# Patient Record
Sex: MALE | Race: WHITE | Employment: OTHER | URBAN - METROPOLITAN AREA
[De-identification: names, ages, dates, MRNs, and addresses within clinical notes are randomized per-mention and may not be internally consistent; named-entity substitution may affect disease eponyms.]

---

## 2017-03-27 ENCOUNTER — OFFICE VISIT (OUTPATIENT)
Dept: FAMILY MEDICINE | Facility: CLINIC | Age: 82
End: 2017-03-27
Payer: COMMERCIAL

## 2017-03-27 ENCOUNTER — TRANSFERRED RECORDS (OUTPATIENT)
Dept: HEALTH INFORMATION MANAGEMENT | Facility: CLINIC | Age: 82
End: 2017-03-27

## 2017-03-27 VITALS
WEIGHT: 180 LBS | HEIGHT: 72 IN | BODY MASS INDEX: 24.38 KG/M2 | TEMPERATURE: 96.7 F | DIASTOLIC BLOOD PRESSURE: 68 MMHG | SYSTOLIC BLOOD PRESSURE: 120 MMHG | OXYGEN SATURATION: 96 % | HEART RATE: 84 BPM

## 2017-03-27 DIAGNOSIS — R35.0 INCREASED FREQUENCY OF URINATION: ICD-10-CM

## 2017-03-27 DIAGNOSIS — Z12.5 SCREENING FOR PROSTATE CANCER: ICD-10-CM

## 2017-03-27 DIAGNOSIS — R30.0 BURNING WITH URINATION: Primary | ICD-10-CM

## 2017-03-27 LAB
ALBUMIN UR-MCNC: NEGATIVE MG/DL
APPEARANCE UR: CLEAR
BASOPHILS # BLD AUTO: 0.1 10E9/L (ref 0–0.2)
BASOPHILS NFR BLD AUTO: 2.8 %
BILIRUB UR QL STRIP: NEGATIVE
COLOR UR AUTO: YELLOW
DIFFERENTIAL METHOD BLD: ABNORMAL
EOSINOPHIL # BLD AUTO: 0.3 10E9/L (ref 0–0.7)
EOSINOPHIL NFR BLD AUTO: 6.9 %
ERYTHROCYTE [DISTWIDTH] IN BLOOD BY AUTOMATED COUNT: 15.5 % (ref 10–15)
GLUCOSE UR STRIP-MCNC: NEGATIVE MG/DL
HCT VFR BLD AUTO: 44.5 % (ref 40–53)
HGB BLD-MCNC: 15.1 G/DL (ref 13.3–17.7)
HGB UR QL STRIP: NEGATIVE
KETONES UR STRIP-MCNC: NEGATIVE MG/DL
LEUKOCYTE ESTERASE UR QL STRIP: NEGATIVE
LYMPHOCYTES # BLD AUTO: 0.8 10E9/L (ref 0.8–5.3)
LYMPHOCYTES NFR BLD AUTO: 19.5 %
MCH RBC QN AUTO: 28.5 PG (ref 26.5–33)
MCHC RBC AUTO-ENTMCNC: 33.9 G/DL (ref 31.5–36.5)
MCV RBC AUTO: 84 FL (ref 78–100)
MONOCYTES # BLD AUTO: 1.1 10E9/L (ref 0–1.3)
MONOCYTES NFR BLD AUTO: 26.6 %
NEUTROPHILS # BLD AUTO: 1.7 10E9/L (ref 1.6–8.3)
NEUTROPHILS NFR BLD AUTO: 44.2 %
NITRATE UR QL: NEGATIVE
PH UR STRIP: 5.5 PH (ref 5–7)
PLATELET # BLD AUTO: 342 10E9/L (ref 150–450)
RBC # BLD AUTO: 5.3 10E12/L (ref 4.4–5.9)
SP GR UR STRIP: 1.02 (ref 1–1.03)
URN SPEC COLLECT METH UR: NORMAL
UROBILINOGEN UR STRIP-ACNC: 0.2 EU/DL (ref 0.2–1)
WBC # BLD AUTO: 3.9 10E9/L (ref 4–11)

## 2017-03-27 PROCEDURE — 87186 SC STD MICRODIL/AGAR DIL: CPT | Performed by: PHYSICIAN ASSISTANT

## 2017-03-27 PROCEDURE — 36415 COLL VENOUS BLD VENIPUNCTURE: CPT | Performed by: PHYSICIAN ASSISTANT

## 2017-03-27 PROCEDURE — G0103 PSA SCREENING: HCPCS | Performed by: PHYSICIAN ASSISTANT

## 2017-03-27 PROCEDURE — 85025 COMPLETE CBC W/AUTO DIFF WBC: CPT | Performed by: PHYSICIAN ASSISTANT

## 2017-03-27 PROCEDURE — 87088 URINE BACTERIA CULTURE: CPT | Performed by: PHYSICIAN ASSISTANT

## 2017-03-27 PROCEDURE — 80048 BASIC METABOLIC PNL TOTAL CA: CPT | Performed by: PHYSICIAN ASSISTANT

## 2017-03-27 PROCEDURE — 81003 URINALYSIS AUTO W/O SCOPE: CPT | Performed by: PHYSICIAN ASSISTANT

## 2017-03-27 PROCEDURE — 87086 URINE CULTURE/COLONY COUNT: CPT | Performed by: PHYSICIAN ASSISTANT

## 2017-03-27 PROCEDURE — 99214 OFFICE O/P EST MOD 30 MIN: CPT | Performed by: PHYSICIAN ASSISTANT

## 2017-03-27 RX ORDER — TACROLIMUS 1 MG/G
OINTMENT TOPICAL
Refills: 2 | COMMUNITY
Start: 2017-03-26 | End: 2017-07-10

## 2017-03-27 RX ORDER — TAMSULOSIN HYDROCHLORIDE 0.4 MG/1
0.4 CAPSULE ORAL DAILY
Qty: 30 CAPSULE | Refills: 0 | Status: SHIPPED | OUTPATIENT
Start: 2017-03-27 | End: 2017-09-06

## 2017-03-27 RX ORDER — TRIAMCINOLONE ACETONIDE 1 MG/G
OINTMENT TOPICAL
Refills: 1 | COMMUNITY
Start: 2017-02-17 | End: 2017-07-10

## 2017-03-27 ASSESSMENT — ANXIETY QUESTIONNAIRES
GAD7 TOTAL SCORE: 3
IF YOU CHECKED OFF ANY PROBLEMS ON THIS QUESTIONNAIRE, HOW DIFFICULT HAVE THESE PROBLEMS MADE IT FOR YOU TO DO YOUR WORK, TAKE CARE OF THINGS AT HOME, OR GET ALONG WITH OTHER PEOPLE: NOT DIFFICULT AT ALL
1. FEELING NERVOUS, ANXIOUS, OR ON EDGE: SEVERAL DAYS
5. BEING SO RESTLESS THAT IT IS HARD TO SIT STILL: NOT AT ALL
7. FEELING AFRAID AS IF SOMETHING AWFUL MIGHT HAPPEN: NOT AT ALL
2. NOT BEING ABLE TO STOP OR CONTROL WORRYING: NOT AT ALL
3. WORRYING TOO MUCH ABOUT DIFFERENT THINGS: SEVERAL DAYS
6. BECOMING EASILY ANNOYED OR IRRITABLE: NOT AT ALL

## 2017-03-27 ASSESSMENT — PATIENT HEALTH QUESTIONNAIRE - PHQ9: 5. POOR APPETITE OR OVEREATING: SEVERAL DAYS

## 2017-03-27 NOTE — PROGRESS NOTES
SUBJECTIVE:                                                    Jeff Mack is a 82 year old male who presents to clinic today for the following health issues:    Genitourinary - Male     Onset: 1 month off and on    Description:   Dysuria (painful urination): YES- burning urination  Hematuria (blood in urine): no   Frequency: YES  Are you urinating at night : YES  Hesitancy (delay in urine): YES  Retention (unable to empty): YES  Decrease in urinary flow: YES  Incontinence: no     Progression of Symptoms:  intermittent    Accompanying Signs & Symptoms:  Fever: no   Back/Flank pain: YNO  Urethral discharge: no   Testicle lumps/masses/pain: no   Nausea and/or vomiting: no   Abdominal pain: YES- left lower quadrant.    History:   History of frequent UTI's: no   History of kidney stones: no   History of hernias: YES  Personal or Family history of Prostate problems: patient does not know family history prostate problems.   Sexually active: no     Precipitating factors:   nothing    Alleviating factors:  nothing         Therapies Tried and outcome: Increase fluid intake; gas x.        Jeff has hx of prostate resection, bladder diverticula and colonic diverticulitis.  Today he presents with 1 month hx of frequency of urination, urinary hesitancy, incomplete emptying.  Over the past few days, these symptoms are becoming more persistent and now he has some burning with urination.  No hematuria.  He does have some LLQ pain, no n/v/d or constipation noted, no f/c.               Problem list and histories reviewed & adjusted, as indicated.  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)     Cardiomegaly     Chronic ischemic heart disease      Essential hypertension, benign     Primary cardiomyopathy (H)     Hyperlipidemia with target LDL less than 100     Acute low back pain     Nonallopathic lesion of sacral region     Lumbar stenosis     Health Care Home     Advanced directives, counseling/discussion     Edema     Esophageal cancer (H)     Past Surgical History:   Procedure Laterality Date     BRONCHOSCOPY FLEXIBLE N/A 4/3/2015    Procedure: BRONCHOSCOPY FLEXIBLE;  Surgeon: Rlaph Catherine MD;  Location:  OR      EXCISION OF LINGUAL TONSIL      TONSILLECTOMY     C NONSPECIFIC PROCEDURE  2-99    Colonic polpectomy     C NONSPECIFIC PROCEDURE  9-92    TURP     C NONSPECIFIC PROCEDURE  1982    Retinal surgery     COLONOSCOPY       ESOPHAGOSCOPY, GASTROSCOPY, DUODENOSCOPY (EGD), COMBINED N/A 3/19/2015    Procedure: COMBINED ESOPHAGOSCOPY, GASTROSCOPY, DUODENOSCOPY (EGD), BIOPSY SINGLE OR MULTIPLE;  Surgeon: Alo Mauro MD;  Location: [facility]     ESOPHAGOSCOPY, GASTROSCOPY, DUODENOSCOPY (EGD), COMBINED N/A 7/14/2015    Procedure: COMBINED ESOPHAGOSCOPY, GASTROSCOPY, DUODENOSCOPY (EGD), BIOPSY SINGLE OR MULTIPLE;  Surgeon: Kathy Torres MD;  Location: [facility]     ESOPHAGOSCOPY, GASTROSCOPY, DUODENOSCOPY (EGD), COMBINED N/A 12/1/2015    Procedure: COMBINED ESOPHAGOSCOPY, GASTROSCOPY, DUODENOSCOPY (EGD), BIOPSY SINGLE OR MULTIPLE;  Surgeon: Alo Mauro MD;  Location: [facility]     ESOPHAGOSCOPY, GASTROSCOPY, DUODENOSCOPY (EGD), COMBINED N/A 3/17/2016    Procedure: COMBINED ESOPHAGOSCOPY, GASTROSCOPY, DUODENOSCOPY (EGD), BIOPSY SINGLE OR MULTIPLE;  Surgeon: Alo Mauro MD;  Location:  GI     ESOPHAGOSCOPY, GASTROSCOPY, DUODENOSCOPY (EGD), COMBINED N/A 7/21/2016    Procedure: COMBINED ESOPHAGOSCOPY, GASTROSCOPY, DUODENOSCOPY (EGD);  Surgeon: Alo Mauro MD;  Location:  GI     ESOPHAGOSCOPY, GASTROSCOPY, DUODENOSCOPY (EGD), COMBINED N/A 11/17/2016    Procedure: COMBINED ESOPHAGOSCOPY, GASTROSCOPY, DUODENOSCOPY (EGD);  Surgeon:  Alo Mauro MD;  Location:  GI     GASTROSTOMY, INSERT TUBE, COMBINED N/A 4/3/2015    Procedure: COMBINED GASTROSTOMY, INSERT TUBE (OPEN);  Surgeon: Ralph Catherine MD;  Location: [facility]     HC PRESSURE GRADIENT MEASUREMENT, INITIAL VESSEL  2005    essentially normal     HC UGI ENDOSCOPY W EUS N/A 2015    Procedure: COMBINED ENDOSCOPIC ULTRASOUND, ESOPHAGOSCOPY, GASTROSCOPY, DUODENOSCOPY (EGD);  Surgeon: Kathy Torres MD;  Location:  GI     LAMINECTOMY, FUSION LUMBAR ONE LEVEL, COMBINED  2012    Procedure: COMBINED LAMINECTOMY, FUSION LUMBAR ONE LEVEL;  Posterior Fusion L5-S1, Total Facetectomy L5-S1 Left ;  Surgeon: Ronald Espinosa MD;  Location:  OR       Social History   Substance Use Topics     Smoking status: Former Smoker     Packs/day: 2.00     Years: 40.00     Types: Cigarettes     Quit date: 1975     Smokeless tobacco: Never Used     Alcohol use No     Family History   Problem Relation Age of Onset     Hypertension Mother      CEREBROVASCULAR DISEASE Mother      DIABETES Brother      DIABETES Father       age 97         Current Outpatient Prescriptions   Medication Sig Dispense Refill     tamsulosin (FLOMAX) 0.4 MG capsule Take 1 capsule (0.4 mg) by mouth daily 30 capsule 0     budesonide-formoterol (SYMBICORT) 160-4.5 MCG/ACT inhaler Inhale 2 puffs into the lungs 2 times daily       CITALOPRAM HYDROBROMIDE PO        cyanocolbalamin (VITAMIN  B-12) 100 MCG tablet Take 50 mcg by mouth daily       Cholecalciferol (VITAMIN D3) 5000 UNITS TABS Take  by mouth.       triamcinolone (KENALOG) 0.1 % ointment APPLY TO AFFECTED AREA ON TRUNK TWICE DAILY -SHOULD LAST APPROXIMATELY 1 MONTH  1     tacrolimus (PROTOPIC) 0.1 % ointment   2     TRAMADOL HCL PO        ALPRAZolam (XANAX) 0.5 MG tablet Take 1 tablet (0.5 mg) by mouth 2 times daily as needed for anxiety 45 tablet 0     fluticasone (FLOVENT HFA) 220 MCG/ACT inhaler Inhale 2 puffs into the lungs 2 times daily        EPINEPHrine (EPIPEN) 0.3 MG/0.3ML injection Inject 0.3 mLs (0.3 mg) into the muscle once as needed for anaphylaxis 1 each 1     MAXAIR AUTOHALER 200 MCG/INH IN AERB 2 puffs tid prn 1 5     Allergies   Allergen Reactions     Bee Venom Shortness Of Breath       Reviewed and updated as needed this visit by clinical staff       Reviewed and updated as needed this visit by Provider         ROS:  Constitutional, HEENT, cardiovascular, pulmonary, gi and gu systems are negative, except as otherwise noted.    OBJECTIVE:                                                    /68 (BP Location: Left arm, Patient Position: Chair, Cuff Size: Adult Large)  Pulse 84  Temp 96.7  F (35.9  C) (Tympanic)  Ht 6' (1.829 m)  Wt 180 lb (81.6 kg)  SpO2 96%  BMI 24.41 kg/m2  Body mass index is 24.41 kg/(m^2).  GENERAL: healthy, alert and no distress  RESP: lungs clear to auscultation - no rales, rhonchi or wheezes  CV: regular rate and rhythm, normal S1 S2, no S3 or S4, no murmur, click or rub  ABDOMEN: soft, mild LLQ TTP, no rebound or guarding, no hepatosplenomegaly, no masses and bowel sounds normal  PSYCH: mentation appears normal, affect normal/bright    Diagnostic Test Results:  Results for orders placed or performed in visit on 03/27/17 (from the past 24 hour(s))   CBC with platelets differential   Result Value Ref Range    WBC 3.9 (L) 4.0 - 11.0 10e9/L    RBC Count 5.30 4.4 - 5.9 10e12/L    Hemoglobin 15.1 13.3 - 17.7 g/dL    Hematocrit 44.5 40.0 - 53.0 %    MCV 84 78 - 100 fl    MCH 28.5 26.5 - 33.0 pg    MCHC 33.9 31.5 - 36.5 g/dL    RDW 15.5 (H) 10.0 - 15.0 %    Platelet Count 342 150 - 450 10e9/L    Diff Method Automated Method     % Neutrophils 44.2 %    % Lymphocytes 19.5 %    % Monocytes 26.6 %    % Eosinophils 6.9 %    % Basophils 2.8 %    Absolute Neutrophil 1.7 1.6 - 8.3 10e9/L    Absolute Lymphocytes 0.8 0.8 - 5.3 10e9/L    Absolute Monocytes 1.1 0.0 - 1.3 10e9/L    Absolute Eosinophils 0.3 0.0 - 0.7 10e9/L     Absolute Basophils 0.1 0.0 - 0.2 10e9/L   *UA reflex to Microscopic and Culture ([place] and [facility] (except [place] and [place])   Result Value Ref Range    Color Urine Yellow     Appearance Urine Clear     Glucose Urine Negative NEG mg/dL    Bilirubin Urine Negative NEG    Ketones Urine Negative NEG mg/dL    Specific Gravity Urine 1.020 1.003 - 1.035    Blood Urine Negative NEG    pH Urine 5.5 5.0 - 7.0 pH    Protein Albumin Urine Negative NEG mg/dL    Urobilinogen Urine 0.2 0.2 - 1.0 EU/dL    Nitrite Urine Negative NEG    Leukocyte Esterase Urine Negative NEG    Source Midstream Urine         ASSESSMENT/PLAN:                                                      1. Burning with urination  UA normal, will await culture. Etiology is likely BPH, I am still awaiting his PSA at this time.  Given his LLQ pain and hx of incomplete voiding, I will also get a renal and bladder US with post void residual to assess for obstructive process.  Today we started him on Flomax for symptoms improvement, i will follow up with him when I have his US available.  - *UA reflex to Microscopic and Culture ([place] and [facility] (except [place] and [place])  - Urine Culture Aerobic Bacterial  - CBC with platelets differential  - Basic metabolic panel    2. Increased frequency of urination  - Basic metabolic panel  - tamsulosin (FLOMAX) 0.4 MG capsule; Take 1 capsule (0.4 mg) by mouth daily  Dispense: 30 capsule; Refill: 0  - US Renal Complete; Future    3. Screening for prostate cancer  - PSA, screen    See Patient Instructions    Flako Butterfield PA-C  [facility]

## 2017-03-27 NOTE — NURSING NOTE
Chief Complaint   Patient presents with     Abdominal Pain       Initial /68 (BP Location: Left arm, Patient Position: Chair, Cuff Size: Adult Large)  Pulse 84  Temp 96.7  F (35.9  C) (Tympanic)  Ht 6' (1.829 m)  Wt 180 lb (81.6 kg)  SpO2 96%  BMI 24.41 kg/m2 Estimated body mass index is 24.41 kg/(m^2) as calculated from the following:    Height as of this encounter: 6' (1.829 m).    Weight as of this encounter: 180 lb (81.6 kg).  Medication Reconciliation: complete Trinidad Grant MA

## 2017-03-27 NOTE — MR AVS SNAPSHOT
"              After Visit Summary   3/27/2017    Jeff Mack    MRN: [id number]           Patient Information     Date Of Birth          1935        Visit Information        Provider Department      3/27/2017 11:00 AM Flako Butterfield PA-C [facility]        Today's Diagnoses     Burning with urination    -  1    Increased frequency of urination        Screening for prostate cancer           Follow-ups after your visit        Future tests that were ordered for you today     Open Future Orders        Priority Expected Expires Ordered    US Renal Complete Routine 6/25/2017 3/27/2018 3/27/2017            Who to contact     If you have questions or need follow up information about today's clinic visit or your schedule please contact [facility] directly at [phone].  Normal or non-critical lab and imaging results will be communicated to you by MyChart, letter or phone within 4 business days after the clinic has received the results. If you do not hear from us within 7 days, please contact the clinic through MyChart or phone. If you have a critical or abnormal lab result, we will notify you by phone as soon as possible.  Submit refill requests through [employer] or call your pharmacy and they will forward the refill request to us. Please allow 3 business days for your refill to be completed.          Additional Information About Your Visit        MyChart Information     [employer] lets you send messages to your doctor, view your test results, renew your prescriptions, schedule appointments and more. To sign up, go to [email or web address] . Click on \"Log in\" on the left side of the screen, which will take you to the Welcome page. Then click on \"Sign up Now\" on the right side of the page.     You will be asked to enter the access code listed below, as well as some personal information. Please follow the directions to create your username and password.     Your access " code is: [email or web address]  Expires: 2017  6:23 PM     Your access code will  in 90 days. If you need help or a new code, please call your [place] clinic or [phone].        Care EveryWhere ID     This is your Care EveryWhere ID. This could be used by other organizations to access your [place] medical records  [id number]        Your Vitals Were     Pulse Temperature Height Pulse Oximetry BMI (Body Mass Index)       84 96.7  F (35.9  C) (Tympanic) 6' (1.829 m) 96% 24.41 kg/m2        Blood Pressure from Last 3 Encounters:   17 120/68   16 150/81   16 127/74    Weight from Last 3 Encounters:   17 180 lb (81.6 kg)   16 175 lb (79.4 kg)   16 175 lb (79.4 kg)              We Performed the Following     *UA reflex to Microscopic and Culture ([place] and [facility] (except [place] and [place])     Basic metabolic panel     CBC with platelets differential     PSA, screen     Urine Culture Aerobic Bacterial          Today's Medication Changes          These changes are accurate as of: 3/27/17  6:23 PM.  If you have any questions, ask your nurse or doctor.               Start taking these medicines.        Dose/Directions    tamsulosin 0.4 MG capsule   Commonly known as:  FLOMAX   Used for:  Increased frequency of urination   Started by:  Flako Butterfield PA-C        Dose:  0.4 mg   Take 1 capsule (0.4 mg) by mouth daily   Quantity:  30 capsule   Refills:  0         These medicines have changed or have updated prescriptions.        Dose/Directions    ALPRAZolam 0.5 MG tablet   Commonly known as:  XANAX   This may have changed:  Another medication with the same name was removed. Continue taking this medication, and follow the directions you see here.   Used for:  Adjustment disorder with mixed anxiety and depressed mood   Changed by:  Benjamin Lazar MD        Dose:  0.5 mg   Take 1 tablet (0.5 mg) by mouth 2 times daily as needed for anxiety   Quantity:  45 tablet    Refills:  0         Stop taking these medicines if you haven't already. Please contact your care team if you have questions.     FORADIL AEROLIZER 12 MCG capsule   Generic drug:  formoterol   Stopped by:  Flako Butterfield PA-C                Where to get your medicines      These medications were sent to [place] [address] [facility] - [place], [place] - [address]  [address] [facility], [facility] [address]     Phone:  [phone]     tamsulosin 0.4 MG capsule                Primary Care Provider Office Phone # Fax #    Benjamin Lazar [phone]       [place] [id number] [facility] [address]        Thank you!     Thank you for choosing [facility]  [place]. Our goal is always to provide you with excellent care. Hearing back from our patients is one way we can continue to improve our services. Please take a few minutes to complete the written survey that you may receive in the mail after your visit with us. Thank you!             Your Updated Medication List - Protect others around you: Learn how to safely use, store and throw away your medicines at www.disposemymeds.org.          This list is accurate as of: 3/27/17  6:23 PM.  Always use your most recent med list.                   Brand Name Dispense Instructions for use    ALPRAZolam 0.5 MG tablet    XANAX    45 tablet    Take 1 tablet (0.5 mg) by mouth 2 times daily as needed for anxiety       budesonide-formoterol 160-4.5 MCG/ACT Inhaler    SYMBICORT     Inhale 2 puffs into the lungs 2 times daily       cholecalciferol 5000 UNITS Tabs tablet    vitamin D3     Take  by mouth.       CITALOPRAM HYDROBROMIDE PO          cyanocolbalamin 100 MCG tablet    vitamin  B-12     Take 50 mcg by mouth daily       EPINEPHrine 0.3 MG/0.3ML injection    EPIPEN    1 each    Inject 0.3 mLs (0.3 mg) into the muscle once as needed for anaphylaxis       fluticasone 220 MCG/ACT Inhaler    FLOVENT  HFA     Inhale 2 puffs into the lungs 2 times daily       MAXAIR AUTOHALER 200 MCG/INH Inhaler   Generic drug:  pirbuterol     1    2 puffs tid prn       tacrolimus 0.1 % ointment    PROTOPIC         tamsulosin 0.4 MG capsule    FLOMAX    30 capsule    Take 1 capsule (0.4 mg) by mouth daily       TRAMADOL HCL PO          triamcinolone 0.1 % ointment    KENALOG     APPLY TO AFFECTED AREA ON TRUNK TWICE DAILY -SHOULD LAST APPROXIMATELY 1 MONTH

## 2017-03-28 ENCOUNTER — TELEPHONE (OUTPATIENT)
Dept: FAMILY MEDICINE | Facility: CLINIC | Age: 82
End: 2017-03-28

## 2017-03-28 DIAGNOSIS — N30.00 ACUTE CYSTITIS WITHOUT HEMATURIA: Primary | ICD-10-CM

## 2017-03-28 LAB
ANION GAP SERPL CALCULATED.3IONS-SCNC: 4 MMOL/L (ref 3–14)
BACTERIA SPEC CULT: ABNORMAL
BUN SERPL-MCNC: 10 MG/DL (ref 7–30)
CALCIUM SERPL-MCNC: 8.9 MG/DL (ref 8.5–10.1)
CHLORIDE SERPL-SCNC: 103 MMOL/L (ref 94–109)
CO2 SERPL-SCNC: 32 MMOL/L (ref 20–32)
CREAT SERPL-MCNC: 0.81 MG/DL (ref 0.66–1.25)
GFR SERPL CREATININE-BSD FRML MDRD: NORMAL ML/MIN/1.7M2
GLUCOSE SERPL-MCNC: 84 MG/DL (ref 70–99)
MICRO REPORT STATUS: ABNORMAL
MICROORGANISM SPEC CULT: ABNORMAL
POTASSIUM SERPL-SCNC: 4.4 MMOL/L (ref 3.4–5.3)
PSA SERPL-ACNC: 0.62 UG/L (ref 0–4)
SODIUM SERPL-SCNC: 139 MMOL/L (ref 133–144)
SPECIMEN SOURCE: ABNORMAL

## 2017-03-28 RX ORDER — SULFAMETHOXAZOLE/TRIMETHOPRIM 800-160 MG
1 TABLET ORAL 2 TIMES DAILY
Qty: 20 TABLET | Refills: 0 | Status: SHIPPED | OUTPATIENT
Start: 2017-03-28 | End: 2017-07-10

## 2017-03-28 ASSESSMENT — ANXIETY QUESTIONNAIRES: GAD7 TOTAL SCORE: 3

## 2017-03-28 ASSESSMENT — PATIENT HEALTH QUESTIONNAIRE - PHQ9: SUM OF ALL RESPONSES TO PHQ QUESTIONS 1-9: 2

## 2017-03-28 NOTE — TELEPHONE ENCOUNTER
Left non detailed message for patient to return call.  Myra Oneill RN   [facility] - Triage

## 2017-03-28 NOTE — TELEPHONE ENCOUNTER
Patient informed and agrees with plan.  Will  Rx and start today  No further questions    Edda Rodney RN

## 2017-03-28 NOTE — TELEPHONE ENCOUNTER
Called and spoke with patient about results of US, no follow up needed, bladder US shows only bladder diverticulum, some debris in the bladder likely secondary to infection. he will begin taking bactrim for UTI and follow up if new or worsening symptoms

## 2017-03-28 NOTE — TELEPHONE ENCOUNTER
Patient prescribed flomax capsule yesterday.  Patient report that he cannot take the capsule whole and it's too big in size.  Wondering if he can open the capsule and place the content in apple sauce.    Message handled by Nurse Triage with [facility] - provider name: Flako Butterfield PA-C.    That's fine.  Just don't crush the capsule.  He can open it and take the content with apple sauce.    Patient informed and agrees with plan    Edda Rodney RN

## 2017-03-28 NOTE — TELEPHONE ENCOUNTER
Attempted to call patient x 1 with results of urine culture showing + UTI ( grew out e coli).  I do not have susceptibilities back yet but given his symptoms I would like to start him on bactrim BID x 10 days. Please try and call him to inform him of this.   He may take this and I would presume symptoms will improve without the help of flomax, so he may not need the flomax any longer.  I do not have the results of the US back yet but will call him when I do.

## 2017-03-29 ENCOUNTER — TELEPHONE (OUTPATIENT)
Dept: FAMILY MEDICINE | Facility: CLINIC | Age: 82
End: 2017-03-29

## 2017-03-29 DIAGNOSIS — N30.00 ACUTE CYSTITIS WITHOUT HEMATURIA: Primary | ICD-10-CM

## 2017-03-29 RX ORDER — CEPHALEXIN 500 MG/1
500 CAPSULE ORAL 4 TIMES DAILY
Qty: 40 CAPSULE | Refills: 0 | Status: SHIPPED | OUTPATIENT
Start: 2017-03-29 | End: 2017-04-08

## 2017-03-29 NOTE — TELEPHONE ENCOUNTER
Patient contacted with information from provider noted below.  Patient agrees with plan.  Brooklyn Barber RN  [facility] [employer]

## 2017-03-29 NOTE — TELEPHONE ENCOUNTER
Jeff was placed on bactrim empirically for uti yesterday.  His culture today grew out resistant bacteria.  Please call him and inform him that I would like to switch him to Levaquin 750 QD x 5 days.  Please let me know if he has questions.

## 2017-03-29 NOTE — TELEPHONE ENCOUNTER
Drug interactions noted with levaquin, Patient can pickup keflex QID x 10 days at his pharmacy.   This has been ordered

## 2017-03-29 NOTE — TELEPHONE ENCOUNTER
Last messages from Flako as below from yesterday and today     3/28/17 -   Attempted to call patient x 1 with results of urine culture showing + UTI ( grew out e coli). I do not have susceptibilities back yet but given his symptoms I would like to start him on bactrim BID x 10 days. Please try and call him to inform him of this. He may take this and I would presume symptoms will improve without the help of flomax, so he may not need the flomax any longer. I do not have the results of the US back yet but will call him when I do.      3/29/17  Jeff was placed on bactrim empirically for uti yesterday. His culture today grew out resistant bacteria. Please call him and inform him that I would like to switch him to Levaquin 750 QD x 5 days. Please let me know if he has questions.     Drug interactions noted with levaquin, Patient can pickup keflex QID x 10 days at his pharmacy. This has been ordered    Patient called back, advised to stop sulfamethoxazole-trimethoprim (BACTRIM DS/SEPTRA DS) 800-160 MG per tablet  and begin Keflex QID for 10 days.  Agrees with plan, will  new abx from pharmacy and started today        Edda Rodney RN

## 2017-05-09 ENCOUNTER — SURGERY (OUTPATIENT)
Age: 82
End: 2017-05-09

## 2017-05-09 ENCOUNTER — HOSPITAL ENCOUNTER (OUTPATIENT)
Facility: CLINIC | Age: 82
Discharge: HOME | End: 2017-05-09
Attending: SPECIALIST | Admitting: SPECIALIST
Payer: MEDICARE

## 2017-05-09 VITALS
SYSTOLIC BLOOD PRESSURE: 137 MMHG | HEIGHT: 72 IN | BODY MASS INDEX: 24.11 KG/M2 | OXYGEN SATURATION: 98 % | DIASTOLIC BLOOD PRESSURE: 109 MMHG | RESPIRATION RATE: 10 BRPM | WEIGHT: 178 LBS

## 2017-05-09 LAB — UPPER GI ENDOSCOPY: NORMAL

## 2017-05-09 PROCEDURE — 25000132 ZZH RX MED GY IP 250 OP 250 PS 637: Mod: GY | Performed by: SPECIALIST

## 2017-05-09 PROCEDURE — 40000104 ZZH STATISTIC MODERATE SEDATION < 10 MIN: Performed by: SPECIALIST

## 2017-05-09 PROCEDURE — 25000128 H RX IP 250 OP 636: Performed by: SPECIALIST

## 2017-05-09 PROCEDURE — 43235 EGD DIAGNOSTIC BRUSH WASH: CPT | Performed by: SPECIALIST

## 2017-05-09 PROCEDURE — 25000125 ZZHC RX 250: Performed by: SPECIALIST

## 2017-05-09 RX ORDER — ONDANSETRON 2 MG/ML
4 INJECTION INTRAMUSCULAR; INTRAVENOUS
Status: DISCONTINUED | OUTPATIENT
Start: 2017-05-09 | End: 2017-05-09 | Stop reason: HOSPADM

## 2017-05-09 RX ORDER — FENTANYL CITRATE 50 UG/ML
INJECTION, SOLUTION INTRAMUSCULAR; INTRAVENOUS PRN
Status: DISCONTINUED | OUTPATIENT
Start: 2017-05-09 | End: 2017-05-09 | Stop reason: HOSPADM

## 2017-05-09 RX ORDER — BETAMETHASONE DIPROPIONATE 0.5 MG/G
CREAM TOPICAL 2 TIMES DAILY
COMMUNITY
End: 2017-10-31

## 2017-05-09 RX ORDER — LIDOCAINE 40 MG/G
CREAM TOPICAL
Status: DISCONTINUED | OUTPATIENT
Start: 2017-05-09 | End: 2017-05-09 | Stop reason: HOSPADM

## 2017-05-09 RX ADMIN — MIDAZOLAM HYDROCHLORIDE 0.5 MG: 1 INJECTION, SOLUTION INTRAMUSCULAR; INTRAVENOUS at 08:39

## 2017-05-09 RX ADMIN — FENTANYL CITRATE 25 MCG: 50 INJECTION, SOLUTION INTRAMUSCULAR; INTRAVENOUS at 08:43

## 2017-05-09 RX ADMIN — MIDAZOLAM HYDROCHLORIDE 0.5 MG: 1 INJECTION, SOLUTION INTRAMUSCULAR; INTRAVENOUS at 08:43

## 2017-05-09 RX ADMIN — FENTANYL CITRATE 25 MCG: 50 INJECTION, SOLUTION INTRAMUSCULAR; INTRAVENOUS at 08:39

## 2017-05-09 RX ADMIN — BENZOCAINE 1 SPRAY: 220 SPRAY, METERED PERIODONTAL at 08:39

## 2017-05-09 NOTE — BRIEF OP NOTE
[facility] Brief Operative Note    Pre-operative diagnosis: ESOPHOGEAL CANCER   Post-operative diagnosis hiatus hernia, scarring in proximal esophagus from prior esophageal cancer treatment.      Procedure: Procedure(s):  (EGD) COMBINED ESOPHAGOSCOPY, GASTROSCOPY, DUODENOSCOPY - Wound Class: II-Clean Contaminated   Surgeon(s): Surgeon(s) and Role:     * Alo Mauro MD - Primary   Estimated blood loss: * No values recorded between 5/9/2017 12:00 AM and 5/9/2017  8:57 AM *    Specimens: * No specimens in log *   Findings: Please see ProVation procedure note in Chart Review

## 2017-05-09 NOTE — H&P
Pre-Endoscopy History and Physical     Jeff Mack MRN# [id number]   YOB: 1935 Age: 82 year old     Date of Procedure: 5/9/2017  Primary care provider: Benjamin Lazar  Type of Endoscopy: Esophagogastroduodenoscopy with possible biopsy, possible dilation, possible foreign body removal  Reason for Procedure: follow up esophageal cancer  Type of Anesthesia Anticipated: Conscious Sedation    HPI:    Jeff is a 82 year old male who will be undergoing the above procedure.      A history and physical has been performed. The patient's medications and allergies have been reviewed. The risks and benefits of the procedure and the sedation options and risks were discussed with the patient.  All questions were answered and informed consent was obtained.      He denies a personal or family history of anesthesia complications or bleeding disorders.     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)     Cardiomegaly     Chronic ischemic heart disease     Essential hypertension, benign     Primary cardiomyopathy (H)     Hyperlipidemia with target LDL less than 100     Acute low back pain     Nonallopathic lesion of sacral region     Lumbar stenosis     Health Care Home     Advanced directives, counseling/discussion     Edema     Esophageal cancer (H)        Past Medical History:   Diagnosis Date     Anxiety state, unspecified      Asthma      Basal cell carcinoma      Benign neoplasm of colon      Cardiomegaly 1/6/2005     Chronic ischemic heart disease, unspecified      Constipation      Depressive disorder, not elsewhere classified      Diverticulosis of colon (without mention of hemorrhage)      Esophageal cancer (H) 3/23/2015     Essential hypertension, benign      Extrinsic  asthma, unspecified      Hiatal hernia      Hypertrophy (benign) of prostate      Internal hemorrhoids without mention of complication      Other primary cardiomyopathies 2003    EF 45%     Sciatica      Spondylosis of unspecified site without mention of myelopathy         Past Surgical History:   Procedure Laterality Date     BRONCHOSCOPY FLEXIBLE N/A 4/3/2015    Procedure: BRONCHOSCOPY FLEXIBLE;  Surgeon: Ralph Catherine MD;  Location:  OR      EXCISION OF LINGUAL TONSIL      TONSILLECTOMY     C NONSPECIFIC PROCEDURE  2-99    Colonic polpectomy     C NONSPECIFIC PROCEDURE  9-92    TURP     C NONSPECIFIC PROCEDURE  1982    Retinal surgery     COLONOSCOPY       ESOPHAGOSCOPY, GASTROSCOPY, DUODENOSCOPY (EGD), COMBINED N/A 3/19/2015    Procedure: COMBINED ESOPHAGOSCOPY, GASTROSCOPY, DUODENOSCOPY (EGD), BIOPSY SINGLE OR MULTIPLE;  Surgeon: Alo Mauro MD;  Location: [facility]     ESOPHAGOSCOPY, GASTROSCOPY, DUODENOSCOPY (EGD), COMBINED N/A 7/14/2015    Procedure: COMBINED ESOPHAGOSCOPY, GASTROSCOPY, DUODENOSCOPY (EGD), BIOPSY SINGLE OR MULTIPLE;  Surgeon: Kathy Torres MD;  Location: [facility]     ESOPHAGOSCOPY, GASTROSCOPY, DUODENOSCOPY (EGD), COMBINED N/A 12/1/2015    Procedure: COMBINED ESOPHAGOSCOPY, GASTROSCOPY, DUODENOSCOPY (EGD), BIOPSY SINGLE OR MULTIPLE;  Surgeon: Alo Mauro MD;  Location: [facility]     ESOPHAGOSCOPY, GASTROSCOPY, DUODENOSCOPY (EGD), COMBINED N/A 3/17/2016    Procedure: COMBINED ESOPHAGOSCOPY, GASTROSCOPY, DUODENOSCOPY (EGD), BIOPSY SINGLE OR MULTIPLE;  Surgeon: Alo Mauro MD;  Location:  GI     ESOPHAGOSCOPY, GASTROSCOPY, DUODENOSCOPY (EGD), COMBINED N/A 7/21/2016    Procedure: COMBINED ESOPHAGOSCOPY, GASTROSCOPY, DUODENOSCOPY (EGD);  Surgeon: Alo Mauro MD;  Location:  GI     ESOPHAGOSCOPY, GASTROSCOPY, DUODENOSCOPY (EGD), COMBINED N/A 11/17/2016    Procedure: COMBINED ESOPHAGOSCOPY, GASTROSCOPY, DUODENOSCOPY (EGD);  Surgeon: Alo Mauro MD;  [facility]     GASTROSTOMY, INSERT TUBE, COMBINED N/A 4/3/2015    Procedure: COMBINED GASTROSTOMY, INSERT TUBE (OPEN);  Surgeon: Ralph Catherine MD;  Location: [facility]     HC PRESSURE GRADIENT MEASUREMENT, INITIAL VESSEL  2005    essentially normal     HC UGI ENDOSCOPY W EUS N/A 2015    Procedure: COMBINED ENDOSCOPIC ULTRASOUND, ESOPHAGOSCOPY, GASTROSCOPY, DUODENOSCOPY (EGD);  Surgeon: Kathy Torres MD;  Location:  GI     LAMINECTOMY, FUSION LUMBAR ONE LEVEL, COMBINED  2012    Procedure: COMBINED LAMINECTOMY, FUSION LUMBAR ONE LEVEL;  Posterior Fusion L5-S1, Total Facetectomy L5-S1 Left ;  Surgeon: Ronald Espinosa MD;  Location:  OR       Social History   Substance Use Topics     Smoking status: Former Smoker     Packs/day: 2.00     Years: 40.00     Types: Cigarettes     Quit date: 1975     Smokeless tobacco: Never Used     Alcohol use No       Family History   Problem Relation Age of Onset     Hypertension Mother      CEREBROVASCULAR DISEASE Mother      DIABETES Brother      DIABETES Father       age 97       Prior to Admission medications    Medication Sig Start Date End Date Taking? Authorizing Provider   betamethasone dipropionate (DIPROSONE) 0.05 % cream Apply topically 2 times daily   Yes Reported, Patient   budesonide-formoterol (SYMBICORT) 160-4.5 MCG/ACT inhaler Inhale 2 puffs into the lungs 2 times daily   Yes Reported, Patient   sulfamethoxazole-trimethoprim (BACTRIM DS/SEPTRA DS) 800-160 MG per tablet Take 1 tablet by mouth 2 times daily 3/28/17   Flako Butterfield PA-C   triamcinolone (KENALOG) 0.1 % ointment APPLY TO AFFECTED AREA ON TRUNK TWICE DAILY -SHOULD LAST APPROXIMATELY 1 MONTH 17   Reported, Patient   tacrolimus (PROTOPIC) 0.1 % ointment  3/26/17   Reported, Patient   tamsulosin (FLOMAX) 0.4 MG capsule Take 1 capsule (0.4 mg) by mouth daily 3/27/17   Flako Butterfield PA-C   TRAMADOL HCL PO     Reported, Patient   ALPRAZolam (XANAX)  0.5 MG tablet Take 1 tablet (0.5 mg) by mouth 2 times daily as needed for anxiety 4/8/16   Benjamin Lazar MD   CITALOPRAM HYDROBROMIDE PO     Reported, Patient   fluticasone (FLOVENT HFA) 220 MCG/ACT inhaler Inhale 2 puffs into the lungs 2 times daily    Reported, Patient   cyanocolbalamin (VITAMIN  B-12) 100 MCG tablet Take 50 mcg by mouth daily    Reported, Patient   EPINEPHrine (EPIPEN) 0.3 MG/0.3ML injection Inject 0.3 mLs (0.3 mg) into the muscle once as needed for anaphylaxis 9/2/13   Juan Francisco Hanks MD   Cholecalciferol (VITAMIN D3) 5000 UNITS TABS Take  by mouth.    Reported, Patient   MAXAIR AUTOHALER 200 MCG/INH IN AERB 2 puffs tid prn 12/31/04   Jamir Velasquez MD       Allergies   Allergen Reactions     Bee Venom Shortness Of Breath        REVIEW OF SYSTEMS:   5 point ROS negative except as noted above in HPI, including Gen., Resp., CV, GI &  system review.    PHYSICAL EXAM:   /81  Resp 16  Ht 1.829 m (6')  Wt 80.7 kg (178 lb)  SpO2 100%  BMI 24.14 kg/m2 Estimated body mass index is 24.14 kg/(m^2) as calculated from the following:    Height as of this encounter: 1.829 m (6').    Weight as of this encounter: 80.7 kg (178 lb).   GENERAL APPEARANCE: alert, and oriented  MENTAL STATUS: alert  AIRWAY EXAM: Mallampatti Class I (visualization of the soft palate, fauces, uvula, anterior and posterior pillars)  RESP: lungs clear to auscultation - no rales, rhonchi or wheezes  CV: regular rates and rhythm  DIAGNOSTICS:    Not indicated    IMPRESSION   ASA Class 2 - Mild systemic disease    PLAN:   Plan for Esophagogastroduodenoscopy with possible biopsy, possible dilation, possible foreign body removal. We discussed the risks, benefits and alternatives and the patient wished to proceed.    The above has been forwarded to the consulting provider.      Signed Electronically by: Alo Mauro  May 9, 2017

## 2017-05-15 ENCOUNTER — TRANSFERRED RECORDS (OUTPATIENT)
Dept: HEALTH INFORMATION MANAGEMENT | Facility: CLINIC | Age: 82
End: 2017-05-15

## 2017-07-03 ENCOUNTER — TELEPHONE (OUTPATIENT)
Dept: FAMILY MEDICINE | Facility: CLINIC | Age: 82
End: 2017-07-03

## 2017-07-03 NOTE — TELEPHONE ENCOUNTER
Scheduled office visit for 7/10 for evaluation of back issues and get refill of Alprazolam    Alprazolam      Last Written Prescription Date: 4/8/16  Last Fill Quantity: 48,  # refills: 0   Last Office Visit with [facility], P or [facility] prescribing provider: 3/27/17                                         Next 5 appointments (look out 90 days)     Jul 10, 2017 11:40 AM CDT   Office Visit with Benjamin Lazar MD   [facility] ([facility])    [address] [place] [id number] [phone]                 Still has a few pills left from 4/8/16 prescription, advised to wait until 7/10 appointment to discuss refill with Dr. Lazar.   Patient agreed with plan.   STALIN Deras

## 2017-07-10 ENCOUNTER — OFFICE VISIT (OUTPATIENT)
Dept: FAMILY MEDICINE | Facility: CLINIC | Age: 82
End: 2017-07-10
Payer: COMMERCIAL

## 2017-07-10 VITALS
WEIGHT: 174 LBS | OXYGEN SATURATION: 97 % | SYSTOLIC BLOOD PRESSURE: 126 MMHG | BODY MASS INDEX: 23.57 KG/M2 | RESPIRATION RATE: 12 BRPM | HEART RATE: 86 BPM | HEIGHT: 72 IN | TEMPERATURE: 97.9 F | DIASTOLIC BLOOD PRESSURE: 74 MMHG

## 2017-07-10 DIAGNOSIS — F43.23 ADJUSTMENT DISORDER WITH MIXED ANXIETY AND DEPRESSED MOOD: ICD-10-CM

## 2017-07-10 DIAGNOSIS — Z23 NEED FOR PROPHYLACTIC VACCINATION AGAINST STREPTOCOCCUS PNEUMONIAE (PNEUMOCOCCUS): ICD-10-CM

## 2017-07-10 DIAGNOSIS — F41.9 ANXIETY: Primary | ICD-10-CM

## 2017-07-10 DIAGNOSIS — Z13.31 SCREENING FOR DEPRESSION: ICD-10-CM

## 2017-07-10 DIAGNOSIS — M54.30 SCIATICA, UNSPECIFIED LATERALITY: ICD-10-CM

## 2017-07-10 PROCEDURE — 99214 OFFICE O/P EST MOD 30 MIN: CPT | Performed by: FAMILY MEDICINE

## 2017-07-10 RX ORDER — ALPRAZOLAM 0.5 MG
0.5 TABLET ORAL 2 TIMES DAILY PRN
Qty: 45 TABLET | Refills: 0 | Status: SHIPPED | OUTPATIENT
Start: 2017-07-10 | End: 2020-01-01

## 2017-07-10 ASSESSMENT — ANXIETY QUESTIONNAIRES
GAD7 TOTAL SCORE: 1
2. NOT BEING ABLE TO STOP OR CONTROL WORRYING: NOT AT ALL
7. FEELING AFRAID AS IF SOMETHING AWFUL MIGHT HAPPEN: NOT AT ALL
1. FEELING NERVOUS, ANXIOUS, OR ON EDGE: SEVERAL DAYS
5. BEING SO RESTLESS THAT IT IS HARD TO SIT STILL: NOT AT ALL
6. BECOMING EASILY ANNOYED OR IRRITABLE: NOT AT ALL
3. WORRYING TOO MUCH ABOUT DIFFERENT THINGS: NOT AT ALL
IF YOU CHECKED OFF ANY PROBLEMS ON THIS QUESTIONNAIRE, HOW DIFFICULT HAVE THESE PROBLEMS MADE IT FOR YOU TO DO YOUR WORK, TAKE CARE OF THINGS AT HOME, OR GET ALONG WITH OTHER PEOPLE: NOT DIFFICULT AT ALL

## 2017-07-10 ASSESSMENT — PATIENT HEALTH QUESTIONNAIRE - PHQ9: 5. POOR APPETITE OR OVEREATING: NOT AT ALL

## 2017-07-10 NOTE — MR AVS SNAPSHOT
After Visit Summary   7/10/2017    Jeff Mack    MRN: [id number]           Patient Information     Date Of Birth          1935        Visit Information        Provider Department      7/10/2017 11:40 AM Benjamin Lazar MD [facility]        Today's Diagnoses     Anxiety    -  1    Screening for depression        Need for prophylactic vaccination against Streptococcus pneumoniae (pneumococcus)        Adjustment disorder with mixed anxiety and depressed mood        Sciatica, unspecified laterality           Follow-ups after your visit        Additional Services     MENTAL HEALTH REFERRAL       Your provider has referred you to: OTHER PROVIDERS: [place] [facility]([phone])    All scheduling is subject to the client's specific insurance plan & benefits, provider/location availability, and provider clinical specialities.  Please arrive 15 minutes early for your first appointment and bring your completed paperwork.    Please be aware that coverage of these services is subject to the terms and limitations of your health insurance plan.  Call member services at your health plan with any benefit or coverage questions.                  Who to contact     If you have questions or need follow up information about today's clinic visit or your schedule please contact [facility] [place] directly at [phone].  Normal or non-critical lab and imaging results will be communicated to you by MyChart, letter or phone within 4 business days after the clinic has received the results. If you do not hear from us within 7 days, please contact the clinic through MyChart or phone. If you have a critical or abnormal lab result, we will notify you by phone as soon as possible.  Submit refill requests through [employer] or call your pharmacy and they will forward the refill request to us. Please allow 3 business days for your refill to be completed.          Additional Information  "About Your Visit        [employer] Information     [employer] lets you send messages to your doctor, view your test results, renew your prescriptions, schedule appointments and more. To sign up, go to [email or web address] . Click on \"Log in\" on the left side of the screen, which will take you to the Welcome page. Then click on \"Sign up Now\" on the right side of the page.     You will be asked to enter the access code listed below, as well as some personal information. Please follow the directions to create your username and password.     Your access code is: [email or web address]  Expires: 10/8/2017 12:07 PM     Your access code will  in 90 days. If you need help or a new code, please call your [place] clinic or [phone].        Care EveryWhere ID     This is your Care EveryWhere ID. This could be used by other organizations to access your [place] medical records  [id number]        Your Vitals Were     Pulse Temperature Respirations Height Pulse Oximetry BMI (Body Mass Index)    86 97.9  F (36.6  C) (Tympanic) 12 6' (1.829 m) 97% 23.6 kg/m2       Blood Pressure from Last 3 Encounters:   07/10/17 126/74   17 (!) 137/109   17 120/68    Weight from Last 3 Encounters:   07/10/17 174 lb (78.9 kg)   17 178 lb (80.7 kg)   17 180 lb (81.6 kg)              We Performed the Following     DEPRESSION ACTION PLAN (DAP)     MENTAL HEALTH REFERRAL          Where to get your medicines      Some of these will need a paper prescription and others can be bought over the counter.  Ask your nurse if you have questions.     Bring a paper prescription for each of these medications     ALPRAZolam 0.5 MG tablet          Primary Care Provider Office Phone # Fax #    Benjamin Lazar [phone]       [address] [address]        Equal Access to Services     CUONG RAY AH: Kymberly Epstein, kenny aldana, asmita doyle " marina sarmientorafa mace'aan ah. So [facility] [phone].    ATENCIÓN: Si rayla jefferson, tiene a dey disposición servicios gratuitos de asistencia lingüística. Eb al [phone].    We comply with applicable federal civil rights laws and [place] laws. We do not discriminate on the basis of race, color, national origin, age, disability sex, sexual orientation or gender identity.            Thank you!     Thank you for choosing [facility]  for your care. Our goal is always to provide you with excellent care. Hearing back from our patients is one way we can continue to improve our services. Please take a few minutes to complete the written survey that you may receive in the mail after your visit with us. Thank you!             Your Updated Medication List - Protect others around you: Learn how to safely use, store and throw away your medicines at www.disposemymeds.org.          This list is accurate as of: 7/10/17 12:07 PM.  Always use your most recent med list.                   Brand Name Dispense Instructions for use Diagnosis    ALPRAZolam 0.5 MG tablet    XANAX    45 tablet    Take 1 tablet (0.5 mg) by mouth 2 times daily as needed for anxiety    Adjustment disorder with mixed anxiety and depressed mood       betamethasone dipropionate 0.05 % cream    DIPROSONE     Apply topically 2 times daily        budesonide-formoterol 160-4.5 MCG/ACT Inhaler    SYMBICORT     Inhale 2 puffs into the lungs 2 times daily        cholecalciferol 5000 UNITS Tabs tablet    vitamin D3     Take  by mouth.        CITALOPRAM HYDROBROMIDE PO           cyanocolbalamin 100 MCG tablet    vitamin  B-12     Take 50 mcg by mouth daily        EPINEPHrine 0.3 MG/0.3ML injection    EPIPEN    1 each    Inject 0.3 mLs (0.3 mg) into the muscle once as needed for anaphylaxis        tamsulosin 0.4 MG capsule    FLOMAX    30 capsule    Take 1 capsule (0.4 mg) by mouth daily    Increased frequency of urination       TRAMADOL HCL  PO

## 2017-07-10 NOTE — PROGRESS NOTES
SUBJECTIVE:                                                    Jeff Mack is a 82 year old male who presents to clinic today for the following health issues:      Back Pain Follow Up      Description:   Location of pain:  left  Character of pain: dull ache  Pain radiation: Does not radiate  Since last visit, pain is:  worsened  New numbness or weakness in legs, not attributed to pain:  no     Intensity: Currently 3/10    History:   Pain interferes with job: Not applicable  Therapies tried without relief: none   Therapies tried with relief: Advil            Accompanying Signs & Symptoms:  Risk of Fracture:  None  Risk of Cauda Equina:  None  Risk of Infection:  None  Risk of Cancer:  None      Amount of exercise or physical activity: 1 day/week for an average of less than 15 minutes    Problems taking medications regularly: No    Medication side effects: none    Diet: regular (no restrictions)          Problem list and histories reviewed & adjusted, as indicated.  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)     Cardiomegaly     Chronic ischemic heart disease     Essential hypertension, benign     Primary cardiomyopathy (H)     Hyperlipidemia with target LDL less than 100     Acute low back pain     Nonallopathic lesion of sacral region     Lumbar stenosis     Health Care Home     Advanced directives, counseling/discussion     Edema     Esophageal cancer (H)     Past Surgical History:   Procedure Laterality Date     BRONCHOSCOPY FLEXIBLE N/A 4/3/2015    Procedure: BRONCHOSCOPY FLEXIBLE;  Surgeon: Ralph Catherine MD;  [facility]     C EXCISION OF LINGUAL TONSIL      TONSILLECTOMY     C NONSPECIFIC PROCEDURE  2-99    Colonic  polpectomy     C NONSPECIFIC PROCEDURE  9-92    TURP     C NONSPECIFIC PROCEDURE  1982    Retinal surgery     COLONOSCOPY       ESOPHAGOSCOPY, GASTROSCOPY, DUODENOSCOPY (EGD), COMBINED N/A 3/19/2015    Procedure: COMBINED ESOPHAGOSCOPY, GASTROSCOPY, DUODENOSCOPY (EGD), BIOPSY SINGLE OR MULTIPLE;  Surgeon: Alo Mauro MD;  Location: [facility]     ESOPHAGOSCOPY, GASTROSCOPY, DUODENOSCOPY (EGD), COMBINED N/A 7/14/2015    Procedure: COMBINED ESOPHAGOSCOPY, GASTROSCOPY, DUODENOSCOPY (EGD), BIOPSY SINGLE OR MULTIPLE;  Surgeon: Kathy Torres MD;  Location: [facility]     ESOPHAGOSCOPY, GASTROSCOPY, DUODENOSCOPY (EGD), COMBINED N/A 12/1/2015    Procedure: COMBINED ESOPHAGOSCOPY, GASTROSCOPY, DUODENOSCOPY (EGD), BIOPSY SINGLE OR MULTIPLE;  Surgeon: Alo Mauro MD;  Location: [facility]     ESOPHAGOSCOPY, GASTROSCOPY, DUODENOSCOPY (EGD), COMBINED N/A 3/17/2016    Procedure: COMBINED ESOPHAGOSCOPY, GASTROSCOPY, DUODENOSCOPY (EGD), BIOPSY SINGLE OR MULTIPLE;  Surgeon: Alo Mauro MD;  Location: [facility]     ESOPHAGOSCOPY, GASTROSCOPY, DUODENOSCOPY (EGD), COMBINED N/A 7/21/2016    Procedure: COMBINED ESOPHAGOSCOPY, GASTROSCOPY, DUODENOSCOPY (EGD);  Surgeon: Alo Mauro MD;  Location: [facility]     ESOPHAGOSCOPY, GASTROSCOPY, DUODENOSCOPY (EGD), COMBINED N/A 11/17/2016    Procedure: COMBINED ESOPHAGOSCOPY, GASTROSCOPY, DUODENOSCOPY (EGD);  Surgeon: Alo Mauro MD;  Location: [facility]     ESOPHAGOSCOPY, GASTROSCOPY, DUODENOSCOPY (EGD), COMBINED N/A 5/9/2017    Procedure: COMBINED ESOPHAGOSCOPY, GASTROSCOPY, DUODENOSCOPY (EGD);  (EGD) COMBINED ESOPHAGOSCOPY, GASTROSCOPY, DUODENOSCOPY;  Surgeon: Alo Mauro MD;  Location: [facility]     GASTROSTOMY, INSERT TUBE, COMBINED N/A 4/3/2015    Procedure: COMBINED GASTROSTOMY, INSERT TUBE (OPEN);  Surgeon: Ralph Catherine MD;  [facility]     HC PRESSURE GRADIENT MEASUREMENT, INITIAL VESSEL  2005    essentially normal     HC UGI ENDOSCOPY W EUS N/A 7/14/2015     Procedure: COMBINED ENDOSCOPIC ULTRASOUND, ESOPHAGOSCOPY, GASTROSCOPY, DUODENOSCOPY (EGD);  Surgeon: Kathy Torres MD;  Location:  GI     LAMINECTOMY, FUSION LUMBAR ONE LEVEL, COMBINED  2012    Procedure: COMBINED LAMINECTOMY, FUSION LUMBAR ONE LEVEL;  Posterior Fusion L5-S1, Total Facetectomy L5-S1 Left ;  Surgeon: Ronald Espinosa MD;  Location:  OR       Social History   Substance Use Topics     Smoking status: Former Smoker     Packs/day: 2.00     Years: 40.00     Types: Cigarettes     Quit date: 1975     Smokeless tobacco: Never Used     Alcohol use No     Family History   Problem Relation Age of Onset     Hypertension Mother      CEREBROVASCULAR DISEASE Mother      DIABETES Brother      DIABETES Father       age 97         Current Outpatient Prescriptions   Medication Sig Dispense Refill     ALPRAZolam (XANAX) 0.5 MG tablet Take 1 tablet (0.5 mg) by mouth 2 times daily as needed for anxiety 45 tablet 0     betamethasone dipropionate (DIPROSONE) 0.05 % cream Apply topically 2 times daily       budesonide-formoterol (SYMBICORT) 160-4.5 MCG/ACT inhaler Inhale 2 puffs into the lungs 2 times daily       TRAMADOL HCL PO        CITALOPRAM HYDROBROMIDE PO        Cholecalciferol (VITAMIN D3) 5000 UNITS TABS Take  by mouth.       tamsulosin (FLOMAX) 0.4 MG capsule Take 1 capsule (0.4 mg) by mouth daily (Patient not taking: Reported on 7/10/2017) 30 capsule 0     cyanocolbalamin (VITAMIN  B-12) 100 MCG tablet Take 50 mcg by mouth daily       EPINEPHrine (EPIPEN) 0.3 MG/0.3ML injection Inject 0.3 mLs (0.3 mg) into the muscle once as needed for anaphylaxis 1 each 1     Allergies   Allergen Reactions     Bee Venom Shortness Of Breath     Recent Labs   Lab Test  17   1130  07/09/15   1110   12   1548   04/28/10   0824   LDL   --   92   --   111   --   144*   HDL   --   48   --   71   --   88   TRIG   --   80   --   110   --   104   ALT   --    --    --   19   --   17   CR  0.81   0.56*   < >  0.86   < >  0.89   GFRESTIMATED  >90  Non  GFR Calc    >90  Non  GFR Calc     < >  87   < >  84   GFRESTBLACK  >90   GFR Calc    >90   GFR Calc     < >  >90   < >  >90   POTASSIUM  4.4  4.1   < >  3.5   --   4.1   TSH   --    --    --   1.72   --   1.73    < > = values in this interval not displayed.      BP Readings from Last 3 Encounters:   07/10/17 126/74   05/09/17 (!) 137/109   03/27/17 120/68    Wt Readings from Last 3 Encounters:   07/10/17 174 lb (78.9 kg)   05/09/17 178 lb (80.7 kg)   03/27/17 180 lb (81.6 kg)                    Reviewed and updated as needed this visit by clinical staff       Reviewed and updated as needed this visit by Provider                  ROS:  Constitutional, HEENT, cardiovascular, pulmonary, gi and gu systems are negative, except as otherwise noted.    OBJECTIVE:     /74 (Cuff Size: Adult Regular)  Pulse 86  Temp 97.9  F (36.6  C) (Tympanic)  Resp 12  Ht 6' (1.829 m)  Wt 174 lb (78.9 kg)  SpO2 97%  BMI 23.6 kg/m2  Body mass index is 23.6 kg/(m^2).  GENERAL: healthy, alert and no distress  NECK: no adenopathy, no asymmetry, masses, or scars and thyroid normal to palpation  RESP: lungs clear to auscultation - no rales, rhonchi or wheezes  CV: regular rate and rhythm, normal S1 S2, no S3 or S4, no murmur, click or rub, no peripheral edema and peripheral pulses strong  ABDOMEN: soft, nontender, no hepatosplenomegaly, no masses and bowel sounds normal  MS: no gross musculoskeletal defects noted, no edema        ASSESSMENT/PLAN:   ASSESSMENT / PLAN:  (F41.9) Anxiety  (primary encounter diagnosis)  Comment: has been worsening with feeling at edge, has no clear etiology nor cause, will have him to see mental health specialist with referral([place] [facility])  Plan: MENTAL HEALTH REFERRAL            (Z13.89) Screening for depression  Plan: DEPRESSION ACTION PLAN (DAP)            (Z23) Need  for prophylactic vaccination against Streptococcus pneumoniae (pneumococcus)      (F43.23) Adjustment disorder with mixed anxiety and depressed mood  Comment: will have him to keep taking xanax prn for flares of anxiety   Plan: ALPRAZolam (XANAX) 0.5 MG tablet            (M54.30) Sciatica, unspecified laterality  Comment: worsening with physical activity, has radicular pain on bilateral LE  Plan: encouraged him to see spine surgery as was already scheduled by pt      Benjamin Lazar MD  [facility]

## 2017-07-10 NOTE — NURSING NOTE
Chief Complaint   Patient presents with     Back Pain       Initial /74 (Cuff Size: Adult Regular)  Pulse 86  Temp 97.9  F (36.6  C) (Tympanic)  Resp 12  Ht 6' (1.829 m)  Wt 174 lb (78.9 kg)  SpO2 97%  BMI 23.6 kg/m2 Estimated body mass index is 23.6 kg/(m^2) as calculated from the following:    Height as of this encounter: 6' (1.829 m).    Weight as of this encounter: 174 lb (78.9 kg).  Medication Reconciliation: complete   Dania Connors, CMA

## 2017-07-11 ASSESSMENT — ANXIETY QUESTIONNAIRES: GAD7 TOTAL SCORE: 1

## 2017-07-11 ASSESSMENT — PATIENT HEALTH QUESTIONNAIRE - PHQ9: SUM OF ALL RESPONSES TO PHQ QUESTIONS 1-9: 5

## 2017-07-12 ENCOUNTER — TELEPHONE (OUTPATIENT)
Dept: FAMILY MEDICINE | Facility: CLINIC | Age: 82
End: 2017-07-12

## 2017-07-12 NOTE — TELEPHONE ENCOUNTER
Patient calling to request a recommendation from Dr. Lazar for mental health consultation. Would like to talk to someone on Dr. Lazar's team. Please advise.  [phone] ([place]) or cell [phone]  Ok to leave detailed message: yes    Thank you  Connie Robertson

## 2017-07-12 NOTE — TELEPHONE ENCOUNTER
Detailed message left with information noted below from provider along with contact number to clinic for additional questions.  Brooklyn Barber RN  [employer]

## 2017-07-12 NOTE — TELEPHONE ENCOUNTER
I gave him a mental health referral to '[facility]'.   Please call him to ask if he needs any further support on the referral   thx

## 2017-08-10 ENCOUNTER — TRANSFERRED RECORDS (OUTPATIENT)
Dept: HEALTH INFORMATION MANAGEMENT | Facility: CLINIC | Age: 82
End: 2017-08-10

## 2017-08-16 ENCOUNTER — TRANSFERRED RECORDS (OUTPATIENT)
Dept: HEALTH INFORMATION MANAGEMENT | Facility: CLINIC | Age: 82
End: 2017-08-16

## 2017-09-06 ENCOUNTER — OFFICE VISIT (OUTPATIENT)
Dept: FAMILY MEDICINE | Facility: CLINIC | Age: 82
End: 2017-09-06
Payer: COMMERCIAL

## 2017-09-06 VITALS
HEART RATE: 89 BPM | WEIGHT: 176 LBS | SYSTOLIC BLOOD PRESSURE: 142 MMHG | TEMPERATURE: 98.3 F | OXYGEN SATURATION: 97 % | BODY MASS INDEX: 23.87 KG/M2 | DIASTOLIC BLOOD PRESSURE: 83 MMHG

## 2017-09-06 DIAGNOSIS — R07.0 THROAT PAIN: Primary | ICD-10-CM

## 2017-09-06 DIAGNOSIS — C15.9 MALIGNANT NEOPLASM OF ESOPHAGUS, UNSPECIFIED LOCATION (H): ICD-10-CM

## 2017-09-06 LAB
BASOPHILS NFR BLD AUTO: 2 %
DIFFERENTIAL METHOD BLD: ABNORMAL
EOSINOPHIL NFR BLD AUTO: 2 %
ERYTHROCYTE [DISTWIDTH] IN BLOOD BY AUTOMATED COUNT: 16.6 % (ref 10–15)
HCT VFR BLD AUTO: 43.8 % (ref 40–53)
HGB BLD-MCNC: 15.2 G/DL (ref 13.3–17.7)
LYMPHOCYTES NFR BLD AUTO: 48 %
MCH RBC QN AUTO: 29.1 PG (ref 26.5–33)
MCHC RBC AUTO-ENTMCNC: 34.7 G/DL (ref 31.5–36.5)
MCV RBC AUTO: 84 FL (ref 78–100)
MONOCYTES NFR BLD AUTO: 13 %
NEUTROPHILS NFR BLD AUTO: 33 %
NEUTS BAND NFR BLD MANUAL: 2 %
PLATELET # BLD AUTO: 339 10E9/L (ref 150–450)
PLATELET # BLD EST: NORMAL 10*3/UL
RBC # BLD AUTO: 5.22 10E12/L (ref 4.4–5.9)
RBC MORPH BLD: NORMAL
TSH SERPL DL<=0.005 MIU/L-ACNC: 1.79 MU/L (ref 0.4–4)
WBC # BLD AUTO: 3.9 10E9/L (ref 4–11)

## 2017-09-06 PROCEDURE — 84443 ASSAY THYROID STIM HORMONE: CPT | Performed by: INTERNAL MEDICINE

## 2017-09-06 PROCEDURE — 99214 OFFICE O/P EST MOD 30 MIN: CPT | Performed by: INTERNAL MEDICINE

## 2017-09-06 PROCEDURE — 85025 COMPLETE CBC W/AUTO DIFF WBC: CPT | Performed by: INTERNAL MEDICINE

## 2017-09-06 PROCEDURE — 36415 COLL VENOUS BLD VENIPUNCTURE: CPT | Performed by: INTERNAL MEDICINE

## 2017-09-06 RX ORDER — FLUTICASONE PROPIONATE 50 MCG
1-2 SPRAY, SUSPENSION (ML) NASAL DAILY
Qty: 1 BOTTLE | Refills: 11 | Status: ON HOLD | OUTPATIENT
Start: 2017-09-06 | End: 2020-01-01

## 2017-09-06 NOTE — MR AVS SNAPSHOT
"              After Visit Summary   2017    Jeff Mack    MRN: [id number]           Patient Information     Date Of Birth          1935        Visit Information        Provider Department      2017 9:20 AM Tika Friedman MD [facility] [place]        Today's Diagnoses     Throat pain    -  1    Malignant neoplasm of esophagus, unspecified location (H)           Follow-ups after your visit        Who to contact     If you have questions or need follow up information about today's clinic visit or your schedule please contact [facility] [place] directly at [phone].  Normal or non-critical lab and imaging results will be communicated to you by [employer], letter or phone within 4 business days after the clinic has received the results. If you do not hear from us within 7 days, please contact the clinic through [employer] or phone. If you have a critical or abnormal lab result, we will notify you by phone as soon as possible.  Submit refill requests through [employer] or call your pharmacy and they will forward the refill request to us. Please allow 3 business days for your refill to be completed.          Additional Information About Your Visit        MyChart Information     [employer] lets you send messages to your doctor, view your test results, renew your prescriptions, schedule appointments and more. To sign up, go to [email or web address] . Click on \"Log in\" on the left side of the screen, which will take you to the Welcome page. Then click on \"Sign up Now\" on the right side of the page.     You will be asked to enter the access code listed below, as well as some personal information. Please follow the directions to create your username and password.     Your access code is: [email or web address]  Expires: 10/8/2017 12:07 PM     Your access code will  in 90 days. If you need help or a new code, please call your [facility] or [phone].        Care EveryWhere ID     This " is your Care EveryWhere ID. This could be used by other organizations to access your [place] medical records  [id number]        Your Vitals Were     Pulse Temperature Pulse Oximetry BMI (Body Mass Index)          89 98.3  F (36.8  C) (Oral) 97% 23.87 kg/m2         Blood Pressure from Last 3 Encounters:   09/06/17 142/83   07/10/17 126/74   05/09/17 (!) 137/109    Weight from Last 3 Encounters:   09/06/17 176 lb (79.8 kg)   07/10/17 174 lb (78.9 kg)   05/09/17 178 lb (80.7 kg)              We Performed the Following     CBC with platelets differential     TSH with free T4 reflex          Today's Medication Changes          These changes are accurate as of: 9/6/17  9:46 AM.  If you have any questions, ask your nurse or doctor.               Start taking these medicines.        Dose/Directions    fluticasone 50 MCG/ACT spray   Commonly known as:  FLONASE   Used for:  Throat pain   Started by:  Tika Friedman MD        Dose:  1-2 spray   Spray 1-2 sprays into both nostrils daily   Quantity:  1 Bottle   Refills:  11            Where to get your medicines      These medications were sent to [place] [address] [place] [facility] [address]  [address] [facility] [address]     Phone:  [phone]     fluticasone 50 MCG/ACT spray                Primary Care Provider Office Phone # Fax #    Benjamin Lazar [phone]       [address] [facility] [address]        Equal Access to Services     [facility]: Hadii tim galarzao Soana lilia, waaxda luqadaha, qaybta kaalmada adeegyada, waxay idiin hayaan adeeg kharash la'aan . So [facility] [phone].    ATENCIÓN: Si habla español, tiene a dey disposición servicios gratuitos de asistencia lingüística. Llame al [phone].    We comply with applicable federal civil rights laws and [place] laws. We do not discriminate on the basis of race, color, national origin, age, disability sex, sexual orientation or gender  identity.            Thank you!     Thank you for choosing [facility] [place]  for your care. Our goal is always to provide you with excellent care. Hearing back from our patients is one way we can continue to improve our services. Please take a few minutes to complete the written survey that you may receive in the mail after your visit with us. Thank you!             Your Updated Medication List - Protect others around you: Learn how to safely use, store and throw away your medicines at www.disposemymeds.org.          This list is accurate as of: 9/6/17  9:46 AM.  Always use your most recent med list.                   Brand Name Dispense Instructions for use Diagnosis    ALPRAZolam 0.5 MG tablet    XANAX    45 tablet    Take 1 tablet (0.5 mg) by mouth 2 times daily as needed for anxiety    Adjustment disorder with mixed anxiety and depressed mood       betamethasone dipropionate 0.05 % cream    DIPROSONE     Apply topically 2 times daily        budesonide-formoterol 160-4.5 MCG/ACT Inhaler    SYMBICORT     Inhale 2 puffs into the lungs 2 times daily        cholecalciferol 5000 UNITS Tabs tablet    vitamin D3     Take  by mouth.        CITALOPRAM HYDROBROMIDE PO           cyanocolbalamin 100 MCG tablet    vitamin  B-12     Take 50 mcg by mouth daily        EPINEPHrine 0.3 MG/0.3ML injection    EPIPEN    1 each    Inject 0.3 mLs (0.3 mg) into the muscle once as needed for anaphylaxis        fluticasone 50 MCG/ACT spray    FLONASE    1 Bottle    Spray 1-2 sprays into both nostrils daily    Throat pain       TRAMADOL HCL PO

## 2017-09-06 NOTE — PROGRESS NOTES
SUBJECTIVE:   Jeff Mack is a 82 year old male who presents to clinic today for the following health issues:      Acute Illness   Acute illness concerns: sinus pain   Onset: 2 weeks     Fever: no     Chills/Sweats: no     Headache (location?): YES    Sinus Pressure:YES    Conjunctivitis:  no    Ear Pain: no    Rhinorrhea: YES    Congestion: YES    Sore Throat: no but feels hot       Cough: YES    Wheeze: no     Decreased Appetite: no     Nausea: no     Vomiting: no     Diarrhea:  no     Dysuria/Freq.: no     Fatigue/Achiness: no     Sick/Strep Exposure: no      Therapies Tried and outcome: cough drops     Throat feels swollen and sore. He has a history of esophageal cancer treated with radiation and chemotherapy. He has not had any recurrence for the past 2 years. He had an upper endoscopy in May of this year showing some mild mucosal changes (scarring and decreased vascular pattern). He had a few small sessile polyps without bleeding. His duodenum and stomach were normal. He has been getting EGD's every 6 months. However, this next time (October) He will be having a CT scan of his Chest/Abd/Pelvis.       He has been having this soreness/fullness for the past 2 weeks but has gotten worse in the past few days. He has been working outside a lot for the past month. He admits to having some redness and soreness in his eyes. He denies fever. He says he has had a change in the quality of his voice; he denies dysphagia.      Problem list and histories reviewed & adjusted, as indicated.  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)     Cardiomegaly     Chronic ischemic heart disease     Essential hypertension, benign     Primary cardiomyopathy  (H)     Hyperlipidemia with target LDL less than 100     Acute low back pain     Nonallopathic lesion of sacral region     Lumbar stenosis     Health Care Home     Advanced directives, counseling/discussion     Edema     Esophageal cancer (H)     Past Surgical History:   Procedure Laterality Date     BRONCHOSCOPY FLEXIBLE N/A 4/3/2015    Procedure: BRONCHOSCOPY FLEXIBLE;  Surgeon: Ralph Catherine MD;  Location:  OR      EXCISION OF LINGUAL TONSIL      TONSILLECTOMY     C NONSPECIFIC PROCEDURE  2-99    Colonic polpectomy     C NONSPECIFIC PROCEDURE  9-92    TURP     C NONSPECIFIC PROCEDURE  1982    Retinal surgery     COLONOSCOPY       ESOPHAGOSCOPY, GASTROSCOPY, DUODENOSCOPY (EGD), COMBINED N/A 3/19/2015    Procedure: COMBINED ESOPHAGOSCOPY, GASTROSCOPY, DUODENOSCOPY (EGD), BIOPSY SINGLE OR MULTIPLE;  Surgeon: Alo Mauro MD;  Location: [facility]     ESOPHAGOSCOPY, GASTROSCOPY, DUODENOSCOPY (EGD), COMBINED N/A 7/14/2015    Procedure: COMBINED ESOPHAGOSCOPY, GASTROSCOPY, DUODENOSCOPY (EGD), BIOPSY SINGLE OR MULTIPLE;  Surgeon: Kathy Torres MD;  Location: [facility]     ESOPHAGOSCOPY, GASTROSCOPY, DUODENOSCOPY (EGD), COMBINED N/A 12/1/2015    Procedure: COMBINED ESOPHAGOSCOPY, GASTROSCOPY, DUODENOSCOPY (EGD), BIOPSY SINGLE OR MULTIPLE;  Surgeon: Alo Mauro MD;  Location: [facility]     ESOPHAGOSCOPY, GASTROSCOPY, DUODENOSCOPY (EGD), COMBINED N/A 3/17/2016    Procedure: COMBINED ESOPHAGOSCOPY, GASTROSCOPY, DUODENOSCOPY (EGD), BIOPSY SINGLE OR MULTIPLE;  Surgeon: Alo Mauro MD;  Location: [facility]     ESOPHAGOSCOPY, GASTROSCOPY, DUODENOSCOPY (EGD), COMBINED N/A 7/21/2016    Procedure: COMBINED ESOPHAGOSCOPY, GASTROSCOPY, DUODENOSCOPY (EGD);  Surgeon: Alo Mauro MD;  Location:  GI     ESOPHAGOSCOPY, GASTROSCOPY, DUODENOSCOPY (EGD), COMBINED N/A 11/17/2016    Procedure: COMBINED ESOPHAGOSCOPY, GASTROSCOPY, DUODENOSCOPY (EGD);  Surgeon: Alo Mauro MD;  Location: [facility]     ESOPHAGOSCOPY,  GASTROSCOPY, DUODENOSCOPY (EGD), COMBINED N/A 2017    Procedure: COMBINED ESOPHAGOSCOPY, GASTROSCOPY, DUODENOSCOPY (EGD);  (EGD) COMBINED ESOPHAGOSCOPY, GASTROSCOPY, DUODENOSCOPY;  Surgeon: Alo Mauro MD;  Location:  GI     GASTROSTOMY, INSERT TUBE, COMBINED N/A 4/3/2015    Procedure: COMBINED GASTROSTOMY, INSERT TUBE (OPEN);  Surgeon: Ralph Catherine MD;  Location:  OR     HC PRESSURE GRADIENT MEASUREMENT, INITIAL VESSEL  2005    essentially normal     HC UGI ENDOSCOPY W EUS N/A 2015    Procedure: COMBINED ENDOSCOPIC ULTRASOUND, ESOPHAGOSCOPY, GASTROSCOPY, DUODENOSCOPY (EGD);  Surgeon: Kathy Torres MD;  Location:  GI     LAMINECTOMY, FUSION LUMBAR ONE LEVEL, COMBINED  2012    Procedure: COMBINED LAMINECTOMY, FUSION LUMBAR ONE LEVEL;  Posterior Fusion L5-S1, Total Facetectomy L5-S1 Left ;  Surgeon: Ronald Espinosa MD;  Location:  OR       Social History   Substance Use Topics     Smoking status: Former Smoker     Packs/day: 2.00     Years: 40.00     Types: Cigarettes     Quit date: 1975     Smokeless tobacco: Never Used     Alcohol use No     Family History   Problem Relation Age of Onset     Hypertension Mother      CEREBROVASCULAR DISEASE Mother      DIABETES Brother      DIABETES Father       age 97         Current Outpatient Prescriptions   Medication Sig Dispense Refill     ALPRAZolam (XANAX) 0.5 MG tablet Take 1 tablet (0.5 mg) by mouth 2 times daily as needed for anxiety 45 tablet 0     betamethasone dipropionate (DIPROSONE) 0.05 % cream Apply topically 2 times daily       budesonide-formoterol (SYMBICORT) 160-4.5 MCG/ACT inhaler Inhale 2 puffs into the lungs 2 times daily       TRAMADOL HCL PO        CITALOPRAM HYDROBROMIDE PO        cyanocolbalamin (VITAMIN  B-12) 100 MCG tablet Take 50 mcg by mouth daily       EPINEPHrine (EPIPEN) 0.3 MG/0.3ML injection Inject 0.3 mLs (0.3 mg) into the muscle once as needed for anaphylaxis 1 each 1      Cholecalciferol (VITAMIN D3) 5000 UNITS TABS Take  by mouth.       BP Readings from Last 3 Encounters:   09/06/17 142/83   07/10/17 126/74   05/09/17 (!) 137/109    Wt Readings from Last 3 Encounters:   09/06/17 176 lb (79.8 kg)   07/10/17 174 lb (78.9 kg)   05/09/17 178 lb (80.7 kg)                        Reviewed and updated as needed this visit by clinical staff     Reviewed and updated as needed this visit by Provider         ROS:  Constitutional, HEENT, cardiovascular, pulmonary, gi and gu systems are negative, except as otherwise noted.      OBJECTIVE:   /83 (BP Location: Left arm, Cuff Size: Adult Regular)  Pulse 89  Temp 98.3  F (36.8  C) (Oral)  Wt 176 lb (79.8 kg)  SpO2 97%  BMI 23.87 kg/m2  Body mass index is 23.87 kg/(m^2).  GENERAL: healthy, alert and no distress  EYES: Eyes grossly normal to inspection, PERRL and conjunctivae and sclerae normal  HENT: ear canals and TM's normal, nose and mouth without ulcers or lesions  NECK: no adenopathy, no asymmetry, masses, or scars and thyroid normal to palpation  RESP: lungs clear to auscultation - no rales, rhonchi or wheezes  CV: regular rate and rhythm, normal S1 S2, no S3 or S4, no murmur, click or rub, no peripheral edema and peripheral pulses strong  PSYCH: mentation appears normal, affect normal/bright    Diagnostic Test Results:  none     ASSESSMENT/PLAN:   Jeff is an 81 y/o male with history of esophageal cancer s/p radiation and chemotherapy, who presents with a 2 weeks of burning and fullness in his throat. He had an EGD done in May (4 months ago) which was unremarkable; this is reassuring. His oncologist is planning a CT Chest/Abd/Pelvis in October. His exam today is normal; most likely his symptoms are either viral in origin or related to an environmental allergen. I am recommending flonase, warm tea with honey, lozenges, and a humidifier. If throat sensation worsens, or if he develops dysphagia, would recommend he contact his  oncologist to have his CT scan done early, or to add another EGD to the scan.     1. Throat pain  - CBC with platelets differential  - TSH with free T4 reflex  - fluticasone (FLONASE) 50 MCG/ACT spray; Spray 1-2 sprays into both nostrils daily  Dispense: 1 Bottle; Refill: 11    2. Malignant neoplasm of esophagus, unspecified location (H)      Follow up if no improvement in symptoms      Tika Friedman MD  [facility]

## 2017-10-31 ENCOUNTER — OFFICE VISIT (OUTPATIENT)
Dept: FAMILY MEDICINE | Facility: CLINIC | Age: 82
End: 2017-10-31
Payer: COMMERCIAL

## 2017-10-31 VITALS
BODY MASS INDEX: 23.84 KG/M2 | TEMPERATURE: 96.9 F | DIASTOLIC BLOOD PRESSURE: 77 MMHG | RESPIRATION RATE: 16 BRPM | SYSTOLIC BLOOD PRESSURE: 129 MMHG | HEART RATE: 80 BPM | HEIGHT: 72 IN | OXYGEN SATURATION: 97 % | WEIGHT: 176 LBS

## 2017-10-31 DIAGNOSIS — I42.9 PRIMARY CARDIOMYOPATHY (H): ICD-10-CM

## 2017-10-31 DIAGNOSIS — K57.30 DIVERTICULOSIS OF LARGE INTESTINE WITHOUT HEMORRHAGE: ICD-10-CM

## 2017-10-31 DIAGNOSIS — I10 ESSENTIAL HYPERTENSION, BENIGN: ICD-10-CM

## 2017-10-31 DIAGNOSIS — K59.00 CONSTIPATION, UNSPECIFIED CONSTIPATION TYPE: Primary | ICD-10-CM

## 2017-10-31 DIAGNOSIS — C15.9 MALIGNANT NEOPLASM OF ESOPHAGUS, UNSPECIFIED LOCATION (H): ICD-10-CM

## 2017-10-31 PROCEDURE — 99214 OFFICE O/P EST MOD 30 MIN: CPT | Performed by: FAMILY MEDICINE

## 2017-10-31 NOTE — PROGRESS NOTES
SUBJECTIVE:   Jeff Mack is a 82 year old male who presents to clinic today for the following health issues:      Constipation       Duration: 2-3 weeks     Description (location/character/radiation): constipation     Intensity:  moderate    Accompanying signs and symptoms: abdominal tightness     History (similar episodes/previous evaluation): history of diverticulitis     Precipitating or alleviating factors: None    Therapies tried and outcome: Dulcolax, Senokot, Magnesium Citrate, Enema      Problem list and histories reviewed & adjusted, as indicated.  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)     Cardiomegaly     Chronic ischemic heart disease     Essential hypertension, benign     Primary cardiomyopathy (H)     Hyperlipidemia with target LDL less than 100     Acute low back pain     Nonallopathic lesion of sacral region     Lumbar stenosis     Health Care Home     Advanced directives, counseling/discussion     Edema     Esophageal cancer (H)     Past Surgical History:   Procedure Laterality Date     BRONCHOSCOPY FLEXIBLE N/A 4/3/2015    Procedure: BRONCHOSCOPY FLEXIBLE;  Surgeon: Ralph Catherine MD;  Location:  OR      EXCISION OF LINGUAL TONSIL      TONSILLECTOMY     C NONSPECIFIC PROCEDURE  2-99    Colonic polpectomy     C NONSPECIFIC PROCEDURE  9-92    TURP     C NONSPECIFIC PROCEDURE  1982    Retinal surgery     COLONOSCOPY       ESOPHAGOSCOPY, GASTROSCOPY, DUODENOSCOPY (EGD), COMBINED N/A 3/19/2015    Procedure: COMBINED ESOPHAGOSCOPY, GASTROSCOPY, DUODENOSCOPY (EGD), BIOPSY SINGLE OR MULTIPLE;  Surgeon: Alo Mauro MD;  Location:  GI     ESOPHAGOSCOPY, GASTROSCOPY, DUODENOSCOPY (EGD), COMBINED N/A  7/14/2015    Procedure: COMBINED ESOPHAGOSCOPY, GASTROSCOPY, DUODENOSCOPY (EGD), BIOPSY SINGLE OR MULTIPLE;  Surgeon: Kathy Torres MD;  Location:  GI     ESOPHAGOSCOPY, GASTROSCOPY, DUODENOSCOPY (EGD), COMBINED N/A 12/1/2015    Procedure: COMBINED ESOPHAGOSCOPY, GASTROSCOPY, DUODENOSCOPY (EGD), BIOPSY SINGLE OR MULTIPLE;  Surgeon: Alo Mauro MD;  Location:  GI     ESOPHAGOSCOPY, GASTROSCOPY, DUODENOSCOPY (EGD), COMBINED N/A 3/17/2016    Procedure: COMBINED ESOPHAGOSCOPY, GASTROSCOPY, DUODENOSCOPY (EGD), BIOPSY SINGLE OR MULTIPLE;  Surgeon: Alo Mauro MD;  Location: [facility]     ESOPHAGOSCOPY, GASTROSCOPY, DUODENOSCOPY (EGD), COMBINED N/A 7/21/2016    Procedure: COMBINED ESOPHAGOSCOPY, GASTROSCOPY, DUODENOSCOPY (EGD);  Surgeon: Alo Mauro MD;  Location: [facility]     ESOPHAGOSCOPY, GASTROSCOPY, DUODENOSCOPY (EGD), COMBINED N/A 11/17/2016    Procedure: COMBINED ESOPHAGOSCOPY, GASTROSCOPY, DUODENOSCOPY (EGD);  Surgeon: Alo Mauro MD;  Location: [facility]     ESOPHAGOSCOPY, GASTROSCOPY, DUODENOSCOPY (EGD), COMBINED N/A 5/9/2017    Procedure: COMBINED ESOPHAGOSCOPY, GASTROSCOPY, DUODENOSCOPY (EGD);  (EGD) COMBINED ESOPHAGOSCOPY, GASTROSCOPY, DUODENOSCOPY;  Surgeon: Alo Mauro MD;  Location:  GI     GASTROSTOMY, INSERT TUBE, COMBINED N/A 4/3/2015    Procedure: COMBINED GASTROSTOMY, INSERT TUBE (OPEN);  Surgeon: Ralph Catherine MD;  Location:  OR      PRESSURE GRADIENT MEASUREMENT, INITIAL VESSEL  2005    essentially normal     HC UGI ENDOSCOPY W EUS N/A 7/14/2015    Procedure: COMBINED ENDOSCOPIC ULTRASOUND, ESOPHAGOSCOPY, GASTROSCOPY, DUODENOSCOPY (EGD);  Surgeon: Kathy Torres MD;  Location:  GI     LAMINECTOMY, FUSION LUMBAR ONE LEVEL, COMBINED  7/26/2012    Procedure: COMBINED LAMINECTOMY, FUSION LUMBAR ONE LEVEL;  Posterior Fusion L5-S1, Total Facetectomy L5-S1 Left ;  Surgeon: Ronald Espinosa MD;  Location:  OR       Social History   Substance Use  Topics     Smoking status: Former Smoker     Packs/day: 2.00     Years: 40.00     Types: Cigarettes     Quit date: 1975     Smokeless tobacco: Never Used     Alcohol use No     Family History   Problem Relation Age of Onset     Hypertension Mother      CEREBROVASCULAR DISEASE Mother      DIABETES Brother      DIABETES Father       age 97         Current Outpatient Prescriptions   Medication Sig Dispense Refill     fluticasone (FLONASE) 50 MCG/ACT spray Spray 1-2 sprays into both nostrils daily 1 Bottle 11     ALPRAZolam (XANAX) 0.5 MG tablet Take 1 tablet (0.5 mg) by mouth 2 times daily as needed for anxiety 45 tablet 0     budesonide-formoterol (SYMBICORT) 160-4.5 MCG/ACT inhaler Inhale 2 puffs into the lungs 2 times daily       cyanocolbalamin (VITAMIN  B-12) 100 MCG tablet Take 50 mcg by mouth daily       Cholecalciferol (VITAMIN D3) 5000 UNITS TABS Take  by mouth.       TRAMADOL HCL PO        CITALOPRAM HYDROBROMIDE PO        EPINEPHrine (EPIPEN) 0.3 MG/0.3ML injection Inject 0.3 mLs (0.3 mg) into the muscle once as needed for anaphylaxis (Patient not taking: Reported on 10/31/2017) 1 each 1     Allergies   Allergen Reactions     Bee Venom Shortness Of Breath     Recent Labs   Lab Test  17   0943  17   1130  07/09/15   1110   12   1548   04/28/10   0824   LDL   --    --   92   --   111   --   144*   HDL   --    --   48   --   71   --   88   TRIG   --    --   80   --   110   --   104   ALT   --    --    --    --   19   --   17   CR   --   0.81  0.56*   < >  0.86   < >  0.89   GFRESTIMATED   --   >90  Non  GFR Calc    >90  Non  GFR Calc     < >  87   < >  84   GFRESTBLACK   --   >90   GFR Calc    >90   GFR Calc     < >  >90   < >  >90   POTASSIUM   --   4.4  4.1   < >  3.5   --   4.1   TSH  1.79   --    --    --   1.72   --   1.73    < > = values in this interval not displayed.      BP Readings from Last 3 Encounters:    10/31/17 129/77   09/06/17 142/83   07/10/17 126/74    Wt Readings from Last 3 Encounters:   10/31/17 176 lb (79.8 kg)   09/06/17 176 lb (79.8 kg)   07/10/17 174 lb (78.9 kg)                          Reviewed and updated as needed this visit by clinical staff     Reviewed and updated as needed this visit by Provider         ROS:  Constitutional, HEENT, cardiovascular, pulmonary, gi and gu systems are negative, except as otherwise noted.      OBJECTIVE:   /77 (Cuff Size: Adult Large)  Pulse 80  Temp 96.9  F (36.1  C) (Tympanic)  Resp 16  Ht 6' (1.829 m)  Wt 176 lb (79.8 kg)  SpO2 97%  BMI 23.87 kg/m2  Body mass index is 23.87 kg/(m^2).  GENERAL: healthy, alert and no distress  NECK: no adenopathy, no asymmetry, masses, or scars and thyroid normal to palpation  RESP: lungs clear to auscultation - no rales, rhonchi or wheezes  CV: regular rate and rhythm, normal S1 S2, no S3 or S4, no murmur, click or rub, no peripheral edema and peripheral pulses strong  ABDOMEN: soft, nontender, no hepatosplenomegaly, no masses and bowel sounds normal  MS: no gross musculoskeletal defects noted, no edema        ASSESSMENT/PLAN:     ASSESSMENT / PLAN:  (K59.00) Constipation, unspecified constipation type  (primary encounter diagnosis)  Comment: has been alternating between constipation and normal BM, has no blood in BM, has no change wt nor appetite, encouraged him to try miralax for next 2-3 weeks daily  Plan: if no improving with miralax, will consider colonoscopy for further evaluation     (I42.8) Primary cardiomyopathy (H)  Comment: has bene stable without worsening clinical sx,  Plan: will keep watching sx     (I10) Essential hypertension, benign  Comment: stable without clinical sx of worsening   Plan: will keep monitoring     (C15.9) Malignant neoplasm of esophagus, unspecified location (H)  Comment: stable, has been doing close surveillance with oncology   Plan: encouraged him to follow oncology as scheduled      (K57.30) Diverticulosis of large intestine without hemorrhage  Comment: has no sign of infection   Plan: will keep watching sx           Benjamin Lazar MD  [facility] [place]

## 2017-10-31 NOTE — NURSING NOTE
Chief Complaint   Patient presents with     Constipation       Initial /77 (Cuff Size: Adult Large)  Pulse 80  Temp 96.9  F (36.1  C) (Tympanic)  Resp 16  Ht 6' (1.829 m)  Wt 176 lb (79.8 kg)  SpO2 97%  BMI 23.87 kg/m2 Estimated body mass index is 23.87 kg/(m^2) as calculated from the following:    Height as of this encounter: 6' (1.829 m).    Weight as of this encounter: 176 lb (79.8 kg).  Medication Reconciliation: complete   Dania Connors, CMA

## 2017-10-31 NOTE — MR AVS SNAPSHOT
"              After Visit Summary   10/31/2017    Jeff Mack    MRN: [id number]           Patient Information     Date Of Birth          1935        Visit Information        Provider Department      10/31/2017 2:40 PM Benjamin Lazar MD [facility]        Today's Diagnoses     Constipation, unspecified constipation type    -  1    Primary cardiomyopathy (H)        Essential hypertension, benign        Malignant neoplasm of esophagus, unspecified location (H)        Diverticulosis of large intestine without hemorrhage           Follow-ups after your visit        Who to contact     If you have questions or need follow up information about today's clinic visit or your schedule please contact [facility] directly at [phone].  Normal or non-critical lab and imaging results will be communicated to you by MyChart, letter or phone within 4 business days after the clinic has received the results. If you do not hear from us within 7 days, please contact the clinic through MyChart or phone. If you have a critical or abnormal lab result, we will notify you by phone as soon as possible.  Submit refill requests through SwingPal or call your pharmacy and they will forward the refill request to us. Please allow 3 business days for your refill to be completed.          Additional Information About Your Visit        MyChart Information     SwingPal lets you send messages to your doctor, view your test results, renew your prescriptions, schedule appointments and more. To sign up, go to [email or web address] . Click on \"Log in\" on the left side of the screen, which will take you to the Welcome page. Then click on \"Sign up Now\" on the right side of the page.     You will be asked to enter the access code listed below, as well as some personal information. Please follow the directions to create your username and password.     Your access code is: [email or web address]  Expires: 1/29/2018  3:14 PM   "   Your access code will  in 90 days. If you need help or a new code, please call your [facility] clinic or [phone].        Care EveryWhere ID     This is your Care EveryWhere ID. This could be used by other organizations to access your [facility] medical records  [id number]        Your Vitals Were     Pulse Temperature Respirations Height Pulse Oximetry BMI (Body Mass Index)    80 96.9  F (36.1  C) (Tympanic) 16 6' (1.829 m) 97% 23.87 kg/m2       Blood Pressure from Last 3 Encounters:   10/31/17 129/77   17 142/83   07/10/17 126/74    Weight from Last 3 Encounters:   10/31/17 176 lb (79.8 kg)   17 176 lb (79.8 kg)   07/10/17 174 lb (78.9 kg)              Today, you had the following     No orders found for display       Primary Care Provider Office Phone # Fax #    Benjamin Lazar [phone]       [address] [facility] [address]        Equal Access to Services     [facility]: Hadii tim katz hadkiah Soana lilia, waaxda luqadaha, qaybta kaalmagildardo acevedo, asmita cummings . So [facility] [phone].    ATENCIÓN: Si habla español, tiene a dey disposición servicios gratuitos de asistencia lingüística. [facility] [phone].    We comply with applicable federal civil rights laws and [place] laws. We do not discriminate on the basis of race, color, national origin, age, disability, sex, sexual orientation, or gender identity.            Thank you!     Thank you for choosing [facility]  for your care. Our goal is always to provide you with excellent care. Hearing back from our patients is one way we can continue to improve our services. Please take a few minutes to complete the written survey that you may receive in the mail after your visit with us. Thank you!             Your Updated Medication List - Protect others around you: Learn how to safely use, store and throw away your medicines at www.disposemymeds.org.          This list  is accurate as of: 10/31/17  3:14 PM.  Always use your most recent med list.                   Brand Name Dispense Instructions for use Diagnosis    ALPRAZolam 0.5 MG tablet    XANAX    45 tablet    Take 1 tablet (0.5 mg) by mouth 2 times daily as needed for anxiety    Adjustment disorder with mixed anxiety and depressed mood       budesonide-formoterol 160-4.5 MCG/ACT Inhaler    SYMBICORT     Inhale 2 puffs into the lungs 2 times daily        cholecalciferol 5000 UNITS Tabs tablet    vitamin D3     Take  by mouth.        CITALOPRAM HYDROBROMIDE PO           cyanocolbalamin 100 MCG tablet    vitamin  B-12     Take 50 mcg by mouth daily        EPINEPHrine 0.3 MG/0.3ML injection    EPIPEN    1 each    Inject 0.3 mLs (0.3 mg) into the muscle once as needed for anaphylaxis        fluticasone 50 MCG/ACT spray    FLONASE    1 Bottle    Spray 1-2 sprays into both nostrils daily    Throat pain       TRAMADOL HCL PO

## 2017-11-13 ENCOUNTER — TRANSFERRED RECORDS (OUTPATIENT)
Dept: HEALTH INFORMATION MANAGEMENT | Facility: CLINIC | Age: 82
End: 2017-11-13

## 2018-02-13 ENCOUNTER — OFFICE VISIT (OUTPATIENT)
Dept: FAMILY MEDICINE | Facility: CLINIC | Age: 83
End: 2018-02-13
Payer: COMMERCIAL

## 2018-02-13 VITALS
HEART RATE: 90 BPM | BODY MASS INDEX: 26.81 KG/M2 | OXYGEN SATURATION: 97 % | WEIGHT: 181 LBS | DIASTOLIC BLOOD PRESSURE: 85 MMHG | SYSTOLIC BLOOD PRESSURE: 148 MMHG | HEIGHT: 69 IN | TEMPERATURE: 99.2 F

## 2018-02-13 DIAGNOSIS — J30.2 CHRONIC SEASONAL ALLERGIC RHINITIS, UNSPECIFIED TRIGGER: ICD-10-CM

## 2018-02-13 DIAGNOSIS — J01.90 ACUTE SINUSITIS WITH SYMPTOMS > 10 DAYS: Primary | ICD-10-CM

## 2018-02-13 PROCEDURE — 99213 OFFICE O/P EST LOW 20 MIN: CPT | Performed by: PHYSICIAN ASSISTANT

## 2018-02-13 ASSESSMENT — ANXIETY QUESTIONNAIRES
7. FEELING AFRAID AS IF SOMETHING AWFUL MIGHT HAPPEN: NOT AT ALL
2. NOT BEING ABLE TO STOP OR CONTROL WORRYING: NOT AT ALL
GAD7 TOTAL SCORE: 1
5. BEING SO RESTLESS THAT IT IS HARD TO SIT STILL: NOT AT ALL
IF YOU CHECKED OFF ANY PROBLEMS ON THIS QUESTIONNAIRE, HOW DIFFICULT HAVE THESE PROBLEMS MADE IT FOR YOU TO DO YOUR WORK, TAKE CARE OF THINGS AT HOME, OR GET ALONG WITH OTHER PEOPLE: NOT DIFFICULT AT ALL
6. BECOMING EASILY ANNOYED OR IRRITABLE: NOT AT ALL
3. WORRYING TOO MUCH ABOUT DIFFERENT THINGS: NOT AT ALL
1. FEELING NERVOUS, ANXIOUS, OR ON EDGE: SEVERAL DAYS

## 2018-02-13 ASSESSMENT — PATIENT HEALTH QUESTIONNAIRE - PHQ9: 5. POOR APPETITE OR OVEREATING: NOT AT ALL

## 2018-02-13 NOTE — PROGRESS NOTES
SUBJECTIVE:   Jeff Mack is a 82 year old male who presents to clinic today for the following health issues:      Acute Illness   Acute illness concerns:  Sinus problem   Onset:  2 months    Fever: no     Chills/Sweats: no     Headache (location?): no     Sinus Pressure:YES    Conjunctivitis:  no    Ear Pain: no    Rhinorrhea: YES    Congestion: YES    Sore Throat: YES little      Cough: YES-productive of clear sputum    Wheeze: YES    Decreased Appetite: no     Nausea: no     Vomiting: no     Diarrhea:  no     Dysuria/Freq.: no     Fatigue/Achiness: no     Sick/Strep Exposure: no      Therapies Tried and outcome:       Jeff has been experiencing some facial pressure located around his nose and cheeks, post nasal drip and constant throat clearing now x 2 months.  He has been using Flonase without relief, unclear if there is a specific trigger.  Over the past few weeks he has been experiencing some associated body aches.  No fevers/chills.  +cough with intermittent mucous production.  He is unsure if he has allergies in the past        Problem list and histories reviewed & adjusted, as indicated.  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)     Cardiomegaly     Chronic ischemic heart disease     Essential hypertension, benign     Primary cardiomyopathy (H)     Hyperlipidemia with target LDL less than 100     Acute low back pain     Nonallopathic lesion of sacral region     Lumbar stenosis     Health Care Home     Advanced directives, counseling/discussion     Edema     Esophageal cancer (H)     Past Surgical History:   Procedure Laterality Date     BRONCHOSCOPY FLEXIBLE N/A 4/3/2015    Procedure: BRONCHOSCOPY FLEXIBLE;  Surgeon:  Ralph Catherine MD;  Location:  OR     C EXCISION OF LINGUAL TONSIL      TONSILLECTOMY     C NONSPECIFIC PROCEDURE  2-99    Colonic polpectomy     C NONSPECIFIC PROCEDURE  9-92    TURP     C NONSPECIFIC PROCEDURE  1982    Retinal surgery     COLONOSCOPY       ESOPHAGOSCOPY, GASTROSCOPY, DUODENOSCOPY (EGD), COMBINED N/A 3/19/2015    Procedure: COMBINED ESOPHAGOSCOPY, GASTROSCOPY, DUODENOSCOPY (EGD), BIOPSY SINGLE OR MULTIPLE;  Surgeon: Alo Mauro MD;  Location: [facility]     ESOPHAGOSCOPY, GASTROSCOPY, DUODENOSCOPY (EGD), COMBINED N/A 7/14/2015    Procedure: COMBINED ESOPHAGOSCOPY, GASTROSCOPY, DUODENOSCOPY (EGD), BIOPSY SINGLE OR MULTIPLE;  Surgeon: Kathy Torres MD;  Location: [facility]     ESOPHAGOSCOPY, GASTROSCOPY, DUODENOSCOPY (EGD), COMBINED N/A 12/1/2015    Procedure: COMBINED ESOPHAGOSCOPY, GASTROSCOPY, DUODENOSCOPY (EGD), BIOPSY SINGLE OR MULTIPLE;  Surgeon: Alo Mauro MD;  Location: [facility]     ESOPHAGOSCOPY, GASTROSCOPY, DUODENOSCOPY (EGD), COMBINED N/A 3/17/2016    Procedure: COMBINED ESOPHAGOSCOPY, GASTROSCOPY, DUODENOSCOPY (EGD), BIOPSY SINGLE OR MULTIPLE;  Surgeon: Alo Mauro MD;  Location: [facility]     ESOPHAGOSCOPY, GASTROSCOPY, DUODENOSCOPY (EGD), COMBINED N/A 7/21/2016    Procedure: COMBINED ESOPHAGOSCOPY, GASTROSCOPY, DUODENOSCOPY (EGD);  Surgeon: Alo Mauro MD;  Location: [facility]     ESOPHAGOSCOPY, GASTROSCOPY, DUODENOSCOPY (EGD), COMBINED N/A 11/17/2016    Procedure: COMBINED ESOPHAGOSCOPY, GASTROSCOPY, DUODENOSCOPY (EGD);  Surgeon: Alo Mauro MD;  Location: [facility]     ESOPHAGOSCOPY, GASTROSCOPY, DUODENOSCOPY (EGD), COMBINED N/A 5/9/2017    Procedure: COMBINED ESOPHAGOSCOPY, GASTROSCOPY, DUODENOSCOPY (EGD);  (EGD) COMBINED ESOPHAGOSCOPY, GASTROSCOPY, DUODENOSCOPY;  Surgeon: Alo Mauro MD;  Location: [facility]     GASTROSTOMY, INSERT TUBE, COMBINED N/A 4/3/2015    Procedure: COMBINED GASTROSTOMY, INSERT TUBE (OPEN);  Surgeon: Ralph Catherine MD;   Location: [facility]     HC PRESSURE GRADIENT MEASUREMENT, INITIAL VESSEL      essentially normal     HC UGI ENDOSCOPY W EUS N/A 2015    Procedure: COMBINED ENDOSCOPIC ULTRASOUND, ESOPHAGOSCOPY, GASTROSCOPY, DUODENOSCOPY (EGD);  Surgeon: Kathy Torres MD;  Location: [facility]     LAMINECTOMY, FUSION LUMBAR ONE LEVEL, COMBINED  2012    Procedure: COMBINED LAMINECTOMY, FUSION LUMBAR ONE LEVEL;  Posterior Fusion L5-S1, Total Facetectomy L5-S1 Left ;  Surgeon: Ronald Espinosa MD;  Location:  OR       Social History   Substance Use Topics     Smoking status: Former Smoker     Packs/day: 2.00     Years: 40.00     Types: Cigarettes     Quit date: 1975     Smokeless tobacco: Never Used     Alcohol use No     Family History   Problem Relation Age of Onset     Hypertension Mother      CEREBROVASCULAR DISEASE Mother      DIABETES Brother      DIABETES Father       age 97         Current Outpatient Prescriptions   Medication Sig Dispense Refill     Ibuprofen (ADVIL PO)        MAGNESIUM OXIDE PO        amoxicillin-clavulanate (AUGMENTIN) 875-125 MG per tablet Take 1 tablet by mouth 2 times daily for 7 days 14 tablet 0     fluticasone (FLONASE) 50 MCG/ACT spray Spray 1-2 sprays into both nostrils daily 1 Bottle 11     ALPRAZolam (XANAX) 0.5 MG tablet Take 1 tablet (0.5 mg) by mouth 2 times daily as needed for anxiety 45 tablet 0     budesonide-formoterol (SYMBICORT) 160-4.5 MCG/ACT inhaler Inhale 2 puffs into the lungs 2 times daily       cyanocolbalamin (VITAMIN  B-12) 100 MCG tablet Take 50 mcg by mouth daily       EPINEPHrine (EPIPEN) 0.3 MG/0.3ML injection Inject 0.3 mLs (0.3 mg) into the muscle once as needed for anaphylaxis 1 each 1     Cholecalciferol (VITAMIN D3) 5000 UNITS TABS Take  by mouth.       TRAMADOL HCL PO        Allergies   Allergen Reactions     Bee Venom Shortness Of Breath       Reviewed and updated as needed this visit by clinical staff       Reviewed and updated as needed  "this visit by Provider         ROS:  Constitutional, HEENT, cardiovascular, pulmonary, gi and gu systems are negative, except as otherwise noted.    OBJECTIVE:     /85  Pulse 90  Temp 99.2  F (37.3  C) (Oral)  Ht 5' 9.29\" (1.76 m)  Wt 181 lb (82.1 kg)  SpO2 97%  BMI 26.5 kg/m2  Body mass index is 26.5 kg/(m^2).  GENERAL: healthy, alert and no distress  EYES: Eyes grossly normal to inspection, PERRL and conjunctivae and sclerae normal  HENT: ear canals and TM's normal, nose and mouth without ulcers or lesions, nasal turbinates swollen and erythematous, + maxillary sinus TTP bilaterally  NECK: no adenopathy  RESP: lungs clear to auscultation - no rales, rhonchi or wheezes  CV: regular rate and rhythm, normal S1 S2, no S3 or S4, no murmur, click or rub    Diagnostic Test Results:  none     ASSESSMENT/PLAN:       1. Acute sinusitis with symptoms > 10 days  Jeff's symptoms are consistent with chronic rhinitis.  I have advised that he try a humidifier, daily Claritin and saline spray daily for symptoms relief. On exam he does have TTP of his maxillary sinuses and symptoms have worsened in the past 2 weeks.  I am going to treat for acute sinusitis today with Augmentin.  If no improvement may consider allergy referral.    - amoxicillin-clavulanate (AUGMENTIN) 875-125 MG per tablet; Take 1 tablet by mouth 2 times daily for 7 days  Dispense: 14 tablet; Refill: 0    2. Chronic seasonal allergic rhinitis, unspecified trigger  See above      See Patient Instructions    Flako Butterfield PA-C  [facility]    "

## 2018-02-13 NOTE — MR AVS SNAPSHOT
"              After Visit Summary   2018    Jeff Mack    MRN: [id number]           Patient Information     Date Of Birth          1935        Visit Information        Provider Department      2018 9:20 AM Flako Butterfield PA-C [facility]        Today's Diagnoses     Acute sinusitis with symptoms > 10 days    -  1    Chronic seasonal allergic rhinitis, unspecified trigger           Follow-ups after your visit        Follow-up notes from your care team     Return if symptoms worsen or fail to improve.      Who to contact     If you have questions or need follow up information about today's clinic visit or your schedule please contact [facility] directly at [phone].  Normal or non-critical lab and imaging results will be communicated to you by [employer], letter or phone within 4 business days after the clinic has received the results. If you do not hear from us within 7 days, please contact the clinic through [employer] or phone. If you have a critical or abnormal lab result, we will notify you by phone as soon as possible.  Submit refill requests through SlideRocket or call your pharmacy and they will forward the refill request to us. Please allow 3 business days for your refill to be completed.          Additional Information About Your Visit        MyChart Information     SlideRocket lets you send messages to your doctor, view your test results, renew your prescriptions, schedule appointments and more. To sign up, go to [email or web address] . Click on \"Log in\" on the left side of the screen, which will take you to the Welcome page. Then click on \"Sign up Now\" on the right side of the page.     You will be asked to enter the access code listed below, as well as some personal information. Please follow the directions to create your username and password.     Your access code is: [email or web address]  Expires: 2018  9:40 AM     Your access code will  in " "90 days. If you need help or a new code, please call your [facility] clinic or [phone].        Care EveryWhere ID     This is your Care EveryWhere ID. This could be used by other organizations to access your [facility] medical records  [id number]        Your Vitals Were     Pulse Temperature Height Pulse Oximetry BMI (Body Mass Index)       90 99.2  F (37.3  C) (Oral) 5' 9.29\" (1.76 m) 97% 26.5 kg/m2        Blood Pressure from Last 3 Encounters:   02/13/18 148/85   10/31/17 129/77   09/06/17 142/83    Weight from Last 3 Encounters:   02/13/18 181 lb (82.1 kg)   10/31/17 176 lb (79.8 kg)   09/06/17 176 lb (79.8 kg)              Today, you had the following     No orders found for display         Today's Medication Changes          These changes are accurate as of 2/13/18  9:40 AM.  If you have any questions, ask your nurse or doctor.               Start taking these medicines.        Dose/Directions    amoxicillin-clavulanate 875-125 MG per tablet   Commonly known as:  AUGMENTIN   Used for:  Acute sinusitis with symptoms > 10 days   Started by:  Flako Butterfield PA-C        Dose:  1 tablet   Take 1 tablet by mouth 2 times daily for 7 days   Quantity:  14 tablet   Refills:  0            Where to get your medicines      These medications were sent to [place] [address] [place] [employer] [facility] [address] [facility]  [address] [facility] [place] [address]     Phone:  [phone]     amoxicillin-clavulanate 875-125 MG per tablet                Primary Care Provider Office Phone # Fax #    Benjamin Lazar [phone]        [facility] [address]        Equal Access to Services     JACE RAY : Kymberly núñez Soana lilia, waaxda luqadaha, qaybta kaalmada bandaryagildardo, asmita frazier. So [facility] [phone].    ATENCIÓN: Si habla español, tiene a dey disposición servicios gratuitos de asistencia lingüística. Llame al [phone].    We comply " with applicable federal civil rights laws and [place] laws. We do not discriminate on the basis of race, color, national origin, age, disability, sex, sexual orientation, or gender identity.            Thank you!     Thank you for choosing [facility]  for your care. Our goal is always to provide you with excellent care. Hearing back from our patients is one way we can continue to improve our services. Please take a few minutes to complete the written survey that you may receive in the mail after your visit with us. Thank you!             Your Updated Medication List - Protect others around you: Learn how to safely use, store and throw away your medicines at www.disposemymeds.org.          This list is accurate as of 2/13/18  9:40 AM.  Always use your most recent med list.                   Brand Name Dispense Instructions for use Diagnosis    ADVIL PO           ALPRAZolam 0.5 MG tablet    XANAX    45 tablet    Take 1 tablet (0.5 mg) by mouth 2 times daily as needed for anxiety    Adjustment disorder with mixed anxiety and depressed mood       amoxicillin-clavulanate 875-125 MG per tablet    AUGMENTIN    14 tablet    Take 1 tablet by mouth 2 times daily for 7 days    Acute sinusitis with symptoms > 10 days       budesonide-formoterol 160-4.5 MCG/ACT Inhaler    SYMBICORT     Inhale 2 puffs into the lungs 2 times daily        cholecalciferol 5000 UNITS Tabs tablet    vitamin D3     Take  by mouth.        cyanocolbalamin 100 MCG tablet    vitamin  B-12     Take 50 mcg by mouth daily        EPINEPHrine 0.3 MG/0.3ML injection    EPIPEN    1 each    Inject 0.3 mLs (0.3 mg) into the muscle once as needed for anaphylaxis        fluticasone 50 MCG/ACT spray    FLONASE    1 Bottle    Spray 1-2 sprays into both nostrils daily    Throat pain       MAGNESIUM OXIDE PO           TRAMADOL HCL PO

## 2018-02-14 ASSESSMENT — PATIENT HEALTH QUESTIONNAIRE - PHQ9: SUM OF ALL RESPONSES TO PHQ QUESTIONS 1-9: 1

## 2018-02-14 ASSESSMENT — ANXIETY QUESTIONNAIRES: GAD7 TOTAL SCORE: 1

## 2018-02-28 ENCOUNTER — TELEPHONE (OUTPATIENT)
Dept: FAMILY MEDICINE | Facility: CLINIC | Age: 83
End: 2018-02-28

## 2018-02-28 DIAGNOSIS — J01.90 ACUTE SINUSITIS WITH SYMPTOMS > 10 DAYS: Primary | ICD-10-CM

## 2018-02-28 RX ORDER — DOXYCYCLINE 100 MG/1
100 CAPSULE ORAL 2 TIMES DAILY
Qty: 14 CAPSULE | Refills: 0 | Status: SHIPPED | OUTPATIENT
Start: 2018-02-28 | End: 2018-03-20

## 2018-02-28 NOTE — TELEPHONE ENCOUNTER
Patient called concerned with ongoing symptoms.    Acute Illness   Acute illness concerns: ongoing sinus   Onset: 3 weeks or more    Fever: no     Chills/Sweats: no     Headache (location?): no     Sinus Pressure:YES improved some not much    Conjunctivitis:  no    Ear Pain: no    Rhinorrhea: YES    Congestion: YES    Sore Throat: YES     Cough: YES    Wheeze: YES    Decreased Appetite: no     Nausea: no     Vomiting: no     Diarrhea:  no     Dysuria/Freq.: no     Fatigue/Achiness: no     Sick/Strep Exposure: no      Therapies Tried and outcome: Flonase, Augmentin, Nasal saline    Patient notes that since he was seen and finished his antibiotic he has not had much improvement in symptoms.  Notes that some of the pressure was relieved in his sinuses however still has quite a bit there.  Wondering if there is another antibiotic that he can take or if there are other advise regarding symptoms.    Brooklyn Barber RN - Triage  [facility]

## 2018-02-28 NOTE — TELEPHONE ENCOUNTER
Patient notified with information noted below from provider and agrees with plan.  Brooklyn Barber RN - Triage  [facility]

## 2018-02-28 NOTE — TELEPHONE ENCOUNTER
Will try second line antibiotic given some improvement with Augmentin. Please advise that he continue with nasal saline, flonase as well for symptom control

## 2018-03-20 ENCOUNTER — OFFICE VISIT (OUTPATIENT)
Dept: FAMILY MEDICINE | Facility: CLINIC | Age: 83
End: 2018-03-20
Payer: COMMERCIAL

## 2018-03-20 VITALS
SYSTOLIC BLOOD PRESSURE: 133 MMHG | BODY MASS INDEX: 26.81 KG/M2 | OXYGEN SATURATION: 98 % | HEIGHT: 69 IN | RESPIRATION RATE: 16 BRPM | HEART RATE: 96 BPM | WEIGHT: 181 LBS | DIASTOLIC BLOOD PRESSURE: 77 MMHG | TEMPERATURE: 97.7 F

## 2018-03-20 DIAGNOSIS — J32.9 PURULENT POST-NASAL DISCHARGE: Primary | ICD-10-CM

## 2018-03-20 PROCEDURE — 99213 OFFICE O/P EST LOW 20 MIN: CPT | Performed by: FAMILY MEDICINE

## 2018-03-20 RX ORDER — IPRATROPIUM BROMIDE 42 UG/1
2 SPRAY, METERED NASAL 3 TIMES DAILY
Qty: 1 BOX | Refills: 1 | Status: SHIPPED | OUTPATIENT
Start: 2018-03-20 | End: 2018-11-16

## 2018-03-20 NOTE — NURSING NOTE
"Chief Complaint   Patient presents with     Sinus Problem       Initial /77 (Cuff Size: Adult Regular)  Pulse 96  Temp 97.7  F (36.5  C) (Tympanic)  Resp 16  Ht 5' 9\" (1.753 m)  Wt 181 lb (82.1 kg)  SpO2 98%  BMI 26.73 kg/m2 Estimated body mass index is 26.73 kg/(m^2) as calculated from the following:    Height as of this encounter: 5' 9\" (1.753 m).    Weight as of this encounter: 181 lb (82.1 kg).  Medication Reconciliation: complete   Dania Connors, CMA    "

## 2018-03-20 NOTE — MR AVS SNAPSHOT
"              After Visit Summary   3/20/2018    Jeff Mack    MRN: [id number]           Patient Information     Date Of Birth          1935        Visit Information        Provider Department      3/20/2018 10:00 AM Benjamin Lazar MD [facility]        Today's Diagnoses     Purulent post-nasal discharge    -  1       Follow-ups after your visit        Follow-up notes from your care team     Return if symptoms worsen or fail to improve.      Your next 10 appointments already scheduled     May 01, 2018   Procedure with Alo Mauro MD   [facility] ([facility])    [address]  [place] [id number] [phone]           [facility] is located at [address] [place]              Who to contact     If you have questions or need follow up information about today's clinic visit or your schedule please contact [facility] [place] directly at [phone].  Normal or non-critical lab and imaging results will be communicated to you by [employer], letter or phone within 4 business days after the clinic has received the results. If you do not hear from us within 7 days, please contact the clinic through [employer] or phone. If you have a critical or abnormal lab result, we will notify you by phone as soon as possible.  Submit refill requests through "[employer] or call your pharmacy and they will forward the refill request to us. Please allow 3 business days for your refill to be completed.          Additional Information About Your Visit        MyChart Information     "[email or web address] lets you send messages to your doctor, view your test results, renew your prescriptions, schedule appointments and more. To sign up, go to [email or web address] . Click on \"Log in\" on the left side of the screen, which will take you to the Welcome page. Then click on \"Sign up Now\" on the right side of the page.     You will be asked to enter the access code " "listed below, as well as some personal information. Please follow the directions to create your username and password.     Your access code is: [email or web address]  Expires: 2018 10:40 AM     Your access code will  in 90 days. If you need help or a new code, please call your [facility] or [phone].        Care EveryWhere ID     This is your Care EveryWhere ID. This could be used by other organizations to access your [facility] medical records  [id number]        Your Vitals Were     Pulse Temperature Respirations Height Pulse Oximetry BMI (Body Mass Index)    96 97.7  F (36.5  C) (Tympanic) 16 5' 9\" (1.753 m) 98% 26.73 kg/m2       Blood Pressure from Last 3 Encounters:   18 133/77   18 148/85   10/31/17 129/77    Weight from Last 3 Encounters:   18 181 lb (82.1 kg)   18 181 lb (82.1 kg)   10/31/17 176 lb (79.8 kg)              Today, you had the following     No orders found for display         Today's Medication Changes          These changes are accurate as of 3/20/18 10:19 AM.  If you have any questions, ask your nurse or doctor.               Start taking these medicines.        Dose/Directions    ipratropium 0.06 % spray   Commonly known as:  ATROVENT   Used for:  Purulent post-nasal discharge   Started by:  Benjamin Lazar MD        Dose:  2 spray   Spray 2 sprays into both nostrils 3 times daily   Quantity:  1 Box   Refills:  1            Where to get your medicines      These medications were sent to [place] [address] [place] [facility] [address] [facility]  [address] [facility] [address]     Phone:  [phone]     ipratropium 0.06 % spray                Primary Care Provider Office Phone # Fax #    Benjamin Lazar [phone]       [address] [facility] [address]        Equal Access to Services     [facility] AH: Hadii aad ku hadasho Soomaali, waaxda luqadaha, qaybta kaalmada adeegyada, waxay idiin hayaan adeeg kharash " lacoretta frazier. So [facility] [phone].    ATENCIÓN: Si habla jefferson, tiene a dey disposición servicios gratuitos de asistencia lingüística. Eb buckley [phone].    We comply with applicable federal civil rights laws and [place] laws. We do not discriminate on the basis of race, color, national origin, age, disability, sex, sexual orientation, or gender identity.            Thank you!     Thank you for choosing [facility] [place]  for your care. Our goal is always to provide you with excellent care. Hearing back from our patients is one way we can continue to improve our services. Please take a few minutes to complete the written survey that you may receive in the mail after your visit with us. Thank you!             Your Updated Medication List - Protect others around you: Learn how to safely use, store and throw away your medicines at www.disposemymeds.org.          This list is accurate as of 3/20/18 10:19 AM.  Always use your most recent med list.                   Brand Name Dispense Instructions for use Diagnosis    ADVIL PO           ALPRAZolam 0.5 MG tablet    XANAX    45 tablet    Take 1 tablet (0.5 mg) by mouth 2 times daily as needed for anxiety    Adjustment disorder with mixed anxiety and depressed mood       budesonide-formoterol 160-4.5 MCG/ACT Inhaler    SYMBICORT     Inhale 2 puffs into the lungs 2 times daily        cholecalciferol 5000 UNITS Tabs tablet    vitamin D3     Take  by mouth.        cyanocolbalamin 100 MCG tablet    vitamin  B-12     Take 50 mcg by mouth daily        EPINEPHrine 0.3 MG/0.3ML injection    EPIPEN    1 each    Inject 0.3 mLs (0.3 mg) into the muscle once as needed for anaphylaxis        fluticasone 50 MCG/ACT spray    FLONASE    1 Bottle    Spray 1-2 sprays into both nostrils daily    Throat pain       ipratropium 0.06 % spray    ATROVENT    1 Box    Spray 2 sprays into both nostrils 3 times daily    Purulent post-nasal discharge       MAGNESIUM OXIDE PO            TRAMADOL HCL PO

## 2018-03-20 NOTE — PROGRESS NOTES
SUBJECTIVE:   Jeff Mack is a 83 year old male who presents to clinic today for the following health issues:      Sinus Problem Follow Up      Description (location/character/radiation): Seen for sinus problem 2/28/18. Still has SOB, nasal congestion and urge to clear his throat.          Problem list and histories reviewed & adjusted, as indicated.  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)     Cardiomegaly     Chronic ischemic heart disease     Essential hypertension, benign     Primary cardiomyopathy (H)     Hyperlipidemia with target LDL less than 100     Acute low back pain     Nonallopathic lesion of sacral region     Lumbar stenosis     Health Care Home     Advanced directives, counseling/discussion     Edema     Esophageal cancer (H)     Past Surgical History:   Procedure Laterality Date     BRONCHOSCOPY FLEXIBLE N/A 4/3/2015    Procedure: BRONCHOSCOPY FLEXIBLE;  Surgeon: Ralph Catherine MD;  Location:  OR      EXCISION OF LINGUAL TONSIL      TONSILLECTOMY     C NONSPECIFIC PROCEDURE  2-99    Colonic polpectomy     C NONSPECIFIC PROCEDURE  9-92    TURP     C NONSPECIFIC PROCEDURE  1982    Retinal surgery     COLONOSCOPY       ESOPHAGOSCOPY, GASTROSCOPY, DUODENOSCOPY (EGD), COMBINED N/A 3/19/2015    Procedure: COMBINED ESOPHAGOSCOPY, GASTROSCOPY, DUODENOSCOPY (EGD), BIOPSY SINGLE OR MULTIPLE;  Surgeon: Alo Mauro MD;  Location:  GI     ESOPHAGOSCOPY, GASTROSCOPY, DUODENOSCOPY (EGD), COMBINED N/A 7/14/2015    Procedure: COMBINED ESOPHAGOSCOPY, GASTROSCOPY, DUODENOSCOPY (EGD), BIOPSY SINGLE OR MULTIPLE;  Surgeon: Kathy Torres MD;  Location:  GI     ESOPHAGOSCOPY, GASTROSCOPY, DUODENOSCOPY (EGD),  COMBINED N/A 12/1/2015    Procedure: COMBINED ESOPHAGOSCOPY, GASTROSCOPY, DUODENOSCOPY (EGD), BIOPSY SINGLE OR MULTIPLE;  Surgeon: Alo Mauro MD;  Location:  GI     ESOPHAGOSCOPY, GASTROSCOPY, DUODENOSCOPY (EGD), COMBINED N/A 3/17/2016    Procedure: COMBINED ESOPHAGOSCOPY, GASTROSCOPY, DUODENOSCOPY (EGD), BIOPSY SINGLE OR MULTIPLE;  Surgeon: Alo Mauro MD;  Location:  GI     ESOPHAGOSCOPY, GASTROSCOPY, DUODENOSCOPY (EGD), COMBINED N/A 7/21/2016    Procedure: COMBINED ESOPHAGOSCOPY, GASTROSCOPY, DUODENOSCOPY (EGD);  Surgeon: Alo Mauro MD;  Location: [facility]     ESOPHAGOSCOPY, GASTROSCOPY, DUODENOSCOPY (EGD), COMBINED N/A 11/17/2016    Procedure: COMBINED ESOPHAGOSCOPY, GASTROSCOPY, DUODENOSCOPY (EGD);  Surgeon: Alo Mauro MD;  Location: [facility]     ESOPHAGOSCOPY, GASTROSCOPY, DUODENOSCOPY (EGD), COMBINED N/A 5/9/2017    Procedure: COMBINED ESOPHAGOSCOPY, GASTROSCOPY, DUODENOSCOPY (EGD);  (EGD) COMBINED ESOPHAGOSCOPY, GASTROSCOPY, DUODENOSCOPY;  Surgeon: Alo Mauro MD;  Location:  GI     GASTROSTOMY, INSERT TUBE, COMBINED N/A 4/3/2015    Procedure: COMBINED GASTROSTOMY, INSERT TUBE (OPEN);  Surgeon: Ralph Catherine MD;  Location:  OR      PRESSURE GRADIENT MEASUREMENT, INITIAL VESSEL  2005    essentially normal     HC UGI ENDOSCOPY W EUS N/A 7/14/2015    Procedure: COMBINED ENDOSCOPIC ULTRASOUND, ESOPHAGOSCOPY, GASTROSCOPY, DUODENOSCOPY (EGD);  Surgeon: Kathy Torres MD;  Location:  GI     LAMINECTOMY, FUSION LUMBAR ONE LEVEL, COMBINED  7/26/2012    Procedure: COMBINED LAMINECTOMY, FUSION LUMBAR ONE LEVEL;  Posterior Fusion L5-S1, Total Facetectomy L5-S1 Left ;  Surgeon: Ronald Espinosa MD;  Location:  OR       Social History   Substance Use Topics     Smoking status: Former Smoker     Packs/day: 2.00     Years: 40.00     Types: Cigarettes     Quit date: 1/1/1975     Smokeless tobacco: Never Used     Alcohol use No     Family History   Problem Relation Age  of Onset     Hypertension Mother      CEREBROVASCULAR DISEASE Mother      DIABETES Brother      DIABETES Father       age 97         Current Outpatient Prescriptions   Medication Sig Dispense Refill     ipratropium (ATROVENT) 0.06 % spray Spray 2 sprays into both nostrils 3 times daily 1 Box 1     Ibuprofen (ADVIL PO)        MAGNESIUM OXIDE PO        fluticasone (FLONASE) 50 MCG/ACT spray Spray 1-2 sprays into both nostrils daily 1 Bottle 11     ALPRAZolam (XANAX) 0.5 MG tablet Take 1 tablet (0.5 mg) by mouth 2 times daily as needed for anxiety 45 tablet 0     budesonide-formoterol (SYMBICORT) 160-4.5 MCG/ACT inhaler Inhale 2 puffs into the lungs 2 times daily       TRAMADOL HCL PO        cyanocolbalamin (VITAMIN  B-12) 100 MCG tablet Take 50 mcg by mouth daily       Cholecalciferol (VITAMIN D3) 5000 UNITS TABS Take  by mouth.       EPINEPHrine (EPIPEN) 0.3 MG/0.3ML injection Inject 0.3 mLs (0.3 mg) into the muscle once as needed for anaphylaxis (Patient not taking: Reported on 3/20/2018) 1 each 1     Allergies   Allergen Reactions     Bee Venom Shortness Of Breath     Recent Labs   Lab Test  17   0943  17   1130  07/09/15   1110   12   1548   04/28/10   0824   LDL   --    --   92   --   111   --   144*   HDL   --    --   48   --   71   --   88   TRIG   --    --   80   --   110   --   104   ALT   --    --    --    --   19   --   17   CR   --   0.81  0.56*   < >  0.86   < >  0.89   GFRESTIMATED   --   >90  Non  GFR Calc    >90  Non  GFR Calc     < >  87   < >  84   GFRESTBLACK   --   >90   GFR Calc    >90   GFR Calc     < >  >90   < >  >90   POTASSIUM   --   4.4  4.1   < >  3.5   --   4.1   TSH  1.79   --    --    --   1.72   --   1.73    < > = values in this interval not displayed.      BP Readings from Last 3 Encounters:   18 133/77   18 148/85   10/31/17 129/77    Wt Readings from Last 3 Encounters:   18 181 lb  "(82.1 kg)   02/13/18 181 lb (82.1 kg)   10/31/17 176 lb (79.8 kg)                    Reviewed and updated as needed this visit by clinical staff       Reviewed and updated as needed this visit by Provider         ROS:  Constitutional, HEENT, cardiovascular, pulmonary, gi and gu systems are negative, except as otherwise noted.    OBJECTIVE:     /77 (Cuff Size: Adult Regular)  Pulse 96  Temp 97.7  F (36.5  C) (Tympanic)  Resp 16  Ht 5' 9\" (1.753 m)  Wt 181 lb (82.1 kg)  SpO2 98%  BMI 26.73 kg/m2  Body mass index is 26.73 kg/(m^2).  GENERAL: healthy, alert and no distress  NECK: no adenopathy, no asymmetry, masses, or scars and thyroid normal to palpation  RESP: lungs clear to auscultation - no rales, rhonchi or wheezes  CV: regular rate and rhythm, normal S1 S2, no S3 or S4, no murmur, click or rub, no peripheral edema and peripheral pulses strong  ABDOMEN: soft, nontender, no hepatosplenomegaly, no masses and bowel sounds normal  MS: no gross musculoskeletal defects noted, no edema        ASSESSMENT/PLAN:   ASSESSMENT / PLAN:  (J32.9) Purulent post-nasal discharge  (primary encounter diagnosis)  Comment: has been having lingering cough with mild SOB due to postnasal drainage,   Will have him to switch Flonase to Atrovent, and adding xyzal daily   Plan: ipratropium (ATROVENT) 0.06 % spray        RTC in 1 month if not improving       Benjamin Lazar MD  [facility]    "

## 2018-05-01 ENCOUNTER — SURGERY (OUTPATIENT)
Age: 83
End: 2018-05-01

## 2018-05-01 ENCOUNTER — HOSPITAL ENCOUNTER (OUTPATIENT)
Facility: CLINIC | Age: 83
Discharge: HOME | End: 2018-05-01
Attending: SPECIALIST | Admitting: SPECIALIST
Payer: MEDICARE

## 2018-05-01 VITALS
OXYGEN SATURATION: 95 % | WEIGHT: 175 LBS | BODY MASS INDEX: 24.5 KG/M2 | RESPIRATION RATE: 14 BRPM | SYSTOLIC BLOOD PRESSURE: 134 MMHG | DIASTOLIC BLOOD PRESSURE: 79 MMHG | HEIGHT: 71 IN

## 2018-05-01 LAB — UPPER GI ENDOSCOPY: NORMAL

## 2018-05-01 PROCEDURE — 43235 EGD DIAGNOSTIC BRUSH WASH: CPT | Performed by: SPECIALIST

## 2018-05-01 PROCEDURE — 25000128 H RX IP 250 OP 636: Performed by: SPECIALIST

## 2018-05-01 PROCEDURE — G0500 MOD SEDAT ENDO SERVICE >5YRS: HCPCS | Performed by: SPECIALIST

## 2018-05-01 PROCEDURE — 25000125 ZZHC RX 250: Performed by: SPECIALIST

## 2018-05-01 RX ORDER — FENTANYL CITRATE 50 UG/ML
INJECTION, SOLUTION INTRAMUSCULAR; INTRAVENOUS PRN
Status: DISCONTINUED | OUTPATIENT
Start: 2018-05-01 | End: 2018-05-01 | Stop reason: HOSPADM

## 2018-05-01 RX ORDER — LIDOCAINE 40 MG/G
CREAM TOPICAL
Status: DISCONTINUED | OUTPATIENT
Start: 2018-05-01 | End: 2018-05-01 | Stop reason: HOSPADM

## 2018-05-01 RX ORDER — ONDANSETRON 2 MG/ML
4 INJECTION INTRAMUSCULAR; INTRAVENOUS
Status: DISCONTINUED | OUTPATIENT
Start: 2018-05-01 | End: 2018-05-01 | Stop reason: HOSPADM

## 2018-05-01 RX ADMIN — TOPICAL ANESTHETIC 1 SPRAY: 200 SPRAY DENTAL; PERIODONTAL at 08:45

## 2018-05-01 RX ADMIN — MIDAZOLAM 0.5 MG: 1 INJECTION INTRAMUSCULAR; INTRAVENOUS at 08:45

## 2018-05-01 RX ADMIN — FENTANYL CITRATE 25 MCG: 50 INJECTION, SOLUTION INTRAMUSCULAR; INTRAVENOUS at 08:45

## 2018-05-01 NOTE — H&P
Pre-Endoscopy History and Physical     Jeff Mack MRN# [id number]   YOB: 1935 Age: 83 year old     Date of Procedure: 5/1/2018  Primary care provider: Benjamin Lazar  Type of Endoscopy: Gastroscopy with possible biopsy, possible dilation  Reason for Procedure: history of esophageal cancer  Type of Anesthesia Anticipated: Conscious Sedation    HPI:    Jeff is a 83 year old male who will be undergoing the above procedure.      A history and physical has been performed. The patient's medications and allergies have been reviewed. The risks and benefits of the procedure and the sedation options and risks were discussed with the patient.  All questions were answered and informed consent was obtained.      He denies a personal or family history of anesthesia complications or bleeding disorders.     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)     Cardiomegaly     Chronic ischemic heart disease     Essential hypertension, benign     Primary cardiomyopathy (H)     Hyperlipidemia with target LDL less than 100     Acute low back pain     Nonallopathic lesion of sacral region     Lumbar stenosis     Health Care Home     Advanced directives, counseling/discussion     Edema     Esophageal cancer (H)        Past Medical History:   Diagnosis Date     Anxiety state, unspecified      Asthma      Basal cell carcinoma      Benign neoplasm of colon      Cardiomegaly 1/6/2005     Chronic ischemic heart disease, unspecified      Constipation      Depressive disorder, not elsewhere classified      Diverticulosis of colon (without mention of hemorrhage)      Esophageal cancer (H) 3/23/2015     Essential hypertension, benign      Hiatal hernia      Hypertrophy (benign) of prostate       Internal hemorrhoids without mention of complication      Other primary cardiomyopathies 2003    EF 45%     Sciatica      Spondylosis of unspecified site without mention of myelopathy         Past Surgical History:   Procedure Laterality Date     BRONCHOSCOPY FLEXIBLE N/A 4/3/2015    Procedure: BRONCHOSCOPY FLEXIBLE;  Surgeon: Ralph Catherine MD;  Location:  OR     C EXCISION OF LINGUAL TONSIL      TONSILLECTOMY     C NONSPECIFIC PROCEDURE  2-99    Colonic polpectomy     C NONSPECIFIC PROCEDURE  9-92    TURP     C NONSPECIFIC PROCEDURE  1982    Retinal surgery     COLONOSCOPY       ESOPHAGOSCOPY, GASTROSCOPY, DUODENOSCOPY (EGD), COMBINED N/A 3/19/2015    Procedure: COMBINED ESOPHAGOSCOPY, GASTROSCOPY, DUODENOSCOPY (EGD), BIOPSY SINGLE OR MULTIPLE;  Surgeon: Alo Mauro MD;  Location: [facility]     ESOPHAGOSCOPY, GASTROSCOPY, DUODENOSCOPY (EGD), COMBINED N/A 7/14/2015    Procedure: COMBINED ESOPHAGOSCOPY, GASTROSCOPY, DUODENOSCOPY (EGD), BIOPSY SINGLE OR MULTIPLE;  Surgeon: Kathy Torres MD;  Location:  GI     ESOPHAGOSCOPY, GASTROSCOPY, DUODENOSCOPY (EGD), COMBINED N/A 12/1/2015    Procedure: COMBINED ESOPHAGOSCOPY, GASTROSCOPY, DUODENOSCOPY (EGD), BIOPSY SINGLE OR MULTIPLE;  Surgeon: Alo Mauro MD;  Location: [facility]     ESOPHAGOSCOPY, GASTROSCOPY, DUODENOSCOPY (EGD), COMBINED N/A 3/17/2016    Procedure: COMBINED ESOPHAGOSCOPY, GASTROSCOPY, DUODENOSCOPY (EGD), BIOPSY SINGLE OR MULTIPLE;  Surgeon: Alo Mauro MD;  Location:  GI     ESOPHAGOSCOPY, GASTROSCOPY, DUODENOSCOPY (EGD), COMBINED N/A 7/21/2016    Procedure: COMBINED ESOPHAGOSCOPY, GASTROSCOPY, DUODENOSCOPY (EGD);  Surgeon: Alo Mauro MD;  Location:  GI     ESOPHAGOSCOPY, GASTROSCOPY, DUODENOSCOPY (EGD), COMBINED N/A 11/17/2016    Procedure: COMBINED ESOPHAGOSCOPY, GASTROSCOPY, DUODENOSCOPY (EGD);  Surgeon: Alo Mauro MD;  Location: [facility]     ESOPHAGOSCOPY, GASTROSCOPY, DUODENOSCOPY (EGD), COMBINED N/A 5/9/2017     Procedure: COMBINED ESOPHAGOSCOPY, GASTROSCOPY, DUODENOSCOPY (EGD);  (EGD) COMBINED ESOPHAGOSCOPY, GASTROSCOPY, DUODENOSCOPY;  Surgeon: Alo Mauro MD;  Location:  GI     GASTROSTOMY, INSERT TUBE, COMBINED N/A 4/3/2015    Procedure: COMBINED GASTROSTOMY, INSERT TUBE (OPEN);  Surgeon: Ralph Catherine MD;  Location: [facility]     HC PRESSURE GRADIENT MEASUREMENT, INITIAL VESSEL  2005    essentially normal     HC UGI ENDOSCOPY W EUS N/A 2015    Procedure: COMBINED ENDOSCOPIC ULTRASOUND, ESOPHAGOSCOPY, GASTROSCOPY, DUODENOSCOPY (EGD);  Surgeon: Kathy Torres MD;  Location:  GI     LAMINECTOMY, FUSION LUMBAR ONE LEVEL, COMBINED  2012    Procedure: COMBINED LAMINECTOMY, FUSION LUMBAR ONE LEVEL;  Posterior Fusion L5-S1, Total Facetectomy L5-S1 Left ;  Surgeon: Ronald Espinosa MD;  Location:  OR       Social History   Substance Use Topics     Smoking status: Former Smoker     Packs/day: 2.00     Years: 40.00     Types: Cigarettes     Quit date: 1975     Smokeless tobacco: Never Used     Alcohol use No       Family History   Problem Relation Age of Onset     Hypertension Mother      CEREBROVASCULAR DISEASE Mother      DIABETES Brother      DIABETES Father       age 97       Prior to Admission medications    Medication Sig Start Date End Date Taking? Authorizing Provider   budesonide-formoterol (SYMBICORT) 160-4.5 MCG/ACT inhaler Inhale 2 puffs into the lungs 2 times daily   Yes Reported, Patient   Cholecalciferol (VITAMIN D3) 5000 UNITS TABS Take  by mouth.   Yes Reported, Patient   cyanocolbalamin (VITAMIN  B-12) 100 MCG tablet Take 50 mcg by mouth daily   Yes Reported, Patient   fluticasone (FLONASE) 50 MCG/ACT spray Spray 1-2 sprays into both nostrils daily 17  Yes Elder, Tika Victoria MD   Ibuprofen (ADVIL PO)    Yes Reported, Patient   MAGNESIUM OXIDE PO    Yes Reported, Patient   ALPRAZolam (XANAX) 0.5 MG tablet Take 1 tablet (0.5 mg) by mouth 2 times daily as needed  "for anxiety 7/10/17   Benjamin Lazar MD   EPINEPHrine (EPIPEN) 0.3 MG/0.3ML injection Inject 0.3 mLs (0.3 mg) into the muscle once as needed for anaphylaxis  Patient not taking: Reported on 3/20/2018 9/2/13   Juan Francisco Hanks MD   ipratropium (ATROVENT) 0.06 % spray Spray 2 sprays into both nostrils 3 times daily 3/20/18   Benjamin Lazar MD   TRAMADOL HCL PO     Reported, Patient       Allergies   Allergen Reactions     Bee Venom Shortness Of Breath        REVIEW OF SYSTEMS:   5 point ROS negative except as noted above in HPI, including Gen., Resp., CV, GI &  system review.    PHYSICAL EXAM:   /83  Resp 16  Ht 1.803 m (5' 11\")  Wt 79.4 kg (175 lb)  SpO2 97%  BMI 24.41 kg/m2 Estimated body mass index is 24.41 kg/(m^2) as calculated from the following:    Height as of this encounter: 1.803 m (5' 11\").    Weight as of this encounter: 79.4 kg (175 lb).   GENERAL APPEARANCE: alert, and oriented  MENTAL STATUS: alert  AIRWAY EXAM: Mallampatti Class I (visualization of the soft palate, fauces, uvula, anterior and posterior pillars)  RESP: lungs clear to auscultation - no rales, rhonchi or wheezes  CV: regular rates and rhythm  DIAGNOSTICS:    Not indicated    IMPRESSION   ASA Class 2 - Mild systemic disease    PLAN:   Plan for Gastroscopy with possible biopsy, possible dilation. We discussed the risks, benefits and alternatives and the patient wished to proceed.    The above has been forwarded to the consulting provider.      Signed Electronically by: Alo Mauro  May 1, 2018          "

## 2018-05-01 NOTE — BRIEF OP NOTE
[facility] Brief Operative Note    Pre-operative diagnosis: SURVEILLANCE AND FOLLOW UP FOR ESOPHAGEAL TUMOR    Post-operative diagnosis normal      Procedure: Procedure(s):  ESOPHAGOGASTRODUODENOSCOPY  - Wound Class: II-Clean Contaminated   Surgeon(s): Surgeon(s) and Role:     * Alo Mauro MD - Primary   Estimated blood loss: * No values recorded between 5/1/2018 12:00 AM and 5/1/2018  9:02 AM *    Specimens: * No specimens in log *   Findings: Please see ProVation procedure note in Chart Review

## 2018-05-07 ENCOUNTER — TRANSFERRED RECORDS (OUTPATIENT)
Dept: HEALTH INFORMATION MANAGEMENT | Facility: CLINIC | Age: 83
End: 2018-05-07

## 2018-05-25 ENCOUNTER — TELEPHONE (OUTPATIENT)
Dept: FAMILY MEDICINE | Facility: CLINIC | Age: 83
End: 2018-05-25
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
## 2018-05-29 ENCOUNTER — OFFICE VISIT (OUTPATIENT)
Dept: FAMILY MEDICINE | Facility: CLINIC | Age: 83
End: 2018-05-29
Payer: COMMERCIAL

## 2018-05-29 VITALS
HEART RATE: 87 BPM | SYSTOLIC BLOOD PRESSURE: 114 MMHG | TEMPERATURE: 97.3 F | BODY MASS INDEX: 26.48 KG/M2 | OXYGEN SATURATION: 96 % | WEIGHT: 178.8 LBS | RESPIRATION RATE: 16 BRPM | DIASTOLIC BLOOD PRESSURE: 57 MMHG | HEIGHT: 69 IN

## 2018-05-29 DIAGNOSIS — H83.3X3 NOISE-INDUCED HEARING LOSS OF BOTH EARS: ICD-10-CM

## 2018-05-29 DIAGNOSIS — Z77.122 EXPOSED TO NOISE: Primary | ICD-10-CM

## 2018-05-29 PROCEDURE — 99213 OFFICE O/P EST LOW 20 MIN: CPT | Performed by: PHYSICIAN ASSISTANT

## 2018-05-29 NOTE — MR AVS SNAPSHOT
After Visit Summary   5/29/2018    Jeff Mack    MRN: [id number]           Patient Information     Date Of Birth          1935        Visit Information        Provider Department      5/29/2018 7:20 AM Flako Butterfield PA-C [facility] [place]        Today's Diagnoses     Exposed to noise    -  1    Noise-induced hearing loss of both ears           Follow-ups after your visit        Additional Services     AUDIOLOGY ADULT REFERRAL       Your provider has referred you to: [facility] [place] [phone]   [email or web address]    Specialty Testing:  Audiogram w/Tymps and Reflexes (Comprehensive Audiology Evaluation)            AUDIOLOGY ADULT REFERRAL       Your provider has referred you to: [facility] [phone]   [email or web address]    Specialty Testing:  Audiogram w/Tymps and Reflexes (Comprehensive Audiology Evaluation)                  Follow-up notes from your care team     Return in about 1 week (around 6/5/2018) for with audiology.      Who to contact     If you have questions or need follow up information about today's clinic visit or your schedule please contact [facility] [place] directly at [phone].  Normal or non-critical lab and imaging results will be communicated to you by MyChart, letter or phone within 4 business days after the clinic has received the results. If you do not hear from us within 7 days, please contact the clinic through MyChart or phone. If you have a critical or abnormal lab result, we will notify you by phone as soon as possible.  Submit refill requests through [employer] or call your pharmacy and they will forward the refill request to us. Please allow 3 business days for your refill to be completed.          Additional Information About Your Visit        Care EveryWhere ID     This  "is your Care EveryWhere ID. This could be used by other organizations to access your [place] medical records  [id number]        Your Vitals Were     Pulse Temperature Respirations Height Pulse Oximetry BMI (Body Mass Index)    87 97.3  F (36.3  C) (Tympanic) 16 5' 9\" (1.753 m) 96% 26.4 kg/m2       Blood Pressure from Last 3 Encounters:   05/29/18 114/57   05/01/18 134/79   03/20/18 133/77    Weight from Last 3 Encounters:   05/29/18 178 lb 12.8 oz (81.1 kg)   05/01/18 175 lb (79.4 kg)   03/20/18 181 lb (82.1 kg)              We Performed the Following     AUDIOLOGY ADULT REFERRAL     AUDIOLOGY ADULT REFERRAL        Primary Care Provider Office Phone # Fax #    Benjamin Lazar [phone]       [address] [facility] [address]        Equal Access to Services     [facility]: Hadii aad ku hadasho Soomaali, waaxda luqadaha, qaybta kaalmada adeegyada, waxay idiin hayaan bandar gardner lacoretta . So [facility] [phone].    ATENCIÓN: Si habla español, tiene a dey disposición servicios gratuitos de asistencia lingüística. Llame al [phone].    We comply with applicable federal civil rights laws and [place] laws. We do not discriminate on the basis of race, color, national origin, age, disability, sex, sexual orientation, or gender identity.            Thank you!     Thank you for choosing [facility]  for your care. Our goal is always to provide you with excellent care. Hearing back from our patients is one way we can continue to improve our services. Please take a few minutes to complete the written survey that you may receive in the mail after your visit with us. Thank you!             Your Updated Medication List - Protect others around you: Learn how to safely use, store and throw away your medicines at www.disposemymeds.org.          This list is accurate as of 5/29/18  7:44 AM.  Always use your most recent med list.                   Brand Name Dispense Instructions " for use Diagnosis    ADVIL PO           ALPRAZolam 0.5 MG tablet    XANAX    45 tablet    Take 1 tablet (0.5 mg) by mouth 2 times daily as needed for anxiety    Adjustment disorder with mixed anxiety and depressed mood       budesonide-formoterol 160-4.5 MCG/ACT Inhaler    SYMBICORT     Inhale 2 puffs into the lungs 2 times daily        cholecalciferol 5000 units Tabs tablet    vitamin D3     Take  by mouth.        cyanocolbalamin 100 MCG tablet    vitamin  B-12     Take 50 mcg by mouth daily        EPINEPHrine 0.3 MG/0.3ML injection    EPIPEN    1 each    Inject 0.3 mLs (0.3 mg) into the muscle once as needed for anaphylaxis        fluticasone 50 MCG/ACT spray    FLONASE    1 Bottle    Spray 1-2 sprays into both nostrils daily    Throat pain       ipratropium 0.06 % spray    ATROVENT    1 Box    Spray 2 sprays into both nostrils 3 times daily    Purulent post-nasal discharge       MAGNESIUM OXIDE PO           TRAMADOL HCL PO
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
## 2018-06-05 ENCOUNTER — TRANSFERRED RECORDS (OUTPATIENT)
Dept: HEALTH INFORMATION MANAGEMENT | Facility: CLINIC | Age: 83
End: 2018-06-05

## 2018-06-18 ENCOUNTER — OFFICE VISIT (OUTPATIENT)
Dept: FAMILY MEDICINE | Facility: CLINIC | Age: 83
End: 2018-06-18
Payer: COMMERCIAL

## 2018-06-18 VITALS
HEIGHT: 69 IN | OXYGEN SATURATION: 97 % | TEMPERATURE: 98 F | WEIGHT: 178 LBS | DIASTOLIC BLOOD PRESSURE: 73 MMHG | RESPIRATION RATE: 16 BRPM | SYSTOLIC BLOOD PRESSURE: 138 MMHG | HEART RATE: 88 BPM | BODY MASS INDEX: 26.36 KG/M2

## 2018-06-18 DIAGNOSIS — Z13.31 SCREENING FOR DEPRESSION: ICD-10-CM

## 2018-06-18 DIAGNOSIS — J30.2 ACUTE SEASONAL ALLERGIC RHINITIS, UNSPECIFIED TRIGGER: Primary | ICD-10-CM

## 2018-06-18 DIAGNOSIS — I42.9 PRIMARY CARDIOMYOPATHY (H): ICD-10-CM

## 2018-06-18 DIAGNOSIS — I10 ESSENTIAL HYPERTENSION, BENIGN: ICD-10-CM

## 2018-06-18 PROCEDURE — 99213 OFFICE O/P EST LOW 20 MIN: CPT | Performed by: FAMILY MEDICINE

## 2018-06-18 RX ORDER — PREDNISONE 20 MG/1
20 TABLET ORAL DAILY
Qty: 5 TABLET | Refills: 0 | Status: SHIPPED | OUTPATIENT
Start: 2018-06-18 | End: 2018-11-16

## 2018-06-18 RX ORDER — MONTELUKAST SODIUM 10 MG/1
10 TABLET ORAL AT BEDTIME
Qty: 30 TABLET | Refills: 1 | Status: SHIPPED | OUTPATIENT
Start: 2018-06-18 | End: 2018-11-16

## 2018-06-18 NOTE — MR AVS SNAPSHOT
"              After Visit Summary   6/18/2018    Jeff Mack    MRN: [id number]           Patient Information     Date Of Birth          1935        Visit Information        Provider Department      6/18/2018 3:40 PM Benjamin Lazar MD [facility] [place]        Today's Diagnoses     Screening for depression    -  1    Acute seasonal allergic rhinitis, unspecified trigger          Care Instructions    Please use prednisone prescription for 5 days with Atrovent spray, then start using Xyzal with prescribed Singulair after first 5 days(treatment with prednisone). We strongly recommend you to keep using Atrovent nasal spray.           Follow-ups after your visit        Who to contact     If you have questions or need follow up information about today's clinic visit or your schedule please contact [facility] [place] directly at [phone].  Normal or non-critical lab and imaging results will be communicated to you by MyChart, letter or phone within 4 business days after the clinic has received the results. If you do not hear from us within 7 days, please contact the clinic through MyChart or phone. If you have a critical or abnormal lab result, we will notify you by phone as soon as possible.  Submit refill requests through [employer] or call your pharmacy and they will forward the refill request to us. Please allow 3 business days for your refill to be completed.          Additional Information About Your Visit        Care EveryWhere ID     This is your Care EveryWhere ID. This could be used by other organizations to access your [facility] medical records  [id number]        Your Vitals Were     Pulse Temperature Respirations Height Pulse Oximetry BMI (Body Mass Index)    88 98  F (36.7  C) (Tympanic) 16 5' 9\" (1.753 m) 97% 26.29 kg/m2       Blood Pressure from Last 3 Encounters:   06/18/18 138/73   05/29/18 114/57   05/01/18 134/79    Weight from Last 3 Encounters:   06/18/18 178 lb (80.7 kg) "   05/29/18 178 lb 12.8 oz (81.1 kg)   05/01/18 175 lb (79.4 kg)              We Performed the Following     DEPRESSION ACTION PLAN (DAP)          Today's Medication Changes          These changes are accurate as of 6/18/18  3:51 PM.  If you have any questions, ask your nurse or doctor.               Start taking these medicines.        Dose/Directions    montelukast 10 MG tablet   Commonly known as:  SINGULAIR   Used for:  Acute seasonal allergic rhinitis, unspecified trigger   Started by:  Benjamin Lazar MD        Dose:  10 mg   Take 1 tablet (10 mg) by mouth At Bedtime   Quantity:  30 tablet   Refills:  1       predniSONE 20 MG tablet   Commonly known as:  DELTASONE   Used for:  Acute seasonal allergic rhinitis, unspecified trigger   Started by:  Benjamin Lazar MD        Dose:  20 mg   Take 1 tablet (20 mg) by mouth daily   Quantity:  5 tablet   Refills:  0            Where to get your medicines      These medications were sent to [place] [address] [place] [facility] [address]  [address] [facility] [address]     Phone:  [phone]     montelukast 10 MG tablet    predniSONE 20 MG tablet                Primary Care Provider Office Phone # Fax #    Benjamin Lazar [phone]       [address] [facility] [address]        Equal Access to Services     [facility]: Hadii tim ku hadasho Soomaali, waaxda luqadaha, qaybta kaalmada adeegyada, waxay idiin hayaan bandar cummings . So [facility] [phone].    ATENCIÓN: Si habla español, tiene a dey disposición servicios gratuitos de asistencia lingüística. Nicholasame al [phone].    We comply with applicable federal civil rights laws and [place] laws. We do not discriminate on the basis of race, color, national origin, age, disability, sex, sexual orientation, or gender identity.            Thank you!     Thank you for choosing [facility]  for your care. Our goal is always to provide you with  excellent care. Hearing back from our patients is one way we can continue to improve our services. Please take a few minutes to complete the written survey that you may receive in the mail after your visit with us. Thank you!             Your Updated Medication List - Protect others around you: Learn how to safely use, store and throw away your medicines at www.disposemymeds.org.          This list is accurate as of 6/18/18  3:51 PM.  Always use your most recent med list.                   Brand Name Dispense Instructions for use Diagnosis    ADVIL PO           ALPRAZolam 0.5 MG tablet    XANAX    45 tablet    Take 1 tablet (0.5 mg) by mouth 2 times daily as needed for anxiety    Adjustment disorder with mixed anxiety and depressed mood       budesonide-formoterol 160-4.5 MCG/ACT Inhaler    SYMBICORT     Inhale 2 puffs into the lungs 2 times daily        cholecalciferol 5000 units Tabs tablet    vitamin D3     Take  by mouth.        cyanocolbalamin 100 MCG tablet    vitamin  B-12     Take 50 mcg by mouth daily        EPINEPHrine 0.3 MG/0.3ML injection    EPIPEN    1 each    Inject 0.3 mLs (0.3 mg) into the muscle once as needed for anaphylaxis        fluticasone 50 MCG/ACT spray    FLONASE    1 Bottle    Spray 1-2 sprays into both nostrils daily    Throat pain       ipratropium 0.06 % spray    ATROVENT    1 Box    Spray 2 sprays into both nostrils 3 times daily    Purulent post-nasal discharge       MAGNESIUM OXIDE PO           montelukast 10 MG tablet    SINGULAIR    30 tablet    Take 1 tablet (10 mg) by mouth At Bedtime    Acute seasonal allergic rhinitis, unspecified trigger       predniSONE 20 MG tablet    DELTASONE    5 tablet    Take 1 tablet (20 mg) by mouth daily    Acute seasonal allergic rhinitis, unspecified trigger       TRAMADOL HCL PO              grossly assessed due to/4/5 throughout

## 2018-06-18 NOTE — PATIENT INSTRUCTIONS
Please use prednisone prescription for 5 days with Atrovent spray, then start using Xyzal with prescribed Singulair after first 5 days(treatment with prednisone). We strongly recommend you to keep using Atrovent nasal spray.

## 2018-06-18 NOTE — PROGRESS NOTES
SUBJECTIVE:   Jeff Mack is a 83 year old male who presents to clinic today for the following health issues:      RESPIRATORY SYMPTOMS      Duration: 3 weeks     Description  Cough, wheezing and nasal cognestion    Severity: moderate    Accompanying signs and symptoms: None    History (predisposing factors):  none    Precipitating or alleviating factors: None    Therapies tried and outcome:  Flonase     Problem list and histories reviewed & adjusted, as indicated.  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)     Cardiomegaly     Chronic ischemic heart disease     Essential hypertension, benign     Primary cardiomyopathy (H)     Hyperlipidemia with target LDL less than 100     Acute low back pain     Nonallopathic lesion of sacral region     Lumbar stenosis     Health Care Home     Advanced directives, counseling/discussion     Edema     Esophageal cancer (H)     Past Surgical History:   Procedure Laterality Date     BRONCHOSCOPY FLEXIBLE N/A 4/3/2015    Procedure: BRONCHOSCOPY FLEXIBLE;  Surgeon: Ralph Catherine MD;  Location:  OR     C EXCISION OF LINGUAL TONSIL      TONSILLECTOMY     C NONSPECIFIC PROCEDURE  2-99    Colonic polpectomy     C NONSPECIFIC PROCEDURE  9-92    TURP     C NONSPECIFIC PROCEDURE  1982    Retinal surgery     COLONOSCOPY       ESOPHAGOSCOPY, GASTROSCOPY, DUODENOSCOPY (EGD), COMBINED N/A 3/19/2015    Procedure: COMBINED ESOPHAGOSCOPY, GASTROSCOPY, DUODENOSCOPY (EGD), BIOPSY SINGLE OR MULTIPLE;  Surgeon: Alo Mauro MD;  Location:  GI     ESOPHAGOSCOPY, GASTROSCOPY, DUODENOSCOPY (EGD), COMBINED N/A 7/14/2015    Procedure: COMBINED ESOPHAGOSCOPY, GASTROSCOPY, DUODENOSCOPY (EGD), BIOPSY SINGLE OR  MULTIPLE;  Surgeon: Kathy Torres MD;  Location:  GI     ESOPHAGOSCOPY, GASTROSCOPY, DUODENOSCOPY (EGD), COMBINED N/A 12/1/2015    Procedure: COMBINED ESOPHAGOSCOPY, GASTROSCOPY, DUODENOSCOPY (EGD), BIOPSY SINGLE OR MULTIPLE;  Surgeon: Alo Mauro MD;  Location:  GI     ESOPHAGOSCOPY, GASTROSCOPY, DUODENOSCOPY (EGD), COMBINED N/A 3/17/2016    Procedure: COMBINED ESOPHAGOSCOPY, GASTROSCOPY, DUODENOSCOPY (EGD), BIOPSY SINGLE OR MULTIPLE;  Surgeon: Alo Mauro MD;  Location: [facility]     ESOPHAGOSCOPY, GASTROSCOPY, DUODENOSCOPY (EGD), COMBINED N/A 7/21/2016    Procedure: COMBINED ESOPHAGOSCOPY, GASTROSCOPY, DUODENOSCOPY (EGD);  Surgeon: Alo Mauro MD;  Location: [facility]     ESOPHAGOSCOPY, GASTROSCOPY, DUODENOSCOPY (EGD), COMBINED N/A 11/17/2016    Procedure: COMBINED ESOPHAGOSCOPY, GASTROSCOPY, DUODENOSCOPY (EGD);  Surgeon: Alo Mauro MD;  Location: [facility]     ESOPHAGOSCOPY, GASTROSCOPY, DUODENOSCOPY (EGD), COMBINED N/A 5/9/2017    Procedure: COMBINED ESOPHAGOSCOPY, GASTROSCOPY, DUODENOSCOPY (EGD);  (EGD) COMBINED ESOPHAGOSCOPY, GASTROSCOPY, DUODENOSCOPY;  Surgeon: Alo Mauro MD;  Location: [facility]     ESOPHAGOSCOPY, GASTROSCOPY, DUODENOSCOPY (EGD), COMBINED N/A 5/1/2018    Procedure: COMBINED ESOPHAGOSCOPY, GASTROSCOPY, DUODENOSCOPY (EGD);  ESOPHAGOGASTRODUODENOSCOPY ;  Surgeon: Alo Mauro MD;  Location:  GI     GASTROSTOMY, INSERT TUBE, COMBINED N/A 4/3/2015    Procedure: COMBINED GASTROSTOMY, INSERT TUBE (OPEN);  Surgeon: Ralph Catherine MD;  Location:  OR      PRESSURE GRADIENT MEASUREMENT, INITIAL VESSEL  2005    essentially normal     HC UGI ENDOSCOPY W EUS N/A 7/14/2015    Procedure: COMBINED ENDOSCOPIC ULTRASOUND, ESOPHAGOSCOPY, GASTROSCOPY, DUODENOSCOPY (EGD);  Surgeon: Kathy Torres MD;  Location: [facility]     LAMINECTOMY, FUSION LUMBAR ONE LEVEL, COMBINED  7/26/2012    Procedure: COMBINED LAMINECTOMY, FUSION LUMBAR ONE LEVEL;  Posterior Fusion  L5-S1, Total Facetectomy L5-S1 Left ;  Surgeon: Ronald Espinosa MD;  Location: [facility]       Social History   Substance Use Topics     Smoking status: Former Smoker     Packs/day: 2.00     Years: 40.00     Types: Cigarettes     Quit date: 1975     Smokeless tobacco: Never Used     Alcohol use No     Family History   Problem Relation Age of Onset     Hypertension Mother      CEREBROVASCULAR DISEASE Mother      DIABETES Brother      DIABETES Father       age 97         Current Outpatient Prescriptions   Medication Sig Dispense Refill     ALPRAZolam (XANAX) 0.5 MG tablet Take 1 tablet (0.5 mg) by mouth 2 times daily as needed for anxiety 45 tablet 0     budesonide-formoterol (SYMBICORT) 160-4.5 MCG/ACT inhaler Inhale 2 puffs into the lungs 2 times daily       Cholecalciferol (VITAMIN D3) 5000 UNITS TABS Take  by mouth.       cyanocolbalamin (VITAMIN  B-12) 100 MCG tablet Take 50 mcg by mouth daily       fluticasone (FLONASE) 50 MCG/ACT spray Spray 1-2 sprays into both nostrils daily 1 Bottle 11     Ibuprofen (ADVIL PO)        montelukast (SINGULAIR) 10 MG tablet Take 1 tablet (10 mg) by mouth At Bedtime 30 tablet 1     predniSONE (DELTASONE) 20 MG tablet Take 1 tablet (20 mg) by mouth daily 5 tablet 0     EPINEPHrine (EPIPEN) 0.3 MG/0.3ML injection Inject 0.3 mLs (0.3 mg) into the muscle once as needed for anaphylaxis (Patient not taking: Reported on 3/20/2018) 1 each 1     ipratropium (ATROVENT) 0.06 % spray Spray 2 sprays into both nostrils 3 times daily (Patient not taking: Reported on 2018) 1 Box 1     MAGNESIUM OXIDE PO        TRAMADOL HCL PO        Allergies   Allergen Reactions     Bee Venom Shortness Of Breath     Recent Labs   Lab Test  17   0943  17   1130  07/09/15   1110   12   1548   04/28/10   0824   LDL   --    --   92   --   111   --   144*   HDL   --    --   48   --   71   --   88   TRIG   --    --   80   --   110   --   104   ALT   --    --    --    --   19   --   17  "  CR   --   0.81  0.56*   < >  0.86   < >  0.89   GFRESTIMATED   --   >90  Non  GFR Calc    >90  Non  GFR Calc     < >  87   < >  84   GFRESTBLACK   --   >90   GFR Calc    >90   GFR Calc     < >  >90   < >  >90   POTASSIUM   --   4.4  4.1   < >  3.5   --   4.1   TSH  1.79   --    --    --   1.72   --   1.73    < > = values in this interval not displayed.      BP Readings from Last 3 Encounters:   06/18/18 138/73   05/29/18 114/57   05/01/18 134/79    Wt Readings from Last 3 Encounters:   06/18/18 178 lb (80.7 kg)   05/29/18 178 lb 12.8 oz (81.1 kg)   05/01/18 175 lb (79.4 kg)                    Reviewed and updated as needed this visit by clinical staff       Reviewed and updated as needed this visit by Provider         ROS:  Constitutional, HEENT, cardiovascular, pulmonary, gi and gu systems are negative, except as otherwise noted.    OBJECTIVE:     /73 (Cuff Size: Adult Regular)  Pulse 88  Temp 98  F (36.7  C) (Tympanic)  Resp 16  Ht 5' 9\" (1.753 m)  Wt 178 lb (80.7 kg)  SpO2 97%  BMI 26.29 kg/m2  Body mass index is 26.29 kg/(m^2).  GENERAL: healthy, alert and no distress  NECK: no adenopathy, no asymmetry, masses, or scars and thyroid normal to palpation  RESP: lungs clear to auscultation - no rales, rhonchi or wheezes  CV: regular rate and rhythm, normal S1 S2, no S3 or S4, no murmur, click or rub, no peripheral edema and peripheral pulses strong  ABDOMEN: soft, nontender, no hepatosplenomegaly, no masses and bowel sounds normal  MS: no gross musculoskeletal defects noted, no edema        ASSESSMENT/PLAN:   Jeff was seen today for uri.    Diagnoses and all orders for this visit:    Acute seasonal allergic rhinitis, unspecified trigger  -     predniSONE (DELTASONE) 20 MG tablet; Take 1 tablet (20 mg) by mouth daily  -     montelukast (SINGULAIR) 10 MG tablet; Take 1 tablet (10 mg) by mouth At Bedtime    Screening for depression  -     " DEPRESSION ACTION PLAN (DAP)    Essential hypertension, benign    Primary cardiomyopathy (H)      Pt has purulent postnasal drainage with cough, has mild wheezing bilateral upper lung  Will have him to try prednisone with Atrovent nasal spray for next 5 days, and switch prednisone to Xyzal and Singulair with keeping on Atrovent spray       FUTURE APPOINTMENTS:       - Follow-up visit as needed     Benjamin Lazar MD  [facility] [place]

## 2018-06-19 ASSESSMENT — ANXIETY QUESTIONNAIRES
6. BECOMING EASILY ANNOYED OR IRRITABLE: SEVERAL DAYS
5. BEING SO RESTLESS THAT IT IS HARD TO SIT STILL: NOT AT ALL
1. FEELING NERVOUS, ANXIOUS, OR ON EDGE: SEVERAL DAYS
7. FEELING AFRAID AS IF SOMETHING AWFUL MIGHT HAPPEN: NOT AT ALL
2. NOT BEING ABLE TO STOP OR CONTROL WORRYING: NOT AT ALL
GAD7 TOTAL SCORE: 3
IF YOU CHECKED OFF ANY PROBLEMS ON THIS QUESTIONNAIRE, HOW DIFFICULT HAVE THESE PROBLEMS MADE IT FOR YOU TO DO YOUR WORK, TAKE CARE OF THINGS AT HOME, OR GET ALONG WITH OTHER PEOPLE: NOT DIFFICULT AT ALL
3. WORRYING TOO MUCH ABOUT DIFFERENT THINGS: NOT AT ALL

## 2018-06-19 ASSESSMENT — PATIENT HEALTH QUESTIONNAIRE - PHQ9: 5. POOR APPETITE OR OVEREATING: SEVERAL DAYS

## 2018-06-20 ASSESSMENT — ANXIETY QUESTIONNAIRES: GAD7 TOTAL SCORE: 3

## 2018-06-20 ASSESSMENT — PATIENT HEALTH QUESTIONNAIRE - PHQ9: SUM OF ALL RESPONSES TO PHQ QUESTIONS 1-9: 2

## 2018-07-03 ENCOUNTER — TRANSFERRED RECORDS (OUTPATIENT)
Dept: HEALTH INFORMATION MANAGEMENT | Facility: CLINIC | Age: 83
End: 2018-07-03

## 2018-07-10 ENCOUNTER — TELEPHONE (OUTPATIENT)
Dept: FAMILY MEDICINE | Facility: CLINIC | Age: 83
End: 2018-07-10

## 2018-07-25 ENCOUNTER — DOCUMENTATION ONLY (OUTPATIENT)
Dept: OTHER | Facility: CLINIC | Age: 83
End: 2018-07-25

## 2018-07-25 DIAGNOSIS — Z71.89 ADVANCED DIRECTIVES, COUNSELING/DISCUSSION: Chronic | ICD-10-CM

## 2018-11-15 ENCOUNTER — ALLIED HEALTH/NURSE VISIT (OUTPATIENT)
Dept: NURSING | Facility: CLINIC | Age: 83
End: 2018-11-15
Payer: COMMERCIAL

## 2018-11-15 VITALS
OXYGEN SATURATION: 94 % | DIASTOLIC BLOOD PRESSURE: 74 MMHG | HEART RATE: 80 BPM | TEMPERATURE: 97.1 F | SYSTOLIC BLOOD PRESSURE: 130 MMHG
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
## 2018-11-15 NOTE — MR AVS SNAPSHOT
"              After Visit Summary   11/15/2018    Jeff Mack    MRN: [id number]           Patient Information     Date Of Birth          1935        Visit Information        Provider Department      11/15/2018 10:30 AM EC RN [place]        Today's Diagnoses     Fall, initial encounter    -  1       Follow-ups after your visit        Your next 10 appointments already scheduled     Nov 16, 2018  1:20 PM CST   Office Visit with Benjamin Lazar MD   [facility] [place] [facility])    [address] [place] [id number]   [phone]           Bring a current list of meds and any records pertaining to this visit. For Physicals, please bring immunization records and any forms needing to be filled out. Please arrive 10 minutes early to complete paperwork.              Who to contact     If you have questions or need follow up information about today's clinic visit or your schedule please contact [facility] [place] directly at [phone].  Normal or non-critical lab and imaging results will be communicated to you by ZMPhart, letter or phone within 4 business days after the clinic has received the results. If you do not hear from us within 7 days, please contact the clinic through [employer] or phone. If you have a critical or abnormal lab result, we will notify you by phone as soon as possible.  Submit refill requests through [employer] or call your pharmacy and they will forward the refill request to us. Please allow 3 business days for your refill to be completed.          Additional Information About Your Visit        ZMPharComviva Information     [employer] lets you send messages to your doctor, view your test results, renew your prescriptions, schedule appointments and more. To sign up, go to [email or web address] . Click on \"Log in\" on the left side of the screen, which will take you to the Welcome page. Then click on \"Sign up Now\" on the " right side of the page.     You will be asked to enter the access code listed below, as well as some personal information. Please follow the directions to create your username and password.     Your access code is: [email or web address]  Expires: 2019 11:02 AM     Your access code will  in 90 days. If you need help or a new code, please call your [place] clinic or [phone].        Care EveryWhere ID     This is your Care EveryWhere ID. This could be used by other organizations to access your [place] medical records  [id number]        Your Vitals Were     Pulse Temperature Pulse Oximetry             80 97.1  F (36.2  C) 94%          Blood Pressure from Last 3 Encounters:   11/15/18 130/74   18 138/73   18 114/57    Weight from Last 3 Encounters:   18 80.7 kg (178 lb)   18 81.1 kg (178 lb 12.8 oz)   18 79.4 kg (175 lb)              Today, you had the following     No orders found for display       Primary Care Provider Office Phone # Fax #    Benjamin Lazar [phone]        [facility] [id number]        Equal Access to Services     CUONG RAY AH: Hadii tim ku hadasho Soomaali, waaxda luqadaha, qaybta kaalmada adeegyada, waxay marina frazier. So [facility] [phone].    ATENCIÓN: Si habla español, tiene a dey disposición servicios gratuitos de asistencia lingüística. Llame al [phone].    We comply with applicable federal civil rights laws and [place] laws. We do not discriminate on the basis of race, color, national origin, age, disability, sex, sexual orientation, or gender identity.            Thank you!     Thank you for choosing [facility]  for your care. Our goal is always to provide you with excellent care. Hearing back from our patients is one way we can continue to improve our services. Please take a few minutes to complete the written survey that you may receive in the mail after your visit  with us. Thank you!             Your Updated Medication List - Protect others around you: Learn how to safely use, store and throw away your medicines at www.disposemymeds.org.          This list is accurate as of 11/15/18 11:02 AM.  Always use your most recent med list.                   Brand Name Dispense Instructions for use Diagnosis    ADVIL PO           ALPRAZolam 0.5 MG tablet    XANAX    45 tablet    Take 1 tablet (0.5 mg) by mouth 2 times daily as needed for anxiety    Adjustment disorder with mixed anxiety and depressed mood       budesonide-formoterol 160-4.5 MCG/ACT Inhaler    SYMBICORT     Inhale 2 puffs into the lungs 2 times daily        cholecalciferol 5000 units Tabs tablet    vitamin D3     Take  by mouth.        cyanocolbalamin 100 MCG tablet    vitamin  B-12     Take 50 mcg by mouth daily        EPINEPHrine 0.3 MG/0.3ML injection    EPIPEN    1 each    Inject 0.3 mLs (0.3 mg) into the muscle once as needed for anaphylaxis        fluticasone 50 MCG/ACT spray    FLONASE    1 Bottle    Spray 1-2 sprays into both nostrils daily    Throat pain       ipratropium 0.06 % spray    ATROVENT    1 Box    Spray 2 sprays into both nostrils 3 times daily    Purulent post-nasal discharge       MAGNESIUM OXIDE PO           montelukast 10 MG tablet    SINGULAIR    30 tablet    Take 1 tablet (10 mg) by mouth At Bedtime    Acute seasonal allergic rhinitis, unspecified trigger       predniSONE 20 MG tablet    DELTASONE    5 tablet    Take 1 tablet (20 mg) by mouth daily    Acute seasonal allergic rhinitis, unspecified trigger       TRAMADOL HCL PO

## 2018-11-15 NOTE — PROGRESS NOTES
Pt walked into clinic after tripping and falling on cement outside of a printing shop.  He did hit the glass of the shop hitting forehead.  Has a goose egg that is turning black and blue.  Denies headache, vision changes, lightheadedness/dizziness, or confusion.  No open wound on head.  Does c/o stiff neck starting.  Did scrape knee on cement that is bleeding.    Vitals normal.  Applied ice to goose egg for about 5-10 minutes and reduced quickly.  Cleaned up knee with normal saline, applied bactracin and large bandage.  Pt has appt with Dr. Lazar tomorrow.  Sent pt home with ice packs, bactracin, and large bandages for knee.  Advised to apply ice packs to head and neck for 20 minutes 4x/day and ok to take tylenol or ibuprofen for pain.    Pt states understanding.    Smita MEZA RN  EP Triage

## 2018-11-16 ENCOUNTER — OFFICE VISIT (OUTPATIENT)
Dept: FAMILY MEDICINE | Facility: CLINIC | Age: 83
End: 2018-11-16
Payer: COMMERCIAL

## 2018-11-16 VITALS
HEART RATE: 80 BPM | HEIGHT: 69 IN | TEMPERATURE: 98 F | BODY MASS INDEX: 26.36 KG/M2 | SYSTOLIC BLOOD PRESSURE: 118 MMHG | WEIGHT: 178 LBS | RESPIRATION RATE: 14 BRPM | DIASTOLIC BLOOD PRESSURE: 67 MMHG | OXYGEN SATURATION: 97 %

## 2018-11-16 DIAGNOSIS — Z12.5 SCREENING FOR PROSTATE CANCER: Primary | ICD-10-CM

## 2018-11-16 DIAGNOSIS — J01.90 ACUTE SINUSITIS WITH SYMPTOMS > 10 DAYS: ICD-10-CM

## 2018-11-16 LAB
ALBUMIN SERPL-MCNC: 3.7 G/DL (ref 3.4–5)
ALP SERPL-CCNC: 69 U/L (ref 40–150)
ALT SERPL W P-5'-P-CCNC: 19 U/L (ref 0–70)
ANION GAP SERPL CALCULATED.3IONS-SCNC: 9 MMOL/L (ref 3–14)
AST SERPL W P-5'-P-CCNC: 25 U/L (ref 0–45)
BASOPHILS # BLD AUTO: 0.1 10E9/L (ref 0–0.2)
BASOPHILS NFR BLD AUTO: 2.1 %
BILIRUB SERPL-MCNC: 0.7 MG/DL (ref 0.2–1.3)
BUN SERPL-MCNC: 12 MG/DL (ref 7–30)
CALCIUM SERPL-MCNC: 8.9 MG/DL (ref 8.5–10.1)
CHLORIDE SERPL-SCNC: 103 MMOL/L (ref 94–109)
CO2 SERPL-SCNC: 27 MMOL/L (ref 20–32)
CREAT SERPL-MCNC: 0.77 MG/DL (ref 0.66–1.25)
DIFFERENTIAL METHOD BLD: ABNORMAL
EOSINOPHIL # BLD AUTO: 0.2 10E9/L (ref 0–0.7)
EOSINOPHIL NFR BLD AUTO: 4.8 %
ERYTHROCYTE [DISTWIDTH] IN BLOOD BY AUTOMATED COUNT: 15.7 % (ref 10–15)
ERYTHROCYTE [SEDIMENTATION RATE] IN BLOOD BY WESTERGREN METHOD: 10 MM/H (ref 0–20)
GFR SERPL CREATININE-BSD FRML MDRD: >90 ML/MIN/1.7M2
GLUCOSE SERPL-MCNC: 123 MG/DL (ref 70–99)
HCT VFR BLD AUTO: 44.1 % (ref 40–53)
HGB BLD-MCNC: 15.3 G/DL (ref 13.3–17.7)
LYMPHOCYTES # BLD AUTO: 0.9 10E9/L (ref 0.8–5.3)
LYMPHOCYTES NFR BLD AUTO: 18.7 %
MCH RBC QN AUTO: 29.5 PG (ref 26.5–33)
MCHC RBC AUTO-ENTMCNC: 34.7 G/DL (ref 31.5–36.5)
MCV RBC AUTO: 85 FL (ref 78–100)
MONOCYTES # BLD AUTO: 1.2 10E9/L (ref 0–1.3)
MONOCYTES NFR BLD AUTO: 24.3 %
NEUTROPHILS # BLD AUTO: 2.4 10E9/L (ref 1.6–8.3)
NEUTROPHILS NFR BLD AUTO: 50.1 %
PLATELET # BLD AUTO: 295 10E9/L (ref 150–450)
POTASSIUM SERPL-SCNC: 4 MMOL/L (ref 3.4–5.3)
PROT SERPL-MCNC: 7.4 G/DL (ref 6.8–8.8)
PSA SERPL-ACNC: 0.98 UG/L (ref 0–4)
RBC # BLD AUTO: 5.19 10E12/L (ref 4.4–5.9)
SODIUM SERPL-SCNC: 139 MMOL/L (ref 133–144)
WBC # BLD AUTO: 4.8 10E9/L (ref 4–11)

## 2018-11-16 PROCEDURE — 80053 COMPREHEN METABOLIC PANEL: CPT | Performed by: FAMILY MEDICINE

## 2018-11-16 PROCEDURE — 85025 COMPLETE CBC W/AUTO DIFF WBC: CPT | Performed by: FAMILY MEDICINE

## 2018-11-16 PROCEDURE — 36415 COLL VENOUS BLD VENIPUNCTURE: CPT | Performed by: FAMILY MEDICINE

## 2018-11-16 PROCEDURE — 99213 OFFICE O/P EST LOW 20 MIN: CPT | Performed by: FAMILY MEDICINE

## 2018-11-16 PROCEDURE — 85652 RBC SED RATE AUTOMATED: CPT | Performed by: FAMILY MEDICINE

## 2018-11-16 PROCEDURE — G0103 PSA SCREENING: HCPCS | Performed by: FAMILY MEDICINE

## 2018-11-16 RX ORDER — PREDNISONE 20 MG/1
TABLET ORAL
Qty: 20 TABLET | Refills: 0 | Status: SHIPPED | OUTPATIENT
Start: 2018-11-16 | End: 2019-02-19

## 2018-11-16 RX ORDER — MONTELUKAST SODIUM 10 MG/1
10 TABLET ORAL AT BEDTIME
Qty: 30 TABLET | Refills: 1 | Status: SHIPPED | OUTPATIENT
Start: 2018-11-16 | End: 2019-08-15

## 2018-11-16 NOTE — MR AVS SNAPSHOT
"              After Visit Summary   2018    Jeff Mack    MRN: [id number]           Patient Information     Date Of Birth          1935        Visit Information        Provider Department      2018 1:20 PM Benjamin Lazar MD [facility] [place]        Today's Diagnoses     Screening for prostate cancer    -  1    Acute sinusitis with symptoms > 10 days           Follow-ups after your visit        Follow-up notes from your care team     Return in about 3 months (around 2019) for Physical Exam.      Who to contact     If you have questions or need follow up information about today's clinic visit or your schedule please contact [facility] directly at [phone].  Normal or non-critical lab and imaging results will be communicated to you by MyChart, letter or phone within 4 business days after the clinic has received the results. If you do not hear from us within 7 days, please contact the clinic through MyChart or phone. If you have a critical or abnormal lab result, we will notify you by phone as soon as possible.  Submit refill requests through [employer] or call your pharmacy and they will forward the refill request to us. Please allow 3 business days for your refill to be completed.          Additional Information About Your Visit        MyChart Information     [employer] lets you send messages to your doctor, view your test results, renew your prescriptions, schedule appointments and more. To sign up, go to [email or web address] . Click on \"Log in\" on the left side of the screen, which will take you to the Welcome page. Then click on \"Sign up Now\" on the right side of the page.     You will be asked to enter the access code listed below, as well as some personal information. Please follow the directions to create your username and password.     Your access code is: [email or web address]  Expires: 2019 11:02 AM     Your access code will  in 90 days. If you need " "help or a new code, please call your [place] clinic or [phone].        Care EveryWhere ID     This is your Care EveryWhere ID. This could be used by other organizations to access your [place] medical records  [id number]        Your Vitals Were     Pulse Temperature Respirations Height Pulse Oximetry BMI (Body Mass Index)    80 98  F (36.7  C) (Tympanic) 14 5' 9\" (1.753 m) 97% 26.29 kg/m2       Blood Pressure from Last 3 Encounters:   11/16/18 118/67   11/15/18 130/74   06/18/18 138/73    Weight from Last 3 Encounters:   11/16/18 178 lb (80.7 kg)   06/18/18 178 lb (80.7 kg)   05/29/18 178 lb 12.8 oz (81.1 kg)              We Performed the Following     CBC with platelets and differential     Comprehensive metabolic panel     ESR: Erythrocyte sedimentation rate     PSA, screen          Today's Medication Changes          These changes are accurate as of 11/16/18  1:55 PM.  If you have any questions, ask your nurse or doctor.               Start taking these medicines.        Dose/Directions    predniSONE 20 MG tablet   Commonly known as:  DELTASONE   Used for:  Acute sinusitis with symptoms > 10 days   Started by:  Benjamin Lazar MD        Take 3 tabs (60 mg) by mouth daily x 3 days, 2 tabs (40 mg) daily x 3 days, 1 tab (20 mg) daily x 3 days, then 1/2 tab (10 mg) x 3 days.   Quantity:  20 tablet   Refills:  0            Where to get your medicines      These medications were sent to [place] [address] [place] [facility] [address] [facility]  [address] [facility] [address]     Phone:  [phone]     montelukast 10 MG tablet    predniSONE 20 MG tablet                Primary Care Provider Office Phone # Fax #    Benjamin Lazar [phone]       [address] [facility] [address]        Equal Access to Services     CUONG RAY AH: Hadii aad ku hadasho Soomaali, waaxda luqadaha, qaybta kaalmada curtis, asmita frazier. So [facility] " [phone].    ATENCIÓN: Si chelly paulino, tiene a dey disposición servicios gratuitos de asistencia lingüística. Eb buckley [phone].    We comply with applicable federal civil rights laws and [place] laws. We do not discriminate on the basis of race, color, national origin, age, disability, sex, sexual orientation, or gender identity.            Thank you!     Thank you for choosing [facility]  for your care. Our goal is always to provide you with excellent care. Hearing back from our patients is one way we can continue to improve our services. Please take a few minutes to complete the written survey that you may receive in the mail after your visit with us. Thank you!             Your Updated Medication List - Protect others around you: Learn how to safely use, store and throw away your medicines at www.disposemymeds.org.          This list is accurate as of 11/16/18  1:55 PM.  Always use your most recent med list.                   Brand Name Dispense Instructions for use Diagnosis    ADVIL PO           ALPRAZolam 0.5 MG tablet    XANAX    45 tablet    Take 1 tablet (0.5 mg) by mouth 2 times daily as needed for anxiety    Adjustment disorder with mixed anxiety and depressed mood       budesonide-formoterol 160-4.5 MCG/ACT Inhaler    SYMBICORT     Inhale 2 puffs into the lungs 2 times daily        cholecalciferol 5000 units Tabs tablet    vitamin D3     Take  by mouth.        cyanocolbalamin 100 MCG tablet    vitamin  B-12     Take 50 mcg by mouth daily        EPINEPHrine 0.3 MG/0.3ML injection    EPIPEN    1 each    Inject 0.3 mLs (0.3 mg) into the muscle once as needed for anaphylaxis        fluticasone 50 MCG/ACT spray    FLONASE    1 Bottle    Spray 1-2 sprays into both nostrils daily    Throat pain       MAGNESIUM OXIDE PO           montelukast 10 MG tablet    SINGULAIR    30 tablet    Take 1 tablet (10 mg) by mouth At Bedtime    Acute sinusitis with symptoms > 10 days        predniSONE 20 MG tablet    DELTASONE    20 tablet    Take 3 tabs (60 mg) by mouth daily x 3 days, 2 tabs (40 mg) daily x 3 days, 1 tab (20 mg) daily x 3 days, then 1/2 tab (10 mg) x 3 days.    Acute sinusitis with symptoms > 10 days       TRAMADOL HCL PO

## 2018-11-16 NOTE — LETTER
November 19, 2018      Jeff Mack  [address] [place] [address]        Dear ,    We are writing to inform you of your test results.    Your lab results including complete blood cell counts and sed rate showed sign of serious inflammation, and your electrolyte balance/liver and kidney function/prostate specific antigen were all normal.  Your glucose(blood sugar) is slightly elevated around the borderline. Please keep working on life style modification including low fat/carb diet with regular exercise.      Resulted Orders   CBC with platelets and differential   Result Value Ref Range    WBC 4.8 4.0 - 11.0 10e9/L      Comment:      Results confirmed by repeat test    RBC Count 5.19 4.4 - 5.9 10e12/L    Hemoglobin 15.3 13.3 - 17.7 g/dL    Hematocrit 44.1 40.0 - 53.0 %    MCV 85 78 - 100 fl    MCH 29.5 26.5 - 33.0 pg    MCHC 34.7 31.5 - 36.5 g/dL    RDW 15.7 (H) 10.0 - 15.0 %    Platelet Count 295 150 - 450 10e9/L    % Neutrophils 50.1 %    % Lymphocytes 18.7 %    % Monocytes 24.3 %      Comment:      Verified by smear review    % Eosinophils 4.8 %    % Basophils 2.1 %    Absolute Neutrophil 2.4 1.6 - 8.3 10e9/L    Absolute Lymphocytes 0.9 0.8 - 5.3 10e9/L    Absolute Monocytes 1.2 0.0 - 1.3 10e9/L    Absolute Eosinophils 0.2 0.0 - 0.7 10e9/L    Absolute Basophils 0.1 0.0 - 0.2 10e9/L    Diff Method Automated Method    ESR: Erythrocyte sedimentation rate   Result Value Ref Range    Sed Rate 10 0 - 20 mm/h   Comprehensive metabolic panel   Result Value Ref Range    Sodium 139 133 - 144 mmol/L    Potassium 4.0 3.4 - 5.3 mmol/L    Chloride 103 94 - 109 mmol/L    Carbon Dioxide 27 20 - 32 mmol/L    Anion Gap 9 3 - 14 mmol/L    Glucose 123 (H) 70 - 99 mg/dL      Comment:      Non Fasting    Urea Nitrogen 12 7 - 30 mg/dL    Creatinine 0.77 0.66 - 1.25 mg/dL    GFR Estimate >90 >60 mL/min/1.7m2      Comment:      Non  GFR Calc    GFR Estimate If Black >90 >60 mL/min/1.7m2       Comment:       GFR Calc    Calcium 8.9 8.5 - 10.1 mg/dL    Bilirubin Total 0.7 0.2 - 1.3 mg/dL    Albumin 3.7 3.4 - 5.0 g/dL    Protein Total 7.4 6.8 - 8.8 g/dL    Alkaline Phosphatase 69 40 - 150 U/L    ALT 19 0 - 70 U/L    AST 25 0 - 45 U/L   PSA, screen   Result Value Ref Range    PSA 0.98 0 - 4 ug/L      Comment:      Assay Method:  Chemiluminescence using Siemens Vista analyzer       If you have any questions or concerns, please call the clinic at the number listed above.       Sincerely,        Benjamin Lazar MD

## 2018-11-16 NOTE — PROGRESS NOTES
SUBJECTIVE:   Jeff Mack is a 83 year old male who presents to clinic today for the following health issues:      RESPIRATORY SYMPTOMS      Duration: 5 weeks     Description  sore throat and facial pain/pressure    Severity: moderate    Accompanying signs and symptoms: fatigue     History (predisposing factors):  none    Precipitating or alleviating factors: None  Therapies tried and outcome:  Flonase, Xyzal    Problem list and histories reviewed & adjusted, as indicated.  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)     Cardiomegaly     Chronic ischemic heart disease     Essential hypertension, benign     Primary cardiomyopathy (H)     Hyperlipidemia with target LDL less than 100     Acute low back pain     Nonallopathic lesion of sacral region     Lumbar stenosis     Health Care Home     Advance Care Planning     Edema     Esophageal cancer (H)     Past Surgical History:   Procedure Laterality Date     BRONCHOSCOPY FLEXIBLE N/A 4/3/2015    Procedure: BRONCHOSCOPY FLEXIBLE;  Surgeon: Ralph Catherine MD;  Location:  OR      EXCISION OF LINGUAL TONSIL      TONSILLECTOMY     C NONSPECIFIC PROCEDURE  2-99    Colonic polpectomy     C NONSPECIFIC PROCEDURE  9-92    TURP     C NONSPECIFIC PROCEDURE  1982    Retinal surgery     COLONOSCOPY       ESOPHAGOSCOPY, GASTROSCOPY, DUODENOSCOPY (EGD), COMBINED N/A 3/19/2015    Procedure: COMBINED ESOPHAGOSCOPY, GASTROSCOPY, DUODENOSCOPY (EGD), BIOPSY SINGLE OR MULTIPLE;  Surgeon: Alo Mauro MD;  Location:  GI     ESOPHAGOSCOPY, GASTROSCOPY, DUODENOSCOPY (EGD), COMBINED N/A 7/14/2015    Procedure: COMBINED ESOPHAGOSCOPY, GASTROSCOPY, DUODENOSCOPY (EGD), BIOPSY SINGLE OR MULTIPLE;   Surgeon: Kathy Torres MD;  Location:  GI     ESOPHAGOSCOPY, GASTROSCOPY, DUODENOSCOPY (EGD), COMBINED N/A 12/1/2015    Procedure: COMBINED ESOPHAGOSCOPY, GASTROSCOPY, DUODENOSCOPY (EGD), BIOPSY SINGLE OR MULTIPLE;  Surgeon: Alo Mauro MD;  Location:  GI     ESOPHAGOSCOPY, GASTROSCOPY, DUODENOSCOPY (EGD), COMBINED N/A 3/17/2016    Procedure: COMBINED ESOPHAGOSCOPY, GASTROSCOPY, DUODENOSCOPY (EGD), BIOPSY SINGLE OR MULTIPLE;  Surgeon: Alo Mauro MD;  Location: [facility]     ESOPHAGOSCOPY, GASTROSCOPY, DUODENOSCOPY (EGD), COMBINED N/A 7/21/2016    Procedure: COMBINED ESOPHAGOSCOPY, GASTROSCOPY, DUODENOSCOPY (EGD);  Surgeon: Alo Mauro MD;  Location: [facility]     ESOPHAGOSCOPY, GASTROSCOPY, DUODENOSCOPY (EGD), COMBINED N/A 11/17/2016    Procedure: COMBINED ESOPHAGOSCOPY, GASTROSCOPY, DUODENOSCOPY (EGD);  Surgeon: Alo Mauro MD;  Location: [facility]     ESOPHAGOSCOPY, GASTROSCOPY, DUODENOSCOPY (EGD), COMBINED N/A 5/9/2017    Procedure: COMBINED ESOPHAGOSCOPY, GASTROSCOPY, DUODENOSCOPY (EGD);  (EGD) COMBINED ESOPHAGOSCOPY, GASTROSCOPY, DUODENOSCOPY;  Surgeon: Alo Mauro MD;  Location: [facility]     ESOPHAGOSCOPY, GASTROSCOPY, DUODENOSCOPY (EGD), COMBINED N/A 5/1/2018    Procedure: COMBINED ESOPHAGOSCOPY, GASTROSCOPY, DUODENOSCOPY (EGD);  ESOPHAGOGASTRODUODENOSCOPY ;  Surgeon: Alo Mauro MD;  Location:  GI     GASTROSTOMY, INSERT TUBE, COMBINED N/A 4/3/2015    Procedure: COMBINED GASTROSTOMY, INSERT TUBE (OPEN);  Surgeon: Ralph Catherine MD;  Location:  OR      PRESSURE GRADIENT MEASUREMENT, INITIAL VESSEL  2005    essentially normal     HC UGI ENDOSCOPY W EUS N/A 7/14/2015    Procedure: COMBINED ENDOSCOPIC ULTRASOUND, ESOPHAGOSCOPY, GASTROSCOPY, DUODENOSCOPY (EGD);  Surgeon: Kathy Torres MD;  Location: [facility]     LAMINECTOMY, FUSION LUMBAR ONE LEVEL, COMBINED  7/26/2012    Procedure: COMBINED LAMINECTOMY, FUSION LUMBAR ONE LEVEL;  Posterior Fusion L5-S1, Total  Facetectomy L5-S1 Left ;  Surgeon: Ronald Espinosa MD;  Location: [facility]       Social History   Substance Use Topics     Smoking status: Former Smoker     Packs/day: 2.00     Years: 40.00     Types: Cigarettes     Quit date: 1975     Smokeless tobacco: Never Used     Alcohol use No     Family History   Problem Relation Age of Onset     Hypertension Mother      Cerebrovascular Disease Mother      Diabetes Brother      Diabetes Father       age 97         Current Outpatient Prescriptions   Medication Sig Dispense Refill     ALPRAZolam (XANAX) 0.5 MG tablet Take 1 tablet (0.5 mg) by mouth 2 times daily as needed for anxiety 45 tablet 0     budesonide-formoterol (SYMBICORT) 160-4.5 MCG/ACT inhaler Inhale 2 puffs into the lungs 2 times daily       cyanocolbalamin (VITAMIN  B-12) 100 MCG tablet Take 50 mcg by mouth daily       fluticasone (FLONASE) 50 MCG/ACT spray Spray 1-2 sprays into both nostrils daily 1 Bottle 11     Ibuprofen (ADVIL PO)        MAGNESIUM OXIDE PO        montelukast (SINGULAIR) 10 MG tablet Take 1 tablet (10 mg) by mouth At Bedtime 30 tablet 1     predniSONE (DELTASONE) 20 MG tablet Take 3 tabs (60 mg) by mouth daily x 3 days, 2 tabs (40 mg) daily x 3 days, 1 tab (20 mg) daily x 3 days, then 1/2 tab (10 mg) x 3 days. 20 tablet 0     TRAMADOL HCL PO        Cholecalciferol (VITAMIN D3) 5000 UNITS TABS Take  by mouth.       EPINEPHrine (EPIPEN) 0.3 MG/0.3ML injection Inject 0.3 mLs (0.3 mg) into the muscle once as needed for anaphylaxis (Patient not taking: Reported on 3/20/2018) 1 each 1     [DISCONTINUED] montelukast (SINGULAIR) 10 MG tablet Take 1 tablet (10 mg) by mouth At Bedtime (Patient not taking: Reported on 2018) 30 tablet 1     Allergies   Allergen Reactions     Bee Venom Shortness Of Breath     Recent Labs   Lab Test  17   0943  17   1130  07/09/15   1110   12   1548   LDL   --    --   92   --   111   HDL   --    --   48   --   71   TRIG   --    --   80    "--   110   ALT   --    --    --    --   19   CR   --   0.81  0.56*   < >  0.86   GFRESTIMATED   --   >90  Non  GFR Calc    >90  Non  GFR Calc     < >  87   GFRESTBLACK   --   >90   GFR Calc    >90   GFR Calc     < >  >90   POTASSIUM   --   4.4  4.1   < >  3.5   TSH  1.79   --    --    --   1.72    < > = values in this interval not displayed.      BP Readings from Last 3 Encounters:   11/16/18 118/67   11/15/18 130/74   06/18/18 138/73    Wt Readings from Last 3 Encounters:   11/16/18 178 lb (80.7 kg)   06/18/18 178 lb (80.7 kg)   05/29/18 178 lb 12.8 oz (81.1 kg)                    Reviewed and updated as needed this visit by clinical staff       Reviewed and updated as needed this visit by Provider         ROS:  Constitutional, HEENT, cardiovascular, pulmonary, gi and gu systems are negative, except as otherwise noted.    OBJECTIVE:     /67 (Cuff Size: Adult Regular)  Pulse 80  Temp 98  F (36.7  C) (Tympanic)  Resp 14  Ht 5' 9\" (1.753 m)  Wt 178 lb (80.7 kg)  SpO2 97%  BMI 26.29 kg/m2  Body mass index is 26.29 kg/(m^2).  GENERAL: healthy, alert and no distress  NECK: no adenopathy, no asymmetry, masses, or scars and thyroid normal to palpation  RESP: lungs clear to auscultation - no rales, rhonchi or wheezes  CV: regular rate and rhythm, normal S1 S2, no S3 or S4, no murmur, click or rub, no peripheral edema and peripheral pulses strong  ABDOMEN: soft, nontender, no hepatosplenomegaly, no masses and bowel sounds normal  MS: no gross musculoskeletal defects noted, no edema        ASSESSMENT/PLAN:   ASSESSMENT / PLAN:  (Z12.5) Screening for prostate cancer  (primary encounter diagnosis)  Plan: PSA, screen            (J01.90) Acute sinusitis with symptoms > 10 days  Comment: has been having mild sore throat and sinus tenderness for last 4-5 weeks, worsening with weather change, has no sign of infection   Will have him to try Singulair and " prednisone,   Also encouraged him to try Flonase and xyzal additionally   Plan: CBC with platelets and differential, ESR:         Erythrocyte sedimentation rate, Comprehensive         metabolic panel, predniSONE (DELTASONE) 20 MG         tablet, montelukast (SINGULAIR) 10 MG tablet            FUTURE APPOINTMENTS:       - Follow-up visit in 3-4 months for CPE    Benjamin Lazar MD  [facility]

## 2019-02-19 ENCOUNTER — OFFICE VISIT (OUTPATIENT)
Dept: FAMILY MEDICINE | Facility: CLINIC | Age: 84
End: 2019-02-19
Payer: COMMERCIAL

## 2019-02-19 VITALS
WEIGHT: 177 LBS | HEIGHT: 69 IN | OXYGEN SATURATION: 99 % | DIASTOLIC BLOOD PRESSURE: 82 MMHG | RESPIRATION RATE: 12 BRPM | SYSTOLIC BLOOD PRESSURE: 134 MMHG | BODY MASS INDEX: 26.22 KG/M2 | TEMPERATURE: 96.6 F | HEART RATE: 83 BPM

## 2019-02-19 DIAGNOSIS — M48.02 CERVICAL STENOSIS OF SPINAL CANAL: ICD-10-CM

## 2019-02-19 DIAGNOSIS — M47.812 FACET ARTHRITIS OF CERVICAL REGION: Primary | ICD-10-CM

## 2019-02-19 DIAGNOSIS — Z23 NEED FOR VACCINATION: ICD-10-CM

## 2019-02-19 PROCEDURE — 90472 IMMUNIZATION ADMIN EACH ADD: CPT | Performed by: FAMILY MEDICINE

## 2019-02-19 PROCEDURE — 90715 TDAP VACCINE 7 YRS/> IM: CPT | Performed by: FAMILY MEDICINE

## 2019-02-19 PROCEDURE — 99213 OFFICE O/P EST LOW 20 MIN: CPT | Mod: 25 | Performed by: FAMILY MEDICINE

## 2019-02-19 PROCEDURE — G0009 ADMIN PNEUMOCOCCAL VACCINE: HCPCS | Performed by: FAMILY MEDICINE

## 2019-02-19 PROCEDURE — 90670 PCV13 VACCINE IM: CPT | Performed by: FAMILY MEDICINE

## 2019-02-19 RX ORDER — MELOXICAM 7.5 MG/1
7.5 TABLET ORAL DAILY
Qty: 90 TABLET | Refills: 3 | Status: SHIPPED | OUTPATIENT
Start: 2019-02-19 | End: 2020-01-01

## 2019-02-19 ASSESSMENT — ANXIETY QUESTIONNAIRES
6. BECOMING EASILY ANNOYED OR IRRITABLE: SEVERAL DAYS
3. WORRYING TOO MUCH ABOUT DIFFERENT THINGS: SEVERAL DAYS
1. FEELING NERVOUS, ANXIOUS, OR ON EDGE: SEVERAL DAYS
2. NOT BEING ABLE TO STOP OR CONTROL WORRYING: SEVERAL DAYS
7. FEELING AFRAID AS IF SOMETHING AWFUL MIGHT HAPPEN: NOT AT ALL
5. BEING SO RESTLESS THAT IT IS HARD TO SIT STILL: SEVERAL DAYS
GAD7 TOTAL SCORE: 6
IF YOU CHECKED OFF ANY PROBLEMS ON THIS QUESTIONNAIRE, HOW DIFFICULT HAVE THESE PROBLEMS MADE IT FOR YOU TO DO YOUR WORK, TAKE CARE OF THINGS AT HOME, OR GET ALONG WITH OTHER PEOPLE: SOMEWHAT DIFFICULT

## 2019-02-19 ASSESSMENT — PATIENT HEALTH QUESTIONNAIRE - PHQ9
SUM OF ALL RESPONSES TO PHQ QUESTIONS 1-9: 5
5. POOR APPETITE OR OVEREATING: SEVERAL DAYS

## 2019-02-19 ASSESSMENT — MIFFLIN-ST. JEOR: SCORE: 1498.25

## 2019-02-19 NOTE — PROGRESS NOTES
SUBJECTIVE:   Jeff Mack is a 83 year old male who presents to clinic today for the following health issues:      Musculoskeletal problem/pain      Duration: 3 months     Description  Location: neck     Intensity:  moderate    Accompanying signs and symptoms: none    History  Previous similar problem: no   Previous evaluation:  x-ray and MRI at [facility]     Precipitating or alleviating factors:  Trauma or overuse: YES- fell and hit a glass door   Aggravating factors include: lying down     Therapies tried and outcome: exercises and Advil       Problem list and histories reviewed & adjusted, as indicated.  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)     Cardiomegaly     Chronic ischemic heart disease     Essential hypertension, benign     Primary cardiomyopathy (H)     Hyperlipidemia with target LDL less than 100     Acute low back pain     Nonallopathic lesion of sacral region     Lumbar stenosis     Health Care Home     Advance Care Planning     Edema     Esophageal cancer (H)     Past Surgical History:   Procedure Laterality Date     BRONCHOSCOPY FLEXIBLE N/A 4/3/2015    Procedure: BRONCHOSCOPY FLEXIBLE;  Surgeon: Ralph Catherine MD;  Location:  OR      EXCISION OF LINGUAL TONSIL      TONSILLECTOMY     C NONSPECIFIC PROCEDURE  2-99    Colonic polpectomy     C NONSPECIFIC PROCEDURE  9-92    TURP     C NONSPECIFIC PROCEDURE  1982    Retinal surgery     COLONOSCOPY       ESOPHAGOSCOPY, GASTROSCOPY, DUODENOSCOPY (EGD), COMBINED N/A 3/19/2015    Procedure: COMBINED ESOPHAGOSCOPY, GASTROSCOPY, DUODENOSCOPY (EGD), BIOPSY SINGLE OR MULTIPLE;  Surgeon: Alo Mauro MD;  Location:  GI     ESOPHAGOSCOPY,  GASTROSCOPY, DUODENOSCOPY (EGD), COMBINED N/A 7/14/2015    Procedure: COMBINED ESOPHAGOSCOPY, GASTROSCOPY, DUODENOSCOPY (EGD), BIOPSY SINGLE OR MULTIPLE;  Surgeon: Kathy Torres MD;  Location: [facility]     ESOPHAGOSCOPY, GASTROSCOPY, DUODENOSCOPY (EGD), COMBINED N/A 12/1/2015    Procedure: COMBINED ESOPHAGOSCOPY, GASTROSCOPY, DUODENOSCOPY (EGD), BIOPSY SINGLE OR MULTIPLE;  Surgeon: Alo Mauro MD;  Location:  GI     ESOPHAGOSCOPY, GASTROSCOPY, DUODENOSCOPY (EGD), COMBINED N/A 3/17/2016    Procedure: COMBINED ESOPHAGOSCOPY, GASTROSCOPY, DUODENOSCOPY (EGD), BIOPSY SINGLE OR MULTIPLE;  Surgeon: Alo Mauro MD;  Location: [facility]     ESOPHAGOSCOPY, GASTROSCOPY, DUODENOSCOPY (EGD), COMBINED N/A 7/21/2016    Procedure: COMBINED ESOPHAGOSCOPY, GASTROSCOPY, DUODENOSCOPY (EGD);  Surgeon: Alo Mauro MD;  Location: [facility]     ESOPHAGOSCOPY, GASTROSCOPY, DUODENOSCOPY (EGD), COMBINED N/A 11/17/2016    Procedure: COMBINED ESOPHAGOSCOPY, GASTROSCOPY, DUODENOSCOPY (EGD);  Surgeon: Alo Mauro MD;  Location: [facility]     ESOPHAGOSCOPY, GASTROSCOPY, DUODENOSCOPY (EGD), COMBINED N/A 5/9/2017    Procedure: COMBINED ESOPHAGOSCOPY, GASTROSCOPY, DUODENOSCOPY (EGD);  (EGD) COMBINED ESOPHAGOSCOPY, GASTROSCOPY, DUODENOSCOPY;  Surgeon: Alo Mauro MD;  Location: [facility]     ESOPHAGOSCOPY, GASTROSCOPY, DUODENOSCOPY (EGD), COMBINED N/A 5/1/2018    Procedure: COMBINED ESOPHAGOSCOPY, GASTROSCOPY, DUODENOSCOPY (EGD);  ESOPHAGOGASTRODUODENOSCOPY ;  Surgeon: Alo Mauro MD;  Location:  GI     GASTROSTOMY, INSERT TUBE, COMBINED N/A 4/3/2015    Procedure: COMBINED GASTROSTOMY, INSERT TUBE (OPEN);  Surgeon: Ralph Catherine MD;  Location:  OR      PRESSURE GRADIENT MEASUREMENT, INITIAL VESSEL  2005    essentially normal     HC UGI ENDOSCOPY W EUS N/A 7/14/2015    Procedure: COMBINED ENDOSCOPIC ULTRASOUND, ESOPHAGOSCOPY, GASTROSCOPY, DUODENOSCOPY (EGD);  Surgeon: Kathy Torres MD;  Location:  GI      LAMINECTOMY, FUSION LUMBAR ONE LEVEL, COMBINED  2012    Procedure: COMBINED LAMINECTOMY, FUSION LUMBAR ONE LEVEL;  Posterior Fusion L5-S1, Total Facetectomy L5-S1 Left ;  Surgeon: Ronald Espinosa MD;  Location:  OR       Social History     Tobacco Use     Smoking status: Former Smoker     Packs/day: 2.00     Years: 40.00     Pack years: 80.00     Types: Cigarettes     Last attempt to quit: 1975     Years since quittin.1     Smokeless tobacco: Never Used   Substance Use Topics     Alcohol use: No     Family History   Problem Relation Age of Onset     Hypertension Mother      Cerebrovascular Disease Mother      Diabetes Brother      Diabetes Father          age 97         Current Outpatient Medications   Medication Sig Dispense Refill     ALPRAZolam (XANAX) 0.5 MG tablet Take 1 tablet (0.5 mg) by mouth 2 times daily as needed for anxiety 45 tablet 0     budesonide-formoterol (SYMBICORT) 160-4.5 MCG/ACT inhaler Inhale 2 puffs into the lungs 2 times daily       Cholecalciferol (VITAMIN D3) 5000 UNITS TABS Take  by mouth.       cyanocolbalamin (VITAMIN  B-12) 100 MCG tablet Take 50 mcg by mouth daily       fluticasone (FLONASE) 50 MCG/ACT spray Spray 1-2 sprays into both nostrils daily 1 Bottle 11     Ibuprofen (ADVIL PO)        meloxicam (MOBIC) 7.5 MG tablet Take 1 tablet (7.5 mg) by mouth daily 90 tablet 3     EPINEPHrine (EPIPEN) 0.3 MG/0.3ML injection Inject 0.3 mLs (0.3 mg) into the muscle once as needed for anaphylaxis (Patient not taking: Reported on 3/20/2018) 1 each 1     MAGNESIUM OXIDE PO        montelukast (SINGULAIR) 10 MG tablet Take 1 tablet (10 mg) by mouth At Bedtime (Patient not taking: Reported on 2019) 30 tablet 1     TRAMADOL HCL PO        Allergies   Allergen Reactions     Bee Venom Shortness Of Breath     Recent Labs   Lab Test 18  1354 17  0943 17  1130 07/09/15  1110  12  1548   LDL  --   --   --  92  --  111   HDL  --   --   --  48  --   "71   TRIG  --   --   --  80  --  110   ALT 19  --   --   --   --  19   CR 0.77  --  0.81 0.56*   < > 0.86   GFRESTIMATED >90  --  >90  Non  GFR Calc   >90  Non  GFR Calc     < > 87   GFRESTBLACK >90  --  >90   GFR Calc   >90   GFR Calc     < > >90   POTASSIUM 4.0  --  4.4 4.1   < > 3.5   TSH  --  1.79  --   --   --  1.72    < > = values in this interval not displayed.      BP Readings from Last 3 Encounters:   02/19/19 134/82   11/16/18 118/67   11/15/18 130/74    Wt Readings from Last 3 Encounters:   02/19/19 80.3 kg (177 lb)   11/16/18 80.7 kg (178 lb)   06/18/18 80.7 kg (178 lb)                    Reviewed and updated as needed this visit by clinical staff       Reviewed and updated as needed this visit by Provider         ROS:  Constitutional, HEENT, cardiovascular, pulmonary, gi and gu systems are negative, except as otherwise noted.    OBJECTIVE:     /82 (Cuff Size: Adult Regular)   Pulse 83   Temp 96.6  F (35.9  C) (Tympanic)   Resp 12   Ht 1.753 m (5' 9\")   Wt 80.3 kg (177 lb)   SpO2 99%   BMI 26.14 kg/m     Body mass index is 26.14 kg/m .  GENERAL: healthy, alert and no distress  NECK: no adenopathy, no asymmetry, masses, or scars and thyroid normal to palpation  RESP: lungs clear to auscultation - no rales, rhonchi or wheezes  CV: regular rate and rhythm, normal S1 S2, no S3 or S4, no murmur, click or rub, no peripheral edema and peripheral pulses strong  ABDOMEN: soft, nontender, no hepatosplenomegaly, no masses and bowel sounds normal  MS: spine exam shows straight and point tenderness on C4-5 and C5-6 bilateral, decreased ROM with pain and stiffness, and mild radiculopathy on bilateral UE      ASSESSMENT/PLAN:   Jeff was seen today for musculoskeletal problem.    Diagnoses and all orders for this visit:    Facet arthritis of cervical region (H)  -     [facility]; Future  -     meloxicam (MOBIC) " 7.5 MG tablet; Take 1 tablet (7.5 mg) by mouth daily    Cervical stenosis of spinal canal  -     [facility]; Future  -     meloxicam (MOBIC) 7.5 MG tablet; Take 1 tablet (7.5 mg) by mouth daily      He has neck pain and seen by [facility], MRI showed Facet arthritis, ZOHREH ordered but insurance company denies approval  Will have him to try PT and NSAIDs for next 1 month and have his spine specialist trial another approval of ZOHREH/facet joint injection   encouraged him to take tramadol for pain as needed       FUTURE APPOINTMENTS:       - Follow-up visit in 6 months for CPE    Benjamin Lazar MD  [facility]

## 2019-02-20 ASSESSMENT — ANXIETY QUESTIONNAIRES: GAD7 TOTAL SCORE: 6

## 2019-02-25 ENCOUNTER — THERAPY VISIT (OUTPATIENT)
Dept: PHYSICAL THERAPY | Facility: CLINIC | Age: 84
End: 2019-02-25
Attending: FAMILY MEDICINE
Payer: COMMERCIAL

## 2019-02-25 DIAGNOSIS — M48.02 CERVICAL STENOSIS OF SPINAL CANAL: ICD-10-CM

## 2019-02-25 DIAGNOSIS — M47.812 FACET ARTHRITIS OF CERVICAL REGION: ICD-10-CM

## 2019-02-25 DIAGNOSIS — M47.812 CERVICAL SPONDYLOSIS WITHOUT MYELOPATHY: ICD-10-CM

## 2019-02-25 DIAGNOSIS — M54.2 NECK PAIN: ICD-10-CM

## 2019-02-25 PROCEDURE — 97012 MECHANICAL TRACTION THERAPY: CPT | Mod: GP | Performed by: PHYSICAL THERAPIST

## 2019-02-25 PROCEDURE — 97161 PT EVAL LOW COMPLEX 20 MIN: CPT | Mod: GP | Performed by: PHYSICAL THERAPIST

## 2019-02-25 PROCEDURE — G8982 BODY POS GOAL STATUS: HCPCS | Mod: GP | Performed by: PHYSICAL THERAPIST

## 2019-02-25 PROCEDURE — G8981 BODY POS CURRENT STATUS: HCPCS | Mod: GP | Performed by: PHYSICAL THERAPIST

## 2019-02-25 PROCEDURE — 97035 APP MDLTY 1+ULTRASOUND EA 15: CPT | Mod: GP | Performed by: PHYSICAL THERAPIST

## 2019-02-25 NOTE — PROGRESS NOTES
[facility] Initial Evaluation  Subjective:  Patient is referred with c/o left cervical pain and limited ROM. He recalls falling forward and hitting his head in November 2018. He has noted left sided neck pain since along with decreased ROM. Pain is worse with left rotation, extension and prolonged sitting. PMH includes lumbar decompression and fusion at L4-5.      The history is provided by the patient.   Jeff Mack is a 83 year old male with a cervical spine condition.  Condition occurred with:  A fall/slip.  Condition occurred: in the community.  This is a new condition     Patient reports pain:  Cervical left side.  Radiates to:  None.  Pain is described as aching and is constant and reported as 4/10.  Associated symptoms:  Loss of motion/stiffness. Pain is worse during the day.  Symptoms are exacerbated by rotating head, looking up or down and sitting and relieved by rest.  Since onset symptoms are unchanged.  Special tests:  MRI and x-ray.      General health as reported by patient is good.  Pertinent medical history includes:  Asthma and cancer.    Other surgeries include:  Cancer surgery.            Barriers include:  None as reported by the patient.    Red flags:  None as reported by the patient.                        Objective:  System    Physical Exam    [place] Cervical Evaluation    Posture:  Sitting: poor  Standing: poor  Protruding Head: yes  Wry Neck: no  Correction of Posture: no effect    Movement Loss:  Protrusion (PRO): min  Flexion (Flex): min and pain  Retraction (RET): min  Extension (EXT): mod and pain  Lateral Flexion Right (LF R): mod and pain  Lateral Flexion Left (LF L): mod  Rotation Right (ROT R): mod  Rotation Left (ROT L): mod and pain  Test Movements:  Pretest Pain Sitting: L neck  PRO: During: no effect  After: no effect  Mechanical Response: no effect  Repeat PRO: During: no effect  After: worse  Mechanical Response: no effect  RET: During: increases   After: no worse  Mechanical Response: no effect  Repeat RET: During: decreases  After: no effect  Mechanical Response: no effect                          Conclusion: dysfunction and other (DDD, spindylosis)                                           ROS    Assessment/Plan:    Patient is a 83 year old male with cervical complaints.    Patient has the following significant findings with corresponding treatment plan.                Diagnosis 1:  Neck pain  Pain -  US, mechanical traction, manual therapy, self management, education and home program  Decreased ROM/flexibility - manual therapy, therapeutic exercise and home program  Impaired muscle performance - neuro re-education and home program  Decreased function - therapeutic activities and home program  Impaired posture - neuro re-education and home program    Therapy Evaluation Codes:   1) History comprised of:   Personal factors that impact the plan of care:      Age and Time since onset of symptoms.    Comorbidity factors that impact the plan of care are:      Osteoarthritis.     Medications impacting care: None.  2) Examination of Body Systems comprised of:   Body structures and functions that impact the plan of care:      Cervical spine.   Activity limitations that impact the plan of care are:      Driving and Reading/Computer work.  3) Clinical presentation characteristics are:   Stable/Uncomplicated.  4) Decision-Making    Low complexity using standardized patient assessment instrument and/or measureable assessment of functional outcome.  Cumulative Therapy Evaluation is: Low complexity.    Previous and current functional limitations:  (See Goal Flow Sheet for this information)    Short term and Long term goals: (See Goal Flow Sheet for this information)     Communication ability:  Patient appears to be able to clearly communicate and understand verbal and written communication and follow directions correctly.  Treatment Explanation - The following has been  discussed with the patient:   RX ordered/plan of care  Anticipated outcomes  Possible risks and side effects  This patient would benefit from PT intervention to resume normal activities.   Rehab potential is fair.    Frequency:  2 X week, once daily  Duration:  for 3 weeks  Discharge Plan:  Achieve all LTG.  Independent in home treatment program.  Reach maximal therapeutic benefit.    Please refer to the daily flowsheet for treatment today, total treatment time and time spent performing 1:1 timed codes.

## 2019-03-01 ENCOUNTER — THERAPY VISIT (OUTPATIENT)
Dept: PHYSICAL THERAPY | Facility: CLINIC | Age: 84
End: 2019-03-01
Payer: COMMERCIAL

## 2019-03-01 DIAGNOSIS — M54.2 NECK PAIN: ICD-10-CM

## 2019-03-01 DIAGNOSIS — M47.812 CERVICAL SPONDYLOSIS WITHOUT MYELOPATHY: ICD-10-CM

## 2019-03-01 PROCEDURE — 97012 MECHANICAL TRACTION THERAPY: CPT | Mod: GP | Performed by: PHYSICAL THERAPIST

## 2019-03-01 PROCEDURE — 97035 APP MDLTY 1+ULTRASOUND EA 15: CPT | Mod: GP | Performed by: PHYSICAL THERAPIST

## 2019-03-01 PROCEDURE — 97140 MANUAL THERAPY 1/> REGIONS: CPT | Mod: GP | Performed by: PHYSICAL THERAPIST

## 2019-03-05 ENCOUNTER — THERAPY VISIT (OUTPATIENT)
Dept: PHYSICAL THERAPY | Facility: CLINIC | Age: 84
End: 2019-03-05
Payer: COMMERCIAL

## 2019-03-05 DIAGNOSIS — M47.812 CERVICAL SPONDYLOSIS WITHOUT MYELOPATHY: ICD-10-CM

## 2019-03-05 DIAGNOSIS — M54.2 NECK PAIN: ICD-10-CM

## 2019-03-05 PROCEDURE — 97140 MANUAL THERAPY 1/> REGIONS: CPT | Mod: GP | Performed by: PHYSICAL THERAPIST

## 2019-03-05 PROCEDURE — 97012 MECHANICAL TRACTION THERAPY: CPT | Mod: GP | Performed by: PHYSICAL THERAPIST

## 2019-03-05 PROCEDURE — 97035 APP MDLTY 1+ULTRASOUND EA 15: CPT | Mod: GP | Performed by: PHYSICAL THERAPIST

## 2019-03-08 ENCOUNTER — THERAPY VISIT (OUTPATIENT)
Dept: PHYSICAL THERAPY | Facility: CLINIC | Age: 84
End: 2019-03-08
Payer: COMMERCIAL

## 2019-03-08 DIAGNOSIS — M47.812 CERVICAL SPONDYLOSIS WITHOUT MYELOPATHY: ICD-10-CM

## 2019-03-08 DIAGNOSIS — M54.2 NECK PAIN: ICD-10-CM

## 2019-03-08 PROCEDURE — 97140 MANUAL THERAPY 1/> REGIONS: CPT | Mod: GP | Performed by: PHYSICAL THERAPIST

## 2019-03-08 PROCEDURE — 97012 MECHANICAL TRACTION THERAPY: CPT | Mod: GP | Performed by: PHYSICAL THERAPIST

## 2019-03-08 PROCEDURE — 97035 APP MDLTY 1+ULTRASOUND EA 15: CPT | Mod: GP | Performed by: PHYSICAL THERAPIST

## 2019-03-12 ENCOUNTER — THERAPY VISIT (OUTPATIENT)
Dept: PHYSICAL THERAPY | Facility: CLINIC | Age: 84
End: 2019-03-12
Payer: COMMERCIAL

## 2019-03-12 DIAGNOSIS — M47.812 CERVICAL SPONDYLOSIS WITHOUT MYELOPATHY: ICD-10-CM

## 2019-03-12 DIAGNOSIS — M54.2 NECK PAIN: ICD-10-CM

## 2019-03-12 PROCEDURE — 97035 APP MDLTY 1+ULTRASOUND EA 15: CPT | Mod: GP | Performed by: PHYSICAL THERAPIST

## 2019-03-12 PROCEDURE — 97012 MECHANICAL TRACTION THERAPY: CPT | Mod: GP | Performed by: PHYSICAL THERAPIST

## 2019-03-12 PROCEDURE — 97530 THERAPEUTIC ACTIVITIES: CPT | Mod: GP | Performed by: PHYSICAL THERAPIST

## 2019-03-12 PROCEDURE — 97010 HOT OR COLD PACKS THERAPY: CPT | Mod: GP | Performed by: PHYSICAL THERAPIST

## 2019-03-15 ENCOUNTER — THERAPY VISIT (OUTPATIENT)
Dept: PHYSICAL THERAPY | Facility: CLINIC | Age: 84
End: 2019-03-15
Payer: COMMERCIAL

## 2019-03-15 DIAGNOSIS — M47.812 CERVICAL SPONDYLOSIS WITHOUT MYELOPATHY: ICD-10-CM

## 2019-03-15 DIAGNOSIS — M54.2 NECK PAIN: ICD-10-CM

## 2019-03-15 PROCEDURE — 97140 MANUAL THERAPY 1/> REGIONS: CPT | Mod: GP | Performed by: PHYSICAL THERAPIST

## 2019-03-15 PROCEDURE — 97012 MECHANICAL TRACTION THERAPY: CPT | Mod: GP | Performed by: PHYSICAL THERAPIST

## 2019-03-15 PROCEDURE — 97035 APP MDLTY 1+ULTRASOUND EA 15: CPT | Mod: GP | Performed by: PHYSICAL THERAPIST

## 2019-03-19 ENCOUNTER — THERAPY VISIT (OUTPATIENT)
Dept: PHYSICAL THERAPY | Facility: CLINIC | Age: 84
End: 2019-03-19
Payer: COMMERCIAL

## 2019-03-19 DIAGNOSIS — M54.2 NECK PAIN: ICD-10-CM

## 2019-03-19 DIAGNOSIS — M47.812 CERVICAL SPONDYLOSIS WITHOUT MYELOPATHY: ICD-10-CM

## 2019-03-19 PROCEDURE — 97140 MANUAL THERAPY 1/> REGIONS: CPT | Mod: GP | Performed by: PHYSICAL THERAPIST

## 2019-03-19 PROCEDURE — 97035 APP MDLTY 1+ULTRASOUND EA 15: CPT | Mod: GP | Performed by: PHYSICAL THERAPIST

## 2019-03-19 PROCEDURE — 97012 MECHANICAL TRACTION THERAPY: CPT | Mod: GP | Performed by: PHYSICAL THERAPIST

## 2019-03-22 ENCOUNTER — THERAPY VISIT (OUTPATIENT)
Dept: PHYSICAL THERAPY | Facility: CLINIC | Age: 84
End: 2019-03-22
Payer: COMMERCIAL

## 2019-03-22 DIAGNOSIS — M54.2 NECK PAIN: ICD-10-CM

## 2019-03-22 DIAGNOSIS — M47.812 CERVICAL SPONDYLOSIS WITHOUT MYELOPATHY: ICD-10-CM

## 2019-03-22 PROCEDURE — 97140 MANUAL THERAPY 1/> REGIONS: CPT | Mod: GP | Performed by: PHYSICAL THERAPIST

## 2019-03-22 PROCEDURE — 97012 MECHANICAL TRACTION THERAPY: CPT | Mod: GP | Performed by: PHYSICAL THERAPIST

## 2019-03-22 PROCEDURE — 97035 APP MDLTY 1+ULTRASOUND EA 15: CPT | Mod: GP | Performed by: PHYSICAL THERAPIST

## 2019-04-05 ENCOUNTER — THERAPY VISIT (OUTPATIENT)
Dept: PHYSICAL THERAPY | Facility: CLINIC | Age: 84
End: 2019-04-05
Payer: COMMERCIAL

## 2019-04-05 DIAGNOSIS — M47.812 CERVICAL SPONDYLOSIS WITHOUT MYELOPATHY: ICD-10-CM

## 2019-04-05 DIAGNOSIS — M54.2 NECK PAIN: ICD-10-CM

## 2019-04-05 PROCEDURE — 97140 MANUAL THERAPY 1/> REGIONS: CPT | Mod: GP | Performed by: PHYSICAL THERAPIST

## 2019-04-05 PROCEDURE — 97035 APP MDLTY 1+ULTRASOUND EA 15: CPT | Mod: GP | Performed by: PHYSICAL THERAPIST

## 2019-04-05 PROCEDURE — 97012 MECHANICAL TRACTION THERAPY: CPT | Mod: GP | Performed by: PHYSICAL THERAPIST

## 2019-04-12 ENCOUNTER — THERAPY VISIT (OUTPATIENT)
Dept: PHYSICAL THERAPY | Facility: CLINIC | Age: 84
End: 2019-04-12
Payer: COMMERCIAL

## 2019-04-12 DIAGNOSIS — M47.812 CERVICAL SPONDYLOSIS WITHOUT MYELOPATHY: ICD-10-CM

## 2019-04-12 DIAGNOSIS — M54.2 NECK PAIN: ICD-10-CM

## 2019-04-12 PROCEDURE — 97012 MECHANICAL TRACTION THERAPY: CPT | Mod: GP | Performed by: PHYSICAL THERAPIST

## 2019-04-12 PROCEDURE — 97035 APP MDLTY 1+ULTRASOUND EA 15: CPT | Mod: GP | Performed by: PHYSICAL THERAPIST

## 2019-04-12 PROCEDURE — 97140 MANUAL THERAPY 1/> REGIONS: CPT | Mod: GP | Performed by: PHYSICAL THERAPIST

## 2019-05-08 ENCOUNTER — TRANSFERRED RECORDS (OUTPATIENT)
Dept: HEALTH INFORMATION MANAGEMENT | Facility: CLINIC | Age: 84
End: 2019-05-08

## 2019-05-17 ENCOUNTER — TRANSFERRED RECORDS (OUTPATIENT)
Dept: HEALTH INFORMATION MANAGEMENT | Facility: CLINIC | Age: 84
End: 2019-05-17

## 2019-06-24 ENCOUNTER — OFFICE VISIT (OUTPATIENT)
Dept: FAMILY MEDICINE | Facility: CLINIC | Age: 84
End: 2019-06-24
Payer: COMMERCIAL

## 2019-06-24 VITALS
HEIGHT: 69 IN | TEMPERATURE: 97.6 F | WEIGHT: 173 LBS | SYSTOLIC BLOOD PRESSURE: 128 MMHG | DIASTOLIC BLOOD PRESSURE: 84 MMHG | HEART RATE: 93 BPM | BODY MASS INDEX: 25.62 KG/M2 | OXYGEN SATURATION: 96 % | RESPIRATION RATE: 18 BRPM

## 2019-06-24 DIAGNOSIS — R06.02 SOB (SHORTNESS OF BREATH): Primary | ICD-10-CM

## 2019-06-24 PROCEDURE — 99213 OFFICE O/P EST LOW 20 MIN: CPT | Performed by: FAMILY MEDICINE

## 2019-06-24 ASSESSMENT — MIFFLIN-ST. JEOR: SCORE: 1475.1

## 2019-06-24 NOTE — PROGRESS NOTES
Subjective     Jeff Mack is a 84 year old male who presents to clinic today for the following health issues:    HPI   ALLERGIES      Duration: 2 months    Description:   Nasal congestion: no   Sneezing: no   Red, itchy eyes: no     Accompanying signs and symptoms: trouble through bronchial area    History (similar episodes/allergy testing):    Precipitating or alleviating factors: he has been seeing a Doctor at the allergy clinic and was given an inhaler that isnt helping much    Therapies tried and outcome: flonase and inhaler; not helping      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)     Cardiomegaly     Chronic ischemic heart disease     Essential hypertension, benign     Primary cardiomyopathy (H)     Hyperlipidemia with target LDL less than 100     Acute low back pain     Nonallopathic lesion of sacral region     Lumbar stenosis     Health Care Home     Advance Care Planning     Edema     Esophageal cancer (H)     Neck pain     Cervical spondylosis without myelopathy     Past Surgical History:   Procedure Laterality Date     BRONCHOSCOPY FLEXIBLE N/A 4/3/2015    Procedure: BRONCHOSCOPY FLEXIBLE;  Surgeon: Ralph Catherine MD;  Location:  OR      EXCISION OF LINGUAL TONSIL      TONSILLECTOMY     C NONSPECIFIC PROCEDURE  2-99    Colonic polpectomy     C NONSPECIFIC PROCEDURE  9-92    TURP     C NONSPECIFIC PROCEDURE  1982    Retinal surgery     COLONOSCOPY       ESOPHAGOSCOPY, GASTROSCOPY, DUODENOSCOPY (EGD), COMBINED N/A 3/19/2015    Procedure: COMBINED ESOPHAGOSCOPY, GASTROSCOPY, DUODENOSCOPY (EGD), BIOPSY SINGLE OR MULTIPLE;  Surgeon: Alo Mauro MD;  Location:  GI     ESOPHAGOSCOPY, GASTROSCOPY, DUODENOSCOPY (EGD), COMBINED N/A 7/14/2015     Procedure: COMBINED ESOPHAGOSCOPY, GASTROSCOPY, DUODENOSCOPY (EGD), BIOPSY SINGLE OR MULTIPLE;  Surgeon: Kathy Torres MD;  Location: [facility]     ESOPHAGOSCOPY, GASTROSCOPY, DUODENOSCOPY (EGD), COMBINED N/A 12/1/2015    Procedure: COMBINED ESOPHAGOSCOPY, GASTROSCOPY, DUODENOSCOPY (EGD), BIOPSY SINGLE OR MULTIPLE;  Surgeon: Alo Mauro MD;  Location: [facility]     ESOPHAGOSCOPY, GASTROSCOPY, DUODENOSCOPY (EGD), COMBINED N/A 3/17/2016    Procedure: COMBINED ESOPHAGOSCOPY, GASTROSCOPY, DUODENOSCOPY (EGD), BIOPSY SINGLE OR MULTIPLE;  Surgeon: Alo Mauro MD;  Location: [facility]     ESOPHAGOSCOPY, GASTROSCOPY, DUODENOSCOPY (EGD), COMBINED N/A 7/21/2016    Procedure: COMBINED ESOPHAGOSCOPY, GASTROSCOPY, DUODENOSCOPY (EGD);  Surgeon: Alo Mauro MD;  Location: [facility]     ESOPHAGOSCOPY, GASTROSCOPY, DUODENOSCOPY (EGD), COMBINED N/A 11/17/2016    Procedure: COMBINED ESOPHAGOSCOPY, GASTROSCOPY, DUODENOSCOPY (EGD);  Surgeon: Alo Mauro MD;  Location: [facility]     ESOPHAGOSCOPY, GASTROSCOPY, DUODENOSCOPY (EGD), COMBINED N/A 5/9/2017    Procedure: COMBINED ESOPHAGOSCOPY, GASTROSCOPY, DUODENOSCOPY (EGD);  (EGD) COMBINED ESOPHAGOSCOPY, GASTROSCOPY, DUODENOSCOPY;  Surgeon: Alo Mauro MD;  Location: [facility]     ESOPHAGOSCOPY, GASTROSCOPY, DUODENOSCOPY (EGD), COMBINED N/A 5/1/2018    Procedure: COMBINED ESOPHAGOSCOPY, GASTROSCOPY, DUODENOSCOPY (EGD);  ESOPHAGOGASTRODUODENOSCOPY ;  Surgeon: Alo Mauro MD;  Location: [facility]     GASTROSTOMY, INSERT TUBE, COMBINED N/A 4/3/2015    Procedure: COMBINED GASTROSTOMY, INSERT TUBE (OPEN);  Surgeon: Ralph Catherine MD;  Location:  OR      PRESSURE GRADIENT MEASUREMENT, INITIAL VESSEL  2005    essentially normal     HC UGI ENDOSCOPY W EUS N/A 7/14/2015    Procedure: COMBINED ENDOSCOPIC ULTRASOUND, ESOPHAGOSCOPY, GASTROSCOPY, DUODENOSCOPY (EGD);  Surgeon: Kathy Torres MD;  Location: [facility]     LAMINECTOMY, FUSION LUMBAR ONE LEVEL, COMBINED  7/26/2012     Procedure: COMBINED LAMINECTOMY, FUSION LUMBAR ONE LEVEL;  Posterior Fusion L5-S1, Total Facetectomy L5-S1 Left ;  Surgeon: Ronald Espinosa MD;  Location:  OR       Social History     Tobacco Use     Smoking status: Former Smoker     Packs/day: 2.00     Years: 40.00     Pack years: 80.00     Types: Cigarettes     Last attempt to quit: 1975     Years since quittin.5     Smokeless tobacco: Never Used   Substance Use Topics     Alcohol use: No     Family History   Problem Relation Age of Onset     Hypertension Mother      Cerebrovascular Disease Mother      Diabetes Brother      Diabetes Father          age 97         Current Outpatient Medications   Medication Sig Dispense Refill     ALPRAZolam (XANAX) 0.5 MG tablet Take 1 tablet (0.5 mg) by mouth 2 times daily as needed for anxiety 45 tablet 0     budesonide-formoterol (SYMBICORT) 160-4.5 MCG/ACT inhaler Inhale 2 puffs into the lungs 2 times daily       Cholecalciferol (VITAMIN D3) 5000 UNITS TABS Take  by mouth.       cyanocolbalamin (VITAMIN  B-12) 100 MCG tablet Take 50 mcg by mouth daily       EPINEPHrine (EPIPEN) 0.3 MG/0.3ML injection Inject 0.3 mLs (0.3 mg) into the muscle once as needed for anaphylaxis 1 each 1     fluticasone (FLONASE) 50 MCG/ACT spray Spray 1-2 sprays into both nostrils daily 1 Bottle 11     Ibuprofen (ADVIL PO)        MAGNESIUM OXIDE PO        meloxicam (MOBIC) 7.5 MG tablet Take 1 tablet (7.5 mg) by mouth daily 90 tablet 3     montelukast (SINGULAIR) 10 MG tablet Take 1 tablet (10 mg) by mouth At Bedtime 30 tablet 1     TRAMADOL HCL PO        Allergies   Allergen Reactions     Bee Venom Shortness Of Breath     Recent Labs   Lab Test 18  1354 17  0943 17  1130 07/09/15  1110  12  1548   LDL  --   --   --  92  --  111   HDL  --   --   --  48  --  71   TRIG  --   --   --  80  --  110   ALT 19  --   --   --   --  19   CR 0.77  --  0.81 0.56*   < > 0.86   GFRESTIMATED >90  --  >90  Non   "American GFR Calc   >90  Non  GFR Calc     < > 87   GFRESTBLACK >90  --  >90   GFR Calc   >90   GFR Calc     < > >90   POTASSIUM 4.0  --  4.4 4.1   < > 3.5   TSH  --  1.79  --   --   --  1.72    < > = values in this interval not displayed.      BP Readings from Last 3 Encounters:   06/24/19 128/84   02/19/19 134/82   11/16/18 118/67    Wt Readings from Last 3 Encounters:   06/24/19 78.5 kg (173 lb)   02/19/19 80.3 kg (177 lb)   11/16/18 80.7 kg (178 lb)                Reviewed and updated as needed this visit by Provider         Review of Systems   ROS COMP: Constitutional, HEENT, cardiovascular, pulmonary, gi and gu systems are negative, except as otherwise noted.      Objective    /84   Pulse 93   Temp 97.6  F (36.4  C) (Tympanic)   Resp 18   Ht 1.753 m (5' 9\")   Wt 78.5 kg (173 lb)   SpO2 96%   BMI 25.55 kg/m    Body mass index is 25.55 kg/m .  Physical Exam   GENERAL: healthy, alert and no distress  NECK: no adenopathy, no asymmetry, masses, or scars and thyroid normal to palpation  RESP: lungs clear to auscultation - no rales, rhonchi or wheezes  CV: regular rate and rhythm, normal S1 S2, no S3 or S4, no murmur, click or rub, no peripheral edema and peripheral pulses strong  ABDOMEN: soft, nontender, no hepatosplenomegaly, no masses and bowel sounds normal  MS: no gross musculoskeletal defects noted, no edema            Assessment & Plan       ICD-10-CM    1. SOB (shortness of breath) R06.02 PULMONARY MEDICINE REFERRAL     His allergic sx has been well controlled but SOB and wheezing got worse, will have him to see pulmonology for further evaluation. Pt acknowledged and agreed with the plan        BMI:   Estimated body mass index is 25.55 kg/m  as calculated from the following:    Height as of this encounter: 1.753 m (5' 9\").    Weight as of this encounter: 78.5 kg (173 lb).           FUTURE APPOINTMENTS:       - Follow-up visit in 3 months     No " follow-ups on file.    Benjamin Lazar MD  [facility] [place]

## 2019-07-08 ENCOUNTER — TRANSFERRED RECORDS (OUTPATIENT)
Dept: HEALTH INFORMATION MANAGEMENT | Facility: CLINIC | Age: 84
End: 2019-07-08

## 2019-08-15 ENCOUNTER — OFFICE VISIT (OUTPATIENT)
Dept: FAMILY MEDICINE | Facility: CLINIC | Age: 84
End: 2019-08-15
Payer: COMMERCIAL

## 2019-08-15 VITALS
HEIGHT: 69 IN | RESPIRATION RATE: 16 BRPM | BODY MASS INDEX: 25.62 KG/M2 | OXYGEN SATURATION: 96 % | HEART RATE: 80 BPM | WEIGHT: 173 LBS | SYSTOLIC BLOOD PRESSURE: 130 MMHG | DIASTOLIC BLOOD PRESSURE: 68 MMHG | TEMPERATURE: 98 F

## 2019-08-15 DIAGNOSIS — Z85.01 HISTORY OF ESOPHAGEAL CANCER: ICD-10-CM

## 2019-08-15 DIAGNOSIS — J30.9 ALLERGIC SINUSITIS: Primary | ICD-10-CM

## 2019-08-15 DIAGNOSIS — J01.90 ACUTE SINUSITIS WITH SYMPTOMS > 10 DAYS: ICD-10-CM

## 2019-08-15 PROCEDURE — 99214 OFFICE O/P EST MOD 30 MIN: CPT | Performed by: FAMILY MEDICINE

## 2019-08-15 RX ORDER — ALBUTEROL SULFATE 90 UG/1
AEROSOL, METERED RESPIRATORY (INHALATION)
Refills: 0 | Status: ON HOLD | COMMUNITY
Start: 2019-06-18 | End: 2020-01-01

## 2019-08-15 RX ORDER — MONTELUKAST SODIUM 10 MG/1
10 TABLET ORAL AT BEDTIME
Qty: 30 TABLET | Refills: 1 | Status: SHIPPED | OUTPATIENT
Start: 2019-08-15 | End: 2020-01-01

## 2019-08-15 ASSESSMENT — ANXIETY QUESTIONNAIRES
6. BECOMING EASILY ANNOYED OR IRRITABLE: NOT AT ALL
GAD7 TOTAL SCORE: 3
2. NOT BEING ABLE TO STOP OR CONTROL WORRYING: SEVERAL DAYS
7. FEELING AFRAID AS IF SOMETHING AWFUL MIGHT HAPPEN: NOT AT ALL
5. BEING SO RESTLESS THAT IT IS HARD TO SIT STILL: NOT AT ALL
3. WORRYING TOO MUCH ABOUT DIFFERENT THINGS: NOT AT ALL
IF YOU CHECKED OFF ANY PROBLEMS ON THIS QUESTIONNAIRE, HOW DIFFICULT HAVE THESE PROBLEMS MADE IT FOR YOU TO DO YOUR WORK, TAKE CARE OF THINGS AT HOME, OR GET ALONG WITH OTHER PEOPLE: NOT DIFFICULT AT ALL
1. FEELING NERVOUS, ANXIOUS, OR ON EDGE: SEVERAL DAYS

## 2019-08-15 ASSESSMENT — PATIENT HEALTH QUESTIONNAIRE - PHQ9
SUM OF ALL RESPONSES TO PHQ QUESTIONS 1-9: 2
5. POOR APPETITE OR OVEREATING: SEVERAL DAYS

## 2019-08-15 ASSESSMENT — MIFFLIN-ST. JEOR: SCORE: 1475.1

## 2019-08-15 NOTE — PROGRESS NOTES
Subjective     Jeff Mack is a 84 year old male who presents to clinic today for the following health issues:    HPI   RESPIRATORY SYMPTOMS      Duration: 2 months     Description  facial pain/pressure, sore throat     Severity: moderate    Accompanying signs and symptoms: None    History (predisposing factors):  Asthma     Precipitating or alleviating factors: has done lots of gardening that is possibly making symptoms worse     Therapies tried and outcome:  Flonase       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)     Cardiomegaly     Chronic ischemic heart disease     Essential hypertension, benign     Primary cardiomyopathy (H)     Hyperlipidemia with target LDL less than 100     Acute low back pain     Nonallopathic lesion of sacral region     Lumbar stenosis     Health Care Home     Advance Care Planning     Edema     Esophageal cancer (H)     Neck pain     Cervical spondylosis without myelopathy     Past Surgical History:   Procedure Laterality Date     BRONCHOSCOPY FLEXIBLE N/A 4/3/2015    Procedure: BRONCHOSCOPY FLEXIBLE;  Surgeon: Ralph Catherine MD;  Location:  OR      EXCISION OF LINGUAL TONSIL      TONSILLECTOMY     C NONSPECIFIC PROCEDURE  2-99    Colonic polpectomy     C NONSPECIFIC PROCEDURE  9-92    TURP     C NONSPECIFIC PROCEDURE  1982    Retinal surgery     COLONOSCOPY       ESOPHAGOSCOPY, GASTROSCOPY, DUODENOSCOPY (EGD), COMBINED N/A 3/19/2015    Procedure: COMBINED ESOPHAGOSCOPY, GASTROSCOPY, DUODENOSCOPY (EGD), BIOPSY SINGLE OR MULTIPLE;  Surgeon: Alo Mauro MD;  Location:  GI     ESOPHAGOSCOPY, GASTROSCOPY, DUODENOSCOPY (EGD), COMBINED N/A 7/14/2015    Procedure: COMBINED ESOPHAGOSCOPY, GASTROSCOPY, DUODENOSCOPY (EGD), BIOPSY SINGLE  OR MULTIPLE;  Surgeon: Kathy Torres MD;  Location:  GI     ESOPHAGOSCOPY, GASTROSCOPY, DUODENOSCOPY (EGD), COMBINED N/A 12/1/2015    Procedure: COMBINED ESOPHAGOSCOPY, GASTROSCOPY, DUODENOSCOPY (EGD), BIOPSY SINGLE OR MULTIPLE;  Surgeon: Alo Mauro MD;  Location:  GI     ESOPHAGOSCOPY, GASTROSCOPY, DUODENOSCOPY (EGD), COMBINED N/A 3/17/2016    Procedure: COMBINED ESOPHAGOSCOPY, GASTROSCOPY, DUODENOSCOPY (EGD), BIOPSY SINGLE OR MULTIPLE;  Surgeon: Alo Mauro MD;  Location:  GI     ESOPHAGOSCOPY, GASTROSCOPY, DUODENOSCOPY (EGD), COMBINED N/A 7/21/2016    Procedure: COMBINED ESOPHAGOSCOPY, GASTROSCOPY, DUODENOSCOPY (EGD);  Surgeon: Alo Mauro MD;  Location: [facility]     ESOPHAGOSCOPY, GASTROSCOPY, DUODENOSCOPY (EGD), COMBINED N/A 11/17/2016    Procedure: COMBINED ESOPHAGOSCOPY, GASTROSCOPY, DUODENOSCOPY (EGD);  Surgeon: Alo Mauro MD;  Location: [facility]     ESOPHAGOSCOPY, GASTROSCOPY, DUODENOSCOPY (EGD), COMBINED N/A 5/9/2017    Procedure: COMBINED ESOPHAGOSCOPY, GASTROSCOPY, DUODENOSCOPY (EGD);  (EGD) COMBINED ESOPHAGOSCOPY, GASTROSCOPY, DUODENOSCOPY;  Surgeon: Alo Mauro MD;  Location: [facility]     ESOPHAGOSCOPY, GASTROSCOPY, DUODENOSCOPY (EGD), COMBINED N/A 5/1/2018    Procedure: COMBINED ESOPHAGOSCOPY, GASTROSCOPY, DUODENOSCOPY (EGD);  ESOPHAGOGASTRODUODENOSCOPY ;  Surgeon: Alo Mauro MD;  Location:  GI     GASTROSTOMY, INSERT TUBE, COMBINED N/A 4/3/2015    Procedure: COMBINED GASTROSTOMY, INSERT TUBE (OPEN);  Surgeon: Ralph Catherine MD;  Location:  OR      PRESSURE GRADIENT MEASUREMENT, INITIAL VESSEL  2005    essentially normal     HC UGI ENDOSCOPY W EUS N/A 7/14/2015    Procedure: COMBINED ENDOSCOPIC ULTRASOUND, ESOPHAGOSCOPY, GASTROSCOPY, DUODENOSCOPY (EGD);  Surgeon: Kathy Torres MD;  Location: [facility]     LAMINECTOMY, FUSION LUMBAR ONE LEVEL, COMBINED  7/26/2012    Procedure: COMBINED LAMINECTOMY, FUSION LUMBAR ONE LEVEL;  Posterior Fusion  L5-S1, Total Facetectomy L5-S1 Left ;  Surgeon: Ronald Espinosa MD;  Location: [facility]       Social History     Tobacco Use     Smoking status: Former Smoker     Packs/day: 2.00     Years: 40.00     Pack years: 80.00     Types: Cigarettes     Last attempt to quit: 1975     Years since quittin.6     Smokeless tobacco: Never Used   Substance Use Topics     Alcohol use: No     Family History   Problem Relation Age of Onset     Hypertension Mother      Cerebrovascular Disease Mother      Diabetes Brother      Diabetes Father          age 97         Current Outpatient Medications   Medication Sig Dispense Refill     ALPRAZolam (XANAX) 0.5 MG tablet Take 1 tablet (0.5 mg) by mouth 2 times daily as needed for anxiety 45 tablet 0     Cholecalciferol (VITAMIN D3) 5000 UNITS TABS Take  by mouth.       cyanocolbalamin (VITAMIN  B-12) 100 MCG tablet Take 50 mcg by mouth daily       fluticasone (FLONASE) 50 MCG/ACT spray Spray 1-2 sprays into both nostrils daily 1 Bottle 11     Ibuprofen (ADVIL PO)        MAGNESIUM OXIDE PO        meloxicam (MOBIC) 7.5 MG tablet Take 1 tablet (7.5 mg) by mouth daily 90 tablet 3     montelukast (SINGULAIR) 10 MG tablet Take 1 tablet (10 mg) by mouth At Bedtime 30 tablet 1     TRAMADOL HCL PO        TRELEGY ELLIPTA 100-62.5-25 MCG/INH oral inhaler INHALE 1 PUFF ONCE DAILY AT THE SAME TIME EACH DAY. RINSE MOUTH WITH WATER & SPIT OUT AFTER USE  5     EPINEPHrine (EPIPEN) 0.3 MG/0.3ML injection Inject 0.3 mLs (0.3 mg) into the muscle once as needed for anaphylaxis (Patient not taking: Reported on 8/15/2019) 1 each 1     VENTOLIN  (90 Base) MCG/ACT inhaler INHALE 2 PUFFS BY MOUTH EVERY 6 HOURS AS NEEDED  0     Allergies   Allergen Reactions     Bee Venom Shortness Of Breath     Recent Labs   Lab Test 18  1354 17  0943 17  1130 07/09/15  1110  12  1548   LDL  --   --   --  92  --  111   HDL  --   --   --  48  --  71   TRIG  --   --   --  80  --  110   ALT  "19  --   --   --   --  19   CR 0.77  --  0.81 0.56*   < > 0.86   GFRESTIMATED >90  --  >90  Non  GFR Calc   >90  Non  GFR Calc     < > 87   GFRESTBLACK >90  --  >90   GFR Calc   >90   GFR Calc     < > >90   POTASSIUM 4.0  --  4.4 4.1   < > 3.5   TSH  --  1.79  --   --   --  1.72    < > = values in this interval not displayed.      BP Readings from Last 3 Encounters:   08/15/19 130/68   06/24/19 128/84   02/19/19 134/82    Wt Readings from Last 3 Encounters:   08/15/19 78.5 kg (173 lb)   06/24/19 78.5 kg (173 lb)   02/19/19 80.3 kg (177 lb)           Reviewed and updated as needed this visit by Provider         Review of Systems   ROS COMP: Constitutional, HEENT, cardiovascular, pulmonary, gi and gu systems are negative, except as otherwise noted.      Objective    /68 (Cuff Size: Adult Regular)   Pulse 80   Temp 98  F (36.7  C) (Tympanic)   Resp 16   Ht 1.753 m (5' 9\")   Wt 78.5 kg (173 lb)   SpO2 96%   BMI 25.55 kg/m    Body mass index is 25.55 kg/m .  Physical Exam   GENERAL: healthy, alert and no distress  NECK: no adenopathy, no asymmetry, masses, or scars and thyroid normal to palpation  RESP: lungs clear to auscultation - no rales, rhonchi or wheezes  CV: regular rate and rhythm, normal S1 S2, no S3 or S4, no murmur, click or rub, no peripheral edema and peripheral pulses strong  ABDOMEN: soft, nontender, no hepatosplenomegaly, no masses and bowel sounds normal  MS: no gross musculoskeletal defects noted, no edema            Assessment & Plan       ICD-10-CM    1. Allergic sinusitis J30.9 montelukast (SINGULAIR) 10 MG tablet   2. Acute sinusitis with symptoms > 10 days J01.90 montelukast (SINGULAIR) 10 MG tablet   3. History of esophageal cancer Z85.01       has been off of Singulair, currently using Flonase and allergra, will have him to resume Singulair and recheck in 1 month  If sore throat not improving, encouraged him to see GI " "to review the sx and considering another EGD, he has last h/o esophageal cancer     BMI:   Estimated body mass index is 25.55 kg/m  as calculated from the following:    Height as of this encounter: 1.753 m (5' 9\").    Weight as of this encounter: 78.5 kg (173 lb).       No follow-ups on file.    Benjamin Lazar MD  [facility] [place]        "

## 2019-08-16 ENCOUNTER — TELEPHONE (OUTPATIENT)
Dept: FAMILY MEDICINE | Facility: CLINIC | Age: 84
End: 2019-08-16

## 2019-08-16 ASSESSMENT — ANXIETY QUESTIONNAIRES: GAD7 TOTAL SCORE: 3

## 2019-08-16 NOTE — TELEPHONE ENCOUNTER
Health Care Directive received and given to Myra Oneill RN to review and document.  Rhina Shah,

## 2019-08-23 ENCOUNTER — DOCUMENTATION ONLY (OUTPATIENT)
Dept: OTHER | Facility: CLINIC | Age: 84
End: 2019-08-23

## 2019-08-28 PROBLEM — M47.812 CERVICAL SPONDYLOSIS WITHOUT MYELOPATHY: Status: RESOLVED | Noted: 2019-02-25 | Resolved: 2019-08-28

## 2019-08-28 PROBLEM — M54.2 NECK PAIN: Status: RESOLVED | Noted: 2019-02-25 | Resolved: 2019-08-28

## 2019-08-28 NOTE — PROGRESS NOTES
Discharge Note    Progress reporting period is from last progress note on   to Apr 12, 2019.    Jeff failed to follow up and current status is unknown.  Please see information below for last relevant information on current status.  Patient seen for 10 visits.    SUBJECTIVE  Subjective changes noted by patient:  Has noted minimal issue with pain over the past week. Able to hit golf balls painfree.  .  Current pain level is  .     Previous pain level was  4/10.   Changes in function:  Yes (See Goal flowsheet attached for changes in current functional level)  Adverse reaction to treatment or activity: None    OBJECTIVE  Changes noted in objective findings: See flow. Expected hypomobility noted in bilateral c-spine related to spondylosis.     ASSESSMENT/PLAN  Diagnosis: neck pain   Updated problem list and treatment plan:   Pain - HEP  STG/LTGs have been met or progress has been made towards goals:  Yes, please see goal flowsheet for most current information  Assessment of Progress: current status is unknown.    Last current status: Pt is progressing well   Self Management Plans:  HEP  I have re-evaluated this patient and find that the nature, scope, duration and intensity of the therapy is appropriate for the medical condition of the patient.  Jeff continues to require the following intervention to meet STG and LTG's:  HEP.    Recommendations:  Discharge with current home program.  Patient to follow up with MD as needed.    Please refer to the daily flowsheet for treatment today, total treatment time and time spent performing 1:1 timed codes.

## 2019-12-02 ENCOUNTER — OFFICE VISIT (OUTPATIENT)
Dept: FAMILY MEDICINE | Facility: CLINIC | Age: 84
End: 2019-12-02
Payer: COMMERCIAL

## 2019-12-02 VITALS
WEIGHT: 170 LBS | SYSTOLIC BLOOD PRESSURE: 132 MMHG | DIASTOLIC BLOOD PRESSURE: 78 MMHG | BODY MASS INDEX: 25.1 KG/M2 | TEMPERATURE: 97 F | HEART RATE: 76 BPM

## 2019-12-02 DIAGNOSIS — J44.89 CHRONIC OBSTRUCTIVE AIRWAY DISEASE WITH ASTHMA (H): ICD-10-CM

## 2019-12-02 DIAGNOSIS — N40.0 HYPERTROPHY OF PROSTATE WITHOUT URINARY OBSTRUCTION: Primary | ICD-10-CM

## 2019-12-02 DIAGNOSIS — C15.9 MALIGNANT NEOPLASM OF ESOPHAGUS, UNSPECIFIED LOCATION (H): ICD-10-CM

## 2019-12-02 DIAGNOSIS — Z12.5 ENCOUNTER FOR SCREENING FOR MALIGNANT NEOPLASM OF PROSTATE: ICD-10-CM

## 2019-12-02 DIAGNOSIS — R33.9 URINARY RETENTION: ICD-10-CM

## 2019-12-02 DIAGNOSIS — R30.0 DYSURIA: ICD-10-CM

## 2019-12-02 DIAGNOSIS — I42.9 PRIMARY CARDIOMYOPATHY (H): ICD-10-CM

## 2019-12-02 LAB
ALBUMIN UR-MCNC: NEGATIVE MG/DL
APPEARANCE UR: CLEAR
BILIRUB UR QL STRIP: NEGATIVE
COLOR UR AUTO: YELLOW
GLUCOSE UR STRIP-MCNC: NEGATIVE MG/DL
HGB UR QL STRIP: NEGATIVE
KETONES UR STRIP-MCNC: NEGATIVE MG/DL
LEUKOCYTE ESTERASE UR QL STRIP: NEGATIVE
NITRATE UR QL: NEGATIVE
PH UR STRIP: 6 PH (ref 5–7)
SOURCE: NORMAL
SP GR UR STRIP: 1.01 (ref 1–1.03)
UROBILINOGEN UR STRIP-ACNC: 0.2 EU/DL (ref 0.2–1)

## 2019-12-02 PROCEDURE — 81003 URINALYSIS AUTO W/O SCOPE: CPT | Performed by: NURSE PRACTITIONER

## 2019-12-02 PROCEDURE — G0103 PSA SCREENING: HCPCS | Performed by: NURSE PRACTITIONER

## 2019-12-02 PROCEDURE — 36415 COLL VENOUS BLD VENIPUNCTURE: CPT | Performed by: NURSE PRACTITIONER

## 2019-12-02 PROCEDURE — 99214 OFFICE O/P EST MOD 30 MIN: CPT | Performed by: NURSE PRACTITIONER

## 2019-12-02 RX ORDER — TAMSULOSIN HYDROCHLORIDE 0.4 MG/1
0.4 CAPSULE ORAL DAILY
Qty: 30 CAPSULE | Refills: 3 | Status: SHIPPED | OUTPATIENT
Start: 2019-12-02 | End: 2019-12-03 | Stop reason: ALTCHOICE

## 2019-12-02 NOTE — PATIENT INSTRUCTIONS
1. Call [facility] [phone] and see if your pulmonologist runs into this a lot and if he would like to try something different.    2. I'll call you with PSA result    3. Try Flomax at night (watch for dizziness / BP drop)    4. If no relief, schedule with urology.

## 2019-12-02 NOTE — PROGRESS NOTES
Subjective     Jeff Mack is a 84 year old male who presents to clinic today for the following health issues:    HPI   Genitourinary - Male  Onset: a few months but feels it's been induced by Trelegy ellipta this summer     Description:   Dysuria (painful urination): no   Hematuria (blood in urine): no   Frequency: YES  Are you urinating at night : YES  Hesitancy (delay in urine): YES  Retention (unable to empty): YES  Decrease in urinary flow: YES  Incontinence: YES    Progression of Symptoms:  worsening    Accompanying Signs & Symptoms:  Fever: no   Back/Flank pain: YES- but nothing new   Urethral discharge: no   Testicle lumps/masses/pain: no   Nausea and/or vomiting: no   Abdominal pain: YES    History:   History of frequent UTI's: no   History of kidney stones: no   History of hernias: no   Personal or Family history of Prostate problems: YES- dad. And 1993 had a TURP   Sexually active: no     Precipitating factors:   Noted; new RX     Alleviating factors:  Na     HPI: Jeff presents today with the complaints of urinary retention, frequency, nocturia, weak stream, and incontinence. Denies constitutional symptoms or change to his urine character (clarity, odor, etc.). No penile discharge.    He feels like this constellation of symptoms may be driven by new COPD inhaler (Trelegy Ellipta). He states that these urinary symptoms began shortly after he started this in August (prescribed by pulmonology). He also states that this is a listed potential side effect of the medication.     He did have TURP in the 1990s for BPH with severe LUTS. Hasn't been an issue since that procedure. PSA one year ago was normal.     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)     Cardiomegaly      Chronic ischemic heart disease     Essential hypertension, benign     Primary cardiomyopathy (H)     Hyperlipidemia with target LDL less than 100     Acute low back pain     Nonallopathic lesion of sacral region     Lumbar stenosis     Health Care Home     Edema     Esophageal cancer (H)     Past Surgical History:   Procedure Laterality Date     BRONCHOSCOPY FLEXIBLE N/A 4/3/2015    Procedure: BRONCHOSCOPY FLEXIBLE;  Surgeon: Ralph Catherine MD;  Location:  OR      EXCISION OF LINGUAL TONSIL      TONSILLECTOMY     C NONSPECIFIC PROCEDURE  2-99    Colonic polpectomy     C NONSPECIFIC PROCEDURE  9-92    TURP     C NONSPECIFIC PROCEDURE  1982    Retinal surgery     COLONOSCOPY       ESOPHAGOSCOPY, GASTROSCOPY, DUODENOSCOPY (EGD), COMBINED N/A 3/19/2015    Procedure: COMBINED ESOPHAGOSCOPY, GASTROSCOPY, DUODENOSCOPY (EGD), BIOPSY SINGLE OR MULTIPLE;  Surgeon: Alo Mauro MD;  Location: [facility]     ESOPHAGOSCOPY, GASTROSCOPY, DUODENOSCOPY (EGD), COMBINED N/A 7/14/2015    Procedure: COMBINED ESOPHAGOSCOPY, GASTROSCOPY, DUODENOSCOPY (EGD), BIOPSY SINGLE OR MULTIPLE;  Surgeon: Kathy Torres MD;  Location: [facility]     ESOPHAGOSCOPY, GASTROSCOPY, DUODENOSCOPY (EGD), COMBINED N/A 12/1/2015    Procedure: COMBINED ESOPHAGOSCOPY, GASTROSCOPY, DUODENOSCOPY (EGD), BIOPSY SINGLE OR MULTIPLE;  Surgeon: Alo Mauro MD;  Location: [facility]     ESOPHAGOSCOPY, GASTROSCOPY, DUODENOSCOPY (EGD), COMBINED N/A 3/17/2016    Procedure: COMBINED ESOPHAGOSCOPY, GASTROSCOPY, DUODENOSCOPY (EGD), BIOPSY SINGLE OR MULTIPLE;  Surgeon: Alo Mauro MD;  Location:  GI     ESOPHAGOSCOPY, GASTROSCOPY, DUODENOSCOPY (EGD), COMBINED N/A 7/21/2016    Procedure: COMBINED ESOPHAGOSCOPY, GASTROSCOPY, DUODENOSCOPY (EGD);  Surgeon: Alo Mauro MD;  Location:  GI     ESOPHAGOSCOPY, GASTROSCOPY, DUODENOSCOPY (EGD), COMBINED N/A 11/17/2016    Procedure: COMBINED ESOPHAGOSCOPY, GASTROSCOPY, DUODENOSCOPY (EGD);  Surgeon: Zunilda  Alo DUENAS MD;  Location:  GI     ESOPHAGOSCOPY, GASTROSCOPY, DUODENOSCOPY (EGD), COMBINED N/A 2017    Procedure: COMBINED ESOPHAGOSCOPY, GASTROSCOPY, DUODENOSCOPY (EGD);  (EGD) COMBINED ESOPHAGOSCOPY, GASTROSCOPY, DUODENOSCOPY;  Surgeon: Alo Mauro MD;  Location:  GI     ESOPHAGOSCOPY, GASTROSCOPY, DUODENOSCOPY (EGD), COMBINED N/A 2018    Procedure: COMBINED ESOPHAGOSCOPY, GASTROSCOPY, DUODENOSCOPY (EGD);  ESOPHAGOGASTRODUODENOSCOPY ;  Surgeon: Alo Mauro MD;  Location:  GI     GASTROSTOMY, INSERT TUBE, COMBINED N/A 4/3/2015    Procedure: COMBINED GASTROSTOMY, INSERT TUBE (OPEN);  Surgeon: Ralph Catherine MD;  Location:  OR     HC PRESSURE GRADIENT MEASUREMENT, INITIAL VESSEL  2005    essentially normal     HC UGI ENDOSCOPY W EUS N/A 2015    Procedure: COMBINED ENDOSCOPIC ULTRASOUND, ESOPHAGOSCOPY, GASTROSCOPY, DUODENOSCOPY (EGD);  Surgeon: Kathy Torres MD;  Location:  GI     LAMINECTOMY, FUSION LUMBAR ONE LEVEL, COMBINED  2012    Procedure: COMBINED LAMINECTOMY, FUSION LUMBAR ONE LEVEL;  Posterior Fusion L5-S1, Total Facetectomy L5-S1 Left ;  Surgeon: Ronald Espinosa MD;  Location:  OR       Social History     Tobacco Use     Smoking status: Former Smoker     Packs/day: 2.00     Years: 40.00     Pack years: 80.00     Types: Cigarettes     Last attempt to quit: 1975     Years since quittin.9     Smokeless tobacco: Never Used   Substance Use Topics     Alcohol use: No     Family History   Problem Relation Age of Onset     Hypertension Mother      Cerebrovascular Disease Mother      Diabetes Brother      Diabetes Father          age 97           Reviewed and updated as needed this visit by Provider  Tobacco  Allergies  Meds  Problems  Med Hx  Surg Hx  Fam Hx         Review of Systems   ROS COMP: Constitutional, systems are negative, except as otherwise noted.      Objective    /78   Pulse 76   Temp 97  F (36.1  C)  (Tympanic)   Wt 77.1 kg (170 lb)   BMI 25.10 kg/m    Body mass index is 25.1 kg/m .  Physical Exam   GENERAL: healthy, alert and no distress  RESP: lungs clear to auscultation - no rales, rhonchi or wheezes  CV: regular rate and rhythm, normal S1 S2, no S3 or S4, no murmur, click or rub, no peripheral edema and peripheral pulses strong  NEURO: Normal strength and tone, mentation intact and speech normal  BACK: no CVA tenderness, no paralumbar tenderness    Diagnostic Test Results:  Results for orders placed or performed in visit on 12/02/19 (from the past 24 hour(s))   UA reflex to Microscopic and Culture   Result Value Ref Range    Color Urine Yellow     Appearance Urine Clear     Glucose Urine Negative NEG^Negative mg/dL    Bilirubin Urine Negative NEG^Negative    Ketones Urine Negative NEG^Negative mg/dL    Specific Gravity Urine 1.015 1.003 - 1.035    Blood Urine Negative NEG^Negative    pH Urine 6.0 5.0 - 7.0 pH    Protein Albumin Urine Negative NEG^Negative mg/dL    Urobilinogen Urine 0.2 0.2 - 1.0 EU/dL    Nitrite Urine Negative NEG^Negative    Leukocyte Esterase Urine Negative NEG^Negative    Source Midstream Urine            Assessment & Plan     Jeff was seen today for urinary problem. UA is unremarkable, making infection less likely. Chronic prostatitis is a possibility, but I feel this is less likely given lack of constitutional symptoms and perineal pain. It would seem that this is either due to a worsening of BPH or urinary retention related to pharmaceutical agent (inhaler). I will recheck BPH today to ensure that it has not climbed significantly since last November. I have also asked him to touch base with his pulmonologist to discuss potential alternatives (and to discuss the likelihood that his inhaler is even at fault). I will order a trial of tamsulosin, as well, in the meantime to see if this makes things easier until he can visit with pulmonology. Finally, I have placed an order for urology  "referral, so he can investigate the likelihood that this is related to worsening BPH (he will schedule a visit if this issue fails to improve if/when he switches his inhaler and if Flomax is less-than-effective. Discussed reasons to call or return to clinic. Jeff acknowledges and demonstrates understanding of circumstances under which care should be sought urgently or emergently. Follow up as discussed. Discussed risks, benefits, alternatives, potential side effects, and proper administration of new medication / treatment. Agrees with plan of care. All questions answered.     Diagnoses and all orders for this visit:    Hypertrophy of prostate without urinary obstruction  -     UROLOGY ADULT REFERRAL  -     tamsulosin (FLOMAX) 0.4 MG capsule; Take 1 capsule (0.4 mg) by mouth daily  -     PSA, screen    Urinary retention  -     UROLOGY ADULT REFERRAL  -     tamsulosin (FLOMAX) 0.4 MG capsule; Take 1 capsule (0.4 mg) by mouth daily  -     PSA, screen    Dysuria  -     UA reflex to Microscopic and Culture  -     PSA, screen    Encounter for screening for malignant neoplasm of prostate   -     PSA, screen    Malignant neoplasm of esophagus, unspecified location (H)  Comment: Treated with chemotherapy and radiation. Being followed yearly by oncology.     Primary cardiomyopathy (H)  Comment: Stable. No issues. Cardiology no longer following given stability / lack of symptoms.     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 Recently started on Trelegy Ellipta. Stable.          BMI:   Estimated body mass index is 25.1 kg/m  as calculated from the following:    Height as of 8/15/19: 1.753 m (5' 9\").    Weight as of this encounter: 77.1 kg (170 lb).       See Patient Instructions    Return in about 4 weeks (around 12/30/2019) for persistent or worsening symptoms.    Akhil Beltran NP  [facility] [place]      "

## 2019-12-03 ENCOUNTER — TELEPHONE (OUTPATIENT)
Dept: FAMILY MEDICINE | Facility: CLINIC | Age: 84
End: 2019-12-03

## 2019-12-03 DIAGNOSIS — N40.0 HYPERTROPHY OF PROSTATE WITHOUT URINARY OBSTRUCTION: Primary | ICD-10-CM

## 2019-12-03 DIAGNOSIS — R33.9 URINARY RETENTION: ICD-10-CM

## 2019-12-03 LAB — PSA SERPL-ACNC: 0.45 UG/L (ref 0–4)

## 2019-12-03 RX ORDER — DOXAZOSIN 1 MG/1
1-4 TABLET ORAL AT BEDTIME
Qty: 90 TABLET | Refills: 1 | Status: SHIPPED | OUTPATIENT
Start: 2019-12-03 | End: 2020-01-01

## 2019-12-03 NOTE — TELEPHONE ENCOUNTER
Called patient and informed him of NP response below. Patient verbalized understanding and agrees with plan.     Domi Rivas RN, BSN  [facility] [place]

## 2019-12-03 NOTE — TELEPHONE ENCOUNTER
Patient states that he has a swallowing problem and cannot swallow Flomax capsule. Pharmacist is checking with  if it is OK to open the capsule.     Patient is asking if another medication available in tablet form that could be crushed.     Please advise. Triage to call the patient back.  Ashley Mark RN

## 2019-12-03 NOTE — TELEPHONE ENCOUNTER
There is only one option from this class that is safe to crush. Please do not open the Flomax capsules. I have ordered doxazosin (Cardura). Please start with 1 mg every night for a week. If it isn't fully effective, you may increase to 2 mg every night for a week. If this isn't effective, may increase to 3 mg nightly. May continue until he hits 4 mg nightly. Please watch for evidence of low blood pressure (dizziness, feeling faint, etc.). Please let me know if there are issues.     Thanks.     Gilles

## 2019-12-05 ENCOUNTER — TELEPHONE (OUTPATIENT)
Dept: FAMILY MEDICINE | Facility: CLINIC | Age: 84
End: 2019-12-05

## 2019-12-05 DIAGNOSIS — J44.89 CHRONIC OBSTRUCTIVE AIRWAY DISEASE WITH ASTHMA (H): Primary | ICD-10-CM

## 2019-12-05 NOTE — TELEPHONE ENCOUNTER
Patient reports that [facility] changed his inhaler from Trelegy to Breo due to patient's urinary problem.  Medication list updated.   Ashley Mark RN

## 2020-01-01 ENCOUNTER — TELEPHONE (OUTPATIENT)
Dept: ONCOLOGY | Facility: CLINIC | Age: 85
End: 2020-01-01

## 2020-01-01 ENCOUNTER — HOSPITAL ENCOUNTER (OUTPATIENT)
Facility: CLINIC | Age: 85
Setting detail: SPECIMEN
Discharge: HOME | End: 2020-08-10
Attending: INTERNAL MEDICINE | Admitting: NURSE PRACTITIONER
Payer: COMMERCIAL

## 2020-01-01 ENCOUNTER — ONCOLOGY VISIT (OUTPATIENT)
Dept: ONCOLOGY | Facility: CLINIC | Age: 85
End: 2020-01-01
Attending: INTERNAL MEDICINE
Payer: COMMERCIAL

## 2020-01-01 ENCOUNTER — ONCOLOGY VISIT (OUTPATIENT)
Dept: ONCOLOGY | Facility: CLINIC | Age: 85
End: 2020-01-01
Attending: NURSE PRACTITIONER
Payer: COMMERCIAL

## 2020-01-01 ENCOUNTER — APPOINTMENT (OUTPATIENT)
Dept: CT IMAGING | Facility: CLINIC | Age: 85
End: 2020-01-01
Attending: EMERGENCY MEDICINE
Payer: COMMERCIAL

## 2020-01-01 ENCOUNTER — INFUSION THERAPY VISIT (OUTPATIENT)
Dept: INFUSION THERAPY | Facility: CLINIC | Age: 85
End: 2020-01-01
Attending: NURSE PRACTITIONER
Payer: COMMERCIAL

## 2020-01-01 ENCOUNTER — HOSPITAL ENCOUNTER (OUTPATIENT)
Facility: CLINIC | Age: 85
Setting detail: SPECIMEN
Discharge: HOME | End: 2020-08-17
Attending: FAMILY MEDICINE | Admitting: NURSE PRACTITIONER
Payer: COMMERCIAL

## 2020-01-01 ENCOUNTER — INFUSION THERAPY VISIT (OUTPATIENT)
Dept: INFUSION THERAPY | Facility: CLINIC | Age: 85
End: 2020-01-01
Attending: INTERNAL MEDICINE
Payer: COMMERCIAL

## 2020-01-01 ENCOUNTER — ANESTHESIA (OUTPATIENT)
Dept: GASTROENTEROLOGY | Facility: CLINIC | Age: 85
End: 2020-01-01
Payer: COMMERCIAL

## 2020-01-01 ENCOUNTER — PATIENT OUTREACH (OUTPATIENT)
Dept: ONCOLOGY | Facility: CLINIC | Age: 85
End: 2020-01-01

## 2020-01-01 ENCOUNTER — HOSPITAL ENCOUNTER (OUTPATIENT)
Facility: CLINIC | Age: 85
Setting detail: SPECIMEN
Discharge: HOME | End: 2020-09-28
Attending: NURSE PRACTITIONER | Admitting: NURSE PRACTITIONER
Payer: COMMERCIAL

## 2020-01-01 ENCOUNTER — APPOINTMENT (OUTPATIENT)
Dept: CT IMAGING | Facility: CLINIC | Age: 85
DRG: 386 | End: 2020-01-01
Attending: NURSE PRACTITIONER
Payer: COMMERCIAL

## 2020-01-01 ENCOUNTER — PATIENT OUTREACH (OUTPATIENT)
Dept: INFUSION THERAPY | Facility: CLINIC | Age: 85
End: 2020-01-01

## 2020-01-01 ENCOUNTER — HOSPITAL ENCOUNTER (OUTPATIENT)
Facility: CLINIC | Age: 85
Setting detail: SPECIMEN
Discharge: HOME | End: 2020-11-10
Attending: NURSE PRACTITIONER | Admitting: NURSE PRACTITIONER
Payer: COMMERCIAL

## 2020-01-01 ENCOUNTER — ANESTHESIA EVENT (OUTPATIENT)
Dept: GASTROENTEROLOGY | Facility: CLINIC | Age: 85
End: 2020-01-01
Payer: COMMERCIAL

## 2020-01-01 ENCOUNTER — NURSE TRIAGE (OUTPATIENT)
Dept: NURSING | Facility: CLINIC | Age: 85
End: 2020-01-01

## 2020-01-01 ENCOUNTER — OFFICE VISIT (OUTPATIENT)
Dept: UROLOGY | Facility: CLINIC | Age: 85
End: 2020-01-01
Attending: FAMILY MEDICINE
Payer: COMMERCIAL

## 2020-01-01 ENCOUNTER — OFFICE VISIT (OUTPATIENT)
Dept: FAMILY MEDICINE | Facility: CLINIC | Age: 85
End: 2020-01-01
Payer: COMMERCIAL

## 2020-01-01 ENCOUNTER — HOSPITAL ENCOUNTER (OUTPATIENT)
Facility: CLINIC | Age: 85
Setting detail: OBSERVATION
Discharge: HOME | End: 2020-04-27
Attending: EMERGENCY MEDICINE | Admitting: HOSPITALIST
Payer: COMMERCIAL

## 2020-01-01 ENCOUNTER — HOSPITAL ENCOUNTER (OUTPATIENT)
Facility: CLINIC | Age: 85
Discharge: HOME | End: 2020-12-15
Admitting: RADIOLOGY
Payer: COMMERCIAL

## 2020-01-01 ENCOUNTER — NURSE TRIAGE (OUTPATIENT)
Dept: FAMILY MEDICINE | Facility: CLINIC | Age: 85
End: 2020-01-01

## 2020-01-01 ENCOUNTER — HOSPITAL ENCOUNTER (OUTPATIENT)
Dept: ULTRASOUND IMAGING | Facility: CLINIC | Age: 85
End: 2020-11-24
Attending: NURSE PRACTITIONER
Payer: COMMERCIAL

## 2020-01-01 ENCOUNTER — VIRTUAL VISIT (OUTPATIENT)
Dept: FAMILY MEDICINE | Facility: CLINIC | Age: 85
End: 2020-01-01
Payer: COMMERCIAL

## 2020-01-01 ENCOUNTER — TELEPHONE (OUTPATIENT)
Dept: FAMILY MEDICINE | Facility: CLINIC | Age: 85
End: 2020-01-01

## 2020-01-01 ENCOUNTER — HOSPITAL ENCOUNTER (OUTPATIENT)
Facility: CLINIC | Age: 85
Setting detail: SPECIMEN
Discharge: HOME | End: 2020-11-23
Attending: INTERNAL MEDICINE | Admitting: NURSE PRACTITIONER
Payer: COMMERCIAL

## 2020-01-01 ENCOUNTER — HOSPITAL ENCOUNTER (OUTPATIENT)
Facility: CLINIC | Age: 85
Setting detail: SPECIMEN
Discharge: HOME | End: 2020-04-29
Attending: INTERNAL MEDICINE | Admitting: INTERNAL MEDICINE
Payer: COMMERCIAL

## 2020-01-01 ENCOUNTER — APPOINTMENT (OUTPATIENT)
Dept: GENERAL RADIOLOGY | Facility: CLINIC | Age: 85
End: 2020-01-01
Attending: EMERGENCY MEDICINE
Payer: COMMERCIAL

## 2020-01-01 ENCOUNTER — APPOINTMENT (OUTPATIENT)
Dept: CARDIOLOGY | Facility: CLINIC | Age: 85
DRG: 187 | End: 2020-01-01
Attending: INTERNAL MEDICINE
Payer: COMMERCIAL

## 2020-01-01 ENCOUNTER — HOSPITAL ENCOUNTER (OUTPATIENT)
Facility: CLINIC | Age: 85
Setting detail: SPECIMEN
Discharge: HOME | End: 2020-08-04
Attending: NURSE PRACTITIONER | Admitting: NURSE PRACTITIONER
Payer: COMMERCIAL

## 2020-01-01 ENCOUNTER — PRE VISIT (OUTPATIENT)
Dept: ONCOLOGY | Facility: CLINIC | Age: 85
End: 2020-01-01

## 2020-01-01 ENCOUNTER — HOSPITAL ENCOUNTER (OUTPATIENT)
Facility: CLINIC | Age: 85
Setting detail: SPECIMEN
Discharge: HOME | End: 2020-07-09
Attending: INTERNAL MEDICINE | Admitting: NURSE PRACTITIONER
Payer: COMMERCIAL

## 2020-01-01 ENCOUNTER — HOSPITAL ENCOUNTER (OUTPATIENT)
Facility: CLINIC | Age: 85
Setting detail: SPECIMEN
Discharge: HOME | End: 2020-05-04
Attending: INTERNAL MEDICINE | Admitting: INTERNAL MEDICINE
Payer: COMMERCIAL

## 2020-01-01 ENCOUNTER — TELEPHONE (OUTPATIENT)
Dept: MEDSURG UNIT | Facility: CLINIC | Age: 85
End: 2020-01-01

## 2020-01-01 ENCOUNTER — HOSPITAL ENCOUNTER (OUTPATIENT)
Facility: CLINIC | Age: 85
Setting detail: OBSERVATION
Discharge: HOME | End: 2020-02-23
Attending: EMERGENCY MEDICINE | Admitting: HOSPITALIST
Payer: COMMERCIAL

## 2020-01-01 ENCOUNTER — HOSPITAL ENCOUNTER (OUTPATIENT)
Facility: CLINIC | Age: 85
Setting detail: SPECIMEN
Discharge: HOME | End: 2020-09-14
Attending: INTERNAL MEDICINE | Admitting: INTERNAL MEDICINE
Payer: COMMERCIAL

## 2020-01-01 ENCOUNTER — HOSPITAL ENCOUNTER (OUTPATIENT)
Facility: CLINIC | Age: 85
Setting detail: SPECIMEN
Discharge: HOME | End: 2020-11-06
Attending: INTERNAL MEDICINE | Admitting: NURSE PRACTITIONER
Payer: COMMERCIAL

## 2020-01-01 ENCOUNTER — HOSPITAL ENCOUNTER (EMERGENCY)
Facility: CLINIC | Age: 85
Discharge: HOME | End: 2020-10-26
Attending: EMERGENCY MEDICINE | Admitting: EMERGENCY MEDICINE
Payer: COMMERCIAL

## 2020-01-01 ENCOUNTER — APPOINTMENT (OUTPATIENT)
Dept: CT IMAGING | Facility: CLINIC | Age: 85
DRG: 386 | End: 2020-01-01
Attending: EMERGENCY MEDICINE
Payer: COMMERCIAL

## 2020-01-01 ENCOUNTER — HOSPITAL ENCOUNTER (OUTPATIENT)
Facility: CLINIC | Age: 85
Setting detail: SPECIMEN
Discharge: HOME | End: 2020-11-27
Attending: INTERNAL MEDICINE | Admitting: INTERNAL MEDICINE
Payer: COMMERCIAL

## 2020-01-01 ENCOUNTER — HOSPITAL ENCOUNTER (OUTPATIENT)
Facility: CLINIC | Age: 85
Discharge: HOME | End: 2020-04-16
Attending: PATHOLOGY | Admitting: PATHOLOGY
Payer: COMMERCIAL

## 2020-01-01 ENCOUNTER — HOSPITAL ENCOUNTER (OUTPATIENT)
Facility: CLINIC | Age: 85
Discharge: HOME | End: 2020-02-19
Attending: INTERNAL MEDICINE | Admitting: INTERNAL MEDICINE
Payer: COMMERCIAL

## 2020-01-01 ENCOUNTER — TELEPHONE (OUTPATIENT)
Dept: LAB | Facility: CLINIC | Age: 85
End: 2020-01-01

## 2020-01-01 ENCOUNTER — HOSPITAL ENCOUNTER (OUTPATIENT)
Facility: CLINIC | Age: 85
Setting detail: SPECIMEN
Discharge: HOME | End: 2020-10-05
Attending: NURSE PRACTITIONER | Admitting: NURSE PRACTITIONER
Payer: COMMERCIAL

## 2020-01-01 ENCOUNTER — HOSPITAL ENCOUNTER (INPATIENT)
Facility: CLINIC | Age: 85
LOS: 2 days | DRG: 386 | End: 2020-12-22
Attending: EMERGENCY MEDICINE | Admitting: INTERNAL MEDICINE
Payer: COMMERCIAL

## 2020-01-01 ENCOUNTER — APPOINTMENT (OUTPATIENT)
Dept: CT IMAGING | Facility: CLINIC | Age: 85
DRG: 187 | End: 2020-01-01
Attending: EMERGENCY MEDICINE
Payer: COMMERCIAL

## 2020-01-01 ENCOUNTER — HOSPITAL ENCOUNTER (OUTPATIENT)
Facility: CLINIC | Age: 85
Setting detail: SPECIMEN
Discharge: HOME | End: 2020-08-24
Attending: INTERNAL MEDICINE | Admitting: INTERNAL MEDICINE
Payer: COMMERCIAL

## 2020-01-01 ENCOUNTER — HOSPITAL ENCOUNTER (OUTPATIENT)
Facility: CLINIC | Age: 85
Setting detail: SPECIMEN
Discharge: HOME | End: 2020-11-04
Attending: NURSE PRACTITIONER | Admitting: NURSE PRACTITIONER
Payer: COMMERCIAL

## 2020-01-01 ENCOUNTER — HOSPITAL ENCOUNTER (OUTPATIENT)
Facility: CLINIC | Age: 85
Setting detail: SPECIMEN
Discharge: HOME | End: 2020-11-02
Attending: NURSE PRACTITIONER | Admitting: INTERNAL MEDICINE
Payer: COMMERCIAL

## 2020-01-01 ENCOUNTER — HOSPITAL ENCOUNTER (OUTPATIENT)
Facility: CLINIC | Age: 85
Setting detail: SPECIMEN
Discharge: HOME | End: 2020-12-15
Attending: NURSE PRACTITIONER | Admitting: NURSE PRACTITIONER
Payer: COMMERCIAL

## 2020-01-01 ENCOUNTER — APPOINTMENT (OUTPATIENT)
Dept: PHYSICAL THERAPY | Facility: CLINIC | Age: 85
End: 2020-01-01
Attending: HOSPITALIST
Payer: COMMERCIAL

## 2020-01-01 ENCOUNTER — INFUSION THERAPY VISIT (OUTPATIENT)
Dept: INFUSION THERAPY | Facility: CLINIC | Age: 85
End: 2020-01-01
Attending: FAMILY MEDICINE
Payer: COMMERCIAL

## 2020-01-01 ENCOUNTER — HOSPITAL ENCOUNTER (OUTPATIENT)
Dept: GENERAL RADIOLOGY | Facility: CLINIC | Age: 85
Discharge: HOME | End: 2020-10-01
Attending: NURSE PRACTITIONER | Admitting: NURSE PRACTITIONER
Payer: COMMERCIAL

## 2020-01-01 ENCOUNTER — HOSPITAL ENCOUNTER (OUTPATIENT)
Facility: CLINIC | Age: 85
Setting detail: SPECIMEN
Discharge: HOME | End: 2020-07-06
Attending: INTERNAL MEDICINE | Admitting: NURSE PRACTITIONER
Payer: COMMERCIAL

## 2020-01-01 ENCOUNTER — HOSPITAL ENCOUNTER (OUTPATIENT)
Facility: CLINIC | Age: 85
Setting detail: SPECIMEN
Discharge: HOME | End: 2020-03-19
Attending: INTERNAL MEDICINE | Admitting: INTERNAL MEDICINE
Payer: COMMERCIAL

## 2020-01-01 ENCOUNTER — ONCOLOGY VISIT (OUTPATIENT)
Dept: ONCOLOGY | Facility: CLINIC | Age: 85
End: 2020-01-01
Attending: FAMILY MEDICINE
Payer: COMMERCIAL

## 2020-01-01 ENCOUNTER — APPOINTMENT (OUTPATIENT)
Dept: GENERAL RADIOLOGY | Facility: CLINIC | Age: 85
DRG: 386 | End: 2020-01-01
Attending: NURSE PRACTITIONER
Payer: COMMERCIAL

## 2020-01-01 ENCOUNTER — APPOINTMENT (OUTPATIENT)
Dept: ULTRASOUND IMAGING | Facility: CLINIC | Age: 85
DRG: 187 | End: 2020-01-01
Attending: INTERNAL MEDICINE
Payer: COMMERCIAL

## 2020-01-01 ENCOUNTER — ONCOLOGY VISIT (OUTPATIENT)
Dept: ONCOLOGY | Facility: CLINIC | Age: 85
End: 2020-01-01
Attending: PHYSICAL MEDICINE & REHABILITATION
Payer: COMMERCIAL

## 2020-01-01 ENCOUNTER — TELEPHONE (OUTPATIENT)
Dept: PHARMACY | Facility: CLINIC | Age: 85
End: 2020-01-01

## 2020-01-01 ENCOUNTER — HOSPITAL ENCOUNTER (EMERGENCY)
Facility: CLINIC | Age: 85
Discharge: HOME | End: 2020-10-31
Attending: EMERGENCY MEDICINE | Admitting: EMERGENCY MEDICINE
Payer: COMMERCIAL

## 2020-01-01 ENCOUNTER — PATIENT OUTREACH (OUTPATIENT)
Dept: CARE COORDINATION | Facility: CLINIC | Age: 85
End: 2020-01-01

## 2020-01-01 ENCOUNTER — HOSPITAL ENCOUNTER (OUTPATIENT)
Facility: CLINIC | Age: 85
Setting detail: SPECIMEN
Discharge: HOME | End: 2020-07-02
Attending: INTERNAL MEDICINE | Admitting: NURSE PRACTITIONER
Payer: COMMERCIAL

## 2020-01-01 ENCOUNTER — VIRTUAL VISIT (OUTPATIENT)
Dept: ONCOLOGY | Facility: CLINIC | Age: 85
End: 2020-01-01
Attending: PSYCHOLOGIST
Payer: COMMERCIAL

## 2020-01-01 ENCOUNTER — HOSPITAL ENCOUNTER (OUTPATIENT)
Facility: CLINIC | Age: 85
Setting detail: SPECIMEN
Discharge: HOME | End: 2020-12-09
Attending: NURSE PRACTITIONER | Admitting: INTERNAL MEDICINE
Payer: COMMERCIAL

## 2020-01-01 ENCOUNTER — HOSPITAL ENCOUNTER (OUTPATIENT)
Facility: CLINIC | Age: 85
Discharge: HOME | End: 2020-11-24
Admitting: PHYSICIAN ASSISTANT
Payer: COMMERCIAL

## 2020-01-01 ENCOUNTER — HOSPITAL ENCOUNTER (OUTPATIENT)
Facility: CLINIC | Age: 85
Setting detail: SPECIMEN
Discharge: HOME | End: 2020-10-12
Attending: NURSE PRACTITIONER | Admitting: NURSE PRACTITIONER
Payer: COMMERCIAL

## 2020-01-01 ENCOUNTER — HOSPITAL ENCOUNTER (OUTPATIENT)
Facility: CLINIC | Age: 85
Setting detail: SPECIMEN
Discharge: HOME | End: 2020-06-29
Attending: NURSE PRACTITIONER | Admitting: INTERNAL MEDICINE
Payer: COMMERCIAL

## 2020-01-01 ENCOUNTER — HOSPITAL ENCOUNTER (OUTPATIENT)
Facility: CLINIC | Age: 85
Setting detail: SPECIMEN
Discharge: HOME | End: 2020-11-05
Attending: INTERNAL MEDICINE | Admitting: NURSE PRACTITIONER
Payer: COMMERCIAL

## 2020-01-01 ENCOUNTER — HOSPITAL ENCOUNTER (INPATIENT)
Facility: CLINIC | Age: 85
LOS: 1 days | Discharge: HOME | DRG: 187 | End: 2020-11-12
Attending: EMERGENCY MEDICINE | Admitting: INTERNAL MEDICINE
Payer: COMMERCIAL

## 2020-01-01 ENCOUNTER — HOSPITAL ENCOUNTER (OUTPATIENT)
Facility: CLINIC | Age: 85
Setting detail: SPECIMEN
Discharge: HOME | End: 2020-10-01
Attending: NURSE PRACTITIONER | Admitting: NURSE PRACTITIONER
Payer: COMMERCIAL

## 2020-01-01 ENCOUNTER — HOSPITAL ENCOUNTER (OUTPATIENT)
Dept: ULTRASOUND IMAGING | Facility: CLINIC | Age: 85
End: 2020-12-15
Attending: NURSE PRACTITIONER
Payer: COMMERCIAL

## 2020-01-01 ENCOUNTER — HOSPITAL ENCOUNTER (OUTPATIENT)
Facility: CLINIC | Age: 85
Setting detail: SPECIMEN
Discharge: HOME | End: 2020-10-19
Admitting: INTERNAL MEDICINE
Payer: COMMERCIAL

## 2020-01-01 ENCOUNTER — DOCUMENTATION ONLY (OUTPATIENT)
Dept: ONCOLOGY | Facility: CLINIC | Age: 85
End: 2020-01-01

## 2020-01-01 ENCOUNTER — APPOINTMENT (OUTPATIENT)
Dept: GENERAL RADIOLOGY | Facility: CLINIC | Age: 85
DRG: 187 | End: 2020-01-01
Attending: INTERNAL MEDICINE
Payer: COMMERCIAL

## 2020-01-01 ENCOUNTER — HOSPITAL ENCOUNTER (OUTPATIENT)
Facility: CLINIC | Age: 85
Setting detail: SPECIMEN
Discharge: HOME | End: 2020-07-27
Attending: NURSE PRACTITIONER | Admitting: INTERNAL MEDICINE
Payer: COMMERCIAL

## 2020-01-01 ENCOUNTER — HOSPITAL ENCOUNTER (EMERGENCY)
Facility: CLINIC | Age: 85
Discharge: HOME | End: 2020-02-29
Attending: EMERGENCY MEDICINE | Admitting: EMERGENCY MEDICINE
Payer: COMMERCIAL

## 2020-01-01 VITALS
DIASTOLIC BLOOD PRESSURE: 61 MMHG | HEART RATE: 80 BPM | SYSTOLIC BLOOD PRESSURE: 116 MMHG | SYSTOLIC BLOOD PRESSURE: 126 MMHG | OXYGEN SATURATION: 99 % | TEMPERATURE: 97.8 F | RESPIRATION RATE: 20 BRPM | TEMPERATURE: 97.5 F | BODY MASS INDEX: 22.8 KG/M2 | HEART RATE: 82 BPM | RESPIRATION RATE: 16 BRPM | WEIGHT: 154.4 LBS | DIASTOLIC BLOOD PRESSURE: 64 MMHG | OXYGEN SATURATION: 98 %

## 2020-01-01 VITALS
RESPIRATION RATE: 16 BRPM | HEIGHT: 69 IN | WEIGHT: 152.8 LBS | DIASTOLIC BLOOD PRESSURE: 68 MMHG | SYSTOLIC BLOOD PRESSURE: 121 MMHG | BODY MASS INDEX: 22.63 KG/M2 | OXYGEN SATURATION: 97 % | HEART RATE: 82 BPM | TEMPERATURE: 97.6 F

## 2020-01-01 VITALS
DIASTOLIC BLOOD PRESSURE: 69 MMHG | SYSTOLIC BLOOD PRESSURE: 131 MMHG | BODY MASS INDEX: 24 KG/M2 | WEIGHT: 162.6 LBS | HEART RATE: 90 BPM | OXYGEN SATURATION: 96 % | TEMPERATURE: 97.3 F | RESPIRATION RATE: 20 BRPM

## 2020-01-01 VITALS
SYSTOLIC BLOOD PRESSURE: 154 MMHG | OXYGEN SATURATION: 97 % | BODY MASS INDEX: 24.08 KG/M2 | TEMPERATURE: 97.7 F | HEART RATE: 90 BPM | DIASTOLIC BLOOD PRESSURE: 79 MMHG | RESPIRATION RATE: 18 BRPM | HEIGHT: 69 IN | WEIGHT: 162.6 LBS

## 2020-01-01 VITALS
SYSTOLIC BLOOD PRESSURE: 137 MMHG | SYSTOLIC BLOOD PRESSURE: 137 MMHG | RESPIRATION RATE: 18 BRPM | OXYGEN SATURATION: 99 % | WEIGHT: 154.6 LBS | HEIGHT: 69 IN | BODY MASS INDEX: 22.66 KG/M2 | TEMPERATURE: 97.5 F | HEART RATE: 81 BPM | BODY MASS INDEX: 22.83 KG/M2 | WEIGHT: 153 LBS | OXYGEN SATURATION: 99 % | TEMPERATURE: 98 F | DIASTOLIC BLOOD PRESSURE: 78 MMHG | DIASTOLIC BLOOD PRESSURE: 69 MMHG | RESPIRATION RATE: 16 BRPM

## 2020-01-01 VITALS
HEART RATE: 82 BPM | DIASTOLIC BLOOD PRESSURE: 65 MMHG | RESPIRATION RATE: 16 BRPM | TEMPERATURE: 99.4 F | SYSTOLIC BLOOD PRESSURE: 129 MMHG

## 2020-01-01 VITALS
WEIGHT: 155.6 LBS | SYSTOLIC BLOOD PRESSURE: 117 MMHG | HEART RATE: 83 BPM | BODY MASS INDEX: 23.05 KG/M2 | DIASTOLIC BLOOD PRESSURE: 64 MMHG | OXYGEN SATURATION: 98 % | TEMPERATURE: 97.6 F | RESPIRATION RATE: 16 BRPM | HEIGHT: 69 IN

## 2020-01-01 VITALS
HEART RATE: 75 BPM | TEMPERATURE: 97.3 F | OXYGEN SATURATION: 96 % | HEIGHT: 69 IN | WEIGHT: 171 LBS | SYSTOLIC BLOOD PRESSURE: 128 MMHG | BODY MASS INDEX: 25.33 KG/M2 | DIASTOLIC BLOOD PRESSURE: 78 MMHG

## 2020-01-01 VITALS
SYSTOLIC BLOOD PRESSURE: 147 MMHG | HEIGHT: 69 IN | OXYGEN SATURATION: 98 % | BODY MASS INDEX: 23.13 KG/M2 | BODY MASS INDEX: 23.19 KG/M2 | HEART RATE: 83 BPM | RESPIRATION RATE: 20 BRPM | TEMPERATURE: 97.7 F | SYSTOLIC BLOOD PRESSURE: 128 MMHG | OXYGEN SATURATION: 97 % | HEART RATE: 76 BPM | RESPIRATION RATE: 16 BRPM | DIASTOLIC BLOOD PRESSURE: 81 MMHG | TEMPERATURE: 97.9 F | DIASTOLIC BLOOD PRESSURE: 77 MMHG | WEIGHT: 156.6 LBS | WEIGHT: 156.6 LBS

## 2020-01-01 VITALS
TEMPERATURE: 98.2 F | SYSTOLIC BLOOD PRESSURE: 135 MMHG | DIASTOLIC BLOOD PRESSURE: 64 MMHG | RESPIRATION RATE: 16 BRPM | OXYGEN SATURATION: 98 % | BODY MASS INDEX: 23.12 KG/M2 | HEART RATE: 86 BPM | HEIGHT: 69 IN | WEIGHT: 156.1 LBS

## 2020-01-01 VITALS
TEMPERATURE: 98.2 F | WEIGHT: 155 LBS | HEART RATE: 81 BPM | OXYGEN SATURATION: 98 % | BODY MASS INDEX: 22.96 KG/M2 | DIASTOLIC BLOOD PRESSURE: 80 MMHG | RESPIRATION RATE: 16 BRPM | SYSTOLIC BLOOD PRESSURE: 156 MMHG | HEIGHT: 69 IN

## 2020-01-01 VITALS
OXYGEN SATURATION: 95 % | RESPIRATION RATE: 20 BRPM | DIASTOLIC BLOOD PRESSURE: 71 MMHG | SYSTOLIC BLOOD PRESSURE: 117 MMHG | HEIGHT: 72 IN | HEART RATE: 73 BPM | BODY MASS INDEX: 21.26 KG/M2 | WEIGHT: 157 LBS | TEMPERATURE: 97.5 F

## 2020-01-01 VITALS
RESPIRATION RATE: 16 BRPM | SYSTOLIC BLOOD PRESSURE: 123 MMHG | HEIGHT: 69 IN | HEART RATE: 84 BPM | OXYGEN SATURATION: 98 % | WEIGHT: 152.6 LBS | TEMPERATURE: 98.4 F | BODY MASS INDEX: 22.6 KG/M2 | DIASTOLIC BLOOD PRESSURE: 61 MMHG

## 2020-01-01 VITALS
BODY MASS INDEX: 21.02 KG/M2 | OXYGEN SATURATION: 99 % | WEIGHT: 155 LBS | HEART RATE: 95 BPM | TEMPERATURE: 96.5 F | DIASTOLIC BLOOD PRESSURE: 62 MMHG | SYSTOLIC BLOOD PRESSURE: 118 MMHG

## 2020-01-01 VITALS
HEIGHT: 69 IN | RESPIRATION RATE: 16 BRPM | BODY MASS INDEX: 22.1 KG/M2 | WEIGHT: 149.2 LBS | DIASTOLIC BLOOD PRESSURE: 65 MMHG | HEART RATE: 82 BPM | TEMPERATURE: 97.5 F | SYSTOLIC BLOOD PRESSURE: 112 MMHG | OXYGEN SATURATION: 98 %

## 2020-01-01 VITALS
HEART RATE: 89 BPM | DIASTOLIC BLOOD PRESSURE: 73 MMHG | RESPIRATION RATE: 20 BRPM | TEMPERATURE: 98.3 F | BODY MASS INDEX: 24.21 KG/M2 | SYSTOLIC BLOOD PRESSURE: 131 MMHG | WEIGHT: 164 LBS

## 2020-01-01 VITALS
TEMPERATURE: 98.3 F | WEIGHT: 170 LBS | TEMPERATURE: 97.8 F | SYSTOLIC BLOOD PRESSURE: 151 MMHG | RESPIRATION RATE: 18 BRPM | RESPIRATION RATE: 11 BRPM | SYSTOLIC BLOOD PRESSURE: 112 MMHG | HEART RATE: 76 BPM | DIASTOLIC BLOOD PRESSURE: 62 MMHG | HEART RATE: 88 BPM | OXYGEN SATURATION: 98 % | HEIGHT: 69 IN | DIASTOLIC BLOOD PRESSURE: 71 MMHG | BODY MASS INDEX: 25.18 KG/M2

## 2020-01-01 VITALS
DIASTOLIC BLOOD PRESSURE: 68 MMHG | TEMPERATURE: 97.4 F | HEART RATE: 93 BPM | WEIGHT: 165 LBS | OXYGEN SATURATION: 96 % | WEIGHT: 165 LBS | HEART RATE: 82 BPM | RESPIRATION RATE: 18 BRPM | OXYGEN SATURATION: 98 % | SYSTOLIC BLOOD PRESSURE: 128 MMHG | DIASTOLIC BLOOD PRESSURE: 64 MMHG | RESPIRATION RATE: 16 BRPM | SYSTOLIC BLOOD PRESSURE: 123 MMHG | HEIGHT: 72 IN | HEIGHT: 69 IN | BODY MASS INDEX: 22.35 KG/M2 | TEMPERATURE: 97.7 F | BODY MASS INDEX: 24.44 KG/M2

## 2020-01-01 VITALS
DIASTOLIC BLOOD PRESSURE: 65 MMHG | SYSTOLIC BLOOD PRESSURE: 111 MMHG | WEIGHT: 149.2 LBS | RESPIRATION RATE: 16 BRPM | HEIGHT: 72 IN | OXYGEN SATURATION: 100 % | BODY MASS INDEX: 20.21 KG/M2 | TEMPERATURE: 97.6 F | HEART RATE: 85 BPM

## 2020-01-01 VITALS
WEIGHT: 156 LBS | SYSTOLIC BLOOD PRESSURE: 127 MMHG | HEART RATE: 81 BPM | BODY MASS INDEX: 23.11 KG/M2 | TEMPERATURE: 97.8 F | HEIGHT: 69 IN | DIASTOLIC BLOOD PRESSURE: 58 MMHG | RESPIRATION RATE: 16 BRPM

## 2020-01-01 VITALS
OXYGEN SATURATION: 98 % | HEART RATE: 88 BPM | WEIGHT: 162 LBS | DIASTOLIC BLOOD PRESSURE: 70 MMHG | SYSTOLIC BLOOD PRESSURE: 130 MMHG | TEMPERATURE: 98.3 F | BODY MASS INDEX: 23.92 KG/M2

## 2020-01-01 VITALS
TEMPERATURE: 95.2 F | WEIGHT: 150 LBS | BODY MASS INDEX: 22.22 KG/M2 | HEART RATE: 73 BPM | HEIGHT: 69 IN | RESPIRATION RATE: 18 BRPM | DIASTOLIC BLOOD PRESSURE: 59 MMHG | OXYGEN SATURATION: 100 % | SYSTOLIC BLOOD PRESSURE: 122 MMHG

## 2020-01-01 VITALS
HEART RATE: 80 BPM | DIASTOLIC BLOOD PRESSURE: 67 MMHG | OXYGEN SATURATION: 95 % | SYSTOLIC BLOOD PRESSURE: 132 MMHG | TEMPERATURE: 97.7 F

## 2020-01-01 VITALS
RESPIRATION RATE: 18 BRPM | HEIGHT: 72 IN | OXYGEN SATURATION: 99 % | HEART RATE: 81 BPM | SYSTOLIC BLOOD PRESSURE: 119 MMHG | DIASTOLIC BLOOD PRESSURE: 68 MMHG | WEIGHT: 150 LBS | BODY MASS INDEX: 20.32 KG/M2 | TEMPERATURE: 97.7 F

## 2020-01-01 VITALS
WEIGHT: 152 LBS | RESPIRATION RATE: 16 BRPM | SYSTOLIC BLOOD PRESSURE: 135 MMHG | HEART RATE: 100 BPM | DIASTOLIC BLOOD PRESSURE: 71 MMHG | HEIGHT: 69 IN | TEMPERATURE: 97.9 F | OXYGEN SATURATION: 98 % | BODY MASS INDEX: 22.51 KG/M2

## 2020-01-01 VITALS
BODY MASS INDEX: 23.7 KG/M2 | OXYGEN SATURATION: 99 % | RESPIRATION RATE: 18 BRPM | SYSTOLIC BLOOD PRESSURE: 136 MMHG | DIASTOLIC BLOOD PRESSURE: 80 MMHG | HEIGHT: 69 IN | WEIGHT: 160 LBS | TEMPERATURE: 98 F | HEART RATE: 94 BPM

## 2020-01-01 VITALS
WEIGHT: 170 LBS | BODY MASS INDEX: 25.18 KG/M2 | DIASTOLIC BLOOD PRESSURE: 70 MMHG | OXYGEN SATURATION: 100 % | SYSTOLIC BLOOD PRESSURE: 130 MMHG | HEART RATE: 88 BPM | HEIGHT: 69 IN | TEMPERATURE: 98 F

## 2020-01-01 VITALS
HEIGHT: 69 IN | SYSTOLIC BLOOD PRESSURE: 135 MMHG | OXYGEN SATURATION: 86 % | HEART RATE: 55 BPM | DIASTOLIC BLOOD PRESSURE: 98 MMHG | TEMPERATURE: 97.1 F | WEIGHT: 151.24 LBS | BODY MASS INDEX: 22.4 KG/M2

## 2020-01-01 VITALS
SYSTOLIC BLOOD PRESSURE: 116 MMHG | RESPIRATION RATE: 18 BRPM | TEMPERATURE: 97.6 F | DIASTOLIC BLOOD PRESSURE: 67 MMHG | OXYGEN SATURATION: 99 % | HEART RATE: 80 BPM

## 2020-01-01 VITALS
RESPIRATION RATE: 16 BRPM | DIASTOLIC BLOOD PRESSURE: 74 MMHG | BODY MASS INDEX: 22.93 KG/M2 | HEIGHT: 69 IN | WEIGHT: 154.8 LBS | HEART RATE: 80 BPM | SYSTOLIC BLOOD PRESSURE: 138 MMHG | TEMPERATURE: 97.8 F | OXYGEN SATURATION: 98 %

## 2020-01-01 VITALS
SYSTOLIC BLOOD PRESSURE: 152 MMHG | RESPIRATION RATE: 16 BRPM | HEART RATE: 86 BPM | TEMPERATURE: 97.9 F | DIASTOLIC BLOOD PRESSURE: 63 MMHG

## 2020-01-01 VITALS
HEART RATE: 79 BPM | TEMPERATURE: 97.9 F | HEIGHT: 69 IN | RESPIRATION RATE: 18 BRPM | WEIGHT: 154.2 LBS | SYSTOLIC BLOOD PRESSURE: 130 MMHG | DIASTOLIC BLOOD PRESSURE: 71 MMHG | BODY MASS INDEX: 22.84 KG/M2

## 2020-01-01 VITALS
WEIGHT: 164 LBS | SYSTOLIC BLOOD PRESSURE: 158 MMHG | HEIGHT: 69 IN | TEMPERATURE: 97.1 F | DIASTOLIC BLOOD PRESSURE: 72 MMHG | HEART RATE: 80 BPM | RESPIRATION RATE: 16 BRPM | OXYGEN SATURATION: 100 % | BODY MASS INDEX: 24.29 KG/M2

## 2020-01-01 VITALS
HEART RATE: 92 BPM | SYSTOLIC BLOOD PRESSURE: 149 MMHG | OXYGEN SATURATION: 99 % | OXYGEN SATURATION: 96 % | TEMPERATURE: 97.4 F | WEIGHT: 154 LBS | RESPIRATION RATE: 18 BRPM | DIASTOLIC BLOOD PRESSURE: 74 MMHG | TEMPERATURE: 97.1 F | SYSTOLIC BLOOD PRESSURE: 131 MMHG | DIASTOLIC BLOOD PRESSURE: 63 MMHG | BODY MASS INDEX: 22.74 KG/M2 | RESPIRATION RATE: 16 BRPM | HEART RATE: 74 BPM

## 2020-01-01 VITALS
HEIGHT: 69 IN | SYSTOLIC BLOOD PRESSURE: 129 MMHG | BODY MASS INDEX: 22.87 KG/M2 | RESPIRATION RATE: 16 BRPM | WEIGHT: 154.4 LBS | TEMPERATURE: 98.7 F | DIASTOLIC BLOOD PRESSURE: 73 MMHG | HEART RATE: 83 BPM | OXYGEN SATURATION: 98 %

## 2020-01-01 VITALS
TEMPERATURE: 97.6 F | RESPIRATION RATE: 16 BRPM | SYSTOLIC BLOOD PRESSURE: 142 MMHG | HEIGHT: 69 IN | HEART RATE: 84 BPM | DIASTOLIC BLOOD PRESSURE: 67 MMHG | BODY MASS INDEX: 22.81 KG/M2 | WEIGHT: 154 LBS | OXYGEN SATURATION: 98 %

## 2020-01-01 VITALS
HEART RATE: 100 BPM | DIASTOLIC BLOOD PRESSURE: 67 MMHG | OXYGEN SATURATION: 98 % | DIASTOLIC BLOOD PRESSURE: 76 MMHG | SYSTOLIC BLOOD PRESSURE: 127 MMHG | SYSTOLIC BLOOD PRESSURE: 133 MMHG | RESPIRATION RATE: 16 BRPM | RESPIRATION RATE: 18 BRPM | BODY MASS INDEX: 22.96 KG/M2 | OXYGEN SATURATION: 95 % | TEMPERATURE: 97.9 F | BODY MASS INDEX: 22.53 KG/M2 | TEMPERATURE: 97.9 F | WEIGHT: 166.1 LBS | WEIGHT: 155 LBS | HEIGHT: 69 IN | HEART RATE: 71 BPM

## 2020-01-01 VITALS
OXYGEN SATURATION: 99 % | RESPIRATION RATE: 16 BRPM | SYSTOLIC BLOOD PRESSURE: 132 MMHG | DIASTOLIC BLOOD PRESSURE: 70 MMHG | WEIGHT: 155 LBS | BODY MASS INDEX: 22.96 KG/M2 | TEMPERATURE: 97.7 F | HEART RATE: 87 BPM | HEIGHT: 69 IN

## 2020-01-01 VITALS
BODY MASS INDEX: 24.79 KG/M2 | HEIGHT: 69 IN | DIASTOLIC BLOOD PRESSURE: 63 MMHG | HEART RATE: 80 BPM | SYSTOLIC BLOOD PRESSURE: 119 MMHG | WEIGHT: 167.4 LBS | RESPIRATION RATE: 10 BRPM | TEMPERATURE: 95.3 F | OXYGEN SATURATION: 99 %

## 2020-01-01 VITALS
BODY MASS INDEX: 22.8 KG/M2 | OXYGEN SATURATION: 97 % | TEMPERATURE: 98.6 F | DIASTOLIC BLOOD PRESSURE: 63 MMHG | HEART RATE: 87 BPM | RESPIRATION RATE: 20 BRPM | SYSTOLIC BLOOD PRESSURE: 127 MMHG | WEIGHT: 154.4 LBS

## 2020-01-01 VITALS
BODY MASS INDEX: 22.69 KG/M2 | HEART RATE: 79 BPM | RESPIRATION RATE: 16 BRPM | HEIGHT: 69 IN | WEIGHT: 153.2 LBS | OXYGEN SATURATION: 99 % | TEMPERATURE: 98 F | DIASTOLIC BLOOD PRESSURE: 70 MMHG | SYSTOLIC BLOOD PRESSURE: 116 MMHG

## 2020-01-01 VITALS
RESPIRATION RATE: 16 BRPM | TEMPERATURE: 98 F | DIASTOLIC BLOOD PRESSURE: 67 MMHG | SYSTOLIC BLOOD PRESSURE: 113 MMHG | HEART RATE: 98 BPM | OXYGEN SATURATION: 93 %

## 2020-01-01 VITALS
BODY MASS INDEX: 23.04 KG/M2 | DIASTOLIC BLOOD PRESSURE: 70 MMHG | RESPIRATION RATE: 20 BRPM | WEIGHT: 156 LBS | TEMPERATURE: 97.8 F | HEART RATE: 98 BPM | SYSTOLIC BLOOD PRESSURE: 123 MMHG | OXYGEN SATURATION: 96 %

## 2020-01-01 VITALS
HEART RATE: 104 BPM | SYSTOLIC BLOOD PRESSURE: 119 MMHG | HEIGHT: 72 IN | WEIGHT: 165 LBS | OXYGEN SATURATION: 96 % | DIASTOLIC BLOOD PRESSURE: 72 MMHG | RESPIRATION RATE: 18 BRPM | BODY MASS INDEX: 22.35 KG/M2

## 2020-01-01 VITALS
WEIGHT: 155.4 LBS | BODY MASS INDEX: 22.95 KG/M2 | RESPIRATION RATE: 18 BRPM | TEMPERATURE: 98.5 F | DIASTOLIC BLOOD PRESSURE: 69 MMHG | SYSTOLIC BLOOD PRESSURE: 130 MMHG

## 2020-01-01 VITALS
WEIGHT: 153.2 LBS | DIASTOLIC BLOOD PRESSURE: 74 MMHG | BODY MASS INDEX: 22.62 KG/M2 | SYSTOLIC BLOOD PRESSURE: 157 MMHG | OXYGEN SATURATION: 99 % | HEART RATE: 80 BPM | RESPIRATION RATE: 14 BRPM | TEMPERATURE: 98.1 F

## 2020-01-01 VITALS
OXYGEN SATURATION: 95 % | TEMPERATURE: 98 F | HEIGHT: 72 IN | DIASTOLIC BLOOD PRESSURE: 72 MMHG | WEIGHT: 165.2 LBS | HEART RATE: 107 BPM | SYSTOLIC BLOOD PRESSURE: 116 MMHG | BODY MASS INDEX: 22.37 KG/M2 | RESPIRATION RATE: 16 BRPM

## 2020-01-01 VITALS
SYSTOLIC BLOOD PRESSURE: 113 MMHG | WEIGHT: 150 LBS | HEART RATE: 84 BPM | TEMPERATURE: 97.5 F | RESPIRATION RATE: 16 BRPM | DIASTOLIC BLOOD PRESSURE: 65 MMHG | BODY MASS INDEX: 22.15 KG/M2 | OXYGEN SATURATION: 96 %

## 2020-01-01 VITALS
SYSTOLIC BLOOD PRESSURE: 119 MMHG | DIASTOLIC BLOOD PRESSURE: 67 MMHG | RESPIRATION RATE: 7 BRPM | HEIGHT: 69 IN | BODY MASS INDEX: 24.29 KG/M2 | WEIGHT: 164 LBS | HEART RATE: 84 BPM | OXYGEN SATURATION: 96 %

## 2020-01-01 VITALS
SYSTOLIC BLOOD PRESSURE: 130 MMHG | TEMPERATURE: 98.1 F | HEART RATE: 87 BPM | RESPIRATION RATE: 18 BRPM | DIASTOLIC BLOOD PRESSURE: 68 MMHG

## 2020-01-01 VITALS
DIASTOLIC BLOOD PRESSURE: 74 MMHG | BODY MASS INDEX: 23.82 KG/M2 | TEMPERATURE: 97.2 F | WEIGHT: 160.8 LBS | RESPIRATION RATE: 16 BRPM | SYSTOLIC BLOOD PRESSURE: 135 MMHG | HEIGHT: 69 IN | OXYGEN SATURATION: 98 % | HEART RATE: 88 BPM

## 2020-01-01 VITALS
DIASTOLIC BLOOD PRESSURE: 72 MMHG | WEIGHT: 165 LBS | HEART RATE: 104 BPM | BODY MASS INDEX: 22.35 KG/M2 | OXYGEN SATURATION: 96 % | SYSTOLIC BLOOD PRESSURE: 119 MMHG | HEIGHT: 72 IN | RESPIRATION RATE: 18 BRPM

## 2020-01-01 VITALS
BODY MASS INDEX: 22.73 KG/M2 | TEMPERATURE: 97.9 F | WEIGHT: 154 LBS | DIASTOLIC BLOOD PRESSURE: 66 MMHG | HEART RATE: 80 BPM | SYSTOLIC BLOOD PRESSURE: 113 MMHG | RESPIRATION RATE: 18 BRPM

## 2020-01-01 VITALS
WEIGHT: 157.6 LBS | SYSTOLIC BLOOD PRESSURE: 134 MMHG | HEIGHT: 69 IN | DIASTOLIC BLOOD PRESSURE: 72 MMHG | BODY MASS INDEX: 23.34 KG/M2 | HEART RATE: 79 BPM | RESPIRATION RATE: 18 BRPM | OXYGEN SATURATION: 98 %

## 2020-01-01 VITALS
BODY MASS INDEX: 25.18 KG/M2 | OXYGEN SATURATION: 96 % | SYSTOLIC BLOOD PRESSURE: 116 MMHG | HEART RATE: 85 BPM | HEIGHT: 69 IN | WEIGHT: 170 LBS | DIASTOLIC BLOOD PRESSURE: 68 MMHG

## 2020-01-01 VITALS
OXYGEN SATURATION: 98 % | TEMPERATURE: 98.1 F | HEART RATE: 100 BPM | WEIGHT: 168 LBS | RESPIRATION RATE: 20 BRPM | HEIGHT: 69 IN | DIASTOLIC BLOOD PRESSURE: 84 MMHG | SYSTOLIC BLOOD PRESSURE: 148 MMHG | BODY MASS INDEX: 24.88 KG/M2

## 2020-01-01 VITALS
BODY MASS INDEX: 22.73 KG/M2 | HEART RATE: 80 BPM | TEMPERATURE: 97.9 F | DIASTOLIC BLOOD PRESSURE: 66 MMHG | SYSTOLIC BLOOD PRESSURE: 113 MMHG | WEIGHT: 154 LBS | RESPIRATION RATE: 18 BRPM

## 2020-01-01 VITALS
TEMPERATURE: 97.3 F | WEIGHT: 150.4 LBS | HEART RATE: 81 BPM | SYSTOLIC BLOOD PRESSURE: 114 MMHG | RESPIRATION RATE: 18 BRPM | DIASTOLIC BLOOD PRESSURE: 68 MMHG | OXYGEN SATURATION: 99 % | BODY MASS INDEX: 20.4 KG/M2

## 2020-01-01 VITALS
SYSTOLIC BLOOD PRESSURE: 117 MMHG | BODY MASS INDEX: 22.73 KG/M2 | OXYGEN SATURATION: 99 % | WEIGHT: 154 LBS | RESPIRATION RATE: 16 BRPM | TEMPERATURE: 97.7 F | HEART RATE: 73 BPM | DIASTOLIC BLOOD PRESSURE: 60 MMHG

## 2020-01-01 VITALS
SYSTOLIC BLOOD PRESSURE: 130 MMHG | TEMPERATURE: 97 F | DIASTOLIC BLOOD PRESSURE: 60 MMHG | WEIGHT: 163 LBS | OXYGEN SATURATION: 96 % | HEART RATE: 92 BPM | BODY MASS INDEX: 24.07 KG/M2

## 2020-01-01 VITALS
DIASTOLIC BLOOD PRESSURE: 56 MMHG | OXYGEN SATURATION: 100 % | BODY MASS INDEX: 22.51 KG/M2 | SYSTOLIC BLOOD PRESSURE: 129 MMHG | HEART RATE: 85 BPM | TEMPERATURE: 95.9 F | HEIGHT: 69 IN | WEIGHT: 152 LBS | RESPIRATION RATE: 18 BRPM

## 2020-01-01 DIAGNOSIS — K52.9: ICD-10-CM

## 2020-01-01 DIAGNOSIS — C93.10 CHRONIC MYELOMONOCYTIC LEUKEMIA NOT HAVING ACHIEVED REMISSION (H): Primary | ICD-10-CM

## 2020-01-01 DIAGNOSIS — D70.1 CHEMOTHERAPY-INDUCED NEUTROPENIA (H): ICD-10-CM

## 2020-01-01 DIAGNOSIS — R06.02 SOB (SHORTNESS OF BREATH): ICD-10-CM

## 2020-01-01 DIAGNOSIS — R10.32 LLQ ABDOMINAL PAIN: ICD-10-CM

## 2020-01-01 DIAGNOSIS — T45.1X5A CHEMOTHERAPY-INDUCED NEUTROPENIA (H): Primary | ICD-10-CM

## 2020-01-01 DIAGNOSIS — R39.198 SLOWING OF URINARY STREAM: Primary | ICD-10-CM

## 2020-01-01 DIAGNOSIS — D64.9 ANEMIA, UNSPECIFIED TYPE: Primary | ICD-10-CM

## 2020-01-01 DIAGNOSIS — C93.10 CHRONIC MYELOMONOCYTIC LEUKEMIA NOT HAVING ACHIEVED REMISSION (H): ICD-10-CM

## 2020-01-01 DIAGNOSIS — D47.02 SYSTEMIC MASTOCYTOSIS: ICD-10-CM

## 2020-01-01 DIAGNOSIS — F43.23 ADJUSTMENT DISORDER WITH MIXED ANXIETY AND DEPRESSED MOOD: ICD-10-CM

## 2020-01-01 DIAGNOSIS — D72.829 LEUKOCYTOSIS, UNSPECIFIED TYPE: Primary | ICD-10-CM

## 2020-01-01 DIAGNOSIS — Z20.822 PERSON UNDER INVESTIGATION FOR COVID-19: ICD-10-CM

## 2020-01-01 DIAGNOSIS — J44.89 CHRONIC OBSTRUCTIVE AIRWAY DISEASE WITH ASTHMA (H): ICD-10-CM

## 2020-01-01 DIAGNOSIS — D72.829 LEUKOCYTOSIS, UNSPECIFIED TYPE: ICD-10-CM

## 2020-01-01 DIAGNOSIS — R53.1 WEAKNESS: ICD-10-CM

## 2020-01-01 DIAGNOSIS — J90 PLEURAL EFFUSION: ICD-10-CM

## 2020-01-01 DIAGNOSIS — R63.4 UNINTENTIONAL WEIGHT LOSS: ICD-10-CM

## 2020-01-01 DIAGNOSIS — D69.6 THROMBOCYTOPENIA (H): ICD-10-CM

## 2020-01-01 DIAGNOSIS — D64.9 ANEMIA, UNSPECIFIED TYPE: ICD-10-CM

## 2020-01-01 DIAGNOSIS — G89.3 CANCER ASSOCIATED PAIN: ICD-10-CM

## 2020-01-01 DIAGNOSIS — F43.29 ADJUSTMENT DISORDER WITH OTHER SYMPTOM: ICD-10-CM

## 2020-01-01 DIAGNOSIS — E79.0 HYPERURICEMIA: ICD-10-CM

## 2020-01-01 DIAGNOSIS — C15.9 MALIGNANT NEOPLASM OF ESOPHAGUS, UNSPECIFIED LOCATION (H): Primary | ICD-10-CM

## 2020-01-01 DIAGNOSIS — F43.0 ACUTE REACTION TO STRESS: Primary | ICD-10-CM

## 2020-01-01 DIAGNOSIS — D70.1 CHEMOTHERAPY-INDUCED NEUTROPENIA (H): Primary | ICD-10-CM

## 2020-01-01 DIAGNOSIS — K62.5 RECTAL BLEEDING: ICD-10-CM

## 2020-01-01 DIAGNOSIS — T45.1X5A CHEMOTHERAPY-INDUCED NEUTROPENIA (H): ICD-10-CM

## 2020-01-01 DIAGNOSIS — N40.0 BPH (BENIGN PROSTATIC HYPERPLASIA): Primary | ICD-10-CM

## 2020-01-01 DIAGNOSIS — R35.1 NOCTURIA: Primary | ICD-10-CM

## 2020-01-01 DIAGNOSIS — R63.4 WEIGHT LOSS: ICD-10-CM

## 2020-01-01 DIAGNOSIS — N40.1 BENIGN PROSTATIC HYPERPLASIA WITH URINARY OBSTRUCTION: ICD-10-CM

## 2020-01-01 DIAGNOSIS — Z01.818 PREOPERATIVE EXAMINATION: Primary | ICD-10-CM

## 2020-01-01 DIAGNOSIS — I25.9 CHRONIC ISCHEMIC HEART DISEASE: Primary | ICD-10-CM

## 2020-01-01 DIAGNOSIS — F43.21 ADJUSTMENT DISORDER WITH DEPRESSED MOOD: Primary | ICD-10-CM

## 2020-01-01 DIAGNOSIS — R82.90 ABNORMAL URINALYSIS: ICD-10-CM

## 2020-01-01 DIAGNOSIS — Z71.89 ADVANCED CARE PLANNING/COUNSELING DISCUSSION: ICD-10-CM

## 2020-01-01 DIAGNOSIS — R63.0 DECREASE IN APPETITE: ICD-10-CM

## 2020-01-01 DIAGNOSIS — R16.1 SPLENOMEGALY: ICD-10-CM

## 2020-01-01 DIAGNOSIS — R39.198 SLOWING OF URINARY STREAM: ICD-10-CM

## 2020-01-01 DIAGNOSIS — R23.2 FACIAL FLUSHING: ICD-10-CM

## 2020-01-01 DIAGNOSIS — R53.83 FATIGUE, UNSPECIFIED TYPE: Primary | ICD-10-CM

## 2020-01-01 DIAGNOSIS — L08.9 LOCALIZED INFECTION OF SKIN: Primary | ICD-10-CM

## 2020-01-01 DIAGNOSIS — C92.10 CML (CHRONIC MYELOCYTIC LEUKEMIA) (H): ICD-10-CM

## 2020-01-01 DIAGNOSIS — M47.816 SPONDYLOSIS OF LUMBAR REGION WITHOUT MYELOPATHY OR RADICULOPATHY: ICD-10-CM

## 2020-01-01 DIAGNOSIS — Z92.3 HISTORY OF RADIATION THERAPY: ICD-10-CM

## 2020-01-01 DIAGNOSIS — N13.8 BENIGN PROSTATIC HYPERPLASIA WITH URINARY OBSTRUCTION: ICD-10-CM

## 2020-01-01 DIAGNOSIS — R06.02 SOB (SHORTNESS OF BREATH): Primary | ICD-10-CM

## 2020-01-01 DIAGNOSIS — F41.9 ANXIETY: ICD-10-CM

## 2020-01-01 DIAGNOSIS — F43.0 ACUTE REACTION TO STRESS: ICD-10-CM

## 2020-01-01 DIAGNOSIS — C15.9 MALIGNANT NEOPLASM OF ESOPHAGUS, UNSPECIFIED LOCATION (H): ICD-10-CM

## 2020-01-01 DIAGNOSIS — J30.9 ALLERGIC SINUSITIS: Primary | ICD-10-CM

## 2020-01-01 DIAGNOSIS — S33.5XXA SPRAIN OF LOW BACK, INITIAL ENCOUNTER: Primary | ICD-10-CM

## 2020-01-01 DIAGNOSIS — I10 ESSENTIAL HYPERTENSION, BENIGN: ICD-10-CM

## 2020-01-01 DIAGNOSIS — K52.9 COLITIS: ICD-10-CM

## 2020-01-01 DIAGNOSIS — E79.0 HYPERURICEMIA: Primary | ICD-10-CM

## 2020-01-01 DIAGNOSIS — N40.1 BENIGN PROSTATIC HYPERPLASIA WITH LOWER URINARY TRACT SYMPTOMS, SYMPTOM DETAILS UNSPECIFIED: Primary | ICD-10-CM

## 2020-01-01 DIAGNOSIS — Z85.01 HISTORY OF ESOPHAGEAL CANCER: ICD-10-CM

## 2020-01-01 DIAGNOSIS — Z51.5 ENCOUNTER FOR PALLIATIVE CARE: ICD-10-CM

## 2020-01-01 LAB
ABO + RH BLD: NORMAL
ABO + RH BLD: NORMAL
ACANTHOCYTES BLD QL SMEAR: ABNORMAL
ACANTHOCYTES BLD QL SMEAR: ABNORMAL
ACANTHOCYTES BLD QL SMEAR: SLIGHT
ACID FAST STN SPEC QL: NORMAL
ACID FAST STN SPEC QL: NORMAL
ALBUMIN SERPL-MCNC: 3 G/DL (ref 3.4–5)
ALBUMIN SERPL-MCNC: 3 G/DL (ref 3.4–5)
ALBUMIN SERPL-MCNC: 3.1 G/DL (ref 3.4–5)
ALBUMIN SERPL-MCNC: 3.1 G/DL (ref 3.4–5)
ALBUMIN SERPL-MCNC: 3.2 G/DL (ref 3.4–5)
ALBUMIN SERPL-MCNC: 3.2 G/DL (ref 3.4–5)
ALBUMIN SERPL-MCNC: 3.3 G/DL (ref 3.4–5)
ALBUMIN SERPL-MCNC: 3.4 G/DL (ref 3.4–5)
ALBUMIN SERPL-MCNC: 3.5 G/DL (ref 3.4–5)
ALBUMIN SERPL-MCNC: 3.6 G/DL (ref 3.4–5)
ALBUMIN SERPL-MCNC: 3.7 G/DL (ref 3.4–5)
ALBUMIN SERPL-MCNC: 3.8 G/DL (ref 3.4–5)
ALBUMIN SERPL-MCNC: 3.8 G/DL (ref 3.4–5)
ALBUMIN SERPL-MCNC: 3.9 G/DL (ref 3.4–5)
ALBUMIN UR-MCNC: 10 MG/DL
ALBUMIN UR-MCNC: 10 MG/DL
ALBUMIN UR-MCNC: 30 MG/DL
ALBUMIN UR-MCNC: NEGATIVE MG/DL
ALBUMIN UR-MCNC: NEGATIVE MG/DL
ALP SERPL-CCNC: 103 U/L (ref 40–150)
ALP SERPL-CCNC: 103 U/L (ref 40–150)
ALP SERPL-CCNC: 109 U/L (ref 40–150)
ALP SERPL-CCNC: 111 U/L (ref 40–150)
ALP SERPL-CCNC: 131 U/L (ref 40–150)
ALP SERPL-CCNC: 135 U/L (ref 40–150)
ALP SERPL-CCNC: 145 U/L (ref 40–150)
ALP SERPL-CCNC: 182 U/L (ref 40–150)
ALP SERPL-CCNC: 188 U/L (ref 40–150)
ALP SERPL-CCNC: 190 U/L (ref 40–150)
ALP SERPL-CCNC: 213 U/L (ref 40–150)
ALP SERPL-CCNC: 231 U/L (ref 40–150)
ALP SERPL-CCNC: 234 U/L (ref 40–150)
ALP SERPL-CCNC: 237 U/L (ref 40–150)
ALP SERPL-CCNC: 238 U/L (ref 40–150)
ALP SERPL-CCNC: 248 U/L (ref 40–150)
ALP SERPL-CCNC: 249 U/L (ref 40–150)
ALP SERPL-CCNC: 256 U/L (ref 40–150)
ALP SERPL-CCNC: 258 U/L (ref 40–150)
ALP SERPL-CCNC: 71 U/L (ref 40–150)
ALP SERPL-CCNC: 83 U/L (ref 40–150)
ALP SERPL-CCNC: 84 U/L (ref 40–150)
ALT SERPL W P-5'-P-CCNC: 16 U/L (ref 0–70)
ALT SERPL W P-5'-P-CCNC: 20 U/L (ref 0–70)
ALT SERPL W P-5'-P-CCNC: 21 U/L (ref 0–70)
ALT SERPL W P-5'-P-CCNC: 23 U/L (ref 0–70)
ALT SERPL W P-5'-P-CCNC: 24 U/L (ref 0–70)
ALT SERPL W P-5'-P-CCNC: 24 U/L (ref 0–70)
ALT SERPL W P-5'-P-CCNC: 28 U/L (ref 0–70)
ALT SERPL W P-5'-P-CCNC: 31 U/L (ref 0–70)
ALT SERPL W P-5'-P-CCNC: 33 U/L (ref 0–70)
ALT SERPL W P-5'-P-CCNC: 35 U/L (ref 0–70)
ALT SERPL W P-5'-P-CCNC: 39 U/L (ref 0–70)
ALT SERPL W P-5'-P-CCNC: 39 U/L (ref 0–70)
ALT SERPL W P-5'-P-CCNC: 49 U/L (ref 0–70)
ALT SERPL W P-5'-P-CCNC: 56 U/L (ref 0–70)
ALT SERPL W P-5'-P-CCNC: 58 U/L (ref 0–70)
ALT SERPL W P-5'-P-CCNC: 58 U/L (ref 0–70)
ALT SERPL W P-5'-P-CCNC: 60 U/L (ref 0–70)
ALT SERPL W P-5'-P-CCNC: 61 U/L (ref 0–70)
ALT SERPL W P-5'-P-CCNC: 61 U/L (ref 0–70)
ALT SERPL W P-5'-P-CCNC: 66 U/L (ref 0–70)
ANION GAP SERPL CALCULATED.3IONS-SCNC: 10 MMOL/L (ref 3–14)
ANION GAP SERPL CALCULATED.3IONS-SCNC: 12 MMOL/L (ref 3–14)
ANION GAP SERPL CALCULATED.3IONS-SCNC: 2 MMOL/L (ref 3–14)
ANION GAP SERPL CALCULATED.3IONS-SCNC: 3 MMOL/L (ref 3–14)
ANION GAP SERPL CALCULATED.3IONS-SCNC: 4 MMOL/L (ref 3–14)
ANION GAP SERPL CALCULATED.3IONS-SCNC: 5 MMOL/L (ref 3–14)
ANION GAP SERPL CALCULATED.3IONS-SCNC: 6 MMOL/L (ref 3–14)
ANION GAP SERPL CALCULATED.3IONS-SCNC: 7 MMOL/L (ref 3–14)
ANION GAP SERPL CALCULATED.3IONS-SCNC: 8 MMOL/L (ref 3–14)
ANION GAP SERPL CALCULATED.3IONS-SCNC: 8 MMOL/L (ref 3–14)
ANION GAP SERPL CALCULATED.3IONS-SCNC: 9 MMOL/L (ref 3–14)
ANISOCYTOSIS BLD QL SMEAR: ABNORMAL
ANISOCYTOSIS BLD QL SMEAR: SLIGHT
APPEARANCE FLD: NORMAL
APPEARANCE UR: ABNORMAL
APPEARANCE UR: CLEAR
APTT PPP: 47 SEC (ref 22–37)
AST SERPL W P-5'-P-CCNC: 100 U/L (ref 0–45)
AST SERPL W P-5'-P-CCNC: 105 U/L (ref 0–45)
AST SERPL W P-5'-P-CCNC: 109 U/L (ref 0–45)
AST SERPL W P-5'-P-CCNC: 110 U/L (ref 0–45)
AST SERPL W P-5'-P-CCNC: 114 U/L (ref 0–45)
AST SERPL W P-5'-P-CCNC: 13 U/L (ref 0–45)
AST SERPL W P-5'-P-CCNC: 14 U/L (ref 0–45)
AST SERPL W P-5'-P-CCNC: 15 U/L (ref 0–45)
AST SERPL W P-5'-P-CCNC: 16 U/L (ref 0–45)
AST SERPL W P-5'-P-CCNC: 17 U/L (ref 0–45)
AST SERPL W P-5'-P-CCNC: 17 U/L (ref 0–45)
AST SERPL W P-5'-P-CCNC: 19 U/L (ref 0–45)
AST SERPL W P-5'-P-CCNC: 23 U/L (ref 0–45)
AST SERPL W P-5'-P-CCNC: 26 U/L (ref 0–45)
AST SERPL W P-5'-P-CCNC: 26 U/L (ref 0–45)
AST SERPL W P-5'-P-CCNC: 27 U/L (ref 0–45)
AST SERPL W P-5'-P-CCNC: 37 U/L (ref 0–45)
AST SERPL W P-5'-P-CCNC: 38 U/L (ref 0–45)
AST SERPL W P-5'-P-CCNC: 56 U/L (ref 0–45)
AST SERPL W P-5'-P-CCNC: 63 U/L (ref 0–45)
AST SERPL W P-5'-P-CCNC: 71 U/L (ref 0–45)
AST SERPL W P-5'-P-CCNC: 95 U/L (ref 0–45)
BACTERIA #/AREA URNS HPF: ABNORMAL /HPF
BACTERIA SPEC CULT: NO GROWTH
BASO STIPL BLD QL SMEAR: PRESENT
BASOPHILS # BLD AUTO: 0 10E9/L (ref 0–0.2)
BASOPHILS # BLD AUTO: 0.1 10E9/L (ref 0–0.2)
BASOPHILS # BLD AUTO: 0.2 10E9/L (ref 0–0.2)
BASOPHILS # BLD AUTO: 0.2 10E9/L (ref 0–0.2)
BASOPHILS # BLD AUTO: 0.3 10E9/L (ref 0–0.2)
BASOPHILS # BLD AUTO: 0.4 10E9/L (ref 0–0.2)
BASOPHILS # BLD AUTO: 0.4 10E9/L (ref 0–0.2)
BASOPHILS # BLD AUTO: 0.5 10E9/L (ref 0–0.2)
BASOPHILS # BLD AUTO: 0.5 10E9/L (ref 0–0.2)
BASOPHILS # BLD AUTO: 0.7 10E9/L (ref 0–0.2)
BASOPHILS # BLD AUTO: 0.8 10E9/L (ref 0–0.2)
BASOPHILS # BLD AUTO: 0.8 10E9/L (ref 0–0.2)
BASOPHILS # BLD AUTO: 0.9 10E9/L (ref 0–0.2)
BASOPHILS # BLD AUTO: 1 10E9/L (ref 0–0.2)
BASOPHILS # BLD AUTO: 1 10E9/L (ref 0–0.2)
BASOPHILS # BLD AUTO: 1.1 10E9/L (ref 0–0.2)
BASOPHILS # BLD AUTO: 1.2 10E9/L (ref 0–0.2)
BASOPHILS # BLD AUTO: 1.6 10E9/L (ref 0–0.2)
BASOPHILS # BLD AUTO: 1.7 10E9/L (ref 0–0.2)
BASOPHILS NFR BLD AUTO: 0 %
BASOPHILS NFR BLD AUTO: 0.5 %
BASOPHILS NFR BLD AUTO: 0.5 %
BASOPHILS NFR BLD AUTO: 0.8 %
BASOPHILS NFR BLD AUTO: 0.8 %
BASOPHILS NFR BLD AUTO: 0.9 %
BASOPHILS NFR BLD AUTO: 1 %
BASOPHILS NFR BLD AUTO: 1.3 %
BASOPHILS NFR BLD AUTO: 1.5 %
BASOPHILS NFR BLD AUTO: 10 %
BASOPHILS NFR BLD AUTO: 12 %
BASOPHILS NFR BLD AUTO: 2 %
BASOPHILS NFR BLD AUTO: 2.6 %
BASOPHILS NFR BLD AUTO: 3 %
BASOPHILS NFR BLD AUTO: 3.5 %
BASOPHILS NFR BLD AUTO: 4 %
BASOPHILS NFR BLD AUTO: 4 %
BASOPHILS NFR BLD AUTO: 5 %
BASOPHILS NFR BLD AUTO: 5 %
BASOPHILS NFR BLD AUTO: 7 %
BASOPHILS NFR BLD AUTO: 7 %
BASOPHILS NFR BLD AUTO: 8 %
BILIRUB DIRECT SERPL-MCNC: 0.1 MG/DL (ref 0–0.2)
BILIRUB SERPL-MCNC: 0.4 MG/DL (ref 0.2–1.3)
BILIRUB SERPL-MCNC: 0.4 MG/DL (ref 0.2–1.3)
BILIRUB SERPL-MCNC: 0.5 MG/DL (ref 0.2–1.3)
BILIRUB SERPL-MCNC: 0.5 MG/DL (ref 0.2–1.3)
BILIRUB SERPL-MCNC: 0.6 MG/DL (ref 0.2–1.3)
BILIRUB SERPL-MCNC: 0.6 MG/DL (ref 0.2–1.3)
BILIRUB SERPL-MCNC: 0.7 MG/DL (ref 0.2–1.3)
BILIRUB SERPL-MCNC: 0.8 MG/DL (ref 0.2–1.3)
BILIRUB SERPL-MCNC: 0.8 MG/DL (ref 0.2–1.3)
BILIRUB SERPL-MCNC: 1 MG/DL (ref 0.2–1.3)
BILIRUB SERPL-MCNC: 1 MG/DL (ref 0.2–1.3)
BILIRUB SERPL-MCNC: 1.1 MG/DL (ref 0.2–1.3)
BILIRUB SERPL-MCNC: 1.2 MG/DL (ref 0.2–1.3)
BILIRUB SERPL-MCNC: 1.2 MG/DL (ref 0.2–1.3)
BILIRUB SERPL-MCNC: 1.3 MG/DL (ref 0.2–1.3)
BILIRUB SERPL-MCNC: 1.4 MG/DL (ref 0.2–1.3)
BILIRUB SERPL-MCNC: 1.4 MG/DL (ref 0.2–1.3)
BILIRUB SERPL-MCNC: 1.5 MG/DL (ref 0.2–1.3)
BILIRUB SERPL-MCNC: 1.7 MG/DL (ref 0.2–1.3)
BILIRUB SERPL-MCNC: 1.9 MG/DL (ref 0.2–1.3)
BILIRUB UR QL STRIP: NEGATIVE
BLASTS # BLD: 0 10E9/L
BLASTS # BLD: 0.3 10E9/L
BLASTS # BLD: 1.3 10E9/L
BLASTS # BLD: 1.6 10E9/L
BLASTS # BLD: 2.6 10E9/L
BLASTS # BLD: 20.6 10E9/L
BLASTS # BLD: 6.5 10E9/L
BLASTS # BLD: 8.4 10E9/L
BLASTS BLD QL SMEAR: 0.5 %
BLASTS BLD QL SMEAR: 1 %
BLASTS BLD QL SMEAR: 1 %
BLASTS BLD QL SMEAR: 1.5 %
BLASTS BLD QL SMEAR: 1.5 %
BLASTS BLD QL SMEAR: 10 %
BLASTS BLD QL SMEAR: 10.5 %
BLASTS BLD QL SMEAR: 14 %
BLD GP AB SCN SERPL QL: NORMAL
BLD PROD TYP BPU: NORMAL
BLD PROD TYP BPU: NORMAL
BLD UNIT ID BPU: 0
BLOOD BANK CMNT PATIENT-IMP: NORMAL
BLOOD PRODUCT CODE: NORMAL
BPU ID: NORMAL
BUN SERPL-MCNC: 11 MG/DL (ref 7–30)
BUN SERPL-MCNC: 11 MG/DL (ref 7–30)
BUN SERPL-MCNC: 12 MG/DL (ref 7–30)
BUN SERPL-MCNC: 13 MG/DL (ref 7–30)
BUN SERPL-MCNC: 13 MG/DL (ref 7–30)
BUN SERPL-MCNC: 14 MG/DL (ref 7–30)
BUN SERPL-MCNC: 15 MG/DL (ref 7–30)
BUN SERPL-MCNC: 16 MG/DL (ref 7–30)
BUN SERPL-MCNC: 16 MG/DL (ref 7–30)
BUN SERPL-MCNC: 17 MG/DL (ref 7–30)
BUN SERPL-MCNC: 18 MG/DL (ref 7–30)
BUN SERPL-MCNC: 22 MG/DL (ref 7–30)
BUN SERPL-MCNC: 23 MG/DL (ref 7–30)
BUN SERPL-MCNC: 23 MG/DL (ref 7–30)
BUN SERPL-MCNC: 24 MG/DL (ref 7–30)
BUN SERPL-MCNC: 25 MG/DL (ref 7–30)
BUN SERPL-MCNC: 30 MG/DL (ref 7–30)
BUN SERPL-MCNC: 32 MG/DL (ref 7–30)
BUN SERPL-MCNC: 34 MG/DL (ref 7–30)
BUN SERPL-MCNC: 9 MG/DL (ref 7–30)
BURR CELLS BLD QL SMEAR: SLIGHT
CALCIUM SERPL-MCNC: 10 MG/DL (ref 8.5–10.1)
CALCIUM SERPL-MCNC: 10.1 MG/DL (ref 8.5–10.1)
CALCIUM SERPL-MCNC: 8.2 MG/DL (ref 8.5–10.1)
CALCIUM SERPL-MCNC: 8.3 MG/DL (ref 8.5–10.1)
CALCIUM SERPL-MCNC: 8.3 MG/DL (ref 8.5–10.1)
CALCIUM SERPL-MCNC: 8.4 MG/DL (ref 8.5–10.1)
CALCIUM SERPL-MCNC: 8.5 MG/DL (ref 8.5–10.1)
CALCIUM SERPL-MCNC: 8.6 MG/DL (ref 8.5–10.1)
CALCIUM SERPL-MCNC: 8.7 MG/DL (ref 8.5–10.1)
CALCIUM SERPL-MCNC: 8.8 MG/DL (ref 8.5–10.1)
CALCIUM SERPL-MCNC: 8.9 MG/DL (ref 8.5–10.1)
CALCIUM SERPL-MCNC: 8.9 MG/DL (ref 8.5–10.1)
CALCIUM SERPL-MCNC: 9 MG/DL (ref 8.5–10.1)
CALCIUM SERPL-MCNC: 9.1 MG/DL (ref 8.5–10.1)
CALCIUM SERPL-MCNC: 9.1 MG/DL (ref 8.5–10.1)
CALCIUM SERPL-MCNC: 9.8 MG/DL (ref 8.5–10.1)
CHLORIDE SERPL-SCNC: 100 MMOL/L (ref 94–109)
CHLORIDE SERPL-SCNC: 101 MMOL/L (ref 94–109)
CHLORIDE SERPL-SCNC: 102 MMOL/L (ref 94–109)
CHLORIDE SERPL-SCNC: 103 MMOL/L (ref 94–109)
CHLORIDE SERPL-SCNC: 104 MMOL/L (ref 94–109)
CHLORIDE SERPL-SCNC: 105 MMOL/L (ref 94–109)
CHLORIDE SERPL-SCNC: 106 MMOL/L (ref 94–109)
CHLORIDE SERPL-SCNC: 107 MMOL/L (ref 94–109)
CHLORIDE SERPL-SCNC: 108 MMOL/L (ref 94–109)
CHLORIDE SERPL-SCNC: 109 MMOL/L (ref 94–109)
CHLORIDE SERPL-SCNC: 98 MMOL/L (ref 94–109)
CO2 SERPL-SCNC: 18 MMOL/L (ref 20–32)
CO2 SERPL-SCNC: 24 MMOL/L (ref 20–32)
CO2 SERPL-SCNC: 25 MMOL/L (ref 20–32)
CO2 SERPL-SCNC: 26 MMOL/L (ref 20–32)
CO2 SERPL-SCNC: 27 MMOL/L (ref 20–32)
CO2 SERPL-SCNC: 28 MMOL/L (ref 20–32)
CO2 SERPL-SCNC: 30 MMOL/L (ref 20–32)
CO2 SERPL-SCNC: 30 MMOL/L (ref 20–32)
CO2 SERPL-SCNC: 31 MMOL/L (ref 20–32)
CO2 SERPL-SCNC: 31 MMOL/L (ref 20–32)
COLONOSCOPY: NORMAL
COLONOSCOPY: NORMAL
COLOR FLD: NORMAL
COLOR UR AUTO: YELLOW
COPATH REPORT: NORMAL
CREAT SERPL-MCNC: 0.67 MG/DL (ref 0.66–1.25)
CREAT SERPL-MCNC: 0.67 MG/DL (ref 0.66–1.25)
CREAT SERPL-MCNC: 0.68 MG/DL (ref 0.66–1.25)
CREAT SERPL-MCNC: 0.7 MG/DL (ref 0.66–1.25)
CREAT SERPL-MCNC: 0.72 MG/DL (ref 0.66–1.25)
CREAT SERPL-MCNC: 0.75 MG/DL (ref 0.66–1.25)
CREAT SERPL-MCNC: 0.79 MG/DL (ref 0.66–1.25)
CREAT SERPL-MCNC: 0.91 MG/DL (ref 0.66–1.25)
CREAT SERPL-MCNC: 0.92 MG/DL (ref 0.66–1.25)
CREAT SERPL-MCNC: 0.93 MG/DL (ref 0.66–1.25)
CREAT SERPL-MCNC: 0.97 MG/DL (ref 0.66–1.25)
CREAT SERPL-MCNC: 0.98 MG/DL (ref 0.66–1.25)
CREAT SERPL-MCNC: 1.02 MG/DL (ref 0.66–1.25)
CREAT SERPL-MCNC: 1.02 MG/DL (ref 0.66–1.25)
CREAT SERPL-MCNC: 1.08 MG/DL (ref 0.66–1.25)
CREAT SERPL-MCNC: 1.08 MG/DL (ref 0.66–1.25)
CREAT SERPL-MCNC: 1.09 MG/DL (ref 0.66–1.25)
CREAT SERPL-MCNC: 1.1 MG/DL (ref 0.66–1.25)
CREAT SERPL-MCNC: 1.17 MG/DL (ref 0.66–1.25)
CREAT SERPL-MCNC: 1.26 MG/DL (ref 0.66–1.25)
CREAT SERPL-MCNC: 1.26 MG/DL (ref 0.66–1.25)
CREAT SERPL-MCNC: 1.28 MG/DL (ref 0.66–1.25)
CREAT SERPL-MCNC: 1.36 MG/DL (ref 0.66–1.25)
CREAT SERPL-MCNC: 1.4 MG/DL (ref 0.66–1.25)
CREAT SERPL-MCNC: 1.41 MG/DL (ref 0.66–1.25)
CREAT SERPL-MCNC: 1.77 MG/DL (ref 0.66–1.25)
D DIMER PPP FEU-MCNC: 2.8 UG/ML FEU (ref 0–0.5)
DACRYOCYTES BLD QL SMEAR: SLIGHT
DIFFERENTIAL METHOD BLD: ABNORMAL
DOHLE BOD BLD QL SMEAR: PRESENT
EOSINOPHIL # BLD AUTO: 0 10E9/L (ref 0–0.7)
EOSINOPHIL # BLD AUTO: 0.1 10E9/L (ref 0–0.7)
EOSINOPHIL # BLD AUTO: 0.2 10E9/L (ref 0–0.7)
EOSINOPHIL # BLD AUTO: 0.2 10E9/L (ref 0–0.7)
EOSINOPHIL # BLD AUTO: 0.3 10E9/L (ref 0–0.7)
EOSINOPHIL # BLD AUTO: 0.4 10E9/L (ref 0–0.7)
EOSINOPHIL # BLD AUTO: 0.4 10E9/L (ref 0–0.7)
EOSINOPHIL # BLD AUTO: 0.5 10E9/L (ref 0–0.7)
EOSINOPHIL # BLD AUTO: 0.5 10E9/L (ref 0–0.7)
EOSINOPHIL # BLD AUTO: 0.6 10E9/L (ref 0–0.7)
EOSINOPHIL # BLD AUTO: 0.7 10E9/L (ref 0–0.7)
EOSINOPHIL # BLD AUTO: 0.7 10E9/L (ref 0–0.7)
EOSINOPHIL # BLD AUTO: 0.8 10E9/L (ref 0–0.7)
EOSINOPHIL # BLD AUTO: 0.8 10E9/L (ref 0–0.7)
EOSINOPHIL # BLD AUTO: 0.9 10E9/L (ref 0–0.7)
EOSINOPHIL # BLD AUTO: 0.9 10E9/L (ref 0–0.7)
EOSINOPHIL # BLD AUTO: 1.1 10E9/L (ref 0–0.7)
EOSINOPHIL # BLD AUTO: 1.2 10E9/L (ref 0–0.7)
EOSINOPHIL # BLD AUTO: 1.2 10E9/L (ref 0–0.7)
EOSINOPHIL # BLD AUTO: 1.3 10E9/L (ref 0–0.7)
EOSINOPHIL # BLD AUTO: 1.5 10E9/L (ref 0–0.7)
EOSINOPHIL # BLD AUTO: 1.9 10E9/L (ref 0–0.7)
EOSINOPHIL # BLD AUTO: 2 10E9/L (ref 0–0.7)
EOSINOPHIL # BLD AUTO: 2.4 10E9/L (ref 0–0.7)
EOSINOPHIL # BLD AUTO: 2.7 10E9/L (ref 0–0.7)
EOSINOPHIL # BLD AUTO: 2.8 10E9/L (ref 0–0.7)
EOSINOPHIL # BLD AUTO: 3.1 10E9/L (ref 0–0.7)
EOSINOPHIL # BLD AUTO: 3.2 10E9/L (ref 0–0.7)
EOSINOPHIL # BLD AUTO: 3.4 10E9/L (ref 0–0.7)
EOSINOPHIL # BLD AUTO: 4.3 10E9/L (ref 0–0.7)
EOSINOPHIL # BLD AUTO: 4.5 10E9/L (ref 0–0.7)
EOSINOPHIL NFR BLD AUTO: 0 %
EOSINOPHIL NFR BLD AUTO: 0.6 %
EOSINOPHIL NFR BLD AUTO: 1 %
EOSINOPHIL NFR BLD AUTO: 1.1 %
EOSINOPHIL NFR BLD AUTO: 1.2 %
EOSINOPHIL NFR BLD AUTO: 1.5 %
EOSINOPHIL NFR BLD AUTO: 1.5 %
EOSINOPHIL NFR BLD AUTO: 10 %
EOSINOPHIL NFR BLD AUTO: 10 %
EOSINOPHIL NFR BLD AUTO: 10.4 %
EOSINOPHIL NFR BLD AUTO: 11 %
EOSINOPHIL NFR BLD AUTO: 12 %
EOSINOPHIL NFR BLD AUTO: 13.3 %
EOSINOPHIL NFR BLD AUTO: 2 %
EOSINOPHIL NFR BLD AUTO: 2.5 %
EOSINOPHIL NFR BLD AUTO: 2.5 %
EOSINOPHIL NFR BLD AUTO: 20 %
EOSINOPHIL NFR BLD AUTO: 20 %
EOSINOPHIL NFR BLD AUTO: 3 %
EOSINOPHIL NFR BLD AUTO: 4 %
EOSINOPHIL NFR BLD AUTO: 4 %
EOSINOPHIL NFR BLD AUTO: 5 %
EOSINOPHIL NFR BLD AUTO: 5.5 %
EOSINOPHIL NFR BLD AUTO: 6 %
EOSINOPHIL NFR BLD AUTO: 7 %
EOSINOPHIL NFR BLD AUTO: 9 %
EOSINOPHIL NFR BLD AUTO: 9 %
ERYTHROCYTE [DISTWIDTH] IN BLOOD BY AUTOMATED COUNT: 15.2 % (ref 10–15)
ERYTHROCYTE [DISTWIDTH] IN BLOOD BY AUTOMATED COUNT: 15.3 % (ref 10–15)
ERYTHROCYTE [DISTWIDTH] IN BLOOD BY AUTOMATED COUNT: 15.3 % (ref 10–15)
ERYTHROCYTE [DISTWIDTH] IN BLOOD BY AUTOMATED COUNT: 15.4 % (ref 10–15)
ERYTHROCYTE [DISTWIDTH] IN BLOOD BY AUTOMATED COUNT: 15.5 % (ref 10–15)
ERYTHROCYTE [DISTWIDTH] IN BLOOD BY AUTOMATED COUNT: 15.6 % (ref 10–15)
ERYTHROCYTE [DISTWIDTH] IN BLOOD BY AUTOMATED COUNT: 15.7 % (ref 10–15)
ERYTHROCYTE [DISTWIDTH] IN BLOOD BY AUTOMATED COUNT: 16.4 % (ref 10–15)
ERYTHROCYTE [DISTWIDTH] IN BLOOD BY AUTOMATED COUNT: 17.1 % (ref 10–15)
ERYTHROCYTE [DISTWIDTH] IN BLOOD BY AUTOMATED COUNT: 17.3 % (ref 10–15)
ERYTHROCYTE [DISTWIDTH] IN BLOOD BY AUTOMATED COUNT: 17.3 % (ref 10–15)
ERYTHROCYTE [DISTWIDTH] IN BLOOD BY AUTOMATED COUNT: 18.2 % (ref 10–15)
ERYTHROCYTE [DISTWIDTH] IN BLOOD BY AUTOMATED COUNT: 18.7 % (ref 10–15)
ERYTHROCYTE [DISTWIDTH] IN BLOOD BY AUTOMATED COUNT: 18.7 % (ref 10–15)
ERYTHROCYTE [DISTWIDTH] IN BLOOD BY AUTOMATED COUNT: 19.3 % (ref 10–15)
ERYTHROCYTE [DISTWIDTH] IN BLOOD BY AUTOMATED COUNT: 19.4 % (ref 10–15)
ERYTHROCYTE [DISTWIDTH] IN BLOOD BY AUTOMATED COUNT: 19.5 % (ref 10–15)
ERYTHROCYTE [DISTWIDTH] IN BLOOD BY AUTOMATED COUNT: 19.9 % (ref 10–15)
ERYTHROCYTE [DISTWIDTH] IN BLOOD BY AUTOMATED COUNT: 20 % (ref 10–15)
ERYTHROCYTE [DISTWIDTH] IN BLOOD BY AUTOMATED COUNT: 20.1 % (ref 10–15)
ERYTHROCYTE [DISTWIDTH] IN BLOOD BY AUTOMATED COUNT: 20.1 % (ref 10–15)
ERYTHROCYTE [DISTWIDTH] IN BLOOD BY AUTOMATED COUNT: 20.5 % (ref 10–15)
ERYTHROCYTE [DISTWIDTH] IN BLOOD BY AUTOMATED COUNT: 21.1 % (ref 10–15)
ERYTHROCYTE [DISTWIDTH] IN BLOOD BY AUTOMATED COUNT: 21.1 % (ref 10–15)
ERYTHROCYTE [DISTWIDTH] IN BLOOD BY AUTOMATED COUNT: 21.2 % (ref 10–15)
ERYTHROCYTE [DISTWIDTH] IN BLOOD BY AUTOMATED COUNT: 21.2 % (ref 10–15)
ERYTHROCYTE [DISTWIDTH] IN BLOOD BY AUTOMATED COUNT: 21.3 % (ref 10–15)
ERYTHROCYTE [DISTWIDTH] IN BLOOD BY AUTOMATED COUNT: 21.4 % (ref 10–15)
ERYTHROCYTE [DISTWIDTH] IN BLOOD BY AUTOMATED COUNT: 21.5 % (ref 10–15)
ERYTHROCYTE [DISTWIDTH] IN BLOOD BY AUTOMATED COUNT: 21.5 % (ref 10–15)
ERYTHROCYTE [DISTWIDTH] IN BLOOD BY AUTOMATED COUNT: 21.6 % (ref 10–15)
ERYTHROCYTE [DISTWIDTH] IN BLOOD BY AUTOMATED COUNT: 21.7 % (ref 10–15)
ERYTHROCYTE [DISTWIDTH] IN BLOOD BY AUTOMATED COUNT: 21.7 % (ref 10–15)
ERYTHROCYTE [DISTWIDTH] IN BLOOD BY AUTOMATED COUNT: 21.8 % (ref 10–15)
ERYTHROCYTE [DISTWIDTH] IN BLOOD BY AUTOMATED COUNT: 21.8 % (ref 10–15)
ERYTHROCYTE [DISTWIDTH] IN BLOOD BY AUTOMATED COUNT: 21.9 % (ref 10–15)
ERYTHROCYTE [DISTWIDTH] IN BLOOD BY AUTOMATED COUNT: 22.6 % (ref 10–15)
ERYTHROCYTE [DISTWIDTH] IN BLOOD BY AUTOMATED COUNT: 22.7 % (ref 10–15)
ERYTHROCYTE [DISTWIDTH] IN BLOOD BY AUTOMATED COUNT: 23.1 % (ref 10–15)
ERYTHROCYTE [DISTWIDTH] IN BLOOD BY AUTOMATED COUNT: 23.2 % (ref 10–15)
ERYTHROCYTE [DISTWIDTH] IN BLOOD BY AUTOMATED COUNT: 23.6 % (ref 10–15)
ERYTHROCYTE [DISTWIDTH] IN BLOOD BY AUTOMATED COUNT: 23.7 % (ref 10–15)
ERYTHROCYTE [DISTWIDTH] IN BLOOD BY AUTOMATED COUNT: 24.2 % (ref 10–15)
ERYTHROCYTE [DISTWIDTH] IN BLOOD BY AUTOMATED COUNT: 24.5 % (ref 10–15)
ERYTHROCYTE [DISTWIDTH] IN BLOOD BY AUTOMATED COUNT: 24.6 % (ref 10–15)
ERYTHROCYTE [DISTWIDTH] IN BLOOD BY AUTOMATED COUNT: 26.2 % (ref 10–15)
FLUAV+FLUBV AG SPEC QL: NEGATIVE
FLUAV+FLUBV AG SPEC QL: NEGATIVE
FLUAV+FLUBV RNA SPEC QL NAA+PROBE: NEGATIVE
FLUAV+FLUBV RNA SPEC QL NAA+PROBE: NEGATIVE
GFR SERPL CREATININE-BSD FRML MDRD: 35 ML/MIN/{1.73_M2}
GFR SERPL CREATININE-BSD FRML MDRD: 46 ML/MIN/{1.73_M2}
GFR SERPL CREATININE-BSD FRML MDRD: 46 ML/MIN/{1.73_M2}
GFR SERPL CREATININE-BSD FRML MDRD: 48 ML/MIN/{1.73_M2}
GFR SERPL CREATININE-BSD FRML MDRD: 51 ML/MIN/{1.73_M2}
GFR SERPL CREATININE-BSD FRML MDRD: 52 ML/MIN/{1.73_M2}
GFR SERPL CREATININE-BSD FRML MDRD: 52 ML/MIN/{1.73_M2}
GFR SERPL CREATININE-BSD FRML MDRD: 57 ML/MIN/{1.73_M2}
GFR SERPL CREATININE-BSD FRML MDRD: 61 ML/MIN/{1.73_M2}
GFR SERPL CREATININE-BSD FRML MDRD: 62 ML/MIN/{1.73_M2}
GFR SERPL CREATININE-BSD FRML MDRD: 63 ML/MIN/{1.73_M2}
GFR SERPL CREATININE-BSD FRML MDRD: 63 ML/MIN/{1.73_M2}
GFR SERPL CREATININE-BSD FRML MDRD: 67 ML/MIN/{1.73_M2}
GFR SERPL CREATININE-BSD FRML MDRD: 67 ML/MIN/{1.73_M2}
GFR SERPL CREATININE-BSD FRML MDRD: 70 ML/MIN/{1.73_M2}
GFR SERPL CREATININE-BSD FRML MDRD: 72 ML/MIN/{1.73_M2}
GFR SERPL CREATININE-BSD FRML MDRD: 75 ML/MIN/{1.73_M2}
GFR SERPL CREATININE-BSD FRML MDRD: 76 ML/MIN/{1.73_M2}
GFR SERPL CREATININE-BSD FRML MDRD: 77 ML/MIN/{1.73_M2}
GFR SERPL CREATININE-BSD FRML MDRD: 83 ML/MIN/{1.73_M2}
GFR SERPL CREATININE-BSD FRML MDRD: 84 ML/MIN/{1.73_M2}
GFR SERPL CREATININE-BSD FRML MDRD: 85 ML/MIN/{1.73_M2}
GFR SERPL CREATININE-BSD FRML MDRD: 85 ML/MIN/{1.73_M2}
GFR SERPL CREATININE-BSD FRML MDRD: 86 ML/MIN/{1.73_M2}
GFR SERPL CREATININE-BSD FRML MDRD: 87 ML/MIN/{1.73_M2}
GFR SERPL CREATININE-BSD FRML MDRD: 88 ML/MIN/{1.73_M2}
GFR SERPL CREATININE-BSD FRML MDRD: 89 ML/MIN/{1.73_M2}
GFR SERPL CREATININE-BSD FRML MDRD: 89 ML/MIN/{1.73_M2}
GLUCOSE BLDC GLUCOMTR-MCNC: 125 MG/DL (ref 70–99)
GLUCOSE BLDC GLUCOMTR-MCNC: 152 MG/DL (ref 70–99)
GLUCOSE BLDC GLUCOMTR-MCNC: 52 MG/DL (ref 70–99)
GLUCOSE BLDC GLUCOMTR-MCNC: 67 MG/DL (ref 70–99)
GLUCOSE BLDC GLUCOMTR-MCNC: 73 MG/DL (ref 70–99)
GLUCOSE BLDC GLUCOMTR-MCNC: 81 MG/DL (ref 70–99)
GLUCOSE BLDC GLUCOMTR-MCNC: 84 MG/DL (ref 70–99)
GLUCOSE FLD-MCNC: 80 MG/DL
GLUCOSE SERPL-MCNC: 102 MG/DL (ref 70–99)
GLUCOSE SERPL-MCNC: 105 MG/DL (ref 70–99)
GLUCOSE SERPL-MCNC: 106 MG/DL (ref 70–99)
GLUCOSE SERPL-MCNC: 107 MG/DL (ref 70–99)
GLUCOSE SERPL-MCNC: 108 MG/DL (ref 70–99)
GLUCOSE SERPL-MCNC: 109 MG/DL (ref 70–99)
GLUCOSE SERPL-MCNC: 113 MG/DL (ref 70–99)
GLUCOSE SERPL-MCNC: 116 MG/DL (ref 70–99)
GLUCOSE SERPL-MCNC: 116 MG/DL (ref 70–99)
GLUCOSE SERPL-MCNC: 120 MG/DL (ref 70–99)
GLUCOSE SERPL-MCNC: 128 MG/DL (ref 70–99)
GLUCOSE SERPL-MCNC: 147 MG/DL (ref 70–99)
GLUCOSE SERPL-MCNC: 160 MG/DL (ref 70–99)
GLUCOSE SERPL-MCNC: 71 MG/DL (ref 70–99)
GLUCOSE SERPL-MCNC: 76 MG/DL (ref 70–99)
GLUCOSE SERPL-MCNC: 83 MG/DL (ref 70–99)
GLUCOSE SERPL-MCNC: 90 MG/DL (ref 70–99)
GLUCOSE SERPL-MCNC: 90 MG/DL (ref 70–99)
GLUCOSE SERPL-MCNC: 92 MG/DL (ref 70–99)
GLUCOSE SERPL-MCNC: 93 MG/DL (ref 70–99)
GLUCOSE SERPL-MCNC: 93 MG/DL (ref 70–99)
GLUCOSE SERPL-MCNC: 94 MG/DL (ref 70–99)
GLUCOSE SERPL-MCNC: 94 MG/DL (ref 70–99)
GLUCOSE SERPL-MCNC: 95 MG/DL (ref 70–99)
GLUCOSE SERPL-MCNC: 98 MG/DL (ref 70–99)
GLUCOSE SERPL-MCNC: 98 MG/DL (ref 70–99)
GLUCOSE UR STRIP-MCNC: NEGATIVE MG/DL
GRAM STN SPEC: NORMAL
GRAM STN SPEC: NORMAL
GRAN CASTS #/AREA URNS LPF: 2 /LPF
HCT VFR BLD AUTO: 28.3 % (ref 40–53)
HCT VFR BLD AUTO: 29.1 % (ref 40–53)
HCT VFR BLD AUTO: 29.5 % (ref 40–53)
HCT VFR BLD AUTO: 29.6 % (ref 40–53)
HCT VFR BLD AUTO: 30 % (ref 40–53)
HCT VFR BLD AUTO: 30.2 % (ref 40–53)
HCT VFR BLD AUTO: 30.3 % (ref 40–53)
HCT VFR BLD AUTO: 30.3 % (ref 40–53)
HCT VFR BLD AUTO: 30.6 % (ref 40–53)
HCT VFR BLD AUTO: 31.5 % (ref 40–53)
HCT VFR BLD AUTO: 31.9 % (ref 40–53)
HCT VFR BLD AUTO: 32 % (ref 40–53)
HCT VFR BLD AUTO: 32.1 % (ref 40–53)
HCT VFR BLD AUTO: 32.6 % (ref 40–53)
HCT VFR BLD AUTO: 32.6 % (ref 40–53)
HCT VFR BLD AUTO: 32.7 % (ref 40–53)
HCT VFR BLD AUTO: 32.8 % (ref 40–53)
HCT VFR BLD AUTO: 33.1 % (ref 40–53)
HCT VFR BLD AUTO: 33.3 % (ref 40–53)
HCT VFR BLD AUTO: 33.5 % (ref 40–53)
HCT VFR BLD AUTO: 33.6 % (ref 40–53)
HCT VFR BLD AUTO: 33.8 % (ref 40–53)
HCT VFR BLD AUTO: 33.8 % (ref 40–53)
HCT VFR BLD AUTO: 34 % (ref 40–53)
HCT VFR BLD AUTO: 34.1 % (ref 40–53)
HCT VFR BLD AUTO: 34.9 % (ref 40–53)
HCT VFR BLD AUTO: 35 % (ref 40–53)
HCT VFR BLD AUTO: 35.4 % (ref 40–53)
HCT VFR BLD AUTO: 35.6 % (ref 40–53)
HCT VFR BLD AUTO: 36.1 % (ref 40–53)
HCT VFR BLD AUTO: 36.4 % (ref 40–53)
HCT VFR BLD AUTO: 36.4 % (ref 40–53)
HCT VFR BLD AUTO: 36.5 % (ref 40–53)
HCT VFR BLD AUTO: 36.5 % (ref 40–53)
HCT VFR BLD AUTO: 36.8 % (ref 40–53)
HCT VFR BLD AUTO: 37.4 % (ref 40–53)
HCT VFR BLD AUTO: 37.8 % (ref 40–53)
HCT VFR BLD AUTO: 38.6 % (ref 40–53)
HCT VFR BLD AUTO: 39.8 % (ref 40–53)
HCT VFR BLD AUTO: 40.1 % (ref 40–53)
HCT VFR BLD AUTO: 40.8 % (ref 40–53)
HCT VFR BLD AUTO: 40.8 % (ref 40–53)
HCT VFR BLD AUTO: 41.6 % (ref 40–53)
HCT VFR BLD AUTO: 41.8 % (ref 40–53)
HCT VFR BLD AUTO: 42 % (ref 40–53)
HCT VFR BLD AUTO: 42.3 % (ref 40–53)
HCT VFR BLD AUTO: 42.6 % (ref 40–53)
HCT VFR BLD AUTO: 44.3 % (ref 40–53)
HGB BLD-MCNC: 10 G/DL (ref 13.3–17.7)
HGB BLD-MCNC: 10 G/DL (ref 13.3–17.7)
HGB BLD-MCNC: 10.2 G/DL (ref 13.3–17.7)
HGB BLD-MCNC: 10.4 G/DL (ref 13.3–17.7)
HGB BLD-MCNC: 10.4 G/DL (ref 13.3–17.7)
HGB BLD-MCNC: 10.5 G/DL (ref 13.3–17.7)
HGB BLD-MCNC: 10.5 G/DL (ref 13.3–17.7)
HGB BLD-MCNC: 10.7 G/DL (ref 13.3–17.7)
HGB BLD-MCNC: 10.8 G/DL (ref 13.3–17.7)
HGB BLD-MCNC: 10.9 G/DL (ref 13.3–17.7)
HGB BLD-MCNC: 10.9 G/DL (ref 13.3–17.7)
HGB BLD-MCNC: 11.1 G/DL (ref 13.3–17.7)
HGB BLD-MCNC: 11.2 G/DL (ref 13.3–17.7)
HGB BLD-MCNC: 11.3 G/DL (ref 13.3–17.7)
HGB BLD-MCNC: 11.3 G/DL (ref 13.3–17.7)
HGB BLD-MCNC: 11.4 G/DL (ref 13.3–17.7)
HGB BLD-MCNC: 11.4 G/DL (ref 13.3–17.7)
HGB BLD-MCNC: 11.6 G/DL (ref 13.3–17.7)
HGB BLD-MCNC: 11.7 G/DL (ref 13.3–17.7)
HGB BLD-MCNC: 11.8 G/DL (ref 13.3–17.7)
HGB BLD-MCNC: 11.9 G/DL (ref 13.3–17.7)
HGB BLD-MCNC: 12.1 G/DL (ref 13.3–17.7)
HGB BLD-MCNC: 12.3 G/DL (ref 13.3–17.7)
HGB BLD-MCNC: 12.3 G/DL (ref 13.3–17.7)
HGB BLD-MCNC: 12.4 G/DL (ref 13.3–17.7)
HGB BLD-MCNC: 12.7 G/DL (ref 13.3–17.7)
HGB BLD-MCNC: 12.9 G/DL (ref 13.3–17.7)
HGB BLD-MCNC: 13 G/DL (ref 13.3–17.7)
HGB BLD-MCNC: 13.4 G/DL (ref 13.3–17.7)
HGB BLD-MCNC: 13.5 G/DL (ref 13.3–17.7)
HGB BLD-MCNC: 13.6 G/DL (ref 13.3–17.7)
HGB BLD-MCNC: 13.7 G/DL (ref 13.3–17.7)
HGB BLD-MCNC: 13.9 G/DL (ref 13.3–17.7)
HGB BLD-MCNC: 13.9 G/DL (ref 13.3–17.7)
HGB BLD-MCNC: 14.3 G/DL (ref 13.3–17.7)
HGB BLD-MCNC: 15 G/DL (ref 13.3–17.7)
HGB BLD-MCNC: 9.3 G/DL (ref 13.3–17.7)
HGB BLD-MCNC: 9.3 G/DL (ref 13.3–17.7)
HGB BLD-MCNC: 9.4 G/DL (ref 13.3–17.7)
HGB BLD-MCNC: 9.5 G/DL (ref 13.3–17.7)
HGB BLD-MCNC: 9.7 G/DL (ref 13.3–17.7)
HGB BLD-MCNC: 9.7 G/DL (ref 13.3–17.7)
HGB BLD-MCNC: 9.9 G/DL (ref 13.3–17.7)
HGB BLD-MCNC: 9.9 G/DL (ref 13.3–17.7)
HGB UR QL STRIP: ABNORMAL
HGB UR QL STRIP: NEGATIVE
HYALINE CASTS #/AREA URNS LPF: 1 /LPF (ref 0–2)
HYALINE CASTS #/AREA URNS LPF: 6 /LPF (ref 0–2)
HYALINE CASTS #/AREA URNS LPF: 9 /LPF (ref 0–2)
IMM GRANULOCYTES # BLD: 0 10E9/L (ref 0–0.4)
IMM GRANULOCYTES # BLD: 0 10E9/L (ref 0–0.4)
IMM GRANULOCYTES NFR BLD: 0.2 %
IMM GRANULOCYTES NFR BLD: 0.6 %
INR PPP: 1.18 (ref 0.86–1.14)
INR PPP: 1.25 (ref 0.86–1.14)
INR PPP: 1.28 (ref 0.86–1.14)
INTERPRETATION ECG - MUSE: NORMAL
KETONES UR STRIP-MCNC: NEGATIVE MG/DL
LABORATORY COMMENT REPORT: NORMAL
LABORATORY COMMENT REPORT: NORMAL
LACTATE BLD-SCNC: 0.8 MMOL/L (ref 0.7–2)
LACTATE BLD-SCNC: 0.9 MMOL/L (ref 0.7–2)
LACTATE BLD-SCNC: 1.3 MMOL/L (ref 0.7–2)
LACTATE BLD-SCNC: 1.6 MMOL/L (ref 0.7–2)
LDH FLD L TO P-CCNC: 306 U/L
LDH SERPL L TO P-CCNC: 204 U/L (ref 85–227)
LDH SERPL L TO P-CCNC: 368 U/L (ref 85–227)
LDH SERPL L TO P-CCNC: 481 U/L (ref 85–227)
LDH SERPL L TO P-CCNC: 610 U/L (ref 85–227)
LEUKOCYTE ESTERASE UR QL STRIP: NEGATIVE
LIPASE SERPL-CCNC: 18 U/L (ref 73–393)
LIPASE SERPL-CCNC: 241 U/L (ref 73–393)
LIPASE SERPL-CCNC: 99 U/L (ref 73–393)
LYMPHOCYTES # BLD AUTO: 0.3 10E9/L (ref 0.8–5.3)
LYMPHOCYTES # BLD AUTO: 0.3 10E9/L (ref 0.8–5.3)
LYMPHOCYTES # BLD AUTO: 0.5 10E9/L (ref 0.8–5.3)
LYMPHOCYTES # BLD AUTO: 0.6 10E9/L (ref 0.8–5.3)
LYMPHOCYTES # BLD AUTO: 0.7 10E9/L (ref 0.8–5.3)
LYMPHOCYTES # BLD AUTO: 0.7 10E9/L (ref 0.8–5.3)
LYMPHOCYTES # BLD AUTO: 0.8 10E9/L (ref 0.8–5.3)
LYMPHOCYTES # BLD AUTO: 0.8 10E9/L (ref 0.8–5.3)
LYMPHOCYTES # BLD AUTO: 0.9 10E9/L (ref 0.8–5.3)
LYMPHOCYTES # BLD AUTO: 1 10E9/L (ref 0.8–5.3)
LYMPHOCYTES # BLD AUTO: 1.2 10E9/L (ref 0.8–5.3)
LYMPHOCYTES # BLD AUTO: 1.2 10E9/L (ref 0.8–5.3)
LYMPHOCYTES # BLD AUTO: 1.3 10E9/L (ref 0.8–5.3)
LYMPHOCYTES # BLD AUTO: 1.5 10E9/L (ref 0.8–5.3)
LYMPHOCYTES # BLD AUTO: 1.6 10E9/L (ref 0.8–5.3)
LYMPHOCYTES # BLD AUTO: 1.7 10E9/L (ref 0.8–5.3)
LYMPHOCYTES # BLD AUTO: 1.8 10E9/L (ref 0.8–5.3)
LYMPHOCYTES # BLD AUTO: 10.8 10E9/L (ref 0.8–5.3)
LYMPHOCYTES # BLD AUTO: 12.5 10E9/L (ref 0.8–5.3)
LYMPHOCYTES # BLD AUTO: 2 10E9/L (ref 0.8–5.3)
LYMPHOCYTES # BLD AUTO: 2.2 10E9/L (ref 0.8–5.3)
LYMPHOCYTES # BLD AUTO: 2.4 10E9/L (ref 0.8–5.3)
LYMPHOCYTES # BLD AUTO: 2.4 10E9/L (ref 0.8–5.3)
LYMPHOCYTES # BLD AUTO: 2.7 10E9/L (ref 0.8–5.3)
LYMPHOCYTES # BLD AUTO: 3.2 10E9/L (ref 0.8–5.3)
LYMPHOCYTES # BLD AUTO: 3.6 10E9/L (ref 0.8–5.3)
LYMPHOCYTES # BLD AUTO: 3.8 10E9/L (ref 0.8–5.3)
LYMPHOCYTES # BLD AUTO: 4.1 10E9/L (ref 0.8–5.3)
LYMPHOCYTES # BLD AUTO: 4.6 10E9/L (ref 0.8–5.3)
LYMPHOCYTES # BLD AUTO: 4.7 10E9/L (ref 0.8–5.3)
LYMPHOCYTES # BLD AUTO: 5 10E9/L (ref 0.8–5.3)
LYMPHOCYTES # BLD AUTO: 5.1 10E9/L (ref 0.8–5.3)
LYMPHOCYTES # BLD AUTO: 5.9 10E9/L (ref 0.8–5.3)
LYMPHOCYTES # BLD AUTO: 6.5 10E9/L (ref 0.8–5.3)
LYMPHOCYTES # BLD AUTO: 7.4 10E9/L (ref 0.8–5.3)
LYMPHOCYTES NFR BLD AUTO: 1 %
LYMPHOCYTES NFR BLD AUTO: 1 %
LYMPHOCYTES NFR BLD AUTO: 1.5 %
LYMPHOCYTES NFR BLD AUTO: 10 %
LYMPHOCYTES NFR BLD AUTO: 11 %
LYMPHOCYTES NFR BLD AUTO: 11 %
LYMPHOCYTES NFR BLD AUTO: 12 %
LYMPHOCYTES NFR BLD AUTO: 12 %
LYMPHOCYTES NFR BLD AUTO: 12.4 %
LYMPHOCYTES NFR BLD AUTO: 13 %
LYMPHOCYTES NFR BLD AUTO: 13.8 %
LYMPHOCYTES NFR BLD AUTO: 14 %
LYMPHOCYTES NFR BLD AUTO: 15 %
LYMPHOCYTES NFR BLD AUTO: 15 %
LYMPHOCYTES NFR BLD AUTO: 17 %
LYMPHOCYTES NFR BLD AUTO: 17.9 %
LYMPHOCYTES NFR BLD AUTO: 2.5 %
LYMPHOCYTES NFR BLD AUTO: 21 %
LYMPHOCYTES NFR BLD AUTO: 22 %
LYMPHOCYTES NFR BLD AUTO: 22 %
LYMPHOCYTES NFR BLD AUTO: 24 %
LYMPHOCYTES NFR BLD AUTO: 26 %
LYMPHOCYTES NFR BLD AUTO: 29 %
LYMPHOCYTES NFR BLD AUTO: 3 %
LYMPHOCYTES NFR BLD AUTO: 31 %
LYMPHOCYTES NFR BLD AUTO: 33 %
LYMPHOCYTES NFR BLD AUTO: 4 %
LYMPHOCYTES NFR BLD AUTO: 4 %
LYMPHOCYTES NFR BLD AUTO: 5 %
LYMPHOCYTES NFR BLD AUTO: 5.5 %
LYMPHOCYTES NFR BLD AUTO: 55 %
LYMPHOCYTES NFR BLD AUTO: 6 %
LYMPHOCYTES NFR BLD AUTO: 7 %
LYMPHOCYTES NFR BLD AUTO: 7 %
LYMPHOCYTES NFR BLD AUTO: 8 %
LYMPHOCYTES NFR BLD AUTO: 9 %
LYMPHOCYTES NFR FLD MANUAL: 53 %
MACROCYTES BLD QL SMEAR: PRESENT
MAGNESIUM SERPL-MCNC: 2.1 MG/DL (ref 1.6–2.3)
MCH RBC QN AUTO: 26.3 PG (ref 26.5–33)
MCH RBC QN AUTO: 26.4 PG (ref 26.5–33)
MCH RBC QN AUTO: 26.5 PG (ref 26.5–33)
MCH RBC QN AUTO: 26.6 PG (ref 26.5–33)
MCH RBC QN AUTO: 26.7 PG (ref 26.5–33)
MCH RBC QN AUTO: 26.8 PG (ref 26.5–33)
MCH RBC QN AUTO: 26.9 PG (ref 26.5–33)
MCH RBC QN AUTO: 27 PG (ref 26.5–33)
MCH RBC QN AUTO: 27 PG (ref 26.5–33)
MCH RBC QN AUTO: 27.1 PG (ref 26.5–33)
MCH RBC QN AUTO: 27.3 PG (ref 26.5–33)
MCH RBC QN AUTO: 27.4 PG (ref 26.5–33)
MCH RBC QN AUTO: 27.5 PG (ref 26.5–33)
MCH RBC QN AUTO: 27.7 PG (ref 26.5–33)
MCH RBC QN AUTO: 27.9 PG (ref 26.5–33)
MCH RBC QN AUTO: 28 PG (ref 26.5–33)
MCH RBC QN AUTO: 28 PG (ref 26.5–33)
MCH RBC QN AUTO: 28.1 PG (ref 26.5–33)
MCH RBC QN AUTO: 28.2 PG (ref 26.5–33)
MCH RBC QN AUTO: 28.2 PG (ref 26.5–33)
MCH RBC QN AUTO: 28.3 PG (ref 26.5–33)
MCH RBC QN AUTO: 28.4 PG (ref 26.5–33)
MCH RBC QN AUTO: 28.4 PG (ref 26.5–33)
MCH RBC QN AUTO: 28.6 PG (ref 26.5–33)
MCH RBC QN AUTO: 28.6 PG (ref 26.5–33)
MCH RBC QN AUTO: 28.7 PG (ref 26.5–33)
MCH RBC QN AUTO: 28.8 PG (ref 26.5–33)
MCH RBC QN AUTO: 28.9 PG (ref 26.5–33)
MCH RBC QN AUTO: 29.6 PG (ref 26.5–33)
MCH RBC QN AUTO: 29.9 PG (ref 26.5–33)
MCH RBC QN AUTO: 30 PG (ref 26.5–33)
MCH RBC QN AUTO: 30.3 PG (ref 26.5–33)
MCH RBC QN AUTO: 30.5 PG (ref 26.5–33)
MCH RBC QN AUTO: 30.6 PG (ref 26.5–33)
MCH RBC QN AUTO: 30.7 PG (ref 26.5–33)
MCHC RBC AUTO-ENTMCNC: 31.7 G/DL (ref 31.5–36.5)
MCHC RBC AUTO-ENTMCNC: 31.9 G/DL (ref 31.5–36.5)
MCHC RBC AUTO-ENTMCNC: 32 G/DL (ref 31.5–36.5)
MCHC RBC AUTO-ENTMCNC: 32.1 G/DL (ref 31.5–36.5)
MCHC RBC AUTO-ENTMCNC: 32.1 G/DL (ref 31.5–36.5)
MCHC RBC AUTO-ENTMCNC: 32.2 G/DL (ref 31.5–36.5)
MCHC RBC AUTO-ENTMCNC: 32.2 G/DL (ref 31.5–36.5)
MCHC RBC AUTO-ENTMCNC: 32.3 G/DL (ref 31.5–36.5)
MCHC RBC AUTO-ENTMCNC: 32.4 G/DL (ref 31.5–36.5)
MCHC RBC AUTO-ENTMCNC: 32.5 G/DL (ref 31.5–36.5)
MCHC RBC AUTO-ENTMCNC: 32.5 G/DL (ref 31.5–36.5)
MCHC RBC AUTO-ENTMCNC: 32.6 G/DL (ref 31.5–36.5)
MCHC RBC AUTO-ENTMCNC: 32.7 G/DL (ref 31.5–36.5)
MCHC RBC AUTO-ENTMCNC: 32.8 G/DL (ref 31.5–36.5)
MCHC RBC AUTO-ENTMCNC: 32.9 G/DL (ref 31.5–36.5)
MCHC RBC AUTO-ENTMCNC: 33 G/DL (ref 31.5–36.5)
MCHC RBC AUTO-ENTMCNC: 33.1 G/DL (ref 31.5–36.5)
MCHC RBC AUTO-ENTMCNC: 33.2 G/DL (ref 31.5–36.5)
MCHC RBC AUTO-ENTMCNC: 33.2 G/DL (ref 31.5–36.5)
MCHC RBC AUTO-ENTMCNC: 33.3 G/DL (ref 31.5–36.5)
MCHC RBC AUTO-ENTMCNC: 33.3 G/DL (ref 31.5–36.5)
MCHC RBC AUTO-ENTMCNC: 33.4 G/DL (ref 31.5–36.5)
MCHC RBC AUTO-ENTMCNC: 33.5 G/DL (ref 31.5–36.5)
MCHC RBC AUTO-ENTMCNC: 33.6 G/DL (ref 31.5–36.5)
MCHC RBC AUTO-ENTMCNC: 33.6 G/DL (ref 31.5–36.5)
MCHC RBC AUTO-ENTMCNC: 33.8 G/DL (ref 31.5–36.5)
MCHC RBC AUTO-ENTMCNC: 33.9 G/DL (ref 31.5–36.5)
MCHC RBC AUTO-ENTMCNC: 34 G/DL (ref 31.5–36.5)
MCHC RBC AUTO-ENTMCNC: 34.1 G/DL (ref 31.5–36.5)
MCV RBC AUTO: 80 FL (ref 78–100)
MCV RBC AUTO: 81 FL (ref 78–100)
MCV RBC AUTO: 82 FL (ref 78–100)
MCV RBC AUTO: 83 FL (ref 78–100)
MCV RBC AUTO: 84 FL (ref 78–100)
MCV RBC AUTO: 85 FL (ref 78–100)
MCV RBC AUTO: 86 FL (ref 78–100)
MCV RBC AUTO: 87 FL (ref 78–100)
MCV RBC AUTO: 88 FL (ref 78–100)
MCV RBC AUTO: 91 FL (ref 78–100)
MCV RBC AUTO: 92 FL (ref 78–100)
MCV RBC AUTO: 93 FL (ref 78–100)
MCV RBC AUTO: 93 FL (ref 78–100)
MCV RBC AUTO: 94 FL (ref 78–100)
MCV RBC AUTO: 94 FL (ref 78–100)
MCV RBC AUTO: 96 FL (ref 78–100)
METAMYELOCYTES # BLD: 0 10E9/L
METAMYELOCYTES # BLD: 0.1 10E9/L
METAMYELOCYTES # BLD: 0.1 10E9/L
METAMYELOCYTES # BLD: 0.5 10E9/L
METAMYELOCYTES # BLD: 0.6 10E9/L
METAMYELOCYTES # BLD: 0.7 10E9/L
METAMYELOCYTES # BLD: 0.9 10E9/L
METAMYELOCYTES # BLD: 1.1 10E9/L
METAMYELOCYTES # BLD: 1.9 10E9/L
METAMYELOCYTES # BLD: 11.2 10E9/L
METAMYELOCYTES # BLD: 13.8 10E9/L
METAMYELOCYTES # BLD: 16.6 10E9/L
METAMYELOCYTES # BLD: 16.8 10E9/L
METAMYELOCYTES # BLD: 17 10E9/L
METAMYELOCYTES # BLD: 2 10E9/L
METAMYELOCYTES # BLD: 2.1 10E9/L
METAMYELOCYTES # BLD: 2.3 10E9/L
METAMYELOCYTES # BLD: 2.5 10E9/L
METAMYELOCYTES # BLD: 21 10E9/L
METAMYELOCYTES # BLD: 21.9 10E9/L
METAMYELOCYTES # BLD: 22.2 10E9/L
METAMYELOCYTES # BLD: 4 10E9/L
METAMYELOCYTES # BLD: 4.3 10E9/L
METAMYELOCYTES # BLD: 5.2 10E9/L
METAMYELOCYTES # BLD: 5.4 10E9/L
METAMYELOCYTES # BLD: 5.6 10E9/L
METAMYELOCYTES # BLD: 9.9 10E9/L
METAMYELOCYTES NFR BLD MANUAL: 1 %
METAMYELOCYTES NFR BLD MANUAL: 1 %
METAMYELOCYTES NFR BLD MANUAL: 10 %
METAMYELOCYTES NFR BLD MANUAL: 10 %
METAMYELOCYTES NFR BLD MANUAL: 11 %
METAMYELOCYTES NFR BLD MANUAL: 13 %
METAMYELOCYTES NFR BLD MANUAL: 16 %
METAMYELOCYTES NFR BLD MANUAL: 17 %
METAMYELOCYTES NFR BLD MANUAL: 18 %
METAMYELOCYTES NFR BLD MANUAL: 2 %
METAMYELOCYTES NFR BLD MANUAL: 2 %
METAMYELOCYTES NFR BLD MANUAL: 2.5 %
METAMYELOCYTES NFR BLD MANUAL: 3 %
METAMYELOCYTES NFR BLD MANUAL: 4 %
METAMYELOCYTES NFR BLD MANUAL: 5 %
METAMYELOCYTES NFR BLD MANUAL: 6 %
METAMYELOCYTES NFR BLD MANUAL: 7 %
METAMYELOCYTES NFR BLD MANUAL: 8 %
METAMYELOCYTES NFR BLD MANUAL: 9.5 %
MICROCYTES BLD QL SMEAR: PRESENT
MONOCYTES # BLD AUTO: 0 10E9/L (ref 0–1.3)
MONOCYTES # BLD AUTO: 0.1 10E9/L (ref 0–1.3)
MONOCYTES # BLD AUTO: 0.2 10E9/L (ref 0–1.3)
MONOCYTES # BLD AUTO: 0.3 10E9/L (ref 0–1.3)
MONOCYTES # BLD AUTO: 0.4 10E9/L (ref 0–1.3)
MONOCYTES # BLD AUTO: 0.4 10E9/L (ref 0–1.3)
MONOCYTES # BLD AUTO: 0.5 10E9/L (ref 0–1.3)
MONOCYTES # BLD AUTO: 0.5 10E9/L (ref 0–1.3)
MONOCYTES # BLD AUTO: 0.6 10E9/L (ref 0–1.3)
MONOCYTES # BLD AUTO: 0.7 10E9/L (ref 0–1.3)
MONOCYTES # BLD AUTO: 0.8 10E9/L (ref 0–1.3)
MONOCYTES # BLD AUTO: 1.7 10E9/L (ref 0–1.3)
MONOCYTES # BLD AUTO: 1.9 10E9/L (ref 0–1.3)
MONOCYTES # BLD AUTO: 16.4 10E9/L (ref 0–1.3)
MONOCYTES # BLD AUTO: 17.6 10E9/L (ref 0–1.3)
MONOCYTES # BLD AUTO: 17.7 10E9/L (ref 0–1.3)
MONOCYTES # BLD AUTO: 2.2 10E9/L (ref 0–1.3)
MONOCYTES # BLD AUTO: 2.5 10E9/L (ref 0–1.3)
MONOCYTES # BLD AUTO: 2.7 10E9/L (ref 0–1.3)
MONOCYTES # BLD AUTO: 26 10E9/L (ref 0–1.3)
MONOCYTES # BLD AUTO: 26 10E9/L (ref 0–1.3)
MONOCYTES # BLD AUTO: 28 10E9/L (ref 0–1.3)
MONOCYTES # BLD AUTO: 4.4 10E9/L (ref 0–1.3)
MONOCYTES # BLD AUTO: 4.8 10E9/L (ref 0–1.3)
MONOCYTES # BLD AUTO: 4.9 10E9/L (ref 0–1.3)
MONOCYTES # BLD AUTO: 5.2 10E9/L (ref 0–1.3)
MONOCYTES # BLD AUTO: 51.2 10E9/L (ref 0–1.3)
MONOCYTES # BLD AUTO: 53.7 10E9/L (ref 0–1.3)
MONOCYTES # BLD AUTO: 56.1 10E9/L (ref 0–1.3)
MONOCYTES # BLD AUTO: 6.7 10E9/L (ref 0–1.3)
MONOCYTES # BLD AUTO: 60.8 10E9/L (ref 0–1.3)
MONOCYTES # BLD AUTO: 66.3 10E9/L (ref 0–1.3)
MONOCYTES # BLD AUTO: 7.8 10E9/L (ref 0–1.3)
MONOCYTES # BLD AUTO: 9 10E9/L (ref 0–1.3)
MONOCYTES # BLD AUTO: 9.1 10E9/L (ref 0–1.3)
MONOCYTES NFR BLD AUTO: 0 %
MONOCYTES NFR BLD AUTO: 0 %
MONOCYTES NFR BLD AUTO: 1.2 %
MONOCYTES NFR BLD AUTO: 10 %
MONOCYTES NFR BLD AUTO: 11 %
MONOCYTES NFR BLD AUTO: 11 %
MONOCYTES NFR BLD AUTO: 12 %
MONOCYTES NFR BLD AUTO: 14 %
MONOCYTES NFR BLD AUTO: 14.1 %
MONOCYTES NFR BLD AUTO: 16 %
MONOCYTES NFR BLD AUTO: 16 %
MONOCYTES NFR BLD AUTO: 17 %
MONOCYTES NFR BLD AUTO: 18 %
MONOCYTES NFR BLD AUTO: 19 %
MONOCYTES NFR BLD AUTO: 2 %
MONOCYTES NFR BLD AUTO: 20.5 %
MONOCYTES NFR BLD AUTO: 22 %
MONOCYTES NFR BLD AUTO: 22 %
MONOCYTES NFR BLD AUTO: 23 %
MONOCYTES NFR BLD AUTO: 24 %
MONOCYTES NFR BLD AUTO: 27 %
MONOCYTES NFR BLD AUTO: 27 %
MONOCYTES NFR BLD AUTO: 29 %
MONOCYTES NFR BLD AUTO: 3 %
MONOCYTES NFR BLD AUTO: 30 %
MONOCYTES NFR BLD AUTO: 32 %
MONOCYTES NFR BLD AUTO: 32 %
MONOCYTES NFR BLD AUTO: 34 %
MONOCYTES NFR BLD AUTO: 35 %
MONOCYTES NFR BLD AUTO: 41 %
MONOCYTES NFR BLD AUTO: 5 %
MONOCYTES NFR BLD AUTO: 5 %
MONOCYTES NFR BLD AUTO: 5.2 %
MONOCYTES NFR BLD AUTO: 6 %
MONOCYTES NFR BLD AUTO: 7 %
MONOCYTES NFR BLD AUTO: 7.1 %
MONOCYTES NFR BLD AUTO: 8 %
MONOCYTES NFR BLD AUTO: 8 %
MONOCYTES NFR BLD AUTO: 9 %
MONOS+MACROS NFR FLD MANUAL: 16 %
MUCOUS THREADS #/AREA URNS LPF: PRESENT /LPF
MYELOCYTES # BLD: 0 10E9/L
MYELOCYTES # BLD: 0.1 10E9/L
MYELOCYTES # BLD: 0.1 10E9/L
MYELOCYTES # BLD: 0.2 10E9/L
MYELOCYTES # BLD: 0.3 10E9/L
MYELOCYTES # BLD: 0.3 10E9/L
MYELOCYTES # BLD: 0.4 10E9/L
MYELOCYTES # BLD: 0.6 10E9/L
MYELOCYTES # BLD: 0.7 10E9/L
MYELOCYTES # BLD: 0.8 10E9/L
MYELOCYTES # BLD: 1.1 10E9/L
MYELOCYTES # BLD: 1.2 10E9/L
MYELOCYTES # BLD: 10.9 10E9/L
MYELOCYTES # BLD: 14.5 10E9/L
MYELOCYTES # BLD: 14.6 10E9/L
MYELOCYTES # BLD: 18.8 10E9/L
MYELOCYTES # BLD: 2.7 10E9/L
MYELOCYTES # BLD: 25.6 10E9/L
MYELOCYTES # BLD: 3.8 10E9/L
MYELOCYTES # BLD: 32.2 10E9/L
MYELOCYTES # BLD: 4.3 10E9/L
MYELOCYTES # BLD: 4.6 10E9/L
MYELOCYTES # BLD: 5.2 10E9/L
MYELOCYTES # BLD: 6.7 10E9/L
MYELOCYTES # BLD: 8.1 10E9/L
MYELOCYTES # BLD: 8.4 10E9/L
MYELOCYTES NFR BLD MANUAL: 0.5 %
MYELOCYTES NFR BLD MANUAL: 1 %
MYELOCYTES NFR BLD MANUAL: 10 %
MYELOCYTES NFR BLD MANUAL: 11 %
MYELOCYTES NFR BLD MANUAL: 14 %
MYELOCYTES NFR BLD MANUAL: 16 %
MYELOCYTES NFR BLD MANUAL: 18 %
MYELOCYTES NFR BLD MANUAL: 2 %
MYELOCYTES NFR BLD MANUAL: 3 %
MYELOCYTES NFR BLD MANUAL: 5 %
MYELOCYTES NFR BLD MANUAL: 5 %
MYELOCYTES NFR BLD MANUAL: 7 %
MYELOCYTES NFR BLD MANUAL: 7.5 %
MYELOCYTES NFR BLD MANUAL: 8 %
MYELOCYTES NFR BLD MANUAL: 9 %
MYELOCYTES NFR BLD MANUAL: 9.5 %
NEUTROPHILS # BLD AUTO: 0.1 10E9/L (ref 1.6–8.3)
NEUTROPHILS # BLD AUTO: 0.8 10E9/L (ref 1.6–8.3)
NEUTROPHILS # BLD AUTO: 1.1 10E9/L (ref 1.6–8.3)
NEUTROPHILS # BLD AUTO: 1.2 10E9/L (ref 1.6–8.3)
NEUTROPHILS # BLD AUTO: 1.7 10E9/L (ref 1.6–8.3)
NEUTROPHILS # BLD AUTO: 1.9 10E9/L (ref 1.6–8.3)
NEUTROPHILS # BLD AUTO: 11.1 10E9/L (ref 1.6–8.3)
NEUTROPHILS # BLD AUTO: 11.4 10E9/L (ref 1.6–8.3)
NEUTROPHILS # BLD AUTO: 11.6 10E9/L (ref 1.6–8.3)
NEUTROPHILS # BLD AUTO: 14 10E9/L (ref 1.6–8.3)
NEUTROPHILS # BLD AUTO: 15.3 10E9/L (ref 1.6–8.3)
NEUTROPHILS # BLD AUTO: 15.9 10E9/L (ref 1.6–8.3)
NEUTROPHILS # BLD AUTO: 16.6 10E9/L (ref 1.6–8.3)
NEUTROPHILS # BLD AUTO: 17.4 10E9/L (ref 1.6–8.3)
NEUTROPHILS # BLD AUTO: 17.8 10E9/L (ref 1.6–8.3)
NEUTROPHILS # BLD AUTO: 2.1 10E9/L (ref 1.6–8.3)
NEUTROPHILS # BLD AUTO: 2.2 10E9/L (ref 1.6–8.3)
NEUTROPHILS # BLD AUTO: 2.2 10E9/L (ref 1.6–8.3)
NEUTROPHILS # BLD AUTO: 2.4 10E9/L (ref 1.6–8.3)
NEUTROPHILS # BLD AUTO: 2.4 10E9/L (ref 1.6–8.3)
NEUTROPHILS # BLD AUTO: 23 10E9/L (ref 1.6–8.3)
NEUTROPHILS # BLD AUTO: 26.7 10E9/L (ref 1.6–8.3)
NEUTROPHILS # BLD AUTO: 27.6 10E9/L (ref 1.6–8.3)
NEUTROPHILS # BLD AUTO: 3 10E9/L (ref 1.6–8.3)
NEUTROPHILS # BLD AUTO: 3.1 10E9/L (ref 1.6–8.3)
NEUTROPHILS # BLD AUTO: 32 10E9/L (ref 1.6–8.3)
NEUTROPHILS # BLD AUTO: 32.2 10E9/L (ref 1.6–8.3)
NEUTROPHILS # BLD AUTO: 36.9 10E9/L (ref 1.6–8.3)
NEUTROPHILS # BLD AUTO: 4.1 10E9/L (ref 1.6–8.3)
NEUTROPHILS # BLD AUTO: 40.3 10E9/L (ref 1.6–8.3)
NEUTROPHILS # BLD AUTO: 48.5 10E9/L (ref 1.6–8.3)
NEUTROPHILS # BLD AUTO: 5.3 10E9/L (ref 1.6–8.3)
NEUTROPHILS # BLD AUTO: 5.8 10E9/L (ref 1.6–8.3)
NEUTROPHILS # BLD AUTO: 50.8 10E9/L (ref 1.6–8.3)
NEUTROPHILS # BLD AUTO: 51.9 10E9/L (ref 1.6–8.3)
NEUTROPHILS # BLD AUTO: 54.7 10E9/L (ref 1.6–8.3)
NEUTROPHILS # BLD AUTO: 6.9 10E9/L (ref 1.6–8.3)
NEUTROPHILS # BLD AUTO: 60.9 10E9/L (ref 1.6–8.3)
NEUTROPHILS # BLD AUTO: 66.5 10E9/L (ref 1.6–8.3)
NEUTROPHILS # BLD AUTO: 82.2 10E9/L (ref 1.6–8.3)
NEUTROPHILS # BLD AUTO: 9.2 10E9/L (ref 1.6–8.3)
NEUTROPHILS # BLD AUTO: 9.7 10E9/L (ref 1.6–8.3)
NEUTROPHILS # BLD AUTO: 9.8 10E9/L (ref 1.6–8.3)
NEUTROPHILS NFR BLD AUTO: 19 %
NEUTROPHILS NFR BLD AUTO: 20 %
NEUTROPHILS NFR BLD AUTO: 21 %
NEUTROPHILS NFR BLD AUTO: 29 %
NEUTROPHILS NFR BLD AUTO: 3.5 %
NEUTROPHILS NFR BLD AUTO: 30 %
NEUTROPHILS NFR BLD AUTO: 34 %
NEUTROPHILS NFR BLD AUTO: 38 %
NEUTROPHILS NFR BLD AUTO: 39 %
NEUTROPHILS NFR BLD AUTO: 44 %
NEUTROPHILS NFR BLD AUTO: 44 %
NEUTROPHILS NFR BLD AUTO: 45 %
NEUTROPHILS NFR BLD AUTO: 45 %
NEUTROPHILS NFR BLD AUTO: 48 %
NEUTROPHILS NFR BLD AUTO: 48.4 %
NEUTROPHILS NFR BLD AUTO: 49 %
NEUTROPHILS NFR BLD AUTO: 50 %
NEUTROPHILS NFR BLD AUTO: 50 %
NEUTROPHILS NFR BLD AUTO: 52 %
NEUTROPHILS NFR BLD AUTO: 52.5 %
NEUTROPHILS NFR BLD AUTO: 53 %
NEUTROPHILS NFR BLD AUTO: 53 %
NEUTROPHILS NFR BLD AUTO: 53.1 %
NEUTROPHILS NFR BLD AUTO: 54 %
NEUTROPHILS NFR BLD AUTO: 54 %
NEUTROPHILS NFR BLD AUTO: 55 %
NEUTROPHILS NFR BLD AUTO: 55.5 %
NEUTROPHILS NFR BLD AUTO: 58 %
NEUTROPHILS NFR BLD AUTO: 60 %
NEUTROPHILS NFR BLD AUTO: 60 %
NEUTROPHILS NFR BLD AUTO: 61 %
NEUTROPHILS NFR BLD AUTO: 62 %
NEUTROPHILS NFR BLD AUTO: 63 %
NEUTROPHILS NFR BLD AUTO: 63 %
NEUTROPHILS NFR BLD AUTO: 64.5 %
NEUTROPHILS NFR BLD AUTO: 65 %
NEUTROPHILS NFR BLD AUTO: 66 %
NEUTROPHILS NFR BLD AUTO: 66.1 %
NEUTROPHILS NFR BLD AUTO: 69 %
NEUTROPHILS NFR BLD AUTO: 72 %
NEUTROPHILS NFR BLD AUTO: 73 %
NEUTROPHILS NFR BLD AUTO: 77 %
NEUTROPHILS NFR BLD AUTO: 78.6 %
NEUTROPHILS NFR BLD AUTO: 85.8 %
NEUTS BAND NFR FLD MANUAL: 24 %
NITRATE UR QL: NEGATIVE
NON-SQ EPI CELLS #/AREA URNS LPF: NORMAL /LPF
NRBC # BLD AUTO: 0 10*3/UL
NRBC # BLD AUTO: 0.2 10*3/UL
NRBC # BLD AUTO: 0.4 10*3/UL
NRBC # BLD AUTO: 0.8 10*3/UL
NRBC # BLD AUTO: 0.9 10*3/UL
NRBC # BLD AUTO: 1.2 10*3/UL
NRBC # BLD AUTO: 1.6 10*3/UL
NRBC # BLD AUTO: 2.9 10*3/UL
NRBC # BLD AUTO: 3.4 10*3/UL
NRBC # BLD AUTO: 4.3 10*3/UL
NRBC # BLD AUTO: 8.9 10*3/UL
NRBC BLD AUTO-RTO: 0 /100
NRBC BLD AUTO-RTO: 0 /100
NRBC BLD AUTO-RTO: 1 /100
NRBC BLD AUTO-RTO: 2 /100
NRBC BLD AUTO-RTO: 3 /100
NRBC BLD AUTO-RTO: 5 /100
NT-PROBNP SERPL-MCNC: 397 PG/ML (ref 0–450)
NT-PROBNP SERPL-MCNC: 478 PG/ML (ref 0–1800)
NUM BPU REQUESTED: 1
OTHER CELLS # BLD MANUAL: 0 10E9/L
OTHER CELLS # BLD MANUAL: 153.9 10E9/L
OTHER CELLS # BLD MANUAL: 22.3 10E9/L
OTHER CELLS # BLD MANUAL: 48.7 10E9/L
OTHER CELLS FLD MANUAL: 7 %
OTHER CELLS NFR BLD MANUAL: 1 %
OTHER CELLS NFR BLD MANUAL: 48 %
OTHER CELLS NFR BLD MANUAL: 58 %
OTHER CELLS NFR BLD MANUAL: 78.5 %
OVALOCYTES BLD QL SMEAR: SLIGHT
PH FLD: 9 PH
PH UR STRIP: 5 PH (ref 5–7)
PH UR STRIP: 5.5 PH (ref 5–7)
PH UR STRIP: 6.5 PH (ref 5–7)
PHOSPHATE SERPL-MCNC: 1.2 MG/DL (ref 2.5–4.5)
PHOSPHATE SERPL-MCNC: 3.3 MG/DL (ref 2.5–4.5)
PHOSPHATE SERPL-MCNC: 3.8 MG/DL (ref 2.5–4.5)
PLATELET # BLD AUTO: 107 10E9/L (ref 150–450)
PLATELET # BLD AUTO: 108 10E9/L (ref 150–450)
PLATELET # BLD AUTO: 110 10E9/L (ref 150–450)
PLATELET # BLD AUTO: 111 10E9/L (ref 150–450)
PLATELET # BLD AUTO: 112 10E9/L (ref 150–450)
PLATELET # BLD AUTO: 115 10E9/L (ref 150–450)
PLATELET # BLD AUTO: 119 10E9/L (ref 150–450)
PLATELET # BLD AUTO: 1219 10E9/L (ref 150–450)
PLATELET # BLD AUTO: 127 10E9/L (ref 150–450)
PLATELET # BLD AUTO: 132 10E9/L (ref 150–450)
PLATELET # BLD AUTO: 139 10E9/L (ref 150–450)
PLATELET # BLD AUTO: 140 10E9/L (ref 150–450)
PLATELET # BLD AUTO: 1420 10E9/L (ref 150–450)
PLATELET # BLD AUTO: 144 10E9/L (ref 150–450)
PLATELET # BLD AUTO: 156 10E9/L (ref 150–450)
PLATELET # BLD AUTO: 163 10E9/L (ref 150–450)
PLATELET # BLD AUTO: 179 10E9/L (ref 150–450)
PLATELET # BLD AUTO: 213 10E9/L (ref 150–450)
PLATELET # BLD AUTO: 235 10E9/L (ref 150–450)
PLATELET # BLD AUTO: 241 10E9/L (ref 150–450)
PLATELET # BLD AUTO: 247 10E9/L (ref 150–450)
PLATELET # BLD AUTO: 260 10E9/L (ref 150–450)
PLATELET # BLD AUTO: 32 10E9/L (ref 150–450)
PLATELET # BLD AUTO: 35 10E9/L (ref 150–450)
PLATELET # BLD AUTO: 35 10E9/L (ref 150–450)
PLATELET # BLD AUTO: 36 10E9/L (ref 150–450)
PLATELET # BLD AUTO: 360 10E9/L (ref 150–450)
PLATELET # BLD AUTO: 39 10E9/L (ref 150–450)
PLATELET # BLD AUTO: 431 10E9/L (ref 150–450)
PLATELET # BLD AUTO: 44 10E9/L (ref 150–450)
PLATELET # BLD AUTO: 44 10E9/L (ref 150–450)
PLATELET # BLD AUTO: 442 10E9/L (ref 150–450)
PLATELET # BLD AUTO: 482 10E9/L (ref 150–450)
PLATELET # BLD AUTO: 532 10E9/L (ref 150–450)
PLATELET # BLD AUTO: 542 10E9/L (ref 150–450)
PLATELET # BLD AUTO: 58 10E9/L (ref 150–450)
PLATELET # BLD AUTO: 669 10E9/L (ref 150–450)
PLATELET # BLD AUTO: 735 10E9/L (ref 150–450)
PLATELET # BLD AUTO: 74 10E9/L (ref 150–450)
PLATELET # BLD AUTO: 76 10E9/L (ref 150–450)
PLATELET # BLD AUTO: 78 10E9/L (ref 150–450)
PLATELET # BLD AUTO: 818 10E9/L (ref 150–450)
PLATELET # BLD AUTO: 82 10E9/L (ref 150–450)
PLATELET # BLD AUTO: 85 10E9/L (ref 150–450)
PLATELET # BLD AUTO: 85 10E9/L (ref 150–450)
PLATELET # BLD AUTO: 98 10E9/L (ref 150–450)
PLATELET # BLD EST: ABNORMAL 10*3/UL
POIKILOCYTOSIS BLD QL SMEAR: SLIGHT
POIKILOCYTOSIS BLD QL SMEAR: SLIGHT
POLYCHROMASIA BLD QL SMEAR: SLIGHT
POTASSIUM SERPL-SCNC: 3.3 MMOL/L (ref 3.4–5.3)
POTASSIUM SERPL-SCNC: 3.4 MMOL/L (ref 3.4–5.3)
POTASSIUM SERPL-SCNC: 3.4 MMOL/L (ref 3.4–5.3)
POTASSIUM SERPL-SCNC: 3.5 MMOL/L (ref 3.4–5.3)
POTASSIUM SERPL-SCNC: 3.5 MMOL/L (ref 3.4–5.3)
POTASSIUM SERPL-SCNC: 3.6 MMOL/L (ref 3.4–5.3)
POTASSIUM SERPL-SCNC: 3.7 MMOL/L (ref 3.4–5.3)
POTASSIUM SERPL-SCNC: 3.8 MMOL/L (ref 3.4–5.3)
POTASSIUM SERPL-SCNC: 4 MMOL/L (ref 3.4–5.3)
POTASSIUM SERPL-SCNC: 4.1 MMOL/L (ref 3.4–5.3)
POTASSIUM SERPL-SCNC: 4.1 MMOL/L (ref 3.4–5.3)
POTASSIUM SERPL-SCNC: 4.2 MMOL/L (ref 3.4–5.3)
POTASSIUM SERPL-SCNC: 4.2 MMOL/L (ref 3.4–5.3)
POTASSIUM SERPL-SCNC: 4.3 MMOL/L (ref 3.4–5.3)
POTASSIUM SERPL-SCNC: 5.3 MMOL/L (ref 3.4–5.3)
PROMYELOCYTES # BLD MANUAL: 1.7 10E9/L
PROMYELOCYTES NFR BLD MANUAL: 1 %
PROT FLD-MCNC: 3.3 G/DL
PROT SERPL-MCNC: 6.3 G/DL (ref 6.8–8.8)
PROT SERPL-MCNC: 6.4 G/DL (ref 6.8–8.8)
PROT SERPL-MCNC: 6.8 G/DL (ref 6.8–8.8)
PROT SERPL-MCNC: 6.9 G/DL (ref 6.8–8.8)
PROT SERPL-MCNC: 6.9 G/DL (ref 6.8–8.8)
PROT SERPL-MCNC: 7 G/DL (ref 6.8–8.8)
PROT SERPL-MCNC: 7 G/DL (ref 6.8–8.8)
PROT SERPL-MCNC: 7.2 G/DL (ref 6.8–8.8)
PROT SERPL-MCNC: 7.2 G/DL (ref 6.8–8.8)
PROT SERPL-MCNC: 7.3 G/DL (ref 6.8–8.8)
PROT SERPL-MCNC: 7.4 G/DL (ref 6.8–8.8)
PROT SERPL-MCNC: 7.5 G/DL (ref 6.8–8.8)
PROT SERPL-MCNC: 7.5 G/DL (ref 6.8–8.8)
PROT SERPL-MCNC: 7.6 G/DL (ref 6.8–8.8)
PROT SERPL-MCNC: 7.7 G/DL (ref 6.8–8.8)
PROT SERPL-MCNC: 7.8 G/DL (ref 6.8–8.8)
PROT SERPL-MCNC: 7.9 G/DL (ref 6.8–8.8)
PROT SERPL-MCNC: 7.9 G/DL (ref 6.8–8.8)
PROT SERPL-MCNC: 8.3 G/DL (ref 6.8–8.8)
PROT SERPL-MCNC: 8.4 G/DL (ref 6.8–8.8)
PROT SERPL-MCNC: 8.5 G/DL (ref 6.8–8.8)
RADIOLOGIST FLAGS: ABNORMAL
RBC # BLD AUTO: 3.04 10E12/L (ref 4.4–5.9)
RBC # BLD AUTO: 3.18 10E12/L (ref 4.4–5.9)
RBC # BLD AUTO: 3.21 10E12/L (ref 4.4–5.9)
RBC # BLD AUTO: 3.22 10E12/L (ref 4.4–5.9)
RBC # BLD AUTO: 3.23 10E12/L (ref 4.4–5.9)
RBC # BLD AUTO: 3.25 10E12/L (ref 4.4–5.9)
RBC # BLD AUTO: 3.43 10E12/L (ref 4.4–5.9)
RBC # BLD AUTO: 3.5 10E12/L (ref 4.4–5.9)
RBC # BLD AUTO: 3.54 10E12/L (ref 4.4–5.9)
RBC # BLD AUTO: 3.58 10E12/L (ref 4.4–5.9)
RBC # BLD AUTO: 3.6 10E12/L (ref 4.4–5.9)
RBC # BLD AUTO: 3.73 10E12/L (ref 4.4–5.9)
RBC # BLD AUTO: 3.77 10E12/L (ref 4.4–5.9)
RBC # BLD AUTO: 3.79 10E12/L (ref 4.4–5.9)
RBC # BLD AUTO: 3.8 10E12/L (ref 4.4–5.9)
RBC # BLD AUTO: 3.86 10E12/L (ref 4.4–5.9)
RBC # BLD AUTO: 3.87 10E12/L (ref 4.4–5.9)
RBC # BLD AUTO: 3.91 10E12/L (ref 4.4–5.9)
RBC # BLD AUTO: 3.93 10E12/L (ref 4.4–5.9)
RBC # BLD AUTO: 3.95 10E12/L (ref 4.4–5.9)
RBC # BLD AUTO: 4 10E12/L (ref 4.4–5.9)
RBC # BLD AUTO: 4 10E12/L (ref 4.4–5.9)
RBC # BLD AUTO: 4.02 10E12/L (ref 4.4–5.9)
RBC # BLD AUTO: 4.03 10E12/L (ref 4.4–5.9)
RBC # BLD AUTO: 4.14 10E12/L (ref 4.4–5.9)
RBC # BLD AUTO: 4.15 10E12/L (ref 4.4–5.9)
RBC # BLD AUTO: 4.17 10E12/L (ref 4.4–5.9)
RBC # BLD AUTO: 4.19 10E12/L (ref 4.4–5.9)
RBC # BLD AUTO: 4.29 10E12/L (ref 4.4–5.9)
RBC # BLD AUTO: 4.3 10E12/L (ref 4.4–5.9)
RBC # BLD AUTO: 4.32 10E12/L (ref 4.4–5.9)
RBC # BLD AUTO: 4.33 10E12/L (ref 4.4–5.9)
RBC # BLD AUTO: 4.38 10E12/L (ref 4.4–5.9)
RBC # BLD AUTO: 4.44 10E12/L (ref 4.4–5.9)
RBC # BLD AUTO: 4.49 10E12/L (ref 4.4–5.9)
RBC # BLD AUTO: 4.5 10E12/L (ref 4.4–5.9)
RBC # BLD AUTO: 4.51 10E12/L (ref 4.4–5.9)
RBC # BLD AUTO: 4.51 10E12/L (ref 4.4–5.9)
RBC # BLD AUTO: 4.54 10E12/L (ref 4.4–5.9)
RBC # BLD AUTO: 4.7 10E12/L (ref 4.4–5.9)
RBC # BLD AUTO: 4.71 10E12/L (ref 4.4–5.9)
RBC # BLD AUTO: 4.88 10E12/L (ref 4.4–5.9)
RBC # BLD AUTO: 4.9 10E12/L (ref 4.4–5.9)
RBC # BLD AUTO: 5.02 10E12/L (ref 4.4–5.9)
RBC # BLD AUTO: 5.12 10E12/L (ref 4.4–5.9)
RBC # BLD AUTO: 5.14 10E12/L (ref 4.4–5.9)
RBC # BLD AUTO: 5.22 10E12/L (ref 4.4–5.9)
RBC # BLD AUTO: 5.22 10E12/L (ref 4.4–5.9)
RBC # BLD AUTO: 5.28 10E12/L (ref 4.4–5.9)
RBC # BLD AUTO: 5.29 10E12/L (ref 4.4–5.9)
RBC #/AREA URNS AUTO: 1 /HPF (ref 0–2)
RBC #/AREA URNS AUTO: 1 /HPF (ref 0–2)
RBC #/AREA URNS AUTO: 2 /HPF (ref 0–2)
RBC #/AREA URNS AUTO: 21 /HPF (ref 0–2)
RBC #/AREA URNS AUTO: 37 /HPF (ref 0–2)
RBC #/AREA URNS AUTO: 7 /HPF (ref 0–2)
RBC #/AREA URNS AUTO: NORMAL /HPF
RBC INCLUSIONS BLD: ABNORMAL
RBC MORPH BLD: ABNORMAL
RBC MORPH BLD: NORMAL
RBC MORPH BLD: NORMAL
RESIDUAL VOLUME (RV) (EXTERNAL): 265
RETICS # AUTO: 240.5 10E9/L (ref 25–95)
RETICS # AUTO: 98.3 10E9/L (ref 25–95)
RETICS/RBC NFR AUTO: 2.2 % (ref 0.5–2)
RETICS/RBC NFR AUTO: 5.6 % (ref 0.5–2)
RSV RNA SPEC QL NAA+PROBE: NEGATIVE
SARS-COV-2 RNA SPEC QL NAA+PROBE: NEGATIVE
SARS-COV-2 RNA SPEC QL NAA+PROBE: NEGATIVE
SARS-COV-2 RNA SPEC QL NAA+PROBE: NORMAL
SARS-COV-2 RNA SPEC QL NAA+PROBE: NOT DETECTED
SMUDGE CELLS BLD QL SMEAR: PRESENT
SMUDGE CELLS BLD QL SMEAR: PRESENT
SODIUM SERPL-SCNC: 132 MMOL/L (ref 133–144)
SODIUM SERPL-SCNC: 133 MMOL/L (ref 133–144)
SODIUM SERPL-SCNC: 133 MMOL/L (ref 133–144)
SODIUM SERPL-SCNC: 134 MMOL/L (ref 133–144)
SODIUM SERPL-SCNC: 135 MMOL/L (ref 133–144)
SODIUM SERPL-SCNC: 136 MMOL/L (ref 133–144)
SODIUM SERPL-SCNC: 137 MMOL/L (ref 133–144)
SODIUM SERPL-SCNC: 138 MMOL/L (ref 133–144)
SODIUM SERPL-SCNC: 139 MMOL/L (ref 133–144)
SOURCE: ABNORMAL
SP GR UR STRIP: 1.01 (ref 1–1.03)
SP GR UR STRIP: 1.02 (ref 1–1.03)
SPECIMEN EXP DATE BLD: NORMAL
SPECIMEN SOURCE FLD: NORMAL
SPECIMEN SOURCE: NORMAL
SQUAMOUS #/AREA URNS AUTO: 1 /HPF (ref 0–1)
SQUAMOUS #/AREA URNS AUTO: 1 /HPF (ref 0–1)
SQUAMOUS #/AREA URNS AUTO: 28 /HPF (ref 0–1)
SQUAMOUS #/AREA URNS AUTO: 3 /HPF (ref 0–1)
SQUAMOUS #/AREA URNS AUTO: <1 /HPF (ref 0–1)
SQUAMOUS #/AREA URNS AUTO: <1 /HPF (ref 0–1)
T4 FREE SERPL-MCNC: 1.14 NG/DL (ref 0.76–1.46)
TARGETS BLD QL SMEAR: SLIGHT
TARGETS BLD QL SMEAR: SLIGHT
TOXIC GRANULES BLD QL SMEAR: PRESENT
TOXIC GRANULES BLD QL SMEAR: PRESENT
TRANSFUSION STATUS PATIENT QL: NORMAL
TRANSFUSION STATUS PATIENT QL: NORMAL
TROPONIN I SERPL-MCNC: <0.015 UG/L (ref 0–0.04)
TRYPTASE SERPL-MCNC: 100 UG/L
TRYPTASE SERPL-MCNC: 26.9 UG/L
TRYPTASE SERPL-MCNC: 33.5 UG/L
TRYPTASE SERPL-MCNC: 44.8 UG/L
TRYPTASE SERPL-MCNC: 51.3 UG/L
TRYPTASE SERPL-MCNC: 52.6 UG/L
TRYPTASE SERPL-MCNC: 57.7 UG/L
TRYPTASE SERPL-MCNC: 81.3 UG/L
TRYPTASE SERPL-MCNC: 82.2 UG/L
TSH SERPL DL<=0.005 MIU/L-ACNC: 6.56 MU/L (ref 0.4–4)
URATE SERPL-MCNC: 11.6 MG/DL (ref 3.5–7.2)
URATE SERPL-MCNC: 14.7 MG/DL (ref 3.5–7.2)
URATE SERPL-MCNC: 4.4 MG/DL (ref 3.5–7.2)
URATE SERPL-MCNC: 5.8 MG/DL (ref 3.5–7.2)
URATE SERPL-MCNC: 6.1 MG/DL (ref 3.5–7.2)
URATE SERPL-MCNC: 6.3 MG/DL (ref 3.5–7.2)
URATE SERPL-MCNC: 7 MG/DL (ref 3.5–7.2)
URATE SERPL-MCNC: 8.6 MG/DL (ref 3.5–7.2)
URATE SERPL-MCNC: 9.8 MG/DL (ref 3.5–7.2)
UROBILINOGEN UR STRIP-ACNC: 0.2 EU/DL (ref 0.2–1)
UROBILINOGEN UR STRIP-ACNC: 0.2 EU/DL (ref 0.2–1)
UROBILINOGEN UR STRIP-MCNC: 2 MG/DL (ref 0–2)
UROBILINOGEN UR STRIP-MCNC: NORMAL MG/DL (ref 0–2)
WBC # BLD AUTO: 0.5 10E9/L (ref 4–11)
WBC # BLD AUTO: 1.8 10E9/L (ref 4–11)
WBC # BLD AUTO: 1.9 10E9/L (ref 4–11)
WBC # BLD AUTO: 10 10E9/L (ref 4–11)
WBC # BLD AUTO: 100.7 10E9/L (ref 4–11)
WBC # BLD AUTO: 103.6 10E9/L (ref 4–11)
WBC # BLD AUTO: 12 10E9/L (ref 4–11)
WBC # BLD AUTO: 12.8 10E9/L (ref 4–11)
WBC # BLD AUTO: 121.6 10E9/L (ref 4–11)
WBC # BLD AUTO: 13.2 10E9/L (ref 4–11)
WBC # BLD AUTO: 15.4 10E9/L (ref 4–11)
WBC # BLD AUTO: 16.6 10E9/L (ref 4–11)
WBC # BLD AUTO: 160.3 10E9/L (ref 4–11)
WBC # BLD AUTO: 161.8 10E9/L (ref 4–11)
WBC # BLD AUTO: 167.7 10E9/L (ref 4–11)
WBC # BLD AUTO: 170.6 10E9/L (ref 4–11)
WBC # BLD AUTO: 178.8 10E9/L (ref 4–11)
WBC # BLD AUTO: 18.5 10E9/L (ref 4–11)
WBC # BLD AUTO: 196 10E9/L (ref 4–11)
WBC # BLD AUTO: 2.3 10E9/L (ref 4–11)
WBC # BLD AUTO: 2.6 10E9/L (ref 4–11)
WBC # BLD AUTO: 22.4 10E9/L (ref 4–11)
WBC # BLD AUTO: 23.3 10E9/L (ref 4–11)
WBC # BLD AUTO: 23.9 10E9/L (ref 4–11)
WBC # BLD AUTO: 26.7 10E9/L (ref 4–11)
WBC # BLD AUTO: 28.3 10E9/L (ref 4–11)
WBC # BLD AUTO: 3.2 10E9/L (ref 4–11)
WBC # BLD AUTO: 3.5 10E9/L (ref 4–11)
WBC # BLD AUTO: 3.7 10E9/L (ref 4–11)
WBC # BLD AUTO: 30.5 10E9/L (ref 4–11)
WBC # BLD AUTO: 32.2 10E9/L (ref 4–11)
WBC # BLD AUTO: 33.5 10E9/L (ref 4–11)
WBC # BLD AUTO: 4 10E9/L (ref 4–11)
WBC # BLD AUTO: 4 10E9/L (ref 4–11)
WBC # BLD AUTO: 4.5 10E9/L (ref 4–11)
WBC # BLD AUTO: 4.7 10E9/L (ref 4–11)
WBC # BLD AUTO: 4.7 10E9/L (ref 4–11)
WBC # BLD AUTO: 41.8 10E9/L (ref 4–11)
WBC # BLD AUTO: 46.5 10E9/L (ref 4–11)
WBC # BLD AUTO: 48.1 10E9/L (ref 4–11)
WBC # BLD AUTO: 55.5 10E9/L (ref 4–11)
WBC # BLD AUTO: 6.8 10E9/L (ref 4–11)
WBC # BLD AUTO: 60.9 10E9/L (ref 4–11)
WBC # BLD AUTO: 7.1 10E9/L (ref 4–11)
WBC # BLD AUTO: 7.5 10E9/L (ref 4–11)
WBC # BLD AUTO: 73.9 10E9/L (ref 4–11)
WBC # BLD AUTO: 78.9 10E9/L (ref 4–11)
WBC # BLD AUTO: 81.1 10E9/L (ref 4–11)
WBC # BLD AUTO: 83.9 10E9/L (ref 4–11)
WBC # BLD AUTO: 89.6 10E9/L (ref 4–11)
WBC # FLD AUTO: 3105 /UL
WBC #/AREA URNS AUTO: 2 /HPF (ref 0–5)
WBC #/AREA URNS AUTO: 2 /HPF (ref 0–5)
WBC #/AREA URNS AUTO: 3 /HPF (ref 0–5)
WBC #/AREA URNS AUTO: 3 /HPF (ref 0–5)
WBC #/AREA URNS AUTO: 4 /HPF (ref 0–5)
WBC #/AREA URNS AUTO: <1 /HPF (ref 0–5)
WBC #/AREA URNS AUTO: NORMAL /HPF

## 2020-01-01 PROCEDURE — 99214 OFFICE O/P EST MOD 30 MIN: CPT | Mod: 95 | Performed by: INTERNAL MEDICINE

## 2020-01-01 PROCEDURE — 250N000011 HC RX IP 250 OP 636: Performed by: INTERNAL MEDICINE

## 2020-01-01 PROCEDURE — 25000132 ZZH RX MED GY IP 250 OP 250 PS 637: Performed by: INTERNAL MEDICINE

## 2020-01-01 PROCEDURE — G0463 HOSPITAL OUTPT CLINIC VISIT: HCPCS

## 2020-01-01 PROCEDURE — 85379 FIBRIN DEGRADATION QUANT: CPT | Performed by: EMERGENCY MEDICINE

## 2020-01-01 PROCEDURE — U0003 INFECTIOUS AGENT DETECTION BY NUCLEIC ACID (DNA OR RNA); SEVERE ACUTE RESPIRATORY SYNDROME CORONAVIRUS 2 (SARS-COV-2) (CORONAVIRUS DISEASE [COVID-19]), AMPLIFIED PROBE TECHNIQUE, MAKING USE OF HIGH THROUGHPUT TECHNOLOGIES AS DESCRIBED BY CMS-2020-01-R: HCPCS | Performed by: EMERGENCY MEDICINE

## 2020-01-01 PROCEDURE — 80053 COMPREHEN METABOLIC PANEL: CPT | Performed by: NURSE PRACTITIONER

## 2020-01-01 PROCEDURE — 36415 COLL VENOUS BLD VENIPUNCTURE: CPT

## 2020-01-01 PROCEDURE — 81219 CALR GENE COM VARIANTS: CPT | Performed by: INTERNAL MEDICINE

## 2020-01-01 PROCEDURE — 258N000003 HC RX IP 258 OP 636: Performed by: INTERNAL MEDICINE

## 2020-01-01 PROCEDURE — 25000128 H RX IP 250 OP 636: Mod: JW | Performed by: INTERNAL MEDICINE

## 2020-01-01 PROCEDURE — 36415 COLL VENOUS BLD VENIPUNCTURE: CPT | Performed by: INTERNAL MEDICINE

## 2020-01-01 PROCEDURE — 25000128 H RX IP 250 OP 636: Performed by: INTERNAL MEDICINE

## 2020-01-01 PROCEDURE — 85025 COMPLETE CBC W/AUTO DIFF WBC: CPT | Performed by: PATHOLOGY

## 2020-01-01 PROCEDURE — 250N000009 HC RX 250: Performed by: EMERGENCY MEDICINE

## 2020-01-01 PROCEDURE — 88305 TISSUE EXAM BY PATHOLOGIST: CPT | Performed by: INTERNAL MEDICINE

## 2020-01-01 PROCEDURE — 74177 CT ABD & PELVIS W/CONTRAST: CPT

## 2020-01-01 PROCEDURE — 83690 ASSAY OF LIPASE: CPT | Performed by: EMERGENCY MEDICINE

## 2020-01-01 PROCEDURE — 97161 PT EVAL LOW COMPLEX 20 MIN: CPT | Mod: GP

## 2020-01-01 PROCEDURE — 96372 THER/PROPH/DIAG INJ SC/IM: CPT

## 2020-01-01 PROCEDURE — 96413 CHEMO IV INFUSION 1 HR: CPT

## 2020-01-01 PROCEDURE — 25000128 H RX IP 250 OP 636: Performed by: NURSE PRACTITIONER

## 2020-01-01 PROCEDURE — 99238 HOSP IP/OBS DSCHRG MGMT 30/<: CPT | Performed by: NURSE PRACTITIONER

## 2020-01-01 PROCEDURE — 81403 MOPATH PROCEDURE LEVEL 4: CPT | Performed by: INTERNAL MEDICINE

## 2020-01-01 PROCEDURE — 999N000154 HC STATISTIC RADIOLOGY XRAY, US, CT, MAR, NM

## 2020-01-01 PROCEDURE — 45385 COLONOSCOPY W/LESION REMOVAL: CPT | Performed by: INTERNAL MEDICINE

## 2020-01-01 PROCEDURE — 93005 ELECTROCARDIOGRAM TRACING: CPT

## 2020-01-01 PROCEDURE — 85025 COMPLETE CBC W/AUTO DIFF WBC: CPT | Performed by: INTERNAL MEDICINE

## 2020-01-01 PROCEDURE — 85060 BLOOD SMEAR INTERPRETATION: CPT | Performed by: NURSE PRACTITIONER

## 2020-01-01 PROCEDURE — 80076 HEPATIC FUNCTION PANEL: CPT | Performed by: EMERGENCY MEDICINE

## 2020-01-01 PROCEDURE — 99220 ZZC INITIAL OBSERVATION CARE,LEVL III: CPT | Performed by: HOSPITALIST

## 2020-01-01 PROCEDURE — 250N000013 HC RX MED GY IP 250 OP 250 PS 637: Performed by: EMERGENCY MEDICINE

## 2020-01-01 PROCEDURE — 99207 PR NO CHARGE LOS: CPT

## 2020-01-01 PROCEDURE — 87040 BLOOD CULTURE FOR BACTERIA: CPT | Performed by: EMERGENCY MEDICINE

## 2020-01-01 PROCEDURE — 250N000011 HC RX IP 250 OP 636: Performed by: EMERGENCY MEDICINE

## 2020-01-01 PROCEDURE — 999N001193 HC VIDEO/TELEPHONE VISIT; NO CHARGE

## 2020-01-01 PROCEDURE — 88185 FLOWCYTOMETRY/TC ADD-ON: CPT | Mod: TC | Performed by: INTERNAL MEDICINE

## 2020-01-01 PROCEDURE — 83735 ASSAY OF MAGNESIUM: CPT | Performed by: EMERGENCY MEDICINE

## 2020-01-01 PROCEDURE — 83605 ASSAY OF LACTIC ACID: CPT | Performed by: EMERGENCY MEDICINE

## 2020-01-01 PROCEDURE — 70450 CT HEAD/BRAIN W/O DYE: CPT

## 2020-01-01 PROCEDURE — 85018 HEMOGLOBIN: CPT | Performed by: INTERNAL MEDICINE

## 2020-01-01 PROCEDURE — 99214 OFFICE O/P EST MOD 30 MIN: CPT | Performed by: NURSE PRACTITIONER

## 2020-01-01 PROCEDURE — 83605 ASSAY OF LACTIC ACID: CPT | Performed by: INTERNAL MEDICINE

## 2020-01-01 PROCEDURE — 40000010 ZZH STATISTIC ANES STAT CODE-CRNA PER MINUTE: Performed by: INTERNAL MEDICINE

## 2020-01-01 PROCEDURE — 88342 IMHCHEM/IMCYTCHM 1ST ANTB: CPT | Performed by: INTERNAL MEDICINE

## 2020-01-01 PROCEDURE — 25800030 ZZH RX IP 258 OP 636: Performed by: INTERNAL MEDICINE

## 2020-01-01 PROCEDURE — 96376 TX/PRO/DX INJ SAME DRUG ADON: CPT

## 2020-01-01 PROCEDURE — 71250 CT THORAX DX C-: CPT

## 2020-01-01 PROCEDURE — 250N000013 HC RX MED GY IP 250 OP 250 PS 637: Performed by: INTERNAL MEDICINE

## 2020-01-01 PROCEDURE — 99217 ZZC OBSERVATION CARE DISCHARGE: CPT | Performed by: INTERNAL MEDICINE

## 2020-01-01 PROCEDURE — 40001009 ZZH VIDEO/TELEPHONE VISIT; NO CHARGE

## 2020-01-01 PROCEDURE — 250N000011 HC RX IP 250 OP 636: Mod: JW | Performed by: INTERNAL MEDICINE

## 2020-01-01 PROCEDURE — 99213 OFFICE O/P EST LOW 20 MIN: CPT | Performed by: NURSE PRACTITIONER

## 2020-01-01 PROCEDURE — 272N000021 US THORACENTESIS

## 2020-01-01 PROCEDURE — 96361 HYDRATE IV INFUSION ADD-ON: CPT

## 2020-01-01 PROCEDURE — 83880 ASSAY OF NATRIURETIC PEPTIDE: CPT | Performed by: EMERGENCY MEDICINE

## 2020-01-01 PROCEDURE — 84484 ASSAY OF TROPONIN QUANT: CPT | Performed by: EMERGENCY MEDICINE

## 2020-01-01 PROCEDURE — 83520 IMMUNOASSAY QUANT NOS NONAB: CPT | Performed by: INTERNAL MEDICINE

## 2020-01-01 PROCEDURE — 45382 COLONOSCOPY W/CONTROL BLEED: CPT | Performed by: INTERNAL MEDICINE

## 2020-01-01 PROCEDURE — G0463 HOSPITAL OUTPT CLINIC VISIT: HCPCS | Mod: 25

## 2020-01-01 PROCEDURE — 87206 SMEAR FLUORESCENT/ACID STAI: CPT | Performed by: INTERNAL MEDICINE

## 2020-01-01 PROCEDURE — 85025 COMPLETE CBC W/AUTO DIFF WBC: CPT | Performed by: NURSE PRACTITIONER

## 2020-01-01 PROCEDURE — 85018 HEMOGLOBIN: CPT | Mod: 91 | Performed by: INTERNAL MEDICINE

## 2020-01-01 PROCEDURE — 86901 BLOOD TYPING SEROLOGIC RH(D): CPT | Performed by: EMERGENCY MEDICINE

## 2020-01-01 PROCEDURE — 81001 URINALYSIS AUTO W/SCOPE: CPT | Performed by: HOSPITALIST

## 2020-01-01 PROCEDURE — 25800030 ZZH RX IP 258 OP 636: Performed by: HOSPITALIST

## 2020-01-01 PROCEDURE — 88112 CYTOPATH CELL ENHANCE TECH: CPT | Mod: TC | Performed by: INTERNAL MEDICINE

## 2020-01-01 PROCEDURE — 00000058 ZZHCL STATISTIC BONE MARROW ASP PERF TC 38220: Performed by: INTERNAL MEDICINE

## 2020-01-01 PROCEDURE — 37000008 ZZH ANESTHESIA TECHNICAL FEE, 1ST 30 MIN: Performed by: INTERNAL MEDICINE

## 2020-01-01 PROCEDURE — 25800030 ZZH RX IP 258 OP 636: Performed by: PHYSICIAN ASSISTANT

## 2020-01-01 PROCEDURE — 250N000011 HC RX IP 250 OP 636: Performed by: NURSE PRACTITIONER

## 2020-01-01 PROCEDURE — 40001005 ZZHCL STATISTIC FLOW >15 ABY TC 88189: Performed by: INTERNAL MEDICINE

## 2020-01-01 PROCEDURE — 71046 X-RAY EXAM CHEST 2 VIEWS: CPT

## 2020-01-01 PROCEDURE — 86850 RBC ANTIBODY SCREEN: CPT | Performed by: EMERGENCY MEDICINE

## 2020-01-01 PROCEDURE — 81001 URINALYSIS AUTO W/SCOPE: CPT | Performed by: NURSE PRACTITIONER

## 2020-01-01 PROCEDURE — 25000132 ZZH RX MED GY IP 250 OP 250 PS 637: Performed by: HOSPITALIST

## 2020-01-01 PROCEDURE — P9037 PLATE PHERES LEUKOREDU IRRAD: HCPCS | Performed by: INTERNAL MEDICINE

## 2020-01-01 PROCEDURE — 88189 FLOWCYTOMETRY/READ 16 & >: CPT | Performed by: PATHOLOGY

## 2020-01-01 PROCEDURE — 999N001109 HC STATISTIC MORPHOLOGY W/INTERP HISTOLOGY TC 85060: Performed by: INTERNAL MEDICINE

## 2020-01-01 PROCEDURE — 80053 COMPREHEN METABOLIC PANEL: CPT | Performed by: INTERNAL MEDICINE

## 2020-01-01 PROCEDURE — 83986 ASSAY PH BODY FLUID NOS: CPT | Performed by: INTERNAL MEDICINE

## 2020-01-01 PROCEDURE — 99214 OFFICE O/P EST MOD 30 MIN: CPT | Mod: 95 | Performed by: FAMILY MEDICINE

## 2020-01-01 PROCEDURE — 96367 TX/PROPH/DG ADDL SEQ IV INF: CPT

## 2020-01-01 PROCEDURE — 93306 TTE W/DOPPLER COMPLETE: CPT | Mod: 26 | Performed by: INTERNAL MEDICINE

## 2020-01-01 PROCEDURE — 80053 COMPREHEN METABOLIC PANEL: CPT | Performed by: EMERGENCY MEDICINE

## 2020-01-01 PROCEDURE — 88341 IMHCHEM/IMCYTCHM EA ADD ANTB: CPT | Performed by: INTERNAL MEDICINE

## 2020-01-01 PROCEDURE — 85097 BONE MARROW INTERPRETATION: CPT | Performed by: INTERNAL MEDICINE

## 2020-01-01 PROCEDURE — 999N001014 HC STATISTIC CYTO WRIGHT STAIN TC: Performed by: INTERNAL MEDICINE

## 2020-01-01 PROCEDURE — 84550 ASSAY OF BLOOD/URIC ACID: CPT | Performed by: INTERNAL MEDICINE

## 2020-01-01 PROCEDURE — 999N000065 XR CHEST PORT 1 VW

## 2020-01-01 PROCEDURE — G0500 MOD SEDAT ENDO SERVICE >5YRS: HCPCS | Performed by: INTERNAL MEDICINE

## 2020-01-01 PROCEDURE — 81001 URINALYSIS AUTO W/SCOPE: CPT | Performed by: EMERGENCY MEDICINE

## 2020-01-01 PROCEDURE — 80069 RENAL FUNCTION PANEL: CPT | Performed by: INTERNAL MEDICINE

## 2020-01-01 PROCEDURE — 38221 DX BONE MARROW BIOPSIES: CPT | Performed by: PATHOLOGY

## 2020-01-01 PROCEDURE — 96374 THER/PROPH/DIAG INJ IV PUSH: CPT

## 2020-01-01 PROCEDURE — 120N000001 HC R&B MED SURG/OB

## 2020-01-01 PROCEDURE — 85730 THROMBOPLASTIN TIME PARTIAL: CPT | Performed by: INTERNAL MEDICINE

## 2020-01-01 PROCEDURE — 88280 CHROMOSOME KARYOTYPE STUDY: CPT | Performed by: INTERNAL MEDICINE

## 2020-01-01 PROCEDURE — 99214 OFFICE O/P EST MOD 30 MIN: CPT | Mod: 95 | Performed by: STUDENT IN AN ORGANIZED HEALTH CARE EDUCATION/TRAINING PROGRAM

## 2020-01-01 PROCEDURE — 83520 IMMUNOASSAY QUANT NOS NONAB: CPT | Performed by: NURSE PRACTITIONER

## 2020-01-01 PROCEDURE — 99214 OFFICE O/P EST MOD 30 MIN: CPT | Performed by: INTERNAL MEDICINE

## 2020-01-01 PROCEDURE — 85025 COMPLETE CBC W/AUTO DIFF WBC: CPT | Performed by: EMERGENCY MEDICINE

## 2020-01-01 PROCEDURE — 89051 BODY FLUID CELL COUNT: CPT | Performed by: INTERNAL MEDICINE

## 2020-01-01 PROCEDURE — 84443 ASSAY THYROID STIM HORMONE: CPT | Performed by: EMERGENCY MEDICINE

## 2020-01-01 PROCEDURE — 88305 TISSUE EXAM BY PATHOLOGIST: CPT | Mod: TC | Performed by: INTERNAL MEDICINE

## 2020-01-01 PROCEDURE — 99285 EMERGENCY DEPT VISIT HI MDM: CPT | Mod: 25

## 2020-01-01 PROCEDURE — 36415 COLL VENOUS BLD VENIPUNCTURE: CPT | Performed by: FAMILY MEDICINE

## 2020-01-01 PROCEDURE — 99219 PR INITIAL OBSERVATION CARE,LEVEL II: CPT | Performed by: INTERNAL MEDICINE

## 2020-01-01 PROCEDURE — 99495 TRANSJ CARE MGMT MOD F2F 14D: CPT | Performed by: FAMILY MEDICINE

## 2020-01-01 PROCEDURE — 200N000001 HC R&B ICU

## 2020-01-01 PROCEDURE — 36415 COLL VENOUS BLD VENIPUNCTURE: CPT | Performed by: HOSPITALIST

## 2020-01-01 PROCEDURE — 96375 TX/PRO/DX INJ NEW DRUG ADDON: CPT

## 2020-01-01 PROCEDURE — 87040 BLOOD CULTURE FOR BACTERIA: CPT | Performed by: NURSE PRACTITIONER

## 2020-01-01 PROCEDURE — 99217 ZZC OBSERVATION CARE DISCHARGE: CPT | Performed by: PHYSICIAN ASSISTANT

## 2020-01-01 PROCEDURE — 999N001017 HC STATISTIC GLUCOSE BY METER IP

## 2020-01-01 PROCEDURE — 87804 INFLUENZA ASSAY W/OPTIC: CPT | Mod: 59 | Performed by: EMERGENCY MEDICINE

## 2020-01-01 PROCEDURE — 258N000003 HC RX IP 258 OP 636: Performed by: EMERGENCY MEDICINE

## 2020-01-01 PROCEDURE — 25000128 H RX IP 250 OP 636: Performed by: EMERGENCY MEDICINE

## 2020-01-01 PROCEDURE — 36591 DRAW BLOOD OFF VENOUS DEVICE: CPT

## 2020-01-01 PROCEDURE — 83880 ASSAY OF NATRIURETIC PEPTIDE: CPT | Performed by: NURSE PRACTITIONER

## 2020-01-01 PROCEDURE — 258N000003 HC RX IP 258 OP 636: Performed by: NURSE PRACTITIONER

## 2020-01-01 PROCEDURE — 36415 COLL VENOUS BLD VENIPUNCTURE: CPT | Performed by: NURSE PRACTITIONER

## 2020-01-01 PROCEDURE — 51798 US URINE CAPACITY MEASURE: CPT | Performed by: UROLOGY

## 2020-01-01 PROCEDURE — G0378 HOSPITAL OBSERVATION PER HR: HCPCS

## 2020-01-01 PROCEDURE — 45381 COLONOSCOPY SUBMUCOUS NJX: CPT | Mod: PT | Performed by: INTERNAL MEDICINE

## 2020-01-01 PROCEDURE — 99215 OFFICE O/P EST HI 40 MIN: CPT | Performed by: INTERNAL MEDICINE

## 2020-01-01 PROCEDURE — 72125 CT NECK SPINE W/O DYE: CPT

## 2020-01-01 PROCEDURE — 0W993ZZ DRAINAGE OF RIGHT PLEURAL CAVITY, PERCUTANEOUS APPROACH: ICD-10-PCS | Performed by: RADIOLOGY

## 2020-01-01 PROCEDURE — 84100 ASSAY OF PHOSPHORUS: CPT | Performed by: EMERGENCY MEDICINE

## 2020-01-01 PROCEDURE — 99213 OFFICE O/P EST LOW 20 MIN: CPT | Mod: 95 | Performed by: INTERNAL MEDICINE

## 2020-01-01 PROCEDURE — 85045 AUTOMATED RETICULOCYTE COUNT: CPT | Performed by: NURSE PRACTITIONER

## 2020-01-01 PROCEDURE — 83615 LACTATE (LD) (LDH) ENZYME: CPT | Performed by: INTERNAL MEDICINE

## 2020-01-01 PROCEDURE — 99239 HOSP IP/OBS DSCHRG MGMT >30: CPT | Performed by: INTERNAL MEDICINE

## 2020-01-01 PROCEDURE — 99207 PR NON BILLABLE SERVICE FOR FALL: CPT | Performed by: NURSE PRACTITIONER

## 2020-01-01 PROCEDURE — 80048 BASIC METABOLIC PNL TOTAL CA: CPT | Performed by: INTERNAL MEDICINE

## 2020-01-01 PROCEDURE — 99207 PR CDG-HISTORY COMP: MEETS EXP. PROB FOCUSED- DOWN CODED LACK OF PFSH: CPT | Performed by: INTERNAL MEDICINE

## 2020-01-01 PROCEDURE — C9803 HOPD COVID-19 SPEC COLLECT: HCPCS

## 2020-01-01 PROCEDURE — 88185 FLOWCYTOMETRY/TC ADD-ON: CPT | Performed by: INTERNAL MEDICINE

## 2020-01-01 PROCEDURE — 84132 ASSAY OF SERUM POTASSIUM: CPT | Performed by: INTERNAL MEDICINE

## 2020-01-01 PROCEDURE — 87116 MYCOBACTERIA CULTURE: CPT | Performed by: INTERNAL MEDICINE

## 2020-01-01 PROCEDURE — 88189 FLOWCYTOMETRY/READ 16 & >: CPT | Performed by: STUDENT IN AN ORGANIZED HEALTH CARE EDUCATION/TRAINING PROGRAM

## 2020-01-01 PROCEDURE — 88341 IMHCHEM/IMCYTCHM EA ADD ANTB: CPT | Mod: 26,59 | Performed by: INTERNAL MEDICINE

## 2020-01-01 PROCEDURE — 999N001137 HC STATISTIC FLOW >15 ABY TC 88189: Performed by: INTERNAL MEDICINE

## 2020-01-01 PROCEDURE — 85027 COMPLETE CBC AUTOMATED: CPT | Performed by: HOSPITALIST

## 2020-01-01 PROCEDURE — 84550 ASSAY OF BLOOD/URIC ACID: CPT | Performed by: NURSE PRACTITIONER

## 2020-01-01 PROCEDURE — 87636 SARSCOV2 & INF A&B AMP PRB: CPT | Performed by: EMERGENCY MEDICINE

## 2020-01-01 PROCEDURE — 73080 X-RAY EXAM OF ELBOW: CPT | Mod: RT

## 2020-01-01 PROCEDURE — 99214 OFFICE O/P EST MOD 30 MIN: CPT | Mod: 95 | Performed by: NURSE PRACTITIONER

## 2020-01-01 PROCEDURE — 85610 PROTHROMBIN TIME: CPT | Performed by: INTERNAL MEDICINE

## 2020-01-01 PROCEDURE — 250N000009 HC RX 250: Performed by: NURSE PRACTITIONER

## 2020-01-01 PROCEDURE — 99231 SBSQ HOSP IP/OBS SF/LOW 25: CPT | Performed by: INTERNAL MEDICINE

## 2020-01-01 PROCEDURE — 88305 TISSUE EXAM BY PATHOLOGIST: CPT | Mod: 26 | Performed by: INTERNAL MEDICINE

## 2020-01-01 PROCEDURE — 38221 DX BONE MARROW BIOPSIES: CPT | Performed by: INTERNAL MEDICINE

## 2020-01-01 PROCEDURE — 96374 THER/PROPH/DIAG INJ IV PUSH: CPT | Mod: 59

## 2020-01-01 PROCEDURE — 99442 ZZC PHYSICIAN TELEPHONE EVALUATION 11-20 MIN: CPT | Performed by: FAMILY MEDICINE

## 2020-01-01 PROCEDURE — 81121 IDH2 COMMON VARIANTS: CPT | Performed by: INTERNAL MEDICINE

## 2020-01-01 PROCEDURE — 36415 COLL VENOUS BLD VENIPUNCTURE: CPT | Performed by: PHYSICIAN ASSISTANT

## 2020-01-01 PROCEDURE — 40000948 ZZHCL STATISTIC BONE MARROW ASP TC 85097: Performed by: INTERNAL MEDICINE

## 2020-01-01 PROCEDURE — U0003 INFECTIOUS AGENT DETECTION BY NUCLEIC ACID (DNA OR RNA); SEVERE ACUTE RESPIRATORY SYNDROME CORONAVIRUS 2 (SARS-COV-2) (CORONAVIRUS DISEASE [COVID-19]), AMPLIFIED PROBE TECHNIQUE, MAKING USE OF HIGH THROUGHPUT TECHNOLOGIES AS DESCRIBED BY CMS-2020-01-R: HCPCS | Performed by: NURSE PRACTITIONER

## 2020-01-01 PROCEDURE — 84439 ASSAY OF FREE THYROXINE: CPT | Performed by: EMERGENCY MEDICINE

## 2020-01-01 PROCEDURE — 87070 CULTURE OTHR SPECIMN AEROBIC: CPT | Performed by: INTERNAL MEDICINE

## 2020-01-01 PROCEDURE — 36415 COLL VENOUS BLD VENIPUNCTURE: CPT | Performed by: PATHOLOGY

## 2020-01-01 PROCEDURE — 84550 ASSAY OF BLOOD/URIC ACID: CPT | Performed by: EMERGENCY MEDICINE

## 2020-01-01 PROCEDURE — 84155 ASSAY OF PROTEIN SERUM: CPT | Performed by: INTERNAL MEDICINE

## 2020-01-01 PROCEDURE — 25000125 ZZHC RX 250: Performed by: EMERGENCY MEDICINE

## 2020-01-01 PROCEDURE — C9113 INJ PANTOPRAZOLE SODIUM, VIA: HCPCS | Performed by: EMERGENCY MEDICINE

## 2020-01-01 PROCEDURE — 88237 TISSUE CULTURE BONE MARROW: CPT | Performed by: INTERNAL MEDICINE

## 2020-01-01 PROCEDURE — 38222 DX BONE MARROW BX & ASPIR: CPT | Performed by: INTERNAL MEDICINE

## 2020-01-01 PROCEDURE — 71275 CT ANGIOGRAPHY CHEST: CPT

## 2020-01-01 PROCEDURE — 83615 LACTATE (LD) (LDH) ENZYME: CPT | Performed by: NURSE PRACTITIONER

## 2020-01-01 PROCEDURE — 38222 DX BONE MARROW BX & ASPIR: CPT | Mod: LT | Performed by: PATHOLOGY

## 2020-01-01 PROCEDURE — 250N000009 HC RX 250: Performed by: INTERNAL MEDICINE

## 2020-01-01 PROCEDURE — 85045 AUTOMATED RETICULOCYTE COUNT: CPT | Performed by: PATHOLOGY

## 2020-01-01 PROCEDURE — 258N000001 HC RX 258: Performed by: INTERNAL MEDICINE

## 2020-01-01 PROCEDURE — 25000128 H RX IP 250 OP 636: Performed by: NURSE ANESTHETIST, CERTIFIED REGISTERED

## 2020-01-01 PROCEDURE — 88311 DECALCIFY TISSUE: CPT | Performed by: INTERNAL MEDICINE

## 2020-01-01 PROCEDURE — 99207 ZZC CDG-CODE CATEGORY CHANGED: CPT | Performed by: INTERNAL MEDICINE

## 2020-01-01 PROCEDURE — 86900 BLOOD TYPING SEROLOGIC ABO: CPT | Performed by: EMERGENCY MEDICINE

## 2020-01-01 PROCEDURE — 85610 PROTHROMBIN TIME: CPT | Performed by: PHYSICIAN ASSISTANT

## 2020-01-01 PROCEDURE — 81206 BCR/ABL1 GENE MAJOR BP: CPT | Performed by: INTERNAL MEDICINE

## 2020-01-01 PROCEDURE — 85027 COMPLETE CBC AUTOMATED: CPT | Performed by: FAMILY MEDICINE

## 2020-01-01 PROCEDURE — 88305 TISSUE EXAM BY PATHOLOGIST: CPT | Mod: 26 | Performed by: PATHOLOGY

## 2020-01-01 PROCEDURE — 84157 ASSAY OF PROTEIN OTHER: CPT | Performed by: INTERNAL MEDICINE

## 2020-01-01 PROCEDURE — 99233 SBSQ HOSP IP/OBS HIGH 50: CPT | Performed by: INTERNAL MEDICINE

## 2020-01-01 PROCEDURE — 93306 TTE W/DOPPLER COMPLETE: CPT

## 2020-01-01 PROCEDURE — 27210582 ZZH DEVICE CLIP RESOLUTION, EACH: Performed by: INTERNAL MEDICINE

## 2020-01-01 PROCEDURE — 999N000147 HC STATISTIC PT IP EVAL DEFER

## 2020-01-01 PROCEDURE — 81270 JAK2 GENE: CPT | Performed by: INTERNAL MEDICINE

## 2020-01-01 PROCEDURE — 99214 OFFICE O/P EST MOD 30 MIN: CPT | Mod: TEL | Performed by: NURSE PRACTITIONER

## 2020-01-01 PROCEDURE — 81207 BCR/ABL1 GENE MINOR BP: CPT | Performed by: INTERNAL MEDICINE

## 2020-01-01 PROCEDURE — 32555 ASPIRATE PLEURA W/ IMAGING: CPT

## 2020-01-01 PROCEDURE — 250N000009 HC RX 250: Performed by: RADIOLOGY

## 2020-01-01 PROCEDURE — 99232 SBSQ HOSP IP/OBS MODERATE 35: CPT | Performed by: INTERNAL MEDICINE

## 2020-01-01 PROCEDURE — 90832 PSYTX W PT 30 MINUTES: CPT | Mod: 95 | Performed by: PSYCHOLOGIST

## 2020-01-01 PROCEDURE — 87205 SMEAR GRAM STAIN: CPT | Performed by: INTERNAL MEDICINE

## 2020-01-01 PROCEDURE — 25800030 ZZH RX IP 258 OP 636: Performed by: NURSE ANESTHETIST, CERTIFIED REGISTERED

## 2020-01-01 PROCEDURE — 88313 SPECIAL STAINS GROUP 2: CPT | Performed by: INTERNAL MEDICINE

## 2020-01-01 PROCEDURE — 99204 OFFICE O/P NEW MOD 45 MIN: CPT | Performed by: INTERNAL MEDICINE

## 2020-01-01 PROCEDURE — 97802 MEDICAL NUTRITION INDIV IN: CPT | Mod: 95 | Performed by: DIETITIAN, REGISTERED

## 2020-01-01 PROCEDURE — 85060 BLOOD SMEAR INTERPRETATION: CPT | Performed by: INTERNAL MEDICINE

## 2020-01-01 PROCEDURE — 85060 BLOOD SMEAR INTERPRETATION: CPT | Performed by: PATHOLOGY

## 2020-01-01 PROCEDURE — 99204 OFFICE O/P NEW MOD 45 MIN: CPT | Mod: 25 | Performed by: UROLOGY

## 2020-01-01 PROCEDURE — 88342 IMHCHEM/IMCYTCHM 1ST ANTB: CPT | Mod: 26 | Performed by: INTERNAL MEDICINE

## 2020-01-01 PROCEDURE — 81003 URINALYSIS AUTO W/O SCOPE: CPT | Performed by: UROLOGY

## 2020-01-01 PROCEDURE — 25800030 ZZH RX IP 258 OP 636: Performed by: EMERGENCY MEDICINE

## 2020-01-01 PROCEDURE — 25000132 ZZH RX MED GY IP 250 OP 250 PS 637: Performed by: PHYSICIAN ASSISTANT

## 2020-01-01 PROCEDURE — 88184 FLOWCYTOMETRY/ TC 1 MARKER: CPT | Mod: TC | Performed by: INTERNAL MEDICINE

## 2020-01-01 PROCEDURE — 96360 HYDRATION IV INFUSION INIT: CPT

## 2020-01-01 PROCEDURE — 80048 BASIC METABOLIC PNL TOTAL CA: CPT | Performed by: HOSPITALIST

## 2020-01-01 PROCEDURE — 99213 OFFICE O/P EST LOW 20 MIN: CPT | Performed by: FAMILY MEDICINE

## 2020-01-01 PROCEDURE — 37000009 ZZH ANESTHESIA TECHNICAL FEE, EACH ADDTL 15 MIN: Performed by: INTERNAL MEDICINE

## 2020-01-01 PROCEDURE — 88264 CHROMOSOME ANALYSIS 20-25: CPT | Performed by: INTERNAL MEDICINE

## 2020-01-01 PROCEDURE — 99215 OFFICE O/P EST HI 40 MIN: CPT | Performed by: NURSE PRACTITIONER

## 2020-01-01 PROCEDURE — 80048 BASIC METABOLIC PNL TOTAL CA: CPT | Performed by: EMERGENCY MEDICINE

## 2020-01-01 PROCEDURE — 85027 COMPLETE CBC AUTOMATED: CPT | Performed by: INTERNAL MEDICINE

## 2020-01-01 PROCEDURE — 81206 BCR/ABL1 GENE MAJOR BP: CPT | Performed by: PATHOLOGY

## 2020-01-01 PROCEDURE — 27210585 ZZH DEVICE CLIP QUICK: Performed by: INTERNAL MEDICINE

## 2020-01-01 PROCEDURE — 40000847 ZZHCL STATISTIC MORPHOLOGY W/INTERP HISTOLOGY TC 85060: Performed by: INTERNAL MEDICINE

## 2020-01-01 PROCEDURE — 99223 1ST HOSP IP/OBS HIGH 75: CPT | Performed by: NURSE PRACTITIONER

## 2020-01-01 PROCEDURE — 99223 1ST HOSP IP/OBS HIGH 75: CPT | Performed by: INTERNAL MEDICINE

## 2020-01-01 PROCEDURE — 40000424 ZZHCL STATISTIC BONE MARROW CORE PERF TC 38221: Performed by: INTERNAL MEDICINE

## 2020-01-01 PROCEDURE — 88112 CYTOPATH CELL ENHANCE TECH: CPT | Mod: 26 | Performed by: PATHOLOGY

## 2020-01-01 PROCEDURE — 96365 THER/PROPH/DIAG IV INF INIT: CPT

## 2020-01-01 PROCEDURE — 99207 PR CDG-CODE CATEGORY CHANGED: CPT | Performed by: INTERNAL MEDICINE

## 2020-01-01 PROCEDURE — 88313 SPECIAL STAINS GROUP 2: CPT | Mod: 26,59 | Performed by: INTERNAL MEDICINE

## 2020-01-01 PROCEDURE — 45380 COLONOSCOPY AND BIOPSY: CPT | Mod: PT,XU | Performed by: INTERNAL MEDICINE

## 2020-01-01 PROCEDURE — 99207 PR NO CHARGE NURSE ONLY: CPT

## 2020-01-01 PROCEDURE — 88305 TISSUE EXAM BY PATHOLOGIST: CPT | Mod: 26,59 | Performed by: INTERNAL MEDICINE

## 2020-01-01 PROCEDURE — 99214 OFFICE O/P EST MOD 30 MIN: CPT | Performed by: FAMILY MEDICINE

## 2020-01-01 PROCEDURE — 00000008 ZZHCL STATISTIC ADDL BM ASP PERF PF 38220: Performed by: INTERNAL MEDICINE

## 2020-01-01 PROCEDURE — 99221 1ST HOSP IP/OBS SF/LOW 40: CPT | Performed by: NURSE PRACTITIONER

## 2020-01-01 PROCEDURE — 88184 FLOWCYTOMETRY/ TC 1 MARKER: CPT | Performed by: INTERNAL MEDICINE

## 2020-01-01 PROCEDURE — 81207 BCR/ABL1 GENE MINOR BP: CPT | Performed by: PATHOLOGY

## 2020-01-01 PROCEDURE — 80053 COMPREHEN METABOLIC PANEL: CPT | Performed by: HOSPITALIST

## 2020-01-01 PROCEDURE — 00000159 ZZHCL STATISTIC H-SEND OUTS PREP: Performed by: INTERNAL MEDICINE

## 2020-01-01 PROCEDURE — 82945 GLUCOSE OTHER FLUID: CPT | Performed by: INTERNAL MEDICINE

## 2020-01-01 PROCEDURE — 99221 1ST HOSP IP/OBS SF/LOW 40: CPT | Mod: 95 | Performed by: INTERNAL MEDICINE

## 2020-01-01 PROCEDURE — 999N001018 HC STATISTIC H-CELL BLOCK W/STAIN: Performed by: INTERNAL MEDICINE

## 2020-01-01 PROCEDURE — 88311 DECALCIFY TISSUE: CPT | Mod: 26 | Performed by: INTERNAL MEDICINE

## 2020-01-01 PROCEDURE — 81479 UNLISTED MOLECULAR PATHOLOGY: CPT | Performed by: INTERNAL MEDICINE

## 2020-01-01 PROCEDURE — 87040 BLOOD CULTURE FOR BACTERIA: CPT | Mod: XS | Performed by: EMERGENCY MEDICINE

## 2020-01-01 RX ORDER — NALOXONE HYDROCHLORIDE 0.4 MG/ML
.1-.4 INJECTION, SOLUTION INTRAMUSCULAR; INTRAVENOUS; SUBCUTANEOUS
Status: CANCELLED | OUTPATIENT
Start: 2020-01-01

## 2020-01-01 RX ORDER — MEPERIDINE HYDROCHLORIDE 25 MG/ML
25 INJECTION INTRAMUSCULAR; INTRAVENOUS; SUBCUTANEOUS EVERY 30 MIN PRN
Status: CANCELLED | OUTPATIENT
Start: 2020-01-01

## 2020-01-01 RX ORDER — HEPARIN SODIUM (PORCINE) LOCK FLUSH IV SOLN 100 UNIT/ML 100 UNIT/ML
5 SOLUTION INTRAVENOUS
Status: CANCELLED | OUTPATIENT
Start: 2020-01-01

## 2020-01-01 RX ORDER — METRONIDAZOLE 500 MG/1
500 TABLET ORAL ONCE
Status: COMPLETED | OUTPATIENT
Start: 2020-01-01 | End: 2020-01-01

## 2020-01-01 RX ORDER — ALBUTEROL SULFATE 90 UG/1
1-2 AEROSOL, METERED RESPIRATORY (INHALATION)
Status: CANCELLED
Start: 2020-01-01

## 2020-01-01 RX ORDER — POLYETHYLENE GLYCOL 3350 17 G/17G
17 POWDER, FOR SOLUTION ORAL DAILY
Status: DISCONTINUED | OUTPATIENT
Start: 2020-01-01 | End: 2020-01-01 | Stop reason: HOSPADM

## 2020-01-01 RX ORDER — DIPHENHYDRAMINE HYDROCHLORIDE 50 MG/ML
50 INJECTION INTRAMUSCULAR; INTRAVENOUS
Status: CANCELLED
Start: 2020-01-01

## 2020-01-01 RX ORDER — HEPARIN SODIUM,PORCINE 10 UNIT/ML
5 VIAL (ML) INTRAVENOUS
Status: CANCELLED | OUTPATIENT
Start: 2020-01-01

## 2020-01-01 RX ORDER — EPINEPHRINE 1 MG/ML
0.3 INJECTION, SOLUTION INTRAMUSCULAR; SUBCUTANEOUS EVERY 5 MIN PRN
Status: CANCELLED | OUTPATIENT
Start: 2020-01-01

## 2020-01-01 RX ORDER — ALBUTEROL SULFATE 0.83 MG/ML
2.5 SOLUTION RESPIRATORY (INHALATION)
Status: CANCELLED | OUTPATIENT
Start: 2020-01-01

## 2020-01-01 RX ORDER — LACTULOSE 10 G/15ML
20 SOLUTION ORAL 3 TIMES DAILY
Status: DISCONTINUED | OUTPATIENT
Start: 2020-01-01 | End: 2020-01-01

## 2020-01-01 RX ORDER — ONDANSETRON 2 MG/ML
4 INJECTION INTRAMUSCULAR; INTRAVENOUS EVERY 6 HOURS PRN
Status: DISCONTINUED | OUTPATIENT
Start: 2020-01-01 | End: 2020-01-01 | Stop reason: HOSPADM

## 2020-01-01 RX ORDER — LEVOFLOXACIN 500 MG/1
500 TABLET, FILM COATED ORAL DAILY
Qty: 7 TABLET | Refills: 0 | Status: SHIPPED | OUTPATIENT
Start: 2020-01-01 | End: 2020-01-01

## 2020-01-01 RX ORDER — SODIUM CHLORIDE 9 MG/ML
1000 INJECTION, SOLUTION INTRAVENOUS CONTINUOUS PRN
Status: CANCELLED
Start: 2020-01-01

## 2020-01-01 RX ORDER — ALPRAZOLAM 0.5 MG
TABLET ORAL
Qty: 45 TABLET | Refills: 0 | Status: SHIPPED | OUTPATIENT
Start: 2020-01-01 | End: 2020-01-01

## 2020-01-01 RX ORDER — DEXTROSE MONOHYDRATE 25 G/50ML
INJECTION, SOLUTION INTRAVENOUS
Status: DISCONTINUED
Start: 2020-01-01 | End: 2020-01-01 | Stop reason: HOSPADM

## 2020-01-01 RX ORDER — PREDNISONE 10 MG/1
TABLET ORAL
Qty: 20 TABLET | Refills: 0 | Status: SHIPPED | OUTPATIENT
Start: 2020-01-01 | End: 2020-01-01

## 2020-01-01 RX ORDER — ALLOPURINOL 300 MG/1
TABLET ORAL
Qty: 30 TABLET | Refills: 0 | OUTPATIENT
Start: 2020-01-01

## 2020-01-01 RX ORDER — METHYLPREDNISOLONE SODIUM SUCCINATE 125 MG/2ML
125 INJECTION, POWDER, LYOPHILIZED, FOR SOLUTION INTRAMUSCULAR; INTRAVENOUS
Status: CANCELLED
Start: 2020-01-01

## 2020-01-01 RX ORDER — LORAZEPAM 2 MG/ML
0.5 INJECTION INTRAMUSCULAR EVERY 4 HOURS PRN
Status: CANCELLED
Start: 2020-01-01

## 2020-01-01 RX ORDER — ALBUTEROL SULFATE 90 UG/1
1-2 AEROSOL, METERED RESPIRATORY (INHALATION) EVERY 6 HOURS PRN
Status: DISCONTINUED | OUTPATIENT
Start: 2020-01-01 | End: 2020-01-01 | Stop reason: HOSPADM

## 2020-01-01 RX ORDER — POTASSIUM CHLORIDE 1.5 G/1.58G
20 POWDER, FOR SOLUTION ORAL ONCE
Status: COMPLETED | OUTPATIENT
Start: 2020-01-01 | End: 2020-01-01

## 2020-01-01 RX ORDER — POTASSIUM CHLORIDE 29.8 MG/ML
20 INJECTION INTRAVENOUS
Status: DISCONTINUED | OUTPATIENT
Start: 2020-01-01 | End: 2020-01-01 | Stop reason: HOSPADM

## 2020-01-01 RX ORDER — FUROSEMIDE 20 MG
20 TABLET ORAL DAILY
Qty: 1 TABLET | Refills: 0 | Status: SHIPPED | OUTPATIENT
Start: 2020-01-01 | End: 2020-01-01

## 2020-01-01 RX ORDER — ALPRAZOLAM 0.25 MG
0.25 TABLET ORAL 2 TIMES DAILY PRN
Status: DISCONTINUED | OUTPATIENT
Start: 2020-01-01 | End: 2020-01-01 | Stop reason: HOSPADM

## 2020-01-01 RX ORDER — SERTRALINE HYDROCHLORIDE 25 MG/1
25 TABLET, FILM COATED ORAL DAILY
Status: DISCONTINUED | OUTPATIENT
Start: 2020-01-01 | End: 2020-01-01 | Stop reason: HOSPADM

## 2020-01-01 RX ORDER — FUROSEMIDE 10 MG/ML
20 INJECTION INTRAMUSCULAR; INTRAVENOUS ONCE
Status: CANCELLED
Start: 2020-01-01 | End: 2020-01-01

## 2020-01-01 RX ORDER — FUROSEMIDE 10 MG/ML
20 INJECTION INTRAMUSCULAR; INTRAVENOUS ONCE
Status: COMPLETED | OUTPATIENT
Start: 2020-01-01 | End: 2020-01-01

## 2020-01-01 RX ORDER — FLUTICASONE PROPIONATE 50 MCG
2 SPRAY, SUSPENSION (ML) NASAL DAILY
Status: DISCONTINUED | OUTPATIENT
Start: 2020-01-01 | End: 2020-01-01 | Stop reason: HOSPADM

## 2020-01-01 RX ORDER — HYDROXYUREA 500 MG/1
500 CAPSULE ORAL DAILY
Qty: 30 CAPSULE | Refills: 0 | Status: SHIPPED | OUTPATIENT
Start: 2020-01-01 | End: 2020-01-01

## 2020-01-01 RX ORDER — EPINEPHRINE 0.3 MG/.3ML
0.3 INJECTION SUBCUTANEOUS PRN
COMMUNITY
End: 2020-01-01

## 2020-01-01 RX ORDER — TRAMADOL HYDROCHLORIDE 50 MG/1
50 TABLET ORAL EVERY 6 HOURS PRN
Qty: 30 TABLET | Refills: 0 | Status: SHIPPED | OUTPATIENT
Start: 2020-01-01 | End: 2020-01-01

## 2020-01-01 RX ORDER — PROPOFOL 10 MG/ML
INJECTION, EMULSION INTRAVENOUS CONTINUOUS PRN
Status: DISCONTINUED | OUTPATIENT
Start: 2020-01-01 | End: 2020-01-01

## 2020-01-01 RX ORDER — SENNOSIDES 8.6 MG
1-2 TABLET ORAL 2 TIMES DAILY
Status: DISCONTINUED | OUTPATIENT
Start: 2020-01-01 | End: 2020-01-01 | Stop reason: HOSPADM

## 2020-01-01 RX ORDER — POTASSIUM CL/LIDO/0.9 % NACL 10MEQ/0.1L
10 INTRAVENOUS SOLUTION, PIGGYBACK (ML) INTRAVENOUS
Status: DISCONTINUED | OUTPATIENT
Start: 2020-01-01 | End: 2020-01-01 | Stop reason: HOSPADM

## 2020-01-01 RX ORDER — NALOXONE HYDROCHLORIDE 0.4 MG/ML
0.4 INJECTION, SOLUTION INTRAMUSCULAR; INTRAVENOUS; SUBCUTANEOUS
Status: DISCONTINUED | OUTPATIENT
Start: 2020-01-01 | End: 2020-01-01 | Stop reason: HOSPADM

## 2020-01-01 RX ORDER — ALLOPURINOL 300 MG/1
300 TABLET ORAL DAILY
Qty: 30 TABLET | Refills: 0 | Status: SHIPPED | OUTPATIENT
Start: 2020-01-01 | End: 2020-01-01

## 2020-01-01 RX ORDER — ALPRAZOLAM 0.5 MG
0.5 TABLET ORAL 2 TIMES DAILY PRN
Status: DISCONTINUED | OUTPATIENT
Start: 2020-01-01 | End: 2020-01-01 | Stop reason: HOSPADM

## 2020-01-01 RX ORDER — LIDOCAINE 40 MG/G
CREAM TOPICAL
Status: DISCONTINUED | OUTPATIENT
Start: 2020-01-01 | End: 2020-01-01 | Stop reason: HOSPADM

## 2020-01-01 RX ORDER — SERTRALINE HYDROCHLORIDE 25 MG/1
50 TABLET, FILM COATED ORAL DAILY
Qty: 90 TABLET | Refills: 0
Start: 2020-01-01 | End: 2020-01-01

## 2020-01-01 RX ORDER — ALBUTEROL SULFATE 90 UG/1
1-2 AEROSOL, METERED RESPIRATORY (INHALATION) EVERY 6 HOURS PRN
Qty: 1 INHALER | Refills: 4 | Status: SHIPPED | OUTPATIENT
Start: 2020-01-01

## 2020-01-01 RX ORDER — ONDANSETRON 4 MG/1
4 TABLET, ORALLY DISINTEGRATING ORAL EVERY 6 HOURS PRN
Status: DISCONTINUED | OUTPATIENT
Start: 2020-01-01 | End: 2020-01-01 | Stop reason: HOSPADM

## 2020-01-01 RX ORDER — LEVOFLOXACIN 750 MG/1
750 TABLET, FILM COATED ORAL ONCE
Status: COMPLETED | OUTPATIENT
Start: 2020-01-01 | End: 2020-01-01

## 2020-01-01 RX ORDER — HEPARIN SODIUM,PORCINE 10 UNIT/ML
5 VIAL (ML) INTRAVENOUS
Status: DISCONTINUED | OUTPATIENT
Start: 2020-01-01 | End: 2020-01-01 | Stop reason: HOSPADM

## 2020-01-01 RX ORDER — SODIUM CHLORIDE 9 MG/ML
INJECTION, SOLUTION INTRAVENOUS CONTINUOUS
Status: DISCONTINUED | OUTPATIENT
Start: 2020-01-01 | End: 2020-01-01 | Stop reason: HOSPADM

## 2020-01-01 RX ORDER — ALBUTEROL SULFATE 90 UG/1
1-2 AEROSOL, METERED RESPIRATORY (INHALATION) EVERY 6 HOURS PRN
COMMUNITY
End: 2020-01-01

## 2020-01-01 RX ORDER — IOPAMIDOL 755 MG/ML
83 INJECTION, SOLUTION INTRAVASCULAR ONCE
Status: COMPLETED | OUTPATIENT
Start: 2020-01-01 | End: 2020-01-01

## 2020-01-01 RX ORDER — POTASSIUM CHLORIDE 1500 MG/1
20-40 TABLET, EXTENDED RELEASE ORAL
Status: DISCONTINUED | OUTPATIENT
Start: 2020-01-01 | End: 2020-01-01 | Stop reason: HOSPADM

## 2020-01-01 RX ORDER — IOPAMIDOL 755 MG/ML
75 INJECTION, SOLUTION INTRAVASCULAR ONCE
Status: COMPLETED | OUTPATIENT
Start: 2020-01-01 | End: 2020-01-01

## 2020-01-01 RX ORDER — MAGNESIUM SULFATE HEPTAHYDRATE 40 MG/ML
4 INJECTION, SOLUTION INTRAVENOUS EVERY 4 HOURS PRN
Status: DISCONTINUED | OUTPATIENT
Start: 2020-01-01 | End: 2020-01-01 | Stop reason: HOSPADM

## 2020-01-01 RX ORDER — LIDOCAINE HYDROCHLORIDE 10 MG/ML
10 INJECTION, SOLUTION EPIDURAL; INFILTRATION; INTRACAUDAL; PERINEURAL ONCE
Status: COMPLETED | OUTPATIENT
Start: 2020-01-01 | End: 2020-01-01

## 2020-01-01 RX ORDER — ACETAMINOPHEN 650 MG/1
650 SUPPOSITORY RECTAL EVERY 4 HOURS PRN
Status: DISCONTINUED | OUTPATIENT
Start: 2020-01-01 | End: 2020-01-01 | Stop reason: HOSPADM

## 2020-01-01 RX ORDER — SENNOSIDES 8.6 MG
1 TABLET ORAL DAILY PRN
COMMUNITY

## 2020-01-01 RX ORDER — ALPRAZOLAM 0.5 MG
0.5 TABLET ORAL 2 TIMES DAILY PRN
Qty: 45 TABLET | Refills: 0 | Status: SHIPPED | OUTPATIENT
Start: 2020-01-01 | End: 2020-01-01

## 2020-01-01 RX ORDER — NICOTINE POLACRILEX 4 MG
15-30 LOZENGE BUCCAL
Status: CANCELLED | OUTPATIENT
Start: 2020-01-01

## 2020-01-01 RX ORDER — PROCHLORPERAZINE 25 MG
12.5 SUPPOSITORY, RECTAL RECTAL EVERY 12 HOURS PRN
Status: DISCONTINUED | OUTPATIENT
Start: 2020-01-01 | End: 2020-01-01 | Stop reason: HOSPADM

## 2020-01-01 RX ORDER — MORPHINE SULFATE 4 MG/ML
4 INJECTION, SOLUTION INTRAMUSCULAR; INTRAVENOUS
Status: DISCONTINUED | OUTPATIENT
Start: 2020-01-01 | End: 2020-01-01 | Stop reason: HOSPADM

## 2020-01-01 RX ORDER — AMOXICILLIN 250 MG
1 CAPSULE ORAL 2 TIMES DAILY PRN
Status: DISCONTINUED | OUTPATIENT
Start: 2020-01-01 | End: 2020-01-01 | Stop reason: HOSPADM

## 2020-01-01 RX ORDER — FLUMAZENIL 0.1 MG/ML
0.2 INJECTION, SOLUTION INTRAVENOUS
Status: DISCONTINUED | OUTPATIENT
Start: 2020-01-01 | End: 2020-01-01 | Stop reason: HOSPADM

## 2020-01-01 RX ORDER — HEPARIN SODIUM (PORCINE) LOCK FLUSH IV SOLN 100 UNIT/ML 100 UNIT/ML
5 SOLUTION INTRAVENOUS
Status: DISCONTINUED | OUTPATIENT
Start: 2020-01-01 | End: 2020-01-01 | Stop reason: HOSPADM

## 2020-01-01 RX ORDER — SERTRALINE HYDROCHLORIDE 25 MG/1
TABLET, FILM COATED ORAL
Qty: 90 TABLET | Refills: 0 | OUTPATIENT
Start: 2020-01-01

## 2020-01-01 RX ORDER — ALPRAZOLAM 0.25 MG
0.25 TABLET ORAL 2 TIMES DAILY PRN
Qty: 60 TABLET | Refills: 1 | Status: SHIPPED | OUTPATIENT
Start: 2020-01-01

## 2020-01-01 RX ORDER — LIDOCAINE HYDROCHLORIDE 20 MG/ML
10 JELLY TOPICAL ONCE
Status: COMPLETED | OUTPATIENT
Start: 2020-01-01 | End: 2020-01-01

## 2020-01-01 RX ORDER — NALOXONE HYDROCHLORIDE 0.4 MG/ML
.1-.4 INJECTION, SOLUTION INTRAMUSCULAR; INTRAVENOUS; SUBCUTANEOUS
Status: DISCONTINUED | OUTPATIENT
Start: 2020-01-01 | End: 2020-01-01 | Stop reason: HOSPADM

## 2020-01-01 RX ORDER — SERTRALINE HYDROCHLORIDE 25 MG/1
25 TABLET, FILM COATED ORAL DAILY
Qty: 90 TABLET | Refills: 0 | Status: SHIPPED | OUTPATIENT
Start: 2020-01-01

## 2020-01-01 RX ORDER — NICOTINE POLACRILEX 4 MG
15-30 LOZENGE BUCCAL
Status: DISCONTINUED | OUTPATIENT
Start: 2020-01-01 | End: 2020-01-01 | Stop reason: HOSPADM

## 2020-01-01 RX ORDER — AMOXICILLIN 250 MG
2 CAPSULE ORAL 2 TIMES DAILY PRN
Status: DISCONTINUED | OUTPATIENT
Start: 2020-01-01 | End: 2020-01-01 | Stop reason: HOSPADM

## 2020-01-01 RX ORDER — IOPAMIDOL 755 MG/ML
79 INJECTION, SOLUTION INTRAVASCULAR ONCE
Status: COMPLETED | OUTPATIENT
Start: 2020-01-01 | End: 2020-01-01

## 2020-01-01 RX ORDER — TRAMADOL HYDROCHLORIDE 50 MG/1
50 TABLET ORAL EVERY 6 HOURS PRN
Status: DISCONTINUED | OUTPATIENT
Start: 2020-01-01 | End: 2020-01-01 | Stop reason: HOSPADM

## 2020-01-01 RX ORDER — EPINEPHRINE 1 MG/ML
0.3 INJECTION, SOLUTION, CONCENTRATE INTRAVENOUS EVERY 5 MIN PRN
Status: CANCELLED | OUTPATIENT
Start: 2020-01-01

## 2020-01-01 RX ORDER — ACETAMINOPHEN 325 MG/1
650 TABLET ORAL EVERY 4 HOURS PRN
Status: DISCONTINUED | OUTPATIENT
Start: 2020-01-01 | End: 2020-01-01 | Stop reason: HOSPADM

## 2020-01-01 RX ORDER — FUROSEMIDE 20 MG
20 TABLET ORAL DAILY
Qty: 3 TABLET | Refills: 0 | Status: SHIPPED | OUTPATIENT
Start: 2020-01-01 | End: 2020-01-01

## 2020-01-01 RX ORDER — FUROSEMIDE 10 MG/ML
40 INJECTION INTRAMUSCULAR; INTRAVENOUS ONCE
Status: COMPLETED | OUTPATIENT
Start: 2020-01-01 | End: 2020-01-01

## 2020-01-01 RX ORDER — ALPRAZOLAM 0.25 MG
0.25 TABLET ORAL 3 TIMES DAILY PRN
Status: DISCONTINUED | OUTPATIENT
Start: 2020-01-01 | End: 2020-01-01 | Stop reason: HOSPADM

## 2020-01-01 RX ORDER — AMOXICILLIN 250 MG
1 CAPSULE ORAL 2 TIMES DAILY
Status: DISCONTINUED | OUTPATIENT
Start: 2020-01-01 | End: 2020-01-01 | Stop reason: HOSPADM

## 2020-01-01 RX ORDER — PIPERACILLIN SODIUM, TAZOBACTAM SODIUM 3; .375 G/15ML; G/15ML
3.38 INJECTION, POWDER, LYOPHILIZED, FOR SOLUTION INTRAVENOUS EVERY 6 HOURS
Status: DISCONTINUED | OUTPATIENT
Start: 2020-01-01 | End: 2020-01-01

## 2020-01-01 RX ORDER — ALPRAZOLAM 0.5 MG
TABLET ORAL
Qty: 45 TABLET | Refills: 0 | OUTPATIENT
Start: 2020-01-01

## 2020-01-01 RX ORDER — AMOXICILLIN 250 MG
2 CAPSULE ORAL 2 TIMES DAILY
Status: DISCONTINUED | OUTPATIENT
Start: 2020-01-01 | End: 2020-01-01 | Stop reason: HOSPADM

## 2020-01-01 RX ORDER — FUROSEMIDE 20 MG
20 TABLET ORAL DAILY
Qty: 30 TABLET | Refills: 0 | Status: SHIPPED | OUTPATIENT
Start: 2020-01-01 | End: 2020-01-01

## 2020-01-01 RX ORDER — POTASSIUM CHLORIDE 1.5 G/1.58G
20-40 POWDER, FOR SOLUTION ORAL
Status: DISCONTINUED | OUTPATIENT
Start: 2020-01-01 | End: 2020-01-01 | Stop reason: HOSPADM

## 2020-01-01 RX ORDER — PROCHLORPERAZINE MALEATE 5 MG
5 TABLET ORAL EVERY 6 HOURS PRN
Status: DISCONTINUED | OUTPATIENT
Start: 2020-01-01 | End: 2020-01-01 | Stop reason: HOSPADM

## 2020-01-01 RX ORDER — FENTANYL CITRATE 50 UG/ML
INJECTION, SOLUTION INTRAMUSCULAR; INTRAVENOUS PRN
Status: DISCONTINUED | OUTPATIENT
Start: 2020-01-01 | End: 2020-01-01 | Stop reason: HOSPADM

## 2020-01-01 RX ORDER — SODIUM CHLORIDE 9 MG/ML
INJECTION, SOLUTION INTRAVENOUS CONTINUOUS
Status: ACTIVE | OUTPATIENT
Start: 2020-01-01 | End: 2020-01-01

## 2020-01-01 RX ORDER — POTASSIUM CHLORIDE 1.5 G/1.58G
40 POWDER, FOR SOLUTION ORAL ONCE
Status: COMPLETED | OUTPATIENT
Start: 2020-01-01 | End: 2020-01-01

## 2020-01-01 RX ORDER — NALOXONE HYDROCHLORIDE 0.4 MG/ML
0.2 INJECTION, SOLUTION INTRAMUSCULAR; INTRAVENOUS; SUBCUTANEOUS
Status: DISCONTINUED | OUTPATIENT
Start: 2020-01-01 | End: 2020-01-01 | Stop reason: HOSPADM

## 2020-01-01 RX ORDER — MAGNESIUM CARB/ALUMINUM HYDROX 105-160MG
148 TABLET,CHEWABLE ORAL
Status: DISCONTINUED | OUTPATIENT
Start: 2020-01-01 | End: 2020-01-01 | Stop reason: HOSPADM

## 2020-01-01 RX ORDER — MORPHINE SULFATE 4 MG/ML
4 INJECTION, SOLUTION INTRAMUSCULAR; INTRAVENOUS ONCE
Status: COMPLETED | OUTPATIENT
Start: 2020-01-01 | End: 2020-01-01

## 2020-01-01 RX ORDER — DEXTROSE MONOHYDRATE 25 G/50ML
25-50 INJECTION, SOLUTION INTRAVENOUS
Status: DISCONTINUED | OUTPATIENT
Start: 2020-01-01 | End: 2020-01-01 | Stop reason: HOSPADM

## 2020-01-01 RX ORDER — DEXTROSE MONOHYDRATE 25 G/50ML
25-50 INJECTION, SOLUTION INTRAVENOUS
Status: CANCELLED | OUTPATIENT
Start: 2020-01-01

## 2020-01-01 RX ORDER — FUROSEMIDE 20 MG
20 TABLET ORAL ONCE
Status: COMPLETED | OUTPATIENT
Start: 2020-01-01 | End: 2020-01-01

## 2020-01-01 RX ORDER — PIPERACILLIN SODIUM, TAZOBACTAM SODIUM 3; .375 G/15ML; G/15ML
3.38 INJECTION, POWDER, LYOPHILIZED, FOR SOLUTION INTRAVENOUS ONCE
Status: COMPLETED | OUTPATIENT
Start: 2020-01-01 | End: 2020-01-01

## 2020-01-01 RX ORDER — POLYETHYLENE GLYCOL 3350 17 G/17G
17 POWDER, FOR SOLUTION ORAL DAILY PRN
Status: DISCONTINUED | OUTPATIENT
Start: 2020-01-01 | End: 2020-01-01 | Stop reason: HOSPADM

## 2020-01-01 RX ORDER — FUROSEMIDE 20 MG
20 TABLET ORAL ONCE
Qty: 1 TABLET | Refills: 0 | Status: SHIPPED | OUTPATIENT
Start: 2020-01-01 | End: 2020-01-01

## 2020-01-01 RX ORDER — LEVOFLOXACIN 750 MG/1
750 TABLET, FILM COATED ORAL DAILY
Qty: 10 TABLET | Refills: 0 | Status: SHIPPED | OUTPATIENT
Start: 2020-01-01 | End: 2020-01-01

## 2020-01-01 RX ORDER — FLUTICASONE PROPIONATE 50 MCG
SPRAY, SUSPENSION (ML) NASAL
Qty: 48 ML | Refills: 3 | Status: SHIPPED | OUTPATIENT
Start: 2020-01-01

## 2020-01-01 RX ORDER — POTASSIUM CHLORIDE 1500 MG/1
20 TABLET, EXTENDED RELEASE ORAL 2 TIMES DAILY
Qty: 2 TABLET | Refills: 0 | Status: SHIPPED | OUTPATIENT
Start: 2020-01-01 | End: 2020-01-01

## 2020-01-01 RX ORDER — SERTRALINE HYDROCHLORIDE 25 MG/1
25 TABLET, FILM COATED ORAL DAILY
Qty: 90 TABLET | Refills: 0 | Status: SHIPPED | OUTPATIENT
Start: 2020-01-01 | End: 2020-01-01

## 2020-01-01 RX ORDER — ALLOPURINOL 100 MG/1
200 TABLET ORAL DAILY
Qty: 10 TABLET | Refills: 0 | Status: SHIPPED | OUTPATIENT
Start: 2020-01-01 | End: 2020-01-01

## 2020-01-01 RX ORDER — AMOXICILLIN 250 MG
1 CAPSULE ORAL DAILY
Qty: 30 TABLET | Refills: 0 | Status: SHIPPED | OUTPATIENT
Start: 2020-01-01 | End: 2020-01-01

## 2020-01-01 RX ORDER — OXYCODONE HYDROCHLORIDE 5 MG/1
5-10 TABLET ORAL
Status: DISCONTINUED | OUTPATIENT
Start: 2020-01-01 | End: 2020-01-01 | Stop reason: HOSPADM

## 2020-01-01 RX ORDER — ALLOPURINOL 300 MG/1
300 TABLET ORAL DAILY
Status: DISCONTINUED | OUTPATIENT
Start: 2020-01-01 | End: 2020-01-01 | Stop reason: HOSPADM

## 2020-01-01 RX ORDER — ALLOPURINOL 100 MG/1
200 TABLET ORAL ONCE
Status: COMPLETED | OUTPATIENT
Start: 2020-01-01 | End: 2020-01-01

## 2020-01-01 RX ORDER — ALLOPURINOL 300 MG/1
300 TABLET ORAL DAILY
Qty: 14 TABLET | Refills: 0 | Status: SHIPPED | OUTPATIENT
Start: 2020-01-01 | End: 2020-01-01

## 2020-01-01 RX ORDER — ALPRAZOLAM 0.5 MG
0.5 TABLET ORAL 2 TIMES DAILY PRN
Qty: 45 TABLET | Refills: 0 | Status: CANCELLED | OUTPATIENT
Start: 2020-01-01

## 2020-01-01 RX ORDER — FENTANYL CITRATE 50 UG/ML
25 INJECTION, SOLUTION INTRAMUSCULAR; INTRAVENOUS EVERY 5 MIN PRN
Status: DISCONTINUED | OUTPATIENT
Start: 2020-01-01 | End: 2020-01-01 | Stop reason: HOSPADM

## 2020-01-01 RX ORDER — MORPHINE SULFATE 4 MG/ML
4 INJECTION, SOLUTION INTRAMUSCULAR; INTRAVENOUS
Status: COMPLETED | OUTPATIENT
Start: 2020-01-01 | End: 2020-01-01

## 2020-01-01 RX ORDER — SODIUM CHLORIDE 9 MG/ML
INJECTION, SOLUTION INTRAVENOUS CONTINUOUS
Status: DISCONTINUED | OUTPATIENT
Start: 2020-01-01 | End: 2020-01-01

## 2020-01-01 RX ORDER — LIDOCAINE HYDROCHLORIDE 10 MG/ML
8-10 INJECTION, SOLUTION INFILTRATION; PERINEURAL
Status: DISCONTINUED | OUTPATIENT
Start: 2020-01-01 | End: 2020-01-01 | Stop reason: HOSPADM

## 2020-01-01 RX ORDER — IBUPROFEN 200 MG
200 TABLET ORAL DAILY PRN
COMMUNITY
End: 2020-01-01

## 2020-01-01 RX ORDER — POLYETHYLENE GLYCOL 3350 17 G/17G
1 POWDER, FOR SOLUTION ORAL DAILY PRN
COMMUNITY

## 2020-01-01 RX ORDER — TRAMADOL HYDROCHLORIDE 50 MG/1
50 TABLET ORAL EVERY 6 HOURS PRN
Qty: 20 TABLET | Refills: 0 | COMMUNITY
Start: 2020-01-01

## 2020-01-01 RX ORDER — FUROSEMIDE 20 MG
20 TABLET ORAL DAILY
Qty: 30 TABLET | Refills: 0 | Status: SHIPPED | OUTPATIENT
Start: 2020-01-01

## 2020-01-01 RX ORDER — MUPIROCIN 20 MG/G
OINTMENT TOPICAL 3 TIMES DAILY
Qty: 15 G | Refills: 1 | Status: SHIPPED | OUTPATIENT
Start: 2020-01-01 | End: 2020-01-01

## 2020-01-01 RX ORDER — SIMETHICONE 40MG/0.6ML
SUSPENSION, DROPS(FINAL DOSAGE FORM)(ML) ORAL PRN
Status: DISCONTINUED | OUTPATIENT
Start: 2020-01-01 | End: 2020-01-01 | Stop reason: HOSPADM

## 2020-01-01 RX ORDER — PROPOFOL 10 MG/ML
INJECTION, EMULSION INTRAVENOUS PRN
Status: DISCONTINUED | OUTPATIENT
Start: 2020-01-01 | End: 2020-01-01

## 2020-01-01 RX ORDER — SODIUM CHLORIDE, SODIUM LACTATE, POTASSIUM CHLORIDE, CALCIUM CHLORIDE 600; 310; 30; 20 MG/100ML; MG/100ML; MG/100ML; MG/100ML
INJECTION, SOLUTION INTRAVENOUS CONTINUOUS PRN
Status: DISCONTINUED | OUTPATIENT
Start: 2020-01-01 | End: 2020-01-01

## 2020-01-01 RX ORDER — FUROSEMIDE 20 MG
20 TABLET ORAL DAILY
Qty: 3 TABLET | Refills: 0 | Status: ON HOLD | OUTPATIENT
Start: 2020-01-01 | End: 2020-01-01

## 2020-01-01 RX ORDER — FLUTICASONE PROPIONATE 50 MCG
1 SPRAY, SUSPENSION (ML) NASAL 2 TIMES DAILY
COMMUNITY
End: 2020-01-01

## 2020-01-01 RX ORDER — METRONIDAZOLE 500 MG/1
500 TABLET ORAL 3 TIMES DAILY
Qty: 30 TABLET | Refills: 0 | Status: SHIPPED | OUTPATIENT
Start: 2020-01-01 | End: 2020-01-01

## 2020-01-01 RX ORDER — BUDESONIDE AND FORMOTEROL FUMARATE DIHYDRATE 160; 4.5 UG/1; UG/1
2 AEROSOL RESPIRATORY (INHALATION) 2 TIMES DAILY
Status: DISCONTINUED | OUTPATIENT
Start: 2020-01-01 | End: 2020-01-01 | Stop reason: CLARIF

## 2020-01-01 RX ORDER — IPRATROPIUM BROMIDE AND ALBUTEROL SULFATE 2.5; .5 MG/3ML; MG/3ML
3 SOLUTION RESPIRATORY (INHALATION)
Status: DISCONTINUED | OUTPATIENT
Start: 2020-01-01 | End: 2020-01-01

## 2020-01-01 RX ORDER — SENNOSIDES 8.6 MG
2 TABLET ORAL 2 TIMES DAILY
Status: DISCONTINUED | OUTPATIENT
Start: 2020-01-01 | End: 2020-01-01 | Stop reason: HOSPADM

## 2020-01-01 RX ORDER — POTASSIUM CHLORIDE 7.45 MG/ML
10 INJECTION INTRAVENOUS
Status: DISCONTINUED | OUTPATIENT
Start: 2020-01-01 | End: 2020-01-01 | Stop reason: HOSPADM

## 2020-01-01 RX ORDER — PIPERACILLIN SODIUM, TAZOBACTAM SODIUM 2; .25 G/10ML; G/10ML
2.25 INJECTION, POWDER, LYOPHILIZED, FOR SOLUTION INTRAVENOUS EVERY 6 HOURS
Status: DISCONTINUED | OUTPATIENT
Start: 2020-01-01 | End: 2020-01-01 | Stop reason: HOSPADM

## 2020-01-01 RX ORDER — BUDESONIDE AND FORMOTEROL FUMARATE DIHYDRATE 160; 4.5 UG/1; UG/1
2 AEROSOL RESPIRATORY (INHALATION) 2 TIMES DAILY
COMMUNITY
Start: 2020-01-01

## 2020-01-01 RX ORDER — LABETALOL HYDROCHLORIDE 5 MG/ML
10 INJECTION, SOLUTION INTRAVENOUS
Status: DISCONTINUED | OUTPATIENT
Start: 2020-01-01 | End: 2020-01-01 | Stop reason: HOSPADM

## 2020-01-01 RX ORDER — FUROSEMIDE 10 MG/ML
20 INJECTION INTRAMUSCULAR; INTRAVENOUS
Status: DISCONTINUED | OUTPATIENT
Start: 2020-01-01 | End: 2020-01-01 | Stop reason: HOSPADM

## 2020-01-01 RX ORDER — BUDESONIDE AND FORMOTEROL FUMARATE DIHYDRATE 160; 4.5 UG/1; UG/1
2 AEROSOL RESPIRATORY (INHALATION) 2 TIMES DAILY
Status: DISCONTINUED | OUTPATIENT
Start: 2020-01-01 | End: 2020-01-01 | Stop reason: HOSPADM

## 2020-01-01 RX ORDER — HYDROXYUREA 500 MG/1
500 CAPSULE ORAL 2 TIMES DAILY
Status: DISCONTINUED | OUTPATIENT
Start: 2020-01-01 | End: 2020-01-01 | Stop reason: HOSPADM

## 2020-01-01 RX ORDER — CEPHALEXIN 500 MG/1
500 CAPSULE ORAL 4 TIMES DAILY
Qty: 20 CAPSULE | Refills: 0 | Status: SHIPPED | OUTPATIENT
Start: 2020-01-01 | End: 2020-01-01

## 2020-01-01 RX ORDER — BISACODYL 10 MG
10 SUPPOSITORY, RECTAL RECTAL DAILY PRN
Status: DISCONTINUED | OUTPATIENT
Start: 2020-01-01 | End: 2020-01-01 | Stop reason: HOSPADM

## 2020-01-01 RX ORDER — IOPAMIDOL 755 MG/ML
84 INJECTION, SOLUTION INTRAVASCULAR ONCE
Status: COMPLETED | OUTPATIENT
Start: 2020-01-01 | End: 2020-01-01

## 2020-01-01 RX ORDER — HYDROXYUREA 500 MG/1
500 CAPSULE ORAL 2 TIMES DAILY
Status: DISCONTINUED | OUTPATIENT
Start: 2020-01-01 | End: 2020-01-01

## 2020-01-01 RX ORDER — FENTANYL CITRATE 50 UG/ML
50 INJECTION, SOLUTION INTRAMUSCULAR; INTRAVENOUS
Status: DISCONTINUED | OUTPATIENT
Start: 2020-01-01 | End: 2020-01-01 | Stop reason: HOSPADM

## 2020-01-01 RX ADMIN — SODIUM CHLORIDE: 9 INJECTION, SOLUTION INTRAVENOUS at 22:03

## 2020-01-01 RX ADMIN — SODIUM CHLORIDE 250 ML: 9 INJECTION, SOLUTION INTRAVENOUS at 09:10

## 2020-01-01 RX ADMIN — FUROSEMIDE 20 MG: 10 INJECTION, SOLUTION INTRAVENOUS at 08:25

## 2020-01-01 RX ADMIN — DECITABINE 37 MG: 50 INJECTION, POWDER, LYOPHILIZED, FOR SOLUTION INTRAVENOUS at 10:58

## 2020-01-01 RX ADMIN — Medication 125 MCG: at 15:33

## 2020-01-01 RX ADMIN — LACTULOSE 20 G: 10 SOLUTION ORAL at 15:33

## 2020-01-01 RX ADMIN — LEVOFLOXACIN 750 MG: 750 TABLET, FILM COATED ORAL at 16:50

## 2020-01-01 RX ADMIN — DECITABINE 37 MG: 50 INJECTION, POWDER, LYOPHILIZED, FOR SOLUTION INTRAVENOUS at 09:30

## 2020-01-01 RX ADMIN — DECITABINE 37 MG: 50 INJECTION, POWDER, LYOPHILIZED, FOR SOLUTION INTRAVENOUS at 09:56

## 2020-01-01 RX ADMIN — LACTULOSE 20 G: 10 SOLUTION ORAL at 20:23

## 2020-01-01 RX ADMIN — FLUTICASONE FUROATE AND VILANTEROL TRIFENATATE 1 PUFF: 200; 25 POWDER RESPIRATORY (INHALATION) at 08:16

## 2020-01-01 RX ADMIN — DECITABINE 37 MG: 50 INJECTION, POWDER, LYOPHILIZED, FOR SOLUTION INTRAVENOUS at 10:09

## 2020-01-01 RX ADMIN — SODIUM CHLORIDE: 9 INJECTION, SOLUTION INTRAVENOUS at 03:55

## 2020-01-01 RX ADMIN — DECITABINE 37 MG: 50 INJECTION, POWDER, LYOPHILIZED, FOR SOLUTION INTRAVENOUS at 11:51

## 2020-01-01 RX ADMIN — DECITABINE 37 MG: 50 INJECTION, POWDER, LYOPHILIZED, FOR SOLUTION INTRAVENOUS at 10:05

## 2020-01-01 RX ADMIN — SODIUM CHLORIDE 250 ML: 9 INJECTION, SOLUTION INTRAVENOUS at 10:22

## 2020-01-01 RX ADMIN — DECITABINE 37 MG: 50 INJECTION, POWDER, LYOPHILIZED, FOR SOLUTION INTRAVENOUS at 10:50

## 2020-01-01 RX ADMIN — SODIUM CHLORIDE 1000 ML: 9 INJECTION, SOLUTION INTRAVENOUS at 10:54

## 2020-01-01 RX ADMIN — ALPRAZOLAM 0.5 MG: 0.5 TABLET ORAL at 08:36

## 2020-01-01 RX ADMIN — DEXTROSE MONOHYDRATE 50 ML: 500 INJECTION PARENTERAL at 00:32

## 2020-01-01 RX ADMIN — IOPAMIDOL 79 ML: 755 INJECTION, SOLUTION INTRAVENOUS at 13:18

## 2020-01-01 RX ADMIN — PROPOFOL 200 MCG/KG/MIN: 10 INJECTION, EMULSION INTRAVENOUS at 13:52

## 2020-01-01 RX ADMIN — SODIUM CHLORIDE 62 ML: 9 INJECTION, SOLUTION INTRAVENOUS at 09:32

## 2020-01-01 RX ADMIN — FUROSEMIDE 20 MG: 10 INJECTION, SOLUTION INTRAVENOUS at 07:58

## 2020-01-01 RX ADMIN — DOCUSATE SODIUM 50 MG AND SENNOSIDES 8.6 MG 1 TABLET: 8.6; 5 TABLET, FILM COATED ORAL at 08:26

## 2020-01-01 RX ADMIN — SODIUM CHLORIDE 250 ML: 9 INJECTION, SOLUTION INTRAVENOUS at 14:01

## 2020-01-01 RX ADMIN — SODIUM CHLORIDE 250 ML: 9 INJECTION, SOLUTION INTRAVENOUS at 10:50

## 2020-01-01 RX ADMIN — POLYETHYLENE GLYCOL 3350 17 G: 17 POWDER, FOR SOLUTION ORAL at 08:01

## 2020-01-01 RX ADMIN — ALPRAZOLAM 0.25 MG: 0.25 TABLET ORAL at 20:23

## 2020-01-01 RX ADMIN — IOPAMIDOL 75 ML: 755 INJECTION, SOLUTION INTRAVENOUS at 09:32

## 2020-01-01 RX ADMIN — DECITABINE 37 MG: 50 INJECTION, POWDER, LYOPHILIZED, FOR SOLUTION INTRAVENOUS at 11:10

## 2020-01-01 RX ADMIN — PIPERACILLIN SODIUM AND TAZOBACTAM SODIUM 3.38 G: 3; .375 INJECTION, POWDER, LYOPHILIZED, FOR SOLUTION INTRAVENOUS at 11:16

## 2020-01-01 RX ADMIN — PANTOPRAZOLE SODIUM 40 MG: 40 INJECTION, POWDER, FOR SOLUTION INTRAVENOUS at 01:21

## 2020-01-01 RX ADMIN — SODIUM CHLORIDE 90 ML: 9 INJECTION, SOLUTION INTRAVENOUS at 02:14

## 2020-01-01 RX ADMIN — SODIUM CHLORIDE, POTASSIUM CHLORIDE, SODIUM LACTATE AND CALCIUM CHLORIDE: 600; 310; 30; 20 INJECTION, SOLUTION INTRAVENOUS at 13:50

## 2020-01-01 RX ADMIN — SODIUM CHLORIDE 250 ML: 9 INJECTION, SOLUTION INTRAVENOUS at 09:35

## 2020-01-01 RX ADMIN — DEXTROSE MONOHYDRATE 25 ML: 500 INJECTION PARENTERAL at 04:40

## 2020-01-01 RX ADMIN — DECITABINE 37 MG: 50 INJECTION, POWDER, LYOPHILIZED, FOR SOLUTION INTRAVENOUS at 13:28

## 2020-01-01 RX ADMIN — SODIUM CHLORIDE 250 ML: 9 INJECTION, SOLUTION INTRAVENOUS at 10:08

## 2020-01-01 RX ADMIN — FUROSEMIDE 40 MG: 10 INJECTION, SOLUTION INTRAVENOUS at 14:55

## 2020-01-01 RX ADMIN — SENNOSIDES 2 TABLET: 8.6 TABLET, FILM COATED ORAL at 22:04

## 2020-01-01 RX ADMIN — PIPERACILLIN SODIUM AND TAZOBACTAM SODIUM 3.38 G: 3; .375 INJECTION, POWDER, LYOPHILIZED, FOR SOLUTION INTRAVENOUS at 23:49

## 2020-01-01 RX ADMIN — DECITABINE 37 MG: 50 INJECTION, POWDER, LYOPHILIZED, FOR SOLUTION INTRAVENOUS at 09:08

## 2020-01-01 RX ADMIN — SENNOSIDES 2 TABLET: 8.6 TABLET, FILM COATED ORAL at 20:23

## 2020-01-01 RX ADMIN — MORPHINE SULFATE 4 MG: 4 INJECTION, SOLUTION INTRAMUSCULAR; INTRAVENOUS at 14:12

## 2020-01-01 RX ADMIN — DEXTROSE MONOHYDRATE 25 ML: 500 INJECTION PARENTERAL at 05:49

## 2020-01-01 RX ADMIN — SODIUM CHLORIDE: 9 INJECTION, SOLUTION INTRAVENOUS at 08:15

## 2020-01-01 RX ADMIN — Medication 1 MG: at 00:47

## 2020-01-01 RX ADMIN — DECITABINE 37 MG: 50 INJECTION, POWDER, LYOPHILIZED, FOR SOLUTION INTRAVENOUS at 10:33

## 2020-01-01 RX ADMIN — DECITABINE 37 MG: 50 INJECTION, POWDER, LYOPHILIZED, FOR SOLUTION INTRAVENOUS at 09:40

## 2020-01-01 RX ADMIN — SODIUM CHLORIDE: 9 INJECTION, SOLUTION INTRAVENOUS at 10:22

## 2020-01-01 RX ADMIN — PIPERACILLIN SODIUM AND TAZOBACTAM SODIUM 2.25 G: 2; .25 INJECTION, POWDER, LYOPHILIZED, FOR SOLUTION INTRAVENOUS at 17:46

## 2020-01-01 RX ADMIN — SODIUM CHLORIDE 250 ML: 9 INJECTION, SOLUTION INTRAVENOUS at 10:32

## 2020-01-01 RX ADMIN — LIDOCAINE HYDROCHLORIDE 10 ML: 10 INJECTION, SOLUTION EPIDURAL; INFILTRATION; INTRACAUDAL; PERINEURAL at 14:01

## 2020-01-01 RX ADMIN — MORPHINE SULFATE 4 MG: 4 INJECTION, SOLUTION INTRAMUSCULAR; INTRAVENOUS at 10:03

## 2020-01-01 RX ADMIN — SODIUM CHLORIDE 250 ML: 9 INJECTION, SOLUTION INTRAVENOUS at 14:07

## 2020-01-01 RX ADMIN — SODIUM CHLORIDE 250 ML: 9 INJECTION, SOLUTION INTRAVENOUS at 10:18

## 2020-01-01 RX ADMIN — ACETAMINOPHEN 650 MG: 325 TABLET, FILM COATED ORAL at 05:41

## 2020-01-01 RX ADMIN — FUROSEMIDE 20 MG: 10 INJECTION, SOLUTION INTRAVENOUS at 15:34

## 2020-01-01 RX ADMIN — SODIUM CHLORIDE 250 ML: 9 INJECTION, SOLUTION INTRAVENOUS at 09:27

## 2020-01-01 RX ADMIN — PIPERACILLIN SODIUM AND TAZOBACTAM SODIUM 3.38 G: 3; .375 INJECTION, POWDER, LYOPHILIZED, FOR SOLUTION INTRAVENOUS at 06:10

## 2020-01-01 RX ADMIN — ALPRAZOLAM 0.25 MG: 0.25 TABLET ORAL at 16:08

## 2020-01-01 RX ADMIN — SODIUM CHLORIDE 250 ML: 9 INJECTION, SOLUTION INTRAVENOUS at 10:25

## 2020-01-01 RX ADMIN — SODIUM CHLORIDE 250 ML: 9 INJECTION, SOLUTION INTRAVENOUS at 10:58

## 2020-01-01 RX ADMIN — FILGRASTIM 480 MCG: 480 INJECTION, SOLUTION INTRAVENOUS; SUBCUTANEOUS at 13:01

## 2020-01-01 RX ADMIN — DECITABINE 37 MG: 50 INJECTION, POWDER, LYOPHILIZED, FOR SOLUTION INTRAVENOUS at 10:19

## 2020-01-01 RX ADMIN — POTASSIUM CHLORIDE 20 MEQ: 1.5 POWDER, FOR SOLUTION ORAL at 08:08

## 2020-01-01 RX ADMIN — DECITABINE 37 MG: 50 INJECTION, POWDER, LYOPHILIZED, FOR SOLUTION INTRAVENOUS at 09:26

## 2020-01-01 RX ADMIN — SENNOSIDES 2 TABLET: 8.6 TABLET, FILM COATED ORAL at 08:30

## 2020-01-01 RX ADMIN — SODIUM CHLORIDE 250 ML: 9 INJECTION, SOLUTION INTRAVENOUS at 09:40

## 2020-01-01 RX ADMIN — SODIUM CHLORIDE 250 ML: 9 INJECTION, SOLUTION INTRAVENOUS at 09:55

## 2020-01-01 RX ADMIN — SODIUM CHLORIDE 250 ML: 9 INJECTION, SOLUTION INTRAVENOUS at 09:30

## 2020-01-01 RX ADMIN — FLUTICASONE PROPIONATE 2 SPRAY: 50 SPRAY, METERED NASAL at 08:26

## 2020-01-01 RX ADMIN — SODIUM CHLORIDE 250 ML: 9 INJECTION, SOLUTION INTRAVENOUS at 09:16

## 2020-01-01 RX ADMIN — FUROSEMIDE 20 MG: 10 INJECTION, SOLUTION INTRAVENOUS at 11:06

## 2020-01-01 RX ADMIN — POLYETHYLENE GLYCOL 3350 17 G: 17 POWDER, FOR SOLUTION ORAL at 08:31

## 2020-01-01 RX ADMIN — SODIUM CHLORIDE 89 ML: 9 INJECTION, SOLUTION INTRAVENOUS at 12:50

## 2020-01-01 RX ADMIN — DECITABINE 37 MG: 50 INJECTION, POWDER, LYOPHILIZED, FOR SOLUTION INTRAVENOUS at 10:02

## 2020-01-01 RX ADMIN — SODIUM CHLORIDE 250 ML: 9 INJECTION, SOLUTION INTRAVENOUS at 10:05

## 2020-01-01 RX ADMIN — PIPERACILLIN SODIUM AND TAZOBACTAM SODIUM 2.25 G: 2; .25 INJECTION, POWDER, LYOPHILIZED, FOR SOLUTION INTRAVENOUS at 12:47

## 2020-01-01 RX ADMIN — POLYETHYLENE GLYCOL 3350 17 G: 17 POWDER, FOR SOLUTION ORAL at 08:24

## 2020-01-01 RX ADMIN — FLUTICASONE FUROATE AND VILANTEROL TRIFENATATE 1 PUFF: 200; 25 POWDER RESPIRATORY (INHALATION) at 15:32

## 2020-01-01 RX ADMIN — PEGFILGRASTIM-CBQV 6 MG: 6 INJECTION, SOLUTION SUBCUTANEOUS at 11:18

## 2020-01-01 RX ADMIN — DECITABINE 37 MG: 50 INJECTION, POWDER, LYOPHILIZED, FOR SOLUTION INTRAVENOUS at 10:44

## 2020-01-01 RX ADMIN — BUDESONIDE AND FORMOTEROL FUMARATE DIHYDRATE 2 PUFF: 160; 4.5 AEROSOL RESPIRATORY (INHALATION) at 08:31

## 2020-01-01 RX ADMIN — PROPOFOL 40 MG: 10 INJECTION, EMULSION INTRAVENOUS at 13:52

## 2020-01-01 RX ADMIN — SODIUM CHLORIDE: 9 INJECTION, SOLUTION INTRAVENOUS at 20:24

## 2020-01-01 RX ADMIN — SENNOSIDES 2 TABLET: 8.6 TABLET, FILM COATED ORAL at 20:51

## 2020-01-01 RX ADMIN — FILGRASTIM 480 MCG: 480 INJECTION, SOLUTION INTRAVENOUS; SUBCUTANEOUS at 11:30

## 2020-01-01 RX ADMIN — SODIUM CHLORIDE 250 ML: 9 INJECTION, SOLUTION INTRAVENOUS at 12:00

## 2020-01-01 RX ADMIN — FUROSEMIDE 20 MG: 10 INJECTION, SOLUTION INTRAVENOUS at 13:05

## 2020-01-01 RX ADMIN — DEXTROSE AND SODIUM CHLORIDE: 5; 900 INJECTION, SOLUTION INTRAVENOUS at 00:45

## 2020-01-01 RX ADMIN — TRAMADOL HYDROCHLORIDE 50 MG: 50 TABLET, FILM COATED ORAL at 13:58

## 2020-01-01 RX ADMIN — MORPHINE SULFATE 4 MG: 4 INJECTION, SOLUTION INTRAMUSCULAR; INTRAVENOUS at 12:18

## 2020-01-01 RX ADMIN — LIDOCAINE HYDROCHLORIDE 6 ML: 20 JELLY TOPICAL at 11:14

## 2020-01-01 RX ADMIN — ALLOPURINOL 200 MG: 100 TABLET ORAL at 14:37

## 2020-01-01 RX ADMIN — IPRATROPIUM BROMIDE AND ALBUTEROL 1 PUFF: 20; 100 SPRAY, METERED RESPIRATORY (INHALATION) at 13:19

## 2020-01-01 RX ADMIN — FLUTICASONE PROPIONATE 2 SPRAY: 50 SPRAY, METERED NASAL at 08:10

## 2020-01-01 RX ADMIN — SODIUM PHOSPHATE, DIBASIC AND SODIUM PHOSPHATE, MONOBASIC 1 ENEMA: 7; 19 ENEMA RECTAL at 13:18

## 2020-01-01 RX ADMIN — SODIUM CHLORIDE 1000 ML: 9 INJECTION, SOLUTION INTRAVENOUS at 09:29

## 2020-01-01 RX ADMIN — DECITABINE 37 MG: 50 INJECTION, POWDER, LYOPHILIZED, FOR SOLUTION INTRAVENOUS at 10:40

## 2020-01-01 RX ADMIN — SODIUM CHLORIDE 62 ML: 9 INJECTION, SOLUTION INTRAVENOUS at 11:19

## 2020-01-01 RX ADMIN — SODIUM CHLORIDE: 9 INJECTION, SOLUTION INTRAVENOUS at 05:25

## 2020-01-01 RX ADMIN — FLUTICASONE PROPIONATE 2 SPRAY: 50 SPRAY, METERED NASAL at 08:16

## 2020-01-01 RX ADMIN — HYDROXYUREA 500 MG: 500 CAPSULE ORAL at 23:07

## 2020-01-01 RX ADMIN — SODIUM CHLORIDE 250 ML: 9 INJECTION, SOLUTION INTRAVENOUS at 15:15

## 2020-01-01 RX ADMIN — IOPAMIDOL 83 ML: 755 INJECTION, SOLUTION INTRAVENOUS at 12:49

## 2020-01-01 RX ADMIN — FLUTICASONE FUROATE AND VILANTEROL TRIFENATATE 1 PUFF: 200; 25 POWDER RESPIRATORY (INHALATION) at 08:11

## 2020-01-01 RX ADMIN — PIPERACILLIN SODIUM AND TAZOBACTAM SODIUM 3.38 G: 3; .375 INJECTION, POWDER, LYOPHILIZED, FOR SOLUTION INTRAVENOUS at 17:54

## 2020-01-01 RX ADMIN — SODIUM CHLORIDE 500 ML: 9 INJECTION, SOLUTION INTRAVENOUS at 10:32

## 2020-01-01 RX ADMIN — DECITABINE 37 MG: 50 INJECTION, POWDER, LYOPHILIZED, FOR SOLUTION INTRAVENOUS at 09:36

## 2020-01-01 RX ADMIN — SODIUM CHLORIDE 62 ML: 9 INJECTION, SOLUTION INTRAVENOUS at 13:18

## 2020-01-01 RX ADMIN — DECITABINE 37 MG: 50 INJECTION, POWDER, LYOPHILIZED, FOR SOLUTION INTRAVENOUS at 14:54

## 2020-01-01 RX ADMIN — SODIUM CHLORIDE 1.5 MG: 9 INJECTION, SOLUTION INTRAVENOUS at 12:03

## 2020-01-01 RX ADMIN — SODIUM CHLORIDE: 9 INJECTION, SOLUTION INTRAVENOUS at 12:37

## 2020-01-01 RX ADMIN — FILGRASTIM 480 MCG: 480 INJECTION, SOLUTION INTRAVENOUS; SUBCUTANEOUS at 13:03

## 2020-01-01 RX ADMIN — ALLOPURINOL 300 MG: 300 TABLET ORAL at 22:03

## 2020-01-01 RX ADMIN — DECITABINE 37 MG: 50 INJECTION, POWDER, LYOPHILIZED, FOR SOLUTION INTRAVENOUS at 10:23

## 2020-01-01 RX ADMIN — DOCUSATE SODIUM 50 MG AND SENNOSIDES 8.6 MG 1 TABLET: 8.6; 5 TABLET, FILM COATED ORAL at 20:58

## 2020-01-01 RX ADMIN — DECITABINE 37 MG: 50 INJECTION, POWDER, LYOPHILIZED, FOR SOLUTION INTRAVENOUS at 10:18

## 2020-01-01 RX ADMIN — IOPAMIDOL 84 ML: 755 INJECTION, SOLUTION INTRAVENOUS at 02:14

## 2020-01-01 RX ADMIN — POTASSIUM CHLORIDE 40 MEQ: 1.5 POWDER, FOR SOLUTION ORAL at 10:54

## 2020-01-01 RX ADMIN — SODIUM CHLORIDE 250 ML: 9 INJECTION, SOLUTION INTRAVENOUS at 09:24

## 2020-01-01 RX ADMIN — MORPHINE SULFATE 4 MG: 4 INJECTION, SOLUTION INTRAMUSCULAR; INTRAVENOUS at 12:22

## 2020-01-01 RX ADMIN — SODIUM CHLORIDE 250 ML: 9 INJECTION, SOLUTION INTRAVENOUS at 09:29

## 2020-01-01 RX ADMIN — Medication 125 MCG: at 08:09

## 2020-01-01 RX ADMIN — FUROSEMIDE 20 MG: 20 TABLET ORAL at 14:54

## 2020-01-01 RX ADMIN — DECITABINE 37 MG: 50 INJECTION, POWDER, LYOPHILIZED, FOR SOLUTION INTRAVENOUS at 15:15

## 2020-01-01 RX ADMIN — DECITABINE 37 MG: 50 INJECTION, POWDER, LYOPHILIZED, FOR SOLUTION INTRAVENOUS at 14:07

## 2020-01-01 RX ADMIN — METRONIDAZOLE 500 MG: 500 TABLET ORAL at 16:50

## 2020-01-01 RX ADMIN — SODIUM CHLORIDE 3 MG: 9 INJECTION, SOLUTION INTRAVENOUS at 14:14

## 2020-01-01 RX ADMIN — DECITABINE 37 MG: 50 INJECTION, POWDER, LYOPHILIZED, FOR SOLUTION INTRAVENOUS at 09:29

## 2020-01-01 RX ADMIN — FLUTICASONE FUROATE AND VILANTEROL TRIFENATATE 1 PUFF: 200; 25 POWDER RESPIRATORY (INHALATION) at 20:54

## 2020-01-01 RX ADMIN — ALPRAZOLAM 0.25 MG: 0.25 TABLET ORAL at 19:06

## 2020-01-01 RX ADMIN — ALPRAZOLAM 0.25 MG: 0.25 TABLET ORAL at 20:59

## 2020-01-01 RX ADMIN — FUROSEMIDE 20 MG: 10 INJECTION, SOLUTION INTRAVENOUS at 12:03

## 2020-01-01 RX ADMIN — SODIUM CHLORIDE 3 MG: 9 INJECTION, SOLUTION INTRAVENOUS at 09:13

## 2020-01-01 RX ADMIN — PIPERACILLIN SODIUM AND TAZOBACTAM SODIUM 2.25 G: 2; .25 INJECTION, POWDER, LYOPHILIZED, FOR SOLUTION INTRAVENOUS at 05:02

## 2020-01-01 RX ADMIN — SODIUM CHLORIDE 250 ML: 9 INJECTION, SOLUTION INTRAVENOUS at 09:00

## 2020-01-01 RX ADMIN — ALPRAZOLAM 0.5 MG: 0.5 TABLET ORAL at 00:02

## 2020-01-01 RX ADMIN — LIDOCAINE HYDROCHLORIDE 10 ML: 10 INJECTION, SOLUTION EPIDURAL; INFILTRATION; INTRACAUDAL; PERINEURAL at 14:22

## 2020-01-01 RX ADMIN — SODIUM CHLORIDE 250 ML: 9 INJECTION, SOLUTION INTRAVENOUS at 09:18

## 2020-01-01 RX ADMIN — SODIUM CHLORIDE 250 ML: 9 INJECTION, SOLUTION INTRAVENOUS at 10:19

## 2020-01-01 RX ADMIN — LIDOCAINE HYDROCHLORIDE 10 ML: 10 INJECTION, SOLUTION EPIDURAL; INFILTRATION; INTRACAUDAL; PERINEURAL at 13:12

## 2020-01-01 RX ADMIN — POLYETHYLENE GLYCOL 3350, SODIUM SULFATE ANHYDROUS, SODIUM BICARBONATE, SODIUM CHLORIDE, POTASSIUM CHLORIDE 4000 ML: 236; 22.74; 6.74; 5.86; 2.97 POWDER, FOR SOLUTION ORAL at 20:08

## 2020-01-01 RX ADMIN — DECITABINE 37 MG: 50 INJECTION, POWDER, LYOPHILIZED, FOR SOLUTION INTRAVENOUS at 09:18

## 2020-01-01 RX ADMIN — PIPERACILLIN SODIUM AND TAZOBACTAM SODIUM 2.25 G: 2; .25 INJECTION, POWDER, LYOPHILIZED, FOR SOLUTION INTRAVENOUS at 00:17

## 2020-01-01 RX ADMIN — DECITABINE 37 MG: 50 INJECTION, POWDER, LYOPHILIZED, FOR SOLUTION INTRAVENOUS at 09:27

## 2020-01-01 RX ADMIN — SODIUM CHLORIDE 250 ML: 9 INJECTION, SOLUTION INTRAVENOUS at 10:40

## 2020-01-01 RX ADMIN — SODIUM CHLORIDE 250 ML: 9 INJECTION, SOLUTION INTRAVENOUS at 09:26

## 2020-01-01 RX ADMIN — ONDANSETRON 4 MG: 2 INJECTION INTRAMUSCULAR; INTRAVENOUS at 13:58

## 2020-01-01 RX ADMIN — DOCUSATE SODIUM 50 MG AND SENNOSIDES 8.6 MG 2 TABLET: 8.6; 5 TABLET, FILM COATED ORAL at 08:00

## 2020-01-01 RX ADMIN — FLUTICASONE FUROATE AND VILANTEROL TRIFENATATE 1 PUFF: 200; 25 POWDER RESPIRATORY (INHALATION) at 08:25

## 2020-01-01 RX ADMIN — FLUTICASONE PROPIONATE 2 SPRAY: 50 SPRAY, METERED NASAL at 15:31

## 2020-01-01 RX ADMIN — SODIUM CHLORIDE 1000 ML: 9 INJECTION, SOLUTION INTRAVENOUS at 10:21

## 2020-01-01 RX ADMIN — IOPAMIDOL 79 ML: 755 INJECTION, SOLUTION INTRAVENOUS at 11:18

## 2020-01-01 RX ADMIN — SODIUM CHLORIDE 250 ML: 9 INJECTION, SOLUTION INTRAVENOUS at 13:15

## 2020-01-01 RX ADMIN — SODIUM CHLORIDE 1000 ML: 9 INJECTION, SOLUTION INTRAVENOUS at 01:21

## 2020-01-01 RX ADMIN — DECITABINE 37 MG: 50 INJECTION, POWDER, LYOPHILIZED, FOR SOLUTION INTRAVENOUS at 10:46

## 2020-01-01 ASSESSMENT — MIFFLIN-ST. JEOR
SCORE: 1409.9
SCORE: 1362.15
SCORE: 1438.82
SCORE: 1388.46
SCORE: 1374.85
SCORE: 1392.99
SCORE: 1395.71
SCORE: 1391.43
SCORE: 1384.83
SCORE: 1383.92
SCORE: 1429.28
SCORE: 1388.77
SCORE: 1388.46
SCORE: 1461.49
SCORE: 1385.73
SCORE: 1393.44
SCORE: 1374.85
SCORE: 1414.76
SCORE: 1452.42
SCORE: 1413.4
SCORE: 1377.82
SCORE: 1461.49
SCORE: 1371.38
SCORE: 1347.38
SCORE: 1411.14
SCORE: 1429.28
SCORE: 1387.55
SCORE: 1400.25
SCORE: 1365.78
SCORE: 1481.44
SCORE: 1423.18
SCORE: 1378.48
SCORE: 1380.29
SCORE: 1481.44
SCORE: 1452.42
SCORE: 1365.78
SCORE: 1481.44
SCORE: 1482.34
SCORE: 1393.24
SCORE: 1466.03
SCORE: 1445.15
SCORE: 1449.7
SCORE: 1379.38
SCORE: 1461.49

## 2020-01-01 ASSESSMENT — PAIN SCALES - GENERAL
PAINLEVEL: NO PAIN (0)
PAINLEVEL: MODERATE PAIN (5)
PAINLEVEL: NO PAIN (0)
PAINLEVEL: SEVERE PAIN (7)
PAINLEVEL: NO PAIN (0)
PAINLEVEL: MODERATE PAIN (5)
PAINLEVEL: NO PAIN (0)
PAINLEVEL: NO PAIN (0)
PAINLEVEL: MILD PAIN (2)
PAINLEVEL: MILD PAIN (2)
PAINLEVEL: NO PAIN (0)
PAINLEVEL: MILD PAIN (2)
PAINLEVEL: NO PAIN (0)
PAINLEVEL: NO PAIN (0)
PAINLEVEL: SEVERE PAIN (6)
PAINLEVEL: NO PAIN (0)
PAINLEVEL: MILD PAIN (2)
PAINLEVEL: NO PAIN (0)
PAINLEVEL: MILD PAIN (2)
PAINLEVEL: NO PAIN (0)

## 2020-01-01 ASSESSMENT — ACTIVITIES OF DAILY LIVING (ADL)
ADLS_ACUITY_SCORE: 16
ADLS_ACUITY_SCORE: 16
ADLS_ACUITY_SCORE: 15
ADLS_ACUITY_SCORE: 15
ADLS_ACUITY_SCORE: 16
ADLS_ACUITY_SCORE: 17
ADLS_ACUITY_SCORE: 16
ADLS_ACUITY_SCORE: 15
ADLS_ACUITY_SCORE: 16
ADLS_ACUITY_SCORE: 15
ADLS_ACUITY_SCORE: 17
ADLS_ACUITY_SCORE: 15
ADLS_ACUITY_SCORE: 15
ADLS_ACUITY_SCORE: 16
ADLS_ACUITY_SCORE: 17
ADLS_ACUITY_SCORE: 16

## 2020-01-01 ASSESSMENT — ENCOUNTER SYMPTOMS
FEVER: 0
ABDOMINAL PAIN: 1
VOMITING: 0
ORTHOPNEA: 0
SHORTNESS OF BREATH: 1
DIARRHEA: 0
ABDOMINAL PAIN: 0
VOMITING: 0
FEVER: 0
ABDOMINAL PAIN: 1
SHORTNESS OF BREATH: 1
CONSTIPATION: 1
BLOOD IN STOOL: 0
ABDOMINAL PAIN: 0
DIARRHEA: 0
BLOOD IN STOOL: 0
LIGHT-HEADEDNESS: 1
APPETITE CHANGE: 1
CONSTIPATION: 1
FEVER: 0
COUGH: 0
CONSTIPATION: 1
BLOOD IN STOOL: 0
FEVER: 0
WEAKNESS: 1
CHILLS: 0
SHORTNESS OF BREATH: 1
CHEST TIGHTNESS: 1
ROS GI COMMENTS: ANAL LEAKAGE
ABDOMINAL PAIN: 1
CHEST TIGHTNESS: 1
BACK PAIN: 0
ANAL BLEEDING: 0
DIARRHEA: 0
FEVER: 0
SEIZURES: 0

## 2020-01-01 ASSESSMENT — COPD QUESTIONNAIRES: COPD: 1

## 2020-01-07 NOTE — PROGRESS NOTES
Subjective     Jeff Mack is a 84 year old male who presents to clinic today for the following health issues:    HPI   Concern - follow up with urine troubles would like to follow up with Urology  Onset:     Description:   Update Dr. 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 EXCISION OF LINGUAL TONSIL      TONSILLECTOMY     C NONSPECIFIC PROCEDURE  2-99    Colonic polpectomy     C NONSPECIFIC PROCEDURE  9-92    TURP     C NONSPECIFIC PROCEDURE  1982    Retinal surgery     COLONOSCOPY       ESOPHAGOSCOPY, GASTROSCOPY, DUODENOSCOPY (EGD), COMBINED N/A 3/19/2015    Procedure: COMBINED ESOPHAGOSCOPY, GASTROSCOPY, DUODENOSCOPY (EGD), BIOPSY SINGLE OR MULTIPLE;  Surgeon: Alo Mauro MD;  Location:  GI     ESOPHAGOSCOPY, GASTROSCOPY, DUODENOSCOPY (EGD), COMBINED N/A 7/14/2015    Procedure: COMBINED ESOPHAGOSCOPY, GASTROSCOPY, DUODENOSCOPY (EGD), BIOPSY SINGLE OR MULTIPLE;  Surgeon: Kathy Torres MD;  Location:  GI     ESOPHAGOSCOPY, GASTROSCOPY, DUODENOSCOPY (EGD), COMBINED N/A 12/1/2015    Procedure: COMBINED ESOPHAGOSCOPY, GASTROSCOPY, DUODENOSCOPY (EGD), BIOPSY SINGLE OR MULTIPLE;  Surgeon: Alo Mauro MD;  Location:   GI     ESOPHAGOSCOPY, GASTROSCOPY, DUODENOSCOPY (EGD), COMBINED N/A 3/17/2016    Procedure: COMBINED ESOPHAGOSCOPY, GASTROSCOPY, DUODENOSCOPY (EGD), BIOPSY SINGLE OR MULTIPLE;  Surgeon: Alo Mauro MD;  Location:  GI     ESOPHAGOSCOPY, GASTROSCOPY, DUODENOSCOPY (EGD), COMBINED N/A 7/21/2016    Procedure: COMBINED ESOPHAGOSCOPY, GASTROSCOPY, DUODENOSCOPY (EGD);  Surgeon: Alo Mauro MD;  Location:  GI     ESOPHAGOSCOPY, GASTROSCOPY, DUODENOSCOPY (EGD), COMBINED N/A 11/17/2016    Procedure: COMBINED ESOPHAGOSCOPY, GASTROSCOPY, DUODENOSCOPY (EGD);  Surgeon: Alo Mauro MD;  Location:  GI     ESOPHAGOSCOPY, GASTROSCOPY, DUODENOSCOPY (EGD), COMBINED N/A 5/9/2017    Procedure: COMBINED ESOPHAGOSCOPY, GASTROSCOPY, DUODENOSCOPY (EGD);  (EGD) COMBINED ESOPHAGOSCOPY, GASTROSCOPY, DUODENOSCOPY;  Surgeon: Alo Mauro MD;  Location:  GI     ESOPHAGOSCOPY, GASTROSCOPY, DUODENOSCOPY (EGD), COMBINED N/A 5/1/2018    Procedure: COMBINED ESOPHAGOSCOPY, GASTROSCOPY, DUODENOSCOPY (EGD);  ESOPHAGOGASTRODUODENOSCOPY ;  Surgeon: Alo Mauro MD;  Location:  GI     GASTROSTOMY, INSERT TUBE, COMBINED N/A 4/3/2015    Procedure: COMBINED GASTROSTOMY, INSERT TUBE (OPEN);  Surgeon: Ralph Catherine MD;  Location:  OR      PRESSURE GRADIENT MEASUREMENT, INITIAL VESSEL  2005    essentially normal     HC UGI ENDOSCOPY W EUS N/A 7/14/2015    Procedure: COMBINED ENDOSCOPIC ULTRASOUND, ESOPHAGOSCOPY, GASTROSCOPY, DUODENOSCOPY (EGD);  Surgeon: Kathy Torres MD;  Location:  GI     LAMINECTOMY, FUSION LUMBAR ONE LEVEL, COMBINED  7/26/2012    Procedure: COMBINED LAMINECTOMY, FUSION LUMBAR ONE LEVEL;  Posterior Fusion L5-S1, Total Facetectomy L5-S1 Left ;  Surgeon: Ronald Espinosa MD;  Location:  OR       Social History     Tobacco Use     Smoking status: Former Smoker     Packs/day: 2.00     Years: 40.00     Pack years: 80.00     Types: Cigarettes     Last attempt to quit: 1/1/1975     Years  since quittin.0     Smokeless tobacco: Never Used   Substance Use Topics     Alcohol use: No     Family History   Problem Relation Age of Onset     Hypertension Mother      Cerebrovascular Disease Mother      Diabetes Brother      Diabetes Father          age 97         Current Outpatient Medications   Medication Sig Dispense Refill     Cholecalciferol (VITAMIN D3) 5000 UNITS TABS Take  by mouth.       cyanocolbalamin (VITAMIN  B-12) 100 MCG tablet Take 50 mcg by mouth daily       fluticasone (FLONASE) 50 MCG/ACT spray Spray 1-2 sprays into both nostrils daily 1 Bottle 11     fluticasone-vilanterol (BREO ELLIPTA) 100-25 MCG/INH inhaler Inhale 1 puff into the lungs daily       Ibuprofen (ADVIL PO)        MAGNESIUM OXIDE PO        meloxicam (MOBIC) 7.5 MG tablet Take 1 tablet (7.5 mg) by mouth daily 90 tablet 3     montelukast (SINGULAIR) 10 MG tablet Take 1 tablet (10 mg) by mouth At Bedtime 30 tablet 1     TRAMADOL HCL PO        VENTOLIN  (90 Base) MCG/ACT inhaler INHALE 2 PUFFS BY MOUTH EVERY 6 HOURS AS NEEDED  0     ALPRAZolam (XANAX) 0.5 MG tablet Take 1 tablet (0.5 mg) by mouth 2 times daily as needed for anxiety (Patient not taking: Reported on 2019) 45 tablet 0     doxazosin (CARDURA) 1 MG tablet Take 1-4 tablets (1-4 mg) by mouth At Bedtime Start at 1 mg for a week. If needed, go to 2 mg, etc. May increase to 4 mg if needed. (Patient not taking: Reported on 2020) 90 tablet 1     EPINEPHrine (EPIPEN) 0.3 MG/0.3ML injection Inject 0.3 mLs (0.3 mg) into the muscle once as needed for anaphylaxis (Patient not taking: Reported on 2019) 1 each 1     Allergies   Allergen Reactions     Bee Venom Shortness Of Breath     Recent Labs   Lab Test 18  1354 17  0943 17  1130 07/09/15  1110  12  1548   LDL  --   --   --  92  --  111   HDL  --   --   --  48  --  71   TRIG  --   --   --  80  --  110   ALT 19  --   --   --   --  19   CR 0.77  --  0.81 0.56*   < > 0.86  "  GFRESTIMATED >90  --  >90  Non  GFR Calc   >90  Non  GFR Calc     < > 87   GFRESTBLACK >90  --  >90   GFR Calc   >90   GFR Calc     < > >90   POTASSIUM 4.0  --  4.4 4.1   < > 3.5   TSH  --  1.79  --   --   --  1.72    < > = values in this interval not displayed.      BP Readings from Last 3 Encounters:   01/07/20 128/78   12/02/19 132/78   08/15/19 130/68    Wt Readings from Last 3 Encounters:   01/07/20 77.6 kg (171 lb)   12/02/19 77.1 kg (170 lb)   08/15/19 78.5 kg (173 lb)             Reviewed and updated as needed this visit by Provider         Review of Systems   ROS COMP: Constitutional, HEENT, cardiovascular, pulmonary, gi and gu systems are negative, except as otherwise noted.      Objective    /78   Pulse 75   Temp 97.3  F (36.3  C) (Tympanic)   Ht 1.753 m (5' 9\")   Wt 77.6 kg (171 lb)   SpO2 96%   BMI 25.25 kg/m    Body mass index is 25.25 kg/m .  Physical Exam   GENERAL: healthy, alert and no distress  NECK: no adenopathy, no asymmetry, masses, or scars and thyroid normal to palpation  RESP: lungs clear to auscultation - no rales, rhonchi or wheezes  CV: regular rate and rhythm, normal S1 S2, no S3 or S4, no murmur, click or rub, no peripheral edema and peripheral pulses strong  ABDOMEN: soft, nontender, no hepatosplenomegaly, no masses and bowel sounds normal  MS: no gross musculoskeletal defects noted, no edema            Assessment & Plan       ICD-10-CM    1. Benign prostatic hyperplasia with lower urinary tract symptoms, symptom details unspecified N40.1 UROLOGY ADULT REFERRAL      has no improvement with Flomax an Alpha blocker,   Will have him to see urologist per his request       No follow-ups on file.    Benjamin Lazar MD  [facility] [place]      "

## 2020-01-30 NOTE — PROGRESS NOTES
[facility]  [address] [facility] [address]  [phone]  Dept: [phone]    PRE-OP EVALUATION:  Today's date: 2020    Jeff Mack (: 1935) presents for pre-operative evaluation assessment as requested by Dr. Solorio.  He requires evaluation and anesthesia risk assessment prior to undergoing surgery/procedure for treatment of Cataracts .    Proposed Surgery/ Procedure: Cataracts  Date of Surgery/ Procedure: 2020  Time of Surgery/ Procedure: [facility]  Hospital/Surgical Facility: [facility]  [phone]  Primary Physician: Benjamin Lazar  Type of Anesthesia Anticipated: to be determined    Patient has a Health Care Directive or Living Will:  YES     1. NO - Do you have a history of heart attack, stroke, stent, bypass or surgery on an artery in the head, neck, heart or legs?  2. NO - Do you ever have any pain or discomfort in your chest?  3. NO - Do you have a history of  Heart Failure?  4. NO - Are you troubled by shortness of breath when: walking on the level, up a slight hill or at night?  5. NO - Do you currently have a cold, bronchitis or other respiratory infection?  6. NO - Do you have a cough, shortness of breath or wheezing?  7. NO - Do you sometimes get pains in the calves of your legs when you walk?  8. NO - Do you or anyone in your family have previous history of blood clots?  9. NO - Do you or does anyone in your family have a serious bleeding problem such as prolonged bleeding following surgeries or cuts?  10. NO - Have you ever had problems with anemia or been told to take iron pills?  11. NO - Have you had any abnormal blood loss such as black, tarry or bloody stools, or abnormal vaginal bleeding?  12. NO - Have you ever had a blood transfusion?  13. NO - Have you or any of your relatives ever had problems with anesthesia?  14. NO - Do you have sleep apnea, excessive snoring or daytime drowsiness?  15. NO - Do you have any prosthetic  heart valves?  16. NO - Do you have prosthetic joints?  17. NO - Is there any chance that you may be pregnant?      HPI:     HPI related to upcoming procedure:       See problem list for active medical problems.  Problems all longstanding and stable, except as noted/documented.  See ROS for pertinent symptoms related to these conditions.      MEDICAL HISTORY:     Patient Active Problem List    Diagnosis Date Noted     Nonallopathic lesion of sacral region 03/26/2012     Priority: High     Problem list name updated by automated process. Provider to review       Primary cardiomyopathy (H) 08/20/2006     Priority: High     Problem list name updated by automated process. Provider to review       Chronic ischemic heart disease 01/06/2005     Priority: High     Problem list name updated by automated process. Provider to review       Essential hypertension, benign 01/06/2005     Priority: High     Sciatica 12/30/2004     Priority: High     Thoracic or lumbosacral neuritis or radiculitis, unspecified 12/30/2004     Priority: High     Esophageal cancer (H) 03/23/2015     Priority: Medium     Edema 09/04/2012     Priority: Medium     Health Care Home 07/27/2012     Priority: Medium     Sayda Painter RN-BC  [phone]  FPA / [facility]           DX V65.8 REPLACED WITH [id number]ALTH CARE HOME (04/08/2013)       Lumbar stenosis 07/26/2012     Priority: Medium     Acute low back pain 03/26/2012     Priority: Medium     Hyperlipidemia with target LDL less than 100 05/26/2010     Priority: Medium     Diagnosis updated by automated process. Provider to review and confirm.       Cardiomegaly 01/06/2005     Priority: Medium     Nonspecific abnormal electrocardiogram (ECG) (EKG) 12/30/2004     Priority: Medium     Hypertrophy of prostate without urinary obstruction 12/30/2004     Priority: Medium     Problem list name updated by automated process. 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 Provider to review        Past Medical History:   Diagnosis Date     Anxiety state, unspecified      Asthma      Basal cell carcinoma      Benign neoplasm of colon      Cardiomegaly 1/6/2005     Chronic ischemic heart disease, unspecified      Constipation      Depressive disorder, not elsewhere classified      Diverticulosis of colon (without mention of hemorrhage)      Esophageal cancer (H) 3/23/2015     Essential hypertension, benign      Hiatal hernia      Hypertrophy (benign) of prostate      Internal hemorrhoids without mention of complication      Other primary cardiomyopathies 2003    EF 45%     Sciatica      Spondylosis of unspecified site without mention of myelopathy      Past Surgical History:   Procedure Laterality Date     BRONCHOSCOPY FLEXIBLE N/A 4/3/2015    Procedure: BRONCHOSCOPY FLEXIBLE;  Surgeon: aRlph Catherine MD;  Location:  OR      EXCISION OF LINGUAL TONSIL      TONSILLECTOMY     C NONSPECIFIC PROCEDURE  2-99    Colonic polpectomy     C NONSPECIFIC PROCEDURE  9-92    TURP     C NONSPECIFIC PROCEDURE  1982    Retinal surgery     COLONOSCOPY       ESOPHAGOSCOPY, GASTROSCOPY, DUODENOSCOPY (EGD), COMBINED N/A 3/19/2015    Procedure: COMBINED ESOPHAGOSCOPY, GASTROSCOPY, DUODENOSCOPY (EGD), BIOPSY SINGLE OR MULTIPLE;  Surgeon: Alo Mauro MD;  Location: [facility]     ESOPHAGOSCOPY, GASTROSCOPY, DUODENOSCOPY (EGD), COMBINED N/A 7/14/2015    Procedure: COMBINED ESOPHAGOSCOPY, GASTROSCOPY, DUODENOSCOPY (EGD), BIOPSY SINGLE OR MULTIPLE;  Surgeon: Kathy Torres MD;  Location: [facility]     ESOPHAGOSCOPY, GASTROSCOPY, DUODENOSCOPY (EGD), COMBINED N/A 12/1/2015    Procedure: COMBINED ESOPHAGOSCOPY, GASTROSCOPY, DUODENOSCOPY (EGD), BIOPSY SINGLE OR MULTIPLE;  Surgeon: Alo Mauro MD;  Location:  GI     ESOPHAGOSCOPY, GASTROSCOPY, DUODENOSCOPY (EGD), COMBINED N/A 3/17/2016    Procedure:  COMBINED ESOPHAGOSCOPY, GASTROSCOPY, DUODENOSCOPY (EGD), BIOPSY SINGLE OR MULTIPLE;  Surgeon: Alo Mauro MD;  Location:  GI     ESOPHAGOSCOPY, GASTROSCOPY, DUODENOSCOPY (EGD), COMBINED N/A 7/21/2016    Procedure: COMBINED ESOPHAGOSCOPY, GASTROSCOPY, DUODENOSCOPY (EGD);  Surgeon: Alo Mauro MD;  Location:  GI     ESOPHAGOSCOPY, GASTROSCOPY, DUODENOSCOPY (EGD), COMBINED N/A 11/17/2016    Procedure: COMBINED ESOPHAGOSCOPY, GASTROSCOPY, DUODENOSCOPY (EGD);  Surgeon: Alo Mauro MD;  Location:  GI     ESOPHAGOSCOPY, GASTROSCOPY, DUODENOSCOPY (EGD), COMBINED N/A 5/9/2017    Procedure: COMBINED ESOPHAGOSCOPY, GASTROSCOPY, DUODENOSCOPY (EGD);  (EGD) COMBINED ESOPHAGOSCOPY, GASTROSCOPY, DUODENOSCOPY;  Surgeon: Alo Mauro MD;  Location:  GI     ESOPHAGOSCOPY, GASTROSCOPY, DUODENOSCOPY (EGD), COMBINED N/A 5/1/2018    Procedure: COMBINED ESOPHAGOSCOPY, GASTROSCOPY, DUODENOSCOPY (EGD);  ESOPHAGOGASTRODUODENOSCOPY ;  Surgeon: Alo Mauro MD;  Location:  GI     GASTROSTOMY, INSERT TUBE, COMBINED N/A 4/3/2015    Procedure: COMBINED GASTROSTOMY, INSERT TUBE (OPEN);  Surgeon: Ralph Catherine MD;  Location:  OR      PRESSURE GRADIENT MEASUREMENT, INITIAL VESSEL  2005    essentially normal     HC UGI ENDOSCOPY W EUS N/A 7/14/2015    Procedure: COMBINED ENDOSCOPIC ULTRASOUND, ESOPHAGOSCOPY, GASTROSCOPY, DUODENOSCOPY (EGD);  Surgeon: Kathy Torres MD;  Location:  GI     LAMINECTOMY, FUSION LUMBAR ONE LEVEL, COMBINED  7/26/2012    Procedure: COMBINED LAMINECTOMY, FUSION LUMBAR ONE LEVEL;  Posterior Fusion L5-S1, Total Facetectomy L5-S1 Left ;  Surgeon: Ronald Espinosa MD;  Location: [facility] OR     Current Outpatient Medications   Medication Sig Dispense Refill     ALPRAZolam (XANAX) 0.5 MG tablet Take 1 tablet (0.5 mg) by mouth 2 times daily as needed for anxiety (Patient not taking: Reported on 12/2/2019) 45 tablet 0     Cholecalciferol (VITAMIN D3) 5000 UNITS TABS Take  by  "mouth.       cyanocolbalamin (VITAMIN  B-12) 100 MCG tablet Take 50 mcg by mouth daily       doxazosin (CARDURA) 1 MG tablet Take 1-4 tablets (1-4 mg) by mouth At Bedtime Start at 1 mg for a week. If needed, go to 2 mg, etc. May increase to 4 mg if needed. (Patient not taking: Reported on 2020) 90 tablet 1     EPINEPHrine (EPIPEN) 0.3 MG/0.3ML injection Inject 0.3 mLs (0.3 mg) into the muscle once as needed for anaphylaxis (Patient not taking: Reported on 2019) 1 each 1     fluticasone (FLONASE) 50 MCG/ACT spray Spray 1-2 sprays into both nostrils daily 1 Bottle 11     fluticasone-vilanterol (BREO ELLIPTA) 100-25 MCG/INH inhaler Inhale 1 puff into the lungs daily       Ibuprofen (ADVIL PO)        MAGNESIUM OXIDE PO        meloxicam (MOBIC) 7.5 MG tablet Take 1 tablet (7.5 mg) by mouth daily 90 tablet 3     montelukast (SINGULAIR) 10 MG tablet Take 1 tablet (10 mg) by mouth At Bedtime 30 tablet 1     TRAMADOL HCL PO        VENTOLIN  (90 Base) MCG/ACT inhaler INHALE 2 PUFFS BY MOUTH EVERY 6 HOURS AS NEEDED  0     OTC products: None, except as noted above    Allergies   Allergen Reactions     Bee Venom Shortness Of Breath      Latex Allergy: NO    Social History     Tobacco Use     Smoking status: Former Smoker     Packs/day: 2.00     Years: 40.00     Pack years: 80.00     Types: Cigarettes     Last attempt to quit: 1975     Years since quittin.1     Smokeless tobacco: Never Used   Substance Use Topics     Alcohol use: No     History   Drug Use No       REVIEW OF SYSTEMS:   CONSTITUTIONAL: NEGATIVE for fever, chills, change in weight  ENT/MOUTH: NEGATIVE for ear, mouth and throat problems  RESP: NEGATIVE for significant cough or SOB  CV: NEGATIVE for chest pain, palpitations or peripheral edema    EXAM:   /70   Pulse 88   Temp 98  F (36.7  C) (Tympanic)   Ht 1.753 m (5' 9\")   Wt 77.1 kg (170 lb)   SpO2 100%   BMI 25.10 kg/m    GENERAL APPEARANCE: healthy, alert and no " distress  HENT: ear canals and TM's normal and nose and mouth without ulcers or lesions  RESP: lungs clear to auscultation - no rales, rhonchi or wheezes  CV: regular rate and rhythm, normal S1 S2, no S3 or S4 and no murmur, click or rub   ABDOMEN: soft, nontender, no HSM or masses and bowel sounds normal  NEURO: Normal strength and tone, sensory exam grossly normal, mentation intact and speech normal    DIAGNOSTICS:   No labs or EKG required for low risk surgery (cataract, skin procedure, breast biopsy, etc)    Recent Labs   Lab Test 11/16/18  1354 09/06/17  0943 03/27/17  1130  04/03/15  1115   HGB 15.3 15.2 15.1   < > 16.0    339 342   < > 215   INR  --   --   --   --  1.00     --  139   < > 139   POTASSIUM 4.0  --  4.4   < > 3.9   CR 0.77  --  0.81   < > 0.69    < > = values in this interval not displayed.        IMPRESSION:   Reason for surgery/procedure: Cataracts    The proposed surgical procedure is considered LOW risk.    REVISED CARDIAC RISK INDEX  The patient has the following serious cardiovascular risks for perioperative complications such as (MI, PE, VFib and 3  AV Block):  No serious cardiac risks  INTERPRETATION: 0 risks: Class I (very low risk - 0.4% complication rate)    The patient has the following additional risks for perioperative complications:  No identified additional risks  The ASCVD Risk score (Muse DC Jr., et al., 2013) failed to calculate for the following reasons:    The 2013 ASCVD risk score is only valid for ages 40 to 79      ICD-10-CM    1. Preoperative examination Z01.818        RECOMMENDATIONS:     --Patient is to take all scheduled medications on the day of surgery EXCEPT for modifications listed below.    Anticoagulant or Antiplatelet Medication Use  NSAIDS: Ibuprofen (Motrin):         Stop one day prior to surgery        APPROVAL GIVEN to proceed with proposed procedure, without further diagnostic evaluation       Signed Electronically by: Benjamin Lazar MD    Copy  of this evaluation report is provided to requesting physician.    [place] Preop Guidelines    Revised Cardiac Risk Index

## 2020-02-10 NOTE — PROGRESS NOTES
History: It is a great pleasure to see this very pleasant 84-year-old gentleman in initial consultation today.  He had a transurethral resection of the prostate approximately 25 years ago  Postvoid residual today is 265 cc.  He is noticing slowing of the stream with intermittency and terminal dribbling without evidence of gross hematuria or recurrent urinary tract infection.  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  He seems to be making very good progress at this point  Past Medical History:   Diagnosis Date     Anxiety state, unspecified      Asthma      Basal cell carcinoma      Benign neoplasm of colon      Cardiomegaly 1/6/2005     Chronic ischemic heart disease, unspecified      Constipation      Depressive disorder, not elsewhere classified      Diverticulosis of colon (without mention of hemorrhage)      Esophageal cancer (H) 3/23/2015     Essential hypertension, benign      Hiatal hernia      Hypertrophy (benign) of prostate      Internal hemorrhoids without mention of complication      Mumps      Other primary cardiomyopathies 2003    EF 45%     Sciatica      Spondylosis of unspecified site without mention of myelopathy        Past Surgical History:   Procedure Laterality Date     BRONCHOSCOPY FLEXIBLE N/A 4/3/2015    Procedure: BRONCHOSCOPY FLEXIBLE;  Surgeon: Ralph Catherine MD;  Location:  OR      EXCISION OF LINGUAL TONSIL      TONSILLECTOMY     C NONSPECIFIC PROCEDURE  2-99    Colonic polpectomy     C NONSPECIFIC PROCEDURE  9-92    TURP     C NONSPECIFIC PROCEDURE  1982    Retinal surgery     COLONOSCOPY       ESOPHAGOSCOPY, GASTROSCOPY, DUODENOSCOPY (EGD), COMBINED N/A 3/19/2015    Procedure: COMBINED ESOPHAGOSCOPY, GASTROSCOPY, DUODENOSCOPY (EGD), BIOPSY SINGLE OR MULTIPLE;  Surgeon: Alo Mauro MD;  Location:  GI      ESOPHAGOSCOPY, GASTROSCOPY, DUODENOSCOPY (EGD), COMBINED N/A 7/14/2015    Procedure: COMBINED ESOPHAGOSCOPY, GASTROSCOPY, DUODENOSCOPY (EGD), BIOPSY SINGLE OR MULTIPLE;  Surgeon: Kathy Torres MD;  Location: [facility]     ESOPHAGOSCOPY, GASTROSCOPY, DUODENOSCOPY (EGD), COMBINED N/A 12/1/2015    Procedure: COMBINED ESOPHAGOSCOPY, GASTROSCOPY, DUODENOSCOPY (EGD), BIOPSY SINGLE OR MULTIPLE;  Surgeon: Alo Mauro MD;  Location: [facility]     ESOPHAGOSCOPY, GASTROSCOPY, DUODENOSCOPY (EGD), COMBINED N/A 3/17/2016    Procedure: COMBINED ESOPHAGOSCOPY, GASTROSCOPY, DUODENOSCOPY (EGD), BIOPSY SINGLE OR MULTIPLE;  Surgeon: Alo Mauro MD;  Location: [facility]     ESOPHAGOSCOPY, GASTROSCOPY, DUODENOSCOPY (EGD), COMBINED N/A 7/21/2016    Procedure: COMBINED ESOPHAGOSCOPY, GASTROSCOPY, DUODENOSCOPY (EGD);  Surgeon: Alo Mauro MD;  Location: [facility]     ESOPHAGOSCOPY, GASTROSCOPY, DUODENOSCOPY (EGD), COMBINED N/A 11/17/2016    Procedure: COMBINED ESOPHAGOSCOPY, GASTROSCOPY, DUODENOSCOPY (EGD);  Surgeon: Alo Mauro MD;  Location: [facility]     ESOPHAGOSCOPY, GASTROSCOPY, DUODENOSCOPY (EGD), COMBINED N/A 5/9/2017    Procedure: COMBINED ESOPHAGOSCOPY, GASTROSCOPY, DUODENOSCOPY (EGD);  (EGD) COMBINED ESOPHAGOSCOPY, GASTROSCOPY, DUODENOSCOPY;  Surgeon: Alo Mauro MD;  Location: [facility]     ESOPHAGOSCOPY, GASTROSCOPY, DUODENOSCOPY (EGD), COMBINED N/A 5/1/2018    Procedure: COMBINED ESOPHAGOSCOPY, GASTROSCOPY, DUODENOSCOPY (EGD);  ESOPHAGOGASTRODUODENOSCOPY ;  Surgeon: Alo Mauro MD;  Location:  GI     GASTROSTOMY, INSERT TUBE, COMBINED N/A 4/3/2015    Procedure: COMBINED GASTROSTOMY, INSERT TUBE (OPEN);  Surgeon: Ralph Catherine MD;  Location:  OR     HC PRESSURE GRADIENT MEASUREMENT, INITIAL VESSEL  2005    essentially normal     HC UGI ENDOSCOPY W EUS N/A 7/14/2015    Procedure: COMBINED ENDOSCOPIC ULTRASOUND, ESOPHAGOSCOPY, GASTROSCOPY, DUODENOSCOPY (EGD);  Surgeon: Kathy Torres MD;   Location:  GI     LAMINECTOMY, FUSION LUMBAR ONE LEVEL, COMBINED  2012    Procedure: COMBINED LAMINECTOMY, FUSION LUMBAR ONE LEVEL;  Posterior Fusion L5-S1, Total Facetectomy L5-S1 Left ;  Surgeon: Ronald Espinosa MD;  Location: [facility] OR     PROSTATE SURGERY         Family History   Problem Relation Age of Onset     Hypertension Mother      Cerebrovascular Disease Mother      Diabetes Brother      Diabetes Father          age 97       Social History     Socioeconomic History     Marital status:      Spouse name: Not on file     Number of children: Not on file     Years of education: Not on file     Highest education level: Not on file   Occupational History     Not on file   Social Needs     Financial resource strain: Not on file     Food insecurity:     Worry: Not on file     Inability: Not on file     Transportation needs:     Medical: Not on file     Non-medical: Not on file   Tobacco Use     Smoking status: Former Smoker     Packs/day: 2.00     Years: 40.00     Pack years: 80.00     Types: Cigarettes     Last attempt to quit: 1975     Years since quittin.1     Smokeless tobacco: Never Used   Substance and Sexual Activity     Alcohol use: No     Drug use: No     Sexual activity: Not Currently   Lifestyle     Physical activity:     Days per week: Not on file     Minutes per session: Not on file     Stress: Not on file   Relationships     Social connections:     Talks on phone: Not on file     Gets together: Not on file     Attends Baptism service: Not on file     Active member of club or organization: Not on file     Attends meetings of clubs or organizations: Not on file     Relationship status: Not on file     Intimate partner violence:     Fear of current or ex partner: Not on file     Emotionally abused: Not on file     Physically abused: Not on file     Forced sexual activity: Not on file   Other Topics Concern     Parent/sibling w/ CABG, MI or angioplasty before 65F 55M? Not  "Asked   Social History Narrative     Not on file       Current Outpatient Medications   Medication Sig Dispense Refill     Cholecalciferol (VITAMIN D3) 5000 UNITS TABS Take  by mouth.       cyanocolbalamin (VITAMIN  B-12) 100 MCG tablet Take 50 mcg by mouth daily       fluticasone (FLONASE) 50 MCG/ACT spray Spray 1-2 sprays into both nostrils daily 1 Bottle 11     fluticasone-vilanterol (BREO ELLIPTA) 100-25 MCG/INH inhaler Inhale 1 puff into the lungs daily       Ibuprofen (ADVIL PO)        MAGNESIUM OXIDE PO        ALPRAZolam (XANAX) 0.5 MG tablet Take 1 tablet (0.5 mg) by mouth 2 times daily as needed for anxiety (Patient not taking: Reported on 12/2/2019) 45 tablet 0     EPINEPHrine (EPIPEN) 0.3 MG/0.3ML injection Inject 0.3 mLs (0.3 mg) into the muscle once as needed for anaphylaxis (Patient not taking: Reported on 12/2/2019) 1 each 1     VENTOLIN  (90 Base) MCG/ACT inhaler INHALE 2 PUFFS BY MOUTH EVERY 6 HOURS AS NEEDED  0       Review Of Systems:  Skin: negative  Eyes: The patient does have cataracts  Ears/Nose/Throat: negative  Respiratory: Some asthma  Cardiovascular: Cardiomegaly.  Ischemic heart disease.  Hypertension  Gastrointestinal: History of esophageal cancer  Genitourinary: nocturia and decreased urinary stream  Musculoskeletal: back pain  Neurologic: negative  Psychiatric: negative  Hematologic/Lymphatic/Immunologic: negative  Endocrine: negative    Exam:  /68 (BP Location: Left arm, Patient Position: Sitting, Cuff Size: Adult Regular)   Pulse 85   Ht 1.753 m (5' 9\")   Wt 77.1 kg (170 lb)   SpO2 96%   BMI 25.10 kg/m      General Impression: Very pleasant gentleman in no acute distress, well oriented to time place and person.    Mental status.  Normal    HEENT: There is no clinical evidence of jaundice on examination of conjunctiva.  Extraocular eye movements normal.  Mucous membranes are unremarkable    Skin: Skin is normal to examination    Lymph Nodes: There is no " "adenopathy    Respiratory System: Respiratory cycle is normal    Cardiovascular: There is no significant peripheral pitting edema    Abdominal: The abdomen is soft, there is no evidence of hernia    Extremities: Extremities otherwise unremarkable    Back and Flank: Mild kyphosis noted but no other remarkable features    Genital: Penis uncircumcised with normal size meatus    Rectal: Good sphincter tone, normal perianal sensation.  Smooth rectal mucosa without hemorrhoids or fissures.  Smooth soft mildly enlarged prostate but without evidence of tenderness, bogginess or nodules.  Seminal vesicles.  Not palpable.  Perineum is otherwise normal to examination    Neurologic: There are no focal abnormal clinical neurological signs in the central, peripheral nervous system    Impression: The patient has slowing of the stream having had a transurethral section of the prostate about 25 years ago.  It is possible that may be significant regrowth of the prostate.  We note that the bladder empties incompletely with a postvoid residual of 265 cc.  He has a previous history of squamous cell carcinoma of the esophagus treated by radiation therapy which has been very effective.  I am going to recommend we arrange for cystoscopy in the near future to better evaluate for intravesical obstruction.  I have had a careful discussion with him about this explaining that could be regrowth of the prostate, there could be evidence of bladder neck contracture or urethral stricture which may be contributing to the symptoms.  I explained this in detail to him today.  I answered all his questions      Plan.  Cystoscopy    \"This dictation was performed with voice recognition software and may contain errors,  omissions and inadvertent word substitution.\"    "

## 2020-02-10 NOTE — NURSING NOTE
Chief Complaint   Patient presents with     New Patient     New patient is here for urinary frequency, and pelvic discomfort.      PVR today was 265ml.     Mae Ford, CMA

## 2020-02-10 NOTE — LETTER
2/10/2020       RE: Jeff Mack  [address] [address]     Dear Colleague,    Thank you for referring your patient, Jeff Mack, to the [facility] at [facility]. Please see a copy of my visit note below.    History: It is a great pleasure to see this very pleasant 84-year-old gentleman in initial consultation today.  He had a transurethral resection of the prostate approximately 25 years ago  Postvoid residual today is 265 cc.  He is noticing slowing of the stream with intermittency and terminal dribbling without evidence of gross hematuria or recurrent urinary tract infection.  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  He seems to be making very good progress at this point  Past Medical History:   Diagnosis Date     Anxiety state, unspecified      Asthma      Basal cell carcinoma      Benign neoplasm of colon      Cardiomegaly 1/6/2005     Chronic ischemic heart disease, unspecified      Constipation      Depressive disorder, not elsewhere classified      Diverticulosis of colon (without mention of hemorrhage)      Esophageal cancer (H) 3/23/2015     Essential hypertension, benign      Hiatal hernia      Hypertrophy (benign) of prostate      Internal hemorrhoids without mention of complication      Mumps      Other primary cardiomyopathies 2003    EF 45%     Sciatica      Spondylosis of unspecified site without mention of myelopathy        Past Surgical History:   Procedure Laterality Date     BRONCHOSCOPY FLEXIBLE N/A 4/3/2015    Procedure: BRONCHOSCOPY FLEXIBLE;  Surgeon: Ralph Catherine MD;  Location: [facility]     C EXCISION OF LINGUAL TONSIL      TONSILLECTOMY     C NONSPECIFIC PROCEDURE  2-99    Colonic polpectomy     C NONSPECIFIC  PROCEDURE  9-92    TURP     C NONSPECIFIC PROCEDURE  1982    Retinal surgery     COLONOSCOPY       ESOPHAGOSCOPY, GASTROSCOPY, DUODENOSCOPY (EGD), COMBINED N/A 3/19/2015    Procedure: COMBINED ESOPHAGOSCOPY, GASTROSCOPY, DUODENOSCOPY (EGD), BIOPSY SINGLE OR MULTIPLE;  Surgeon: Alo Mauro MD;  Location: [facility]     ESOPHAGOSCOPY, GASTROSCOPY, DUODENOSCOPY (EGD), COMBINED N/A 7/14/2015    Procedure: COMBINED ESOPHAGOSCOPY, GASTROSCOPY, DUODENOSCOPY (EGD), BIOPSY SINGLE OR MULTIPLE;  Surgeon: Kathy Torres MD;  Location: [facility]     ESOPHAGOSCOPY, GASTROSCOPY, DUODENOSCOPY (EGD), COMBINED N/A 12/1/2015    Procedure: COMBINED ESOPHAGOSCOPY, GASTROSCOPY, DUODENOSCOPY (EGD), BIOPSY SINGLE OR MULTIPLE;  Surgeon: Alo Mauro MD;  Location: [facility]     ESOPHAGOSCOPY, GASTROSCOPY, DUODENOSCOPY (EGD), COMBINED N/A 3/17/2016    Procedure: COMBINED ESOPHAGOSCOPY, GASTROSCOPY, DUODENOSCOPY (EGD), BIOPSY SINGLE OR MULTIPLE;  Surgeon: Alo Mauro MD;  Location: [facility]     ESOPHAGOSCOPY, GASTROSCOPY, DUODENOSCOPY (EGD), COMBINED N/A 7/21/2016    Procedure: COMBINED ESOPHAGOSCOPY, GASTROSCOPY, DUODENOSCOPY (EGD);  Surgeon: Alo Mauro MD;  Location: [facility]     ESOPHAGOSCOPY, GASTROSCOPY, DUODENOSCOPY (EGD), COMBINED N/A 11/17/2016    Procedure: COMBINED ESOPHAGOSCOPY, GASTROSCOPY, DUODENOSCOPY (EGD);  Surgeon: Alo Mauro MD;  Location: [facility]     ESOPHAGOSCOPY, GASTROSCOPY, DUODENOSCOPY (EGD), COMBINED N/A 5/9/2017    Procedure: COMBINED ESOPHAGOSCOPY, GASTROSCOPY, DUODENOSCOPY (EGD);  (EGD) COMBINED ESOPHAGOSCOPY, GASTROSCOPY, DUODENOSCOPY;  Surgeon: Alo Mauro MD;  Location: [facility]     ESOPHAGOSCOPY, GASTROSCOPY, DUODENOSCOPY (EGD), COMBINED N/A 5/1/2018    Procedure: COMBINED ESOPHAGOSCOPY, GASTROSCOPY, DUODENOSCOPY (EGD);  ESOPHAGOGASTRODUODENOSCOPY ;  Surgeon: Alo Mauro MD;  Location: [facility]     GASTROSTOMY, INSERT TUBE, COMBINED N/A 4/3/2015    Procedure: COMBINED GASTROSTOMY, INSERT TUBE  (OPEN);  Surgeon: Ralph Catherine MD;  Location: [facility]     HC PRESSURE GRADIENT MEASUREMENT, INITIAL VESSEL  2005    essentially normal     HC UGI ENDOSCOPY W EUS N/A 2015    Procedure: COMBINED ENDOSCOPIC ULTRASOUND, ESOPHAGOSCOPY, GASTROSCOPY, DUODENOSCOPY (EGD);  Surgeon: Kathy Torres MD;  Location: [facility]     LAMINECTOMY, FUSION LUMBAR ONE LEVEL, COMBINED  2012    Procedure: COMBINED LAMINECTOMY, FUSION LUMBAR ONE LEVEL;  Posterior Fusion L5-S1, Total Facetectomy L5-S1 Left ;  Surgeon: Ronald Espinosa MD;  Location: [facility]     PROSTATE SURGERY         Family History   Problem Relation Age of Onset     Hypertension Mother      Cerebrovascular Disease Mother      Diabetes Brother      Diabetes Father          age 97       Social History     Socioeconomic History     Marital status:      Spouse name: Not on file     Number of children: Not on file     Years of education: Not on file     Highest education level: Not on file   Occupational History     Not on file   Social Needs     Financial resource strain: Not on file     Food insecurity:     Worry: Not on file     Inability: Not on file     Transportation needs:     Medical: Not on file     Non-medical: Not on file   Tobacco Use     Smoking status: Former Smoker     Packs/day: 2.00     Years: 40.00     Pack years: 80.00     Types: Cigarettes     Last attempt to quit: 1975     Years since quittin.1     Smokeless tobacco: Never Used   Substance and Sexual Activity     Alcohol use: No     Drug use: No     Sexual activity: Not Currently   Lifestyle     Physical activity:     Days per week: Not on file     Minutes per session: Not on file     Stress: Not on file   Relationships     Social connections:     Talks on phone: Not on file     Gets together: Not on file     Attends Voodoo service: Not on file     Active member of club or organization: Not on file     Attends meetings of clubs or organizations: Not on  "file     Relationship status: Not on file     Intimate partner violence:     Fear of current or ex partner: Not on file     Emotionally abused: Not on file     Physically abused: Not on file     Forced sexual activity: Not on file   Other Topics Concern     Parent/sibling w/ CABG, MI or angioplasty before 65F 55M? Not Asked   Social History Narrative     Not on file       Current Outpatient Medications   Medication Sig Dispense Refill     Cholecalciferol (VITAMIN D3) 5000 UNITS TABS Take  by mouth.       cyanocolbalamin (VITAMIN  B-12) 100 MCG tablet Take 50 mcg by mouth daily       fluticasone (FLONASE) 50 MCG/ACT spray Spray 1-2 sprays into both nostrils daily 1 Bottle 11     fluticasone-vilanterol (BREO ELLIPTA) 100-25 MCG/INH inhaler Inhale 1 puff into the lungs daily       Ibuprofen (ADVIL PO)        MAGNESIUM OXIDE PO        ALPRAZolam (XANAX) 0.5 MG tablet Take 1 tablet (0.5 mg) by mouth 2 times daily as needed for anxiety (Patient not taking: Reported on 12/2/2019) 45 tablet 0     EPINEPHrine (EPIPEN) 0.3 MG/0.3ML injection Inject 0.3 mLs (0.3 mg) into the muscle once as needed for anaphylaxis (Patient not taking: Reported on 12/2/2019) 1 each 1     VENTOLIN  (90 Base) MCG/ACT inhaler INHALE 2 PUFFS BY MOUTH EVERY 6 HOURS AS NEEDED  0       Review Of Systems:  Skin: negative  Eyes: The patient does have cataracts  Ears/Nose/Throat: negative  Respiratory: Some asthma  Cardiovascular: Cardiomegaly.  Ischemic heart disease.  Hypertension  Gastrointestinal: History of esophageal cancer  Genitourinary: nocturia and decreased urinary stream  Musculoskeletal: back pain  Neurologic: negative  Psychiatric: negative  Hematologic/Lymphatic/Immunologic: negative  Endocrine: negative    Exam:  /68 (BP Location: Left arm, Patient Position: Sitting, Cuff Size: Adult Regular)   Pulse 85   Ht 1.753 m (5' 9\")   Wt 77.1 kg (170 lb)   SpO2 96%   BMI 25.10 kg/m       General Impression: Very pleasant gentleman " "in no acute distress, well oriented to time place and person.    Mental status.  Normal    HEENT: There is no clinical evidence of jaundice on examination of conjunctiva.  Extraocular eye movements normal.  Mucous membranes are unremarkable    Skin: Skin is normal to examination    Lymph Nodes: There is no adenopathy    Respiratory System: Respiratory cycle is normal    Cardiovascular: There is no significant peripheral pitting edema    Abdominal: The abdomen is soft, there is no evidence of hernia    Extremities: Extremities otherwise unremarkable    Back and Flank: Mild kyphosis noted but no other remarkable features    Genital: Penis uncircumcised with normal size meatus    Rectal: Good sphincter tone, normal perianal sensation.  Smooth rectal mucosa without hemorrhoids or fissures.  Smooth soft mildly enlarged prostate but without evidence of tenderness, bogginess or nodules.  Seminal vesicles.  Not palpable.  Perineum is otherwise normal to examination    Neurologic: There are no focal abnormal clinical neurological signs in the central, peripheral nervous system    Impression: The patient has slowing of the stream having had a transurethral section of the prostate about 25 years ago.  It is possible that may be significant regrowth of the prostate.  We note that the bladder empties incompletely with a postvoid residual of 265 cc.  He has a previous history of squamous cell carcinoma of the esophagus treated by radiation therapy which has been very effective.  I am going to recommend we arrange for cystoscopy in the near future to better evaluate for intravesical obstruction.  I have had a careful discussion with him about this explaining that could be regrowth of the prostate, there could be evidence of bladder neck contracture or urethral stricture which may be contributing to the symptoms.  I explained this in detail to him today.  I answered all his questions      Plan.  Cystoscopy    \"This dictation was " "performed with voice recognition software and may contain errors,  omissions and inadvertent word substitution.\"      Again, thank you for allowing me to participate in the care of your patient.      Sincerely,    Andrea Lyon MD      "

## 2020-02-19 NOTE — ANESTHESIA CARE TRANSFER NOTE
Patient: Jeff Mack    Procedure(s):  COLONOSCOPY, WITH POLYPECTOMY AND BIOPSY  Colonoscopy, With Hemorrhage Control  Colonoscopy, Flexible, With Lesion Removal Using Snare  Tattooing, With Colonoscopy    Diagnosis: Encounter for screening colonoscopy [Z12.11]  Diagnosis Additional Information: No value filed.    Anesthesia Type:   MAC     Note:  Airway :Room Air  Patient transferred to:PACU  Comments: Anesthesia Care Note    Patient: Jeff Mack    Transferred to: PACU    Patient vital signs: Stable    Airway: None    Monitors placed. VSS. PIV patent. No change in dentition. Report given to JOSE GUADALUPE Singh CRNA   2/19/2020  Handoff Report: Identifed the Patient, Identified the Reponsible Provider, Reviewed the pertinent medical history, Discussed the surgical course, Reviewed Intra-OP anesthesia mangement and issues during anesthesia, Set expectations for post-procedure period and Allowed opportunity for questions and acknowledgement of understanding      Vitals: (Last set prior to Anesthesia Care Transfer)    CRNA VITALS  2/19/2020 1419 - 2/19/2020 1452      2/19/2020             Ht Rate:  71    Resp Rate (set):  10                Electronically Signed By: RAUDEL Tomlinson CRNA  February 19, 2020  2:52 PM

## 2020-02-19 NOTE — ANESTHESIA POSTPROCEDURE EVALUATION
Patient: Jeff Mack    Procedure(s):  COLONOSCOPY, WITH POLYPECTOMY AND BIOPSY  Colonoscopy, With Hemorrhage Control  Colonoscopy, Flexible, With Lesion Removal Using Snare  Tattooing, With Colonoscopy    Diagnosis:Encounter for screening colonoscopy [Z12.11]  Diagnosis Additional Information: No value filed.    Anesthesia Type:  MAC    Note:  Anesthesia Post Evaluation    Patient location during evaluation: Endoscopy Recovery  Patient participation: Able to fully participate in evaluation  Level of consciousness: awake  Pain management: adequate  Airway patency: patent  Cardiovascular status: acceptable  Respiratory status: acceptable  Hydration status: acceptable  PONV: none     Anesthetic complications: None          Last vitals:  Vitals:    02/19/20 1500 02/19/20 1510 02/19/20 1520   BP: 113/71 138/77 (!) 151/71   Pulse: 73 70 76   Resp: 12 15 11   Temp:      SpO2: 97% 99% 98%         Electronically Signed By: Kvng Stallings MD  February 19, 2020  5:26 PM

## 2020-02-19 NOTE — ANESTHESIA PREPROCEDURE EVALUATION
Anesthesia Pre-Procedure Evaluation    Patient: Jeff Mack   MRN: [id number] : 1935          Preoperative Diagnosis: Encounter for screening colonoscopy [Z12.11]    Procedure(s):  COLONOSCOPY    Past Medical History:   Diagnosis Date     Anxiety state, unspecified      Asthma      Basal cell carcinoma      Benign neoplasm of colon      Cardiomegaly 2005     Chronic ischemic heart disease, unspecified      Constipation      Depressive disorder, not elsewhere classified      Diverticulosis of colon (without mention of hemorrhage)      Esophageal cancer (H) 3/23/2015     Essential hypertension, benign      Hiatal hernia      Hypertrophy (benign) of prostate      Internal hemorrhoids without mention of complication      Mumps      Other primary cardiomyopathies     EF 45%     Sciatica      Spondylosis of unspecified site without mention of myelopathy      Past Surgical History:   Procedure Laterality Date     BRONCHOSCOPY FLEXIBLE N/A 4/3/2015    Procedure: BRONCHOSCOPY FLEXIBLE;  Surgeon: Ralph Catherine MD;  Location:  OR      EXCISION OF LINGUAL TONSIL      TONSILLECTOMY     C NONSPECIFIC PROCEDURE  2-99    Colonic polpectomy     C NONSPECIFIC PROCEDURE  9-    TURP     C NONSPECIFIC PROCEDURE      Retinal surgery     COLONOSCOPY       ESOPHAGOSCOPY, GASTROSCOPY, DUODENOSCOPY (EGD), COMBINED N/A 3/19/2015    Procedure: COMBINED ESOPHAGOSCOPY, GASTROSCOPY, DUODENOSCOPY (EGD), BIOPSY SINGLE OR MULTIPLE;  Surgeon: Alo Mauro MD;  Location:  GI     ESOPHAGOSCOPY, GASTROSCOPY, DUODENOSCOPY (EGD), COMBINED N/A 2015    Procedure: COMBINED ESOPHAGOSCOPY, GASTROSCOPY, DUODENOSCOPY (EGD), BIOPSY SINGLE OR MULTIPLE;  Surgeon: Kathy Torres MD;  Location:  GI     ESOPHAGOSCOPY, GASTROSCOPY, DUODENOSCOPY (EGD), COMBINED N/A 2015    Procedure: COMBINED ESOPHAGOSCOPY, GASTROSCOPY, DUODENOSCOPY (EGD), BIOPSY SINGLE OR MULTIPLE;  Surgeon: Alo Mauro MD;   Location:  GI     ESOPHAGOSCOPY, GASTROSCOPY, DUODENOSCOPY (EGD), COMBINED N/A 3/17/2016    Procedure: COMBINED ESOPHAGOSCOPY, GASTROSCOPY, DUODENOSCOPY (EGD), BIOPSY SINGLE OR MULTIPLE;  Surgeon: Alo Mauro MD;  Location:  GI     ESOPHAGOSCOPY, GASTROSCOPY, DUODENOSCOPY (EGD), COMBINED N/A 7/21/2016    Procedure: COMBINED ESOPHAGOSCOPY, GASTROSCOPY, DUODENOSCOPY (EGD);  Surgeon: Alo Mauro MD;  Location:  GI     ESOPHAGOSCOPY, GASTROSCOPY, DUODENOSCOPY (EGD), COMBINED N/A 11/17/2016    Procedure: COMBINED ESOPHAGOSCOPY, GASTROSCOPY, DUODENOSCOPY (EGD);  Surgeon: Alo Mauro MD;  Location:  GI     ESOPHAGOSCOPY, GASTROSCOPY, DUODENOSCOPY (EGD), COMBINED N/A 5/9/2017    Procedure: COMBINED ESOPHAGOSCOPY, GASTROSCOPY, DUODENOSCOPY (EGD);  (EGD) COMBINED ESOPHAGOSCOPY, GASTROSCOPY, DUODENOSCOPY;  Surgeon: Alo Mauro MD;  Location:  GI     ESOPHAGOSCOPY, GASTROSCOPY, DUODENOSCOPY (EGD), COMBINED N/A 5/1/2018    Procedure: COMBINED ESOPHAGOSCOPY, GASTROSCOPY, DUODENOSCOPY (EGD);  ESOPHAGOGASTRODUODENOSCOPY ;  Surgeon: Alo Mauro MD;  Location: [facility]     GASTROSTOMY, INSERT TUBE, COMBINED N/A 4/3/2015    Procedure: COMBINED GASTROSTOMY, INSERT TUBE (OPEN);  Surgeon: Ralph Catherine MD;  Location:  OR      PRESSURE GRADIENT MEASUREMENT, INITIAL VESSEL  2005    essentially normal     HC UGI ENDOSCOPY W EUS N/A 7/14/2015    Procedure: COMBINED ENDOSCOPIC ULTRASOUND, ESOPHAGOSCOPY, GASTROSCOPY, DUODENOSCOPY (EGD);  Surgeon: Kathy Torres MD;  Location:  GI     LAMINECTOMY, FUSION LUMBAR ONE LEVEL, COMBINED  7/26/2012    Procedure: COMBINED LAMINECTOMY, FUSION LUMBAR ONE LEVEL;  Posterior Fusion L5-S1, Total Facetectomy L5-S1 Left ;  Surgeon: Ronald Espinosa MD;  Location: [facility]     PROSTATE SURGERY         Anesthesia Evaluation     . Pt has had prior anesthetic.     No history of anesthetic complications          ROS/MED HX    ENT/Pulmonary:  - neg pulmonary  ROS   (+)asthma Treatment: Inhaler daily,  COPD, , . .   (-) sleep apnea and recent URI   Neurologic:      (-) seizures, CVA and migraines   Cardiovascular: Comment: Cardiomyopathy, EF 45%    (+) Dyslipidemia, hypertension--CAD, --. : . . . :. .      (-) CHF and orthopnea/PND   METS/Exercise Tolerance:     Hematologic:  - neg hematologic  ROS       Musculoskeletal: Comment: Lumbar stenosis  sciatica        GI/Hepatic: Comment: Esophageal cancer    (+) hiatal hernia,      (-) GERD   Renal/Genitourinary:     (+) BPH,       Endo:      (-) Type II DM and thyroid disease   Psychiatric:     (+) psychiatric history anxiety      Infectious Disease:         Malignancy:   (+) Malignancy History of Other  Other CA esophageal cancer status post         Other:    - neg other ROS                      Physical Exam  Normal systems: cardiovascular and pulmonary    Airway   Mallampati: II  TM distance: >3 FB  Neck ROM: full    Dental   (+) upper dentures    Cardiovascular   Rhythm and rate: regular and normal      Pulmonary    breath sounds clear to auscultation            Lab Results   Component Value Date    WBC 4.8 11/16/2018    HGB 15.3 11/16/2018    HCT 44.1 11/16/2018     11/16/2018    CRP 6.1 07/30/2014    SED 10 11/16/2018     11/16/2018    POTASSIUM 4.0 11/16/2018    CHLORIDE 103 11/16/2018    CO2 27 11/16/2018    BUN 12 11/16/2018    CR 0.77 11/16/2018     (H) 11/16/2018    KAE 8.9 11/16/2018    ALBUMIN 3.7 11/16/2018    PROTTOTAL 7.4 11/16/2018    ALT 19 11/16/2018    AST 25 11/16/2018    ALKPHOS 69 11/16/2018    BILITOTAL 0.7 11/16/2018    PTT 34 01/12/2005    INR 1.00 04/03/2015    TSH 1.79 09/06/2017       Preop Vitals  BP Readings from Last 3 Encounters:   02/10/20 116/68   01/30/20 130/70   01/07/20 128/78    Pulse Readings from Last 3 Encounters:   02/10/20 85   01/30/20 88   01/07/20 75      Resp Readings from Last 3 Encounters:   08/15/19 16   06/24/19 18   02/19/19 12    SpO2 Readings from  "Last 3 Encounters:   02/10/20 96%   01/30/20 100%   01/07/20 96%      Temp Readings from Last 1 Encounters:   01/30/20 36.7  C (98  F) (Tympanic)    Ht Readings from Last 1 Encounters:   02/10/20 1.753 m (5' 9\")      Wt Readings from Last 1 Encounters:   02/10/20 77.1 kg (170 lb)    Estimated body mass index is 25.1 kg/m  as calculated from the following:    Height as of 2/10/20: 1.753 m (5' 9\").    Weight as of 2/10/20: 77.1 kg (170 lb).       Anesthesia Plan      History & Physical Review  History and physical reviewed and following examination; no interval change.    ASA Status:  3 .    NPO Status:  > 8 hours    Plan for MAC Reason for MAC:  Deep or markedly invasive procedure (G8)  PONV prophylaxis:  Ondansetron (or other 5HT-3)       Postoperative Care  Postoperative pain management:  IV analgesics.      Consents  Anesthetic plan, risks, benefits and alternatives discussed with:  Patient..                 Kvng Stallings MD  "

## 2020-02-22 PROBLEM — K62.5 RECTAL BLEEDING: Status: ACTIVE | Noted: 2020-01-01

## 2020-02-22 NOTE — PLAN OF CARE
Observation goals     -diagnostic tests and consults completed and resulted - not met  -vital signs normal or at patient baseline - not met, high BP  -tolerating oral intake to maintain hydration - not met  -returns to baseline functional status - not met  -safe disposition plan has been identified - not met        VSS ex high BP. A/Ox4. SBA. Clear liquid diet. Pt came in around 0345 on 2/22/2020. Had 2 bright red BM in ED, has not had stools in this unit yet. Has some lower abdominal tenderness, denies N/V. Hgb will be rechecked this am.

## 2020-02-22 NOTE — PROGRESS NOTES
"1240:  Pt getting anxious. Requesting to page GI. I paged Dr Gonzalez and left a voice message. Pt had total of 3 bloody episode per rectum. Pt stated he is slightly dizzy. HR little tachy in 110s. BP stable. IV fluids infusing. NPO since MN. Will continue to monitor.     Addendum 1346: pt a/o. VSS. abd soreness. Belly soft and non tender to touch. Very concerned about his bleeding. Pt stated to RN, \" This is emergency. I am bleeding since this morning.\"assured him that his vitals has been stable and that we are montioring him very closely. Total of 4 episode of active bright red blood with some clots per rectum. Recheck hgb at 1400. Wife at bed side. Waiting for GI to consult.     1400: dr Roberson called back. MD will come to see pt later this evening. OK to start clears. Colonoscopy tomorrow. Pt and wife updated with POC  "

## 2020-02-22 NOTE — PROGRESS NOTES
MD Notification    Notified Person: MD    Notified Person Name: MD Gonzalez ELIZA    Notification Date/Time: 2/22   3:55 PM    Notification Interaction: answering service    Purpose of Notification: follow up consult    Orders Received:    Comments: re paged at 4:41; re paged at 5:47; per answering service MD is on call until 8 AM  . Pt at bedside around 6:30 PM

## 2020-02-22 NOTE — ED NOTES
[facility]  ED Nurse Handoff Report    ED Chief complaint: Rectal Bleeding (Pt. had colonoscopy on Wednesday. Pt. had bright red BM times 2 tonight. No clots visualized. )      ED Diagnosis:   Final diagnoses:   Rectal bleeding       Code Status: as per hospitalist    Allergies:   Allergies   Allergen Reactions     Bee Venom Shortness Of Breath       Patient Story: rectal bleeding 2 times today. Had a colonoscopy done on the 19th.  Focused Assessment:  Ambulatory, A&Ox4, respirations even and unlabored, skin dry, warm, color wnl. Denies any pain.   Results for orders placed or performed during the hospital encounter of 02/21/20   CBC with platelets differential     Status: Abnormal   Result Value Ref Range    WBC 13.2 (H) 4.0 - 11.0 10e9/L    RBC Count 5.22 4.4 - 5.9 10e12/L    Hemoglobin 15.0 13.3 - 17.7 g/dL    Hematocrit 44.3 40.0 - 53.0 %    MCV 85 78 - 100 fl    MCH 28.7 26.5 - 33.0 pg    MCHC 33.9 31.5 - 36.5 g/dL    RDW 15.2 (H) 10.0 - 15.0 %    Platelet Count 179 150 - 450 10e9/L    Diff Method Manual Differential     % Neutrophils 44.0 %    % Lymphocytes 24.0 %    % Monocytes 19.0 %    % Eosinophils 5.0 %    % Basophils 2.0 %    % Metamyelocytes 5.0 %    % Myelocytes 1.0 %    Absolute Neutrophil 5.8 1.6 - 8.3 10e9/L    Absolute Lymphocytes 3.2 0.8 - 5.3 10e9/L    Absolute Monocytes 2.5 (H) 0.0 - 1.3 10e9/L    Absolute Eosinophils 0.7 0.0 - 0.7 10e9/L    Absolute Basophils 0.3 (H) 0.0 - 0.2 10e9/L    Absolute Metamyelocytes 0.7 (H) 0 10e9/L    Absolute Myelocytes 0.1 (H) 0 10e9/L    RBC Morphology Normal     Platelet Estimate       Automated count confirmed.  Platelet morphology is normal.   Comprehensive metabolic panel     Status: None   Result Value Ref Range    Sodium 134 133 - 144 mmol/L    Potassium 3.8 3.4 - 5.3 mmol/L    Chloride 104 94 - 109 mmol/L    Carbon Dioxide 25 20 - 32 mmol/L    Anion Gap 5 3 - 14 mmol/L    Glucose 95 70 - 99 mg/dL    Urea Nitrogen 12 7 - 30 mg/dL     "Creatinine 0.75 0.66 - 1.25 mg/dL    GFR Estimate 85 >60 mL/min/[1.73_m2]    GFR Estimate If Black >90 >60 mL/min/[1.73_m2]    Calcium 8.7 8.5 - 10.1 mg/dL    Bilirubin Total 0.6 0.2 - 1.3 mg/dL    Albumin 3.7 3.4 - 5.0 g/dL    Protein Total 7.7 6.8 - 8.8 g/dL    Alkaline Phosphatase 84 40 - 150 U/L    ALT 21 0 - 70 U/L    AST 26 0 - 45 U/L   ABO/Rh type and screen     Status: None   Result Value Ref Range    ABO O     RH(D) Pos     Antibody Screen Neg     Test Valid Only At [facility]        Specimen Expires 02/24/2020        Treatments and/or interventions provided: fluids, protonix  Patient's response to treatments and/or interventions: NAD, comfortably resting in stretcher.     To be done/followed up on inpatient unit:  GI consult?    Does this patient have any cognitive concerns?: n/a    Activity level - Baseline/Home:  Independent  Activity Level - Current:   Independent    Patient's Preferred language: English   Needed?: No    Isolation: None  Infection: Not Applicable  Bariatric?: No    Vital Signs:   Vitals:    02/21/20 2315   BP: (!) 141/80   Resp: 16   Temp: 97.7  F (36.5  C)   TempSrc: Temporal   SpO2: 98%   Weight: 76.2 kg (168 lb)   Height: 1.753 m (5' 9\")       Cardiac Rhythm:     Was the PSS-3 completed:   Yes  What interventions are required if any?               Family Comments: wife and daughter at bedside  OBS brochure/video discussed/provided to patient/family: yes               For the majority of the shift this patient's behavior was Green.   Behavioral interventions performed were n/a.    ED NURSE PHONE NUMBER: [phone]       "

## 2020-02-22 NOTE — PROGRESS NOTES
RECEIVING UNIT ED HANDOFF REVIEW    ED Nurse Handoff Report was reviewed by: Frances Cespedes RN on February 22, 2020 at 2:40 AM

## 2020-02-22 NOTE — H&P
[facility]    History and Physical  Hospitalist       Date of Admission:  2/21/2020  Date of Service (when I saw the patient): 02/22/20    Assessment & Plan   Jeff Mack is a 84 year old male who presents with gastrointestinal bleeding.  Admitted for further evaluation and treatment.    Gastrointestinal bleeding, in the context of recent colonoscopy and polypectomy: Patient with colonoscopy and polypectomy completed on February 19, 2020 with evidence for nonbleeding internal hemorrhoids, normal ileum, diverticulosis, polyp in the descending colon removed with hot snare, clip placed, transverse colon polyp with resection, clip placed, patient discharged thereafter.  Patient reports intermittent abdominal discomfort and rectal bleeding since colonoscopy with a large bloody bowel movement prior to presentation in the emergency department.  Patient denies chest pain, shortness of breath, headache, vision changes, eye pain, ear pain, sore throat, cough, dysuria, blood in the urine, constipation, lower extremity edema, fever, rash.  -Close hemodynamic monitoring.  -Recheck hemoglobin in the morning.  -Supportive measures.  -Consider gastroenterology consult as clinically indicated.    History of depression, anxiety: Stable.  -Continue prior to admission alprazolam when verified by pharmacy.    History of reactive airway disease: Stable.  -Continue prior to admission fluticasone when verified by pharmacy.  -Continue prior to admission Flonase when verified by pharmacy.  -Continue prior to admission Ventolin when verified by pharmacy.    Code Status: Full Code    Disposition: Observation.    Dr. Jeffrey Hernandez D.O.  [facility] Hospitalist  Pager [phone]    Primary Care Physician   Benjamin Lazar    Chief Complaint   Gastrointestinal bleeding    History is obtained from the patient and medical records.     History of Present Illness   Jeff Mack is a 84 year old male who presents  with gastrointestinal bleeding.  Admitted for further evaluation and treatment. Gastrointestinal bleeding, in the context of recent colonoscopy and polypectomy: Patient with colonoscopy and polypectomy completed on February 19, 2020 with evidence for nonbleeding internal hemorrhoids, normal ileum, diverticulosis, polyp in the descending colon removed with hot snare, clip placed, transverse colon polyp with resection, clip placed, patient discharged thereafter.  Patient reports intermittent abdominal discomfort and rectal bleeding since colonoscopy with a large bloody bowel movement prior to presentation in the emergency department.  Patient denies chest pain, shortness of breath, headache, vision changes, eye pain, ear pain, sore throat, cough, dysuria, blood in the urine, constipation, lower extremity edema, fever, rash.    Past Medical History    I have reviewed this patient's medical history and updated it with pertinent information if needed.   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  Past Surgical History:   Procedure Laterality Date     BRONCHOSCOPY FLEXIBLE N/A 4/3/2015    Procedure: BRONCHOSCOPY FLEXIBLE;  Surgeon: Ralph Catherine MD;  Location:   OR     C EXCISION OF LINGUAL TONSIL      TONSILLECTOMY     C NONSPECIFIC PROCEDURE  2-99    Colonic polpectomy     C NONSPECIFIC PROCEDURE  9-92    TURP     C NONSPECIFIC PROCEDURE  1982    Retinal surgery     COLONOSCOPY       COLONOSCOPY N/A 2/19/2020    Procedure: COLONOSCOPY, WITH POLYPECTOMY AND BIOPSY;  Surgeon: Kathy Torres MD;  Location: [facility]     ESOPHAGOSCOPY, GASTROSCOPY, DUODENOSCOPY (EGD), COMBINED N/A 3/19/2015    Procedure: COMBINED ESOPHAGOSCOPY, GASTROSCOPY, DUODENOSCOPY (EGD), BIOPSY SINGLE OR MULTIPLE;  Surgeon: Alo Mauro MD;  Location: [facility]     ESOPHAGOSCOPY, GASTROSCOPY, DUODENOSCOPY (EGD), COMBINED N/A 7/14/2015    Procedure: COMBINED ESOPHAGOSCOPY, GASTROSCOPY, DUODENOSCOPY (EGD), BIOPSY SINGLE OR MULTIPLE;  Surgeon: Kathy Torres MD;  Location: [facility]     ESOPHAGOSCOPY, GASTROSCOPY, DUODENOSCOPY (EGD), COMBINED N/A 12/1/2015    Procedure: COMBINED ESOPHAGOSCOPY, GASTROSCOPY, DUODENOSCOPY (EGD), BIOPSY SINGLE OR MULTIPLE;  Surgeon: Alo Mauro MD;  Location: [facility]     ESOPHAGOSCOPY, GASTROSCOPY, DUODENOSCOPY (EGD), COMBINED N/A 3/17/2016    Procedure: COMBINED ESOPHAGOSCOPY, GASTROSCOPY, DUODENOSCOPY (EGD), BIOPSY SINGLE OR MULTIPLE;  Surgeon: Alo Mauro MD;  Location: [facility]     ESOPHAGOSCOPY, GASTROSCOPY, DUODENOSCOPY (EGD), COMBINED N/A 7/21/2016    Procedure: COMBINED ESOPHAGOSCOPY, GASTROSCOPY, DUODENOSCOPY (EGD);  Surgeon: Alo Mauro MD;  Location: [facility]     ESOPHAGOSCOPY, GASTROSCOPY, DUODENOSCOPY (EGD), COMBINED N/A 11/17/2016    Procedure: COMBINED ESOPHAGOSCOPY, GASTROSCOPY, DUODENOSCOPY (EGD);  Surgeon: Alo Mauro MD;  Location: [facility]     ESOPHAGOSCOPY, GASTROSCOPY, DUODENOSCOPY (EGD), COMBINED N/A 5/9/2017    Procedure: COMBINED ESOPHAGOSCOPY, GASTROSCOPY, DUODENOSCOPY (EGD);  (EGD) COMBINED ESOPHAGOSCOPY, GASTROSCOPY, DUODENOSCOPY;  Surgeon: Alo Mauro MD;  Location:  GI     ESOPHAGOSCOPY, GASTROSCOPY, DUODENOSCOPY (EGD),  COMBINED N/A 5/1/2018    Procedure: COMBINED ESOPHAGOSCOPY, GASTROSCOPY, DUODENOSCOPY (EGD);  ESOPHAGOGASTRODUODENOSCOPY ;  Surgeon: Alo Mauro MD;  Location:  GI     GASTROSTOMY, INSERT TUBE, COMBINED N/A 4/3/2015    Procedure: COMBINED GASTROSTOMY, INSERT TUBE (OPEN);  Surgeon: Ralph Catherine MD;  Location: [facility] OR     HC PRESSURE GRADIENT MEASUREMENT, INITIAL VESSEL  2005    essentially normal     HC UGI ENDOSCOPY W EUS N/A 7/14/2015    Procedure: COMBINED ENDOSCOPIC ULTRASOUND, ESOPHAGOSCOPY, GASTROSCOPY, DUODENOSCOPY (EGD);  Surgeon: Kathy Torres MD;  Location:  GI     LAMINECTOMY, FUSION LUMBAR ONE LEVEL, COMBINED  7/26/2012    Procedure: COMBINED LAMINECTOMY, FUSION LUMBAR ONE LEVEL;  Posterior Fusion L5-S1, Total Facetectomy L5-S1 Left ;  Surgeon: Ronald Espinosa MD;  Location: [facility] OR     PROSTATE SURGERY         Prior to Admission Medications   Prior to Admission Medications   Prescriptions Last Dose Informant Patient Reported? Taking?   ALPRAZolam (XANAX) 0.5 MG tablet   No No   Sig: Take 1 tablet (0.5 mg) by mouth 2 times daily as needed for anxiety   Cholecalciferol (VITAMIN D3) 5000 UNITS TABS   Yes No   Sig: Take  by mouth.   EPINEPHrine (EPIPEN) 0.3 MG/0.3ML injection   No No   Sig: Inject 0.3 mLs (0.3 mg) into the muscle once as needed for anaphylaxis   Patient not taking: Reported on 12/2/2019   Ibuprofen (ADVIL PO)   Yes No   MAGNESIUM OXIDE PO   Yes No   VENTOLIN  (90 Base) MCG/ACT inhaler   Yes No   Sig: INHALE 2 PUFFS BY MOUTH EVERY 6 HOURS AS NEEDED   cyanocolbalamin (VITAMIN  B-12) 100 MCG tablet   Yes No   Sig: Take 50 mcg by mouth daily   fluticasone (FLONASE) 50 MCG/ACT spray   No No   Sig: Spray 1-2 sprays into both nostrils daily   fluticasone-vilanterol (BREO ELLIPTA) 100-25 MCG/INH inhaler   No No   Sig: Inhale 1 puff into the lungs daily      Facility-Administered Medications: None     Allergies   Allergies   Allergen Reactions     Bee Venom  Shortness Of Breath       Social History   I have reviewed this patient's social history and updated it with pertinent information if needed. Jeff Mack  reports that he quit smoking about 45 years ago. His smoking use included cigarettes. He has a 80.00 pack-year smoking history. He has never used smokeless tobacco. He reports that he does not drink alcohol or use drugs.    Family History   I have reviewed this patient's family history and updated it with pertinent information if needed.   Family History   Problem Relation Age of Onset     Hypertension Mother      Cerebrovascular Disease Mother      Diabetes Brother      Diabetes Father          age 97       Review of Systems   The 10 point Review of Systems is negative other than noted in the HPI or here.     Physical Exam   Temp: 97.7  F (36.5  C) Temp src: Temporal BP: (!) 141/80   Heart Rate: 100 Resp: 16 SpO2: 98 % O2 Device: None (Room air)    Vital Signs with Ranges  Temp:  [97.7  F (36.5  C)] 97.7  F (36.5  C)  Heart Rate:  [100] 100  Resp:  [16] 16  BP: (141)/(80) 141/80  SpO2:  [98 %] 98 %  168 lbs 0 oz    GENERAL: Alert and oriented. NAD. Conversational, appropriate.   HEENT: Normocephalic. EOMI. No icterus or injection. Nares normal.   LUNGS: Clear to auscultation. No dyspnea at rest.   HEART: Regular rate. Extremities perfused.   ABDOMEN: Soft, minimally tender, and nondistended. Positive bowel sounds.   EXTREMITIES: No LE edema noted.   NEUROLOGIC: Moves extremities x4, follows commands.    Data   Data reviewed today:  I personally reviewed both laboratory and imaging data.   Recent Labs   Lab 20  2346   WBC 13.2*   HGB 15.0   MCV 85         POTASSIUM 3.8   CHLORIDE 104   CO2 25   BUN 12   CR 0.75   ANIONGAP 5   KAE 8.7   GLC 95   ALBUMIN 3.7   PROTTOTAL 7.7   BILITOTAL 0.6   ALKPHOS 84   ALT 21   AST 26       No results found for this or any previous visit (from the past 24 hour(s)).

## 2020-02-22 NOTE — PROGRESS NOTES
Pt had 2 episode of large blood out of his rectum this am. Pt very anxious. IV fluids infusing. Vitals stable. No dizziness.

## 2020-02-22 NOTE — PROGRESS NOTES
PRIMARY DIAGNOSIS: GI BLEED    OUTPATIENT/OBSERVATION GOALS TO BE MET BEFORE DISCHARGE  1. Orthostatic performed: N/A    2. Stable Hgb Yes.   Recent Labs   Lab Test 02/22/20  0643 02/21/20  2346 11/16/18  1354   HGB 12.4* 15.0 15.3       3. Resolved or declined bleeding episodes: No,  with a moderate amount of bleeding Last episode: 2/22/2020      4. Appropriate testing complete: No    5. Cleared for discharge by consultants (if involved): No    6. Safe discharge environment identified: Yes    Discharge Planner Nurse   Safe discharge environment identified: Yes  Barriers to discharge: Yes       Entered by: Ashlie De Leon 02/22/2020 9:15 AM     Please review provider order for any additional goals.   Nurse to notify provider when observation goals have been met and patient is ready for discharge.

## 2020-02-22 NOTE — PROGRESS NOTES
PRIMARY DIAGNOSIS: GI BLEED    OUTPATIENT/OBSERVATION GOALS TO BE MET BEFORE DISCHARGE  1. Orthostatic performed: N/A    2. Stable Hgb Yes.   Recent Labs   Lab Test 02/22/20  0643 02/21/20  2346 11/16/18  1354   HGB 12.4* 15.0 15.3       3. Resolved or declined bleeding episodes: No,  with a moderate amount of bleeding. Total of 3 episode since 0800 am today Last episode: 2/22/2020      4. Appropriate testing complete: No    5. Cleared for discharge by consultants (if involved): No    6. Safe discharge environment identified: Yes    Discharge Planner Nurse   Safe discharge environment identified: Yes  Barriers to discharge: Yes       Entered by: Ashlie De Leon 02/22/2020 12:02 PM     Please review provider order for any additional goals.   Nurse to notify provider when observation goals have been met and patient is ready for discharge.

## 2020-02-22 NOTE — PROGRESS NOTES
Noncharge Progress Note    Jeff Mack is an 85yo M with PMH of anxiety, depression, recent colonoscopy 2/19/2020, diverticulosis, internal hemorrhoids, who presents with BRBPR. He reports that bleeding started yesterday, and this morning he had two more episodes of bloody stools. He endorses crampy abdominal pain in his lower abdomen. He denies any lightheadedness or dizziness since this started.    Physical Exam  Male in NAD  RRR no m/r/g  Abd nontender, active bowel sounds, soft  Nonpitting LE edema    A/P  1. BRBPR: Underwent colonoscopy three days ago s/p polypectomy, clip placement. Hgb 15.9-->12.4 here, there is a dilutional component from IVF. He denies dizziness.  - Due to ongoing bleeding today, GI was consulted for further management  - NPO for now  - NS @ 75ml/hr continue  - Hgb q8h    2. See Dr. Hernandez's H&P    Jaguar Lees MD

## 2020-02-22 NOTE — PROGRESS NOTES
MD Notification    Notified Person: MD    Notified Person Name:  ibis    Notification Date/Time: 2/22/2020 0745      Notification Interaction: via text    Purpose of Notification: hgb is 12.4 dropped from 15    Orders Received:    Comments: pt asymptomatic

## 2020-02-22 NOTE — PROGRESS NOTES
Observation goals PRIOR TO DISCHARGE    Comments: -diagnostic tests and consults completed and resulted  - NOT MET- HAVING SERIAL HGB; GI NEED TO SEE PT.  -vital signs normal or at patient baseline - MET  -tolerating oral intake to maintain hydration - MET  -returns to baseline functional status  - MET  -safe disposition plan has been identified  - MET  Nurse to notify provider when observation goals have been met and patient is ready for discharge.

## 2020-02-22 NOTE — PHARMACY-ADMISSION MEDICATION HISTORY
Pharmacy Medication History  Admission medication history interview status for the 2020  admission is complete. See EPIC admission navigator for prior to admission medications     Medication history sources: Patient, Noe  Medication history source reliability: Good  Adherence assessment: Good    Significant changes made to the medication list:  No significant changes    Additional medication history information:   1. Patient had used an epipen once years ago and then had one on hand, but it has since . He may need a new RX for this.     Medication reconciliation completed by provider prior to medication history? No    Time spent in this activity: 10 minutes    Prior to Admission medications    Medication Sig Last Dose Taking? Auth Provider   albuterol (PROAIR HFA/PROVENTIL HFA/VENTOLIN HFA) 108 (90 Base) MCG/ACT inhaler Inhale 1-2 puffs into the lungs every 6 hours as needed for shortness of breath / dyspnea or wheezing PRN Yes Unknown, Entered By History   ALPRAZolam (XANAX) 0.5 MG tablet Take 1 tablet (0.5 mg) by mouth 2 times daily as needed for anxiety PRN Yes Benjamin Lazar MD   Cholecalciferol (VITAMIN D3) 5000 UNITS TABS Take 5,000 Units by mouth daily  2020 at Unknown time Yes Reported, Patient   EPINEPHrine (ANY BX GENERIC EQUIV) 0.3 MG/0.3ML injection 2-pack Inject 0.3 mg into the muscle as needed for anaphylaxis  Yes Unknown, Entered By History   fluticasone (FLONASE) 50 MCG/ACT nasal spray Spray 1 spray into both nostrils 2 times daily 2020 at AM Yes Unknown, Entered By History   fluticasone-vilanterol (BREO ELLIPTA) 100-25 MCG/INH inhaler Inhale 1 puff into the lungs daily 2020 at AM Yes Benjamin Lazar MD   ibuprofen (ADVIL/MOTRIN) 200 MG tablet Take 200 mg by mouth daily as needed (for back pain) PRN Yes Unknown, Entered By History

## 2020-02-22 NOTE — ED PROVIDER NOTES
History     Chief Complaint:  Rectal Bleeding     HPI   Jeff Mack is a 84 year old male who presents to the emergency department today for evaluation of bright red rectal bleeding.  The patient reports he had colonoscopy on Wednesday and had several polyps removed.  He said today was his first bowel movement since the procedure.  He noted stool plus bright red blood.  About 3 hours ago he had a second episode where he felt like he had to defecate.  At that point he reports he passed a large volume of bright red blood.  He said he feels tired and fatigued, but not particularly lightheaded or dizzy.  He is not had nausea or vomiting.  He is not having any abdominal pain.  He is not had fevers.    He said he has been seeing a urologist recently because of urinary retention.  Those symptoms have been stable.  He denies dysuria.  Is not having flank pain.  He said he had a recent right-sided cataract surgery, and is scheduled to have the left done next week.    Allergies:  No Known Drug Allergies     Medications:    Xanax  Epipen  Breo ellipta  Ventolin  Magnesium Oxide    Past Medical History:    Anxiety state  Asthma  Basal cell carcinoma  Benign neoplasm of colon  Cardiomegaly  Chronic ischemic heart disease  Constipation  Depressive disorder  Diverticulosis of colon  Esophageal cancer  Hypertension  Hiatal hernia  Hypertrophy of prostate  Internal hemorrhoids  Mumps   Primary cardiomyopathies  Sciatica  Spondylosis  Nonallopathic lesion of sacral region  Abnormal EKG  Chronic obstructive airway disease   Hyperlipidemia   Lumbar stenosis   Edema   Spondylolisthesis     Past Surgical History:    Bronchoscopy flexible  Excision of lingual tonsil  Colonic polypectomy  TURP  Retinal surgery  Colonoscopy multiple   EGD multiple   Gastrostomy, insert tube  Pressure gradient measurement, initial vessel  Endoscopy with EUS  Laminectomy, fusion lumbar one level  Prostate surgery     Family History:    Mother:  "hypertension, cerebrovascular disease, diabetes  Brother: diabetes    Social History:  The patient was accompanied to the ED by his wife and granddaughter.  Smoking Status: Former Smoker   Packs per day: 2   Years: 40   Types: Cigarettes  Smokeless Tobacco: Never Used  Alcohol Use: Negative  Drug Use: Negative  PCP: Benjamin Lazar  Marital Status:        Review of Systems  Positive as stated in the HPI otherwise reviewed and negative    Physical Exam     Patient Vitals for the past 24 hrs:   BP Temp Temp src Pulse Heart Rate Resp SpO2 Height Weight   02/22/20 0241 137/89 -- -- 93 93 16 (!) 17 % -- --   02/21/20 2315 (!) 141/80 97.7  F (36.5  C) Temporal -- 100 16 98 % 1.753 m (5' 9\") 76.2 kg (168 lb)     Physical Exam  Constitutional:  Appears well-developed and well-nourished. Cooperative.   HENT:   Head:    Atraumatic.   Mouth/Throat:   Oropharynx is without erythema or exudate and mucous     membranes are moist.   Eyes:    Conjunctivae normal and EOM are normal.      Pupils are equal, round, and reactive to light.   Neck:    Normal range of motion. Neck supple.   Cardiovascular:  Normal rate, regular rhythm, normal heart sounds and radial and    dorsalis pedis pulses are 2+ and symmetric.    Pulmonary/Chest:  Effort normal and breath sounds normal.   Abdominal:   Soft. Bowel sounds are hyperactive.      No splenomegaly or hepatomegaly. No tenderness. No rebound.   Musculoskeletal:  Normal range of motion. No edema and no tenderness.   Neurological:  Alert. Normal strength. No cranial nerve deficit. GCS 15  Skin:    Skin is warm and dry.   Psychiatric:   Normal mood and affect.     Emergency Department Course     Laboratory:  Laboratory findings were communicated with the patient who voiced understanding of the findings.    CBC: WBC 13.2 (H), HGB 15.0,   CMP: WNL (Creatinine 0.75)  ABO/Rh Type and Screen: O+, Antibody Screen negative    Interventions:  0121 Protonix 40 mg IV  0121 NS 1000 ml " IV    Emergency Department Course:    2343 Nursing notes and vitals reviewed. I performed an exam of the patient as documented above.     2346 IV was inserted and blood was drawn for laboratory testing, results above.    I spoke with Dr. Hernandez of the hospitalist service from [facility] regarding patient's presentation, findings, and plan of care.    Findings and plan explained to the patient who consents to admission. Discussed the patient with Dr. Hernandez, who will admit the patient to an observation bed for further monitoring, evaluation, and treatment.    Impression & Plan      Medical Decision Making:  Jeff Mack is a 84 year old male who presents to the emergency department today for evaluation of bright red rectal bleeding, a few days post colonoscopy with polyp removal.    I considered a broad differential diagnosis for this patient including upper GIB (ulcer, gastritis, avm, tumor, etc) vs lower GIB (tumor, diverticulosis, avm, meckels, etc), colitis, aortoenteric fistula, hemorrhoids, fissures,  Ischemic colitis, bacterial stool infection (salmonella, shigella, e. Coli, campylobacter).     The workup in the ED is consistent with gastrointestinal bleeding, likely due to his recent bowel procedure.  There is a lack of vomiting, lack of preceding black stools, no epigastric pain, no history of ulcers or NSAID use.  I did give protonix just in case.  No indication to start octreotide as my suspicion of variceal bleed is so low.     Given amount of blood, orthostatic vitals and exam, I would admit at this point for serial hemoglobins.  Patient was typed and screened.  Discussed with hospitalist.  They are stable and do not need ICU care at this point.      Did not call GI consult at this time given non-massive bleed and stable patient.  His gastroenterologist is  of [facility].    Diagnosis:    ICD-10-CM    1. Rectal bleeding K62.5      Disposition:   The patient is admitted into the care  of Dr. Hernandez.    Scribe Disclosure:  I, Idania Coronadophu, am serving as a scribe at 11:58 PM on 2/21/2020 to document services personally performed by Ryan Tripp MD based on my observations and the provider's statements to me.     EMERGENCY DEPARTMENT       Ryan Tripp MD  02/22/20 0849

## 2020-02-23 NOTE — CONSULTS
[facility] Consultation Note     Patient Name: Jeff Mack      YOB: 1935 (Age: 84 year old)   Medical Record #: [id number]   Primary Physician: Benjamin Lazar   Admit Date/Time: 2/21/2020 11:29 PM     I was asked to see this patient by Dr. Lazar for evaluation of rectal bleeding.     Impression & Plan     84 year old male with rectal bleeding 2 days after colonoscopy with polypectomy.  Suspect this is related to polypectomy sites, likely the hot snare site in the descending colon.  He continues to bleed and have a drop in his hemoglobin, therefore we will plan colonoscopy tomorrow after preparation today.  We discussed alternatives of waiting to do colonoscopy, but he prefers to proceed which is certainly reasonable given continued bloody stool output.    Clears    Prep tonight    N.p.o. at midnight    Enema at 6 AM if not clear from prep    Plan was communicated with primary care team. Thank you for allowing us to participate in the care of Jeff Mack. Please call with any questions or pertinent change in clinical status.     Nithya Gonzalez MD MS....................  2/22/2020     Cell [phone]  After 5 PM call [phone]    [facility]          History of Presenting Illness:    Jeff Mack is a 84 year old male with history of colon polyps admitted 2/21/2020 for rectal bleeding.  He underwent a colonoscopy on February 19.  Several small descending colon polyps and transverse colon polyps were removed with the biopsy forceps.  1 larger 16mm polyp was removed in the descending colon with hot snare, one clip placed.  A 30 mm polyp was partially removed in the transverse colon polyp.  Clips placed and tattooed with plan for removal in several months.    On the evening of February 20 he developed painless rectal bleeding.  This persisted and he presented to the emergency room where his hemoglobin was initially 15.  He was admitted to observation, and  his hemoglobin has dropped to 11.  He has had persistent rectal bleeding today.    Review of Systems:   Review of Systems: Negative otherwise.    Past Medical History:   Diagnosis Date     Anxiety state, unspecified      Asthma      Basal cell carcinoma      Benign neoplasm of colon      Cardiomegaly 1/6/2005     Chronic ischemic heart disease, unspecified      Constipation      Depressive disorder, not elsewhere classified      Diverticulosis of colon (without mention of hemorrhage)      Esophageal cancer (H) 3/23/2015     Essential hypertension, benign      Hiatal hernia      Hypertrophy (benign) of prostate      Internal hemorrhoids without mention of complication      Mumps      Other primary cardiomyopathies 2003    EF 45%     Sciatica      Spondylosis of unspecified site without mention of myelopathy        Past Surgical History:   Procedure Laterality Date     BRONCHOSCOPY FLEXIBLE N/A 4/3/2015    Procedure: BRONCHOSCOPY FLEXIBLE;  Surgeon: Ralph Catherine MD;  Location:  OR      EXCISION OF LINGUAL TONSIL      TONSILLECTOMY     C NONSPECIFIC PROCEDURE  2-99    Colonic polpectomy     C NONSPECIFIC PROCEDURE  9-92    TURP     C NONSPECIFIC PROCEDURE  1982    Retinal surgery     COLONOSCOPY       COLONOSCOPY N/A 2/19/2020    Procedure: COLONOSCOPY, WITH POLYPECTOMY AND BIOPSY;  Surgeon: Kathy Torres MD;  Location:  GI     ESOPHAGOSCOPY, GASTROSCOPY, DUODENOSCOPY (EGD), COMBINED N/A 3/19/2015    Procedure: COMBINED ESOPHAGOSCOPY, GASTROSCOPY, DUODENOSCOPY (EGD), BIOPSY SINGLE OR MULTIPLE;  Surgeon: Alo Mauro MD;  Location: [facility]     ESOPHAGOSCOPY, GASTROSCOPY, DUODENOSCOPY (EGD), COMBINED N/A 7/14/2015    Procedure: COMBINED ESOPHAGOSCOPY, GASTROSCOPY, DUODENOSCOPY (EGD), BIOPSY SINGLE OR MULTIPLE;  Surgeon: Kathy Torres MD;  Location:  GI     ESOPHAGOSCOPY, GASTROSCOPY, DUODENOSCOPY (EGD), COMBINED N/A 12/1/2015    Procedure: COMBINED ESOPHAGOSCOPY, GASTROSCOPY,  DUODENOSCOPY (EGD), BIOPSY SINGLE OR MULTIPLE;  Surgeon: Alo Mauro MD;  Location:  GI     ESOPHAGOSCOPY, GASTROSCOPY, DUODENOSCOPY (EGD), COMBINED N/A 3/17/2016    Procedure: COMBINED ESOPHAGOSCOPY, GASTROSCOPY, DUODENOSCOPY (EGD), BIOPSY SINGLE OR MULTIPLE;  Surgeon: Alo Mauro MD;  Location:  GI     ESOPHAGOSCOPY, GASTROSCOPY, DUODENOSCOPY (EGD), COMBINED N/A 7/21/2016    Procedure: COMBINED ESOPHAGOSCOPY, GASTROSCOPY, DUODENOSCOPY (EGD);  Surgeon: Alo Mauro MD;  Location:  GI     ESOPHAGOSCOPY, GASTROSCOPY, DUODENOSCOPY (EGD), COMBINED N/A 11/17/2016    Procedure: COMBINED ESOPHAGOSCOPY, GASTROSCOPY, DUODENOSCOPY (EGD);  Surgeon: Alo Mauro MD;  Location:  GI     ESOPHAGOSCOPY, GASTROSCOPY, DUODENOSCOPY (EGD), COMBINED N/A 5/9/2017    Procedure: COMBINED ESOPHAGOSCOPY, GASTROSCOPY, DUODENOSCOPY (EGD);  (EGD) COMBINED ESOPHAGOSCOPY, GASTROSCOPY, DUODENOSCOPY;  Surgeon: Alo Mauro MD;  Location:  GI     ESOPHAGOSCOPY, GASTROSCOPY, DUODENOSCOPY (EGD), COMBINED N/A 5/1/2018    Procedure: COMBINED ESOPHAGOSCOPY, GASTROSCOPY, DUODENOSCOPY (EGD);  ESOPHAGOGASTRODUODENOSCOPY ;  Surgeon: Alo Mauro MD;  Location:  GI     GASTROSTOMY, INSERT TUBE, COMBINED N/A 4/3/2015    Procedure: COMBINED GASTROSTOMY, INSERT TUBE (OPEN);  Surgeon: Ralph Catherine MD;  Location:  OR     HC PRESSURE GRADIENT MEASUREMENT, INITIAL VESSEL  2005    essentially normal     HC UGI ENDOSCOPY W EUS N/A 7/14/2015    Procedure: COMBINED ENDOSCOPIC ULTRASOUND, ESOPHAGOSCOPY, GASTROSCOPY, DUODENOSCOPY (EGD);  Surgeon: Kathy Torres MD;  Location:  GI     LAMINECTOMY, FUSION LUMBAR ONE LEVEL, COMBINED  7/26/2012    Procedure: COMBINED LAMINECTOMY, FUSION LUMBAR ONE LEVEL;  Posterior Fusion L5-S1, Total Facetectomy L5-S1 Left ;  Surgeon: Ronald Espinosa MD;  Location: [facility] OR     PROSTATE SURGERY         Medications & Allergies     Medications Prior to Admission   Medication Sig  Dispense Refill Last Dose     albuterol (PROAIR HFA/PROVENTIL HFA/VENTOLIN HFA) 108 (90 Base) MCG/ACT inhaler Inhale 1-2 puffs into the lungs every 6 hours as needed for shortness of breath / dyspnea or wheezing   PRN     ALPRAZolam (XANAX) 0.5 MG tablet Take 1 tablet (0.5 mg) by mouth 2 times daily as needed for anxiety 45 tablet 0 PRN     Cholecalciferol (VITAMIN D3) 5000 UNITS TABS Take 5,000 Units by mouth daily    2/21/2020 at Unknown time     EPINEPHrine (ANY BX GENERIC EQUIV) 0.3 MG/0.3ML injection 2-pack Inject 0.3 mg into the muscle as needed for anaphylaxis        fluticasone (FLONASE) 50 MCG/ACT nasal spray Spray 1 spray into both nostrils 2 times daily   2/21/2020 at AM     fluticasone-vilanterol (BREO ELLIPTA) 100-25 MCG/INH inhaler Inhale 1 puff into the lungs daily   2/21/2020 at AM     ibuprofen (ADVIL/MOTRIN) 200 MG tablet Take 200 mg by mouth daily as needed (for back pain)   PRN       Current scheduled medications:     polyethylene glycol  4,000 mL Oral or NG Tube Once     sodium chloride (PF)  3 mL Intracatheter Q8H     [START ON 2/23/2020] sodium phosphate  1 enema Rectal Once     Current PRN medications:     acetaminophen, acetaminophen, bisacodyl, labetalol, lidocaine 4%, lidocaine (buffered or not buffered), melatonin, naloxone, ondansetron **OR** ondansetron, polyethylene glycol, senna-docusate **OR** senna-docusate, sodium chloride (PF)    Allergies   Allergen Reactions     Bee Venom Shortness Of Breath       Social & Family History   Social History:  reports that he quit smoking about 45 years ago. His smoking use included cigarettes. He has a 80.00 pack-year smoking history. He has never used smokeless tobacco. He reports that he does not drink alcohol or use drugs.      Family History: family history includes Cerebrovascular Disease in his mother; Diabetes in his brother and father; Hypertension in his mother.    Physical Exam   Physical Exam:   Vital signs:   Blood pressure 106/52,  "pulse 105, temperature 97.5  F (36.4  C), temperature source Oral, resp. rate 18, height 1.753 m (5' 9\"), weight 76.2 kg (168 lb), SpO2 96 %.  Estimated body mass index is 24.81 kg/m  as calculated from the following:    Height as of this encounter: 1.753 m (5' 9\").    Weight as of this encounter: 76.2 kg (168 lb).  General Appearance:   Alert and oriented  Eyes: Normal  HEENT: Normal  Respiratory: Bilateral  CV: Regular  GI: Soft nontender nondistended  Musculoskeletal: Normal muscle mass no edema slightly pale  Lymphatic: No edema  Skin/hair: Slightly pale  Neurologic: Nonfocal    Labs & Micro   Labs:   Recent Labs   Lab Test 02/22/20  1355 02/22/20  0643 02/21/20  2346 11/16/18  1354 09/06/17  0943 03/27/17  1130  04/03/15  1115   WBC  --  12.8* 13.2* 4.8 3.9* 3.9*   < > 4.0   HGB 11.7* 12.4* 15.0 15.3 15.2 15.1   < > 16.0   MCV  --  85 85 85 84 84   < > 83   PLT  --  156 179 295 339 342   < > 215   INR  --   --   --   --   --   --   --  1.00    < > = values in this interval not displayed.     Recent Labs   Lab Test 02/22/20  0643 02/21/20  2346 11/16/18  1354 03/27/17  1130 07/09/15  1110   POTASSIUM 4.0 3.8 4.0 4.4 4.1   CHLORIDE 107 104 103 103 98   BUN 12 12 12 10 9     Recent Labs   Lab Test 02/22/20  0643 02/21/20  2346 11/16/18  1354 07/17/12  1548   ALBUMIN 3.1* 3.7 3.7 4.2   BILITOTAL 0.7 0.6 0.7 0.6   ALT 16 21 19 19   AST 19 26 25 31                "

## 2020-02-23 NOTE — DISCHARGE SUMMARY
[facility]    Hospitalist Discharge Summary       Date of Admission:  2/21/2020  Date of Discharge:  2/23/2020  Discharging Provider: Jaguar Lees MD      Discharge Diagnoses   Post-polypectomy lower GI bleed s/p clips  Acute blood loss anemia    Follow-ups Needed After Discharge   Follow-up Appointments     Follow-up and recommended labs and tests       Follow up with primary care provider, Benjamin Lazar, within two weeks for   hospital follow- up. Check CBC at follow up.           Hospital Course   Jeff Mack is an 85yo M with PMH of anxiety, depression, recent colonoscopy 2/19/2020, diverticulosis, internal hemorrhoids, who was admitted on 2/22/2020 with BRBPR. He underwent a colonoscopy on 2/19/2020 with polypectomy and clip placement. During this hospitalization he had several episodes of BRBPR, remained hemodynamically stable but Hgb trended slowly down from 15.0-->9.3. GI consulted, repeated colonoscopy showed post-polypectomy bleeding s/p clip placement x4. Patient stable to discharge home with PCP follow up.    Consultations This Hospital Stay   GASTROENTEROLOGY IP CONSULT    Code Status   Full Code    Time Spent on this Encounter   I, Jaguar Lees, personally saw the patient today and spent approximately 25 minutes discharging this patient.       Jaguar Lees MD  [facility]  ______________________________________________________________________    Physical Exam   Vital Signs: Temp: 95.3  F (35.2  C) Temp src: Oral BP: 119/63 Pulse: 80 Heart Rate: 82 Resp: 10 SpO2: 99 % O2 Device: None (Room air)    Weight: 167 lbs 6.4 oz    Constitutional: Male in NAD  Eyes: PERRL, nonicteric, normal ocular movements  Respiratory: CTAB, no wheezing or crackles  Cardiovascular: RRR, normal S1/2, no m/r/g  GI: No organomegaly, normoactive bowel sounds, nontender, nondistended  Skin: No rashes  Musculoskeletal: Normal strength in UE and LE, moves all extremities  Neurologic:  A&Ox3  Psychiatric: Appropriate affect and mood       Primary Care Physician   Benjamin Lazar    Discharge Disposition   Discharged to home  Condition at discharge: Stable    Significant Results and Procedures   Most Recent 3 CBC's:  Recent Labs   Lab Test 02/23/20  0628 02/22/20  2225 02/22/20  1355 02/22/20  0643 02/21/20  2346 11/16/18  1354   WBC  --   --   --  12.8* 13.2* 4.8   HGB 9.3* 10.9* 11.7* 12.4* 15.0 15.3   MCV  --   --   --  85 85 85   PLT  --   --   --  156 179 295     Most Recent 3 BMP's:  Recent Labs   Lab Test 02/22/20  0643 02/21/20  2346 11/16/18  1354    134 139   POTASSIUM 4.0 3.8 4.0   CHLORIDE 107 104 103   CO2 25 25 27   BUN 12 12 12   CR 0.72 0.75 0.77   ANIONGAP 5 5 9   KAE 8.2* 8.7 8.9   GLC 93 95 123*     Most Recent 2 LFT's:  Recent Labs   Lab Test 02/22/20  0643 02/21/20  2346   AST 19 26   ALT 16 21   ALKPHOS 71 84   BILITOTAL 0.7 0.6   ,   Results for orders placed or performed during the hospital encounter of 03/19/15   CT Chest/Abdomen/Pelvis w Contrast    Narrative    CT CHEST, ABDOMEN AND PELVIS WITH CONTRAST  3/19/2015  6:35 PM     HISTORY: Evaluate esophogeal mass.    TECHNIQUE: Volumetric acquisition through chest, abdomen and pelvis  with IV contrast. 92 mL Isovue-[id number].    COMPARISON: None.    FINDINGS:   Chest: No acute infiltrates or pleural effusions. Tiny calcified  granuloma at the right lung base. Tiny 0.2 cm nodule in the right  lower lung laterally is technically indeterminate. There is a masslike  area of thickening in the mid esophagus extending from approximately  the level of the aortic arch to the yadiel as seen on the coronal  reformatted images. Findings are worrisome for neoplasm. Tiny  nonenlarged upper right paratracheal lymph node on series 2, image 15.  No enlarged mediastinal or hilar lymph nodes. Coronary artery  calcifications. Heart size is normal. Small esophageal hiatal hernia.    Abdomen and pelvis: Liver, gallbladder and spleen are negative.  The  pancreas, adrenal glands and kidneys are normal. No hydronephrosis.    Small left posterior bladder diverticulum measuring approximately 3.5  cm. Vascular calcification. No abdominal aortic aneurysm. No enlarged  abdominal or pelvic lymph nodes. Mild degenerative and hypertrophic  changes in the thoracic and lumbar spine. No destructive bone lesions.      Impression    IMPRESSION:  1. Mid to upper esophageal mass, worrisome for neoplasm. Correlate  with endoscopy findings.  2. No convincing evidence of metastatic disease. No other acute  findings.  3. 0.2 cm right lung nodule is technically indeterminate. Consider  followup imaging to document stability.    SHIREEN JOHNS MD       Discharge Orders      Reason for your hospital stay    You were hospitalized for post-colonoscopy bleeding, and had repeat colonoscopy with repeat clips placed to stop the bleeding.     Follow-up and recommended labs and tests     Follow up with primary care provider, Benjamin Lazar, within two weeks for hospital follow- up. Check CBC at follow up.     Activity    Your activity upon discharge: activity as tolerated     When to contact your care team    Call your primary doctor if you have any of the following: bright red blood per rectum     Full Code     Diet    Follow this diet upon discharge:    Regular diet     Discharge Medications   Current Discharge Medication List      CONTINUE these medications which have NOT CHANGED    Details   albuterol (PROAIR HFA/PROVENTIL HFA/VENTOLIN HFA) 108 (90 Base) MCG/ACT inhaler Inhale 1-2 puffs into the lungs every 6 hours as needed for shortness of breath / dyspnea or wheezing    Comments: Pharmacy may dispense brand covered by insurance (Proair, or proventil or ventolin or generic albuterol inhaler)      ALPRAZolam (XANAX) 0.5 MG tablet Take 1 tablet (0.5 mg) by mouth 2 times daily as needed for anxiety  Qty: 45 tablet, Refills: 0    Associated Diagnoses: Adjustment disorder with mixed anxiety  and depressed mood      Cholecalciferol (VITAMIN D3) 5000 UNITS TABS Take 5,000 Units by mouth daily       EPINEPHrine (ANY BX GENERIC EQUIV) 0.3 MG/0.3ML injection 2-pack Inject 0.3 mg into the muscle as needed for anaphylaxis      fluticasone (FLONASE) 50 MCG/ACT nasal spray Spray 1 spray into both nostrils 2 times daily      fluticasone-vilanterol (BREO ELLIPTA) 100-25 MCG/INH inhaler Inhale 1 puff into the lungs daily    Comments: Reported by the patient. Ordered by [facility]  Associated Diagnoses: 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)      ibuprofen (ADVIL/MOTRIN) 200 MG tablet Take 200 mg by mouth daily as needed (for back pain)           Allergies   Allergies   Allergen Reactions     Bee Venom Shortness Of Breath

## 2020-02-23 NOTE — PLAN OF CARE
Observation goals   -diagnostic tests and consults completed and resulted - not met  -vital signs normal or at patient baseline- met  -tolerating oral intake to maintain hydration- partially met  -returns to baseline functional status- not met  -safe disposition plan has been identified- not met  Nurse to notify provider when observation goals have been met and patient is ready for discharge.

## 2020-02-23 NOTE — PROGRESS NOTES
END OF SHIFT REPORT :          DATE & TIME: February 22, 2020        SHIFT: 7 AM- 3 PM- 7 PM    Jeff Mack 84 year old male was admitted  on 2/21/2020  due to Rectal Bleeding (Pt. had colonoscopy on Wednesday. Pt. had bright red BM times 2 tonight. No clots visualized. )    Patient is:Full Code. ISOLATION:No. : No.     Patient's recent vital signs were:   Temp: 97.5  F (36.4  C) BP: 106/52 Pulse: 105 Heart Rate: 96 Resp: 18 ,   SpO2: 96 %,O2 Device: None (Room air)     Patient is alert and oriented x 4, scores green on behavior & aggression tool color. CIWA is NA . Patient is up with Independent to the bathroom .  Skin is intact, IV is NS @ 75 cc/hr, and is  not  in pain.  Patient has a no drain and is on telemetry. in ST.  Diet: clear liquid, possible NPO after midnight    SIGNIFICANT LAB/ DIAGNOSTIC PROCEDURE had colonoscopy and polypectomy 2 days ago  CONSULT/S: GI at bedside  GOALS/PLAN: possible colonoscopy in AM

## 2020-02-23 NOTE — PLAN OF CARE
Observation goals   -diagnostic tests and consults completed and resulted - not met, colonoscopy today  -vital signs normal or at patient baseline- met  -tolerating oral intake to maintain hydration- partially met  -returns to baseline functional status- not met  -safe disposition plan has been identified- not met  Nurse to notify provider when observation goals have been met and patient is ready for discharge.        VSS. A/Ox4. Ind. NPO. Denies pain, N/V. Colonoscopy at 1300 today. Continuing colon prep, pt does not want to do enema scheduled this am. BS hyperactive. Abdomen is non-tender. Continuing to have bloody stools. Hgb pending this morning.

## 2020-02-23 NOTE — PROGRESS NOTES
Discharge Note: Jeff Mack is alert, oriented x 4. Vital Signs are stable and is up independently.  Pain : none.  AVS Discharge Instruction discussed with patient and wife.  Patient verbalized understanding instruction and was  discharged  accompanied by his wife.

## 2020-02-23 NOTE — PROGRESS NOTES
Observation goals PRIOR TO DISCHARGE    Comments: -diagnostic tests and consults completed and resulted - PT IN COLONOSCOPY  -vital signs normal or at patient baseline - MET  -tolerating oral intake to maintain hydration - MET  -returns to baseline functional status  - MET  -safe disposition plan has been identified  - MET  Nurse to notify provider when observation goals have been met and patient is ready for discharge.

## 2020-02-23 NOTE — PROGRESS NOTES
Brief GI note    84-year-old male presenting with hematochezia, acute anemia 2 days after colonoscopy with polypectomy.  Colonoscopy today reveals ulcer, likely in the area of hot snare use for polypectomy.  The clip had fallen off.  Stigmata of recent bleeding present.  4 clips placed.    Plan    Advance diet as tolerated okay to discharge today if patient able to tolerate oral intake    Return to hospital if further bleeding becomes evident    Call if questions    Nithya Gonzalez MD [facility]   Cell: [phone]  Office: [phone]

## 2020-02-23 NOTE — DISCHARGE INSTRUCTIONS
Colonoscopy     A camera attached to a flexible tube with a viewing lens is used to take video pictures.     Colonoscopy is a test to view the inside of your lower digestive tract (colon and rectum). Sometimes it can show the last part of the small intestine (ileum). During the test, small pieces of tissue may be removed for testing. This is called a biopsy. Small growths, such as polyps, may also be removed.   Why is colonoscopy done?  The test is done to help look for colon cancer. And it can help find the source of abdominal pain, bleeding, and changes in bowel habits. It may be needed once a year to every 10 years, depending on factors such as your:    Age    Health history    Family health history    Symptoms    Results from any prior colonoscopy  Risks and possible complications  These include:    Bleeding                 A puncture or tear in the colon     Risks of anesthesia    A cancer lesion not being seen or fully removed  Getting ready   To prepare for the test:    Talk with your healthcare provider about the risks of the test (see below). Also ask your healthcare provider about alternatives to the test.    Tell your healthcare provider about any medicines and supplements you take. Also tell him or her about any health conditions you may have.    Make sure your rectum and colon are empty for the test. Follow the diet and bowel prep instructions exactly. If you don t, the test may need to be rescheduled.    Plan for a friend or family member to drive you home after the test.     Colonoscopy provides an inside view of the entire colon.     You may discuss the results with your doctor right away or at a future visit.  During the test   The test is usually done in the hospital on an outpatient basis. This means you go home the same day. The procedure takes about 30 minutes. During that time:    You are given relaxing (sedating) medicine through an IV line. You may be drowsy, or fully asleep.    The  healthcare provider will first give you a physical exam to check for anal and rectal problems.    Then the anus is lubricated and the scope inserted.    If you are awake, you may have a feeling similar to needing to have a bowel movement. You may also feel pressure as air is pumped into the colon. It s OK to pass gas during the procedure.    Biopsy, polyp removal, or other treatments may be done during the test.  After the test   You may have gas right after the test. It can help to try to pass it to help prevent later bloating. Your healthcare provider may discuss the results with you right away. Or you may need to schedule a follow-up visit to talk about the results. After the test, you can go back to your normal eating and other activities. You may be tired from the sedation and need to rest for a few hours. Discuss your medications with your provider to understand if your medicines can be restarted right away or not.  Date Last Reviewed: 11/1/2016 2000-2019 [employer]. [address] [place] [address], [place] [address]. All rights reserved. This information is not intended as a substitute for professional medical care. Always follow your healthcare professional's instructions.    .suit

## 2020-02-23 NOTE — PROGRESS NOTES
INPATIENT PRE PROCEDURE NOTE     CHIEF COMPLAINT / REASON FOR PROCEDURE: esophagogastroduodenoscopy   History and Physical Reviewed: on chart-<30 days old; updated within 7 days.     PRE-SEDATION ASSESSMENT:   Lung Exam: normal  Heart Exam: normal  Mallampati Airway Classification: Class III (visualization of the soft palate and base of uvula)  Previous reaction to anesthesia/sedation: NO  Sedation plan based on assessment:Minimal sedation  Comment(s):    ASA Classification: 3 - Severe systemic disease, but not incapacitating     IMPRESSION: Proceed as planned with light sedation      Nithya Gonzalez MD [facility]  Office: [phone]  After 5 pm  Pager:  [phone]

## 2020-02-23 NOTE — PLAN OF CARE
PRIOR TO DISCHARGE:   -Diagnostic tests and consults completed and resulted; Not met- Colonoscopy tomorrow   -Vital signs normal or at patient baseline; Met   -Tolerating oral intake to maintain hydration; Met   -Returns to baseline functional status; Met  -Safe disposition plan has been identified; Met      Pt Aox4, VSS on RA, IND in room. Pt denied any pain. PIV infusing. Go lightly started at 8:15 --> colonoscopy at 9am tomorrow. Pt has had x1 bloody BM since starting the bowel prep. No c/o dizziness, lightheadedness. Serial Hgb ordered. Hgb at 10pm: 10.9. Tele: NSR. Discharge plan pending Hgb and colonoscopy tomorrow.

## 2020-02-27 NOTE — TELEPHONE ENCOUNTER
Pt was discharged from [facility] on 2/23 after being treated for Rectal Bleeding. Please call the pt. Thank you.  Rhina Shah,

## 2020-02-27 NOTE — TELEPHONE ENCOUNTER
ED / Discharge Outreach Protocol    Patient Contact    Attempt # 1    Was call answered?  No.  Left message on voicemail with information to call me back.    Domi Rivas RN, BSN  [facility] [place]

## 2020-02-28 NOTE — TELEPHONE ENCOUNTER
"ED/Discharge Protocol    \"Hi, my name is Domi HERBERTChrissy Rivas RN, a registered nurse, and I am calling on behalf of Dr. Lazar''s office at [place].  I am calling to follow up and see how things are going for you after your recent visit.\"    \"I see that you were in the (ER/UC/IP) on 2/21/2020.    How are you doing now that you are home?\" No symptoms currently. Patient is doing fine.     Is patient experiencing symptoms that may require a hospital visit?  No symptoms currently.     Discharge Instructions    \"Let's review your discharge instructions.  What is/are the follow-up recommendations?  Pt. Response: Follow up with Dr. Lazar in 1-2 weeks.     \"Were you instructed to make a follow-up appointment?\"  Pt. Response: Yes.  Has appointment been made?   No.  \"Can I help you schedule that appointment?\" Patient states he going to be going in for an eye appointment and will call the clinic back to scheduled an office visit.       \"When you see the provider, I would recommend that you bring your discharge instructions with you.    Medications    \"How many new medications are you on since your hospitalization/ED visit?\"    0-1  \"How many of your current medicines changed (dose, timing, name, etc.) while you were in the hospital/ED visit?\"   0-1  \"Do you have questions about your medications?\"   No  \"Were you newly diagnosed with heart failure, COPD, diabetes or did you have a heart attack?\"   No  For patients on insulin: \"Did you start on insulin in the hospital or did you have your insulin dose changed?\"   No  Post Discharge Medication Reconciliation Status: discharge medications reconciled, continue medications without change.    Was MTM referral placed (*Make sure to put transitions as reason for referral)?   No    Call Summary    \"Do you have any questions or concerns about your condition or care plan at the moment?\"    No  Triage nurse advice given: Call to schedule follow up appointment with labs. Patient verbalized " "understanding and agrees with plan.       Patient was in ER x1 in the past year (assess appropriateness of ER visits.)      \"If you have questions or things don't continue to improve, we encourage you contact us through the main clinic number,  [phone].  Even if the clinic is not open, triage nurses are available 24/7 to help you.     We would like you to know that our clinic has extended hours (provide information).  We also have urgent care (provide details on closest location and hours/contact info)\"      \"Thank you for your time and take care!\"    Domi Rivas RN, BSN  [facility] [place]      "

## 2020-02-29 NOTE — ED AVS SNAPSHOT
Emergency Department  [address] [facility] [address]  Phone:  [phone]  Fax:  [phone]                                    Jeff Mack   MRN: [id number]    Department:   Emergency Department   Date of Visit:  2/28/2020           After Visit Summary Signature Page    I have received my discharge instructions, and my questions have been answered. I have discussed any challenges I see with this plan with the nurse or doctor.    ..........................................................................................................................................  Patient/Patient Representative Signature      ..........................................................................................................................................  Patient Representative Print Name and Relationship to Patient    ..................................................               ................................................  Date                                   Time    ..........................................................................................................................................  Reviewed by Signature/Title    ...................................................              ..............................................  Date                                               Time          22EPIC Rev 08/18

## 2020-02-29 NOTE — DISCHARGE INSTRUCTIONS
Your white blood cell count (WBC) was elevated tonight.  The cause of this is not clear.  Return to the ER if you have fevers, body aches, abdominal pain, or any worsening symptoms.  Follow up closely with your doctor for a recheck.

## 2020-02-29 NOTE — ED PROVIDER NOTES
History     Chief Complaint:  Shortness of breath     HPI   Jeff Mack is a 84 year old male, with a history of hypertension, heart disease, cardiomegaly, asthma, esophageal cancer and chronic obstructive airway disease, who presents with his wife to the emergency department for evaluation of shortness of breath. The patient reports he has been experiencing congestion and increased shortness of breath for the last four days prior to evaluation. He states he has needed to use his rescue inhaler the last two days due to the shortness of breath but states it did not work tonight, prompting his evaluation. He is currently experiencing some chest tightness, lightheadedness and continued shortness of breath. He denies any rectal bleeding, bloody stools, fever, abdominal pain or diarrhea. Of note, patient did recently have cataract surgery yesterday.    Allergies:  No Known Drug Allergies      Medications:    Xanax  Epipen  Breo ellipta  Ventolin  Magnesium Oxide     Past Medical History:    Anxiety state  Asthma  Basal cell carcinoma  Benign neoplasm of colon  Cardiomegaly  Chronic ischemic heart disease  Constipation  Depressive disorder  Diverticulosis of colon  Esophageal cancer  Hypertension  Hiatal hernia  Hypertrophy of prostate  Internal hemorrhoids  Mumps   Primary cardiomyopathies  Sciatica  Spondylosis  Nonallopathic lesion of sacral region  Chronic obstructive airway disease   Hyperlipidemia   Lumbar stenosis   Spondylolisthesis      Past Surgical History:    Bronchoscopy flexible  Excision of lingual tonsil  Colonic polypectomy  TURP  Retinal surgery  Colonoscopy multiple   EGD multiple   Gastrostomy, insert tube  Pressure gradient measurement, initial vessel  Endoscopy with EUS  Laminectomy, fusion lumbar one level  Prostate surgery   Cataract surgery     Family History:    Mother: hypertension, cerebrovascular disease, diabetes  Brother: diabetes     Social History:  The patient was accompanied to  "the ED by his wife.  Smoking Status: Former Smoker              Packs per day: 2              Years: 40              Types: Cigarettes  Smokeless Tobacco: Never Used  Alcohol Use: Negative  Drug Use: Negative  PCP: Benjamin Lazar  Marital Status:        Review of Systems   Constitutional: Negative for fever.   Respiratory: Positive for chest tightness and shortness of breath.    Gastrointestinal: Negative for abdominal pain, anal bleeding, blood in stool and diarrhea.   Neurological: Positive for light-headedness.   All other systems reviewed and are negative.      Physical Exam   Patient Vitals for the past 24 hrs:   BP Temp Temp src Pulse Resp SpO2 Height Weight   02/29/20 0300 (!) 148/84 98.1  F (36.7  C) Oral 100 -- 98 % -- --   02/28/20 2324 (!) 163/60 -- -- 92 20 99 % -- --   02/28/20 2029 (!) 143/66 97.6  F (36.4  C) Oral 97 20 99 % 1.753 m (5' 9\") 76.2 kg (168 lb)     Physical Exam  General: Appears well-developed and well-nourished.   Head: No signs of trauma.   Mouth/Throat: Oropharynx is clear and moist.   Eyes: Conjunctivae are normal.   CV: Normal rate and regular rhythm.    Resp: Effort normal and breath sounds normal. No respiratory distress.   GI: Soft. There is no tenderness.  No rebound or guarding.  Normal bowel sounds.  No CVA tenderness.  MSK: Normal range of motion. no edema. No Calf tenderness.  Neuro: The patient is alert and oriented.  Speech normal.  GCS 15  Skin: Skin is warm and dry. No rash noted.   Psych: normal mood and affect. behavior is normal.       Emergency Department Course   ECG (20:35:18):  Rate 99 bpm. OR interval 138. QRS duration 76. QT/QTc 354/454. P-R-T axes 58 21 61. Sinus rhythm with marked sinus arrhythmia. Otherwise normal ECG. No significant change when compared to EKG dated 7/4/18. Interpreted at 0007 by Andrea De La Paz MD.    Imaging:  Radiology findings were communicated with the patient and family who voiced understanding of the findings.    Chest XR, PA " & LAT  IMPRESSION: Hyperinflation. No consolidation. Heart size normal. ASCVD aorta.  As read by Radiology.    CT Chest (PE) Abdomen Pelvis w Contrast  IMPRESSION:  1.  No visualized pulmonary embolism or other acute findings.  2.  Mild ectasia of the ascending aorta.  As read by Radiology.    Laboratory:  Laboratory findings were communicated with the patient and family who voiced understanding of the findings.    CBC: WBC 30.5 (H), HGB 9.4 (L) o/w WNL ()  CMP: AWNL (Creatinine 0.97)  Blood Culture x2: Pending  Troponin I (2331): <0.015     Influenza A/B antigen: Negative    Emergency Department Course:  Past medical records, nursing notes, and vitals reviewed.  0006: I performed an exam of the patient and obtained history, as documented above.     EKG was obtained and reviewed in the emergency department.    IV inserted and blood drawn.    Influenza screen was obtained and evaluated in the emergency department.    The patient was sent for a chest x-ray and chest abdomen pelvis CT while in the emergency department, results above.     0315: I rechecked the patient. I reviewed the results with the Patient and spouse and answered all related questions prior to discharge.     Findings and plan explained to the Patient and spouse. Patient discharged home with instructions regarding supportive care, medications, and reasons to return. The importance of close follow-up was reviewed.   Impression & Plan   Medical Decision Making:  Jeff Mack is an 84-year-old gentleman who presents due to shortness of breath.  Reports over last few days has had some congestion and shortness of breath.  He uses an inhaler with some relief, but this evening felt somewhat worse so he came to the ER.  On my evaluation, he is speaking in full sentences.  His vital signs were appropriate with normal O2 sats and his lung sounds were clear.  He had recently been in the hospital for a GI bleed and had a noticeable drop in his hemoglobin.  I  did repeat blood work and his hemoglobin was slightly improved from discharged.  It was noted that his white blood cell count was significantly elevated.  Patient's abdominal exam was benign and is not having any fevers.  I did choose to do a CT scan to make sure there is no signs of perforation or other acute process.  CT scan did not show any signs of PE, perforation, or any other concerning findings.  Patient continued to appear quite well and actually asked on a few occasions if he was ready for discharge.  The cause of his elevated white blood cell count is not clear.  Given the patient felt quite well, I felt he is appropriate for discharge.  He is recommended to continue with his inhaler and to follow-up closely with his primary care doctor within the next couple of days for recheck.  I did send blood cultures prior to discharge just in case this could be the beginnings of bacteremia from colonoscopy, clinical have a lower suspicion for this.    Diagnosis:    ICD-10-CM   1. SOB (shortness of breath) R06.02   2. Leukocytosis, unspecified type D72.829     Disposition:  Discharged to home.    Micha Funes  2/28/2020    EMERGENCY DEPARTMENT  Scribe Disclosure:  KEON, Micha Funes, am serving as a scribe at 12:06 AM on 2/29/2020 to document services personally performed by Andrea De La Paz MD based on my observations and the provider's statements to me.      Andera De La Paz MD  02/29/20 1123

## 2020-03-03 NOTE — TELEPHONE ENCOUNTER
Patient did not stop by the lab today, 3/3/2020 for his blood work.    I have cancelled and re-ordered the complete blood count as future.      Please have care team call and help patient schedule a lab only appointment to complete order if needed.    Thank you  Lab

## 2020-03-03 NOTE — PROGRESS NOTES
Subjective     Jeff Mack is a 84 year old male who presents to clinic today for the following health issues:    [facility]       Hospital Follow-up Visit:    Hospital/Nursing Home/IP Rehab Facility: [facility]  Date of Admission: 2/21/20  Date of Discharge: 2/23/20  Reason(s) for Admission: Post-polypectomy lower GI bleed s/p clips, Acute blood loss anemia            Problems taking medications regularly:  None       Medication changes since discharge: None       Problems adhering to non-medication therapy:  None    Summary of hospitalization:  [facility] discharge summary reviewed  Diagnostic Tests/Treatments reviewed.  Follow up needed: none  Other Healthcare Providers Involved in Patient s Care:         None  Update since discharge: improved.     Post Discharge Medication Reconciliation: discharge medications reconciled, continue medications without change.  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)     Cardiomegaly     Chronic ischemic heart disease     Essential hypertension, benign     Primary cardiomyopathy (H)     Hyperlipidemia with target LDL less than 100     Acute low back pain     Nonallopathic lesion of sacral region     Lumbar stenosis     Health Care Home     Edema     Esophageal cancer (H)     Rectal bleeding     Past Surgical History:   Procedure Laterality Date     BRONCHOSCOPY FLEXIBLE N/A 4/3/2015    Procedure: BRONCHOSCOPY FLEXIBLE;  Surgeon:  Ralph Catherine MD;  Location:  OR     C EXCISION OF LINGUAL TONSIL      TONSILLECTOMY     C NONSPECIFIC PROCEDURE  2-99    Colonic polpectomy     C NONSPECIFIC PROCEDURE  9-92    TURP     C NONSPECIFIC PROCEDURE  1982    Retinal surgery     COLONOSCOPY       COLONOSCOPY N/A 2/19/2020    Procedure: COLONOSCOPY, WITH POLYPECTOMY AND BIOPSY;  Surgeon: Kathy Torres MD;  Location:  GI     ESOPHAGOSCOPY, GASTROSCOPY, DUODENOSCOPY (EGD), COMBINED N/A 3/19/2015    Procedure: COMBINED ESOPHAGOSCOPY, GASTROSCOPY, DUODENOSCOPY (EGD), BIOPSY SINGLE OR MULTIPLE;  Surgeon: Alo Mauro MD;  Location:  GI     ESOPHAGOSCOPY, GASTROSCOPY, DUODENOSCOPY (EGD), COMBINED N/A 7/14/2015    Procedure: COMBINED ESOPHAGOSCOPY, GASTROSCOPY, DUODENOSCOPY (EGD), BIOPSY SINGLE OR MULTIPLE;  Surgeon: Kathy Torres MD;  Location:  GI     ESOPHAGOSCOPY, GASTROSCOPY, DUODENOSCOPY (EGD), COMBINED N/A 12/1/2015    Procedure: COMBINED ESOPHAGOSCOPY, GASTROSCOPY, DUODENOSCOPY (EGD), BIOPSY SINGLE OR MULTIPLE;  Surgeon: Alo Mauro MD;  Location: [facility]     ESOPHAGOSCOPY, GASTROSCOPY, DUODENOSCOPY (EGD), COMBINED N/A 3/17/2016    Procedure: COMBINED ESOPHAGOSCOPY, GASTROSCOPY, DUODENOSCOPY (EGD), BIOPSY SINGLE OR MULTIPLE;  Surgeon: Alo Mauro MD;  Location: [facility]     ESOPHAGOSCOPY, GASTROSCOPY, DUODENOSCOPY (EGD), COMBINED N/A 7/21/2016    Procedure: COMBINED ESOPHAGOSCOPY, GASTROSCOPY, DUODENOSCOPY (EGD);  Surgeon: Alo Mauro MD;  Location:  GI     ESOPHAGOSCOPY, GASTROSCOPY, DUODENOSCOPY (EGD), COMBINED N/A 11/17/2016    Procedure: COMBINED ESOPHAGOSCOPY, GASTROSCOPY, DUODENOSCOPY (EGD);  Surgeon: Alo Muaro MD;  Location:  GI     ESOPHAGOSCOPY, GASTROSCOPY, DUODENOSCOPY (EGD), COMBINED N/A 5/9/2017    Procedure: COMBINED ESOPHAGOSCOPY, GASTROSCOPY, DUODENOSCOPY (EGD);  (EGD) COMBINED ESOPHAGOSCOPY, GASTROSCOPY, DUODENOSCOPY;  Surgeon: Alo Mauro MD;  Location: [facility]      ESOPHAGOSCOPY, GASTROSCOPY, DUODENOSCOPY (EGD), COMBINED N/A 2018    Procedure: COMBINED ESOPHAGOSCOPY, GASTROSCOPY, DUODENOSCOPY (EGD);  ESOPHAGOGASTRODUODENOSCOPY ;  Surgeon: Alo Mauro MD;  Location:  GI     GASTROSTOMY, INSERT TUBE, COMBINED N/A 4/3/2015    Procedure: COMBINED GASTROSTOMY, INSERT TUBE (OPEN);  Surgeon: Ralph Catherine MD;  Location: [facility]     HC PRESSURE GRADIENT MEASUREMENT, INITIAL VESSEL  2005    essentially normal     HC UGI ENDOSCOPY W EUS N/A 2015    Procedure: COMBINED ENDOSCOPIC ULTRASOUND, ESOPHAGOSCOPY, GASTROSCOPY, DUODENOSCOPY (EGD);  Surgeon: Kathy Torres MD;  Location:  GI     LAMINECTOMY, FUSION LUMBAR ONE LEVEL, COMBINED  2012    Procedure: COMBINED LAMINECTOMY, FUSION LUMBAR ONE LEVEL;  Posterior Fusion L5-S1, Total Facetectomy L5-S1 Left ;  Surgeon: Ronald Espinosa MD;  Location: [facility] OR     PROSTATE SURGERY         Social History     Tobacco Use     Smoking status: Former Smoker     Packs/day: 2.00     Years: 40.00     Pack years: 80.00     Types: Cigarettes     Last attempt to quit: 1975     Years since quittin.2     Smokeless tobacco: Never Used   Substance Use Topics     Alcohol use: No     Family History   Problem Relation Age of Onset     Hypertension Mother      Cerebrovascular Disease Mother      Diabetes Brother      Diabetes Father          age 97         Current Outpatient Medications   Medication Sig Dispense Refill     albuterol (PROAIR HFA/PROVENTIL HFA/VENTOLIN HFA) 108 (90 Base) MCG/ACT inhaler Inhale 1-2 puffs into the lungs every 6 hours as needed for shortness of breath / dyspnea or wheezing       ALPRAZolam (XANAX) 0.5 MG tablet Take 1 tablet (0.5 mg) by mouth 2 times daily as needed for anxiety 45 tablet 0     Cholecalciferol (VITAMIN D3) 5000 UNITS TABS Take 5,000 Units by mouth daily        fluticasone (FLONASE) 50 MCG/ACT nasal spray Spray 1 spray into both nostrils 2 times daily        "fluticasone-vilanterol (BREO ELLIPTA) 100-25 MCG/INH inhaler Inhale 1 puff into the lungs daily       EPINEPHrine (ANY BX GENERIC EQUIV) 0.3 MG/0.3ML injection 2-pack Inject 0.3 mg into the muscle as needed for anaphylaxis       Allergies   Allergen Reactions     Bee Venom Shortness Of Breath     Recent Labs   Lab Test 02/28/20  2331 02/22/20  0643 02/21/20  2346  09/06/17  0943  07/09/15  1110  07/17/12  1548   LDL  --   --   --   --   --   --  92  --  111   HDL  --   --   --   --   --   --  48  --  71   TRIG  --   --   --   --   --   --  80  --  110   ALT 20 16 21   < >  --   --   --   --  19   CR 0.97 0.72 0.75   < >  --    < > 0.56*   < > 0.86   GFRESTIMATED 72 86 85   < >  --    < > >90  Non  GFR Calc     < > 87   GFRESTBLACK 83 >90 >90   < >  --    < > >90   GFR Calc     < > >90   POTASSIUM 3.8 4.0 3.8   < >  --    < > 4.1   < > 3.5   TSH  --   --   --   --  1.79  --   --   --  1.72    < > = values in this interval not displayed.      BP Readings from Last 3 Encounters:   03/03/20 128/64   02/29/20 (!) 148/84   02/23/20 119/63    Wt Readings from Last 3 Encounters:   03/03/20 74.8 kg (165 lb)   02/28/20 76.2 kg (168 lb)   02/23/20 75.9 kg (167 lb 6.4 oz)              Reviewed and updated as needed this visit by Provider         Review of Systems   ROS COMP: Constitutional, HEENT, cardiovascular, pulmonary, gi and gu systems are negative, except as otherwise noted.      Objective    /64 (Cuff Size: Adult Regular)   Pulse 82   Temp 97.4  F (36.3  C) (Tympanic)   Resp 16   Ht 1.753 m (5' 9\")   Wt 74.8 kg (165 lb)   SpO2 98%   BMI 24.37 kg/m    Body mass index is 24.37 kg/m .  Physical Exam   GENERAL: healthy, alert and no distress  NECK: no adenopathy, no asymmetry, masses, or scars and thyroid normal to palpation  RESP: lungs clear to auscultation - no rales, rhonchi or wheezes  CV: regular rate and rhythm, normal S1 S2, no S3 or S4, no murmur, click or rub, no " peripheral edema and peripheral pulses strong  ABDOMEN: soft, nontender, no hepatosplenomegaly, no masses and bowel sounds normal  MS: no gross musculoskeletal defects noted, no edema            Assessment & Plan     1. Anemia, unspecified type  Has been stable without episode of GI bleed after discharge from hospital, will have him to check CBC for rechecking  Encouraged him to try iron supplement   - CBC with platelets    2. Rectal bleeding  Mentioned above   - CBC with platelets    3. Adjustment disorder with other symptom  Worsening with recent admission, has been taking xanax, will refill as needed   Pt will contact us for refill in th case          FUTURE APPOINTMENTS:       - Follow-up visit in 6 months     No follow-ups on file.    Benjamin Lazar MD  [facility]

## 2020-03-04 NOTE — TELEPHONE ENCOUNTER
ONCOLOGY INTAKE: Records Information      APPT INFORMATION:  Referring provider:  Benjamin Lazar   Referring provider s clinic:  [facility]  Reason for visit/diagnosis:  Leukocytosis   Has patient been notified of appointment date and time?: Yes     RECORDS INFORMATION:  Were the records received with the referral (via [employer])? Records in [facility]     Has patient been seen for any external appt for this diagnosis? No     If yes, where? NA    Has patient had any imaging or procedures outside of [place] for this condition? No      If Yes, where? NA    ADDITIONAL INFORMATION:  None

## 2020-03-05 NOTE — TELEPHONE ENCOUNTER
RECORDS STATUS - ALL OTHER DIAGNOSIS      RECORDS RECEIVED FROM: Epic/[facility]   DATE RECEIVED: 3/19/2020    NOTES STATUS DETAILS   OFFICE NOTE from referring provider Complete  Benjamin Lazar    OFFICE NOTE from medical oncologist N/A    DISCHARGE SUMMARY from hospital N/A    DISCHARGE REPORT from the ER N/A    OPERATIVE REPORT N/A    MEDICATION LIST Complete [facility]   CLINICAL TRIAL TREATMENTS TO DATE N/A    LABS     PATHOLOGY REPORTS N/A    ANYTHING RELATED TO DIAGNOSIS Complete Labs last updated on 3/4/2020    GENONOMIC TESTING     TYPE:     IMAGING (NEED IMAGES & REPORT)     CT SCANS     MRI     MAMMO     ULTRASOUND     PET

## 2020-03-12 NOTE — PROGRESS NOTES
Subjective     Jeff Mack is a 84 year old male who presents to clinic today for the following health issues:      Concern - Pt is here to f/u from recent labs     HPI: Jeff presents today with questions about recent labs.     Briefly, Jeff had a colonoscopy on 2/19. On 2/21, he began experiencing profound rectal bleeding. He went to the hospital and spent two nights there. While there a repeat colonoscopy was performed, which revealed a bleeding polypectomy site (a clip had evidently come loose). This was addressed and he has not experienced any further bleeding. His hemoglobin had gotten down to 9.3 at one point. He did not receive any blood transfusions. He returned to the ED on 2/29 for SOB. He was worked up extensively for cardiac, pulmonary, and infectious etiology, but none was discovered. His WBC was in the low 30s while there. At his ED follow up visit, his hemoglobin had rebounded to 10.4 but his WBCs remained 32.2. A hematology consult order was placed. He has that visit planned next Thursday. He reports today that he continues to experience fatigue and malaise. No fever, chills, body aches. No urinary symptoms and no further rectal bleeding.     Of note, he has been using a topical glaucoma agent containing a steroid. 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)     Cardiomegaly     Chronic ischemic heart disease     Essential hypertension, benign     Primary cardiomyopathy (H)     Hyperlipidemia with target LDL less than 100     Acute low back pain     Nonallopathic lesion of sacral region     Lumbar stenosis     Health Care Home     Edema     Esophageal cancer (H)     Rectal bleeding     Past Surgical History:    Procedure Laterality Date     BRONCHOSCOPY FLEXIBLE N/A 4/3/2015    Procedure: BRONCHOSCOPY FLEXIBLE;  Surgeon: Ralph Catherine MD;  Location:  OR     C EXCISION OF LINGUAL TONSIL      TONSILLECTOMY     C NONSPECIFIC PROCEDURE  2-99    Colonic polpectomy     C NONSPECIFIC PROCEDURE  9-92    TURP     C NONSPECIFIC PROCEDURE  1982    Retinal surgery     COLONOSCOPY       COLONOSCOPY N/A 2/19/2020    Procedure: COLONOSCOPY, WITH POLYPECTOMY AND BIOPSY;  Surgeon: Kathy Torres MD;  Location:  GI     ESOPHAGOSCOPY, GASTROSCOPY, DUODENOSCOPY (EGD), COMBINED N/A 3/19/2015    Procedure: COMBINED ESOPHAGOSCOPY, GASTROSCOPY, DUODENOSCOPY (EGD), BIOPSY SINGLE OR MULTIPLE;  Surgeon: Alo Mauro MD;  Location: [facility]     ESOPHAGOSCOPY, GASTROSCOPY, DUODENOSCOPY (EGD), COMBINED N/A 7/14/2015    Procedure: COMBINED ESOPHAGOSCOPY, GASTROSCOPY, DUODENOSCOPY (EGD), BIOPSY SINGLE OR MULTIPLE;  Surgeon: Kathy Torres MD;  Location: [facility]     ESOPHAGOSCOPY, GASTROSCOPY, DUODENOSCOPY (EGD), COMBINED N/A 12/1/2015    Procedure: COMBINED ESOPHAGOSCOPY, GASTROSCOPY, DUODENOSCOPY (EGD), BIOPSY SINGLE OR MULTIPLE;  Surgeon: Alo Mauro MD;  Location: [facility]     ESOPHAGOSCOPY, GASTROSCOPY, DUODENOSCOPY (EGD), COMBINED N/A 3/17/2016    Procedure: COMBINED ESOPHAGOSCOPY, GASTROSCOPY, DUODENOSCOPY (EGD), BIOPSY SINGLE OR MULTIPLE;  Surgeon: Alo Mauro MD;  Location:  GI     ESOPHAGOSCOPY, GASTROSCOPY, DUODENOSCOPY (EGD), COMBINED N/A 7/21/2016    Procedure: COMBINED ESOPHAGOSCOPY, GASTROSCOPY, DUODENOSCOPY (EGD);  Surgeon: Alo Mauro MD;  Location:  GI     ESOPHAGOSCOPY, GASTROSCOPY, DUODENOSCOPY (EGD), COMBINED N/A 11/17/2016    Procedure: COMBINED ESOPHAGOSCOPY, GASTROSCOPY, DUODENOSCOPY (EGD);  Surgeon: Alo Mauro MD;  Location: [facility]     ESOPHAGOSCOPY, GASTROSCOPY, DUODENOSCOPY (EGD), COMBINED N/A 5/9/2017    Procedure: COMBINED ESOPHAGOSCOPY, GASTROSCOPY, DUODENOSCOPY (EGD);   (EGD) COMBINED ESOPHAGOSCOPY, GASTROSCOPY, DUODENOSCOPY;  Surgeon: Alo Mauro MD;  Location:  GI     ESOPHAGOSCOPY, GASTROSCOPY, DUODENOSCOPY (EGD), COMBINED N/A 2018    Procedure: COMBINED ESOPHAGOSCOPY, GASTROSCOPY, DUODENOSCOPY (EGD);  ESOPHAGOGASTRODUODENOSCOPY ;  Surgeon: Alo Mauro MD;  Location:  GI     GASTROSTOMY, INSERT TUBE, COMBINED N/A 4/3/2015    Procedure: COMBINED GASTROSTOMY, INSERT TUBE (OPEN);  Surgeon: Ralph Catherine MD;  Location: [facility]     HC PRESSURE GRADIENT MEASUREMENT, INITIAL VESSEL  2005    essentially normal     HC UGI ENDOSCOPY W EUS N/A 2015    Procedure: COMBINED ENDOSCOPIC ULTRASOUND, ESOPHAGOSCOPY, GASTROSCOPY, DUODENOSCOPY (EGD);  Surgeon: Kathy Torres MD;  Location:  GI     LAMINECTOMY, FUSION LUMBAR ONE LEVEL, COMBINED  2012    Procedure: COMBINED LAMINECTOMY, FUSION LUMBAR ONE LEVEL;  Posterior Fusion L5-S1, Total Facetectomy L5-S1 Left ;  Surgeon: Ronald Espinosa MD;  Location: [facility] OR     PROSTATE SURGERY         Social History     Tobacco Use     Smoking status: Former Smoker     Packs/day: 2.00     Years: 40.00     Pack years: 80.00     Types: Cigarettes     Last attempt to quit: 1975     Years since quittin.2     Smokeless tobacco: Never Used   Substance Use Topics     Alcohol use: No     Family History   Problem Relation Age of Onset     Hypertension Mother      Cerebrovascular Disease Mother      Diabetes Brother      Diabetes Father          age 97           Reviewed and updated as needed this visit by Provider  Tobacco  Allergies  Meds  Problems  Med Hx  Surg Hx  Fam Hx         Review of Systems   ROS COMP: Constitutional, HEENT, cardiovascular, pulmonary, GI, , neuro systems are negative, except as otherwise noted.      Objective    /70 (BP Location: Right arm, Patient Position: Chair, Cuff Size: Adult Regular)   Pulse 88   Temp 98.3  F (36.8  C) (Tympanic)   Wt 73.5 kg (162 lb)    SpO2 98%   BMI 23.92 kg/m    Body mass index is 23.92 kg/m .  Physical Exam   GENERAL: healthy, alert and no distress  HENT: ear canals and TM's normal, nose and mouth without ulcers or lesions  NECK: no adenopathy, no asymmetry, masses, or scars and thyroid normal to palpation  RESP: lungs clear to auscultation - no rales, rhonchi or wheezes  CV: regular rate and rhythm, normal S1 S2, no S3 or S4, no murmur, click or rub, no peripheral edema and peripheral pulses strong  NEURO: Normal strength and tone, mentation intact and speech normal  PSYCH: mentation appears normal, affect normal/bright    Diagnostic Test Results:  Results for orders placed or performed in visit on 03/12/20 (from the past 24 hour(s))   CBC with platelets and differential   Result Value Ref Range    WBC 22.4 (H) 4.0 - 11.0 10e9/L    RBC Count 4.17 (L) 4.4 - 5.9 10e12/L    Hemoglobin 11.4 (L) 13.3 - 17.7 g/dL    Hematocrit 34.9 (L) 40.0 - 53.0 %    MCV 84 78 - 100 fl    MCH 27.3 26.5 - 33.0 pg    MCHC 32.7 31.5 - 36.5 g/dL    RDW 15.4 (H) 10.0 - 15.0 %    Platelet Count 163 150 - 450 10e9/L    Diff Method PENDING    *UA reflex to Microscopic and Culture ([place] [facility] (except [place] and [place])    Specimen: Midstream Urine   Result Value Ref Range    Color Urine Yellow     Appearance Urine Clear     Glucose Urine Negative NEG^Negative mg/dL    Bilirubin Urine Negative NEG^Negative    Ketones Urine Negative NEG^Negative mg/dL    Specific Gravity Urine 1.020 1.003 - 1.035    Blood Urine Small (A) NEG^Negative    pH Urine 5.5 5.0 - 7.0 pH    Protein Albumin Urine Negative NEG^Negative mg/dL    Urobilinogen Urine 0.2 0.2 - 1.0 EU/dL    Nitrite Urine Negative NEG^Negative    Leukocyte Esterase Urine Negative NEG^Negative    Source Midstream Urine    Urine Microscopic   Result Value Ref Range    WBC Urine 0 - 5 OTO5^0 - 5 /HPF    RBC Urine O - 2 OTO2^O - 2 /HPF    Squamous Epithelial /LPF Urine Few FEW^Few /LPF           Assessment  & Plan     Jeff was seen today for recheck. Reassuringly, his hemoglobin is up to 11.4 today. His WBC has also fallen to 22.4. It is possible that the steroid-containing eyedrops and ICS-LABA inhaler could be delaying the fall of this parameter despite no infectious process perceived. His leukocytosis was likely the result of acute stress response and possibly potentiated by the use of steroid-containing agents (his last day of steroid eyedrops is today). Will order a peripheral morphology study, anyway, just to be sure that there isn't an underlying myoproliferative etiology. I have also ordered another CBC to be drawn in 5 days. If that demonstrates continued decrease of WBC, and if his peripheral smear is unremarkable, he will cancel his appointment with hematology. He agrees with this approach. Discussed reasons to call or return to clinic. Jeff acknowledges and demonstrates understanding of circumstances under which care should be sought urgently or emergently. Follow up as discussed.      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 Will be switching back to Symbicort (from Breo Ellipta) due to insurance reasons (which is a positive change for him, he states).            See Patient Instructions    Return in about 5 days (around 3/17/2020) for Lab Work.    Akhil Beltran NP  [facility]

## 2020-03-12 NOTE — PATIENT INSTRUCTIONS
1. Take oral iron supplementation (every other day).  2. Schedule lab only visit for next Tuesday. If WBC is ok, you can cancel hematology appointment.

## 2020-03-17 NOTE — PATIENT INSTRUCTIONS
Hemoglobin up to 11.9 today  Should probably keep appointment with hematology (white count is back up - 28 today).    Let me know if this issue persists.

## 2020-03-17 NOTE — PROGRESS NOTES
"Subjective     Jeff Mack is a 84 year old male who presents to clinic today for the following health issues:      Concern - finger wound  Onset: 3 weeks    Description:   Was working out in the garage about 3 weeks ago and some thing penetrated right index finger. C/O redness, swelling     Therapies Tried and outcome: neosporin     HPI: Jeff presents today with the complaint of right index finger redness and tenderness. He relates that he cut his right index finger (middle phalanx; thumb side) while working with / cutting corrugated cardboard in his garage about 3 weeks ago. He relates that he has been experiencing redness and irritation ever since. He notes no improvement. No pain associated with this, just \"irritated\" when he grasps things. No heat or proximal extension. No tautness. No bleeding or draining. No consitutional symptoms. Movement / operation of finger preserved.     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)     Cardiomegaly     Chronic ischemic heart disease     Essential hypertension, benign     Primary cardiomyopathy (H)     Hyperlipidemia with target LDL less than 100     Acute low back pain     Nonallopathic lesion of sacral region     Lumbar stenosis     Health Care Home     Edema     Esophageal cancer (H)     Rectal bleeding     Past Surgical History:   Procedure Laterality Date     BRONCHOSCOPY FLEXIBLE N/A 4/3/2015    Procedure: BRONCHOSCOPY FLEXIBLE;  Surgeon: Ralph Catherine MD;  Location: [facility]     C EXCISION OF LINGUAL TONSIL      TONSILLECTOMY     C NONSPECIFIC PROCEDURE  2-99    Colonic polpectomy     C NONSPECIFIC PROCEDURE  9-92    TURP     C NONSPECIFIC PROCEDURE  1982    Retinal surgery     COLONOSCOPY       COLONOSCOPY N/A " 2/19/2020    Procedure: COLONOSCOPY, WITH POLYPECTOMY AND BIOPSY;  Surgeon: Kathy Torres MD;  Location: [facility]     ESOPHAGOSCOPY, GASTROSCOPY, DUODENOSCOPY (EGD), COMBINED N/A 3/19/2015    Procedure: COMBINED ESOPHAGOSCOPY, GASTROSCOPY, DUODENOSCOPY (EGD), BIOPSY SINGLE OR MULTIPLE;  Surgeon: Alo Mauro MD;  Location: [facility]     ESOPHAGOSCOPY, GASTROSCOPY, DUODENOSCOPY (EGD), COMBINED N/A 7/14/2015    Procedure: COMBINED ESOPHAGOSCOPY, GASTROSCOPY, DUODENOSCOPY (EGD), BIOPSY SINGLE OR MULTIPLE;  Surgeon: Kathy Torres MD;  Location: [facility]     ESOPHAGOSCOPY, GASTROSCOPY, DUODENOSCOPY (EGD), COMBINED N/A 12/1/2015    Procedure: COMBINED ESOPHAGOSCOPY, GASTROSCOPY, DUODENOSCOPY (EGD), BIOPSY SINGLE OR MULTIPLE;  Surgeon: Alo Mauro MD;  Location: [facility]     ESOPHAGOSCOPY, GASTROSCOPY, DUODENOSCOPY (EGD), COMBINED N/A 3/17/2016    Procedure: COMBINED ESOPHAGOSCOPY, GASTROSCOPY, DUODENOSCOPY (EGD), BIOPSY SINGLE OR MULTIPLE;  Surgeon: Alo Mauro MD;  Location: [facility]     ESOPHAGOSCOPY, GASTROSCOPY, DUODENOSCOPY (EGD), COMBINED N/A 7/21/2016    Procedure: COMBINED ESOPHAGOSCOPY, GASTROSCOPY, DUODENOSCOPY (EGD);  Surgeon: Alo Mauro MD;  Location: [facility]     ESOPHAGOSCOPY, GASTROSCOPY, DUODENOSCOPY (EGD), COMBINED N/A 11/17/2016    Procedure: COMBINED ESOPHAGOSCOPY, GASTROSCOPY, DUODENOSCOPY (EGD);  Surgeon: Alo Mauro MD;  Location: [facility]     ESOPHAGOSCOPY, GASTROSCOPY, DUODENOSCOPY (EGD), COMBINED N/A 5/9/2017    Procedure: COMBINED ESOPHAGOSCOPY, GASTROSCOPY, DUODENOSCOPY (EGD);  (EGD) COMBINED ESOPHAGOSCOPY, GASTROSCOPY, DUODENOSCOPY;  Surgeon: Alo Mauro MD;  Location: [facility]     ESOPHAGOSCOPY, GASTROSCOPY, DUODENOSCOPY (EGD), COMBINED N/A 5/1/2018    Procedure: COMBINED ESOPHAGOSCOPY, GASTROSCOPY, DUODENOSCOPY (EGD);  ESOPHAGOGASTRODUODENOSCOPY ;  Surgeon: Alo Mauro MD;  Location: [facility]     GASTROSTOMY, INSERT TUBE, COMBINED N/A 4/3/2015    Procedure: COMBINED  GASTROSTOMY, INSERT TUBE (OPEN);  Surgeon: Ralph Catherine MD;  Location: [facility]     HC PRESSURE GRADIENT MEASUREMENT, INITIAL VESSEL  2005    essentially normal     HC UGI ENDOSCOPY W EUS N/A 2015    Procedure: COMBINED ENDOSCOPIC ULTRASOUND, ESOPHAGOSCOPY, GASTROSCOPY, DUODENOSCOPY (EGD);  Surgeon: Kathy Torres MD;  Location: [facility]     LAMINECTOMY, FUSION LUMBAR ONE LEVEL, COMBINED  2012    Procedure: COMBINED LAMINECTOMY, FUSION LUMBAR ONE LEVEL;  Posterior Fusion L5-S1, Total Facetectomy L5-S1 Left ;  Surgeon: Ronald Espinosa MD;  Location: [facility]     PROSTATE SURGERY         Social History     Tobacco Use     Smoking status: Former Smoker     Packs/day: 2.00     Years: 40.00     Pack years: 80.00     Types: Cigarettes     Last attempt to quit: 1975     Years since quittin.2     Smokeless tobacco: Never Used   Substance Use Topics     Alcohol use: No     Family History   Problem Relation Age of Onset     Hypertension Mother      Cerebrovascular Disease Mother      Diabetes Brother      Diabetes Father          age 97           Reviewed and updated as needed this visit by Provider  Tobacco  Allergies  Meds  Problems  Med Hx  Surg Hx  Fam Hx         Review of Systems   ROS COMP: Constitutional, musculoskeletal, skin systems are negative, except as otherwise noted.      Objective    /60   Pulse 92   Temp 97  F (36.1  C) (Tympanic)   Wt 73.9 kg (163 lb)   SpO2 96%   BMI 24.07 kg/m    Body mass index is 24.07 kg/m .  Physical Exam   GENERAL: healthy, alert and no distress  MS: no gross musculoskeletal defects noted, no edema  SKIN: Right index finger - middle and distal phalanges (thumb side). Erythema and mild edema (no fluctuance or induration). 2 cm x 1 cm area. No draining or bleeding. No heat. Not taut. Mildly tender. No foreign objects appreciated visually.     Diagnostic Test Results:  Labs reviewed in Epic  Results for orders placed or  performed in visit on 03/17/20 (from the past 24 hour(s))   UA without Microscopic   Result Value Ref Range    Color Urine Yellow     Appearance Urine Clear     Glucose Urine Negative NEG^Negative mg/dL    Bilirubin Urine Negative NEG^Negative    Ketones Urine Negative NEG^Negative mg/dL    Specific Gravity Urine 1.020 1.003 - 1.035    Blood Urine Trace (A) NEG^Negative    pH Urine 5.5 5.0 - 7.0 pH    Protein Albumin Urine Negative NEG^Negative mg/dL    Urobilinogen Urine 0.2 0.2 - 1.0 EU/dL    Nitrite Urine Negative NEG^Negative    Leukocyte Esterase Urine Negative NEG^Negative    Source Midstream Urine            Assessment & Plan     Jeff was seen today for finger.    Diagnoses and all orders for this visit:    Localized infection of skin  Comment: History and exam suggest possible mild, well-localized cellulitis. No purulence appreciated. Doubt that incision would yield any appreciable drainage, so will defer this today. Suggest tid warm soaks followed by application of mupirocin and a clean Band-Aid. Will also order a 5-day course of Keflex. Discussed reasons to call or return to clinic. Jeff acknowledges and demonstrates understanding of circumstances under which care should be sought urgently or emergently. Follow up as discussed. Discussed risks, benefits, alternatives, potential side effects, and proper administration of new medication / treatment. Agrees with plan of care. All questions answered.   -     mupirocin (BACTROBAN) 2 % external ointment; Apply topically 3 times daily  -     cephALEXin (KEFLEX) 500 MG capsule; Take 1 capsule (500 mg) by mouth 4 times daily for 5 days      See Patient Instructions    Return in about 2 weeks (around 3/31/2020) for persistent or worsening symptoms.    Akhil Beltran NP  [facility] [place]

## 2020-03-18 NOTE — TELEPHONE ENCOUNTER
Called Jeff to remind him of his upcoming appointment on 3/19/20 with Dr. Ndiaye.  Provided him with the address and location of the clinic.  Reported no questions.    Also, called patient to review current visitor restrictions and complete COVID-19 Patient Infection/Travel Screening Tool.     Due to the recent public health concerns and in an effort to keep our patients and staff safe and healthy, we are implementing a screening process for the patients and visitors that come to our clinic.      At this time, NO visitors are allowed on our hospital and clinic campuses.      I am going to ask you a few questions, please answer yes or no.     In the last month, have you been in contact with someone who was confirmed or suspected to have Coronavirus/COVID-19?  No     Do you have a:  Fever (or reported chills)?  No  Cough?  No  Shortness of breath?  Yes--reports baseline congestion from asthma/COPD.  States he uses inhalers daily.  Rash?  No    Have you recently traveled internationally in the last month?  No  If so, where?  N/A    Patient PASSED the screening assessment.  Patient instructed to come to the clinic as planned for appointment and to call the clinic right away if anything changes in their health status.    Aguila Alonso, NICON, RN, OCN  Oncology Care Coordinator  [facility]

## 2020-03-19 NOTE — PROGRESS NOTES
Writer called and LVM with patient reviewing lab results and with the information that addition l testing have been ordered on his blood that will take 2 weeks to come back.    Jody Mehta RN

## 2020-03-19 NOTE — LETTER
3/19/2020         RE: Jeff Mack  [address] [place] [address]        Dear Colleague,    Thank you for referring your patient, Jeff Mack, to the [facility]. Please see a copy of my visit note below.    Labs drawn without difficulty. Shari Schoenberger, CMA      Writer called and LVM with patient reviewing lab results and with the information that addition l testing have been ordered on his blood that will take 2 weeks to come back.    Jody Mehta RN      Visit Date:   03/19/2020      This consult has been requested by Dr. Benjamin Lazar MD for leukocytosis.      Mr. Mack is an 85-year-old gentleman who had a colonoscopy on 02/19/2020.  There were diverticulosis and colon polyps.  After the colonoscopy, he was having significant bright red blood per rectum.  The patient was admitted to the hospital on 02/21/2020 because of bright red blood per rectum.  He had a repeat colonoscopy on 02/23/2020.  It revealed post-polypectomy bleed which was treated with clips.  Bleeding resolved.  He was evaluated in the emergency room on 02/29/2020 for shortness of breath.  With all these visits, he had multiple labs done which revealed leukocytosis.  His labs are reviewed and summarized below.   1.  On 11/16/2018, CBC normal with WBC of 4.8, hemoglobin 15.3 and platelets of 295.   -On 02/21/2020, WBC of 13.2 with normal hemoglobin and platelets.   2.  On 02/28/2020:  -WBC of 30.5, hemoglobin of 9.4 and platelets of 235.  MCV of 87.  Neutrophil of 50%, lymphocytes of 15% and monocytes of 17%.  There is myelocyte and metamyelocyte present.   -CMP on 02/28/2020 is normal.   3.  Peripheral blood smear review on 03/12/2020 reveals leukocytosis with absolute neutrophilia, monocytosis and leukoerythroblastic reaction.   4.  CT chest, abdomen and pelvis on 02/29/2020 does not reveal any evidence of malignancy.      The patient denies any recent infection.  In last 1 month, he has been going to  the hospital twice.  Once for bleeding after colonoscopy, the second time for shortness of breath.  He is improving from those.      The patient denies any history of cancer.      REVIEW OF SYSTEMS:  No headache.  No dizziness.  No neck pain.  No chest pain.  No shortness of breath.  No abdominal pain, nausea or vomiting.  Appetite is good.  No urinary or bowel complaints.  No bleeding.  No fever, chills or night sweats.  All other review of systems negative.      ALLERGIES:  REVIEWED.      MEDICATIONS:  Reviewed.      PAST MEDICAL HISTORY:   1.  Skin basal cell carcinoma, status post excision.   2.  Asthma.   3.  Anxiety.   4.  Chronic ischemic heart disease.   5.  Depression.   6.  Diverticulosis.   7.  Hypertension.   8.  Hiatal hernia.   9.  BPH status post TURP.     10.  Sciatica.     11.  Back surgery.   12.  History of esophageal squamous cell carcinoma diagnosed in 2015.  He has stage IIA (T3 N0 M0) disease.  He was treated with concurrent chemo-radiation between April and May 2016. He received weekly carboplatin and Taxol.  No surgery.  He has been in remission since then.  He follows up with Dr. Alo Hagan.      SOCIAL HISTORY:   -He quit smoking about 40 years ago.   -No alcohol use.      FAMILY HISTORY:   -His parents are .   -He had 2 brothers who are .   -Did not have any sisters.   -No family history of any cancer or blood disorder.      PHYSICAL EXAMINATION:   GENERAL:  Alert and oriented x 3.   VITAL SIGNS:  Reviewed.  ECOG PS of 1.     EYES:  No icterus.   THROAT:  No ulcer. No thrush.   NECK:  Supple. No lymphadenopathy. No thyromegaly.   AXILLAE:  No lymphadenopathy.   LUNGS:  Good air entry bilaterally.  No crackles or wheezing.   HEART:  Regular.  No murmur.   GI: Abdomen is soft.  Nontender. No mass.   EXTREMITIES:  No pedal edema.  No calf swelling or tenderness.   SKIN:  No rash.      LABORATORY DATA:  Reviewed.      ASSESSMENT:   1.  An 85-year-old gentleman with  leukocytosis.   2.  Normocytic anemia.  This is secondary to post-polypectomy bleeding.   3.  Thrombocytopenia.   4.  Other medical problems including hypertension and coronary artery disease.   5.  Esophageal cancer in remission.      PLAN:   1.  I had a long discussion with the patient.  Labs were all reviewed.  CBC for last few years reviewed.  I explained to him that he has leukocytosis.  He has some anemia which would go along with bleeding after polypectomy.  He also has thrombocytopenia.  Different causes of leukocytosis discussed.  This could be reactive.  He has been in to the hospital twice recently.  We also discussed regarding possibilities of myeloproliferative disorder.  I explained to the patient that my suspicion for any leukemia is low.  I would like to recheck a CBC today.  Depending on that, we will decide whether to do further workup or not.  He is agreeable for it.   2.  The patient recently had a CT scan done.  No evidence of any malignancy.   3.  We will recheck a CBC today and will call him depending on that, we will decide regarding any further investigation.   4.  He had multiple questions, which were all answered.  He will continue to follow up with Dr. Terrell regarding esophageal cancer.      ADDENDUM:  CBC from today is again abnormal with WBC of 26.7, hemoglobin of 11.8 and platelets of 112.  Neutrophil of 62%, lymphocytes of 6% and monocyte of 18%. There is myelocyte and metamyelocyte.  The patient may have CML or could be chronic myelomonocytic leukemia.  We will get next generation sequencing including BCR-ABL.  We will inform him of the results when available.      Thanks for the consultation.         GRAZYNA RICHEY MD             D: 2020   T: 2020   MT: TERESA      Name:     RADHA ALFONSO   MRN:      [id number]        Account:      [id number]   :      1935           Visit Date:   2020      Document: [id number]       cc: Benjamin Lazar MD     Visit Date:    03/19/2020      This consult has been requested by Dr. Benjamin Lazar MD for leukocytosis.      Mr. Mack is an 85-year-old gentleman who had a colonoscopy on 02/19/2020.  There were diverticulosis and colon polyps.  After the colonoscopy, he was having significant bright red blood per rectum.  The patient was admitted to the hospital on 02/21/2020 because of bright red blood per rectum.  He had a repeat colonoscopy on 02/23/2020.  It revealed post-polypectomy bleed which was treated with clips.  Bleeding resolved.  He was evaluated in the emergency room on 02/29/2020 for shortness of breath.  With all these visits, he had multiple labs done which revealed leukocytosis.  His labs are reviewed and summarized below.   1.  On 11/16/2018, CBC normal with WBC of 4.8, hemoglobin 15.3 and platelets of 295.   -On 02/21/2020, WBC of 13.2 with normal hemoglobin and platelets.   2.  On 02/28/2020:  -WBC of 30.5, hemoglobin of 9.4 and platelets of 235.  MCV of 87.  Neutrophil of 50%, lymphocytes of 15% and monocytes of 17%.  There is myelocyte and metamyelocyte present.   -CMP on 02/28/2020 is normal.   3.  Peripheral blood smear review on 03/12/2020 reveals leukocytosis with absolute neutrophilia, monocytosis and leukoerythroblastic reaction.   4.  CT chest, abdomen and pelvis on 02/29/2020 does not reveal any evidence of malignancy.      The patient denies any recent infection.  In last 1 month, he has been going to the hospital twice.  Once for bleeding after colonoscopy, the second time for shortness of breath.  He is improving from those.      The patient denies any history of cancer.      REVIEW OF SYSTEMS:  No headache.  No dizziness.  No neck pain.  No chest pain.  No shortness of breath.  No abdominal pain, nausea or vomiting.  Appetite is good.  No urinary or bowel complaints.  No bleeding.  No fever, chills or night sweats.  All other review of systems negative.      ALLERGIES:  REVIEWED.      MEDICATIONS:  Reviewed.       PAST MEDICAL HISTORY:   1.  Skin basal cell carcinoma, status post excision.   2.  Asthma.   3.  Anxiety.   4.  Chronic ischemic heart disease.   5.  Depression.   6.  Diverticulosis.   7.  Hypertension.   8.  Hiatal hernia.   9.  BPH status post TURP.     10.  Sciatica.     11.  Back surgery.   12.  History of esophageal squamous cell carcinoma diagnosed in 2015.  He has stage IIA (T3 N0 M0) disease.  He was treated with concurrent chemo-radiation between April and May 2016. He received weekly carboplatin and Taxol.  No surgery.  He has been in remission since then.  He follows up with Dr. Alo Hagan.      SOCIAL HISTORY:   -He quit smoking about 40 years ago.   -No alcohol use.      FAMILY HISTORY:   -His parents are .   -He had 2 brothers who are .   -Did not have any sisters.   -No family history of any cancer or blood disorder.      PHYSICAL EXAMINATION:   GENERAL:  Alert and oriented x 3.   VITAL SIGNS:  Reviewed.  ECOG PS of 1.     EYES:  No icterus.   THROAT:  No ulcer. No thrush.   NECK:  Supple. No lymphadenopathy. No thyromegaly.   AXILLAE:  No lymphadenopathy.   LUNGS:  Good air entry bilaterally.  No crackles or wheezing.   HEART:  Regular.  No murmur.   GI: Abdomen is soft.  Nontender. No mass.   EXTREMITIES:  No pedal edema.  No calf swelling or tenderness.   SKIN:  No rash.      LABORATORY DATA:  Reviewed.      ASSESSMENT:   1.  An 85-year-old gentleman with leukocytosis.   2.  Normocytic anemia.  This is secondary to post-polypectomy bleeding.   3.  Thrombocytopenia.   4.  Other medical problems including hypertension and coronary artery disease.   5.  Esophageal cancer in remission.      PLAN:   1.  I had a long discussion with the patient.  Labs were all reviewed.  CBC for last few years reviewed.  I explained to him that he has leukocytosis.  He has some anemia which would go along with bleeding after polypectomy.  He also has thrombocytopenia.  Different causes of  leukocytosis discussed.  This could be reactive.  He has been in to the hospital twice recently.  We also discussed regarding possibilities of myeloproliferative disorder.  I explained to the patient that my suspicion for any leukemia is low.  I would like to recheck a CBC today.  Depending on that, we will decide whether to do further workup or not.  He is agreeable for it.   2.  The patient recently had a CT scan done.  No evidence of any malignancy.   3.  We will recheck a CBC today and will call him depending on that, we will decide regarding any further investigation.   4.  He had multiple questions, which were all answered.  He will continue to follow up with Dr. Terrell regarding esophageal cancer.      ADDENDUM:  CBC from today is again abnormal with WBC of 26.7, hemoglobin of 11.8 and platelets of 112.  Neutrophil of 62%, lymphocytes of 6% and monocyte of 18%. There is myelocyte and metamyelocyte.  The patient may have CML or could be chronic myelomonocytic leukemia.  We will get next generation sequencing including BCR-ABL.  We will inform him of the results when available.      Thanks for the consultation.         GRAZYNA NDIAYE MD             D: 2020   T: 2020   MT: LR      Name:     RADHA ALFONSO   MRN:      [id number]        Account:      [id number]   :      1935           Visit Date:   2020      Document: [id number]       cc: Benjamin Lazar MD         Again, thank you for allowing me to participate in the care of your patient.        Sincerely,        Grazyna Ndiaye MD

## 2020-03-20 NOTE — PROGRESS NOTES
Visit Date:   03/19/2020      This consult has been requested by Dr. Benjamin Lazar MD for leukocytosis.      Mr. Mack is an 85-year-old gentleman who had a colonoscopy on 02/19/2020.  There were diverticulosis and colon polyps.  After the colonoscopy, he was having significant bright red blood per rectum.  The patient was admitted to the hospital on 02/21/2020 because of bright red blood per rectum.  He had a repeat colonoscopy on 02/23/2020.  It revealed post-polypectomy bleed which was treated with clips.  Bleeding resolved.  He was evaluated in the emergency room on 02/29/2020 for shortness of breath.  With all these visits, he had multiple labs done which revealed leukocytosis.  His labs are reviewed and summarized below.   1.  On 11/16/2018, CBC normal with WBC of 4.8, hemoglobin 15.3 and platelets of 295.   -On 02/21/2020, WBC of 13.2 with normal hemoglobin and platelets.   2.  On 02/28/2020:  -WBC of 30.5, hemoglobin of 9.4 and platelets of 235.  MCV of 87.  Neutrophil of 50%, lymphocytes of 15% and monocytes of 17%.  There is myelocyte and metamyelocyte present.   -CMP on 02/28/2020 is normal.   3.  Peripheral blood smear review on 03/12/2020 reveals leukocytosis with absolute neutrophilia, monocytosis and leukoerythroblastic reaction.   4.  CT chest, abdomen and pelvis on 02/29/2020 does not reveal any evidence of malignancy.      The patient denies any recent infection.  In last 1 month, he has been going to the hospital twice.  Once for bleeding after colonoscopy, the second time for shortness of breath.  He is improving from those.      The patient denies any history of cancer.      REVIEW OF SYSTEMS:  No headache.  No dizziness.  No neck pain.  No chest pain.  No shortness of breath.  No abdominal pain, nausea or vomiting.  Appetite is good.  No urinary or bowel complaints.  No bleeding.  No fever, chills or night sweats.  All other review of systems negative.      ALLERGIES:  REVIEWED.      MEDICATIONS:   Reviewed.      PAST MEDICAL HISTORY:   1.  Skin basal cell carcinoma, status post excision.   2.  Asthma.   3.  Anxiety.   4.  Chronic ischemic heart disease.   5.  Depression.   6.  Diverticulosis.   7.  Hypertension.   8.  Hiatal hernia.   9.  BPH status post TURP.     10.  Sciatica.     11.  Back surgery.   12.  History of esophageal squamous cell carcinoma diagnosed in 2015.  He has stage IIA (T3 N0 M0) disease.  He was treated with concurrent chemo-radiation between April and May 2016. He received weekly carboplatin and Taxol.  No surgery.  He has been in remission since then.  He follows up with Dr. Alo Hagan.      SOCIAL HISTORY:   -He quit smoking about 40 years ago.   -No alcohol use.      FAMILY HISTORY:   -His parents are .   -He had 2 brothers who are .   -Did not have any sisters.   -No family history of any cancer or blood disorder.      PHYSICAL EXAMINATION:   GENERAL:  Alert and oriented x 3.   VITAL SIGNS:  Reviewed.  ECOG PS of 1.     EYES:  No icterus.   THROAT:  No ulcer. No thrush.   NECK:  Supple. No lymphadenopathy. No thyromegaly.   AXILLAE:  No lymphadenopathy.   LUNGS:  Good air entry bilaterally.  No crackles or wheezing.   HEART:  Regular.  No murmur.   GI: Abdomen is soft.  Nontender. No mass.   EXTREMITIES:  No pedal edema.  No calf swelling or tenderness.   SKIN:  No rash.      LABORATORY DATA:  Reviewed.      ASSESSMENT:   1.  An 85-year-old gentleman with leukocytosis.   2.  Normocytic anemia.  This is secondary to post-polypectomy bleeding.   3.  Thrombocytopenia.   4.  Other medical problems including hypertension and coronary artery disease.   5.  Esophageal cancer in remission.      PLAN:   1.  I had a long discussion with the patient.  Labs were all reviewed.  CBC for last few years reviewed.  I explained to him that he has leukocytosis.  He has some anemia which would go along with bleeding after polypectomy.  He also has thrombocytopenia.  Different  causes of leukocytosis discussed.  This could be reactive.  He has been in to the hospital twice recently.  We also discussed regarding possibilities of myeloproliferative disorder.  I explained to the patient that my suspicion for any leukemia is low.  I would like to recheck a CBC today.  Depending on that, we will decide whether to do further workup or not.  He is agreeable for it.   2.  The patient recently had a CT scan done.  No evidence of any malignancy.   3.  We will recheck a CBC today and will call him depending on that, we will decide regarding any further investigation.   4.  He had multiple questions, which were all answered.  He will continue to follow up with Dr. Terrell regarding esophageal cancer.      ADDENDUM:  CBC from today is again abnormal with WBC of 26.7, hemoglobin of 11.8 and platelets of 112.  Neutrophil of 62%, lymphocytes of 6% and monocyte of 18%. There is myelocyte and metamyelocyte.  The patient may have CML or could be chronic myelomonocytic leukemia.  We will get next generation sequencing including BCR-ABL.  We will inform him of the results when available.      Thanks for the consultation.         GRAZYNA RICHEY MD             D: 2020   T: 2020   MT: TERESA      Name:     RADHA ALFONSO   MRN:      [id number]        Account:      [id number]   :      1935           Visit Date:   2020      Document: [id number]       cc: Benjamin Lazar MD

## 2020-03-23 NOTE — TELEPHONE ENCOUNTER
Left message for pt to call back.    Pt should do evisit or telephone visit per Dr. Lazar to get medication refill.    Ailin Cabrera CMA

## 2020-03-23 NOTE — TELEPHONE ENCOUNTER
Requested Prescriptions   Pending Prescriptions Disp Refills     ALPRAZolam (XANAX) 0.5 MG tablet 45 tablet 0     Sig: Take 1 tablet (0.5 mg) by mouth 2 times daily as needed for anxiety   Last Written Prescription Date:  7/10/2017  Last Fill Quantity: 45,  # refills: 0   Last office visit: 3/17/2020 with prescribing provider: Nagi   Future Office Visit:        There is no refill protocol information for this order

## 2020-03-24 NOTE — PROGRESS NOTES
"Jeff Mack is a 85 year old male who is being evaluated via a billable telephone visit.      The patient has been notified of following:     \"This telephone visit will be conducted via a call between you and your physician/provider. We have found that certain health care needs can be provided without the need for a physical exam.  This service lets us provide the care you need with a short phone conversation.  If a prescription is necessary we can send it directly to your pharmacy.  If lab work is needed we can place an order for that and you can then stop by our lab to have the test done at a later time.    If during the course of the call the physician/provider feels a telephone visit is not appropriate, you will not be charged for this service.\"     Jeff Mack complains of  No chief complaint on file.      I have reviewed and updated the patient's Past Medical History, Social History, Family History and Medication List.    ALLERGIES  Bee venom    Anxiety Follow-Up - Xanax med check - uses it as needed - uses only a half of a tablet at time only.     How are you doing with your anxiety since your last visit? stable    Are you having other symptoms that might be associated with anxiety? No    Have you had a significant life event? With the Corona Virus and everything going on he just wants to make sure he has it     Are you feeling depressed? No    Do you have any concerns with your use of alcohol or other drugs? No    Social History     Tobacco Use     Smoking status: Former Smoker     Packs/day: 2.00     Years: 40.00     Pack years: 80.00     Types: Cigarettes     Last attempt to quit: 1975     Years since quittin.2     Smokeless tobacco: Never Used   Substance Use Topics     Alcohol use: No     Drug use: No     JENI-7 SCORE 2018 2019 8/15/2019   Total Score - - -   Total Score 3 6 3     PHQ 2018 2019 8/15/2019   PHQ-9 Total Score 2 5 2   Q9: Thoughts of better off " dead/self-harm past 2 weeks Not at all Not at all Not at all     Last PHQ-9 8/15/2019   1.  Little interest or pleasure in doing things 0   2.  Feeling down, depressed, or hopeless 0   3.  Trouble falling or staying asleep, or sleeping too much 1   4.  Feeling tired or having little energy 0   5.  Poor appetite or overeating 0   6.  Feeling bad about yourself 0   7.  Trouble concentrating 1   8.  Moving slowly or restless 0   Q9: Thoughts of better off dead/self-harm past 2 weeks 0   PHQ-9 Total Score 2   Difficulty at work, home, or with people -     JENI-7  8/15/2019   1. Feeling nervous, anxious, or on edge 1   2. Not being able to stop or control worrying 1   3. Worrying too much about different things 0   4. Trouble relaxing 1   5. Being so restless that it is hard to sit still 0   6. Becoming easily annoyed or irritable 0   7. Feeling afraid, as if something awful might happen 0   JENI-7 Total Score 3   If you checked any problems, how difficult have they made it for you to do your work, take care of things at home, or get along with other people? Not difficult at all       Assessment/Plan:  1. Adjustment disorder with mixed anxiety and depressed mood  Has been worsening with upcoming f/u oncology test and worsening current status of COVID-19  Has no HI/SI, no worsening depressed mood  Will have him to keep on alprazolam as he has been  - ALPRAZolam (XANAX) 0.5 MG tablet; Take 1 tablet (0.5 mg) by mouth 2 times daily as needed for anxiety  Dispense: 45 tablet; Refill: 0    Phone call duration:  13 minutes    Benjamin Lazar MD  ;

## 2020-03-29 NOTE — PROGRESS NOTES
Visit Date:   03/19/2020      This consult has been requested by Dr. Benjamin Lazar MD for leukocytosis.      Mr. Mack is an 85-year-old gentleman who had a colonoscopy on 02/19/2020.  There were diverticulosis and colon polyps.  After the colonoscopy, he was having significant bright red blood per rectum.  The patient was admitted to the hospital on 02/21/2020 because of bright red blood per rectum.  He had a repeat colonoscopy on 02/23/2020.  It revealed post-polypectomy bleed which was treated with clips.  Bleeding resolved.  He was evaluated in the emergency room on 02/29/2020 for shortness of breath.  With all these visits, he had multiple labs done which revealed leukocytosis.  His labs are reviewed and summarized below.   1.  On 11/16/2018, CBC normal with WBC of 4.8, hemoglobin 15.3 and platelets of 295.   -On 02/21/2020, WBC of 13.2 with normal hemoglobin and platelets.   2.  On 02/28/2020:  -WBC of 30.5, hemoglobin of 9.4 and platelets of 235.  MCV of 87.  Neutrophil of 50%, lymphocytes of 15% and monocytes of 17%.  There is myelocyte and metamyelocyte present.   -CMP on 02/28/2020 is normal.   3.  Peripheral blood smear review on 03/12/2020 reveals leukocytosis with absolute neutrophilia, monocytosis and leukoerythroblastic reaction.   4.  CT chest, abdomen and pelvis on 02/29/2020 does not reveal any evidence of malignancy.      The patient denies any recent infection.  In last 1 month, he has been going to the hospital twice.  Once for bleeding after colonoscopy, the second time for shortness of breath.  He is improving from those.      The patient denies any history of cancer.      REVIEW OF SYSTEMS:  No headache.  No dizziness.  No neck pain.  No chest pain.  No shortness of breath.  No abdominal pain, nausea or vomiting.  Appetite is good.  No urinary or bowel complaints.  No bleeding.  No fever, chills or night sweats.  All other review of systems negative.      ALLERGIES:  REVIEWED.      MEDICATIONS:   Reviewed.      PAST MEDICAL HISTORY:   1.  Skin basal cell carcinoma, status post excision.   2.  Asthma.   3.  Anxiety.   4.  Chronic ischemic heart disease.   5.  Depression.   6.  Diverticulosis.   7.  Hypertension.   8.  Hiatal hernia.   9.  BPH status post TURP.     10.  Sciatica.     11.  Back surgery.   12.  History of esophageal squamous cell carcinoma diagnosed in 2015.  He has stage IIA (T3 N0 M0) disease.  He was treated with concurrent chemo-radiation between April and May 2016. He received weekly carboplatin and Taxol.  No surgery.  He has been in remission since then.  He follows up with Dr. Alo Hagan.      SOCIAL HISTORY:   -He quit smoking about 40 years ago.   -No alcohol use.      FAMILY HISTORY:   -His parents are .   -He had 2 brothers who are .   -Did not have any sisters.   -No family history of any cancer or blood disorder.      PHYSICAL EXAMINATION:   GENERAL:  Alert and oriented x 3.   VITAL SIGNS:  Reviewed.  ECOG PS of 1.     EYES:  No icterus.   THROAT:  No ulcer. No thrush.   NECK:  Supple. No lymphadenopathy. No thyromegaly.   AXILLAE:  No lymphadenopathy.   LUNGS:  Good air entry bilaterally.  No crackles or wheezing.   HEART:  Regular.  No murmur.   GI: Abdomen is soft.  Nontender. No mass.   EXTREMITIES:  No pedal edema.  No calf swelling or tenderness.   SKIN:  No rash.      LABORATORY DATA:  Reviewed.      ASSESSMENT:   1.  An 85-year-old gentleman with leukocytosis.   2.  Normocytic anemia.  This is secondary to post-polypectomy bleeding.   3.  Thrombocytopenia.   4.  Other medical problems including hypertension and coronary artery disease.   5.  Esophageal cancer in remission.      PLAN:   1.  I had a long discussion with the patient.  Labs were all reviewed.  CBC for last few years reviewed.  I explained to him that he has leukocytosis.  He has some anemia which would go along with bleeding after polypectomy.  He also has thrombocytopenia.  Different  causes of leukocytosis discussed.  This could be reactive.  He has been in to the hospital twice recently.  We also discussed regarding possibilities of myeloproliferative disorder.  I explained to the patient that my suspicion for any leukemia is low.  I would like to recheck a CBC today.  Depending on that, we will decide whether to do further workup or not.  He is agreeable for it.   2.  The patient recently had a CT scan done.  No evidence of any malignancy.   3.  We will recheck a CBC today and will call him depending on that, we will decide regarding any further investigation.   4.  He had multiple questions, which were all answered.  He will continue to follow up with Dr. Terrell regarding esophageal cancer.      ADDENDUM:  CBC from today is again abnormal with WBC of 26.7, hemoglobin of 11.8 and platelets of 112.  Neutrophil of 62%, lymphocytes of 6% and monocyte of 18%. There is myelocyte and metamyelocyte.  The patient may have CML or could be chronic myelomonocytic leukemia.  We will get next generation sequencing including BCR-ABL.  We will inform him of the results when available.      Thanks for the consultation.         GRAZYNA RICHEY MD             D: 2020   T: 2020   MT: TERESA      Name:     RADHA ALFONSO   MRN:      [id number]        Account:      [id number]   :      1935           Visit Date:   2020      Document: [id number]       cc: Benjamin Lazar MD

## 2020-04-01 NOTE — TELEPHONE ENCOUNTER
Left a non-detailed message and call back number [phone].     Domi Rivas RN, BSN  [facility] [place]

## 2020-04-01 NOTE — TELEPHONE ENCOUNTER
Pt call in saying he missed a call from Dr Ndiaye today - 4/1  I see Note to relay    Pt then asks the results of his blood test from 3/18 - specifically his WBC and Hgb  And wonders why no one called him with results from 2 weeks ago ?    Hgb > 11.8  WBC > 26.7  (from 3/19)    Pt also advised to call clinic tomorrow when open and discuss with them    Protocol and care advice reviewed  Caller states understanding of the recommended disposition    Advised to call back if further questions or concerns    Isidro Esquivel , RN / [facility]        Reason for Disposition    Caller requesting lab results    Additional Information    Lab result questions    Protocols used: PCP CALL - NO TRIAGE-A-AH, INFORMATION ONLY CALL-A-AH

## 2020-04-01 NOTE — TELEPHONE ENCOUNTER
Reason for Call:  Other call back    Detailed comments: Pt called this afternoon and  Would like a nurse working with [facility] Beltran or Dr. Lazar to give him a call tomorrow morning due to some symptoms he is having. I did inform him that Dr. Lazar was out this week and next, and that [facility] was ONLY doing virtual visits this week. Pt does NOT want a telephone appointment with anybody else-- pt is experiencing severe fatigue and some weight loss. Please give pt a call back when you can. Thank you.     Phone Number Patient can be reached at: Home number on file [phone] (home)    Best Time: MORNING    Can we leave a detailed message on this number? YES    Call taken on 4/1/2020 at 4:31 PM by Ronel Portillo

## 2020-04-02 NOTE — PROGRESS NOTES
"Subjective     Jeff Mack is a 85 year old male who is being evaluated via a billable telephone visit.      The patient has been notified of following:     \"This telephone visit will be conducted via a call between you and your physician/provider. We have found that certain health care needs can be provided without the need for a physical exam.  This service lets us provide the care you need with a short phone conversation.  If a prescription is necessary we can send it directly to your pharmacy.  If lab work is needed we can place an order for that and you can then stop by our lab to have the test done at a later time.    If during the course of the call the physician/provider feels a telephone visit is not appropriate, you will not be charged for this service.\"     Patient has given verbal consent for Telephone visit?  Yes    Jeff Mack complains of   Chief Complaint   Patient presents with     Fatigue       ALLERGIES  Bee venom    Concern - Pt is following up from his last OV on March 17. He is still experiencing weight loss and fatigue. He weighs 157 lbs as of this am. Fatigue has gotten somewhat worse.  He is awaiting a phone call From Dr. Ndiaye on lab values.     HPI: Don calls today with ongoing fatigue, weight loss (unintentional), and loss of appetite. Briefly, Joshua experienced GI hemorrhage after a colonoscopy in February and spent several days in the hospital. His WBC was noted to be over 30K during that hospitalization and has never returned to normal range (typically 26-28K on subsequent checks). His hemoglobin has also failed to crest 11.8 mg/dl despite being over a month out from his hemorrhage episode. He was referred to hematology and is currently being worked up for potential myeloproliferative disorder. His oncology / hematology labs have been resulted but he missed the phone call from his hematologist yesterday. He will be calling that office today to discuss the results and what they " mean. He reports that he has been increasingly fatigued and with low appetite. He notes a 13 pound weight loss over the past month. No fever, chills, rigors.      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)     Cardiomegaly     Chronic ischemic heart disease     Essential hypertension, benign     Primary cardiomyopathy (H)     Hyperlipidemia with target LDL less than 100     Acute low back pain     Nonallopathic lesion of sacral region     Lumbar stenosis     Health Care Home     Edema     Esophageal cancer (H)     Rectal bleeding     Past Surgical History:   Procedure Laterality Date     BRONCHOSCOPY FLEXIBLE N/A 4/3/2015    Procedure: BRONCHOSCOPY FLEXIBLE;  Surgeon: Ralph Catherine MD;  Location:  OR      EXCISION OF LINGUAL TONSIL      TONSILLECTOMY     C NONSPECIFIC PROCEDURE  2-99    Colonic polpectomy     C NONSPECIFIC PROCEDURE  9-92    TURP     C NONSPECIFIC PROCEDURE  1982    Retinal surgery     COLONOSCOPY       COLONOSCOPY N/A 2/19/2020    Procedure: COLONOSCOPY, WITH POLYPECTOMY AND BIOPSY;  Surgeon: Kathy Torres MD;  Location:  GI     ESOPHAGOSCOPY, GASTROSCOPY, DUODENOSCOPY (EGD), COMBINED N/A 3/19/2015    Procedure: COMBINED ESOPHAGOSCOPY, GASTROSCOPY, DUODENOSCOPY (EGD), BIOPSY SINGLE OR MULTIPLE;  Surgeon: Alo Mauro MD;  Location:  GI     ESOPHAGOSCOPY, GASTROSCOPY, DUODENOSCOPY (EGD), COMBINED N/A 7/14/2015    Procedure: COMBINED ESOPHAGOSCOPY, GASTROSCOPY, DUODENOSCOPY (EGD), BIOPSY SINGLE OR MULTIPLE;  Surgeon: Kathy Torres MD;  Location:  GI     ESOPHAGOSCOPY, GASTROSCOPY, DUODENOSCOPY (EGD), COMBINED N/A 12/1/2015    Procedure: COMBINED ESOPHAGOSCOPY, GASTROSCOPY, DUODENOSCOPY (EGD), BIOPSY SINGLE OR  MULTIPLE;  Surgeon: Alo Mauro MD;  Location:  GI     ESOPHAGOSCOPY, GASTROSCOPY, DUODENOSCOPY (EGD), COMBINED N/A 3/17/2016    Procedure: COMBINED ESOPHAGOSCOPY, GASTROSCOPY, DUODENOSCOPY (EGD), BIOPSY SINGLE OR MULTIPLE;  Surgeon: Alo Mauro MD;  Location:  GI     ESOPHAGOSCOPY, GASTROSCOPY, DUODENOSCOPY (EGD), COMBINED N/A 7/21/2016    Procedure: COMBINED ESOPHAGOSCOPY, GASTROSCOPY, DUODENOSCOPY (EGD);  Surgeon: Alo Mauro MD;  Location:  GI     ESOPHAGOSCOPY, GASTROSCOPY, DUODENOSCOPY (EGD), COMBINED N/A 11/17/2016    Procedure: COMBINED ESOPHAGOSCOPY, GASTROSCOPY, DUODENOSCOPY (EGD);  Surgeon: Alo Mauro MD;  Location:  GI     ESOPHAGOSCOPY, GASTROSCOPY, DUODENOSCOPY (EGD), COMBINED N/A 5/9/2017    Procedure: COMBINED ESOPHAGOSCOPY, GASTROSCOPY, DUODENOSCOPY (EGD);  (EGD) COMBINED ESOPHAGOSCOPY, GASTROSCOPY, DUODENOSCOPY;  Surgeon: Alo Mauro MD;  Location:  GI     ESOPHAGOSCOPY, GASTROSCOPY, DUODENOSCOPY (EGD), COMBINED N/A 5/1/2018    Procedure: COMBINED ESOPHAGOSCOPY, GASTROSCOPY, DUODENOSCOPY (EGD);  ESOPHAGOGASTRODUODENOSCOPY ;  Surgeon: Alo Mauro MD;  Location:  GI     GASTROSTOMY, INSERT TUBE, COMBINED N/A 4/3/2015    Procedure: COMBINED GASTROSTOMY, INSERT TUBE (OPEN);  Surgeon: Ralph Catherine MD;  Location:  OR      PRESSURE GRADIENT MEASUREMENT, INITIAL VESSEL  2005    essentially normal     HC UGI ENDOSCOPY W EUS N/A 7/14/2015    Procedure: COMBINED ENDOSCOPIC ULTRASOUND, ESOPHAGOSCOPY, GASTROSCOPY, DUODENOSCOPY (EGD);  Surgeon: Kathy Torres MD;  Location:  GI     LAMINECTOMY, FUSION LUMBAR ONE LEVEL, COMBINED  7/26/2012    Procedure: COMBINED LAMINECTOMY, FUSION LUMBAR ONE LEVEL;  Posterior Fusion L5-S1, Total Facetectomy L5-S1 Left ;  Surgeon: Ronald Espinosa MD;  Location: [facility]     PROSTATE SURGERY         Social History     Tobacco Use     Smoking status: Former Smoker     Packs/day: 2.00     Years: 40.00     Pack  years: 80.00     Types: Cigarettes     Last attempt to quit: 1975     Years since quittin.2     Smokeless tobacco: Never Used   Substance Use Topics     Alcohol use: No     Family History   Problem Relation Age of Onset     Hypertension Mother      Cerebrovascular Disease Mother      Diabetes Brother      Diabetes Father          age 97           Reviewed and updated as needed this visit by Provider  Tobacco  Allergies  Meds  Problems  Med Hx  Surg Hx  Fam Hx         Review of Systems   ROS COMP: Constitutional, GI systems are negative, except as otherwise noted.       Objective   Reported vitals:  There were no vitals taken for this visit.   alert, no distress and cooperative  Psych: Alert and oriented times 3; coherent speech, normal   rate and volume, able to articulate logical thoughts, able   to abstract reason, no tangential thoughts, no hallucinations   or delusions  His affect is bright / appropriate     Diagnostic Test Results:  Labs reviewed in Epic        Assessment/Plan:  1. Fatigue, unspecified type  Comment: Likely related to hematologic pathology (yet to be fully elucidated). Will await hematology / oncology plan for further workup verus treatment. He will call that office back today in hopes to discuss this with Dr. Ndiaye.     2. Decrease in appetite  Comment: Likely related to hematologic pathology (yet to be fully elucidated). Will await hematology / oncology plan for further workup verus treatment. He will call that office back today in hopes to discuss this with Dr. Ndiaye.     3. Unintentional weight loss  Comment: Likely related to hematologic pathology (yet to be fully elucidated). Will await hematology / oncology plan for further workup verus treatment. He will call that office back today in hopes to discuss this with Dr. Ndiaye.       Return in about 2 weeks (around 2020) for persistent or worsening symptoms.      Phone call duration:  13 minutes    Akhil Beltran  NP

## 2020-04-02 NOTE — PROGRESS NOTES
I spoke to patient.  Labs were reviewed.  CBC reveals leukocytosis, anemia and thrombocytopenia.  No JEREMIAH 2, CALR or MPL gene mutation.  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    For further evaluation, he needs a bone marrow biopsy.  Patient would like to wait.  In a month, will get a bone marrow biopsy. He will have CBC checked periodically with his PCP. I will see him after bone marrow biopsy.

## 2020-04-02 NOTE — TELEPHONE ENCOUNTER
S/w pt who states for the past 3-4 weeks had a colonoscopy that went wrong.  Had a lot of bleeding and has been losing weight.  Has lost about 13 lbs in the past 5 weeks and lack of energy.    Waiting for results from the cancer clinic from a week ago.  Received a call yesterday from them but missed the call.  Pt is going to call the cancer clinic today.  States his WBC is quite high and is concerned about it.  States Hgb is low but coming up.      Scheduled a phone visit with Reddy today at 9:40    Smita MEZA RN  EP Triage

## 2020-04-03 NOTE — TELEPHONE ENCOUNTER
Pt calling states he s/w Dr. Ndiaye yesterday and is scheduled for a bone marrow biopsy on 5/6.  Would like to get his hgb and wbc tested around the 15th of April to stay on top of it.    Please read orders only from Dr. Ndiaye from 4/2.  Lab order pended.  Ok for lab on 4/15?    Smita MEZA RN  EP Triage

## 2020-04-03 NOTE — TELEPHONE ENCOUNTER
Patient scheduled for lab on 04/15.      .Amanda REILLY    [facility] Johnna Routt

## 2020-04-03 NOTE — TELEPHONE ENCOUNTER
Reason for Call:  Other call back    Detailed comments: PT called - about white blood count & hemoglobin levels     Phone Number Patient can be reached at: Cell number on file:    Telephone Information:   Mobile [phone]       Best Time: any    Can we leave a detailed message on this number? Yes      Call taken on 4/3/2020 at 10:59 AM by Angela White

## 2020-04-09 NOTE — TELEPHONE ENCOUNTER
Routing refill request to provider for review/approval because:  Medication is reported/historical. Ashley Mark RN

## 2020-04-14 NOTE — PROGRESS NOTES
"Jeff Mack is a 85 year old male who is being evaluated via a billable telephone visit.      The patient has been notified of following:     \"This telephone visit will be conducted via a call between you and your physician/provider. We have found that certain health care needs can be provided without the need for a physical exam.  This service lets us provide the care you need with a short phone conversation.  If a prescription is necessary we can send it directly to your pharmacy.  If lab work is needed we can place an order for that and you can then stop by our lab to have the test done at a later time.    Telephone visits are billed at different rates depending on your insurance coverage. During this emergency period, for some insurers they may be billed the same as an in-person visit.  Please reach out to your insurance provider with any questions.    If during the course of the call the physician/provider feels a telephone visit is not appropriate, you will not be charged for this service.\"    Patient has given verbal consent for Telephone visit?  Yes    How would you like to obtain your AVS? Mail a copy    Subjective     Jeff Mack is a 85 year old male who presents to clinic today for the following health issues:    Acute Illness   Acute illness concerns: Weight loss, loss of energy   Onset: feb 23 2020    Fever: no    Chills/Sweats: no    Headache (location?): no    Sinus Pressure:no    Conjunctivitis:  no    Ear Pain: no    Rhinorrhea: no    Congestion: no    Sore Throat: no     Cough: no    Wheeze: no    Decreased Appetite: YES    Nausea: no    Vomiting: no    Diarrhea:  no    Dysuria/Freq.: no    Fatigue/Achiness: YES    Sick/Strep Exposure: no    Weight loss     Loss of energy   Trouble sleeping  Anxiety   Has a test for the white blood count - \"Bone marrow\" examination stated pt on May 6th 2020         Therapies Tried and outcome: xanax - shows improvement       Patient Active Problem List "   Diagnosis     Sciatica     Nonspecific abnormal electrocardiogram (ECG) (EKG)     Hypertrophy of prostate without urinary obstruction     Thoracic or lumbosacral neuritis or radiculitis, unspecified     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)     Cardiomegaly     Chronic ischemic heart disease     Essential hypertension, benign     Primary cardiomyopathy (H)     Hyperlipidemia with target LDL less than 100     Acute low back pain     Nonallopathic lesion of sacral region     Lumbar stenosis     Health Care Home     Edema     Esophageal cancer (H)     Rectal bleeding     Past Surgical History:   Procedure Laterality Date     BRONCHOSCOPY FLEXIBLE N/A 4/3/2015    Procedure: BRONCHOSCOPY FLEXIBLE;  Surgeon: Ralph Catherine MD;  Location:  OR      EXCISION OF LINGUAL TONSIL      TONSILLECTOMY     C NONSPECIFIC PROCEDURE  2-99    Colonic polpectomy     C NONSPECIFIC PROCEDURE  9-92    TURP     C NONSPECIFIC PROCEDURE  1982    Retinal surgery     COLONOSCOPY       COLONOSCOPY N/A 2/19/2020    Procedure: COLONOSCOPY, WITH POLYPECTOMY AND BIOPSY;  Surgeon: Kathy Torres MD;  Location: [facility]     ESOPHAGOSCOPY, GASTROSCOPY, DUODENOSCOPY (EGD), COMBINED N/A 3/19/2015    Procedure: COMBINED ESOPHAGOSCOPY, GASTROSCOPY, DUODENOSCOPY (EGD), BIOPSY SINGLE OR MULTIPLE;  Surgeon: Alo Mauro MD;  Location: [facility]     ESOPHAGOSCOPY, GASTROSCOPY, DUODENOSCOPY (EGD), COMBINED N/A 7/14/2015    Procedure: COMBINED ESOPHAGOSCOPY, GASTROSCOPY, DUODENOSCOPY (EGD), BIOPSY SINGLE OR MULTIPLE;  Surgeon: Kathy Torres MD;  Location: [facility]     ESOPHAGOSCOPY, GASTROSCOPY, DUODENOSCOPY (EGD), COMBINED N/A 12/1/2015    Procedure: COMBINED ESOPHAGOSCOPY, GASTROSCOPY, DUODENOSCOPY (EGD), BIOPSY SINGLE OR MULTIPLE;  Surgeon: Alo Mauro MD;  Location: [facility]     ESOPHAGOSCOPY, GASTROSCOPY, DUODENOSCOPY (EGD), COMBINED N/A 3/17/2016    Procedure: COMBINED ESOPHAGOSCOPY, GASTROSCOPY, DUODENOSCOPY  (EGD), BIOPSY SINGLE OR MULTIPLE;  Surgeon: Alo Mauro MD;  Location:  GI     ESOPHAGOSCOPY, GASTROSCOPY, DUODENOSCOPY (EGD), COMBINED N/A 2016    Procedure: COMBINED ESOPHAGOSCOPY, GASTROSCOPY, DUODENOSCOPY (EGD);  Surgeon: Alo Mauro MD;  Location:  GI     ESOPHAGOSCOPY, GASTROSCOPY, DUODENOSCOPY (EGD), COMBINED N/A 2016    Procedure: COMBINED ESOPHAGOSCOPY, GASTROSCOPY, DUODENOSCOPY (EGD);  Surgeon: Alo Mauro MD;  Location:  GI     ESOPHAGOSCOPY, GASTROSCOPY, DUODENOSCOPY (EGD), COMBINED N/A 2017    Procedure: COMBINED ESOPHAGOSCOPY, GASTROSCOPY, DUODENOSCOPY (EGD);  (EGD) COMBINED ESOPHAGOSCOPY, GASTROSCOPY, DUODENOSCOPY;  Surgeon: Alo Mauro MD;  Location:  GI     ESOPHAGOSCOPY, GASTROSCOPY, DUODENOSCOPY (EGD), COMBINED N/A 2018    Procedure: COMBINED ESOPHAGOSCOPY, GASTROSCOPY, DUODENOSCOPY (EGD);  ESOPHAGOGASTRODUODENOSCOPY ;  Surgeon: Alo Mauro MD;  Location:  GI     GASTROSTOMY, INSERT TUBE, COMBINED N/A 4/3/2015    Procedure: COMBINED GASTROSTOMY, INSERT TUBE (OPEN);  Surgeon: Ralph Catherine MD;  Location:  OR     HC PRESSURE GRADIENT MEASUREMENT, INITIAL VESSEL  2005    essentially normal     HC UGI ENDOSCOPY W EUS N/A 2015    Procedure: COMBINED ENDOSCOPIC ULTRASOUND, ESOPHAGOSCOPY, GASTROSCOPY, DUODENOSCOPY (EGD);  Surgeon: Kathy Torres MD;  Location:  GI     LAMINECTOMY, FUSION LUMBAR ONE LEVEL, COMBINED  2012    Procedure: COMBINED LAMINECTOMY, FUSION LUMBAR ONE LEVEL;  Posterior Fusion L5-S1, Total Facetectomy L5-S1 Left ;  Surgeon: Ronald Espinosa MD;  Location: [facility] OR     PROSTATE SURGERY         Social History     Tobacco Use     Smoking status: Former Smoker     Packs/day: 2.00     Years: 40.00     Pack years: 80.00     Types: Cigarettes     Last attempt to quit: 1975     Years since quittin.3     Smokeless tobacco: Never Used   Substance Use Topics     Alcohol use: No     Family History    Problem Relation Age of Onset     Hypertension Mother      Cerebrovascular Disease Mother      Diabetes Brother      Diabetes Father          age 97         Current Outpatient Medications   Medication Sig Dispense Refill     albuterol (PROAIR HFA/PROVENTIL HFA/VENTOLIN HFA) 108 (90 Base) MCG/ACT inhaler Inhale 1-2 puffs into the lungs every 6 hours as needed for shortness of breath / dyspnea or wheezing       ALPRAZolam (XANAX) 0.5 MG tablet Take 1 tablet (0.5 mg) by mouth 2 times daily as needed for anxiety 45 tablet 0     Cholecalciferol (VITAMIN D3) 5000 UNITS TABS Take 5,000 Units by mouth daily        EPINEPHrine (ANY BX GENERIC EQUIV) 0.3 MG/0.3ML injection 2-pack Inject 0.3 mg into the muscle as needed for anaphylaxis       fluticasone (FLONASE) 50 MCG/ACT nasal spray SPRAY 2 SPRAYS INTO EACH NOSTRIL EVERY DAY 48 mL 3     fluticasone-vilanterol (BREO ELLIPTA) 100-25 MCG/INH inhaler Inhale 1 puff into the lungs daily       mupirocin (BACTROBAN) 2 % external ointment Apply topically 3 times daily 15 g 1     sertraline (ZOLOFT) 25 MG tablet Take 1 tablet (25 mg) by mouth daily 90 tablet 0     SYMBICORT 160-4.5 MCG/ACT Inhaler        Allergies   Allergen Reactions     Bee Venom Shortness Of Breath     Recent Labs   Lab Test 20  2331 20  0643 20  2346  17  0943  07/09/15  1110  12  1548   LDL  --   --   --   --   --   --  92  --  111   HDL  --   --   --   --   --   --  48  --  71   TRIG  --   --   --   --   --   --  80  --  110   ALT 20 16 21   < >  --   --   --   --  19   CR 0.97 0.72 0.75   < >  --    < > 0.56*   < > 0.86   GFRESTIMATED 72 86 85   < >  --    < > >90  Non  GFR Calc     < > 87   GFRESTBLACK 83 >90 >90   < >  --    < > >90   GFR Calc     < > >90   POTASSIUM 3.8 4.0 3.8   < >  --    < > 4.1   < > 3.5   TSH  --   --   --   --  1.79  --   --   --  1.72    < > = values in this interval not displayed.      BP Readings from Last 3  Encounters:   03/19/20 (!) 158/72   03/17/20 130/60   03/12/20 130/70    Wt Readings from Last 3 Encounters:   03/19/20 74.4 kg (164 lb)   03/17/20 73.9 kg (163 lb)   03/12/20 73.5 kg (162 lb)                    Reviewed and updated as needed this visit by Provider         Review of Systems   ROS COMP: Constitutional, HEENT, cardiovascular, pulmonary, gi and gu systems are negative, except as otherwise noted.       Objective   Reported vitals:  There were no vitals taken for this visit.   healthy, alert and no distress  PSYCH: Alert and oriented times 3; coherent speech, normal   rate and volume, able to articulate logical thoughts, able   to abstract reason, no tangential thoughts, no hallucinations   or delusions  His affect is normal  RESP: No cough, no audible wheezing, able to talk in full sentences  Remainder of exam unable to be completed due to telephone visits            Assessment/Plan:  1. 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 Essential hypertension, benign  Stable, has no CP/SOB/palpitation   Will keep monitoring     3. Weight loss  Mentioned above   - sertraline (ZOLOFT) 25 MG tablet; Take 1 tablet (25 mg) by mouth daily  Dispense: 90 tablet; Refill: 0        No follow-ups on file.      Phone call duration:  13 minutes    Benjamin Lazar MD

## 2020-04-15 NOTE — TELEPHONE ENCOUNTER
Please see lab result note, RN spoke with patient regarding BP result note and lab results along with informing patient of MD Lazar's response below. Patient verbalized understanding and agrees with plan.       Domi Rivas RN, BSN  [facility] [place]

## 2020-04-15 NOTE — TELEPHONE ENCOUNTER
Patient calling for results of CBC with differential. Patient is asking if he would still have bone biopsy. Ashley Mark RN

## 2020-04-16 NOTE — PROCEDURES
The patient was positively identified and informed consent was obtained (see the completed Affirmation of Consent for Bone Marrow Aspiration and/or Biopsy Procedure(s) form in the patient's chart). The patient was placed in the prone position and the bony landmarks of the pelvis were identified. Medical staff reconfirmed the patient's name, date of birth and procedure. The skin over the posterior iliac crest was scrubbed and draped in a sterile fashion. The local area of the procedure was anesthetized with a total of 7 mL of 1% Lidocaine and a small incision was made.  The patient did not receive conscious sedation.    Trephine bone marrow core(s) was/were obtained from the left posterior iliac crest. There was a dry aspirate with minimal aspirate material. An additional trephine biopsy was obtained from the left posterior iliac crest for: morphology with possible immunophenotyping and/or cytogenetics and molecular diagnostics    Direct pressure was applied to the biopsy site with sterile gauze. The biopsy site was cleaned with alcohol and a sterile dressing was placed over the biopsy incision using a pressure bandage. The patient was then placed in the supine position to maintain pressure on the biopsy site. Post-procedure wound care instructions, including routine dressing instructions and analgesia, were given to the patient. The procedure was completed without complication.

## 2020-04-16 NOTE — OR NURSING
Pt sat at bedside. Minimal bleeding noted. After 5 minutes. Dressing was saturated. Dr alcantar notified. Dressing changed. Dr Ndiaye notified.Small hematoma at site.  After 15 minutes, Site d/i. Pt sitting at bedside and no further bleeding. Pt lying flat in bed. Continue to monitor.

## 2020-04-21 NOTE — PROGRESS NOTES
Visit Date:   04/21/2020     HEMATOLOGY HISTORY: Mr. Mack is a gentleman with leukocytosis.   1.  On 11/16/2018, CBC normal with WBC of 4.8, hemoglobin 15.3 and platelets of 295.   -On 02/21/2020, WBC of 13.2 with normal hemoglobin and platelets.   2.  On 02/28/2020:  -WBC of 30.5, hemoglobin of 9.4 and platelets of 235.  MCV of 87.  Neutrophil of 50%, lymphocytes of 15% and monocytes of 17%.  There is myelocyte and metamyelocyte present.   -CMP on 02/28/2020 is normal.   3.  Peripheral blood smear review on 03/12/2020 reveals leukocytosis with absolute neutrophilia, monocytosis and leukoerythroblastic reaction.   4.  CT chest, abdomen and pelvis on 02/29/2020 does not reveal any evidence of malignancy.   5.   Multiple labs were done on 03/19/2020.   -WBC of 26.7, hemoglobin of 11.8 and platelets of 112.  MCV normal.  Neutrophil of 62% and monocyte of 18%.  Lymphocyte of 6%.   -Negative for BCR-ABL1.   -NGS panel negative for JAK2, CALR and MPL.  There is alteration in IDH2 and KIT.      SUBJECTIVE:  Mr. Mack is an 85-year-old gentleman who was recently seen for leukocytosis.  Multiple investigations were ordered.   1.  Multiple labs were done on 03/19/2020.   -WBC of 26.7, hemoglobin of 11.8 and platelets of 112.  MCV normal.  Neutrophil of 62% and monocyte of 18%.  Lymphocyte of 6%.   -Negative for BCR-ABL1.   -NGS panel negative for JAK2, CALR and MPL.  There is alteration in IDH2 and KIT.   2.  Bone marrow biopsy done on 04/16/2020.  Final report is pending.  I spoke to the pathologist.  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   -Flow cytometry on bone marrow reveals increased monocytic cells (43%).  No increase in CD34 positive myeloid blasts.   -BCR-ABL1 bone marrow is negative.      The patient says that his overall condition is about the same.  He has fatigue.  That is his major complaint.  Appetite has not been good.  He has lost about 12 pounds of weight in the last couple of months.  He also has been a little more irritable,  which could be due to the fact that he is housebound due to the COVID-19 pandemic.      REVIEW OF SYSTEMS:  No headache.  Intermittent dizziness.  No visual problems.  Intermittently he gets flushing of the face.  It is not every day.  No chest pain.  No shortness of breath.  No abdominal pain.  No urinary complaints.  No bowel problems.  He gets some arthritic joint pain.      PHYSICAL EXAMINATION:  He is alert, oriented x 3.     This is a virtual visit.      LABORATORY DATA:  Reviewed.   -- On 04/16/2020, WBC of 60.9, hemoglobin of 11.8 and platelet of 111.      ASSESSMENT:   1.  An 85-year-old gentleman with leukocytosis (mainly monocytosis), anemia and thrombocytopenia.  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  Final bone marrow biopsy report is pending.   2.  Fatigue.   3.  Intermittent flushing of the face.   4.  Decreased appetite and weight loss.      PLAN:   1.  I had a long discussion with the patient and his wife.  All of the investigations were reviewed.  I explained to him that his white count is high.  Mildly anemic and thrombocytopenic.      Bone marrow report is pending.  I have discussed the case with the pathologist.  They are doing further workup.  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  We will wait for the final report.      In the bone marrow sample, they also found aggregate of mast cell.  We will get a serum tryptase level.      2.  We discussed regarding treatment if it turns out to be chronic myelomonocytic leukemia.  One option would be to treat him with Vidaza.  Further discussion after final pathology report is available.      3.  I am concerned regarding his decreased appetite and weight loss.  This is likely related to his hematological problem.  He had CT scan and colonoscopy done.  No malignancy was seen.      4.  He will have labs done in the first week of May and we will do a virtual visit after that.  I advised him to call us with any questions or concerns.         This is a telephone visit between 8:28 a.m. and 8:52 a.m.     ADDENDUM: Bone marrow biopsy is back. Reveals CMML-1 and systemic mastocytosis. I informed patient of the result. I am going to review the case with my colleagues and come up with a treatment plan.    FINAL DIAGNOSIS:   Bone marrow, posterior iliac crest, left decalcified trephine biopsy and   touch imprint; left aspirate clot,   aspirate smears; and peripheral blood smear:     - Chronic myelomonocytic leukemia - 1, characterized by:     - Hypercellular bone marrow (90%) with increased M:E ratio and 7% blasts     - Aggregates of atypical mast cells consistent with bone marrow   involvement by systemic mastocytosis     - Increased bone marrow fibrosis (MF-1-2)     - Peripheral blood showing slight normocytic normochromic anemia; marked    leukocytosis due to left shifted   neutrophilia and absolute monocytosis with 1% circulating blasts and   promonocytes; slight thrombocytopenia         GRAZYNA RICHEY MD             D: 2020   T: 2020   MT: DAMIAN      Name:     RADHA ALFONSO   MRN:      [id number]        Account:      [id number]   :      1935           Visit Date:   2020      Document: [id number]      This is a telephone visit between 8:28 AM and 8:52 AM.

## 2020-04-21 NOTE — PROGRESS NOTES
"Jeff Mack is a 85 year old male who is being evaluated via a billable telephone visit.      The patient has been notified of following:     \"This telephone visit will be conducted via a call between you and your physician/provider. We have found that certain health care needs can be provided without the need for a physical exam.  This service lets us provide the care you need with a short phone conversation.  If a prescription is necessary we can send it directly to your pharmacy.  If lab work is needed we can place an order for that and you can then stop by our lab to have the test done at a later time.    Telephone visits are billed at different rates depending on your insurance coverage. During this emergency period, for some insurers they may be billed the same as an in-person visit.  Please reach out to your insurance provider with any questions.    If during the course of the call the physician/provider feels a telephone visit is not appropriate, you will not be charged for this service.\"    Patient has given verbal consent for Telephone visit?  Yes    How would you like to obtain your AVS? Mail a copy    Reviewed med list, allergies. No pain.     Yuly Stevenson CMA      "

## 2020-04-21 NOTE — PATIENT INSTRUCTIONS
1. Labs in first week of May. Can be done at local clinic  2. Virtual visit in first week of May.    Please call patient at home to make appts and then mail copy of AVS to him.

## 2020-04-21 NOTE — PROGRESS NOTES
Visit Date:   04/21/2020     HEMATOLOGY HISTORY: Mr. Mack is a gentleman with leukocytosis.   1.  On 11/16/2018, CBC normal with WBC of 4.8, hemoglobin 15.3 and platelets of 295.   -On 02/21/2020, WBC of 13.2 with normal hemoglobin and platelets.   2.  On 02/28/2020:  -WBC of 30.5, hemoglobin of 9.4 and platelets of 235.  MCV of 87.  Neutrophil of 50%, lymphocytes of 15% and monocytes of 17%.  There is myelocyte and metamyelocyte present.   -CMP on 02/28/2020 is normal.   3.  Peripheral blood smear review on 03/12/2020 reveals leukocytosis with absolute neutrophilia, monocytosis and leukoerythroblastic reaction.   4.  CT chest, abdomen and pelvis on 02/29/2020 does not reveal any evidence of malignancy.   5.   Multiple labs were done on 03/19/2020.   -WBC of 26.7, hemoglobin of 11.8 and platelets of 112.  MCV normal.  Neutrophil of 62% and monocyte of 18%.  Lymphocyte of 6%.   -Negative for BCR-ABL1.   -NGS panel negative for JAK2, CALR and MPL.  There is alteration in IDH2 and KIT.      SUBJECTIVE:  Mr. Mack is an 85-year-old gentleman who was recently seen for leukocytosis.  Multiple investigations were ordered.   1.  Multiple labs were done on 03/19/2020.   -WBC of 26.7, hemoglobin of 11.8 and platelets of 112.  MCV normal.  Neutrophil of 62% and monocyte of 18%.  Lymphocyte of 6%.   -Negative for BCR-ABL1.   -NGS panel negative for JAK2, CALR and MPL.  There is alteration in IDH2 and KIT.   2.  Bone marrow biopsy done on 04/16/2020.  Final report is pending.  I spoke to the pathologist.  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   -Flow cytometry on bone marrow reveals increased monocytic cells (43%).  No increase in CD34 positive myeloid blasts.   -BCR-ABL1 bone marrow is negative.      The patient says that his overall condition is about the same.  He has fatigue.  That is his major complaint.  Appetite has not been good.  He has lost about 12 pounds of weight in the last couple of months.  He also has been a little more irritable,  which could be due to the fact that he is housebound due to the COVID-19 pandemic.      REVIEW OF SYSTEMS:  No headache.  Intermittent dizziness.  No visual problems.  Intermittently he gets flushing of the face.  It is not every day.  No chest pain.  No shortness of breath.  No abdominal pain.  No urinary complaints.  No bowel problems.  He gets some arthritic joint pain.      PHYSICAL EXAMINATION:  He is alert, oriented x 3.     This is a virtual visit.      LABORATORY DATA:  Reviewed.   -- On 04/16/2020, WBC of 60.9, hemoglobin of 11.8 and platelet of 111.      ASSESSMENT:   1.  An 85-year-old gentleman with leukocytosis (mainly monocytosis), anemia and thrombocytopenia.  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  Final bone marrow biopsy report is pending.   2.  Fatigue.   3.  Intermittent flushing of the face.   4.  Decreased appetite and weight loss.      PLAN:   1.  I had a long discussion with the patient and his wife.  All of the investigations were reviewed.  I explained to him that his white count is high.  Mildly anemic and thrombocytopenic.      Bone marrow report is pending.  I have discussed the case with the pathologist.  They are doing further workup.  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  We will wait for the final report.      In the bone marrow sample, they also found aggregate of mast cell.  We will get a serum tryptase level.      2.  We discussed regarding treatment if it turns out to be chronic myelomonocytic leukemia.  One option would be to treat him with Vidaza.  Further discussion after final pathology report is available.      3.  I am concerned regarding his decreased appetite and weight loss.  This is likely related to his hematological problem.  He had CT scan and colonoscopy done.  No malignancy was seen.      4.  He will have labs done in the first week of May and we will do a virtual visit after that.  I advised him to call us with any questions or concerns.         This is a telephone visit between 8:28 a.m. and 8:52 a.m.     ADDENDUM: Bone marrow biopsy is back. Reveals CMML-1 and systemic mastocytosis. I informed patient of the result. I am going to review the case with my colleagues and come up with a treatment plan.    FINAL DIAGNOSIS:   Bone marrow, posterior iliac crest, left decalcified trephine biopsy and   touch imprint; left aspirate clot,   aspirate smears; and peripheral blood smear:     - Chronic myelomonocytic leukemia - 1, characterized by:     - Hypercellular bone marrow (90%) with increased M:E ratio and 7% blasts     - Aggregates of atypical mast cells consistent with bone marrow   involvement by systemic mastocytosis     - Increased bone marrow fibrosis (MF-1-2)     - Peripheral blood showing slight normocytic normochromic anemia; marked    leukocytosis due to left shifted   neutrophilia and absolute monocytosis with 1% circulating blasts and   promonocytes; slight thrombocytopenia         GRAZYNA RICHEY MD             D: 2020   T: 2020   MT: DAMIAN      Name:     RADHA ALFONSO   MRN:      [id number]        Account:      [id number]   :      1935           Visit Date:   2020      Document: [id number]      This is a telephone visit between 8:28 AM and 8:52 AM.

## 2020-04-26 PROBLEM — R53.1 WEAKNESS: Status: ACTIVE | Noted: 2020-01-01

## 2020-04-26 NOTE — PHARMACY-ADMISSION MEDICATION HISTORY
Pharmacy Medication History  Admission medication history interview status for the 4/26/2020  admission is complete. See EPIC admission navigator for prior to admission medications     Medication history sources: Patient and Surescripts  Medication history source reliability: Good  Adherence assessment: Good    Significant changes made to the medication list:  None    Additional medication history information:   Please note patient's wife is planning on bringing in his Symbicort and albuterol inhalers for inpatient use    Patient states he is not taking sertraline, hasn't taken within the last week, states it doesn't work for him. Marked for review.     Patient states he doesn't need his vitamin D or Flonase while he is hospitalized.     Medication reconciliation completed by provider prior to medication history? No    Time spent in this activity: 15 minutes       Prior to Admission medications    Medication Sig Last Dose Taking? Auth Provider   albuterol (PROAIR HFA/PROVENTIL HFA/VENTOLIN HFA) 108 (90 Base) MCG/ACT inhaler Inhale 1-2 puffs into the lungs every 6 hours as needed for shortness of breath / dyspnea or wheezing prn at prn Yes Unknown, Entered By History   ALPRAZolam (XANAX) 0.5 MG tablet Take 1 tablet (0.5 mg) by mouth 2 times daily as needed for anxiety 4/25/2020 at Unknown time Yes Benjamin Lazar MD   Cholecalciferol (VITAMIN D3) 5000 UNITS TABS Take 5,000 Units by mouth daily  Past Week at Unknown time Yes Reported, Patient   EPINEPHrine (ANY BX GENERIC EQUIV) 0.3 MG/0.3ML injection 2-pack Inject 0.3 mg into the muscle as needed for anaphylaxis prn at prn Yes Unknown, Entered By History   fluticasone (FLONASE) 50 MCG/ACT nasal spray SPRAY 2 SPRAYS INTO EACH NOSTRIL EVERY DAY Past Week at Unknown time Yes Bill Reed MD   polyethylene glycol (MIRALAX) 17 g packet Take 1 packet by mouth daily as needed for constipation prn at prn Yes Unknown, Entered By History   sennosides (SENOKOT) 8.6 MG tablet Take  1 tablet by mouth daily as needed for constipation prn at prn Yes Unknown, Entered By History   SYMBICORT 160-4.5 MCG/ACT Inhaler Inhale 2 puffs into the lungs 2 times daily  4/26/2020 at am Yes Reported, Patient   sertraline (ZOLOFT) 25 MG tablet Take 1 tablet (25 mg) by mouth daily  Patient not taking: Reported on 4/26/2020 Not Taking at Unknown time  Benjamin Lazar MD

## 2020-04-26 NOTE — H&P
[facility]    History and Physical  Hospitalist       Date of Admission:  4/26/2020    Assessment & Plan   Jeff Mack is a 85 year old male who presents with generalized weakness/fatigue, admitted for observation    Weakness  Fatigue  CML  Noted to have leukocytosis 2/29/2020. Worked up with Dr Ndiaye. Underwent bone marrow biopsy on 4/16/20, pathology confirms chronic myelomonocytic leukemia. Felt more weak and fatigued on 4/26 when he went to fix his mailbox and felt like he could barely make it back into his house. Likely that his symptoms are related to his CML. WBC on admit was 81.1. CXR clear. Patient initially wanted to go home, but then decided that his weakness was too great and that he would rather stay the night and see Dr Ndiaye in the morning to get information regarding his new diagnosis. Last visit with Dr Ndiaye via phone on 4/21/20  - Admit observation status  - Dr Ndiaye, [facility] oncology consult  - PT consult to ensure he is steady with ambulation and safe to return home  - Consult nutrition for supplement recommendation  - Check mag/phos    Abnormal UA  UA with 37 WBCs, 28 squamous cells. ? Quality of sample  - Repeat UA    Constipation  Took miralax earlier today. Last BM was 4/25, but still feels he needs to go.  - Requests senokot 2 tabs at bedtime, will have available 1-2 tabs BID for him along with PRN miralax    Mildly elevated creatinine  Describes some decreased appetite last few weeks. Creatinine baseline on 2/28 was 0.97, but had been 0.7-0.8 range prior to that. On admit creatinine 1.17  - Give one liter IVF overnight.  - BMP in AM    Hx gastrointestinal bleeding after colonoscopy and polypectomy  Admitted Feb 2020 with bleeding after procedure and underwent clip placement x4 at that time. No recurrence of bleed since that time     History of depression  Anxiety  PTA alprazolam (recently self-discontinued his sertraline)  - Seems like his anxiety is a little more  "heightened given uncertainty of treatment plan with his recently diagnosed CML.  - Continue PTA alprazolam     History of reactive airway disease  Stable, no shortness of breath or cough.  PTA fluticasone, symbicort and albuterol  - Wife bringing symbicort and albuterol inhalers from home. Continue these medications.    Hx Hypertension  Not maintained on any medications PTA. Noted BP on admit was 142/76  - Trend BPs    DVT Prophylaxis: Pneumatic Compression Devices  Code Status: DNR / DNI, change from previous admission. Patient would like to be \"let go\" if his heart stopped or breathing became difficult.  Expected discharge: 24-48 hours, recommended to prior living arrangement likely once weakness/fatigue improved    Berta Lal,     Primary Care Physician   Benjamin Lazar    Chief Complaint   Generalized weakness    History is obtained from the patient, previous records    History of Present Illness   Jeff Mack is a 85 year old male who presents with generalized weakness and fatigue. Reports decreased appetite last few weeks and weight loss of 12lbs over past few months (goal weight is 168). Note that during visit with Dr Ndiaye on 4/21, fatigue was his largest complaint at that time. Appears that has not really changed at time of admission, although he describes it more as weakness now. Tried to fix his mailbox on 4/26 but felt as if he couldn't make it back to the house due to weakness. Brought to ED for evaluation and agreed to observation admission. His biggest goal from admission is to have discussion with Dr Ndiaye regarding his treatment plan.  Denies fevers, chills, headaches, nausea, vomiting. Did report some issue with constipation, took miralax AM of 4/26 without result, hoping to have senokot this evening.    Past Medical History    I have reviewed this patient's medical history and updated it with pertinent information if needed.   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  Past Surgical History:   Procedure Laterality Date     BONE MARROW BIOPSY, BONE SPECIMEN, NEEDLE/TROCAR N/A 4/16/2020    Procedure: BIOPSY, BONE MARROW;  Surgeon: Mg Hobbs MD;  Location: [facility]     BRONCHOSCOPY FLEXIBLE N/A 4/3/2015    Procedure: BRONCHOSCOPY FLEXIBLE;  Surgeon: Ralph Catherine MD;  Location:  OR      EXCISION OF LINGUAL TONSIL      TONSILLECTOMY     C NONSPECIFIC PROCEDURE  2-99    Colonic polpectomy     C NONSPECIFIC PROCEDURE  9-92    TURP     C NONSPECIFIC PROCEDURE  1982    Retinal surgery     COLONOSCOPY       COLONOSCOPY N/A 2/19/2020    Procedure: COLONOSCOPY, WITH POLYPECTOMY AND BIOPSY;  Surgeon: Kathy Torres MD;  Location: [facility]     ESOPHAGOSCOPY, GASTROSCOPY, DUODENOSCOPY (EGD), COMBINED N/A 3/19/2015    Procedure: COMBINED ESOPHAGOSCOPY, GASTROSCOPY, DUODENOSCOPY (EGD), BIOPSY SINGLE OR MULTIPLE;  Surgeon: Alo Mauro MD;  Location: [facility]     ESOPHAGOSCOPY, GASTROSCOPY, DUODENOSCOPY (EGD), COMBINED N/A 7/14/2015    Procedure: COMBINED ESOPHAGOSCOPY, GASTROSCOPY, DUODENOSCOPY (EGD), BIOPSY SINGLE OR MULTIPLE;  Surgeon: Kathy Torres MD;  Location: [facility]      ESOPHAGOSCOPY, GASTROSCOPY, DUODENOSCOPY (EGD), COMBINED N/A 12/1/2015    Procedure: COMBINED ESOPHAGOSCOPY, GASTROSCOPY, DUODENOSCOPY (EGD), BIOPSY SINGLE OR MULTIPLE;  Surgeon: Alo Mauro MD;  Location:  GI     ESOPHAGOSCOPY, GASTROSCOPY, DUODENOSCOPY (EGD), COMBINED N/A 3/17/2016    Procedure: COMBINED ESOPHAGOSCOPY, GASTROSCOPY, DUODENOSCOPY (EGD), BIOPSY SINGLE OR MULTIPLE;  Surgeon: Alo Mauro MD;  Location: [facility]     ESOPHAGOSCOPY, GASTROSCOPY, DUODENOSCOPY (EGD), COMBINED N/A 7/21/2016    Procedure: COMBINED ESOPHAGOSCOPY, GASTROSCOPY, DUODENOSCOPY (EGD);  Surgeon: Alo Mauro MD;  Location: [facility]     ESOPHAGOSCOPY, GASTROSCOPY, DUODENOSCOPY (EGD), COMBINED N/A 11/17/2016    Procedure: COMBINED ESOPHAGOSCOPY, GASTROSCOPY, DUODENOSCOPY (EGD);  Surgeon: Alo Mauro MD;  Location: [facility]     ESOPHAGOSCOPY, GASTROSCOPY, DUODENOSCOPY (EGD), COMBINED N/A 5/9/2017    Procedure: COMBINED ESOPHAGOSCOPY, GASTROSCOPY, DUODENOSCOPY (EGD);  (EGD) COMBINED ESOPHAGOSCOPY, GASTROSCOPY, DUODENOSCOPY;  Surgeon: Alo Mauro MD;  Location: [facility]     ESOPHAGOSCOPY, GASTROSCOPY, DUODENOSCOPY (EGD), COMBINED N/A 5/1/2018    Procedure: COMBINED ESOPHAGOSCOPY, GASTROSCOPY, DUODENOSCOPY (EGD);  ESOPHAGOGASTRODUODENOSCOPY ;  Surgeon: Alo Mauro MD;  Location:  GI     GASTROSTOMY, INSERT TUBE, COMBINED N/A 4/3/2015    Procedure: COMBINED GASTROSTOMY, INSERT TUBE (OPEN);  Surgeon: Ralph Catherine MD;  Location:  OR      PRESSURE GRADIENT MEASUREMENT, INITIAL VESSEL  2005    essentially normal     HC UGI ENDOSCOPY W EUS N/A 7/14/2015    Procedure: COMBINED ENDOSCOPIC ULTRASOUND, ESOPHAGOSCOPY, GASTROSCOPY, DUODENOSCOPY (EGD);  Surgeon: Kathy Torres MD;  Location:  GI     LAMINECTOMY, FUSION LUMBAR ONE LEVEL, COMBINED  7/26/2012    Procedure: COMBINED LAMINECTOMY, FUSION LUMBAR ONE LEVEL;  Posterior Fusion L5-S1, Total Facetectomy L5-S1 Left ;  Surgeon: Ronald Espinosa MD;   Location: [facility] OR     PROSTATE SURGERY         Prior to Admission Medications   Prior to Admission Medications   Prescriptions Last Dose Informant Patient Reported? Taking?   ALPRAZolam (XANAX) 0.5 MG tablet 4/25/2020 at Unknown time Self No Yes   Sig: Take 1 tablet (0.5 mg) by mouth 2 times daily as needed for anxiety   Cholecalciferol (VITAMIN D3) 5000 UNITS TABS Past Week at Unknown time Self Yes Yes   Sig: Take 5,000 Units by mouth daily    EPINEPHrine (ANY BX GENERIC EQUIV) 0.3 MG/0.3ML injection 2-pack prn at Unknown time Self Yes Yes   Sig: Inject 0.3 mg into the muscle as needed for anaphylaxis   SYMBICORT 160-4.5 MCG/ACT Inhaler 4/26/2020 at Unknown time Self Yes Yes   Sig: Inhale 2 puffs into the lungs 2 times daily    albuterol (PROAIR HFA/PROVENTIL HFA/VENTOLIN HFA) 108 (90 Base) MCG/ACT inhaler prn at Unknown time Self Yes Yes   Sig: Inhale 1-2 puffs into the lungs every 6 hours as needed for shortness of breath / dyspnea or wheezing   fluticasone (FLONASE) 50 MCG/ACT nasal spray Past Week at Unknown time Self No Yes   Sig: SPRAY 2 SPRAYS INTO EACH NOSTRIL EVERY DAY   polyethylene glycol (MIRALAX) 17 g packet prn at Unknown time Self Yes Yes   Sig: Take 1 packet by mouth daily as needed for constipation   sennosides (SENOKOT) 8.6 MG tablet prn at Unknown time Self Yes Yes   Sig: Take 1 tablet by mouth daily as needed for constipation   sertraline (ZOLOFT) 25 MG tablet Not Taking at Unknown time Self No No   Sig: Take 1 tablet (25 mg) by mouth daily   Patient not taking: Reported on 4/26/2020      Facility-Administered Medications: None     Allergies   No Known Allergies    Social History   I have reviewed this patient's social history and updated it with pertinent information if needed. Jeff Mack  reports that he quit smoking about 45 years ago. His smoking use included cigarettes. He has a 80.00 pack-year smoking history. He has never used smokeless tobacco. He reports that he does not drink  alcohol or use drugs.    Family History   I have reviewed this patient's family history and updated it with pertinent information if needed.   Family History   Problem Relation Age of Onset     Hypertension Mother      Cerebrovascular Disease Mother      Diabetes Brother      Diabetes Father          age 97       Review of Systems   The 10 point Review of Systems is negative other than noted in the HPI    Physical Exam   Temp: 98  F (36.7  C) Temp src: Oral BP: (!) 142/76 Pulse: 90 Heart Rate: 79 Resp: 18 SpO2: 98 % O2 Device: None (Room air)    Vital Signs with Ranges  Temp:  [98  F (36.7  C)] 98  F (36.7  C)  Pulse:  [75-90] 90  Heart Rate:  [79] 79  Resp:  [18] 18  BP: (127-142)/(65-82) 142/76  SpO2:  [96 %-98 %] 98 %  156 lbs 0 oz    Constitutional: Awake, alert, cooperative, no apparent distress.  Eyes: Conjunctiva and pupils examined and normal.  HEENT: Moist mucous membranes, normal dentition.  Respiratory: Clear to auscultation bilaterally, no crackles or wheezing.  Cardiovascular: Regular rate and rhythm, normal S1 and S2, and no murmur noted.  GI: Soft, non-distended, non-tender, normal bowel sounds.  Lymph/Hematologic: No anterior cervical or supraclavicular adenopathy.  Skin: No rashes, no cyanosis, +1 bilateral lower extremity edema with sock lines  Musculoskeletal: No joint swelling, erythema or tenderness.  Neurologic: Cranial nerves 2-12 intact, normal strength and sensation.  Psychiatric: Alert, oriented to person, place and time, +anxiety    Data   Data reviewed today:  I personally reviewed CXR without infiltrate or effusion. EKG with sinus rhythm and some PACs  Recent Labs   Lab 20  1432   WBC 81.1*   HGB 12.7*   MCV 82   PLT 98*      POTASSIUM 3.7   CHLORIDE 106   CO2 26   BUN 15   CR 1.17   ANIONGAP 6   KAE 9.0   GLC 92   ALBUMIN 3.8   PROTTOTAL 7.5   BILITOTAL 0.4   ALKPHOS 109   ALT 35   AST 37   TROPI <0.015       Recent Results (from the past 24 hour(s))   XR Chest 2 Views     Narrative    XR CHEST 2 VW   4/26/2020 3:42 PM     HISTORY: weakness    COMPARISON: CT chest 2/29/2020      Impression    IMPRESSION: No acute cardiopulmonary disease.    LEONARDO LAND MD

## 2020-04-26 NOTE — ED PROVIDER NOTES
History     Chief Complaint:  Generalized Weakness      HPI   Jeff Mack is a 85 year old male who presents with generalized weakness.  On April 16 he had a bone marrow biopsy, which ultimately returned positive for CML.  He is followed by Dr. Ndiaye with Oncology, though there are currently no specific plans in place to address the very recently diagnosed CML.  The patient denies any new pain.  He has been checking his temperature and it has been normal.  He is somewhat constipated and took MiraLAX a few days ago with some relief.  He specifically denies any trouble breathing, chest pain, or cough.  He states that he is somewhat anxious.  He has had a decreased appetite.  No lateralizing weakness or numbness.  No falls.  He lives with his wife.  He called the nurse line today and was advised to come to the emergency department.    Allergies:  No Known Drug Allergies     Medications:    Albuterol inhaler  Xanax   Sertraline   Symbicort      Past Medical History:    Anxiety state  Asthma  BCC   Benign neoplasm of colon   Cardiomegaly   Chronic ischemic heart disease   Constipation   Depressive disorder   Diverticulosis of colon   Esophageal cancer   Hypertension   Hiatal hernia   Hypertrophy of prostate   Internal hemorrhoids without mention of complication   Mumps   Sciatic   Spondylosis of unspecified site without mention of myelopathy   COPD with asthma     Past Surgical History:    Bone marrow bx, bone specimen needle/trocar   Bronchoscopy flexible   Excision lingual tonsil  Colonic polypectomy   TURP   Retinal surgery   EGD combined x8  Gastrostomy, insert tube, combined   Pressure gradient measurement, initial vessel   UGI endoscopy w EUS   Laminectomy, fusion lumbar one level combined   Prostate surgery     Family History:    Mother - hypertension, cerebrovascular disease   Brother - diabetes  Father - diabetes     Social History:  Smoking Status: Former smoker 2.00 PPD for 40 years  Smokeless Tobacco:  "Never  Alcohol Use: No    Marital Status:        Review of Systems   All other systems reviewed and are negative.    Physical Exam     Patient Vitals for the past 24 hrs:   BP Temp Temp src Pulse Heart Rate Resp SpO2 Height Weight   04/26/20 1700 (!) 142/76 -- -- 90 -- -- 98 % -- --   04/26/20 1600 131/69 -- -- 81 -- -- 98 % -- --   04/26/20 1545 132/77 -- -- 84 -- -- 98 % -- --   04/26/20 1540 129/82 -- -- 79 -- -- 97 % -- --   04/26/20 1515 127/65 -- -- 81 -- -- 97 % -- --   04/26/20 1500 (!) 141/73 -- -- 82 -- -- 98 % -- --   04/26/20 1445 (!) 140/74 -- -- 82 -- -- 97 % -- --   04/26/20 1430 137/71 -- -- 75 -- -- 96 % -- --   04/26/20 1418 (!) 140/75 98  F (36.7  C) Oral -- 79 18 98 % 1.753 m (5' 9\") 70.8 kg (156 lb)        Physical Exam   General: Nontoxic-appearing male sitting upright in a chair in room 13  HENT: mucous membranes moist  CV: regular rate, regular rhythm, no LE edema  Resp: normal effort, clear throughout, no crackles or wheezing  GI: abdomen soft and nontender, no guarding  MSK: no bony tenderness  Skin: appropriately warm and dry  Neuro: alert, clear speech, oriented, ambulatory  Psych: pleasant, cooperative      Emergency Department Course     ECG:  ECG taken at 1502, ECG read at 1505  Sinus rhythm with premature atrial complexes  Otherwise normal ECG  Rate 82 bpm. ND interval 158 ms. QRS duration 90 ms. QT/QTc 394/460 ms. P-R-T axes 70 57 72.    Imaging:  Radiology findings were communicated with the patient who voiced understanding of the findings.    Chest  XR:  No acute cardiopulmonary disease.     Reading per radiology     Laboratory:  Laboratory findings were communicated with the patient who voiced understanding of the findings.    CBC: WBC 81.1(H), HGB 12.7(L), PLT 98(L)  BMP: GFR 57 (L),  o/w WNL (Creatinine 1.17)  Hepatic panel: Bilirubin direct: 0.1, Bilirubin total: 0.4, albumin 3.8, protein total: 7.5, Alkphos 109, ALT 35, AST 37  Troponin (Collected 1526): <0.015   UA " with micro: protein albumin 30(A), RBC/HPF 37(H), Squamous epithelial/HPF 28(H), Mucous present (A), Hyaline casts 6 (H)    Procedures    Interventions:    Emergency Department Course:   Nursing notes and vitals reviewed.    1422 I performed an exam of the patient as documented above.     1434 IV was inserted and blood was drawn for laboratory testing, results above.     1456 The patient provided a urine sample here in the emergency department. This was sent for laboratory testing, findings above.     I performed electronic chart review in [facility].    1502 EKG was performed, see results above.    1531 The patient was sent for a Chest XR while in the emergency department, results above.      I spoke with the on-call Oncologist covering for Dr. Ndiaye.    1700 I personally reviewed the results with the patient and answered all related questions prior to admission.    1717  I spoke to Dr. Lal of the hospitalist service who accepts the patient for admission.        Impression & Plan      Medical Decision Making:  A broad differential was considered including infection, metabolic abnormality, stroke, dehydration, numerous others, I think it is ultimately most likely that his presenting weakness and generalized symptoms are secondary to his recently diagnosed CML.  His urinalysis is somewhat abnormal, though not highly suggestive of infection, I do not think that immediate antibiotics are indicated based on his clinical presentation.  He initially expressed a desire for discharge home, so I called his oncology team to coordinate outpatient follow-up, but then changed his mind and felt that he was unable to safely manage at home so I have arranged for hospitalization under the care of the hospitalist service for ongoing care.      Diagnosis:    ICD-10-CM    1. Weakness  R53.1    2. CML (chronic myelocytic leukemia) (H)  C92.10    3. Abnormal urinalysis  R82.90        Disposition:   The patient is admitted into the care of  Dr. Lal.     Scribe Disclosure:  I, Radha Elmer, am serving as a scribe at 1422 on 4/26/2020 to document services personally performed by Juan Angel MD based on my observations and the provider's statements to me.   EMERGENCY DEPARTMENT    This record was created at least in part using electronic voice recognition software, so please excuse any typographical errors.         Juan Angel MD  04/26/20 2006

## 2020-04-26 NOTE — TELEPHONE ENCOUNTER
"Caller has sudden increase in his chronic fatigue an weakness today; has been following up with  PCP an awaiting  Diagnosis; states today has profound   Sudden onset of weakness that is alarming to spouse; needed assistance to  retun to chair and  And to walk;   Caller denies any melena , fever or one sided weakness or confusion; Has had poor oral intake both fluid and food and trouble sleeping   No URI symptoms    Triage protocol reviewed   Advised ED assessment today for worsening symptoms   Caller and wife understand and will proceed to ED  @ [facility]   Belkis Us RN  FNA           132/82- 88-16      Reason for Disposition    Patient sounds very sick or weak to the triager    Additional Information    Negative: Severe difficulty breathing (e.g., struggling for each breath, speaks in single words)    Negative: Shock suspected (e.g., cold/pale/clammy skin, too weak to stand, low BP, rapid pulse)    Negative: Difficult to awaken or acting confused (e.g., disoriented, slurred speech)    Negative: [1] Fainted > 15 minutes ago AND [2] still feels too weak or dizzy to stand    Negative: [1] SEVERE weakness (i.e., unable to walk or barely able to walk, requires support) AND     [2] new onset or worsening    Negative: Sounds like a life-threatening emergency to the triager    Negative: Weakness of the face, arm or leg on one side of the body    Negative: [1] Has diabetes (diabetes mellitus) AND [2] weakness from low blood sugar (i.e., < 60 mg/dl or 3.5 mmol/l)    Negative: Heat exhaustion suspected (i.e., dehydration from heat exposure)    Negative: Vomiting is main symptom    Negative: Diarrhea is main symptom    Negative: Difficulty breathing    Negative: Heart beating < 50 beats per minute OR > 140 beats per minute    Negative: Extra heart beats OR irregular heart beating   (i.e., \"palpitations\")    Negative: Follows bleeding (e.g., from vomiting, rectum, vagina; EXCEPTION: small brief weakness from sight of a small " amount blood)    Negative: Black or tarry bowel movements    Protocols used: WEAKNESS (GENERALIZED) AND FATIGUE-A-AH

## 2020-04-26 NOTE — ED NOTES
Report received. Patient visualized. Patient returned from xray. Vital signs stable. Patient helped to sit in chair next to the bed. Will continue to monitor.

## 2020-04-26 NOTE — ED TRIAGE NOTES
Pt feeling fatigued and generalized weakness for past month since colonoscopy. Today is also feeling more short of breath and tightness in chest. Had bone marrow biopsy last week. Reports elevated WBCs but pt unsure what the diagnosis is.

## 2020-04-26 NOTE — ED NOTES
[facility]  ED Nurse Handoff Report    ED Chief complaint: Generalized Weakness; Shortness of Breath; and Chest Pain (Tightness)      ED Diagnosis:   Final diagnoses:   Weakness   CML (chronic myelocytic leukemia) (H)   Abnormal urinalysis       Code Status: to be assessed by admitting provider     Allergies: No Known Allergies    Patient Story: Patient comes to the ED today with complaints of generalized weakness, decreased appetite, constipation, and increasing anxiety. Patient was recently diagnosed with CML and is currently not being treated for this diagnosis   Focused Assessment:    Respiratory: Denies shortness of breath but does admit to having some anxiety   Cardiac: WDl   Neuro: Patient is Alert and oriented X4    GI: patient states he has been having some issues with constipation and decreased appetite   : WDL  Musculoskeletal: Patient has generalized weakness     Treatments and/or interventions provided: chinmay   Patient's response to treatments and/or interventions: none     To be done/followed up on inpatient unit:  continue to monitor     Does this patient have any cognitive concerns?: none     Activity level - Baseline/Home:  wdl  Activity Level - Current:   Stand with Assist    Patient's Preferred language: English   Needed?: No    Isolation: None  Infection: Not Applicable  Bariatric?: No    Vital Signs:   Vitals:    04/26/20 1515 04/26/20 1540 04/26/20 1545 04/26/20 1600   BP: 127/65 129/82 132/77 131/69   Pulse: 81 79 84 81   Resp:       Temp:       TempSrc:       SpO2: 97% 97% 98% 98%   Weight:       Height:           Cardiac Rhythm:     Was the PSS-3 completed:   Yes  What interventions are required if any?               Family Comments: None   OBS brochure/video discussed/provided to patient/family: Yes              Name of person given brochure if not patient: n.a              Relationship to patient: n.a    For the majority of the shift this patient's behavior was  Green.   Behavioral interventions performed were none .    ED NURSE PHONE NUMBER: *[id number]

## 2020-04-27 PROBLEM — C93.10 CHRONIC MYELOMONOCYTIC LEUKEMIA NOT HAVING ACHIEVED REMISSION (H): Status: ACTIVE | Noted: 2020-01-01

## 2020-04-27 NOTE — PLAN OF CARE
RN:  Patient discharged to home.  Observation goals met.  VSS at time of discharge.  Up IND.  Tolerating diet.  Consults completed.  Patient giving verbal  understanding of AVS instructions and medications prior to discharge home.

## 2020-04-27 NOTE — PROGRESS NOTES
PRIMARY DIAGNOSIS: GENERALIZED WEAKNESS     OUTPATIENT/OBSERVATION GOALS TO BE MET BEFORE DISCHARGE  1. Orthostatic performed: N/A     2. Tolerating PO medications: Yes.     3. Return to near baseline physical activity: Met.  Patient at baseline  and set to go home today.      4. Cleared for discharge by consultants (if involved): Met.  Seen and cleared by Dr. Ndiaye.

## 2020-04-27 NOTE — PROGRESS NOTES
04/27/20 0900   Quick Adds   Type of Visit Initial PT Evaluation   Living Environment   Lives With spouse   Living Arrangements house   Home Accessibility stairs to enter home;stairs within home   Number of Stairs, Main Entrance 3   Stair Railings, Main Entrance railings safe and in good condition;railing on right side (ascending)   Number of Stairs, Within Home, Primary 7   Stair Railings, Within Home, Primary railings safe and in good condition;railing on right side (ascending)   Transportation Anticipated car, drives self;family or friend will provide   Living Environment Comment Pt reports he is able to stay on main level as needed   Self-Care   Usual Activity Tolerance excellent   Current Activity Tolerance good   Regular Exercise No   Equipment Currently Used at Home none   Functional Level Prior   Ambulation 0-->independent   Transferring 0-->independent   Fall history within last six months no   Which of the above functional risks had a recent onset or change? none   Prior Functional Level Comment Pt reports independence with mobility prior to admit   General Information   Onset of Illness/Injury or Date of Surgery - Date 04/26/20   Referring Physician Dr. Lal   Patient/Family Goals Statement To go home   Pertinent History of Current Problem (include personal factors and/or comorbidities that impact the POC) Pt is an 85 year old male with recent diagnosis of CML admitted with constipation and generalized fatigue.   Cognitive Status Examination   Orientation orientation to person, place and time   Level of Consciousness alert   Follows Commands and Answers Questions 100% of the time   Pain Assessment   Patient Currently in Pain No   Range of Motion (ROM)   ROM Quick Adds No deficits were identified   Strength   Manual Muscle Testing Quick Adds No deficits were identified   Bed Mobility   Bed Mobility Comments Independent   Transfer Skills   Transfer Comments Independent   Gait   Gait Comments Independent  "ambulating within room, SBA for longer distances 2/2 fatigue. Up/Down 3 steps with handrail SBA.   Balance   Balance Comments Good in sitting and standing   Clinical Impression   Criteria for Skilled Therapeutic Intervention evaluation only   Clinical Presentation Stable/Uncomplicated   Clinical Presentation Rationale VSS, pain controlled   Clinical Decision Making (Complexity) Low complexity   Anticipated Discharge Disposition Home with Assist   Risk & Benefits of therapy have been explained Yes   Patient, Family & other staff in agreement with plan of care Yes   [facility] \"6 Clicks\"   2016, Trustees of [facility], under license to [employer].  All rights reserved.   6 Clicks Short Forms Basic Mobility Inpatient Short Form   [facility] AM-PAC  \"6 Clicks\" V.2 Basic Mobility Inpatient Short Form   1. Turning from your back to your side while in a flat bed without using bedrails? 4 - None   2. Moving from lying on your back to sitting on the side of a flat bed without using bedrails? 4 - None   3. Moving to and from a bed to a chair (including a wheelchair)? 4 - None   4. Standing up from a chair using your arms (e.g., wheelchair, or bedside chair)? 4 - None   5. To walk in hospital room? 4 - None   6. Climbing 3-5 steps with a railing? 3 - A Little   Basic Mobility Raw Score (Score out of 24.Lower scores equate to lower levels of function) 23   Total Evaluation Time   Total Evaluation Time (Minutes) 25     "

## 2020-04-27 NOTE — CONSULTS
Care Transition Initial Assessment - RN        Met with: Patient.  DATA   Active Problems:    Weakness       Cognitive Status: awake, alert and oriented.        Contact information and PCP information verified: Yes  Lives With: spouse   Living Arrangements: house                 Insurance concerns: No Insurance issues identified  ASSESSMENT  Patient currently receives the following services:  Pt did not receive services prior to this hospitalization       Identified issues/concerns regarding health management: Patient newly dx with CML and admitted d/t fatigue will be going home with the help of his wife.  Pt is independent with self care, he is alert and oriented and steady on feet. Onc clinic to call patient and set up appointments for CML treatment.  Follow up appt with primary care made.    PLAN  Financial costs for the patient include per insurance .  Patient given options and choices for discharge discontinue to home .  Patient/family is agreeable to the plan?  Yes:   Patient anticipates discharging to home .        Patient anticipates needs for home equipment: No  Transportation/person available to transport on day of discharge  is wife to .  Plan/Disposition: Home   Appointments: May 4th 0920 PCP will call pt      Care  (CTS) will continue to follow as needed.    STALIN Cruz [facility]  Inpatient Care Coordinator  M: [phone]

## 2020-04-27 NOTE — PROVIDER NOTIFICATION
MD Notification    Notified Person: MD    Notified Person Name: Dr. Oliveros    Notification Date/Time: 4/26/2020 8603    Notification Interaction: paged    Purpose of Notification:   UA results are back      Orders Received:    Comments:

## 2020-04-27 NOTE — PROGRESS NOTES
PRIMARY DIAGNOSIS: GENERALIZED WEAKNESS     OUTPATIENT/OBSERVATION GOALS TO BE MET BEFORE DISCHARGE  1. Orthostatic performed: N/A     2. Tolerating PO medications: Yes.     3. Return to near baseline physical activity: Partially met. Still assessing.      4. Cleared for discharge by consultants (if involved): No.

## 2020-04-27 NOTE — CONSULTS
"CLINICAL NUTRITION SERVICES  -  ASSESSMENT NOTE    Recommendations Ordered by Registered Dietitian (RD):   - Boost Plus BID between meals (vanilla)  - Trial Plus2 Shake this afternoon   Malnutrition: (4/27)  % Weight Loss:  > 7.5% in 3 months (severe malnutrition)  % Intake:  Unable to clarify extent or duration  Subcutaneous Fat Loss:  Unable to assess  Muscle Loss:  Unable to assess  Fluid Retention:  None noted    Malnutrition Diagnosis: Unable to determine due to lack of NFPE, could not assess duration or extent of reduced appetite      REASON FOR ASSESSMENT  Jeff Mack is a 85 year old male seen by Registered Dietitian for Provider Order - \"decreased appetite, some weight loss. Supplement recommendations\"    NUTRITION HISTORY  - Information obtained from patient, EMR.   - PMH: recent CML diagnosis. C/o Constipation and a decreased appetite, anxiety.   - presents with generalized weakness.     - Lives at home with his spouse.     - Attempted to speak with patient over the phone, however unable to obtain detailed nutrition history due to patient ending conversation early, as he had another call coming in.   - He stated that his appetite has been poor, he has lost weight, and constipation has been a barrier.   - He has not tried Ensure but plans to.   - NKFA    CURRENT NUTRITION ORDERS  Diet Order:     Regular     Current Intake/Tolerance:  Had a courtesy meal overnight.   Assisted pt in ordering breakfast (eggs w/ ham, apple juice, banana. Agreed to try a protein milkshake).     NUTRITION FOCUSED PHYSICAL ASSESSMENT FOR DIAGNOSING MALNUTRITION)  No:  Unable - Current departmental policy during COVID-19 Pandemic         Observed:    Unable    Obtained from Chart/Interdisciplinary Team:  Last BM 4/24  Jairon - Nutrition 3: Total 20    ANTHROPOMETRICS  Height: 5' 9\"  Weight: 152 lbs 12.8 oz (69.3 kg)   Body mass index is 22.56 kg/m .  Weight Status:  Normal BMI  IBW: 72.3 kg  % IBW: 96%  Weight History: up to " a 15# loss in 2 months (9%).   Wt Readings from Last 10 Encounters:   04/26/20 69.3 kg (152 lb 12.8 oz)   04/16/20 74.4 kg (164 lb)   03/19/20 74.4 kg (164 lb)   03/17/20 73.9 kg (163 lb)   03/12/20 73.5 kg (162 lb)   03/03/20 74.8 kg (165 lb)   02/28/20 76.2 kg (168 lb)   02/23/20 75.9 kg (167 lb 6.4 oz)   02/19/20 77.1 kg (170 lb)   02/10/20 77.1 kg (170 lb)     LABS  Labs reviewed    MEDICATIONS  Medications reviewed  Senokot scheduled BID  NaCl IVF @ 100 ml/hr, now off    ASSESSED NUTRITION NEEDS PER APPROVED PRACTICE GUIDELINES:  Dosing Weight 69.3 kg  Estimated Energy Needs: 1906-0238 kcals (25-30 Kcal/Kg)  Justification: maintenance  Estimated Protein Needs:  grams protein (1.2-1.5 g pro/Kg)  Justification: Repletion  Estimated Fluid Needs: 1 mL/kcal  Justification: maintenance    MALNUTRITION:  % Weight Loss:  > 7.5% in 3 months (severe malnutrition)  % Intake:  Unable to clarify extent or duration  Subcutaneous Fat Loss:  Unable to assess  Muscle Loss:  Unable to assess  Fluid Retention:  None noted    Malnutrition Diagnosis: Unable to determine due to lack of NFPE, could not assess duration or extent of reduced appetite     NUTRITION DIAGNOSIS:  Unintended weight loss related to poor appetite and constipation as evidenced by weight loss of 9% in 2 months.       NUTRITION INTERVENTIONS  Recommendations / Nutrition Prescription  Regular diet    Add Boost Plus Vanilla @ 10&2      Implementation  Nutrition education: Provided education on role of RD, supplements.  Medical Food Supplement: as above      Nutrition Goals  Intake of >/=50% meals TID + 1-2 supplements daily       MONITORING AND EVALUATION:  Progress towards goals will be monitored and evaluated per protocol and Practice Guidelines    Domenica Dyson RD, LD  Pager: [phone]

## 2020-04-27 NOTE — DISCHARGE SUMMARY
[facility]  Hospitalist Discharge Summary      Date of Admission:  4/26/2020  Date of Discharge:  4/27/2020  Discharging Provider: Janay López PA-C    Discharge Diagnoses   Generalized weakness, fatigue and weight loss in the context of recently diagnosed CMML-1 and systemic mastocytosis  Normocytic anemia  Thrombocytopenia  Constipation   Mildly elevated creatinine     Follow-ups Needed After Discharge   Follow-up Appointments     Follow-up and recommended labs and tests       Follow up with primary care provider, Benjamin Lazra, within 7 days for   hospital follow-up.  No follow up labs or test are needed.    [facility] will call you or your wife to set up start date   for chemotherapy (as early as 4/28/2020)           Unresulted Labs Ordered in the Past 30 Days of this Admission     Date and Time Order Name Status Description    4/16/2020 1245 Myeloproliferative Neoplasms Expanded NGS Panel In process     4/16/2020 1245 CHROMOSOME BONE MARROW In process       These results will be followed up by PCP    Discharge Disposition   Discharged to home  Condition at discharge: Stable    Hospital Course  Jeff Mack is a 85 year old male with PMHx of CLL, depression, anxiety, reactive airway disease and hypertension who presented to the ER on 4/27/2020 with persistent generalized weakness and fatigue. Registered to observation status for further evaluation and treatment. Please refer to H&P by Dr. Lal from 4/26/2020 for complete details on presentation.      Generalized weakness, fatigue and weight loss in the context of recently diagnosed CMML-1 and systemic mastocytosis: Noted to have leukocytosis 2/29/2020. Worked up with Dr Ndiaye of [facility]. Underwent bone marrow biopsy on 4/16/20, pathology confirms chronic myelomonocytic leukemia. Felt more weak and fatigued on 4/26/2020 when he went to fix his mailbox and felt like he could barely make it back into  his house. Likely that his symptoms are related to his CML. WBC on admit was 81.1, CMP unremarkable, magnesium, phosphorus WNL. Troponin negative. CXR clear. Patient initially wanted to go home, but then decided that his weakness was too great and that he would rather stay the night and see Dr Ndiaye in the morning to get information regarding his new diagnosis. Last visit with Dr Ndiaye via phone on 4/21/2020  - [facility] consulted, appreciate recommendations from Dr. Ndiaye. Please refer to his consultation note for complete details. Plan to start chemotherapy as early at 4/28/2020 outpatient   - Follow-up with PCP to discuss hospitalization   - PT consulted, recommend home with wife and SBA for stair management   - Nutrition consulted during hospitalization, please refer to note for recommendations   - Regular diet      Normocytic anemia  Thrombocytopenia: Thrombocytopenia beginning 3/2020, anemia starting 2/2020. Baseline around 11. This is in the setting of above. No active signs of bleeding including melena, hematochezia, hematuria, hematemesis and hemoptysis.   -- Monitor           Recent Labs   Lab 04/27/20  0707 04/26/20  1432   HGB 11.4* 12.7*      Abnormal UA: Initial UA with 37 WBCs, 28 squamous cells. Repeat UA unremarkable.      Constipation: Took miralax earlier on day of admission. Last BM was 4/25, but still feels he needs to go.  - Received fleet enema this AM   - Continue senokot 2 tabs at bedtime and PRN miralax     Mildly elevated creatinine, improving: Describes some decreased appetite last few weeks. Creatinine baseline on 2/28 was 0.97, but had been 0.7-0.8 range prior to that. On admit creatinine 1.17.  - Creatinine this AM 1.08  - Hydrated with IVF during hospitalization, monitor creatinine outpatient      Hx gastrointestinal bleeding after colonoscopy and polypectomy: Admitted Feb 2020 with bleeding after procedure and underwent clip placement x4 at that time. No recurrence of bleed  since that time.     History of depression  Anxiety:  PTA alprazolam (recently self-discontinued his sertraline)  - Seems like his anxiety is a little more heightened given above presentation and recent CMML diagnosis  - Continue PTA alprazolam     History of reactive airway disease: Stable, no shortness of breath or cough.  PTA fluticasone, symbicort and albuterol  - Continue PTA meds at discharge      Hx Hypertension: Not maintained on any medications PTA. Noted BP on admit was 142/76.  - BP this /68    Consultations This Hospital Stay   HEMATOLOGY & ONCOLOGY IP CONSULT  PHYSICAL THERAPY ADULT IP CONSULT  NUTRITION SERVICES ADULT IP CONSULT    Code Status   DNR/DNI    Time Spent on this Encounter   I, Janay López PA-C, personally saw the patient today and spent greater than 30 minutes discharging this patient.       Janay López PA-C  [facility]  ______________________________________________________________________    Physical Exam   Vital Signs: Temp: 97.6  F (36.4  C) Temp src: Oral BP: 121/68 Pulse: 82 Heart Rate: 82 Resp: 16 SpO2: 97 % O2 Device: None (Room air)    Weight: 152 lbs 12.8 oz    CONSTITUTIONAL: Pt sitting upright in chair, dressed in hospital garb. Appears comfortable. Cooperative with interview.   HEENT: Normocephalic, atraumatic. Negative for conjunctival redness or scleral icterus.    CARDIOVASCULAR: RRR, no murmurs, rubs, or extra heart sounds appreciated. Pulses +2/4 and regular in upper and lower extremities, bilaterally.   RESPIRATORY: No increased work of breathing.  CTA, bilat; no wheezes, rales, or rhonchi appreciated.  GASTROINTESTINAL:  Abdomen soft, non-distended. BS auscultated in all four quadrants. Negative for tenderness to palpation.  No masses or organomegaly noted.  MUSCULOSKELETAL: No gross deformities noted. Normal muscle tone.   HEMATOLOGIC/LYMPHATIC/IMMUNOLOGIC: Negative for lower extremity edema,  bilaterally.  NEUROLOGIC: Alert and oriented to person, place, and time.  strength intact. No focal neuro deficits.   SKIN: Warm, dry, intact. No jaundice noted. Negative for suspicious lesions, rashes, bruising, open sores or abrasions.        Primary Care Physician   Benjamin Lazar    Discharge Orders      Reason for your hospital stay    You were hospitalized for further evaluation of fatigue, weakness, and weight loss in the setting of recently diagnosed CML. You were hydrated overnight with IV fluids and seen by Oncology with plan to start chemotherapy as soon as possible outpatient.     Follow-up and recommended labs and tests     Follow up with primary care provider, Benjamin Lazar, within 7 days for hospital follow-up.  No follow up labs or test are needed.    [facility] will call you or your wife to set up start date for chemotherapy (as early as 4/28/2020)     Activity    Your activity upon discharge: activity as tolerated     DNR/DNI     Diet    Follow this diet upon discharge: Orders Placed This Encounter      Snacks/Supplements Adult: Boost Plus; Between Meals      Regular Diet Adult       Significant Results and Procedures   Most Recent 3 CBC's:  Recent Labs   Lab Test 04/27/20  0707 04/26/20  1432 04/16/20  1227   WBC 78.9* 81.1* 60.9*   HGB 11.4* 12.7* 11.8*   MCV 82 82 81   PLT 85* 98* 111*     Most Recent 3 BMP's:  Recent Labs   Lab Test 04/27/20  0707 04/26/20  1432 02/28/20  2331    138 134   POTASSIUM 3.7 3.7 3.8   CHLORIDE 109 106 102   CO2 24 26 27   BUN 11 15 9   CR 1.08 1.17 0.97   ANIONGAP 6 6 5   KAE 8.7 9.0 8.6   GLC 90 92 98   ,   Results for orders placed or performed during the hospital encounter of 04/26/20   XR Chest 2 Views    Narrative    XR CHEST 2 VW   4/26/2020 3:42 PM     HISTORY: weakness    COMPARISON: CT chest 2/29/2020      Impression    IMPRESSION: No acute cardiopulmonary disease.    LEONARDO LAND MD       Discharge Medications   Current Discharge  Medication List      CONTINUE these medications which have NOT CHANGED    Details   albuterol (PROAIR HFA/PROVENTIL HFA/VENTOLIN HFA) 108 (90 Base) MCG/ACT inhaler Inhale 1-2 puffs into the lungs every 6 hours as needed for shortness of breath / dyspnea or wheezing    Comments: Pharmacy may dispense brand covered by insurance (Proair, or proventil or ventolin or generic albuterol inhaler)      ALPRAZolam (XANAX) 0.5 MG tablet Take 1 tablet (0.5 mg) by mouth 2 times daily as needed for anxiety  Qty: 45 tablet, Refills: 0    Associated Diagnoses: Adjustment disorder with mixed anxiety and depressed mood      Cholecalciferol (VITAMIN D3) 5000 UNITS TABS Take 5,000 Units by mouth daily       EPINEPHrine (ANY BX GENERIC EQUIV) 0.3 MG/0.3ML injection 2-pack Inject 0.3 mg into the muscle as needed for anaphylaxis      fluticasone (FLONASE) 50 MCG/ACT nasal spray SPRAY 2 SPRAYS INTO EACH NOSTRIL EVERY DAY  Qty: 48 mL, Refills: 3    Associated Diagnoses: Allergic sinusitis      polyethylene glycol (MIRALAX) 17 g packet Take 1 packet by mouth daily as needed for constipation      sennosides (SENOKOT) 8.6 MG tablet Take 1 tablet by mouth daily as needed for constipation      SYMBICORT 160-4.5 MCG/ACT Inhaler Inhale 2 puffs into the lungs 2 times daily       sertraline (ZOLOFT) 25 MG tablet Take 1 tablet (25 mg) by mouth daily  Qty: 90 tablet, Refills: 0    Associated Diagnoses: Acute reaction to stress; Weight loss           Allergies   No Known Allergies

## 2020-04-27 NOTE — PROGRESS NOTES
PRIMARY DIAGNOSIS: GENERALIZED WEAKNESS    OUTPATIENT/OBSERVATION GOALS TO BE MET BEFORE DISCHARGE  1. Orthostatic performed: N/A    2. Tolerating PO medications: Yes    3. Return to near baseline physical activity: No    4. Cleared for discharge by consultants (if involved): No    Discharge Planner Nurse   Safe discharge environment identified: No  Barriers to discharge: Yes       Entered by: Dee Dee Garcia 04/27/2020 3:49 AM     Please review provider order for any additional goals.   Nurse to notify provider when observation goals have been met and patient is ready for discharge.

## 2020-04-27 NOTE — PLAN OF CARE
A/O x 4. VSS. C/o headache, PRN tylenol to manage. PIV SL. Lung sounds clear. Denies SOB. Bowel sounds present. PRN xanax given for anxiety. SBA. Regular diet. Refused PCD's. UA resulted, hospitalist paged. Plan for Hem/Onc consult.

## 2020-04-27 NOTE — PLAN OF CARE
Discharge Planner PT   Patient plan for discharge: Home  Current status: Pt is an 85 year old male under observation status for generalized fatigue and constipation, recent diagnosis of CML. At baseline, pt lives with his wife in a home with steps to enter, reports he is able to stay on main level as needed. Pt reports independence with mobility and no fall history prior to admit, wife is able to assist as needed.  This date, pt demonstrates ability to perform bed mobility and transfers independently. Pt ambulates within hospital room independently without an AD. Pt also ambulates 150' without an AD with SBA for longer distances 2/2 fatigue, no overt LOB with ambulation. Anticipate that pt is near baseline mobility, will discharge from PT and complete orders. Recommend that patient ambulate with RN team 3-4x/day to maintain current status and patient to sit in chair for all meals.   Barriers to return to prior living situation: None  Recommendations for discharge: Home with wife providing SBA for stair management  Rationale for recommendations: Pt currently independent with bed mobility, transfers and short distance ambulation. Wife able to provide SBA for long distance ambulation and stair management. Anticipate that pt is near baseline mobility and safe to discharge home from a mobility standpoint.        Entered by: Darleen Issa 04/27/2020 9:31 AM

## 2020-04-27 NOTE — PROGRESS NOTES
PRIMARY DIAGNOSIS: GENERALIZED WEAKNESS    OUTPATIENT/OBSERVATION GOALS TO BE MET BEFORE DISCHARGE  1. Orthostatic performed: N/A    2. Tolerating PO medications: Yes    3. Return to near baseline physical activity: No    4. Cleared for discharge by consultants (if involved): No    Discharge Planner Nurse   Safe discharge environment identified: No  Barriers to discharge: Yes       Entered by: Dee Dee Garcia 04/27/2020 3:44 AM     Please review provider order for any additional goals.   Nurse to notify provider when observation goals have been met and patient is ready for discharge.

## 2020-04-27 NOTE — PLAN OF CARE
A&Ox4 and tolerating regular diet. VSS on RA ex elevated BP. UP SBA and IVF infusing. Refused PCD's. Denies pain, SOB, numbness, tingling, nausea, and dizziness. UA collected. Continue to monitor.

## 2020-04-27 NOTE — PROGRESS NOTES
RECEIVING UNIT ED HANDOFF REVIEW    ED Nurse Handoff Report was reviewed by: Ruchi Roland RN on April 26, 2020 at 7:14 PM

## 2020-04-28 NOTE — TELEPHONE ENCOUNTER
"Patient is currently scheduled for an appointment at [facility] in [place].  Called patient to review current visitor restrictions and complete COVID-19 Patient Infection/Travel Screening Tool.     Due to the recent public health concerns around COVID-19 and in an effort to keep our patients and staff safe and healthy, we are implementing a screening process for the patients that come to our clinic.      I am going to ask you a few questions, please answer yes or no.  Your honesty about any symptoms is critical, as it keeps patients and staff healthy.      Do you have a:  Fever (or reported chills)?  No  Cough?  No  Shortness of breath?  No  Rash?  No    In the last month, have you been in contact with someone who was confirmed or suspected to have Coronavirus/COVID-19?  No    Have you traveled internationally in the last month?  No  If so, where?  N/A     I also wanted to let you know that to protect our patients from the flu and other common illnesses, [facility] enforce visitor restrictions year round, but due to the community spread of COVID-19 in [place], we are taking additional precautionary steps to ensure the health of our patients.  At this time, NO visitors are allowed on our hospital and clinic campuses.     Patient PASSED the screening assessment.    Patient instructed to come to the clinic as planned for their scheduled appointment and to call the clinic if any symptoms develop prior to their appointment.    \"Per CDC Guidelines, we are asking all patients that are coming into the building to wear a cloth covering that covers your mouth and nose.  You will be screened again at the entrance to the clinic for any Covid 19 symptoms. If you screen positive to any Covid 19 symptoms during our screening process you will be provided a surgical mask to wear during your time in the building.\"    \"COVID-19 is contagious and can be dangerous for our patients and staff.  Please " "send us a Sc[facility] message or call our clinic before coming in if you feel any of the following symptoms: fever, cough, congestion, runny nose, sore throat, muscle aches and pains, or shortness of breath.  If you are already at our clinic, it is very important that you be honest about any symptoms you are experiencing to ensure your safety and that of other patients and staff who treat you.  If you do have symptoms, we will have a nurse and/or provider asses you to determine next steps.\"    Rosette Alonso RN on 4/28/2020 at 11:00 AM      "

## 2020-04-28 NOTE — TELEPHONE ENCOUNTER
Left voicemail message for patient requesting a return call regarding appointment for tomorrow 4/29.

## 2020-04-29 NOTE — PROGRESS NOTES
Infusion Nursing Note:  Jeff Mack presents today for C1D1 Dacogen.    Patient seen by provider today: No   present during visit today: Not Applicable.    Note: First time getting chemotherapy today. Oriented to infusion center. Chemotherapy teaching, side effects, and schedule reviewed with patient. General and individual chemotherapy side effects reviewed with patient including fatigue, immunosuppression, alopecia, nausea/vomiting, constipation/diarrhea. Pt instructed to call care coordinator, triage (or MD on call if after hours/weekends) with chills/temp >=100.5, questions/concerns. Pt stated understanding of plan.     STALIN Portillo gave chemo education while patient was in infusion today.      Intravenous Access:  Labs drawn without difficulty.  Peripheral IV placed.    Treatment Conditions:  Lab Results   Component Value Date    HGB 11.4 04/27/2020     Lab Results   Component Value Date    WBC 78.9 04/27/2020      Lab Results   Component Value Date    ANEU 27.6 04/26/2020     Lab Results   Component Value Date    PLT 85 04/27/2020      Lab Results   Component Value Date     04/27/2020                   Lab Results   Component Value Date    POTASSIUM 3.7 04/27/2020           Lab Results   Component Value Date    MAG 2.1 04/26/2020            Lab Results   Component Value Date    CR 1.08 04/27/2020                   Lab Results   Component Value Date    KAE 8.7 04/27/2020                Lab Results   Component Value Date    BILITOTAL 0.4 04/26/2020           Lab Results   Component Value Date    ALBUMIN 3.8 04/26/2020                    Lab Results   Component Value Date    ALT 35 04/26/2020           Lab Results   Component Value Date    AST 37 04/26/2020       Results reviewed, labs MET treatment parameters, ok to proceed with treatment.      Post Infusion Assessment:  Patient tolerated infusion without incident.  Blood return noted pre and post infusion.  Site patent and intact, free from  redness, edema or discomfort.  No evidence of extravasations.  Access discontinued per protocol.       Discharge Plan:   Prescription refills given for Allopurinol.  Discharge instructions reviewed with: Patient.  Patient and/or family verbalized understanding of discharge instructions and all questions answered.  Copy of AVS reviewed with patient and/or family.  Patient will return 4/30/20 for next appointment.  Patient discharged in stable condition accompanied by: self.  Departure Mode: Ambulatory.    Robin Ovalle RN

## 2020-04-30 NOTE — PROGRESS NOTES
Infusion Nursing Note:  Jeff Mack presents today for C1D2 dacogen.    Patient seen by provider today: No   present during visit today: Not Applicable.    Note: Patient felt tired and disoriented for a bit after he took his first dose of allopurinol this am at 6:00am. He is wondering if he can lower the dose. Vanessa, the pharmacist discussed with Dr. Ndiaye and instructed patient to take at bedtime to see if that would help with symptoms. Patient agreed.    Intravenous Access:  Peripheral IV placed.    Treatment Conditions:  Lab Results   Component Value Date    HGB 11.4 04/27/2020     Lab Results   Component Value Date    WBC 78.9 04/27/2020      Lab Results   Component Value Date    ANEU 27.6 04/26/2020     Lab Results   Component Value Date    PLT 85 04/27/2020      Lab Results   Component Value Date     04/27/2020                   Lab Results   Component Value Date    POTASSIUM 3.7 04/27/2020           Lab Results   Component Value Date    MAG 2.1 04/26/2020            Lab Results   Component Value Date    CR 1.08 04/27/2020                   Lab Results   Component Value Date    KAE 8.7 04/27/2020                Lab Results   Component Value Date    BILITOTAL 0.4 04/26/2020           Lab Results   Component Value Date    ALBUMIN 3.8 04/26/2020                    Lab Results   Component Value Date    ALT 35 04/26/2020           Lab Results   Component Value Date    AST 37 04/26/2020       Results reviewed, labs MET treatment parameters, ok to proceed with treatment.      Post Infusion Assessment:  Patient tolerated infusion without incident.  Site patent and intact, free from redness, edema or discomfort.  No evidence of extravasations.  Access discontinued per protocol.       Discharge Plan:   AVS to patient via MYCHART.  Patient will return 5/1 for next appointment.   Patient discharged in stable condition accompanied by: self.  Departure Mode: Ambulatory.    Aguila Patrick,  RN

## 2020-04-30 NOTE — TELEPHONE ENCOUNTER
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 This clinician called Joshua today with goal of introducing psychosocial services.    Intervention:   Left detailed voicemail for Joshua with social work contact information and availability.     Resources Provided:  Onc SW contact information    Plan:  Provided patient/family with writer's contact information and availability. Will await return call.     Please call or page if needs or concerns arise.     BILL Hdez, LICSW  Direct Phone: [phone]  Pager: [phone]

## 2020-05-01 NOTE — PROGRESS NOTES
Infusion Nursing Note:  Jeff Mack presents today for C1D3 dacogen  Patient seen by provider today: No   present during visit today: Not Applicable.    Note: N/A.    Intravenous Access:  Peripheral IV placed.    Treatment Conditions:  Lab Results   Component Value Date    HGB 11.4 04/27/2020     Lab Results   Component Value Date    WBC 78.9 04/27/2020      Lab Results   Component Value Date    ANEU 27.6 04/26/2020     Lab Results   Component Value Date    PLT 85 04/27/2020      Lab Results   Component Value Date     04/27/2020                   Lab Results   Component Value Date    POTASSIUM 3.7 04/27/2020           Lab Results   Component Value Date    MAG 2.1 04/26/2020            Lab Results   Component Value Date    CR 1.08 04/27/2020                   Lab Results   Component Value Date    KAE 8.7 04/27/2020                Lab Results   Component Value Date    BILITOTAL 0.4 04/26/2020           Lab Results   Component Value Date    ALBUMIN 3.8 04/26/2020                    Lab Results   Component Value Date    ALT 35 04/26/2020           Lab Results   Component Value Date    AST 37 04/26/2020       Results reviewed, labs MET treatment parameters, ok to proceed with treatment.      Post Infusion Assessment:  Patient tolerated infusion without incident.  Site patent and intact, free from redness, edema or discomfort.  No evidence of extravasations.  Access discontinued per protocol.       Discharge Plan:   AVS to patient via MYCHART.  Patient will return 5/4 for next appointment.   Patient discharged in stable condition accompanied by: self.  Departure Mode: Ambulatory.    Aguila Patrick RN

## 2020-05-01 NOTE — TELEPHONE ENCOUNTER
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 Joshua returning this clinician's call from yesterday.     Intervention:   Joshua is a  gentleman who lives at home with his wife. Today, Joshua endorses increased emotional distress as a result of multiple medical changes in the past few months. Joshua reports that he has been more depressed with increased weakness, loss of appetite, 14 lbs weight loss. Joshua reports that he has always enjoyed exercising, golfing, gardening, crossword puzzles, and reading; but has found it difficult to engage in these activities as of late. Joshua acknowledges that he has a history of anxiety, and had seen psychiatrist in the community for support many years ago, and has been thinking about whether it would be fruitful to re-engage with psychiatric services. Medication for mood support has helped in the past, although he is understandably wary about re-engaging with medication support at present time, and more interested in talk-therapy and getting concrete strategies to support mood. Joshua reports that wife has been very supportive in listening to him, and helping him to problem solve changes in mood. Oriented to support available through staff psychologist.     Joshua reports multiple questions today that he would like to ask Dr. Ndiaye about new cancer diagnosis, and connection with bleeding after colonoscopy. Supported Joshua in generating questions for upcoming visit with Dr. Ndiaye.     Resources Provided:  Onc SW contact information  Oncology psychologist services     Assessment:  Joshua is a thoughtful and bright gentleman who appears to have a close and loving relationship with wife. Joshua has a history of esophageal cancer, and is understandably feeling more depressed with need to engage with chemotherapy  as a result of new CMML cancer diagnosis. Joshua is discouraged about physical changes, and has a good understanding about the way in which the physical changes impact his emotional health. Don receptive to ongoing engagement with staff psychologist for mood support and strategies for coping with preexisting depression and anxiety in the context of new diagnosis.     Plan:  1) Schedulers to book appointment with [facility]  2) Onc SW will continue to be available for ongoing psychosocial support as needed. Don knows to reach out with questions or concerns.     Please call or page if needs or concerns arise.     BILL Hdze, LICSW  Direct Phone: [phone]  Pager: [phone]

## 2020-05-02 NOTE — TELEPHONE ENCOUNTER
Jeff feels that he is not tolerating allopurinol which has been started at a high dose for him at 300 mg. He would like to hold it during the weekend. I doubt that his symptoms are due to his allopurinol. It is okay to hold it. It might not make a difference. He will bring this up with Dr. Ndiaye when he is in clinic on Monday.       Tristian Paniagua    Hematologist and Medical Oncologist  [facility]

## 2020-05-02 NOTE — TELEPHONE ENCOUNTER
"\"I get infusion treatments (See epic) and I have also been taking RX   allopurinol (ZYLOPRIM) 300 MG tablet  30 tablet  0  4/28/2020 5/28/2020  --    Sig - Route: Take 1 tablet (300 mg) by mouth daily - Oral      \"I've had 3 doses and I just can't take it anymore. Every time I take it I get a very depressed and dull feeling. I am stopping it.\" Denies other sx at this time. Triaged and paged on call  ([facility]  Through page op at 44:43 am to call patient at [phone].  Poppy Beavers RN [facility]    COVID 19 Nurse Triage Plan/Patient Instructions    Please be aware that novel coronavirus (COVID-19) may be circulating in the community. If you develop symptoms such as fever, cough, or SOB or if you have concerns about the presence of another infection including coronavirus (COVID-19), please contact your health care provider or visit [email or web address].     Disposition/Instructions    Patient to stay at home and follow home care protocol based instructions.     Thank you for limiting contact with others, wearing a simple mask to cover your cough, practice good hand hygiene habits and accessing our virtual services where possible to limit the spread of this virus.    For more information about COVID19 and options for caring for yourself at home, please visit the CDC website at https://www.cdc.gov/coronavirus/2019-ncov/about/steps-when-sick.html  For more options for care at [facility], please visit our website at [email or web address]    For more information, please use the [place] Department of Health COVID-19 Website: https://www.health.state.[place]/diseases/coronavirus/index.html  [place] Department of Health ([facility]) COVID-19 Hotlines (Interpreters available):      Health questions: Phone Number: [phone] or [phone] and Hours: 7 a.m. to 7 p.m.    Schools and  questions: Phone Number: [phone] or [phone] and Hours 7 " a.m. to 7 p.m.                   Reason for Disposition    Caller has URGENT medication question about med that PCP prescribed and triager unable to answer question    Protocols used: MEDICATION QUESTION CALL-A-AH

## 2020-05-03 NOTE — TELEPHONE ENCOUNTER
Patient is currently scheduled for an appointment at [facility] in [place].  Called patient to review current visitor restrictions and complete COVID-19 Patient Infection (Travel) Screening Tool.    No answer, voicemail message left.  Instructed patient to call the clinic to complete pre-screening.

## 2020-05-04 PROBLEM — K62.5 RECTAL BLEEDING: Status: RESOLVED | Noted: 2020-01-01 | Resolved: 2020-01-01

## 2020-05-04 NOTE — PATIENT INSTRUCTIONS
1. Labs today.  2. Continue decitabine.  3. Virtual visit with labs next week.    Patient in [facility] [place]

## 2020-05-04 NOTE — PROGRESS NOTES
"Jeff Mack is a 85 year old male who is being evaluated via a billable telephone visit.      The patient has been notified of following:     \"This telephone visit will be conducted via a call between you and your physician/provider. We have found that certain health care needs can be provided without the need for a physical exam.  This service lets us provide the care you need with a short phone conversation.  If a prescription is necessary we can send it directly to your pharmacy.  If lab work is needed we can place an order for that and you can then stop by our lab to have the test done at a later time.    Telephone visits are billed at different rates depending on your insurance coverage. During this emergency period, for some insurers they may be billed the same as an in-person visit.  Please reach out to your insurance provider with any questions.    If during the course of the call the physician/provider feels a telephone visit is not appropriate, you will not be charged for this service.\"    Patient has given verbal consent for Telephone visit?  Yes    What phone number would you like to be contacted at? NEW NUMBER -- [phone] -- wife's cell, she will be on call    How would you like to obtain your AVS? Mail a copy    Subjective     Jeff Mack is a 85 year old male who presents to clinic today for the following health issues:    [facility]      Hospital Follow-up Visit: Admin from ED    Hospital/Nursing Home/IP Rehab Facility: [facility]  Date of Admission: 04/26/2020  Date of Discharge: 04/27/2020  Reason(s) for Admission: Generalized weakness, fatigue and weight loss in the context of recently diagnosed CMML-1 and systemic mastocytosis      Was your hospitalization related to COVID-19? No   Problems taking medications regularly:  None -- Has not been taking Zoloft - with all other health issues feels overloaded.   Medication changes since discharge: None  Problems adhering to " non-medication therapy:  None    Summary of hospitalization:  [facility] discharge summary reviewed  Diagnostic Tests/Treatments reviewed.  Follow up needed: none  Other Healthcare Providers Involved in Patient s Care:         None  Update since discharge: worsened.  stable.       Post Discharge Medication Reconciliation: discharge medications reconciled, continue medications without change.  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)     Cardiomegaly     Chronic ischemic heart disease     Essential hypertension, benign     Primary cardiomyopathy (H)     Hyperlipidemia with target LDL less than 100     Acute low back pain     Nonallopathic lesion of sacral region     Lumbar stenosis     Health Care Home     Edema     Esophageal cancer (H)     Rectal bleeding     Weakness     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)     Past Surgical History:   Procedure Laterality Date     BONE MARROW BIOPSY, BONE SPECIMEN, NEEDLE/TROCAR N/A 4/16/2020    Procedure: BIOPSY, BONE MARROW;  Surgeon: Mg Hobbs MD;  Location:  GI     BRONCHOSCOPY FLEXIBLE N/A 4/3/2015    Procedure: BRONCHOSCOPY FLEXIBLE;  Surgeon: Ralph Catherine MD;  Location:  OR      EXCISION OF LINGUAL TONSIL      TONSILLECTOMY     C NONSPECIFIC PROCEDURE  2-99    Colonic polpectomy     C NONSPECIFIC PROCEDURE  9-92    TURP     C NONSPECIFIC PROCEDURE  1982    Retinal surgery     COLONOSCOPY       COLONOSCOPY N/A 2/19/2020    Procedure: COLONOSCOPY, WITH POLYPECTOMY AND BIOPSY;  Surgeon: Kathy Torres MD;  Location:  GI     ESOPHAGOSCOPY, GASTROSCOPY, DUODENOSCOPY (EGD), COMBINED N/A 3/19/2015    Procedure:  COMBINED ESOPHAGOSCOPY, GASTROSCOPY, DUODENOSCOPY (EGD), BIOPSY SINGLE OR MULTIPLE;  Surgeon: Alo Mauro MD;  Location: [facility]     ESOPHAGOSCOPY, GASTROSCOPY, DUODENOSCOPY (EGD), COMBINED N/A 7/14/2015    Procedure: COMBINED ESOPHAGOSCOPY, GASTROSCOPY, DUODENOSCOPY (EGD), BIOPSY SINGLE OR MULTIPLE;  Surgeon: Kathy Torres MD;  Location: [facility]     ESOPHAGOSCOPY, GASTROSCOPY, DUODENOSCOPY (EGD), COMBINED N/A 12/1/2015    Procedure: COMBINED ESOPHAGOSCOPY, GASTROSCOPY, DUODENOSCOPY (EGD), BIOPSY SINGLE OR MULTIPLE;  Surgeon: Alo Mauro MD;  Location: [facility]     ESOPHAGOSCOPY, GASTROSCOPY, DUODENOSCOPY (EGD), COMBINED N/A 3/17/2016    Procedure: COMBINED ESOPHAGOSCOPY, GASTROSCOPY, DUODENOSCOPY (EGD), BIOPSY SINGLE OR MULTIPLE;  Surgeon: Alo Mauro MD;  Location: [facility]     ESOPHAGOSCOPY, GASTROSCOPY, DUODENOSCOPY (EGD), COMBINED N/A 7/21/2016    Procedure: COMBINED ESOPHAGOSCOPY, GASTROSCOPY, DUODENOSCOPY (EGD);  Surgeon: Alo Mauro MD;  Location: [facility]     ESOPHAGOSCOPY, GASTROSCOPY, DUODENOSCOPY (EGD), COMBINED N/A 11/17/2016    Procedure: COMBINED ESOPHAGOSCOPY, GASTROSCOPY, DUODENOSCOPY (EGD);  Surgeon: Alo Mauro MD;  Location: [facility]     ESOPHAGOSCOPY, GASTROSCOPY, DUODENOSCOPY (EGD), COMBINED N/A 5/9/2017    Procedure: COMBINED ESOPHAGOSCOPY, GASTROSCOPY, DUODENOSCOPY (EGD);  (EGD) COMBINED ESOPHAGOSCOPY, GASTROSCOPY, DUODENOSCOPY;  Surgeon: Alo Mauro MD;  Location: [facility]     ESOPHAGOSCOPY, GASTROSCOPY, DUODENOSCOPY (EGD), COMBINED N/A 5/1/2018    Procedure: COMBINED ESOPHAGOSCOPY, GASTROSCOPY, DUODENOSCOPY (EGD);  ESOPHAGOGASTRODUODENOSCOPY ;  Surgeon: Alo Mauro MD;  Location: [facility]     GASTROSTOMY, INSERT TUBE, COMBINED N/A 4/3/2015    Procedure: COMBINED GASTROSTOMY, INSERT TUBE (OPEN);  Surgeon: Ralph Catherine MD;  Location:  OR     HC PRESSURE GRADIENT MEASUREMENT, INITIAL VESSEL  2005    essentially normal     HC UGI ENDOSCOPY W EUS N/A 7/14/2015     Procedure: COMBINED ENDOSCOPIC ULTRASOUND, ESOPHAGOSCOPY, GASTROSCOPY, DUODENOSCOPY (EGD);  Surgeon: Kathy Torres MD;  Location: [facility]     LAMINECTOMY, FUSION LUMBAR ONE LEVEL, COMBINED  2012    Procedure: COMBINED LAMINECTOMY, FUSION LUMBAR ONE LEVEL;  Posterior Fusion L5-S1, Total Facetectomy L5-S1 Left ;  Surgeon: Ronald Espinosa MD;  Location: [facility] OR     PROSTATE SURGERY         Social History     Tobacco Use     Smoking status: Former Smoker     Packs/day: 2.00     Years: 40.00     Pack years: 80.00     Types: Cigarettes     Last attempt to quit: 1975     Years since quittin.3     Smokeless tobacco: Never Used   Substance Use Topics     Alcohol use: No     Family History   Problem Relation Age of Onset     Hypertension Mother      Cerebrovascular Disease Mother      Diabetes Brother      Diabetes Father          age 97         Current Outpatient Medications   Medication Sig Dispense Refill     albuterol (PROAIR HFA/PROVENTIL HFA/VENTOLIN HFA) 108 (90 Base) MCG/ACT inhaler Inhale 1-2 puffs into the lungs every 6 hours as needed for shortness of breath / dyspnea or wheezing       ALPRAZolam (XANAX) 0.5 MG tablet Take 1 tablet (0.5 mg) by mouth 2 times daily as needed for anxiety 45 tablet 0     Cholecalciferol (VITAMIN D3) 5000 UNITS TABS Take 5,000 Units by mouth daily        fluticasone (FLONASE) 50 MCG/ACT nasal spray SPRAY 2 SPRAYS INTO EACH NOSTRIL EVERY DAY 48 mL 3     polyethylene glycol (MIRALAX) 17 g packet Take 1 packet by mouth daily as needed for constipation       sennosides (SENOKOT) 8.6 MG tablet Take 1 tablet by mouth daily as needed for constipation       sertraline (ZOLOFT) 25 MG tablet Take 2 tablets (50 mg) by mouth daily 90 tablet 0     SYMBICORT 160-4.5 MCG/ACT Inhaler Inhale 2 puffs into the lungs 2 times daily        No Known Allergies  Recent Labs   Lab Test 20  0707 20  1432 20  2331 20  0643  17  0943  07/09/15  1110   07/17/12  1548   LDL  --   --   --   --   --   --   --  92  --  111   HDL  --   --   --   --   --   --   --  48  --  71   TRIG  --   --   --   --   --   --   --  80  --  110   ALT  --  35 20 16   < >  --   --   --   --  19   CR 1.08 1.17 0.97 0.72   < >  --    < > 0.56*   < > 0.86   GFRESTIMATED 63 57* 72 86   < >  --    < > >90  Non  GFR Calc     < > 87   GFRESTBLACK 73 66 83 >90   < >  --    < > >90   GFR Calc     < > >90   POTASSIUM 3.7 3.7 3.8 4.0   < >  --    < > 4.1   < > 3.5   TSH  --   --   --   --   --  1.79  --   --   --  1.72    < > = values in this interval not displayed.      BP Readings from Last 3 Encounters:   05/01/20 131/69   04/30/20 131/73   04/29/20 (!) 154/79    Wt Readings from Last 3 Encounters:   05/01/20 73.8 kg (162 lb 9.6 oz)   04/30/20 74.4 kg (164 lb)   04/29/20 73.8 kg (162 lb 9.6 oz)                    Reviewed and updated as needed this visit by Provider         Review of Systems   ROS COMP: Constitutional, HEENT, cardiovascular, pulmonary, gi and gu systems are negative, except as otherwise noted.       Objective   Reported vitals:  There were no vitals taken for this visit.   healthy, alert and no distress  PSYCH: Alert and oriented times 3; coherent speech, normal   rate and volume, able to articulate logical thoughts, able   to abstract reason, no tangential thoughts, no hallucinations   or delusions  His affect is normal  RESP: No cough, no audible wheezing, able to talk in full sentences  Remainder of exam unable to be completed due to telephone visits    Diagnostic Test Results:  Labs reviewed in Epic        Assessment/Plan:  1. Adjustment disorder with mixed anxiety and depressed mood  Has been worsening with current diagnosis of CMML and weakness, started emotional support with cancer navigator, and will see oncology psychologic therapy soon, will keep him on the current dose of benzodiazepine prn with increased dose of Sertraline to 50mg,  -  ALPRAZolam (XANAX) 0.5 MG tablet; Take 1 tablet (0.5 mg) by mouth 2 times daily as needed for anxiety  Dispense: 45 tablet; Refill: 0    2. Acute reaction to stress  Mentioned above   - sertraline (ZOLOFT) 25 MG tablet; Take 2 tablets (50 mg) by mouth daily  Dispense: 90 tablet; Refill: 0    3. Weight loss  Stable, has improving appetite, encouraged him to keep monitoring   - sertraline (ZOLOFT) 25 MG tablet; Take 2 tablets (50 mg) by mouth daily  Dispense: 90 tablet; Refill: 0    4. 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 Weakness  Stable, keep monitoring       Return in about 3 weeks (around 5/25/2020), or if symptoms worsen or fail to improve.      Phone call duration:  15 minutes    Benjamin Lazar MD

## 2020-05-04 NOTE — LETTER
"    5/4/2020         RE: Jeff Mack  [address] [place] [address]        Dear Colleague,    Thank you for referring your patient, Jeff Mack, to the [facility]. Please see a copy of my visit note below.    Oncology Rooming Note    May 4, 2020 1:59 PM   Jeff Mack is a 85 year old male who presents for:    Chief Complaint   Patient presents with     Oncology Clinic Visit     Initial Vitals: There were no vitals taken for this visit. Estimated body mass index is 24 kg/m  as calculated from the following:    Height as of 4/29/20: 1.753 m (5' 9.02\").    Weight as of 5/1/20: 73.8 kg (162 lb 9.6 oz). There is no height or weight on file to calculate BSA.  Data Unavailable Comment: Data Unavailable   No LMP for male patient.  Allergies reviewed: Yes  Medications reviewed: Yes    Medications: Medication refills not needed today.  Pharmacy name entered into [employer]:    [id number] - [place], [place] - [address]  [facility] [employer] - [place] - [address]    Clinical concerns:  doctor was notified.      Briana Eubanks MA              Visit Date:   05/04/2020      SUBJECTIVE:  Mr. Mack is an 85-year-old gentleman with chronic myelomonocytic leukemia (CMML-1) and systemic mastocytosis.  The patient has been symptomatic.  He was started on decitabine on 04/29/2020.  Overall he is tolerating it well.      Overall he feels slightly better.  He still has significant fatigue.  His appetite is not very good.      No headache.  Intermittent dizziness.  No chest pain.  No shortness of breath at rest.  No nausea.  No recurrent vomiting.  No bleeding.      The patient has been on allopurinol.  The patient says that he is not able to tolerate it.  He does not feel well after taking allopurinol and he stopped it after 3 days.      PHYSICAL EXAMINATION:   GENERAL:  He is alert, oriented x 3.   VITAL SIGNS:  Reviewed, ECOG PS of 2.   The rest of the systems " not examined.      LABORATORY DATA:  Reviewed.      ASSESSMENT:   1.  An 85-year-old gentleman with CMML-1 and systemic mastocytosis.   2.  Fatigue and decreased appetite, slightly improving.   3.  Hyperuricemia.      PLAN:   1.  I discussed with the patient regarding his symptoms.  His symptoms are mainly due to his CMML-1.  He is on decitabine.  That will be continued.  His WBC and platelets are already better.   I explained to the patient that I expect his symptoms to improve, but I told him it may take many cycles of decitabine to see significant improvement.   2.  The patient is having side effects of allopurinol.  That has been stopped.  His uric acid came back elevated after he had left.  Tomorrow we will plan on giving him rasburicase.   3.  We will continue to monitor him closely.  We will do labs on virtual visit next week.  Advised him to call us with any questions or concerns.         GRAZYNA RICHEY MD             D: 2020   T: 2020   MT: NITA      Name:     RADHA ALFONSO   MRN:      [id number]        Account:      [id number]   :      1935           Visit Date:   2020      Document: [id number]       Again, thank you for allowing me to participate in the care of your patient.        Sincerely,        Grazyna Richey MD

## 2020-05-04 NOTE — PROGRESS NOTES
Infusion Nursing Note:  Jeff Mack presents today for dacogen.    Patient seen by provider today: Yes: Dr. Ndiaye   present during visit today: Not Applicable.    Note: N/A.    Intravenous Access:  Peripheral IV placed.    Treatment Conditions:  Results reviewed, labs MET treatment parameters, ok to proceed with treatment.      Post Infusion Assessment:  Patient tolerated infusion without incident.  Blood return noted pre and post infusion.  Site patent and intact, free from redness, edema or discomfort.  No evidence of extravasations.  Access discontinued per protocol.       Discharge Plan:   Discharge instructions reviewed with: Patient.  Patient and/or family verbalized understanding of discharge instructions and all questions answered.  Copy of AVS reviewed with patient and/or family.  Patient will return tomorrow for next appointment.  Patient discharged in stable condition accompanied by: self.  Departure Mode: Ambulatory.    Kayleen Gomez RN      Patient is currently scheduled for an appointment at [facility] in [place].  Called patient to review current visitor restrictions and complete COVID-19 Patient Infection/Travel Screening Tool.     Due to the recent public health concerns around COVID-19 and in an effort to keep our patients and staff safe and healthy, we are implementing a screening process for the patients that come to our clinic.      I am going to ask you a few questions, please answer yes or no.  Your honesty about any symptoms is critical, as it keeps patients and staff healthy.      Do you have a:  Fever (or reported chills)?  No  New cough (started within the past 14 days)?  No  New shortness of breath (started within the past 14 days)?  No  Rash?  No    In the last month, have you been in contact with someone who was confirmed or suspected to have Coronavirus/COVID-19?  No    Have you traveled internationally in the last month?  No  If so, where?   "N/A     I also wanted to let you know that to protect our patients from the flu and other common illnesses, [facility] locations enforce visitor restrictions year round, but due to the community spread of COVID-19 in [place], we are taking additional precautionary steps to ensure the health of our patients.  At this time, NO visitors are allowed on our hospital and clinic campuses.     Patient PASSED the screening assessment.    Patient instructed to come to the clinic as planned for their scheduled appointment and to call the clinic if any symptoms develop prior to their appointment.    \"Per CDC Guidelines, we are asking all patients that are coming into the building to wear a cloth covering that covers your mouth and nose.  You will be screened again at the entrance to the clinic for any Covid 19 symptoms. If you screen positive to any Covid 19 symptoms during our screening process you will be provided a surgical mask to wear during your time in the building.\"    \"COVID-19 is contagious and can be dangerous for our patients and staff.  Please send us a [employer] message or call our clinic before coming in if you feel any of the following symptoms: fever, cough, congestion, runny nose, sore throat, muscle aches and pains, or shortness of breath.  If you are already at our clinic, it is very important that you be honest about any symptoms you are experiencing to ensure your safety and that of other patients and staff who treat you.  If you do have symptoms, we will have a nurse and/or provider asses you to determine next steps.\"    Kayleen Gomez RN on 5/4/2020 at 2:52 PM                    "

## 2020-05-04 NOTE — PROGRESS NOTES
"Oncology Rooming Note    May 4, 2020 1:59 PM   Jeff Mack is a 85 year old male who presents for:    Chief Complaint   Patient presents with     Oncology Clinic Visit     Initial Vitals: There were no vitals taken for this visit. Estimated body mass index is 24 kg/m  as calculated from the following:    Height as of 4/29/20: 1.753 m (5' 9.02\").    Weight as of 5/1/20: 73.8 kg (162 lb 9.6 oz). There is no height or weight on file to calculate BSA.  Data Unavailable Comment: Data Unavailable   No LMP for male patient.  Allergies reviewed: Yes  Medications reviewed: Yes    Medications: Medication refills not needed today.  Pharmacy name entered into [facility]:    [id number] [place] [place] - [address] [address] [place], [place] - [address]    Clinical concerns:  doctor was notified.      Briana Eubanks MA            "

## 2020-05-05 PROBLEM — E79.0 HYPERURICEMIA: Status: ACTIVE | Noted: 2020-01-01

## 2020-05-05 NOTE — PROGRESS NOTES
Infusion Nursing Note:  Jeff Mack presents today for dacogen & Elitekl.    Patient seen by provider today: No   present during visit today: Not Applicable.    Note: N/A.    Intravenous Access:  Peripheral IV placed.    Treatment Conditions:  Not Applicable.      Post Infusion Assessment:  Patient tolerated infusion without incident.  Blood return noted pre and post infusion.  Site patent and intact, free from redness, edema or discomfort.  No evidence of extravasations.  Access discontinued per protocol.       Discharge Plan:   Discharge instructions reviewed with: Patient.  Patient and/or family verbalized understanding of discharge instructions and all questions answered.  Copy of AVS reviewed with patient and/or family.  Patient will return 5/13/20 for next appointment.  Patient discharged in stable condition accompanied by: self.  Departure Mode: Ambulatory.    Kayleen Gomez RN

## 2020-05-05 NOTE — PROGRESS NOTES
Visit Date:   05/04/2020     HEMATOLOGY HISTORY: Mr. 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   1.  On 02/28/2020:  -WBC of 30.5, hemoglobin of 9.4 and platelet of 235.  MCV of 87.  Neutrophil of 50%, lymphocytes of 15% and monocytes of 17%.  There is myelocyte and metamyelocyte present. (On 11/16/2018, CBC normal with WBC of 4.8, hemoglobin 15.3 and platelets of 295.)  -CMP is normal.   3.  CT chest, abdomen and pelvis on 02/29/2020 does not reveal any evidence of malignancy.   4.   On 03/19/2020, NGS panel negative for JAK2, CALR and MPL.  There is IDH2 R140Q and KIT D816V.  5. Bone marrow biopsy done on 04/16/2020. It reveals chronic myelomonocytic leukemia - 1 (CMML-1) characterized by hypercellular bone marrow (90%) with increased M:E ratio and 7% blasts. There are aggregates of atypical mast cells consistent with bone marrow involvement by systemic mastocytosis. Increased bone marrow fibrosis (MF-1-2).  -Normal cytogenetics.   -Flow cytometry on bone marrow reveals increased monocytic cells (43%).  No increase in CD34 positive myeloid blasts.   -BCR-ABL1 bone marrow is negative.   6. On 04/29/2020, tryptase elevated at 51.3 (normal less than 11).  6. Decitabine started on 04/29/2020.     SUBJECTIVE:  Mr. Mack is an 85-year-old gentleman with chronic myelomonocytic leukemia (CMML-1) and systemic mastocytosis.  The patient has been symptomatic.  He was started on decitabine on 04/29/2020.  Overall he is tolerating it well.      Overall he feels slightly better.  He still has significant fatigue.  His appetite is not very good.      No headache.  Intermittent dizziness.  No chest pain.  No shortness of breath at rest.  No nausea.  No recurrent vomiting.  No bleeding.      The patient has been on allopurinol.  The patient says that he is not able to tolerate it.  He does not feel well after taking allopurinol and he stopped it after 3 days.      PHYSICAL  EXAMINATION:   GENERAL:  He is alert, oriented x 3.   VITAL SIGNS:  Reviewed, ECOG PS of 2.   The rest of the systems not examined.      LABORATORY DATA:  Reviewed.      ASSESSMENT:   1.  An 85-year-old gentleman with CMML-1 and systemic mastocytosis.   2.  Fatigue and decreased appetite, slightly improving.   3.  Hyperuricemia.      PLAN:   1.  I discussed with the patient regarding his symptoms.  His symptoms are mainly due to his CMML-1.  He is on decitabine.  That will be continued.  His WBC and platelets are already better. I explained to the patient that I expect his symptoms to improve, but I told him it may take many cycles of decitabine to see significant improvement.   2.  The patient is having side effects of allopurinol.  That has been stopped.  His uric acid came back elevated after he had left.  Tomorrow we will plan on giving him rasburicase.   3.  We will continue to monitor him closely.  We will do labs on virtual visit next week.  Advised him to call us with any questions or concerns.         GRAZYNA RICHEY MD             D: 2020   T: 2020   MT: NITA      Name:     RADHA ALFONSO   MRN:      [id number]        Account:      [id number]   :      1935           Visit Date:   2020      Document: [id number]

## 2020-05-07 NOTE — TELEPHONE ENCOUNTER
Writer called and spoke with patient to follow up on Dacogen infusions. Patient verbalizes doing well but does notice some anxiety episodes and believes it is from the infusion Rasburicase(Elitek). He is on Xanax and he takes a 1/2 pill twice a day. Patient wondering if Zoloft would interact with any of his medications. Primary would like him to start Zoloft Therapy.    Writer will have pharmacy team review and advise.    Jody Mehta RN

## 2020-05-12 NOTE — PROGRESS NOTES
Writer received a call from patient requesting his WBC.  Writer reviewed WBC of 16.6.    Patient will review labs and concerns in details at tomorrow's video visit.     Jody Mehta RN

## 2020-05-13 NOTE — PROGRESS NOTES
"Jeff Mack is a 85 year old male who is being evaluated via a billable video visit.      The patient has been notified of following:     \"This video visit will be conducted via a call between you and your physician/provider. We have found that certain health care needs can be provided without the need for an in-person physical exam.  This service lets us provide the care you need with a video conversation.  If a prescription is necessary we can send it directly to your pharmacy.  If lab work is needed we can place an order for that and you can then stop by our lab to have the test done at a later time.    Video visits are billed at different rates depending on your insurance coverage.  Please reach out to your insurance provider with any questions.    If during the course of the call the physician/provider feels a video visit is not appropriate, you will not be charged for this service.\"    Patient has given verbal consent for Video visit? Yes    How would you like to obtain your AVS? MyChart    Patient would like the video invitation sent by: Text to cell phone: [phone]    Will anyone else be joining your video visit? No        Video-Visit Details    Type of service:  Video Visit    Video Start Time:  Video End Time:     Originating Location (pt. Location): Home    Distant Location (provider location):  [facility]     Platform used for Video Visit: Mauricio Eubanks MA      "

## 2020-05-13 NOTE — PROGRESS NOTES
Visit Date:   05/13/2020     HEMATOLOGY HISTORY: Mr. 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   1.  On 02/28/2020:  -WBC of 30.5, hemoglobin of 9.4 and platelet of 235.  MCV of 87.  Neutrophil of 50%, lymphocytes of 15% and monocytes of 17%.  There is myelocyte and metamyelocyte present. (On 11/16/2018, CBC normal with WBC of 4.8, hemoglobin 15.3 and platelets of 295.)  -CMP is normal.   3.  CT chest, abdomen and pelvis on 02/29/2020 does not reveal any evidence of malignancy.   4.   On 03/19/2020, NGS panel negative for JAK2, CALR and MPL.  There is IDH2 R140Q and KIT D816V.  5. Bone marrow biopsy done on 04/16/2020. It reveals chronic myelomonocytic leukemia - 1 (CMML-1) characterized by hypercellular bone marrow (90%) with increased M:E ratio and 7% blasts. There are aggregates of atypical mast cells consistent with bone marrow involvement by systemic mastocytosis. Increased bone marrow fibrosis (MF-1-2).  -Normal cytogenetics.   -Flow cytometry on bone marrow reveals increased monocytic cells (43%).  No increase in CD34 positive myeloid blasts.   -BCR-ABL1 bone marrow is negative.   6. On 04/29/2020, tryptase elevated at 51.3 (normal less than 11).  6. Decitabine started on 04/29/2020.     SUBJECTIVE:  Mr. Mack is an 85-year-old gentleman with chronic myelomonocytic leukemia and systemic mastocytosis.  The patient was started on decitabine on 04/29/2020.  Overall, he tolerated it well.      REVIEW OF SYSTEMS:  He has fatigue.  Fatigue is slightly less.  No headache.  No dizziness.  No chest pain.  No shortness of breath at rest.  No nausea or vomiting.  Appetite is fair.  No fever.  No night sweats.  The patient said that his overall condition is slightly better.      PHYSICAL EXAMINATION:  He is alert and oriented x 3.   This is a virtual visit.      LABORATORY DATA:  Reviewed.      ASSESSMENT:   1.  An 85-year-old gentleman with chronic myelomonocytic  leukemia on decitabine.   2.  Systemic mastocytosis.   3.  Hyperuricemia, resolved.   4.  Fatigue, improving.      PLAN:   1.  The patient tolerated the first cycle of decitabine well.  Labs reveal improvement in his WBC and hemoglobin.  He is still thrombocytopenic.  The patient was happy to know that his CBC is better.   2.  I explained to the patient that with time his symptoms should improve.  I am expecting his fatigue to slowly improve.  Also, his appetite should improve and he should gain some weight.     3.  I explained to the patient that my plan is to give him at least 4 cycles of decitabine followed by a bone marrow biopsy.  The patient is concerned.  The patient says that he may not be able to tolerate it.  I told the patient that he should symptomatically feel better with further decitabine.  For now we will plan to give him decitabine as scheduled.   4.  He wants his CBC to be monitored closely.  We will do a CBC next week and a virtual visit after that.  I advised him to call us with questions or concerns.     Telephone visit between 2:05 PM and 2:13 PM.        GRAZYNA RICHEY MD             D: 2020   T: 2020   MT: DAMIAN      Name:     RADHA ALFONSO   MRN:      [id number]        Account:      [id number]   :      1935           Visit Date:   2020      Document: [id number]

## 2020-05-14 NOTE — PROGRESS NOTES
Visit Date:   05/13/2020     HEMATOLOGY HISTORY: Mr. 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   1.  On 02/28/2020:  -WBC of 30.5, hemoglobin of 9.4 and platelet of 235.  MCV of 87.  Neutrophil of 50%, lymphocytes of 15% and monocytes of 17%.  There is myelocyte and metamyelocyte present. (On 11/16/2018, CBC normal with WBC of 4.8, hemoglobin 15.3 and platelets of 295.)  -CMP is normal.   3.  CT chest, abdomen and pelvis on 02/29/2020 does not reveal any evidence of malignancy.   4.   On 03/19/2020, NGS panel negative for JAK2, CALR and MPL.  There is IDH2 R140Q and KIT D816V.  5. Bone marrow biopsy done on 04/16/2020. It reveals chronic myelomonocytic leukemia - 1 (CMML-1) characterized by hypercellular bone marrow (90%) with increased M:E ratio and 7% blasts. There are aggregates of atypical mast cells consistent with bone marrow involvement by systemic mastocytosis. Increased bone marrow fibrosis (MF-1-2).  -Normal cytogenetics.   -Flow cytometry on bone marrow reveals increased monocytic cells (43%).  No increase in CD34 positive myeloid blasts.   -BCR-ABL1 bone marrow is negative.   6. On 04/29/2020, tryptase elevated at 51.3 (normal less than 11).  6. Decitabine started on 04/29/2020.     SUBJECTIVE:  Mr. Mack is an 85-year-old gentleman with chronic myelomonocytic leukemia and systemic mastocytosis.  The patient was started on decitabine on 04/29/2020.  Overall, he tolerated it well.      REVIEW OF SYSTEMS:  He has fatigue.  Fatigue is slightly less.  No headache.  No dizziness.  No chest pain.  No shortness of breath at rest.  No nausea or vomiting.  Appetite is fair.  No fever.  No night sweats.  The patient said that his overall condition is slightly better.      PHYSICAL EXAMINATION:  He is alert and oriented x 3.   This is a virtual visit.      LABORATORY DATA:  Reviewed.      ASSESSMENT:   1.  An 85-year-old gentleman with chronic myelomonocytic  leukemia on decitabine.   2.  Systemic mastocytosis.   3.  Hyperuricemia, resolved.   4.  Fatigue, improving.      PLAN:   1.  The patient tolerated the first cycle of decitabine well.  Labs reveal improvement in his WBC and hemoglobin.  He is still thrombocytopenic.  The patient was happy to know that his CBC is better.   2.  I explained to the patient that with time his symptoms should improve.  I am expecting his fatigue to slowly improve.  Also, his appetite should improve and he should gain some weight.     3.  I explained to the patient that my plan is to give him at least 4 cycles of decitabine followed by a bone marrow biopsy.  The patient is concerned.  The patient says that he may not be able to tolerate it.  I told the patient that he should symptomatically feel better with further decitabine.  For now we will plan to give him decitabine as scheduled.   4.  He wants his CBC to be monitored closely.  We will do a CBC next week and a virtual visit after that.  I advised him to call us with questions or concerns.     Telephone visit between 2:05 PM and 2:13 PM.        GRAZYNA RICHEY MD             D: 2020   T: 2020   MT: DAMIAN      Name:     RADHA ALFONSO   MRN:      [id number]        Account:      [id number]   :      1935           Visit Date:   2020      Document: [id number]

## 2020-05-16 NOTE — PROGRESS NOTES
Visit Date:   05/04/2020     HEMATOLOGY HISTORY: Mr. 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   1.  On 02/28/2020:  -WBC of 30.5, hemoglobin of 9.4 and platelet of 235.  MCV of 87.  Neutrophil of 50%, lymphocytes of 15% and monocytes of 17%.  There is myelocyte and metamyelocyte present. (On 11/16/2018, CBC normal with WBC of 4.8, hemoglobin 15.3 and platelets of 295.)  -CMP is normal.   3.  CT chest, abdomen and pelvis on 02/29/2020 does not reveal any evidence of malignancy.   4.   On 03/19/2020, NGS panel negative for JAK2, CALR and MPL.  There is IDH2 R140Q and KIT D816V.  5. Bone marrow biopsy done on 04/16/2020. It reveals chronic myelomonocytic leukemia - 1 (CMML-1) characterized by hypercellular bone marrow (90%) with increased M:E ratio and 7% blasts. There are aggregates of atypical mast cells consistent with bone marrow involvement by systemic mastocytosis. Increased bone marrow fibrosis (MF-1-2).  -Normal cytogenetics.   -Flow cytometry on bone marrow reveals increased monocytic cells (43%).  No increase in CD34 positive myeloid blasts.   -BCR-ABL1 bone marrow is negative.   6. On 04/29/2020, tryptase elevated at 51.3 (normal less than 11).  6. Decitabine started on 04/29/2020.     SUBJECTIVE:  Mr. Mack is an 85-year-old gentleman with chronic myelomonocytic leukemia (CMML-1) and systemic mastocytosis.  The patient has been symptomatic.  He was started on decitabine on 04/29/2020.  Overall he is tolerating it well.      Overall he feels slightly better.  He still has significant fatigue.  His appetite is not very good.      No headache.  Intermittent dizziness.  No chest pain.  No shortness of breath at rest.  No nausea.  No recurrent vomiting.  No bleeding.      The patient has been on allopurinol.  The patient says that he is not able to tolerate it.  He does not feel well after taking allopurinol and he stopped it after 3 days.      PHYSICAL  EXAMINATION:   GENERAL:  He is alert, oriented x 3.   VITAL SIGNS:  Reviewed, ECOG PS of 2.   The rest of the systems not examined.      LABORATORY DATA:  Reviewed.      ASSESSMENT:   1.  An 85-year-old gentleman with CMML-1 and systemic mastocytosis.   2.  Fatigue and decreased appetite, slightly improving.   3.  Hyperuricemia.      PLAN:   1.  I discussed with the patient regarding his symptoms.  His symptoms are mainly due to his CMML-1.  He is on decitabine.  That will be continued.  His WBC and platelets are already better. I explained to the patient that I expect his symptoms to improve, but I told him it may take many cycles of decitabine to see significant improvement.   2.  The patient is having side effects of allopurinol.  That has been stopped.  His uric acid came back elevated after he had left.  Tomorrow we will plan on giving him rasburicase.   3.  We will continue to monitor him closely.  We will do labs on virtual visit next week.  Advised him to call us with any questions or concerns.         GRAZYNA RICHEY MD             D: 2020   T: 2020   MT: NITA      Name:     RADHA ALFONSO   MRN:      [id number]        Account:      [id number]   :      1935           Visit Date:   2020      Document: [id number]

## 2020-05-20 NOTE — TELEPHONE ENCOUNTER
Don calling to speak to MIREYA Portillo for Dr. Ndiaye.     Joshua reports he was not able to have his labs done and would like to speak to Lindsey about this as he has an appointment scheduled tomorrow with Dr. Ndiaye.     Will route to MIREYA Portillo.     Kathy Alegre, BSN, RN, PHN

## 2020-05-20 NOTE — TELEPHONE ENCOUNTER
Writer return call to patient and he informed me that his lab will be drawn on Friday 5/22 and to R/S tomorrow's video visit to next week.    Message sent to schedulers to cancel and will have Dr. Ndiaye advise on timing of next week's appointment.    Jody Mehta RN

## 2020-05-20 NOTE — LETTER
"    5/20/2020         RE: Jeff Mack  [address] [place] [address]        Dear Colleague,    Thank you for referring your patient, Jeff Mack, to the [facility]. Please see a copy of my visit note below.    Jeff Mack is a 85 year old male who is being evaluated via a billable telephone visit. Phone call duration: 20 minutes    The patient has been notified of following:     \"This telephone visit will be conducted via a call between you and your physician/provider. We have found that certain health care needs can be provided without the need for a physical exam.  This service lets us provide the care you need with a short phone conversation.     Telephone visits are billed at different rates depending on your insurance coverage. During this emergency period, for some insurers they may be billed the same as an in-person visit.  Please reach out to your insurance provider with any questions.    Patient has given verbal consent for Telephone visit?  Yes     Jeff Mack is a 85 year old male who presents for Depression  The patient was seen for a 20 minute appointment on 5/20/2020.    Subjective: He still experiences frustration and sadness regarding his fatigue levels and the reduction in the amount of activity he can do each day. His frustration was evident and it was clear that he was struggling to accommodate this information into his sense of self. He recalled the topic of activity pacing but was frustrated that it did not create the results he wanted.     Objective: He was polite but clearly frustrated. Affect was appropriate to the content of the therapeutic conversation.     Diagnosis:   Encounter Diagnosis   Name Primary?     Adjustment disorder with depressed mood Yes     Recommendations/Plan:  1. Continue to modify the activity pacing activities  2. Return for follow-up as needed.     Thank you for this opportunity to participate in your care of this " patient.    uJan Mg Psy.D., L.P.  Director, Oncology Supportive Care     Again, thank you for allowing me to participate in the care of your patient.        Sincerely,        Juan Mg PsyD

## 2020-05-20 NOTE — LETTER
"    5/20/2020         RE: Jeff Mack  [address] [place] [address]        Dear Colleague,    Thank you for referring your patient, Jeff Mack, to the [facility]. Please see a copy of my visit note below.    Jeff Mack is a 85 year old male who is being evaluated via a billable telephone visit. Phone call duration: 20 minutes    The patient has been notified of following:     \"This telephone visit will be conducted via a call between you and your physician/provider. We have found that certain health care needs can be provided without the need for a physical exam.  This service lets us provide the care you need with a short phone conversation.     Telephone visits are billed at different rates depending on your insurance coverage. During this emergency period, for some insurers they may be billed the same as an in-person visit.  Please reach out to your insurance provider with any questions.    Patient has given verbal consent for Telephone visit?  Yes     Jeff Mack is a 85 year old male who presents for Depression  The patient was seen for a 20 minute appointment on 5/20/2020.    Subjective: He still experiences frustration and sadness regarding his fatigue levels and the reduction in the amount of activity he can do each day. His frustration was evident and it was clear that he was struggling to accommodate this information into his sense of self. He recalled the topic of activity pacing but was frustrated that it did not create the results he wanted.     Objective: He was polite but clearly frustrated. Affect was appropriate to the content of the therapeutic conversation.     Diagnosis:   Encounter Diagnosis   Name Primary?     Adjustment disorder with depressed mood Yes     Recommendations/Plan:  1. Continue to modify the activity pacing activities  2. Return for follow-up as needed.     Thank you for this opportunity to participate in your care of this " patient.    Juan Mg Psy.D., L.P.  Director, Oncology Supportive Care     Again, thank you for allowing me to participate in the care of your patient.        Sincerely,        Juan Mg PsyD

## 2020-05-21 NOTE — LETTER
"    5/21/2020         RE: Jeff Mack  [address] [place] [address]        Dear Colleague,    Thank you for referring your patient, Jeff Mack, to the [facility] CANCER [facility]. Please see a copy of my visit note below.    Jeff Mack is a 85 year old male who is being evaluated via a billable telephone visit.      The patient has been notified of following:     \"This telephone visit will be conducted via a call between you and your physician/provider. We have found that certain health care needs can be provided without the need for a physical exam.  This service lets us provide the care you need with a short phone conversation.  If a prescription is necessary we can send it directly to your pharmacy.  If lab work is needed we can place an order for that and you can then stop by our lab to have the test done at a later time.    Telephone visits are billed at different rates depending on your insurance coverage. During this emergency period, for some insurers they may be billed the same as an in-person visit.  Please reach out to your insurance provider with any questions.    If during the course of the call the physician/provider feels a telephone visit is not appropriate, you will not be charged for this service.\"    Patient has given verbal consent for Telephone visit?  Yes    What phone number would you like to be contacted at? [phone]    How would you like to obtain your AVS? Melissa Stevenson, [facility]        Visit Date:   05/21/2020     HEMATOLOGY HISTORY: Mr. 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   1.  On 02/28/2020:  -WBC of 30.5, hemoglobin of 9.4 and platelet of 235.  MCV of 87.  Neutrophil of 50%, lymphocytes of 15% and monocytes of 17%.  There is myelocyte and metamyelocyte present. (On 11/16/2018, CBC normal with WBC of 4.8, hemoglobin 15.3 and platelets of 295.)  -CMP is normal.   3.  CT chest, " abdomen and pelvis on 02/29/2020 does not reveal any evidence of malignancy.   4.   On 03/19/2020, NGS panel negative for JAK2, CALR and MPL.  There is IDH2 R140Q and KIT D816V.  5. Bone marrow biopsy done on 04/16/2020. It reveals chronic myelomonocytic leukemia - 1 (CMML-1) characterized by hypercellular bone marrow (90%) with increased M:E ratio and 7% blasts. There are aggregates of atypical mast cells consistent with bone marrow involvement by systemic mastocytosis. Increased bone marrow fibrosis (MF-1-2).  -Normal cytogenetics.   -Flow cytometry on bone marrow reveals increased monocytic cells (43%).  No increase in CD34 positive myeloid blasts.   -BCR-ABL1 bone marrow is negative.   6. On 04/29/2020, tryptase elevated at 51.3 (normal less than 11).  6. Decitabine started on 04/29/2020.     SUBJECTIVE:  Mr. Mack is an 85-year-old gentleman with chronic myelomonocytic and systemic mastocytosis.  He is on decitabine.  He has completed 1 cycle.  Overall, he tolerated it well.      The patient has anxiety.  The patient said that because of anxiety, he does not feel good.  He cannot relax.  He has fatigue.  The patient says that he is fatigued both mentally and physically.      No headache.  No dizziness.  No chest pain. No shortness of breath.  No abdominal pain.  No vomiting.  Appetite is fairly good.  No urinary complaint.  He has constipation.  The patient said that he feels tired most of the time.  He gets some joint stiffness and pain, especially in the morning.      The patient says that treatment with decitabine is making him more anxious.  He wants it to be stopped soon.      PHYSICAL EXAMINATION:  He is alert, oriented x 3.    This is a virtual visit.      LABORATORY DATA:  Reviewed.      ASSESSMENT:   1.  An 85-year-old gentleman with chronic myelomonocytic leukemia-1.   2.  Systemic mastocytosis.   3.  Anxiety.   4.  Fatigue.   5.  Constipation.   6.  Arthritic joint pain.      PLAN:   1.  I had a long  discussion with the patient.  CBC was reviewed.  I explained to him his WBC is normal.  He is mildly anemic and thrombocytopenic.  I told him that decitabine has helped with his leukocytosis.  I explained to the patient that decitabine has been given to treat his chronic myelomonocytic leukemia.  I told him that usually multiple cycles are given.    The patient has anxiety that is causing a lot of problems and he wants to stop decitabine.  I told the patient that he should take at least the second cycle and see how he does.  He is agreeable for it.  Second cycle will be started in about 10 days.  Side effects of desaturation have been reviewed. I explained to the patient that after cycle 2 that if he decides to stop due to decitabine, one option will be to put him on hydroxyurea.    2.  The patient has anxiety.  He has been seen by our psychologist, Dr. Mg.   3.  The patient will take over-the-counter Tylenol for aches and pain.   4.  He will take over-the-counter medication for constipation.   5.  He will continue on alprazolam as needed.     6.  I will see him when he comes to start cycle 2 of decitabine.      This is a telephone visit for 12 minutes.         GRAZYNA RICHEY MD             D: 2020   T: 2020   MT: SHEKHAR      Name:     RADHA ALFONSO   MRN:      [id number]        Account:      [id number]   :      1935           Visit Date:   2020      Document: [id number]        Visit Date:   2020     HEMATOLOGY HISTORY: Mr. 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   1.  On 2020:  -WBC of 30.5, hemoglobin of 9.4 and platelet of 235.  MCV of 87.  Neutrophil of 50%, lymphocytes of 15% and monocytes of 17%.  There is myelocyte and metamyelocyte present. (On 2018, CBC normal with WBC of 4.8, hemoglobin 15.3 and platelets of 295.)  -CMP is normal.   3.  CT chest, abdomen and pelvis on 2020 does not reveal any  evidence of malignancy.   4.   On 03/19/2020, NGS panel negative for JAK2, CALR and MPL.  There is IDH2 R140Q and KIT D816V.  5. Bone marrow biopsy done on 04/16/2020. It reveals chronic myelomonocytic leukemia - 1 (CMML-1) characterized by hypercellular bone marrow (90%) with increased M:E ratio and 7% blasts. There are aggregates of atypical mast cells consistent with bone marrow involvement by systemic mastocytosis. Increased bone marrow fibrosis (MF-1-2).  -Normal cytogenetics.   -Flow cytometry on bone marrow reveals increased monocytic cells (43%).  No increase in CD34 positive myeloid blasts.   -BCR-ABL1 bone marrow is negative.   6. On 04/29/2020, tryptase elevated at 51.3 (normal less than 11).  6. Decitabine started on 04/29/2020.     SUBJECTIVE:  Mr. Mack is an 85-year-old gentleman with chronic myelomonocytic and systemic mastocytosis.  He is on decitabine.  He has completed 1 cycle.  Overall, he tolerated it well.      The patient has anxiety.  The patient said that because of anxiety, he does not feel good.  He cannot relax.  He has fatigue.  The patient says that he is fatigued both mentally and physically.      No headache.  No dizziness.  No chest pain. No shortness of breath.  No abdominal pain.  No vomiting.  Appetite is fairly good.  No urinary complaint.  He has constipation.  The patient said that he feels tired most of the time.  He gets some joint stiffness and pain, especially in the morning.      The patient says that treatment with decitabine is making him more anxious.  He wants it to be stopped soon.      PHYSICAL EXAMINATION:  He is alert, oriented x 3.    This is a virtual visit.      LABORATORY DATA:  Reviewed.      ASSESSMENT:   1.  An 85-year-old gentleman with chronic myelomonocytic leukemia-1.   2.  Systemic mastocytosis.   3.  Anxiety.   4.  Fatigue.   5.  Constipation.   6.  Arthritic joint pain.      PLAN:   1.  I had a long discussion with the patient.  CBC was reviewed.  I  explained to him his WBC is normal.  He is mildly anemic and thrombocytopenic.  I told him that decitabine has helped with his leukocytosis.  I explained to the patient that decitabine has been given to treat his chronic myelomonocytic leukemia.  I told him that usually multiple cycles are given.    The patient has anxiety that is causing a lot of problems and he wants to stop decitabine.  I told the patient that he should take at least the second cycle and see how he does.  He is agreeable for it.  Second cycle will be started in about 10 days.  Side effects of desaturation have been reviewed. I explained to the patient that after cycle 2 that if he decides to stop due to decitabine, one option will be to put him on hydroxyurea.    2.  The patient has anxiety.  He has been seen by our psychologist, Dr. Mg.   3.  The patient will take over-the-counter Tylenol for aches and pain.   4.  He will take over-the-counter medication for constipation.   5.  He will continue on alprazolam as needed.     6.  I will see him when he comes to start cycle 2 of decitabine.      This is a telephone visit for 12 minutes.         GRAZYNA RICHEY MD             D: 2020   T: 2020   MT: SHEKHAR      Name:     RADHA ALFONSO   MRN:      [id number]        Account:      [id number]   :      1935           Visit Date:   2020      Document: [id number]          Again, thank you for allowing me to participate in the care of your patient.        Sincerely,        Grazyna Richey MD

## 2020-05-21 NOTE — PROGRESS NOTES
"Jeff Mack is a 85 year old male who is being evaluated via a billable telephone visit.      The patient has been notified of following:     \"This telephone visit will be conducted via a call between you and your physician/provider. We have found that certain health care needs can be provided without the need for a physical exam.  This service lets us provide the care you need with a short phone conversation.  If a prescription is necessary we can send it directly to your pharmacy.  If lab work is needed we can place an order for that and you can then stop by our lab to have the test done at a later time.    Telephone visits are billed at different rates depending on your insurance coverage. During this emergency period, for some insurers they may be billed the same as an in-person visit.  Please reach out to your insurance provider with any questions.    If during the course of the call the physician/provider feels a telephone visit is not appropriate, you will not be charged for this service.\"    Patient has given verbal consent for Telephone visit?  Yes    What phone number would you like to be contacted at? [phone]    How would you like to obtain your AVS? Melissa Stevenson, [facility]      "

## 2020-05-21 NOTE — LETTER
"    5/21/2020         RE: Jeff Mack  [address] [place] [address]        Dear Colleague,    Thank you for referring your patient, Jeff Mack, to the [facility]. Please see a copy of my visit note below.    Jeff Mack is a 85 year old male who is being evaluated via a billable telephone visit.      The patient has been notified of following:     \"This telephone visit will be conducted via a call between you and your physician/provider. We have found that certain health care needs can be provided without the need for a physical exam.  This service lets us provide the care you need with a short phone conversation.  If a prescription is necessary we can send it directly to your pharmacy.  If lab work is needed we can place an order for that and you can then stop by our lab to have the test done at a later time.    Telephone visits are billed at different rates depending on your insurance coverage. During this emergency period, for some insurers they may be billed the same as an in-person visit.  Please reach out to your insurance provider with any questions.    If during the course of the call the physician/provider feels a telephone visit is not appropriate, you will not be charged for this service.\"    Patient has given verbal consent for Telephone visit?  Yes    What phone number would you like to be contacted at? [phone]    How would you like to obtain your AVS? Melissa Stevenson, [facility]        Visit Date:   05/21/2020     HEMATOLOGY HISTORY: Mr. 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   1.  On 02/28/2020:  -WBC of 30.5, hemoglobin of 9.4 and platelet of 235.  MCV of 87.  Neutrophil of 50%, lymphocytes of 15% and monocytes of 17%.  There is myelocyte and metamyelocyte present. (On 11/16/2018, CBC normal with WBC of 4.8, hemoglobin 15.3 and platelets of 295.)  -CMP is normal.   3.  CT chest, " abdomen and pelvis on 02/29/2020 does not reveal any evidence of malignancy.   4.   On 03/19/2020, NGS panel negative for JAK2, CALR and MPL.  There is IDH2 R140Q and KIT D816V.  5. Bone marrow biopsy done on 04/16/2020. It reveals chronic myelomonocytic leukemia - 1 (CMML-1) characterized by hypercellular bone marrow (90%) with increased M:E ratio and 7% blasts. There are aggregates of atypical mast cells consistent with bone marrow involvement by systemic mastocytosis. Increased bone marrow fibrosis (MF-1-2).  -Normal cytogenetics.   -Flow cytometry on bone marrow reveals increased monocytic cells (43%).  No increase in CD34 positive myeloid blasts.   -BCR-ABL1 bone marrow is negative.   6. On 04/29/2020, tryptase elevated at 51.3 (normal less than 11).  6. Decitabine started on 04/29/2020.     SUBJECTIVE:  Mr. Mack is an 85-year-old gentleman with chronic myelomonocytic and systemic mastocytosis.  He is on decitabine.  He has completed 1 cycle.  Overall, he tolerated it well.      The patient has anxiety.  The patient said that because of anxiety, he does not feel good.  He cannot relax.  He has fatigue.  The patient says that he is fatigued both mentally and physically.      No headache.  No dizziness.  No chest pain. No shortness of breath.  No abdominal pain.  No vomiting.  Appetite is fairly good.  No urinary complaint.  He has constipation.  The patient said that he feels tired most of the time.  He gets some joint stiffness and pain, especially in the morning.      The patient says that treatment with decitabine is making him more anxious.  He wants it to be stopped soon.      PHYSICAL EXAMINATION:  He is alert, oriented x 3.    This is a virtual visit.      LABORATORY DATA:  Reviewed.      ASSESSMENT:   1.  An 85-year-old gentleman with chronic myelomonocytic leukemia-1.   2.  Systemic mastocytosis.   3.  Anxiety.   4.  Fatigue.   5.  Constipation.   6.  Arthritic joint pain.      PLAN:   1.  I had a long  discussion with the patient.  CBC was reviewed.  I explained to him his WBC is normal.  He is mildly anemic and thrombocytopenic.  I told him that decitabine has helped with his leukocytosis.  I explained to the patient that decitabine has been given to treat his chronic myelomonocytic leukemia.  I told him that usually multiple cycles are given.    The patient has anxiety that is causing a lot of problems and he wants to stop decitabine.  I told the patient that he should take at least the second cycle and see how he does.  He is agreeable for it.  Second cycle will be started in about 10 days.  Side effects of desaturation have been reviewed. I explained to the patient that after cycle 2 that if he decides to stop due to decitabine, one option will be to put him on hydroxyurea.    2.  The patient has anxiety.  He has been seen by our psychologist, Dr. Mg.   3.  The patient will take over-the-counter Tylenol for aches and pain.   4.  He will take over-the-counter medication for constipation.   5.  He will continue on alprazolam as needed.     6.  I will see him when he comes to start cycle 2 of decitabine.      This is a telephone visit for 12 minutes.         GRAZYNA RICHEY MD             D: 2020   T: 2020   MT: SHEKHAR      Name:     RADHA ALFONSO   MRN:      [id number]        Account:      [id number]   :      1935           Visit Date:   2020      Document: [id number]        Visit Date:   2020     HEMATOLOGY HISTORY: Mr. 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   1.  On 2020:  -WBC of 30.5, hemoglobin of 9.4 and platelet of 235.  MCV of 87.  Neutrophil of 50%, lymphocytes of 15% and monocytes of 17%.  There is myelocyte and metamyelocyte present. (On 2018, CBC normal with WBC of 4.8, hemoglobin 15.3 and platelets of 295.)  -CMP is normal.   3.  CT chest, abdomen and pelvis on 2020 does not reveal any  evidence of malignancy.   4.   On 03/19/2020, NGS panel negative for JAK2, CALR and MPL.  There is IDH2 R140Q and KIT D816V.  5. Bone marrow biopsy done on 04/16/2020. It reveals chronic myelomonocytic leukemia - 1 (CMML-1) characterized by hypercellular bone marrow (90%) with increased M:E ratio and 7% blasts. There are aggregates of atypical mast cells consistent with bone marrow involvement by systemic mastocytosis. Increased bone marrow fibrosis (MF-1-2).  -Normal cytogenetics.   -Flow cytometry on bone marrow reveals increased monocytic cells (43%).  No increase in CD34 positive myeloid blasts.   -BCR-ABL1 bone marrow is negative.   6. On 04/29/2020, tryptase elevated at 51.3 (normal less than 11).  6. Decitabine started on 04/29/2020.     SUBJECTIVE:  Mr. Mack is an 85-year-old gentleman with chronic myelomonocytic and systemic mastocytosis.  He is on decitabine.  He has completed 1 cycle.  Overall, he tolerated it well.      The patient has anxiety.  The patient said that because of anxiety, he does not feel good.  He cannot relax.  He has fatigue.  The patient says that he is fatigued both mentally and physically.      No headache.  No dizziness.  No chest pain. No shortness of breath.  No abdominal pain.  No vomiting.  Appetite is fairly good.  No urinary complaint.  He has constipation.  The patient said that he feels tired most of the time.  He gets some joint stiffness and pain, especially in the morning.      The patient says that treatment with decitabine is making him more anxious.  He wants it to be stopped soon.      PHYSICAL EXAMINATION:  He is alert, oriented x 3.    This is a virtual visit.      LABORATORY DATA:  Reviewed.      ASSESSMENT:   1.  An 85-year-old gentleman with chronic myelomonocytic leukemia-1.   2.  Systemic mastocytosis.   3.  Anxiety.   4.  Fatigue.   5.  Constipation.   6.  Arthritic joint pain.      PLAN:   1.  I had a long discussion with the patient.  CBC was reviewed.  I  explained to him his WBC is normal.  He is mildly anemic and thrombocytopenic.  I told him that decitabine has helped with his leukocytosis.  I explained to the patient that decitabine has been given to treat his chronic myelomonocytic leukemia.  I told him that usually multiple cycles are given.    The patient has anxiety that is causing a lot of problems and he wants to stop decitabine.  I told the patient that he should take at least the second cycle and see how he does.  He is agreeable for it.  Second cycle will be started in about 10 days.  Side effects of desaturation have been reviewed. I explained to the patient that after cycle 2 that if he decides to stop due to decitabine, one option will be to put him on hydroxyurea.    2.  The patient has anxiety.  He has been seen by our psychologist, Dr. Mg.   3.  The patient will take over-the-counter Tylenol for aches and pain.   4.  He will take over-the-counter medication for constipation.   5.  He will continue on alprazolam as needed.     6.  I will see him when he comes to start cycle 2 of decitabine.      This is a telephone visit for 12 minutes.         GRAZYNA RICHEY MD             D: 2020   T: 2020   MT: SHEKHAR      Name:     RADHA ALFONSO   MRN:      [id number]        Account:      [id number]   :      1935           Visit Date:   2020      Document: [id number]          Again, thank you for allowing me to participate in the care of your patient.        Sincerely,        Grazyna Richey MD

## 2020-05-21 NOTE — PROGRESS NOTES
Visit Date:   05/21/2020     HEMATOLOGY HISTORY: Mr. 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   1.  On 02/28/2020:  -WBC of 30.5, hemoglobin of 9.4 and platelet of 235.  MCV of 87.  Neutrophil of 50%, lymphocytes of 15% and monocytes of 17%.  There is myelocyte and metamyelocyte present. (On 11/16/2018, CBC normal with WBC of 4.8, hemoglobin 15.3 and platelets of 295.)  -CMP is normal.   3.  CT chest, abdomen and pelvis on 02/29/2020 does not reveal any evidence of malignancy.   4.   On 03/19/2020, NGS panel negative for JAK2, CALR and MPL.  There is IDH2 R140Q and KIT D816V.  5. Bone marrow biopsy done on 04/16/2020. It reveals chronic myelomonocytic leukemia - 1 (CMML-1) characterized by hypercellular bone marrow (90%) with increased M:E ratio and 7% blasts. There are aggregates of atypical mast cells consistent with bone marrow involvement by systemic mastocytosis. Increased bone marrow fibrosis (MF-1-2).  -Normal cytogenetics.   -Flow cytometry on bone marrow reveals increased monocytic cells (43%).  No increase in CD34 positive myeloid blasts.   -BCR-ABL1 bone marrow is negative.   6. On 04/29/2020, tryptase elevated at 51.3 (normal less than 11).  6. Decitabine started on 04/29/2020.     SUBJECTIVE:  Mr. Mack is an 85-year-old gentleman with chronic myelomonocytic and systemic mastocytosis.  He is on decitabine.  He has completed 1 cycle.  Overall, he tolerated it well.      The patient has anxiety.  The patient said that because of anxiety, he does not feel good.  He cannot relax.  He has fatigue.  The patient says that he is fatigued both mentally and physically.      No headache.  No dizziness.  No chest pain. No shortness of breath.  No abdominal pain.  No vomiting.  Appetite is fairly good.  No urinary complaint.  He has constipation.  The patient said that he feels tired most of the time.  He gets some joint stiffness and pain, especially in the  morning.      The patient says that treatment with decitabine is making him more anxious.  He wants it to be stopped soon.      PHYSICAL EXAMINATION:  He is alert, oriented x 3.    This is a virtual visit.      LABORATORY DATA:  Reviewed.      ASSESSMENT:   1.  An 85-year-old gentleman with chronic myelomonocytic leukemia-1.   2.  Systemic mastocytosis.   3.  Anxiety.   4.  Fatigue.   5.  Constipation.   6.  Arthritic joint pain.      PLAN:   1.  I had a long discussion with the patient.  CBC was reviewed.  I explained to him his WBC is normal.  He is mildly anemic and thrombocytopenic.  I told him that decitabine has helped with his leukocytosis.  I explained to the patient that decitabine has been given to treat his chronic myelomonocytic leukemia.  I told him that usually multiple cycles are given.    The patient has anxiety that is causing a lot of problems and he wants to stop decitabine.  I told the patient that he should take at least the second cycle and see how he does.  He is agreeable for it.  Second cycle will be started in about 10 days.  Side effects of desaturation have been reviewed. I explained to the patient that after cycle 2 that if he decides to stop due to decitabine, one option will be to put him on hydroxyurea.    2.  The patient has anxiety.  He has been seen by our psychologist, Dr. Mg.   3.  The patient will take over-the-counter Tylenol for aches and pain.   4.  He will take over-the-counter medication for constipation.   5.  He will continue on alprazolam as needed.     6.  I will see him when he comes to start cycle 2 of decitabine.      This is a telephone visit for 12 minutes.         GRAZYNA RICHEY MD             D: 2020   T: 2020   MT: SHEKHAR      Name:     RADHA ALFONSO   MRN:      [id number]        Account:      [id number]   :      1935           Visit Date:   2020      Document: [id number]

## 2020-05-21 NOTE — PROGRESS NOTES
Visit Date:   05/21/2020     HEMATOLOGY HISTORY: Mr. 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   1.  On 02/28/2020:  -WBC of 30.5, hemoglobin of 9.4 and platelet of 235.  MCV of 87.  Neutrophil of 50%, lymphocytes of 15% and monocytes of 17%.  There is myelocyte and metamyelocyte present. (On 11/16/2018, CBC normal with WBC of 4.8, hemoglobin 15.3 and platelets of 295.)  -CMP is normal.   3.  CT chest, abdomen and pelvis on 02/29/2020 does not reveal any evidence of malignancy.   4.   On 03/19/2020, NGS panel negative for JAK2, CALR and MPL.  There is IDH2 R140Q and KIT D816V.  5. Bone marrow biopsy done on 04/16/2020. It reveals chronic myelomonocytic leukemia - 1 (CMML-1) characterized by hypercellular bone marrow (90%) with increased M:E ratio and 7% blasts. There are aggregates of atypical mast cells consistent with bone marrow involvement by systemic mastocytosis. Increased bone marrow fibrosis (MF-1-2).  -Normal cytogenetics.   -Flow cytometry on bone marrow reveals increased monocytic cells (43%).  No increase in CD34 positive myeloid blasts.   -BCR-ABL1 bone marrow is negative.   6. On 04/29/2020, tryptase elevated at 51.3 (normal less than 11).  6. Decitabine started on 04/29/2020.     SUBJECTIVE:  Mr. Mack is an 85-year-old gentleman with chronic myelomonocytic and systemic mastocytosis.  He is on decitabine.  He has completed 1 cycle.  Overall, he tolerated it well.      The patient has anxiety.  The patient said that because of anxiety, he does not feel good.  He cannot relax.  He has fatigue.  The patient says that he is fatigued both mentally and physically.      No headache.  No dizziness.  No chest pain. No shortness of breath.  No abdominal pain.  No vomiting.  Appetite is fairly good.  No urinary complaint.  He has constipation.  The patient said that he feels tired most of the time.  He gets some joint stiffness and pain, especially in the  morning.      The patient says that treatment with decitabine is making him more anxious.  He wants it to be stopped soon.      PHYSICAL EXAMINATION:  He is alert, oriented x 3.    This is a virtual visit.      LABORATORY DATA:  Reviewed.      ASSESSMENT:   1.  An 85-year-old gentleman with chronic myelomonocytic leukemia-1.   2.  Systemic mastocytosis.   3.  Anxiety.   4.  Fatigue.   5.  Constipation.   6.  Arthritic joint pain.      PLAN:   1.  I had a long discussion with the patient.  CBC was reviewed.  I explained to him his WBC is normal.  He is mildly anemic and thrombocytopenic.  I told him that decitabine has helped with his leukocytosis.  I explained to the patient that decitabine has been given to treat his chronic myelomonocytic leukemia.  I told him that usually multiple cycles are given.    The patient has anxiety that is causing a lot of problems and he wants to stop decitabine.  I told the patient that he should take at least the second cycle and see how he does.  He is agreeable for it.  Second cycle will be started in about 10 days.  Side effects of desaturation have been reviewed. I explained to the patient that after cycle 2 that if he decides to stop due to decitabine, one option will be to put him on hydroxyurea.    2.  The patient has anxiety.  He has been seen by our psychologist, Dr. Mg.   3.  The patient will take over-the-counter Tylenol for aches and pain.   4.  He will take over-the-counter medication for constipation.   5.  He will continue on alprazolam as needed.     6.  I will see him when he comes to start cycle 2 of decitabine.      This is a telephone visit for 12 minutes.         GRAZYNA RICHEY MD             D: 2020   T: 2020   MT: SHEKHAR      Name:     RADHA ALFONSO   MRN:      [id number]        Account:      [id number]   :      1935           Visit Date:   2020      Document: [id number]

## 2020-05-21 NOTE — PATIENT INSTRUCTIONS
1. Start cycle 2 of decitabine on 06/01/2020.  2.  See me when he comes for decitabine.    Patient prefers a call to schedule

## 2020-05-26 NOTE — PROGRESS NOTES
"Jeff Mack is a 85 year old male who is being evaluated via a billable telephone visit. Phone call duration: 20 minutes    The patient has been notified of following:     \"This telephone visit will be conducted via a call between you and your physician/provider. We have found that certain health care needs can be provided without the need for a physical exam.  This service lets us provide the care you need with a short phone conversation.     Telephone visits are billed at different rates depending on your insurance coverage. During this emergency period, for some insurers they may be billed the same as an in-person visit.  Please reach out to your insurance provider with any questions.    Patient has given verbal consent for Telephone visit?  Yes     Jeff Mack is a 85 year old male who presents for Depression  The patient was seen for a 20 minute appointment on 5/20/2020.    Subjective: He still experiences frustration and sadness regarding his fatigue levels and the reduction in the amount of activity he can do each day. His frustration was evident and it was clear that he was struggling to accommodate this information into his sense of self. He recalled the topic of activity pacing but was frustrated that it did not create the results he wanted.     Objective: He was polite but clearly frustrated. Affect was appropriate to the content of the therapeutic conversation.     Diagnosis:   Encounter Diagnosis   Name Primary?     Adjustment disorder with depressed mood Yes     Recommendations/Plan:  1. Continue to modify the activity pacing activities  2. Return for follow-up as needed.     Thank you for this opportunity to participate in your care of this patient.    Juan Mg Psy.D., L.P.  Director, Oncology Supportive Care   "

## 2020-05-27 NOTE — TELEPHONE ENCOUNTER
Writer contacted pharmacy with Dr. Ndiaye's recommendations. Requested pharmacy cancel refill request.     Kathy Alegre, NICON, RN, PHN

## 2020-05-27 NOTE — TELEPHONE ENCOUNTER
Left a non-detailed message and call back number [phone].     Domi Rivas RN, BSN  [facility] [place]

## 2020-05-27 NOTE — TELEPHONE ENCOUNTER
----- Message from Obdulio Ndiaye MD sent at 5/27/2020  3:05 PM CDT -----  Regarding: RE: Refill Request- Allopurinol  No need for refill of allopurinol.  ----- Message -----  From: Kathy Alegre RN  Sent: 5/27/2020   2:31 PM CDT  To: Jody Mehta RN, Obdulio Ndiaye MD  Subject: Refill Request- Allopurinol                      Dr. Ndiaye- We received multiple refill requests via fax for Allopurinol. Not active on pt's medication list. Should he be taking? Please advise and/or send Rx if appropriate. Thank you!    [employer] Johnna Chavez, STALIN

## 2020-05-27 NOTE — TELEPHONE ENCOUNTER
Routing refill request to provider for review/approval because:  Drug not active on patient's medication list    Domi Rivas RN, BSN  [facility] [place]

## 2020-05-28 NOTE — TELEPHONE ENCOUNTER
"Patient is scheduled to be seen in our lab 5/29/2020 AT 0945.  Appointment notes indicate \"ORDERS - DR RICHEY\".  NO current FUTURE or STANDING ORDERS have been placed.  Please place current FUTURE or STANDING orders as needed.      Note that orders have a maximum duration of one (1) year.    Thank you.  Please call if you have any questions.     "

## 2020-05-28 NOTE — TELEPHONE ENCOUNTER
Called patient, patient does not need a refill. Pharmacy error.     Domi Rivas RN, BSN  [facility] [place]

## 2020-05-29 NOTE — TELEPHONE ENCOUNTER
Attempted to call patient to triage symptoms.  Left a non-detailed message and call back number [phone].     Domi Rivas RN, BSN  [facility] [place]

## 2020-05-29 NOTE — TELEPHONE ENCOUNTER
Reason for Call:  Other call back    Detailed comments: Pt has questions about onstipation for 3 days     Phone Number Patient can be reached at: Home number on file [phone] (home)    Best Time: Pt has appt at 9:45 so if could call before 9:30    Can we leave a detailed message on this number? YES    Call taken on 5/29/2020 at 9:05 AM by Hyun Bianchi

## 2020-05-29 NOTE — TELEPHONE ENCOUNTER
"Patient reports no BM for 3 days. Has tried Miralax and Sennakot. Does drink 6-8 glasses of water a day.   Patient denies abdominal pain or bloating, rectal pain or any other symptom. Agrees to continue to monitor and call back if new or worsening symptoms.   Patient states the he will try Fleets enema.  If unable to have BM over the weekend, will call back Monday.    Additional Information    Negative: Abdomen pain is the main symptom and adult male    Negative: Abdomen pain is the main symptom and adult female    Negative: Rectal bleeding or blood in stool is the main symptom    Negative: Patient sounds very sick or weak to the triager    Negative: Constant abdominal pain lasting > 2 hours    Negative: Vomiting bile (green color)    Negative: Vomiting and abdomen looks much more swollen than usual    Negative: Rectal pain or fullness from fecal impaction (rectum full of stool) and NOT better after SITZ bath, suppository or enema    Negative: Abdomen is more swollen than usual    Negative: Last bowel movement (BM) > 4 days ago    Negative: Leaking stool    Negative: Intermittent mild abdominal pain and fever    Mild constipation    Answer Assessment - Initial Assessment Questions  1. STOOL PATTERN OR FREQUENCY: \"How often do you pass bowel movements (BMs)?\"  (Normal range: tid to q 3 days)  \"When was the last BM passed?\"        Goes about every 2 days. Last BM 4 days ago  2. STRAINING: \"Do you have to strain to have a BM?\"       Yes, straining a lot. Feels like I can go and then nothing happens. Not eating a lot of food. Eating a lot of heavy breads.   3. RECTAL PAIN: \"Does your rectum hurt when the stool comes out?\" If so, ask: \"Do you have hemorrhoids? How bad is the pain?\"  (Scale 1-10; or mild, moderate, severe)      No rectal pain.   4. STOOL COMPOSITION: \"Are the stools hard?\"       Have been soft  5. BLOOD ON STOOLS: \"Has there been any blood on the toilet tissue or on the surface of the BM?\" If so, ask: " "\"When was the last time?\"       no  6. CHRONIC CONSTIPATION: \"Is this a new problem for you?\"  If no, ask: \"How long have you had this problem?\" (days, weeks, months)       Going on for some time. Regular for awhile and then run into a situation like this.  7. CHANGES IN DIET: \"Have there been any recent changes in your diet?\"       Eating more bread likely  8. MEDICATIONS: \"Have you been taking any new medications?\"      Last chemotherapy was 2 weeks ago  9. LAXATIVES: \"Have you been using any laxatives or enemas?\"  If yes, ask \"What, how often, and when was the last time?\"      Miralax 2-3 days, took Sennakot the last 2 noghts  10. CAUSE: \"What do you think is causing the constipation?\"         Ongoing problem. Has history of diverticulitis. No c/o of abdominal pain  11. OTHER SYMPTOMS: \"Do you have any other symptoms?\" (e.g., abdominal pain, fever, vomiting)        none  12. PREGNANCY: \"Is there any chance you are pregnant?\" \"When was your last menstrual period?\"        NA    Protocols used: CONSTIPATION-A-[place]  Ashley Mark RN    "

## 2020-06-01 PROBLEM — R53.1 WEAKNESS: Status: RESOLVED | Noted: 2020-01-01 | Resolved: 2020-01-01

## 2020-06-01 NOTE — PROGRESS NOTES
"Oncology Rooming Note    June 1, 2020 9:04 AM   Jeff Mack is a 85 year old male who presents for:    Chief Complaint   Patient presents with     Oncology Clinic Visit     Initial Vitals: There were no vitals taken for this visit. Estimated body mass index is 23.75 kg/m  as calculated from the following:    Height as of 5/4/20: 1.753 m (5' 9\").    Weight as of 5/4/20: 72.9 kg (160 lb 12.8 oz). There is no height or weight on file to calculate BSA.  Data Unavailable Comment: Data Unavailable   No LMP for male patient.  Allergies reviewed: Yes  Medications reviewed: Yes    Medications: Medication refills not needed today.  Pharmacy name entered into [facility]:    [id number] [place] [facility] [address] [address] [place], [place] - [address] [employer]    Clinical concerns:  doctor was notified.      Briana Eubanks MA            "

## 2020-06-01 NOTE — PATIENT INSTRUCTIONS
1. Continue decitabine.  2. Nutrition consult.  3. Ensure 2 to 3 cans a day.  4. Weekly CBC. (Pt will do labs at his primary clinic)  5. Follow-up in 2 weeks.    Patient in [facility]

## 2020-06-01 NOTE — PROGRESS NOTES
Infusion Nursing Note:  Jeff Mack presents today for dacogen.    Patient seen by provider today: Yes: Senthil   present during visit today: Not Applicable.    Note: N/A.    Intravenous Access:  Peripheral IV placed.    Treatment Conditions:  Lab Results   Component Value Date    HGB 10.5 05/29/2020     Lab Results   Component Value Date    WBC 2.6 05/29/2020      Lab Results   Component Value Date    ANEU 2.2 05/29/2020     Lab Results   Component Value Date     05/29/2020      Results reviewed, labs MET treatment parameters, ok to proceed with treatment.      Post Infusion Assessment:  Patient tolerated infusion without incident.  Blood return noted pre and post infusion.  Site patent and intact, free from redness, edema or discomfort.  No evidence of extravasations.  Access discontinued per protocol.       Discharge Plan:   Discharge instructions reviewed with: Patient.  Patient and/or family verbalized understanding of discharge instructions and all questions answered.  Patient discharged in stable condition accompanied by: self.  Departure Mode: Ambulatory.    Conchis Cueva RN

## 2020-06-02 NOTE — PROGRESS NOTES
Visit Date:   06/01/2020     HEMATOLOGY HISTORY: Mr. 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   1.  On 02/28/2020:  -WBC of 30.5, hemoglobin of 9.4 and platelet of 235.  MCV of 87.  Neutrophil of 50%, lymphocytes of 15% and monocytes of 17%.  There is myelocyte and metamyelocyte present. (On 11/16/2018, CBC normal with WBC of 4.8, hemoglobin 15.3 and platelets of 295.)  -CMP is normal.   3.  CT chest, abdomen and pelvis on 02/29/2020 does not reveal any evidence of malignancy.   4.   On 03/19/2020, NGS panel negative for JAK2, CALR and MPL.  There is IDH2 R140Q and KIT D816V.  5. Bone marrow biopsy done on 04/16/2020. It reveals chronic myelomonocytic leukemia - 1 (CMML-1) characterized by hypercellular bone marrow (90%) with increased M:E ratio and 7% blasts. There are aggregates of atypical mast cells consistent with bone marrow involvement by systemic mastocytosis. Increased bone marrow fibrosis (MF-1-2).  -Normal cytogenetics.   -Flow cytometry on bone marrow reveals increased monocytic cells (43%).  No increase in CD34 positive myeloid blasts.   -BCR-ABL1 bone marrow is negative.   6. On 04/29/2020, tryptase elevated at 51.3 (normal less than 11).  6. Decitabine started on 04/29/2020.     SUBJECTIVE:  Mr. Mack is an 85-year-old gentleman with chronic myelomonocytic leukemia-1 and  systemic mastocytosis.  The patient is on decitabine.  The patient is here to start cycle #2.  Overall, he tolerated cycle #1 well.      The patient has anxiety disorder.  The patient says that he has been very anxious because of this blood disorder and also because of COVID-19 situation.  Because of that, he does not feel well.  Because of anxiety, he would like to stop IV decitabine.      No headache.  No dizziness.  No ear pain or sore throat.  No neck pain.  No chest pain.  No shortness of breath.  No abdominal pain, nausea or vomiting.  Appetite has been good.  Because of  anxiety, he lost some weight.  No urinary complaint.  He gets some constipation.  He takes MiraLax which helps.  The patient gets occasional numbness in his feet.  Because of her anxiety, does not get good sleep.      PHYSICAL EXAMINATION:   GENERAL:  The patient was alert, oriented x 3.   VITAL SIGNS:  Reviewed.  ECOG PS of 1.   The rest of the systems not examined.      LABORATORY DATA:  Reviewed.      ASSESSMENT:   1.  An 85-year-old gentleman with chronic myelomonocytic leukemia-1 on decitabine.   2.  Systemic mastocytosis.   3.  Anxiety.   4.  Fatigue secondary to his age, decitabine and anxiety.      PLAN:   1.  I discussed regarding CMML.  I explained to him that his disease is responding.  His CBC was reviewed with him.  His WBC count has decreased and he is leukopenic.  He is not neutropenic.  His hemoglobin is remaining stable around 10.  Platelet is normal.  The patient is tolerating decitabine well.  Most of his symptoms are all secondary to anxiety.  After discussion, patient is agreeable to take 1 more cycle of gemcitabine.  After that, he would like to stop.  I told the patient that we can start him on hydroxyurea after that.   2.  The patient is leukopenic.  He is not neutropenic.  We will monitor CBC weekly.  I advised the patient to see a physician if he has fever, infection, worsening weakness or any other concerns.   3.  Discussed regarding his anxiety.  He takes Xanax which helps.  He will continue on that.  The patient will continue to follow up with Dr. Satish Mg.   4.  The patient had a few questions, which were all answered.     5.  We will do labs and virtual visit in 2 weeks' time.  At that time, I will discuss regarding bone marrow biopsy.   6.  We will set him up to see a nutritionist.  Because of weight loss, I also encouraged him to drink 2-3 cans of Ensure a day.         GRAZYNA RICHEY MD             D: 06/01/2020   T: 06/02/2020   MT: TERESA      Name:     RADHA ALFONSO   MRN:       [id number]        Account:      [id number]   :      1935           Visit Date:   2020      Document: [id number]

## 2020-06-02 NOTE — PROGRESS NOTES
Infusion Nursing Note:  Jeff Mack presents today for Cycle 2 Day 2 Decitabine.    Patient seen by provider today: No   present during visit today: Not Applicable.    Note: N/A.    Intravenous Access:  Peripheral IV placed.    Treatment Conditions:  Lab Results   Component Value Date    HGB 10.5 05/29/2020     Lab Results   Component Value Date    WBC 2.6 05/29/2020      Lab Results   Component Value Date    ANEU 2.2 05/29/2020     Lab Results   Component Value Date     05/29/2020      Lab Results   Component Value Date     05/29/2020                   Lab Results   Component Value Date    POTASSIUM 4.2 05/29/2020           Lab Results   Component Value Date    MAG 2.1 04/26/2020            Lab Results   Component Value Date    CR 0.67 05/29/2020                   Lab Results   Component Value Date    KAE 8.7 05/29/2020                Lab Results   Component Value Date    BILITOTAL 0.8 05/29/2020           Lab Results   Component Value Date    ALBUMIN 3.1 05/29/2020                    Lab Results   Component Value Date    ALT 21 05/29/2020           Lab Results   Component Value Date    AST 17 05/29/2020           Post Infusion Assessment:  Patient tolerated infusion without incident.  Blood return noted pre and post infusion.  Site patent and intact, free from redness, edema or discomfort.  No evidence of extravasations.  Access discontinued per protocol.       Discharge Plan:   Patient declined prescription refills.  Discharge instructions reviewed with: Patient.  Patient verbalized understanding of discharge instructions and all questions answered.  Copy of AVS reviewed with patient.  Patient will return 6/3/20 for next appointment.  Patient discharged in stable condition accompanied by: self.  Departure Mode: Ambulatory.    Elisha Kaur RN

## 2020-06-03 NOTE — PROGRESS NOTES
Infusion Nursing Note:  Jeff Mack presents today for dacogen.    Patient seen by provider today: No   present during visit today: Not Applicable.    Note: N/A.    Intravenous Access:  Peripheral IV placed.    Treatment Conditions:  Results reviewed, labs MET treatment parameters, ok to proceed with treatment.      Post Infusion Assessment:  Patient tolerated infusion without incident.  Blood return noted pre and post infusion.  Site patent and intact, free from redness, edema or discomfort.  No evidence of extravasations.  Access discontinued per protocol.       Discharge Plan:   Discharge instructions reviewed with: Patient.  Patient and/or family verbalized understanding of discharge instructions and all questions answered.  AVS printed for patient.  Patient will return 6/4/20 for next appointment.   Patient discharged in stable condition accompanied by: self.  Departure Mode: Ambulatory.    Kayleen Gomez RN

## 2020-06-04 NOTE — PROGRESS NOTES
Infusion Nursing Note:  Jeff Mack presents today for C2D4 Decitabine.    Patient seen by provider today: No    Note: Reports to tolerating infusions.  Reports some fatigue and loss of appetite.  No new issues to report today.    Intravenous Access:  Peripheral IV placed.      Treatment Conditions:  Lab Results   Component Value Date    HGB 10.5 05/29/2020     Lab Results   Component Value Date    WBC 2.6 05/29/2020      Lab Results   Component Value Date    ANEU 2.2 05/29/2020     Lab Results   Component Value Date     05/29/2020      Lab Results   Component Value Date     05/29/2020                   Lab Results   Component Value Date    POTASSIUM 4.2 05/29/2020           Lab Results   Component Value Date    MAG 2.1 04/26/2020            Lab Results   Component Value Date    CR 0.67 05/29/2020                   Lab Results   Component Value Date    KAE 8.7 05/29/2020                Lab Results   Component Value Date    BILITOTAL 0.8 05/29/2020           Lab Results   Component Value Date    ALBUMIN 3.1 05/29/2020                    Lab Results   Component Value Date    ALT 21 05/29/2020           Lab Results   Component Value Date    AST 17 05/29/2020           Post Infusion Assessment:  Patient tolerated infusion without incident.  Blood return noted pre and post infusion.  Site patent and intact, free from redness, edema or discomfort.  No evidence of extravasations.  Access discontinued per protocol.    Discharge Plan:   Patient declined prescription refills.  Discharge instructions reviewed with: Patient.  Patient and/or family verbalized understanding of discharge instructions and all questions answered.  Copy of AVS reviewed with patient and/or family.  Patient will return 6/5/20 for next appointment.  Patient discharged in stable condition accompanied by: self.  Departure Mode: Ambulatory.    Mariam Townsend, RN, RN

## 2020-06-05 NOTE — PROGRESS NOTES
Infusion Nursing Note:  Jeff Mack presents today for C2D5 dacogen.    Patient seen by provider today: No   present during visit today: Not Applicable.    Note: N/A.    Intravenous Access:  Peripheral IV placed.    Treatment Conditions:  Not Applicable.      Post Infusion Assessment:  Patient tolerated infusion without incident.  Blood return noted pre and post infusion.  Site patent and intact, free from redness, edema or discomfort.  No evidence of extravasations.  Access discontinued per protocol.       Discharge Plan:   Discharge instructions reviewed with: Patient.  Patient and/or family verbalized understanding of discharge instructions and all questions answered.  Patient discharged in stable condition accompanied by: self.  Departure Mode: Ambulatory.    Mena Roy RN

## 2020-06-07 NOTE — PROGRESS NOTES
Visit Date:   06/01/2020     HEMATOLOGY HISTORY: Mr. 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   1.  On 02/28/2020:  -WBC of 30.5, hemoglobin of 9.4 and platelet of 235.  MCV of 87.  Neutrophil of 50%, lymphocytes of 15% and monocytes of 17%.  There is myelocyte and metamyelocyte present. (On 11/16/2018, CBC normal with WBC of 4.8, hemoglobin 15.3 and platelets of 295.)  -CMP is normal.   3.  CT chest, abdomen and pelvis on 02/29/2020 does not reveal any evidence of malignancy.   4.   On 03/19/2020, NGS panel negative for JAK2, CALR and MPL.  There is IDH2 R140Q and KIT D816V.  5. Bone marrow biopsy done on 04/16/2020. It reveals chronic myelomonocytic leukemia - 1 (CMML-1) characterized by hypercellular bone marrow (90%) with increased M:E ratio and 7% blasts. There are aggregates of atypical mast cells consistent with bone marrow involvement by systemic mastocytosis. Increased bone marrow fibrosis (MF-1-2).  -Normal cytogenetics.   -Flow cytometry on bone marrow reveals increased monocytic cells (43%).  No increase in CD34 positive myeloid blasts.   -BCR-ABL1 bone marrow is negative.   6. On 04/29/2020, tryptase elevated at 51.3 (normal less than 11).  6. Decitabine started on 04/29/2020.     SUBJECTIVE:  Mr. Mack is an 85-year-old gentleman with chronic myelomonocytic leukemia-1 and  systemic mastocytosis.  The patient is on decitabine.  The patient is here to start cycle #2.  Overall, he tolerated cycle #1 well.      The patient has anxiety disorder.  The patient says that he has been very anxious because of this blood disorder and also because of COVID-19 situation.  Because of that, he does not feel well.  Because of anxiety, he would like to stop IV decitabine.      No headache.  No dizziness.  No ear pain or sore throat.  No neck pain.  No chest pain.  No shortness of breath.  No abdominal pain, nausea or vomiting.  Appetite has been good.  Because of  anxiety, he lost some weight.  No urinary complaint.  He gets some constipation.  He takes MiraLax which helps.  The patient gets occasional numbness in his feet.  Because of her anxiety, does not get good sleep.      PHYSICAL EXAMINATION:   GENERAL:  The patient was alert, oriented x 3.   VITAL SIGNS:  Reviewed.  ECOG PS of 1.   The rest of the systems not examined.      LABORATORY DATA:  Reviewed.      ASSESSMENT:   1.  An 85-year-old gentleman with chronic myelomonocytic leukemia-1 on decitabine.   2.  Systemic mastocytosis.   3.  Anxiety.   4.  Fatigue secondary to his age, decitabine and anxiety.      PLAN:   1.  I discussed regarding CMML.  I explained to him that his disease is responding.  His CBC was reviewed with him.  His WBC count has decreased and he is leukopenic.  He is not neutropenic.  His hemoglobin is remaining stable around 10.  Platelet is normal.  The patient is tolerating decitabine well.  Most of his symptoms are all secondary to anxiety.  After discussion, patient is agreeable to take 1 more cycle of gemcitabine.  After that, he would like to stop.  I told the patient that we can start him on hydroxyurea after that.   2.  The patient is leukopenic.  He is not neutropenic.  We will monitor CBC weekly.  I advised the patient to see a physician if he has fever, infection, worsening weakness or any other concerns.   3.  Discussed regarding his anxiety.  He takes Xanax which helps.  He will continue on that.  The patient will continue to follow up with Dr. Satish Mg.   4.  The patient had a few questions, which were all answered.     5.  We will do labs and virtual visit in 2 weeks' time.  At that time, I will discuss regarding bone marrow biopsy.   6.  We will set him up to see a nutritionist.  Because of weight loss, I also encouraged him to drink 2-3 cans of Ensure a day.         GRAZYNA RICHEY MD             D: 06/01/2020   T: 06/02/2020   MT: TERESA      Name:     RADHA ALFONSO   MRN:       [id number]        Account:      [id number]   :      1935           Visit Date:   2020      Document: [id number]

## 2020-06-11 NOTE — LETTER
"    6/11/2020         RE: Jeff Mack  [address]  [place] [address]        Dear Colleague,    Thank you for referring your patient, Jeff Mack, to the [facility]. Please see a copy of my visit note below.    Jeff Mack is a 85 year old male who is being evaluated via a billable telephone visit.      The patient has been notified of following:     \"This telephone visit will be conducted via a call between you and your physician/provider. We have found that certain health care needs can be provided without the need for a physical exam.  This service lets us provide the care you need with a short phone conversation.  If a prescription is necessary we can send it directly to your pharmacy.  If lab work is needed we can place an order for that and you can then stop by our lab to have the test done at a later time.    Telephone visits are billed at different rates depending on your insurance coverage. During this emergency period, for some insurers they may be billed the same as an in-person visit.  Please reach out to your insurance provider with any questions.    If during the course of the call the physician/provider feels a telephone visit is not appropriate, you will not be charged for this service.\"    Patient has given verbal consent for Telephone visit?  Yes    CLINICAL NUTRITION SERVICES - ASSESSMENT NOTE    Jeff Mack 85 year old referred for MNT related to   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) [C93.10]  - Primary        Weight loss [R63.4]         Time Spent: 28 minutes  Visit Type: Initial   Referring Physician: Dr. Ndiaye   Pt accompanied by: self     Nutrition Prescription   Recommendations Suggested by Registered Dietitian (RD):   1. Eat 5-6 times per day  2. Aim for 5728-6189 calories and 80-90 grams protein per day    Malnutrition: Non severe malnutrition in context of chronic disease      NUTRITION HISTORY  Factors affecting " "nutrition intake include:decreased appetite  Patient states he does not have an appetite.  Patient states was told by psychologist to eat every 2 hours so has been trying to increase intake.  Patient usually eats 3 meals per day and does not snack.  Patient states he had esophageal cancer 4 years ago so aware he needs to eat.  Patient states he does not have the strength he previously had.  Patient feels like he has lost muscle.      Current diet: regular  Current appetite/intake: poor-fair  Chemotherapy: Decitabine        Diet Recall  Breakfast Wheaties + strawberries + tomato juice    Lunch Grilled cheese or turkey/ham sandwich or soup (tomato) or scrambled eggs and toast with butter and peanut butter    Dinner Hamburger; mashed potato and vegetables    Snacks Kind bar; banana; chips; cheese and crackers    Beverages Ensure Plus; water; chocolate milk      ANTHROPOMETRICS  Height: 5'9\"  Weight: 155 lbs   BMI: 22.8 kg/m2   Weight Status:  Normal BMI  IBW: 160 lbs (97%)  Weight History: Patient with 9% weight loss in 4 months.  Patient weighed 170 lbs 2/19/2020.  Wt Readings from Last 10 Encounters:   06/05/20 70.5 kg (155 lb 6.4 oz)   06/04/20 70.1 kg (154 lb 9.6 oz)   06/03/20 69.9 kg (154 lb 3.2 oz)   06/02/20 70 kg (154 lb 6.4 oz)   06/01/20 70.2 kg (154 lb 12.8 oz)   05/04/20 72.9 kg (160 lb 12.8 oz)   05/01/20 73.8 kg (162 lb 9.6 oz)   04/30/20 74.4 kg (164 lb)   04/29/20 73.8 kg (162 lb 9.6 oz)   04/26/20 69.3 kg (152 lb 12.8 oz)     Dosing Weight: 70 kg    Medications:   Reviewed    Labs:   Labs reviewed    NUTRITION FOCUSED PHYSICAL ASSESSMENT FOR DIAGNOSING MALNUTRITION:  No:  Unable due to telephone visit due to COVID-19 pandemic     Observed:  Unable due to telephone visit due to COVID-19 pandemic     Observed from interdisciplinary team:  None noted     ASSESSED NUTRITION NEEDS:  Estimated Energy Needs: 5804-3427 kcals (25-30 Kcal/Kg)  Justification: repletion  Estimated Protein Needs:  grams " protein (1.2-1.5 g pro/Kg)  Justification: Repletion  Estimated Fluid Needs: 4654-9706  mL   Justification: maintenance    MALNUTRITION:  % Weight Loss:  > 7.5% in 3 months (severe malnutrition)  % Intake:  <75% for >/= 3 months (non-severe malnutrition)  Subcutaneous Fat Loss:  None observed due to telephone visit due to COVID-19 pandemic   Muscle Loss:  None observed due to telephone visit due to COVID-19 pandemic   Fluid Retention:  None noted due to telephone visit due to COVID-19 pandemic     Malnutrition Diagnosis: Non-Severe malnutrition  In Context of:  Chronic illness or disease    NUTRITION DIAGNOSIS:  Unintended weight loss related to decreased appetite due to anxiety as evidenced by 9% weight loss in 4 months    INTERVENTIONS  Provided written & verbal education:   - Discussed ways to maximize and fortify foods with calories and protein via small frequent meals.    - Advised pt to aim for at least 2226-1403 kcal and 80-90g protein daily.   - Reviewed common barriers to eating with treatment and as treatment progresses. Discussed ways to cope with decreased appetite.   - Reviewed ONS options (Ensure Enlive, Ensure Plus/Boost Plus, CIB, Benecalorie etc). Encouraged patient to drink 1/2 bottle at meals + 1/2 bottle mid day as patient states he feels rush of sugar when he drinks it.  Encouraged pt to start consuming 2 ONS or home made shakes/smoothies daily.       Sent patient corresponding education materials/handouts on:  Six Small Meals a Day, High Calorie, High Protein Recipe booklet, Tips to Increase the Calories in Your Diet and Sources of Protein.     Pt verbalize understanding of materials provided during consult.   Patient Understanding: good  Expected Compliance: fair-good     Implementation  Composition of Meals and Snacks and General/healthful diet    Goals  1.  Aim for 5-6 small frequent meals  2.  Aim for 5662-7312 kcal and 80-90 g protein/day    Follow-Up Plans:   Patient to follow up with [facility]  in 2-4 weeks     RD name and number provided     MONITORING AND EVALUATION:  -Food intake  -Fluid/beverage intake  -Liquid meal replacement or supplement  -Weight trends    Denny Gleason, RD, LD  Clinical Dietitian  [facility]  [phone] (direct)      Again, thank you for allowing me to participate in the care of your patient.        Sincerely,        David Casillas RD, LD

## 2020-06-11 NOTE — LETTER
"    6/11/2020         RE: Jeff Mack  [address]  [place] [address]        Dear Colleague,    Thank you for referring your patient, Jeff Mack, to the [facility]. Please see a copy of my visit note below.    Jeff Mack is a 85 year old male who is being evaluated via a billable telephone visit.      The patient has been notified of following:     \"This telephone visit will be conducted via a call between you and your physician/provider. We have found that certain health care needs can be provided without the need for a physical exam.  This service lets us provide the care you need with a short phone conversation.  If a prescription is necessary we can send it directly to your pharmacy.  If lab work is needed we can place an order for that and you can then stop by our lab to have the test done at a later time.    Telephone visits are billed at different rates depending on your insurance coverage. During this emergency period, for some insurers they may be billed the same as an in-person visit.  Please reach out to your insurance provider with any questions.    If during the course of the call the physician/provider feels a telephone visit is not appropriate, you will not be charged for this service.\"    Patient has given verbal consent for Telephone visit?  Yes    CLINICAL NUTRITION SERVICES - ASSESSMENT NOTE    Jeff Mack 85 year old referred for MNT related to   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) [C93.10]  - Primary        Weight loss [R63.4]         Time Spent: 28 minutes  Visit Type: Initial   Referring Physician: Dr. Ndiaye   Pt accompanied by: self     Nutrition Prescription   Recommendations Suggested by Registered Dietitian (RD):   1. Eat 5-6 times per day  2. Aim for 6782-7496 calories and 80-90 grams protein per day    Malnutrition: Non severe malnutrition in context of chronic disease      NUTRITION HISTORY  Factors affecting " "nutrition intake include:decreased appetite  Patient states he does not have an appetite.  Patient states was told by psychologist to eat every 2 hours so has been trying to increase intake.  Patient usually eats 3 meals per day and does not snack.  Patient states he had esophageal cancer 4 years ago so aware he needs to eat.  Patient states he does not have the strength he previously had.  Patient feels like he has lost muscle.      Current diet: regular  Current appetite/intake: poor-fair  Chemotherapy: Decitabine        Diet Recall  Breakfast Wheaties + strawberries + tomato juice    Lunch Grilled cheese or turkey/ham sandwich or soup (tomato) or scrambled eggs and toast with butter and peanut butter    Dinner Hamburger; mashed potato and vegetables    Snacks Kind bar; banana; chips; cheese and crackers    Beverages Ensure Plus; water; chocolate milk      ANTHROPOMETRICS  Height: 5'9\"  Weight: 155 lbs   BMI: 22.8 kg/m2   Weight Status:  Normal BMI  IBW: 160 lbs (97%)  Weight History: Patient with 9% weight loss in 4 months.  Patient weighed 170 lbs 2/19/2020.  Wt Readings from Last 10 Encounters:   06/05/20 70.5 kg (155 lb 6.4 oz)   06/04/20 70.1 kg (154 lb 9.6 oz)   06/03/20 69.9 kg (154 lb 3.2 oz)   06/02/20 70 kg (154 lb 6.4 oz)   06/01/20 70.2 kg (154 lb 12.8 oz)   05/04/20 72.9 kg (160 lb 12.8 oz)   05/01/20 73.8 kg (162 lb 9.6 oz)   04/30/20 74.4 kg (164 lb)   04/29/20 73.8 kg (162 lb 9.6 oz)   04/26/20 69.3 kg (152 lb 12.8 oz)     Dosing Weight: 70 kg    Medications:   Reviewed    Labs:   Labs reviewed    NUTRITION FOCUSED PHYSICAL ASSESSMENT FOR DIAGNOSING MALNUTRITION:  No:  Unable due to telephone visit due to COVID-19 pandemic     Observed:  Unable due to telephone visit due to COVID-19 pandemic     Observed from interdisciplinary team:  None noted     ASSESSED NUTRITION NEEDS:  Estimated Energy Needs: 0016-0833 kcals (25-30 Kcal/Kg)  Justification: repletion  Estimated Protein Needs:  grams " protein (1.2-1.5 g pro/Kg)  Justification: Repletion  Estimated Fluid Needs: 2851-1684  mL   Justification: maintenance    MALNUTRITION:  % Weight Loss:  > 7.5% in 3 months (severe malnutrition)  % Intake:  <75% for >/= 3 months (non-severe malnutrition)  Subcutaneous Fat Loss:  None observed due to telephone visit due to COVID-19 pandemic   Muscle Loss:  None observed due to telephone visit due to COVID-19 pandemic   Fluid Retention:  None noted due to telephone visit due to COVID-19 pandemic     Malnutrition Diagnosis: Non-Severe malnutrition  In Context of:  Chronic illness or disease    NUTRITION DIAGNOSIS:  Unintended weight loss related to decreased appetite due to anxiety as evidenced by 9% weight loss in 4 months    INTERVENTIONS  Provided written & verbal education:   - Discussed ways to maximize and fortify foods with calories and protein via small frequent meals.    - Advised pt to aim for at least 1689-0532 kcal and 80-90g protein daily.   - Reviewed common barriers to eating with treatment and as treatment progresses. Discussed ways to cope with decreased appetite.   - Reviewed ONS options (Ensure Enlive, Ensure Plus/Boost Plus, CIB, Benecalorie etc). Encouraged patient to drink 1/2 bottle at meals + 1/2 bottle mid day as patient states he feels rush of sugar when he drinks it.  Encouraged pt to start consuming 2 ONS or home made shakes/smoothies daily.       Sent patient corresponding education materials/handouts on:  Six Small Meals a Day, High Calorie, High Protein Recipe booklet, Tips to Increase the Calories in Your Diet and Sources of Protein.     Pt verbalize understanding of materials provided during consult.   Patient Understanding: good  Expected Compliance: fair-good     Implementation  Composition of Meals and Snacks and General/healthful diet    Goals  1.  Aim for 5-6 small frequent meals  2.  Aim for 9311-1373 kcal and 80-90 g protein/day    Follow-Up Plans:   Patient to follow up with [facility]  in 2-4 weeks     RD name and number provided     MONITORING AND EVALUATION:  -Food intake  -Fluid/beverage intake  -Liquid meal replacement or supplement  -Weight trends    Denny Gleason, RD, LD  Clinical Dietitian  [facility]  [phone] (direct)      Again, thank you for allowing me to participate in the care of your patient.        Sincerely,        David Casillas RD, LD

## 2020-06-11 NOTE — PROGRESS NOTES
"Jeff Mack is a 85 year old male who is being evaluated via a billable telephone visit.      The patient has been notified of following:     \"This telephone visit will be conducted via a call between you and your physician/provider. We have found that certain health care needs can be provided without the need for a physical exam.  This service lets us provide the care you need with a short phone conversation.  If a prescription is necessary we can send it directly to your pharmacy.  If lab work is needed we can place an order for that and you can then stop by our lab to have the test done at a later time.    Telephone visits are billed at different rates depending on your insurance coverage. During this emergency period, for some insurers they may be billed the same as an in-person visit.  Please reach out to your insurance provider with any questions.    If during the course of the call the physician/provider feels a telephone visit is not appropriate, you will not be charged for this service.\"    Patient has given verbal consent for Telephone visit?  Yes    CLINICAL NUTRITION SERVICES - ASSESSMENT NOTE    Jeff Mack 85 year old referred for MNT related to   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) [C93.10]  - Primary        Weight loss [R63.4]         Time Spent: 28 minutes  Visit Type: Initial   Referring Physician: Dr. Ndiaye   Pt accompanied by: self     Nutrition Prescription   Recommendations Suggested by Registered Dietitian (RD):   1. Eat 5-6 times per day  2. Aim for 2981-8883 calories and 80-90 grams protein per day    Malnutrition: Non severe malnutrition in context of chronic disease      NUTRITION HISTORY  Factors affecting nutrition intake include:decreased appetite  Patient states he does not have an appetite.  Patient states was told by psychologist to eat every 2 hours so has been trying to increase intake.  Patient usually eats 3 meals per day and does not " "snack.  Patient states he had esophageal cancer 4 years ago so aware he needs to eat.  Patient states he does not have the strength he previously had.  Patient feels like he has lost muscle.      Current diet: regular  Current appetite/intake: poor-fair  Chemotherapy: Decitabine        Diet Recall  Breakfast Wheaties + strawberries + tomato juice    Lunch Grilled cheese or turkey/ham sandwich or soup (tomato) or scrambled eggs and toast with butter and peanut butter    Dinner Hamburger; mashed potato and vegetables    Snacks Kind bar; banana; chips; cheese and crackers    Beverages Ensure Plus; water; chocolate milk      ANTHROPOMETRICS  Height: 5'9\"  Weight: 155 lbs   BMI: 22.8 kg/m2   Weight Status:  Normal BMI  IBW: 160 lbs (97%)  Weight History: Patient with 9% weight loss in 4 months.  Patient weighed 170 lbs 2/19/2020.  Wt Readings from Last 10 Encounters:   06/05/20 70.5 kg (155 lb 6.4 oz)   06/04/20 70.1 kg (154 lb 9.6 oz)   06/03/20 69.9 kg (154 lb 3.2 oz)   06/02/20 70 kg (154 lb 6.4 oz)   06/01/20 70.2 kg (154 lb 12.8 oz)   05/04/20 72.9 kg (160 lb 12.8 oz)   05/01/20 73.8 kg (162 lb 9.6 oz)   04/30/20 74.4 kg (164 lb)   04/29/20 73.8 kg (162 lb 9.6 oz)   04/26/20 69.3 kg (152 lb 12.8 oz)     Dosing Weight: 70 kg    Medications:   Reviewed    Labs:   Labs reviewed    NUTRITION FOCUSED PHYSICAL ASSESSMENT FOR DIAGNOSING MALNUTRITION:  No:  Unable due to telephone visit due to COVID-19 pandemic     Observed:  Unable due to telephone visit due to COVID-19 pandemic     Observed from interdisciplinary team:  None noted     ASSESSED NUTRITION NEEDS:  Estimated Energy Needs: 3345-0414 kcals (25-30 Kcal/Kg)  Justification: repletion  Estimated Protein Needs:  grams protein (1.2-1.5 g pro/Kg)  Justification: Repletion  Estimated Fluid Needs: 6991-6906  mL   Justification: maintenance    MALNUTRITION:  % Weight Loss:  > 7.5% in 3 months (severe malnutrition)  % Intake:  <75% for >/= 3 months (non-severe " malnutrition)  Subcutaneous Fat Loss:  None observed due to telephone visit due to COVID-19 pandemic   Muscle Loss:  None observed due to telephone visit due to COVID-19 pandemic   Fluid Retention:  None noted due to telephone visit due to COVID-19 pandemic     Malnutrition Diagnosis: Non-Severe malnutrition  In Context of:  Chronic illness or disease    NUTRITION DIAGNOSIS:  Unintended weight loss related to decreased appetite due to anxiety as evidenced by 9% weight loss in 4 months    INTERVENTIONS  Provided written & verbal education:   - Discussed ways to maximize and fortify foods with calories and protein via small frequent meals.    - Advised pt to aim for at least 1001-7971 kcal and 80-90g protein daily.   - Reviewed common barriers to eating with treatment and as treatment progresses. Discussed ways to cope with decreased appetite.   - Reviewed ONS options (Ensure Enlive, Ensure Plus/Boost Plus, CIB, Benecalorie etc). Encouraged patient to drink 1/2 bottle at meals + 1/2 bottle mid day as patient states he feels rush of sugar when he drinks it.  Encouraged pt to start consuming 2 ONS or home made shakes/smoothies daily.       Sent patient corresponding education materials/handouts on:  Six Small Meals a Day, High Calorie, High Protein Recipe booklet, Tips to Increase the Calories in Your Diet and Sources of Protein.     Pt verbalize understanding of materials provided during consult.   Patient Understanding: good  Expected Compliance: fair-good     Implementation  Composition of Meals and Snacks and General/healthful diet    Goals  1.  Aim for 5-6 small frequent meals  2.  Aim for 4238-1613 kcal and 80-90 g protein/day    Follow-Up Plans:   Patient to follow up with RD in 2-4 weeks     RD name and number provided     MONITORING AND EVALUATION:  -Food intake  -Fluid/beverage intake  -Liquid meal replacement or supplement  -Weight trends    Denny Gleason, RD, LD  Clinical Dietitian  [facility]  [facility]  [phone] (direct)

## 2020-06-12 NOTE — TELEPHONE ENCOUNTER
Pt calling had blood work done yesterday per Dr. Ndiaye and is interested on what the WBC and HGB are?  Pt has appt on Monday with Dr. Ndiaye and wants to be prepared.    Smita MEZA RN  EP Triage

## 2020-06-12 NOTE — TELEPHONE ENCOUNTER
Reason for Call:  call back    Detailed comments: Had lab's done yesterday afternoon - are results back ?     Phone Number Patient can be reached at: Cell number on file:    Telephone Information:   Mobile [phone]       Best Time: any    Can we leave a detailed message on this number? YES    Call taken on 6/12/2020 at 9:20 AM by Angela White

## 2020-06-15 NOTE — PROGRESS NOTES
"Oncology Rooming Note    Doreen 15, 2020 8:42 AM   Jeff Mack is a 85 year old male who presents for:    Chief Complaint   Patient presents with     Oncology Clinic Visit     Initial Vitals: There were no vitals taken for this visit. Estimated body mass index is 22.95 kg/m  as calculated from the following:    Height as of 6/3/20: 1.753 m (5' 9\").    Weight as of 6/5/20: 70.5 kg (155 lb 6.4 oz). There is no height or weight on file to calculate BSA.  Data Unavailable Comment: Data Unavailable   No LMP for male patient.  Allergies reviewed: Yes  Medications reviewed: Yes    Medications: Medication refills not needed today.  Pharmacy name entered into [facility]:    [id number] [place] [place] [address] [address] [place], [place] - [address]    Clinical concerns:  doctor was notified.      Briana Eubanks MA            "

## 2020-06-15 NOTE — LETTER
"    6/15/2020         RE: Jeff Mack  [address]  [place] [address]        Dear Colleague,    Thank you for referring your patient, Jeff Mack, to the [facility]. Please see a copy of my visit note below.    Oncology Rooming Note    Doreen 15, 2020 8:42 AM   Jeff Mack is a 85 year old male who presents for:    Chief Complaint   Patient presents with     Oncology Clinic Visit     Initial Vitals: There were no vitals taken for this visit. Estimated body mass index is 22.95 kg/m  as calculated from the following:    Height as of 6/3/20: 1.753 m (5' 9\").    Weight as of 6/5/20: 70.5 kg (155 lb 6.4 oz). There is no height or weight on file to calculate BSA.  Data Unavailable Comment: Data Unavailable   No LMP for male patient.  Allergies reviewed: Yes  Medications reviewed: Yes    Medications: Medication refills not needed today.  Pharmacy name entered into [employer]:    [facility] #[id number] - [place], [place] - [address]  [id number] [place], [place] - [address]    Clinical concerns:  doctor was notified.      Briana Eubanks MA              Visit Date:   06/15/2020     HEMATOLOGY HISTORY: Mr. 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   1.  On 02/28/2020:  -WBC of 30.5, hemoglobin of 9.4 and platelet of 235.  MCV of 87.  Neutrophil of 50%, lymphocytes of 15% and monocytes of 17%.  There is myelocyte and metamyelocyte present. (On 11/16/2018, CBC normal with WBC of 4.8, hemoglobin 15.3 and platelets of 295.)  -CMP is normal.   3.  CT chest, abdomen and pelvis on 02/29/2020 does not reveal any evidence of malignancy.   4.   On 03/19/2020, NGS panel negative for JAK2, CALR and MPL.  There is IDH2 R140Q and KIT D816V.  5. Bone marrow biopsy done on 04/16/2020. It reveals chronic myelomonocytic leukemia - 1 (CMML-1) characterized by hypercellular bone marrow (90%) with increased M:E ratio and 7% blasts. " There are aggregates of atypical mast cells consistent with bone marrow involvement by systemic mastocytosis. Increased bone marrow fibrosis (MF-1-2).  -Normal cytogenetics.   -Flow cytometry on bone marrow reveals increased monocytic cells (43%).  No increase in CD34 positive myeloid blasts.   -BCR-ABL1 bone marrow is negative.   6. On 04/29/2020, tryptase elevated at 51.3 (normal less than 11).  6. Decitabine started on 04/29/2020.     SUBJECTIVE:  Mr. Mack is an 85-year-old gentleman with chronic myelomonocytic leukemia-1 and systemic mastocytosis ptosis.  The patient has completed 2 cycles of decitabine.  He is responding.  WBC has improved.        His main problem is anxiety.  Because of that, he would like to stop IV treatment.      He is not in pain.  No headache.  No dizziness.  He has insomnia.  No chest pain or shortness of breath.  No nausea or vomiting.  No bleeding.  No fever, chills or night sweats.      The patient says that his appetite is good but he is not gaining weight.      The patient has some peripheral neuropathy. Some numbness in the feet.  It has not gotten worse.      The patient was given Zoloft.  He did not start it.  He does not want to take it.  He is worried about side effects.      PHYSICAL EXAMINATION:   GENERAL:  The patient was alert and oriented x 3.   VITAL SIGNS:  Reviewed.  He is anxious.  ECOG PS of 1.   The rest of the systems not examined.      LABORATORY DATA:  Reviewed.      ASSESSMENT:   1.  An 85-year-old gentleman with chronic myelomonocytic leukemia-1.   2.  Systemic mastocytosis   3.  Leukopenia from decitabine.   4.  Anxiety.   5.  Peripheral neuropathy.      PLAN:   1.  I discussed with the patient regarding CMML.  The patient responded to decitabine.  I explained to the patient that generally I would recommend continuing to do decitabine.  The patient has anxiety and wants to stop it.  We will stop the decitabine as per his wishes.  We will monitor CBC.  When he  has worsening leukocytosis, we can start him on hydroxyurea or decitabine.  The patient will like hydroxyurea.  The patient says that IV treatment makes him very anxious.   2.  The patient advised to see a physician if he has fever, chills, infection, worsening weakness or any other concerns.   3.  The patient has systemic mastocytosis.  He is asymptomatic from it.  We will monitor it.   4.  I will see him in 2 weeks' time with CBC.  Advised him to call us with any questions or concerns.      TOTAL FACE TO FACE TIME SPENT:  25 minutes, more than 50% of the time spent in counseling and coordination of care.         GRAZYNA RICHEY MD             D: 06/15/2020   T: 2020   MT: TERESA      Name:     RADHA ALFONSO   MRN:      [id number]        Account:      [id number]   :      1935           Visit Date:   06/15/2020      Document: [id number]      Visit Date:   06/15/2020     HEMATOLOGY HISTORY: Mr. 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   1.  On 2020:  -WBC of 30.5, hemoglobin of 9.4 and platelet of 235.  MCV of 87.  Neutrophil of 50%, lymphocytes of 15% and monocytes of 17%.  There is myelocyte and metamyelocyte present. (On 2018, CBC normal with WBC of 4.8, hemoglobin 15.3 and platelets of 295.)  -CMP is normal.   3.  CT chest, abdomen and pelvis on 2020 does not reveal any evidence of malignancy.   4.   On 2020, NGS panel negative for JAK2, CALR and MPL.  There is IDH2 R140Q and KIT D816V.  5. Bone marrow biopsy done on 2020. It reveals chronic myelomonocytic leukemia - 1 (CMML-1) characterized by hypercellular bone marrow (90%) with increased M:E ratio and 7% blasts. There are aggregates of atypical mast cells consistent with bone marrow involvement by systemic mastocytosis. Increased bone marrow fibrosis (MF-1-2).  -Normal cytogenetics.   -Flow cytometry on bone marrow reveals increased monocytic cells (43%).  No  increase in CD34 positive myeloid blasts.   -BCR-ABL1 bone marrow is negative.   6. On 04/29/2020, tryptase elevated at 51.3 (normal less than 11).  6. Decitabine started on 04/29/2020.     SUBJECTIVE:  Mr. Mack is an 85-year-old gentleman with chronic myelomonocytic leukemia-1 and systemic mastocytosis ptosis.  The patient has completed 2 cycles of decitabine.  He is responding.  WBC has improved.        His main problem is anxiety.  Because of that, he would like to stop IV treatment.      He is not in pain.  No headache.  No dizziness.  He has insomnia.  No chest pain or shortness of breath.  No nausea or vomiting.  No bleeding.  No fever, chills or night sweats.      The patient says that his appetite is good but he is not gaining weight.      The patient has some peripheral neuropathy. Some numbness in the feet.  It has not gotten worse.      The patient was given Zoloft.  He did not start it.  He does not want to take it.  He is worried about side effects.      PHYSICAL EXAMINATION:   GENERAL:  The patient was alert and oriented x 3.   VITAL SIGNS:  Reviewed.  He is anxious.  ECOG PS of 1.   The rest of the systems not examined.      LABORATORY DATA:  Reviewed.      ASSESSMENT:   1.  An 85-year-old gentleman with chronic myelomonocytic leukemia-1.   2.  Systemic mastocytosis   3.  Leukopenia from decitabine.   4.  Anxiety.   5.  Peripheral neuropathy.      PLAN:   1.  I discussed with the patient regarding CMML.  The patient responded to decitabine.  I explained to the patient that generally I would recommend continuing to do decitabine.  The patient has anxiety and wants to stop it.  We will stop the decitabine as per his wishes.  We will monitor CBC.  When he has worsening leukocytosis, we can start him on hydroxyurea or decitabine.  The patient will like hydroxyurea.  The patient says that IV treatment makes him very anxious.   2.  The patient advised to see a physician if he has fever, chills, infection,  worsening weakness or any other concerns.   3.  The patient has systemic mastocytosis.  He is asymptomatic from it.  We will monitor it.   4.  I will see him in 2 weeks' time with CBC.  Advised him to call us with any questions or concerns.      TOTAL FACE TO FACE TIME SPENT:  25 minutes, more than 50% of the time spent in counseling and coordination of care.         GRAZYNA RICHEY MD             D: 06/15/2020   T: 2020   MT: TERESA      Name:     RADHA ALFONSO   MRN:      [id number]        Account:      [id number]   :      1935           Visit Date:   06/15/2020      Document: [id number]          Again, thank you for allowing me to participate in the care of your patient.        Sincerely,        Grazyna Richey MD

## 2020-06-16 NOTE — PROGRESS NOTES
Visit Date:   06/15/2020     HEMATOLOGY HISTORY: Mr. 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   1.  On 02/28/2020:  -WBC of 30.5, hemoglobin of 9.4 and platelet of 235.  MCV of 87.  Neutrophil of 50%, lymphocytes of 15% and monocytes of 17%.  There is myelocyte and metamyelocyte present. (On 11/16/2018, CBC normal with WBC of 4.8, hemoglobin 15.3 and platelets of 295.)  -CMP is normal.   3.  CT chest, abdomen and pelvis on 02/29/2020 does not reveal any evidence of malignancy.   4.   On 03/19/2020, NGS panel negative for JAK2, CALR and MPL.  There is IDH2 R140Q and KIT D816V.  5. Bone marrow biopsy done on 04/16/2020. It reveals chronic myelomonocytic leukemia - 1 (CMML-1) characterized by hypercellular bone marrow (90%) with increased M:E ratio and 7% blasts. There are aggregates of atypical mast cells consistent with bone marrow involvement by systemic mastocytosis. Increased bone marrow fibrosis (MF-1-2).  -Normal cytogenetics.   -Flow cytometry on bone marrow reveals increased monocytic cells (43%).  No increase in CD34 positive myeloid blasts.   -BCR-ABL1 bone marrow is negative.   6. On 04/29/2020, tryptase elevated at 51.3 (normal less than 11).  6. Decitabine started on 04/29/2020.     SUBJECTIVE:  Mr. Mack is an 85-year-old gentleman with chronic myelomonocytic leukemia-1 and systemic mastocytosis ptosis.  The patient has completed 2 cycles of decitabine.  He is responding.  WBC has improved.        His main problem is anxiety.  Because of that, he would like to stop IV treatment.      He is not in pain.  No headache.  No dizziness.  He has insomnia.  No chest pain or shortness of breath.  No nausea or vomiting.  No bleeding.  No fever, chills or night sweats.      The patient says that his appetite is good but he is not gaining weight.      The patient has some peripheral neuropathy. Some numbness in the feet.  It has not gotten worse.      The  patient was given Zoloft.  He did not start it.  He does not want to take it.  He is worried about side effects.      PHYSICAL EXAMINATION:   GENERAL:  The patient was alert and oriented x 3.   VITAL SIGNS:  Reviewed.  He is anxious.  ECOG PS of 1.   The rest of the systems not examined.      LABORATORY DATA:  Reviewed.      ASSESSMENT:   1.  An 85-year-old gentleman with chronic myelomonocytic leukemia-1.   2.  Systemic mastocytosis   3.  Leukopenia from decitabine.   4.  Anxiety.   5.  Peripheral neuropathy.      PLAN:   1.  I discussed with the patient regarding CMML.  The patient responded to decitabine.  I explained to the patient that generally I would recommend continuing to do decitabine.  The patient has anxiety and wants to stop it.  We will stop the decitabine as per his wishes.  We will monitor CBC.  When he has worsening leukocytosis, we can start him on hydroxyurea or decitabine.  The patient will like hydroxyurea.  The patient says that IV treatment makes him very anxious.   2.  The patient advised to see a physician if he has fever, chills, infection, worsening weakness or any other concerns.   3.  The patient has systemic mastocytosis.  He is asymptomatic from it.  We will monitor it.   4.  I will see him in 2 weeks' time with CBC.  Advised him to call us with any questions or concerns.      TOTAL FACE TO FACE TIME SPENT:  25 minutes, more than 50% of the time spent in counseling and coordination of care.         GRAZYNA RICHEY MD             D: 06/15/2020   T: 2020   MT: TERESA      Name:     RADHA ALFONSO   MRN:      [id number]        Account:      [id number]   :      1935           Visit Date:   06/15/2020      Document: [id number]

## 2020-06-19 NOTE — PROGRESS NOTES
"Writer received a call from patient regarding bilateral ankle and feet swelling. This started approximately a week ago. Patient states ankles /feet appear normal in the morning and gets worse through the day. I asked patient about heart issues, he denied concerns. I asked about salt intake and he said recently he has changed his diet which has more salt than he is used to . Patient has his feet up now and states his ankles/feet are \"almost normal\".    Writer advised that patient decreased salt in his diet, and elevate his legs frequently through the day.     Writer will have Dr. Ndiaye advise on Monday with any further instructions.    Jody Mehta RN            "

## 2020-06-21 NOTE — PROGRESS NOTES
Visit Date:   06/15/2020     HEMATOLOGY HISTORY: Mr. 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   1.  On 02/28/2020:  -WBC of 30.5, hemoglobin of 9.4 and platelet of 235.  MCV of 87.  Neutrophil of 50%, lymphocytes of 15% and monocytes of 17%.  There is myelocyte and metamyelocyte present. (On 11/16/2018, CBC normal with WBC of 4.8, hemoglobin 15.3 and platelets of 295.)  -CMP is normal.   3.  CT chest, abdomen and pelvis on 02/29/2020 does not reveal any evidence of malignancy.   4.   On 03/19/2020, NGS panel negative for JAK2, CALR and MPL.  There is IDH2 R140Q and KIT D816V.  5. Bone marrow biopsy done on 04/16/2020. It reveals chronic myelomonocytic leukemia - 1 (CMML-1) characterized by hypercellular bone marrow (90%) with increased M:E ratio and 7% blasts. There are aggregates of atypical mast cells consistent with bone marrow involvement by systemic mastocytosis. Increased bone marrow fibrosis (MF-1-2).  -Normal cytogenetics.   -Flow cytometry on bone marrow reveals increased monocytic cells (43%).  No increase in CD34 positive myeloid blasts.   -BCR-ABL1 bone marrow is negative.   6. On 04/29/2020, tryptase elevated at 51.3 (normal less than 11).  6. Decitabine started on 04/29/2020.     SUBJECTIVE:  Mr. Mack is an 85-year-old gentleman with chronic myelomonocytic leukemia-1 and systemic mastocytosis ptosis.  The patient has completed 2 cycles of decitabine.  He is responding.  WBC has improved.        His main problem is anxiety.  Because of that, he would like to stop IV treatment.      He is not in pain.  No headache.  No dizziness.  He has insomnia.  No chest pain or shortness of breath.  No nausea or vomiting.  No bleeding.  No fever, chills or night sweats.      The patient says that his appetite is good but he is not gaining weight.      The patient has some peripheral neuropathy. Some numbness in the feet.  It has not gotten worse.      The  patient was given Zoloft.  He did not start it.  He does not want to take it.  He is worried about side effects.      PHYSICAL EXAMINATION:   GENERAL:  The patient was alert and oriented x 3.   VITAL SIGNS:  Reviewed.  He is anxious.  ECOG PS of 1.   The rest of the systems not examined.      LABORATORY DATA:  Reviewed.      ASSESSMENT:   1.  An 85-year-old gentleman with chronic myelomonocytic leukemia-1.   2.  Systemic mastocytosis   3.  Leukopenia from decitabine.   4.  Anxiety.   5.  Peripheral neuropathy.      PLAN:   1.  I discussed with the patient regarding CMML.  The patient responded to decitabine.  I explained to the patient that generally I would recommend continuing to do decitabine.  The patient has anxiety and wants to stop it.  We will stop the decitabine as per his wishes.  We will monitor CBC.  When he has worsening leukocytosis, we can start him on hydroxyurea or decitabine.  The patient will like hydroxyurea.  The patient says that IV treatment makes him very anxious.   2.  The patient advised to see a physician if he has fever, chills, infection, worsening weakness or any other concerns.   3.  The patient has systemic mastocytosis.  He is asymptomatic from it.  We will monitor it.   4.  I will see him in 2 weeks' time with CBC.  Advised him to call us with any questions or concerns.      TOTAL FACE TO FACE TIME SPENT:  25 minutes, more than 50% of the time spent in counseling and coordination of care.         GRAZYNA RICHEY MD             D: 06/15/2020   T: 2020   MT: TERESA      Name:     RADHA ALFONSO   MRN:      [id number]        Account:      [id number]   :      1935           Visit Date:   06/15/2020      Document: [id number]

## 2020-06-26 PROBLEM — D70.1 CHEMOTHERAPY-INDUCED NEUTROPENIA (H): Status: ACTIVE | Noted: 2020-01-01

## 2020-06-26 PROBLEM — T45.1X5A CHEMOTHERAPY-INDUCED NEUTROPENIA (H): Status: ACTIVE | Noted: 2020-01-01

## 2020-06-26 NOTE — PATIENT INSTRUCTIONS
If you develop a fever of 100.2 or greater, have chills or rigors (shivering/shaking), go to the emergency room.    your antibiotic prescription today.  Avoid anyone that is sick.  Wash your hands frequently.  Wash fruits and vegetables thoroughy.  Avoid fresh flowers.

## 2020-06-26 NOTE — PROGRESS NOTES
Writer received a call with critical labs of WBC 0.5 and ANC 0.1.    Andrea GONZALES, aware and is advising Neupogen X3 days, and Levaquin.    Jody Mehta RN

## 2020-06-26 NOTE — PROGRESS NOTES
Infusion Nursing Note:  eJff Mack presents today for neupogen.    Patient seen by provider today: No   present during visit today: Not Applicable.    Note: Education given on neupogen, neutropenic precautions, and when to proceed to the ER.    Intravenous Access:  No Intravenous access/labs at this visit.    Treatment Conditions:  Not Applicable.      Post Infusion Assessment:  Patient tolerated injection without incident.       Discharge Plan:   Prescription refills given for levaquin. Pt reports he will pick this up on his way home.  Discharge instructions reviewed with: Patient.  Patient and/or family verbalized understanding of discharge instructions and all questions answered.  Copy of AVS reviewed with patient and/or family.  Patient will return tomorrow for next appointment.  Patient discharged in stable condition accompanied by: self.  Departure Mode: Ambulatory.    Conchis Cueva RN

## 2020-06-26 NOTE — PROGRESS NOTES
Pt  is neutropenic    I called patient and spoke to him over the phone.  I informed him of the lab results  Patient denies fever chills sweats cough shortness of breath chest pain nausea vomiting diarrhea abdominal pain or any signs or symptoms of infection      Per Dr. Ndiaye patient will need Neupogen shots and levaquin   Patient will come to the clinic today for Neupogen shot.  He will get Neupogen on Saturday and Sunday as well  Prescription for Levaquin 500 mg p.o. daily for 7 days sent to his pharmacy      Neutropenic precaution discussed with patient.  Pt Advised  to check temperature frequently, in the event of fever chills sweats or any signs or symptoms of infection patient is advised to call 911 or go to ER patient verbalized understanding    Patient has scheduled appointment with Dr. Ndiaye on Monday    RAUDEL Stephens CNP

## 2020-06-27 NOTE — PROGRESS NOTES
Infusion Nursing Note:  Jeff Mack presents today for Neupogen.    Patient seen by provider today: No   present during visit today: Not Applicable.    Note: denies any bone pain r/t Neupogen    Intravenous Access:  No Intravenous access/labs at this visit.    Treatment Conditions:  Not Applicable.      Post Infusion Assessment:  Patient tolerated injection without incident.  Site patent and intact, free from redness, edema or discomfort.       Discharge Plan:   Discharge instructions reviewed with: Patient.  Patient and/or family verbalized understanding of discharge instructions and all questions answered.  Patient discharged in stable condition accompanied by: self.  Departure Mode: Ambulatory.    Tika Foote RN

## 2020-06-28 NOTE — PROGRESS NOTES
Infusion Nursing Note:  Jeff Mack presents today for neupogen.    Patient seen by provider today: No   present during visit today: Not Applicable.    Note: N/A.    Intravenous Access:  No Intravenous access/labs at this visit.    Treatment Conditions:  Not Applicable.      Post Infusion Assessment:  Patient tolerated injection without incident.  Site patent and intact, free from redness, edema or discomfort.       Discharge Plan:   Discharge instructions reviewed with: Patient.  Patient and/or family verbalized understanding of discharge instructions and all questions answered.  AVS to patient via [employer].  Patient will return tomoow for next appointment.   Patient discharged in stable condition accompanied by: self.  Departure Mode: Ambulatory.    Kayleen Gomez RN

## 2020-06-29 PROBLEM — E79.0 HYPERURICEMIA: Status: RESOLVED | Noted: 2020-01-01 | Resolved: 2020-01-01

## 2020-06-29 NOTE — PROGRESS NOTES
"Oncology Rooming Note    June 29, 2020 8:27 AM   Jeff Mack is a 85 year old male who presents for:    Chief Complaint   Patient presents with     Oncology Clinic Visit     Initial Vitals: /64   Pulse 86   Temp 98.2  F (36.8  C) (Oral)   Resp 16   Ht 1.753 m (5' 9\")   Wt 70.8 kg (156 lb 1.6 oz)   SpO2 98%   BMI 23.05 kg/m   Estimated body mass index is 23.05 kg/m  as calculated from the following:    Height as of this encounter: 1.753 m (5' 9\").    Weight as of this encounter: 70.8 kg (156 lb 1.6 oz). Body surface area is 1.86 meters squared.  No Pain (0) Comment: Data Unavailable   No LMP for male patient.  Allergies reviewed: Yes  Medications reviewed: Yes    Medications: Medication refills not needed today.  Pharmacy name entered into [employer]:    [facility][id number] - [place], [place] - [address] [address] [place] - [facility] [address]    Clinical concerns: no      Roxana Stevenson CMA            "

## 2020-06-29 NOTE — PROGRESS NOTES
Infusion Nursing Note:  Jeff Mack presents today for dacogen.    Patient seen by provider today: Yes: Senthil   present during visit today: Not Applicable.    Note: N/A.    Intravenous Access:  Implanted Port.    Treatment Conditions:  Lab Results   Component Value Date    HGB 10.7 06/29/2020     Lab Results   Component Value Date    WBC 4.0 06/29/2020      Lab Results   Component Value Date    ANEU 2.2 06/29/2020     Lab Results   Component Value Date     06/29/2020      Results reviewed, labs MET treatment parameters, ok to proceed with treatment.      Post Infusion Assessment:  Patient tolerated infusion without incident.  Blood return noted pre and post infusion.  Site patent and intact, free from redness, edema or discomfort.  No evidence of extravasations.  Access discontinued per protocol.       Discharge Plan:   Discharge instructions reviewed with: Patient.  Patient and/or family verbalized understanding of discharge instructions and all questions answered.  Copy of AVS reviewed with patient and/or family.  Patient will return tomorrow for next appointment.  Patient discharged in stable condition accompanied by: self.  Departure Mode: Ambulatory.    Conchis Cueva RN

## 2020-06-29 NOTE — PROGRESS NOTES
Medical Assistant Note:  Jeff Mack presents today for lab draw.    Patient seen by provider today: Yes: Dr evans.   present during visit today: Not Applicable.    Concerns: No Concerns.    Procedure:  Lab draw site: RAC, Needle type: BF, Gauge: 21. Gauze and coban applied    Post Assessment:  Labs drawn without difficulty: Yes.    Discharge Plan:  Departure Mode: Ambulatory.    Face to Face Time: 5.    Lashanda Yeh CMA

## 2020-06-29 NOTE — PATIENT INSTRUCTIONS
1. Continue decitabine.  2. CBC this Thursday and see Andrea Sanchez.  3. See me in 4 weeks with next cycle.  5. Weekly cbc.  6. Stop levaquin.    Patient in [facility]

## 2020-06-29 NOTE — LETTER
"    6/29/2020         RE: Jeff Mack  [address]  [place] [address]        Dear Colleague,    Thank you for referring your patient, Jeff Mack, to the [facility]. Please see a copy of my visit note below.    Oncology Rooming Note    June 29, 2020 8:27 AM   Jeff Mack is a 85 year old male who presents for:    Chief Complaint   Patient presents with     Oncology Clinic Visit     Initial Vitals: /64   Pulse 86   Temp 98.2  F (36.8  C) (Oral)   Resp 16   Ht 1.753 m (5' 9\")   Wt 70.8 kg (156 lb 1.6 oz)   SpO2 98%   BMI 23.05 kg/m   Estimated body mass index is 23.05 kg/m  as calculated from the following:    Height as of this encounter: 1.753 m (5' 9\").    Weight as of this encounter: 70.8 kg (156 lb 1.6 oz). Body surface area is 1.86 meters squared.  No Pain (0) Comment: Data Unavailable   No LMP for male patient.  Allergies reviewed: Yes  Medications reviewed: Yes    Medications: Medication refills not needed today.  Pharmacy name entered into [employer]:    [id number] [place] [place] - [address] [id number] [address] - [place] [facility], [place] - [address]    Clinical concerns: no      Roxana Stevenson, [facility]              Visit Date:   06/29/2020     HEMATOLOGY HISTORY: Mr. 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   1.  On 02/28/2020:  -WBC of 30.5, hemoglobin of 9.4 and platelet of 235.  MCV of 87.  Neutrophil of 50%, lymphocytes of 15% and monocytes of 17%.  There is myelocyte and metamyelocyte present. (On 11/16/2018, CBC normal with WBC of 4.8, hemoglobin 15.3 and platelets of 295.)  -CMP is normal.   3.  CT chest, abdomen and pelvis on 02/29/2020 does not reveal any evidence of malignancy.   4.   On 03/19/2020, NGS panel negative for JAK2, CALR and MPL.  There is IDH2 R140Q and KIT D816V.  5. Bone marrow biopsy done on 04/16/2020. It reveals chronic myelomonocytic leukemia - 1 " (CMML-1) characterized by hypercellular bone marrow (90%) with increased M:E ratio and 7% blasts. There are aggregates of atypical mast cells consistent with bone marrow involvement by systemic mastocytosis. Increased bone marrow fibrosis (MF-1-2).  -Normal cytogenetics.   -Flow cytometry on bone marrow reveals increased monocytic cells (43%).  No increase in CD34 positive myeloid blasts.   -BCR-ABL1 bone marrow is negative.   6. On 04/29/2020, tryptase elevated at 51.3 (normal less than 11).  6. Decitabine started on 04/29/2020.     SUBJECTIVE:  Mr. Mack is an 85-year-old gentleman with chronic myelomonocytic leukemia and systemic mastocytosis on gemcitabine.  He is here to start cycle #3.  His CBC last week had revealed neutropenia.  Because of that, he was started on Neupogen.  He received 3 doses and he tolerated it well.      REVIEW OF SYSTEMS:  Overall, he is doing well. He is more relaxed.  Not as anxious.  No headache or dizziness.  No chest pain or shortness of breath.  No abdominal pain, nausea or vomiting.  No cough.  No urinary or bowel complaints.  No bleeding.      PHYSICAL EXAMINATION:   GENERAL:  The patient was alert and oriented x 3.   VITAL SIGNS:  Reviewed.  ECOG PS of 1.   The rest of the systems not examined.      LABORATORY DATA:  Reviewed.      ASSESSMENT:   1.  An 85-year-old gentleman with chronic myelomonocytic leukemia-1.    2.  Systemic mastocytosis.   3.  Anxiety.   4.  Peripheral neuropathy, stable.   5.  Thrombocytosis, reactive.   6.  Normocytic anemia.      PLAN:   1.  I discussed with the patient regarding treatment.  Previously, he had wanted to stop decitabine.  I again discussed regarding treatment.  I would recommend at least getting 4 cycles.  After discussion, the patient is agreeable to take another 2 cycles of decitabine.  After cycle #4, we will get a bone marrow biopsy.  He is agreeable for it.   2.  The patient is worried that he might become severely leukopenic again.   I told the patient we will monitor his CBC.  I will have him see our nurse practitioner later this week with CBC.   3.  The patient has systemic mastocytosis.  His tryptase level has decreased to 27.  We will continue to monitor it.   4.  Multiple questions, which were all answered.  I will see him in a month.  In between, he will see our nurse practitioner.     5.  Advised him to go to the emergency room if he has fever, chills, infection, worsening weakness, shortness of breath, recurrent vomiting, bleeding or any other concerns.      TOTAL FACE TO FACE TIME SPENT:  25 minutes, more than 50% of the time spent in counseling and coordination of care.         GRAZYNA RICHEY MD             D: 2020   T: 2020   MT: TERESA      Name:     RADHA ALFONSO   MRN:      [id number]        Account:      [id number]   :      1935           Visit Date:   2020      Document: [id number]      Visit Date:   2020     HEMATOLOGY HISTORY: Mr. 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   1.  On 2020:  -WBC of 30.5, hemoglobin of 9.4 and platelet of 235.  MCV of 87.  Neutrophil of 50%, lymphocytes of 15% and monocytes of 17%.  There is myelocyte and metamyelocyte present. (On 2018, CBC normal with WBC of 4.8, hemoglobin 15.3 and platelets of 295.)  -CMP is normal.   3.  CT chest, abdomen and pelvis on 2020 does not reveal any evidence of malignancy.   4.   On 2020, NGS panel negative for JAK2, CALR and MPL.  There is IDH2 R140Q and KIT D816V.  5. Bone marrow biopsy done on 2020. It reveals chronic myelomonocytic leukemia - 1 (CMML-1) characterized by hypercellular bone marrow (90%) with increased M:E ratio and 7% blasts. There are aggregates of atypical mast cells consistent with bone marrow involvement by systemic mastocytosis. Increased bone marrow fibrosis (MF-1-2).  -Normal cytogenetics.   -Flow cytometry on bone marrow  reveals increased monocytic cells (43%).  No increase in CD34 positive myeloid blasts.   -BCR-ABL1 bone marrow is negative.   6. On 04/29/2020, tryptase elevated at 51.3 (normal less than 11).  6. Decitabine started on 04/29/2020.     SUBJECTIVE:  Mr. Mack is an 85-year-old gentleman with chronic myelomonocytic leukemia and systemic mastocytosis on gemcitabine.  He is here to start cycle #3.  His CBC last week had revealed neutropenia.  Because of that, he was started on Neupogen.  He received 3 doses and he tolerated it well.      REVIEW OF SYSTEMS:  Overall, he is doing well. He is more relaxed.  Not as anxious.  No headache or dizziness.  No chest pain or shortness of breath.  No abdominal pain, nausea or vomiting.  No cough.  No urinary or bowel complaints.  No bleeding.      PHYSICAL EXAMINATION:   GENERAL:  The patient was alert and oriented x 3.   VITAL SIGNS:  Reviewed.  ECOG PS of 1.   The rest of the systems not examined.      LABORATORY DATA:  Reviewed.      ASSESSMENT:   1.  An 85-year-old gentleman with chronic myelomonocytic leukemia-1.    2.  Systemic mastocytosis.   3.  Anxiety.   4.  Peripheral neuropathy, stable.   5.  Thrombocytosis, reactive.   6.  Normocytic anemia.      PLAN:   1.  I discussed with the patient regarding treatment.  Previously, he had wanted to stop decitabine.  I again discussed regarding treatment.  I would recommend at least getting 4 cycles.  After discussion, the patient is agreeable to take another 2 cycles of decitabine.  After cycle #4, we will get a bone marrow biopsy.  He is agreeable for it.   2.  The patient is worried that he might become severely leukopenic again.  I told the patient we will monitor his CBC.  I will have him see our nurse practitioner later this week with CBC.   3.  The patient has systemic mastocytosis.  His tryptase level has decreased to 27.  We will continue to monitor it.   4.  Multiple questions, which were all answered.  I will see him in a  month.  In between, he will see our nurse practitioner.     5.  Advised him to go to the emergency room if he has fever, chills, infection, worsening weakness, shortness of breath, recurrent vomiting, bleeding or any other concerns.      TOTAL FACE TO FACE TIME SPENT:  25 minutes, more than 50% of the time spent in counseling and coordination of care.         GRAZYNA RICHEY MD             D: 2020   T: 2020   MT: TERESA      Name:     RADHA ALFONSO   MRN:      [id number]        Account:      [id number]   :      1935           Visit Date:   2020      Document: [id number]          Again, thank you for allowing me to participate in the care of your patient.        Sincerely,        Grazyna Richey MD

## 2020-06-30 NOTE — PROGRESS NOTES
Infusion Nursing Note:  Jeff Mcak presents today for C3D2 Dacogen.    Patient seen by provider today: No   present during visit today: Not Applicable.    Note: Joshua says he is concerned about his WBC dropping with treatment this week, as last week he required Neupogen injections over the weekend. He was reminded that he will have labs this week Thursday 7/2 and also meet with Andrea CRISTOBAL, he was reassured by this and is ok with plan.    Intravenous Access:  Peripheral IV placed.    Treatment Conditions:  Not Applicable.BSA 1.86      Post Infusion Assessment:  Patient tolerated infusion without incident.  Blood return noted pre and post infusion.  Site patent and intact, free from redness, edema or discomfort.  No evidence of extravasations.  Access discontinued per protocol.       Discharge Plan:   Discharge instructions reviewed with: Patient.  Patient and/or family verbalized understanding of discharge instructions and all questions answered.  Copy of AVS reviewed with patient and/or family.  Patient will return tomorrow for next appointment.  Patient discharged in stable condition accompanied by: self.  Departure Mode: Ambulatory.    Tika Foote RN

## 2020-06-30 NOTE — PROGRESS NOTES
Visit Date:   06/29/2020     HEMATOLOGY HISTORY: Mr. 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   1.  On 02/28/2020:  -WBC of 30.5, hemoglobin of 9.4 and platelet of 235.  MCV of 87.  Neutrophil of 50%, lymphocytes of 15% and monocytes of 17%.  There is myelocyte and metamyelocyte present. (On 11/16/2018, CBC normal with WBC of 4.8, hemoglobin 15.3 and platelets of 295.)  -CMP is normal.   3.  CT chest, abdomen and pelvis on 02/29/2020 does not reveal any evidence of malignancy.   4.   On 03/19/2020, NGS panel negative for JAK2, CALR and MPL.  There is IDH2 R140Q and KIT D816V.  5. Bone marrow biopsy done on 04/16/2020. It reveals chronic myelomonocytic leukemia - 1 (CMML-1) characterized by hypercellular bone marrow (90%) with increased M:E ratio and 7% blasts. There are aggregates of atypical mast cells consistent with bone marrow involvement by systemic mastocytosis. Increased bone marrow fibrosis (MF-1-2).  -Normal cytogenetics.   -Flow cytometry on bone marrow reveals increased monocytic cells (43%).  No increase in CD34 positive myeloid blasts.   -BCR-ABL1 bone marrow is negative.   6. On 04/29/2020, tryptase elevated at 51.3 (normal less than 11).  6. Decitabine started on 04/29/2020.     SUBJECTIVE:  Mr. Mack is an 85-year-old gentleman with chronic myelomonocytic leukemia and systemic mastocytosis on gemcitabine.  He is here to start cycle #3.  His CBC last week had revealed neutropenia.  Because of that, he was started on Neupogen.  He received 3 doses and he tolerated it well.      REVIEW OF SYSTEMS:  Overall, he is doing well. He is more relaxed.  Not as anxious.  No headache or dizziness.  No chest pain or shortness of breath.  No abdominal pain, nausea or vomiting.  No cough.  No urinary or bowel complaints.  No bleeding.      PHYSICAL EXAMINATION:   GENERAL:  The patient was alert and oriented x 3.   VITAL SIGNS:  Reviewed.  ECOG PS of 1.   The  rest of the systems not examined.      LABORATORY DATA:  Reviewed.      ASSESSMENT:   1.  An 85-year-old gentleman with chronic myelomonocytic leukemia-1.    2.  Systemic mastocytosis.   3.  Anxiety.   4.  Peripheral neuropathy, stable.   5.  Thrombocytosis, reactive.   6.  Normocytic anemia.      PLAN:   1.  I discussed with the patient regarding treatment.  Previously, he had wanted to stop decitabine.  I again discussed regarding treatment.  I would recommend at least getting 4 cycles.  After discussion, the patient is agreeable to take another 2 cycles of decitabine.  After cycle #4, we will get a bone marrow biopsy.  He is agreeable for it.   2.  The patient is worried that he might become severely leukopenic again.  I told the patient we will monitor his CBC.  I will have him see our nurse practitioner later this week with CBC.   3.  The patient has systemic mastocytosis.  His tryptase level has decreased to 27.  We will continue to monitor it.   4.  Multiple questions, which were all answered.  I will see him in a month.  In between, he will see our nurse practitioner.     5.  Advised him to go to the emergency room if he has fever, chills, infection, worsening weakness, shortness of breath, recurrent vomiting, bleeding or any other concerns.      TOTAL FACE TO FACE TIME SPENT:  25 minutes, more than 50% of the time spent in counseling and coordination of care.         GRAZYNA RICHEY MD             D: 2020   T: 2020   MT: TERESA      Name:     RADHA ALFONSO   MRN:      [id number]        Account:      [id number]   :      1935           Visit Date:   2020      Document: [id number]

## 2020-07-02 NOTE — LETTER
"    7/2/2020         RE: Jeff Mack  [address]  [place] [address]        Dear Colleague,    Thank you for referring your patient, Jeff Mack, to the [facility]. Please see a copy of my visit note below.    Oncology Rooming Note    July 2, 2020 9:42 AM   Jeff Mack is a 85 year old male who presents for:    Chief Complaint   Patient presents with     Oncology Clinic Visit     Initial Vitals: /66   Pulse 80   Temp 97.9  F (36.6  C) (Oral)   Resp 18   Wt 69.9 kg (154 lb)   BMI 22.73 kg/m   Estimated body mass index is 22.73 kg/m  as calculated from the following:    Height as of 6/30/20: 1.753 m (5' 9.02\").    Weight as of this encounter: 69.9 kg (154 lb). Body surface area is 1.84 meters squared.  No Pain (0) Comment: Data Unavailable   No LMP for male patient.  Allergies reviewed: Yes  Medications reviewed: Yes    Medications: Medication refills not needed today.  Pharmacy name entered into [employer]:    [id number] - [place], [place] - [address]  [facility] [address] [place] [facility] - [place] - [address]    Clinical concerns:  NP was notified.      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     Pt  is currently on decitabine        Interval History:  Patient dates he has been tolerating decitabine well.  Patient denies fever chills sweats denies cough denies pain.  Denies nausea vomiting diarrhea denies abdominal pain denies bleeding denies skin rash      Review of Systems:  14 point ROS of systems including Constitutional, Eyes, Respiratory, Cardiovascular, Gastroenterology, Genitourinary, Integumentary, Muscularskeletal, Psychiatric were all negative except for pertinent positives noted in my HPI.      Current Outpatient Medications   Medication Sig Dispense Refill     albuterol (PROAIR HFA/PROVENTIL " HFA/VENTOLIN HFA) 108 (90 Base) MCG/ACT inhaler Inhale 1-2 puffs into the lungs every 6 hours as needed for shortness of breath / dyspnea or wheezing       ALPRAZolam (XANAX) 0.5 MG tablet Take 1 tablet (0.5 mg) by mouth 2 times daily as needed for anxiety 45 tablet 0     Cholecalciferol (VITAMIN D3) 5000 UNITS TABS Take 5,000 Units by mouth daily        fluticasone (FLONASE) 50 MCG/ACT nasal spray SPRAY 2 SPRAYS INTO EACH NOSTRIL EVERY DAY 48 mL 3     Nutritional Supplements (ENSURE COMPLETE) LIQD Take 1 Bottle by mouth every 8 hours as needed 30 Bottle 3     polyethylene glycol (MIRALAX) 17 g packet Take 1 packet by mouth daily as needed for constipation       sennosides (SENOKOT) 8.6 MG tablet Take 1 tablet by mouth daily as needed for constipation       SYMBICORT 160-4.5 MCG/ACT Inhaler Inhale 2 puffs into the lungs 2 times daily          Physical Examination:  General: The patient is a pleasant male in no acute distress.  /66   Pulse 80   Temp 97.9  F (36.6  C) (Oral)   Resp 18   Wt 69.9 kg (154 lb)   BMI 22.73 kg/m    HEENT: EOMI, PERRL. Sclerae are anicteric. Oral mucosa is pink and moist with no lesions or thrush.   Lymph: Neck is supple with no lymphadenopathy in the cervical or supraclavicular areas.   Heart: Regular rate and rhythm.   Lungs: Clear to auscultation bilaterally.   GI: Bowel sounds present, soft, nontender with no palpable hepatosplenomegaly or masses.   Extremities: No lower extremity edema noted bilaterally.   Skin: No rashes, petechiae, or bruising noted on exposed skin.    Laboratory Data:  Results for orders placed or performed in visit on 07/02/20 (from the past 24 hour(s))   CBC with platelets differential   Result Value Ref Range    WBC 3.5 (L) 4.0 - 11.0 10e9/L    RBC Count 4.02 (L) 4.4 - 5.9 10e12/L    Hemoglobin 10.8 (L) 13.3 - 17.7 g/dL    Hematocrit 33.8 (L) 40.0 - 53.0 %    MCV 84 78 - 100 fl    MCH 26.9 26.5 - 33.0 pg    MCHC 32.0 31.5 - 36.5 g/dL    RDW 21.5 (H)  10.0 - 15.0 %    Platelet Count 818 (H) 150 - 450 10e9/L    Diff Method Automated Method     % Neutrophils 48.4 %    % Lymphocytes 21.0 %    % Monocytes 17.0 %    % Eosinophils 10.4 %    % Basophils 2.6 %    % Immature Granulocytes 0.6 %    Nucleated RBCs 0 0 /100    Absolute Neutrophil 1.7 1.6 - 8.3 10e9/L    Absolute Lymphocytes 0.7 (L) 0.8 - 5.3 10e9/L    Absolute Monocytes 0.6 0.0 - 1.3 10e9/L    Absolute Eosinophils 0.4 0.0 - 0.7 10e9/L    Absolute Basophils 0.1 0.0 - 0.2 10e9/L    Abs Immature Granulocytes 0.0 0 - 0.4 10e9/L    Absolute Nucleated RBC 0.0     Anisocytosis Moderate     Acanthocytes Slight     Dohle Bodies Present     Platelet Estimate       Automated count confirmed.  Giant platelets are present.         Assessment and Plan:      This is a 85-year-old male with       chronic myelomonocytic leukemia CMML   - currently on decitabine.   Patient required Neupogen shot last week for low ANC.  Now ANC is stable we will recheck CBC and follow-up with me next week  We will continue to monitor labs weekly  Plan will be to do bone marrow biopsy after cycle 4  Schedule with me next week with labs    systemic mastocytosis.   Patient is asymptomatic  Tryptase 26 -slowly trending down  We will continue to monitor    Anxiety  anxious about getting IV treatment  Reassurance given  We will also meet with our   Patient is advised to call our clinic if worsening of symptoms        Patient is advised to check temperature frequently in the event of fever chills sweats cough sore throat chest pain nausea vomiting diarrhea abdominal pain bleeding or any changes in health condition patient is advised to go to ER or call our clinic    RAUDEL Stephens [facility]     Chart documentation with Dragon Voice recognition Software. Although reviewed after completion, some words and grammatical errors may remain.          Again, thank you for allowing me to participate in the care  of your patient.        Sincerely,        RAUDEL Stephens CNP

## 2020-07-02 NOTE — PROGRESS NOTES
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     Pt  is currently on decitabine        Interval History:  Patient dates he has been tolerating decitabine well.  Patient denies fever chills sweats denies cough denies pain.  Denies nausea vomiting diarrhea denies abdominal pain denies bleeding denies skin rash      Review of Systems:  14 point ROS of systems including Constitutional, Eyes, Respiratory, Cardiovascular, Gastroenterology, Genitourinary, Integumentary, Muscularskeletal, Psychiatric were all negative except for pertinent positives noted in my HPI.      Current Outpatient Medications   Medication Sig Dispense Refill     albuterol (PROAIR HFA/PROVENTIL HFA/VENTOLIN HFA) 108 (90 Base) MCG/ACT inhaler Inhale 1-2 puffs into the lungs every 6 hours as needed for shortness of breath / dyspnea or wheezing       ALPRAZolam (XANAX) 0.5 MG tablet Take 1 tablet (0.5 mg) by mouth 2 times daily as needed for anxiety 45 tablet 0     Cholecalciferol (VITAMIN D3) 5000 UNITS TABS Take 5,000 Units by mouth daily        fluticasone (FLONASE) 50 MCG/ACT nasal spray SPRAY 2 SPRAYS INTO EACH NOSTRIL EVERY DAY 48 mL 3     Nutritional Supplements (ENSURE COMPLETE) LIQD Take 1 Bottle by mouth every 8 hours as needed 30 Bottle 3     polyethylene glycol (MIRALAX) 17 g packet Take 1 packet by mouth daily as needed for constipation       sennosides (SENOKOT) 8.6 MG tablet Take 1 tablet by mouth daily as needed for constipation       SYMBICORT 160-4.5 MCG/ACT Inhaler Inhale 2 puffs into the lungs 2 times daily          Physical Examination:  General: The patient is a pleasant male in no acute distress.  /66   Pulse 80   Temp 97.9  F (36.6  C) (Oral)   Resp 18   Wt 69.9 kg (154 lb)   BMI 22.73 kg/m    HEENT: EOMI, PERRL. Sclerae are anicteric. Oral mucosa is pink and moist with no lesions or thrush.   Lymph: Neck is  supple with no lymphadenopathy in the cervical or supraclavicular areas.   Heart: Regular rate and rhythm.   Lungs: Clear to auscultation bilaterally.   GI: Bowel sounds present, soft, nontender with no palpable hepatosplenomegaly or masses.   Extremities: No lower extremity edema noted bilaterally.   Skin: No rashes, petechiae, or bruising noted on exposed skin.    Laboratory Data:  Results for orders placed or performed in visit on 07/02/20 (from the past 24 hour(s))   CBC with platelets differential   Result Value Ref Range    WBC 3.5 (L) 4.0 - 11.0 10e9/L    RBC Count 4.02 (L) 4.4 - 5.9 10e12/L    Hemoglobin 10.8 (L) 13.3 - 17.7 g/dL    Hematocrit 33.8 (L) 40.0 - 53.0 %    MCV 84 78 - 100 fl    MCH 26.9 26.5 - 33.0 pg    MCHC 32.0 31.5 - 36.5 g/dL    RDW 21.5 (H) 10.0 - 15.0 %    Platelet Count 818 (H) 150 - 450 10e9/L    Diff Method Automated Method     % Neutrophils 48.4 %    % Lymphocytes 21.0 %    % Monocytes 17.0 %    % Eosinophils 10.4 %    % Basophils 2.6 %    % Immature Granulocytes 0.6 %    Nucleated RBCs 0 0 /100    Absolute Neutrophil 1.7 1.6 - 8.3 10e9/L    Absolute Lymphocytes 0.7 (L) 0.8 - 5.3 10e9/L    Absolute Monocytes 0.6 0.0 - 1.3 10e9/L    Absolute Eosinophils 0.4 0.0 - 0.7 10e9/L    Absolute Basophils 0.1 0.0 - 0.2 10e9/L    Abs Immature Granulocytes 0.0 0 - 0.4 10e9/L    Absolute Nucleated RBC 0.0     Anisocytosis Moderate     Acanthocytes Slight     Dohle Bodies Present     Platelet Estimate       Automated count confirmed.  Giant platelets are present.         Assessment and Plan:      This is a 85-year-old male with       chronic myelomonocytic leukemia CMML   - currently on decitabine.   Patient required Neupogen shot last week for low ANC.  Now ANC is stable we will recheck CBC and follow-up with me next week  We will continue to monitor labs weekly  Plan will be to do bone marrow biopsy after cycle 4  Schedule with me next week with labs    systemic mastocytosis.   Patient is  asymptomatic  Tryptase 26 -slowly trending down  We will continue to monitor    Anxiety  anxious about getting IV treatment  Reassurance given  We will also meet with our   Patient is advised to call our clinic if worsening of symptoms        Patient is advised to check temperature frequently in the event of fever chills sweats cough sore throat chest pain nausea vomiting diarrhea abdominal pain bleeding or any changes in health condition patient is advised to go to ER or call our clinic    RAUDEL Setphens [facility]     Chart documentation with Dragon Voice recognition Software. Although reviewed after completion, some words and grammatical errors may remain.

## 2020-07-02 NOTE — PROGRESS NOTES
Infusion Nursing Note:  Jeff Mack presents today for C3D4 dacogen.    Patient seen by provider today: Yes: Andrea   present during visit today: Not Applicable.    Note: N/A.    Intravenous Access:  Peripheral IV placed.    Treatment Conditions:  Lab Results   Component Value Date    HGB 10.8 07/02/2020     Lab Results   Component Value Date    WBC 3.5 07/02/2020      Lab Results   Component Value Date    ANEU 1.7 07/02/2020     Lab Results   Component Value Date     07/02/2020      Results reviewed, labs MET treatment parameters, ok to proceed with treatment.      Post Infusion Assessment:  Patient tolerated infusion without incident.  Blood return noted pre and post infusion.  Site patent and intact, free from redness, edema or discomfort.  No evidence of extravasations.  Access discontinued per protocol.       Discharge Plan:   Discharge instructions reviewed with: Patient.  Patient and/or family verbalized understanding of discharge instructions and all questions answered.  Patient discharged in stable condition accompanied by: self.  Departure Mode: Ambulatory.    Mena Roy RN

## 2020-07-02 NOTE — PROGRESS NOTES
"Oncology Rooming Note    July 2, 2020 9:42 AM   Jeff Mack is a 85 year old male who presents for:    Chief Complaint   Patient presents with     Oncology Clinic Visit     Initial Vitals: /66   Pulse 80   Temp 97.9  F (36.6  C) (Oral)   Resp 18   Wt 69.9 kg (154 lb)   BMI 22.73 kg/m   Estimated body mass index is 22.73 kg/m  as calculated from the following:    Height as of 6/30/20: 1.753 m (5' 9.02\").    Weight as of this encounter: 69.9 kg (154 lb). Body surface area is 1.84 meters squared.  No Pain (0) Comment: Data Unavailable   No LMP for male patient.  Allergies reviewed: Yes  Medications reviewed: Yes    Medications: Medication refills not needed today.  Pharmacy name entered into [employer]:    [id number] - [place], [place] - [address] [address] [place] [facility] [address]    Clinical concerns:  NP was notified.      Zaynab Aparicio CMA            "

## 2020-07-02 NOTE — TELEPHONE ENCOUNTER
"Oncology Distress Screening Follow-up  Clinical Social Work  [facility]    Identified Concern and Score From Distress Screenin. How concerned are you about your ability to eat?   0            2. How concerned are you about unintended weight loss or your current weight?   0            3. How concerned are you about feeling depressed or very sad?   0            4. How concerned are you about feeling anxious or very scared?   8Abnormal    test results            5. Do you struggle with the loss of meaning and nisha in your life?       Not at all            6. How concerned are you about work and home life issues that may be affected by your cancer?   0            7. How concerned are you about knowing what resources are available to help you?   0            8. Do you currently have what you would describe as Orthodoxy or spiritual struggles?               Not at all              Date of Distress Screenin20    Intervention:   Joshua is an 85-year-old gentleman with a diagnosis of CMML. Joshua had appointment with Andrea Sanchez this morning, at which time he scored positively on distress screen. This clinician called for psychosocial check-in and support. Joshua expressed heightened anxiety in context of feeling like he is, \"going round and round\" and not knowing whether treatment has been effective for him. Provided safe place for him to discuss how difficult blood cancers are in terms of knowing about treatment success as opposed to his history with solid tumors. Joshua expressed feeling frustrated and overwhelmed by conversation today that suggested possible bone marrow \"operation.\" Engaged in conversation about planning that supports positive coping.     Follow-up Required:   1) Frederick will benefit from additional education by NP on Monday about ways in which \"treatment response\" is measured with blood cancers, and why certain treatments are being recommended. Specifically, what we anticipate, and what next steps might be. "   2) Joshua has seen Satish Mg in past, and acknowledged that ongoing engagement with him has been helpful. SW will refer to schedulers.     Please page in the case of psychosocial distress or concern.     BILL Hdez, [facility]  Phone: [phone]  Pager: [phone]    [facility]: M, T  *every other Thursday, 8am-4:30pm  [facility]: W, F, *every other Thursday, 8am-4:30pm

## 2020-07-03 NOTE — PROGRESS NOTES
Infusion Nursing Note:  Jeff Mack presents today for C3D5 Decitabine.    Patient seen by provider today: No   present during visit today: Not Applicable.    Note: pt tolerating chemotherapy well except mild fatigue. Denied nausea, eating good.     Intravenous Access:  Peripheral IV placed.    Treatment Conditions:  Not Applicable.      Post Infusion Assessment:  Patient tolerated infusion without incident.  Blood return noted pre and post infusion.  Site patent and intact, free from redness, edema or discomfort.  No evidence of extravasations.  Access discontinued per protocol.       Discharge Plan:   Discharge instructions reviewed with: Patient.  Patient and/or family verbalized understanding of discharge instructions and all questions answered.  Copy of AVS reviewed with patient and/or family.  Patient will return 7/27/20 for next appointment.  Patient discharged in stable condition accompanied by: self.  Departure Mode: Ambulatory.    Olena Bernardo RN

## 2020-07-06 NOTE — PROGRESS NOTES
Visit Date:   06/29/2020     HEMATOLOGY HISTORY: Mr. 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   1.  On 02/28/2020:  -WBC of 30.5, hemoglobin of 9.4 and platelet of 235.  MCV of 87.  Neutrophil of 50%, lymphocytes of 15% and monocytes of 17%.  There is myelocyte and metamyelocyte present. (On 11/16/2018, CBC normal with WBC of 4.8, hemoglobin 15.3 and platelets of 295.)  -CMP is normal.   3.  CT chest, abdomen and pelvis on 02/29/2020 does not reveal any evidence of malignancy.   4.   On 03/19/2020, NGS panel negative for JAK2, CALR and MPL.  There is IDH2 R140Q and KIT D816V.  5. Bone marrow biopsy done on 04/16/2020. It reveals chronic myelomonocytic leukemia - 1 (CMML-1) characterized by hypercellular bone marrow (90%) with increased M:E ratio and 7% blasts. There are aggregates of atypical mast cells consistent with bone marrow involvement by systemic mastocytosis. Increased bone marrow fibrosis (MF-1-2).  -Normal cytogenetics.   -Flow cytometry on bone marrow reveals increased monocytic cells (43%).  No increase in CD34 positive myeloid blasts.   -BCR-ABL1 bone marrow is negative.   6. On 04/29/2020, tryptase elevated at 51.3 (normal less than 11).  6. Decitabine started on 04/29/2020.     SUBJECTIVE:  Mr. Mack is an 85-year-old gentleman with chronic myelomonocytic leukemia and systemic mastocytosis on gemcitabine.  He is here to start cycle #3.  His CBC last week had revealed neutropenia.  Because of that, he was started on Neupogen.  He received 3 doses and he tolerated it well.      REVIEW OF SYSTEMS:  Overall, he is doing well. He is more relaxed.  Not as anxious.  No headache or dizziness.  No chest pain or shortness of breath.  No abdominal pain, nausea or vomiting.  No cough.  No urinary or bowel complaints.  No bleeding.      PHYSICAL EXAMINATION:   GENERAL:  The patient was alert and oriented x 3.   VITAL SIGNS:  Reviewed.  ECOG PS of 1.   The  rest of the systems not examined.      LABORATORY DATA:  Reviewed.      ASSESSMENT:   1.  An 85-year-old gentleman with chronic myelomonocytic leukemia-1.    2.  Systemic mastocytosis.   3.  Anxiety.   4.  Peripheral neuropathy, stable.   5.  Thrombocytosis, reactive.   6.  Normocytic anemia.      PLAN:   1.  I discussed with the patient regarding treatment.  Previously, he had wanted to stop decitabine.  I again discussed regarding treatment.  I would recommend at least getting 4 cycles.  After discussion, the patient is agreeable to take another 2 cycles of decitabine.  After cycle #4, we will get a bone marrow biopsy.  He is agreeable for it.   2.  The patient is worried that he might become severely leukopenic again.  I told the patient we will monitor his CBC.  I will have him see our nurse practitioner later this week with CBC.   3.  The patient has systemic mastocytosis.  His tryptase level has decreased to 27.  We will continue to monitor it.   4.  Multiple questions, which were all answered.  I will see him in a month.  In between, he will see our nurse practitioner.     5.  Advised him to go to the emergency room if he has fever, chills, infection, worsening weakness, shortness of breath, recurrent vomiting, bleeding or any other concerns.      TOTAL FACE TO FACE TIME SPENT:  25 minutes, more than 50% of the time spent in counseling and coordination of care.         GRAZYNA RICHEY MD             D: 2020   T: 2020   MT: TERESA      Name:     RADHA ALFONSO   MRN:      [id number]        Account:      [id number]   :      1935           Visit Date:   2020      Document: [id number]

## 2020-07-06 NOTE — PROGRESS NOTES
Medical Assistant Note:  Jeff Mack presents today for lab draw.    Patient seen by provider today: No.   present during visit today: Not Applicable.    Concerns: No Concerns.    Procedure:  Lab draw site: LAC, Needle type: BF, Gauge: 21. Lucio and coban applied    Post Assessment:  Labs drawn without difficulty: Yes.    Discharge Plan:  Departure Mode: Ambulatory.    Face to Face Time: 5.    Lashanda Yeh CMA

## 2020-07-06 NOTE — LETTER
"    7/6/2020         RE: Jeff Mack  [address]  [place] [address]        Dear Colleague,    Thank you for referring your patient, Jeff Mack, to the [facility]. Please see a copy of my visit note below.    Jeff Mack is a 85 year old male who is being evaluated via a billable telephone visit.      The patient has been notified of following:     \"This telephone visit will be conducted via a call between you and your physician/provider. We have found that certain health care needs can be provided without the need for a physical exam.  This service lets us provide the care you need with a short phone conversation.  If a prescription is necessary we can send it directly to your pharmacy.  If lab work is needed we can place an order for that and you can then stop by our lab to have the test done at a later time.    Telephone visits are billed at different rates depending on your insurance coverage. During this emergency period, for some insurers they may be billed the same as an in-person visit.  Please reach out to your insurance provider with any questions.    If during the course of the call the physician/provider feels a telephone visit is not appropriate, you will not be charged for this service.\"    Patient has given verbal consent for Telephone visit?  Yes    What phone number would you like to be contacted at? [phone]    How would you like to obtain your AVS? Mail a copy    Phone call duration:        Lashanda Yeh CMA        Jeff Mack is a 85 year old male who is being evaluated via a billable telephone visit.      The patient has been notified of following:     \"This telephone visit will be conducted via a call between you and your physician/provider. We have found that certain health care needs can be provided without the need for a physical exam.  This service lets us provide the care you need with a short phone conversation.  If a prescription is " "necessary we can send it directly to your pharmacy.  If lab work is needed we can place an order for that and you can then stop by our lab to have the test done at a later time.    Telephone visits are billed at different rates depending on your insurance coverage. During this emergency period, for some insurers they may be billed the same as an in-person visit.  Please reach out to your insurance provider with any questions.    If during the course of the call the physician/provider feels a telephone visit is not appropriate, you will not be charged for this service.\"    Patient has given verbal consent for Telephone visit?  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     Pt  is currently on decitabine        Interval History:  Patient reports he is feeling well.  He reports bilateral lower extremity edema he denies redness swelling or pain denies cough pain.  Fever chills sweats denies cough no shortness of breath denies chest pain nausea vomiting diarrhea abdominal pain or bleeding      Review of Systems:  14 point ROS of systems including Constitutional, Eyes, Respiratory, Cardiovascular, Gastroenterology, Genitourinary, Integumentary, Muscularskeletal, Psychiatric were all negative except for pertinent positives noted in my HPI.      Current Outpatient Medications   Medication Sig Dispense Refill     albuterol (PROAIR HFA/PROVENTIL HFA/VENTOLIN HFA) 108 (90 Base) MCG/ACT inhaler Inhale 1-2 puffs into the lungs every 6 hours as needed for shortness of breath / dyspnea or wheezing       ALPRAZolam (XANAX) 0.5 MG tablet Take 1 tablet (0.5 mg) by mouth 2 times daily as needed for anxiety 45 tablet 0     Cholecalciferol (VITAMIN D3) 5000 UNITS TABS Take 5,000 Units by mouth daily        fluticasone (FLONASE) 50 MCG/ACT nasal spray SPRAY 2 " SPRAYS INTO EACH NOSTRIL EVERY DAY 48 mL 3     Nutritional Supplements (ENSURE COMPLETE) LIQD Take 1 Bottle by mouth every 8 hours as needed 30 Bottle 3     SYMBICORT 160-4.5 MCG/ACT Inhaler Inhale 2 puffs into the lungs 2 times daily        polyethylene glycol (MIRALAX) 17 g packet Take 1 packet by mouth daily as needed for constipation       sennosides (SENOKOT) 8.6 MG tablet Take 1 tablet by mouth daily as needed for constipation         Physical Examination:  Telephone visit no audible cough or wheezing.    Laboratory Data:  Results for orders placed or performed in visit on 07/06/20 (from the past 24 hour(s))   CBC with platelets differential   Result Value Ref Range    WBC 1.8 (L) 4.0 - 11.0 10e9/L    RBC Count 3.87 (L) 4.4 - 5.9 10e12/L    Hemoglobin 10.5 (L) 13.3 - 17.7 g/dL    Hematocrit 32.7 (L) 40.0 - 53.0 %    MCV 85 78 - 100 fl    MCH 27.1 26.5 - 33.0 pg    MCHC 32.1 31.5 - 36.5 g/dL    RDW 21.5 (H) 10.0 - 15.0 %    Platelet Count 669 (H) 150 - 450 10e9/L    Diff Method Manual Differential     % Neutrophils 63.0 %    % Lymphocytes 26.0 %    % Monocytes 9.0 %    % Eosinophils 2.0 %    % Basophils 0.0 %    Absolute Neutrophil 1.1 (L) 1.6 - 8.3 10e9/L    Absolute Lymphocytes 0.5 (L) 0.8 - 5.3 10e9/L    Absolute Monocytes 0.2 0.0 - 1.3 10e9/L    Absolute Eosinophils 0.0 0.0 - 0.7 10e9/L    Absolute Basophils 0.0 0.0 - 0.2 10e9/L    Acanthocytes Slight     Ovalocytes Slight     Platelet Estimate       Automated count confirmed.  Platelet morphology is normal.         Assessment and Plan:      This is a 85-year-old male with       chronic myelomonocytic leukemia CMML   - currently on decitabine.   Cycle 3-day 5 given on 07/03  Plan will be to do bone marrow biopsy after cycle 4  ANC is 1.1    systemic mastocytosis.   Patient is asymptomatic  Tryptase 26 -slowly trending down  We will continue to monitor      Neutropenia  ANC is 1.1 patient received decitabine last week  Clinic precautions discussed fever  chills sweats or any signs symptoms  Spoke to  Dr. Ndiaye will plan to give Neulasta on Thursday    Anxiety  anxious about getting IV treatment  Reassurance given  We will also meet with our   Patient is advised to call our clinic if worsening of symptoms    Bilateral lower extremity edema  Compression socks, low-sodium diet  I will see patient on Thursday to evaluate edema       Patient is advised to check temperature frequently in the event of fever chills sweats cough sore throat chest pain nausea vomiting diarrhea abdominal pain bleeding or any changes in health condition patient is advised to go to ER or call our clinic    RAUDEL Stephens CNP  [facility]     Chart documentation with Dragon Voice recognition Software. Although reviewed after completion, some words and grammatical errors may remain.          Again, thank you for allowing me to participate in the care of your patient.        Sincerely,        RAUDEL Stephens CNP

## 2020-07-06 NOTE — PROGRESS NOTES
"Jeff Mack is a 85 year old male who is being evaluated via a billable telephone visit.      The patient has been notified of following:     \"This telephone visit will be conducted via a call between you and your physician/provider. We have found that certain health care needs can be provided without the need for a physical exam.  This service lets us provide the care you need with a short phone conversation.  If a prescription is necessary we can send it directly to your pharmacy.  If lab work is needed we can place an order for that and you can then stop by our lab to have the test done at a later time.    Telephone visits are billed at different rates depending on your insurance coverage. During this emergency period, for some insurers they may be billed the same as an in-person visit.  Please reach out to your insurance provider with any questions.    If during the course of the call the physician/provider feels a telephone visit is not appropriate, you will not be charged for this service.\"    Patient has given verbal consent for Telephone visit?  Yes    What phone number would you like to be contacted at? [phone]    How would you like to obtain your AVS? Mail a copy    Phone call duration:        Lashanda Yeh CMA      "

## 2020-07-06 NOTE — LETTER
"    7/6/2020         RE: Jeff Mack  [address]  [place] [address]        Dear Colleague,    Thank you for referring your patient, Jeff Mack, to the [facility]. Please see a copy of my visit note below.    Jeff Mack is a 85 year old male who is being evaluated via a billable telephone visit.      The patient has been notified of following:     \"This telephone visit will be conducted via a call between you and your physician/provider. We have found that certain health care needs can be provided without the need for a physical exam.  This service lets us provide the care you need with a short phone conversation.  If a prescription is necessary we can send it directly to your pharmacy.  If lab work is needed we can place an order for that and you can then stop by our lab to have the test done at a later time.    Telephone visits are billed at different rates depending on your insurance coverage. During this emergency period, for some insurers they may be billed the same as an in-person visit.  Please reach out to your insurance provider with any questions.    If during the course of the call the physician/provider feels a telephone visit is not appropriate, you will not be charged for this service.\"    Patient has given verbal consent for Telephone visit?  Yes    What phone number would you like to be contacted at? [phone]    How would you like to obtain your AVS? Mail a copy    Phone call duration:        Lashanda Yeh CMA        Jeff Mack is a 85 year old male who is being evaluated via a billable telephone visit.      The patient has been notified of following:     \"This telephone visit will be conducted via a call between you and your physician/provider. We have found that certain health care needs can be provided without the need for a physical exam.  This service lets us provide the care you need with a short phone conversation.  If a prescription is " "necessary we can send it directly to your pharmacy.  If lab work is needed we can place an order for that and you can then stop by our lab to have the test done at a later time.    Telephone visits are billed at different rates depending on your insurance coverage. During this emergency period, for some insurers they may be billed the same as an in-person visit.  Please reach out to your insurance provider with any questions.    If during the course of the call the physician/provider feels a telephone visit is not appropriate, you will not be charged for this service.\"    Patient has given verbal consent for Telephone visit?  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     Pt  is currently on decitabine        Interval History:  Patient reports he is feeling well.  He reports bilateral lower extremity edema he denies redness swelling or pain denies cough pain.  Fever chills sweats denies cough no shortness of breath denies chest pain nausea vomiting diarrhea abdominal pain or bleeding      Review of Systems:  14 point ROS of systems including Constitutional, Eyes, Respiratory, Cardiovascular, Gastroenterology, Genitourinary, Integumentary, Muscularskeletal, Psychiatric were all negative except for pertinent positives noted in my HPI.      Current Outpatient Medications   Medication Sig Dispense Refill     albuterol (PROAIR HFA/PROVENTIL HFA/VENTOLIN HFA) 108 (90 Base) MCG/ACT inhaler Inhale 1-2 puffs into the lungs every 6 hours as needed for shortness of breath / dyspnea or wheezing       ALPRAZolam (XANAX) 0.5 MG tablet Take 1 tablet (0.5 mg) by mouth 2 times daily as needed for anxiety 45 tablet 0     Cholecalciferol (VITAMIN D3) 5000 UNITS TABS Take 5,000 Units by mouth daily        fluticasone (FLONASE) 50 MCG/ACT nasal spray SPRAY 2 " SPRAYS INTO EACH NOSTRIL EVERY DAY 48 mL 3     Nutritional Supplements (ENSURE COMPLETE) LIQD Take 1 Bottle by mouth every 8 hours as needed 30 Bottle 3     SYMBICORT 160-4.5 MCG/ACT Inhaler Inhale 2 puffs into the lungs 2 times daily        polyethylene glycol (MIRALAX) 17 g packet Take 1 packet by mouth daily as needed for constipation       sennosides (SENOKOT) 8.6 MG tablet Take 1 tablet by mouth daily as needed for constipation         Physical Examination:  Telephone visit no audible cough or wheezing.    Laboratory Data:  Results for orders placed or performed in visit on 07/06/20 (from the past 24 hour(s))   CBC with platelets differential   Result Value Ref Range    WBC 1.8 (L) 4.0 - 11.0 10e9/L    RBC Count 3.87 (L) 4.4 - 5.9 10e12/L    Hemoglobin 10.5 (L) 13.3 - 17.7 g/dL    Hematocrit 32.7 (L) 40.0 - 53.0 %    MCV 85 78 - 100 fl    MCH 27.1 26.5 - 33.0 pg    MCHC 32.1 31.5 - 36.5 g/dL    RDW 21.5 (H) 10.0 - 15.0 %    Platelet Count 669 (H) 150 - 450 10e9/L    Diff Method Manual Differential     % Neutrophils 63.0 %    % Lymphocytes 26.0 %    % Monocytes 9.0 %    % Eosinophils 2.0 %    % Basophils 0.0 %    Absolute Neutrophil 1.1 (L) 1.6 - 8.3 10e9/L    Absolute Lymphocytes 0.5 (L) 0.8 - 5.3 10e9/L    Absolute Monocytes 0.2 0.0 - 1.3 10e9/L    Absolute Eosinophils 0.0 0.0 - 0.7 10e9/L    Absolute Basophils 0.0 0.0 - 0.2 10e9/L    Acanthocytes Slight     Ovalocytes Slight     Platelet Estimate       Automated count confirmed.  Platelet morphology is normal.         Assessment and Plan:      This is a 85-year-old male with       chronic myelomonocytic leukemia CMML   - currently on decitabine.   Cycle 3-day 5 given on 07/03  Plan will be to do bone marrow biopsy after cycle 4  ANC is 1.1    systemic mastocytosis.   Patient is asymptomatic  Tryptase 26 -slowly trending down  We will continue to monitor      Neutropenia  ANC is 1.1 patient received decitabine last week  Clinic precautions discussed fever  chills sweats or any signs symptoms  Spoke to  Dr. Ndiaye will plan to give Neulasta on Thursday    Anxiety  anxious about getting IV treatment  Reassurance given  We will also meet with our   Patient is advised to call our clinic if worsening of symptoms    Bilateral lower extremity edema  Compression socks, low-sodium diet  I will see patient on Thursday to evaluate edema       Patient is advised to check temperature frequently in the event of fever chills sweats cough sore throat chest pain nausea vomiting diarrhea abdominal pain bleeding or any changes in health condition patient is advised to go to ER or call our clinic    RAUDEL Stephens CNP  [facility]     Chart documentation with Dragon Voice recognition Software. Although reviewed after completion, some words and grammatical errors may remain.          Again, thank you for allowing me to participate in the care of your patient.        Sincerely,        RAUDEL Stephens CNP

## 2020-07-06 NOTE — PROGRESS NOTES
"Jeff Mack is a 85 year old male who is being evaluated via a billable telephone visit.      The patient has been notified of following:     \"This telephone visit will be conducted via a call between you and your physician/provider. We have found that certain health care needs can be provided without the need for a physical exam.  This service lets us provide the care you need with a short phone conversation.  If a prescription is necessary we can send it directly to your pharmacy.  If lab work is needed we can place an order for that and you can then stop by our lab to have the test done at a later time.    Telephone visits are billed at different rates depending on your insurance coverage. During this emergency period, for some insurers they may be billed the same as an in-person visit.  Please reach out to your insurance provider with any questions.    If during the course of the call the physician/provider feels a telephone visit is not appropriate, you will not be charged for this service.\"    Patient has given verbal consent for Telephone visit?  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     Pt  is currently on decitabine        Interval History:  Patient reports he is feeling well.  He reports bilateral lower extremity edema he denies redness swelling or pain denies cough pain.  Fever chills sweats denies cough no shortness of breath denies chest pain nausea vomiting diarrhea abdominal pain or bleeding      Review of Systems:  14 point ROS of systems including Constitutional, Eyes, Respiratory, Cardiovascular, Gastroenterology, Genitourinary, Integumentary, Muscularskeletal, Psychiatric were all negative except for pertinent positives noted in my HPI.      Current Outpatient Medications   Medication Sig Dispense Refill     " albuterol (PROAIR HFA/PROVENTIL HFA/VENTOLIN HFA) 108 (90 Base) MCG/ACT inhaler Inhale 1-2 puffs into the lungs every 6 hours as needed for shortness of breath / dyspnea or wheezing       ALPRAZolam (XANAX) 0.5 MG tablet Take 1 tablet (0.5 mg) by mouth 2 times daily as needed for anxiety 45 tablet 0     Cholecalciferol (VITAMIN D3) 5000 UNITS TABS Take 5,000 Units by mouth daily        fluticasone (FLONASE) 50 MCG/ACT nasal spray SPRAY 2 SPRAYS INTO EACH NOSTRIL EVERY DAY 48 mL 3     Nutritional Supplements (ENSURE COMPLETE) LIQD Take 1 Bottle by mouth every 8 hours as needed 30 Bottle 3     SYMBICORT 160-4.5 MCG/ACT Inhaler Inhale 2 puffs into the lungs 2 times daily        polyethylene glycol (MIRALAX) 17 g packet Take 1 packet by mouth daily as needed for constipation       sennosides (SENOKOT) 8.6 MG tablet Take 1 tablet by mouth daily as needed for constipation         Physical Examination:  Telephone visit no audible cough or wheezing.    Laboratory Data:  Results for orders placed or performed in visit on 07/06/20 (from the past 24 hour(s))   CBC with platelets differential   Result Value Ref Range    WBC 1.8 (L) 4.0 - 11.0 10e9/L    RBC Count 3.87 (L) 4.4 - 5.9 10e12/L    Hemoglobin 10.5 (L) 13.3 - 17.7 g/dL    Hematocrit 32.7 (L) 40.0 - 53.0 %    MCV 85 78 - 100 fl    MCH 27.1 26.5 - 33.0 pg    MCHC 32.1 31.5 - 36.5 g/dL    RDW 21.5 (H) 10.0 - 15.0 %    Platelet Count 669 (H) 150 - 450 10e9/L    Diff Method Manual Differential     % Neutrophils 63.0 %    % Lymphocytes 26.0 %    % Monocytes 9.0 %    % Eosinophils 2.0 %    % Basophils 0.0 %    Absolute Neutrophil 1.1 (L) 1.6 - 8.3 10e9/L    Absolute Lymphocytes 0.5 (L) 0.8 - 5.3 10e9/L    Absolute Monocytes 0.2 0.0 - 1.3 10e9/L    Absolute Eosinophils 0.0 0.0 - 0.7 10e9/L    Absolute Basophils 0.0 0.0 - 0.2 10e9/L    Acanthocytes Slight     Ovalocytes Slight     Platelet Estimate       Automated count confirmed.  Platelet morphology is normal.          Assessment and Plan:      This is a 85-year-old male with       chronic myelomonocytic leukemia CMML   - currently on decitabine.   Cycle 3-day 5 given on 07/03  Plan will be to do bone marrow biopsy after cycle 4  ANC is 1.1    systemic mastocytosis.   Patient is asymptomatic  Tryptase 26 -slowly trending down  We will continue to monitor      Neutropenia  ANC is 1.1 patient received decitabine last week  Clinic precautions discussed fever chills sweats or any signs symptoms  Spoke to  Dr. Ndiaye will plan to give Neulasta on Thursday    Anxiety  anxious about getting IV treatment  Reassurance given  We will also meet with our   Patient is advised to call our clinic if worsening of symptoms    Bilateral lower extremity edema  Compression socks, low-sodium diet  I will see patient on Thursday to evaluate edema       Patient is advised to check temperature frequently in the event of fever chills sweats cough sore throat chest pain nausea vomiting diarrhea abdominal pain bleeding or any changes in health condition patient is advised to go to ER or call our clinic    RAUDEL Stephens [facility]     Chart documentation with Dragon Voice recognition Software. Although reviewed after completion, some words and grammatical errors may remain.

## 2020-07-09 NOTE — PROGRESS NOTES
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     Pt  is currently on decitabine--cycle 3 completed on 07/03/2020        Interval History:  Patient reports he is feeling well.  Reoprts bilateral ankle swelling.  Patient denies calf pain, denies redness denies bone pain.  Denies gait problem.  Denies fever chills sweats denies cough no shortness of breath denies chest pain denies nausea vomiting diarrhea denies abdominal pain denies bleeding    Review of Systems:  14 point ROS of systems including Constitutional, Eyes, Respiratory, Cardiovascular, Gastroenterology, Genitourinary, Integumentary, Muscularskeletal, Psychiatric were all negative except for pertinent positives noted in my HPI.      Current Outpatient Medications   Medication Sig Dispense Refill     albuterol (PROAIR HFA/PROVENTIL HFA/VENTOLIN HFA) 108 (90 Base) MCG/ACT inhaler Inhale 1-2 puffs into the lungs every 6 hours as needed for shortness of breath / dyspnea or wheezing       ALPRAZolam (XANAX) 0.5 MG tablet Take 1 tablet (0.5 mg) by mouth 2 times daily as needed for anxiety 45 tablet 0     Cholecalciferol (VITAMIN D3) 5000 UNITS TABS Take 5,000 Units by mouth daily        fluticasone (FLONASE) 50 MCG/ACT nasal spray SPRAY 2 SPRAYS INTO EACH NOSTRIL EVERY DAY 48 mL 3     furosemide (LASIX) 20 MG tablet Take 1 tablet (20 mg) by mouth once for 1 dose 1 tablet 0     levofloxacin (LEVAQUIN) 500 MG tablet Take 1 tablet (500 mg) by mouth daily for 7 days 7 tablet 0     Nutritional Supplements (ENSURE COMPLETE) LIQD Take 1 Bottle by mouth every 8 hours as needed 30 Bottle 3     polyethylene glycol (MIRALAX) 17 g packet Take 1 packet by mouth daily as needed for constipation       potassium chloride ER (KLOR-CON M) 20 MEQ CR tablet Take 1 tablet (20 mEq) by mouth 2 times daily for 1 day 2 tablet 0     sennosides (SENOKOT) 8.6 MG tablet Take 1 tablet by mouth  daily as needed for constipation       SYMBICORT 160-4.5 MCG/ACT Inhaler Inhale 2 puffs into the lungs 2 times daily          Physical Examination:  Physical Exam   Constitutional: He appears well-developed.   HENT:   Head: Normocephalic.   Neck: Normal range of motion.   Cardiovascular: Normal rate.   Pulmonary/Chest: Effort normal.   Abdominal: Soft.   Musculoskeletal: Normal range of motion.      Comments: Pitting edema bilateral ankles,no redness, no tenderness.         Laboratory Data:  Results for orders placed or performed in visit on 07/09/20 (from the past 24 hour(s))   Comprehensive metabolic panel   Result Value Ref Range    Sodium 139 133 - 144 mmol/L    Potassium 3.8 3.4 - 5.3 mmol/L    Chloride 105 94 - 109 mmol/L    Carbon Dioxide 30 20 - 32 mmol/L    Anion Gap 4 3 - 14 mmol/L    Glucose 76 70 - 99 mg/dL    Urea Nitrogen 14 7 - 30 mg/dL    Creatinine 0.68 0.66 - 1.25 mg/dL    GFR Estimate 88 >60 mL/min/[1.73_m2]    GFR Estimate If Black >90 >60 mL/min/[1.73_m2]    Calcium 8.9 8.5 - 10.1 mg/dL    Bilirubin Total 0.5 0.2 - 1.3 mg/dL    Albumin 3.7 3.4 - 5.0 g/dL    Protein Total 7.9 6.8 - 8.8 g/dL    Alkaline Phosphatase 111 40 - 150 U/L    ALT 24 0 - 70 U/L    AST 14 0 - 45 U/L   CBC with platelets differential   Result Value Ref Range    WBC 1.9 (L) 4.0 - 11.0 10e9/L    RBC Count 3.95 (L) 4.4 - 5.9 10e12/L    Hemoglobin 11.1 (L) 13.3 - 17.7 g/dL    Hematocrit 33.5 (L) 40.0 - 53.0 %    MCV 85 78 - 100 fl    MCH 28.1 26.5 - 33.0 pg    MCHC 33.1 31.5 - 36.5 g/dL    RDW 21.7 (H) 10.0 - 15.0 %    Platelet Count 482 (H) 150 - 450 10e9/L    Diff Method Manual Differential     % Neutrophils 44.0 %    % Lymphocytes 31.0 %    % Monocytes 7.0 %    % Eosinophils 10.0 %    % Basophils 8.0 %    Nucleated RBCs 2 (H) 0 /100    Absolute Neutrophil 0.8 (L) 1.6 - 8.3 10e9/L    Absolute Lymphocytes 0.6 (L) 0.8 - 5.3 10e9/L    Absolute Monocytes 0.1 0.0 - 1.3 10e9/L    Absolute Eosinophils 0.2 0.0 - 0.7 10e9/L    Absolute  Basophils 0.2 0.0 - 0.2 10e9/L    Absolute Nucleated RBC 0.0     Anisocytosis Moderate     RBC Fragments Moderate     Platelet Estimate       Automated count confirmed.  Platelet morphology is normal.         Assessment and Plan:      This is a 85-year-old male with       chronic myelomonocytic leukemia CMML   - currently on decitabine.   Cycle 3-day 5 given on 07/03  Plan will be to do bone marrow biopsy after cycle 4  07/27-patient has follow-up appointment with Dr. Ndiaye prior to cycle 4 of decitabine    systemic mastocytosis.   Patient is asymptomatic  Tryptase 26 -slowly trending down  We will continue to monitor      Neutropenia  Patient is asymptomatic  Neutropenic precautions discussed.  Check temperature frequently in the event of fever chills sweats or any signs symptoms of infection patient is advised to go to ER  -Neulasta shot today-side effects of Neulasta discussed  -Since he is at risk for infection I will start him on prophylactic Levaquin for 7 days  -We will check CBC next week  and follow-up with me        Anxiety  anxious about getting IV treatment  Reassurance given  He has appointment with Dr. Mg   Patient is advised to call our clinic if worsening of symptoms    Bilateral lower ankle edema  Lasix 20 mg po x 1 today   Kcl po BID x 1 day   Compression socks low-salt diet  Advised to call our clinic if edema does not resolve          Patient is advised to check temperature frequently in the event of fever chills sweats cough sore throat chest pain nausea vomiting diarrhea abdominal pain bleeding or any changes in health condition patient is advised to go to ER or call our clinic    RAUDEL Stephens CNP  [facility]     Chart documentation with Dragon Voice recognition Software. Although reviewed after completion, some words and grammatical errors may remain.

## 2020-07-09 NOTE — PROGRESS NOTES
Medical Assistant Note:  Jeff Mack presents today for blood draw.    Patient seen by provider today: Yes: sai.   present during visit today: Not Applicable.    Concerns: No Concerns.    Procedure:  Lab draw site: rac, Needle type: bf, Gauge: 23.    Post Assessment:  Labs drawn without difficulty: Yes.    Discharge Plan:  Departure Mode: Ambulatory.    Face to Face Time: 5min  .    Zaynab Aparicio, CMA

## 2020-07-09 NOTE — LETTER
"    7/9/2020         RE: Jeff Mack  [address]  [place] [address]        Dear Colleague,    Thank you for referring your patient, Jeff Mack, to the [facility]CER [facility]. Please see a copy of my visit note below.    Oncology Rooming Note    July 9, 2020 9:08 AM   Jeff Mack is a 85 year old male who presents for:    Chief Complaint   Patient presents with     Oncology Clinic Visit     Initial Vitals: There were no vitals taken for this visit. Estimated body mass index is 22.73 kg/m  as calculated from the following:    Height as of 7/3/20: 1.753 m (5' 9.02\").    Weight as of 7/6/20: 69.9 kg (154 lb). There is no height or weight on file to calculate BSA.  Data Unavailable Comment: Data Unavailable   No LMP for male patient.  Allergies reviewed: Yes  Medications reviewed: Yes    Medications: Medication refills not needed today.  Pharmacy name entered into [employer]:    [facility][id number] - [place], [place] - [address]  [id number] - [place], [place] - [address]    Clinical concerns:  NP was notified.      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     Pt  is currently on decitabine--cycle 3 completed on 07/03/2020        Interval History:  Patient reports he is feeling well.  Reoprts bilateral ankle swelling.  Patient denies calf pain, denies redness denies bone pain.  Denies gait problem.  Denies fever chills sweats denies cough no shortness of breath denies chest pain denies nausea vomiting diarrhea denies abdominal pain denies bleeding    Review of Systems:  14 point ROS of systems including Constitutional, Eyes, Respiratory, Cardiovascular, Gastroenterology, Genitourinary, Integumentary, Muscularskeletal, Psychiatric were all negative except for pertinent positives noted in my HPI.      Current Outpatient Medications "   Medication Sig Dispense Refill     albuterol (PROAIR HFA/PROVENTIL HFA/VENTOLIN HFA) 108 (90 Base) MCG/ACT inhaler Inhale 1-2 puffs into the lungs every 6 hours as needed for shortness of breath / dyspnea or wheezing       ALPRAZolam (XANAX) 0.5 MG tablet Take 1 tablet (0.5 mg) by mouth 2 times daily as needed for anxiety 45 tablet 0     Cholecalciferol (VITAMIN D3) 5000 UNITS TABS Take 5,000 Units by mouth daily        fluticasone (FLONASE) 50 MCG/ACT nasal spray SPRAY 2 SPRAYS INTO EACH NOSTRIL EVERY DAY 48 mL 3     furosemide (LASIX) 20 MG tablet Take 1 tablet (20 mg) by mouth once for 1 dose 1 tablet 0     levofloxacin (LEVAQUIN) 500 MG tablet Take 1 tablet (500 mg) by mouth daily for 7 days 7 tablet 0     Nutritional Supplements (ENSURE COMPLETE) LIQD Take 1 Bottle by mouth every 8 hours as needed 30 Bottle 3     polyethylene glycol (MIRALAX) 17 g packet Take 1 packet by mouth daily as needed for constipation       potassium chloride ER (KLOR-CON M) 20 MEQ CR tablet Take 1 tablet (20 mEq) by mouth 2 times daily for 1 day 2 tablet 0     sennosides (SENOKOT) 8.6 MG tablet Take 1 tablet by mouth daily as needed for constipation       SYMBICORT 160-4.5 MCG/ACT Inhaler Inhale 2 puffs into the lungs 2 times daily          Physical Examination:  Physical Exam   Constitutional: He appears well-developed.   HENT:   Head: Normocephalic.   Neck: Normal range of motion.   Cardiovascular: Normal rate.   Pulmonary/Chest: Effort normal.   Abdominal: Soft.   Musculoskeletal: Normal range of motion.      Comments: Pitting edema bilateral ankles,no redness, no tenderness.         Laboratory Data:  Results for orders placed or performed in visit on 07/09/20 (from the past 24 hour(s))   Comprehensive metabolic panel   Result Value Ref Range    Sodium 139 133 - 144 mmol/L    Potassium 3.8 3.4 - 5.3 mmol/L    Chloride 105 94 - 109 mmol/L    Carbon Dioxide 30 20 - 32 mmol/L    Anion Gap 4 3 - 14 mmol/L    Glucose 76 70 - 99 mg/dL     Urea Nitrogen 14 7 - 30 mg/dL    Creatinine 0.68 0.66 - 1.25 mg/dL    GFR Estimate 88 >60 mL/min/[1.73_m2]    GFR Estimate If Black >90 >60 mL/min/[1.73_m2]    Calcium 8.9 8.5 - 10.1 mg/dL    Bilirubin Total 0.5 0.2 - 1.3 mg/dL    Albumin 3.7 3.4 - 5.0 g/dL    Protein Total 7.9 6.8 - 8.8 g/dL    Alkaline Phosphatase 111 40 - 150 U/L    ALT 24 0 - 70 U/L    AST 14 0 - 45 U/L   CBC with platelets differential   Result Value Ref Range    WBC 1.9 (L) 4.0 - 11.0 10e9/L    RBC Count 3.95 (L) 4.4 - 5.9 10e12/L    Hemoglobin 11.1 (L) 13.3 - 17.7 g/dL    Hematocrit 33.5 (L) 40.0 - 53.0 %    MCV 85 78 - 100 fl    MCH 28.1 26.5 - 33.0 pg    MCHC 33.1 31.5 - 36.5 g/dL    RDW 21.7 (H) 10.0 - 15.0 %    Platelet Count 482 (H) 150 - 450 10e9/L    Diff Method Manual Differential     % Neutrophils 44.0 %    % Lymphocytes 31.0 %    % Monocytes 7.0 %    % Eosinophils 10.0 %    % Basophils 8.0 %    Nucleated RBCs 2 (H) 0 /100    Absolute Neutrophil 0.8 (L) 1.6 - 8.3 10e9/L    Absolute Lymphocytes 0.6 (L) 0.8 - 5.3 10e9/L    Absolute Monocytes 0.1 0.0 - 1.3 10e9/L    Absolute Eosinophils 0.2 0.0 - 0.7 10e9/L    Absolute Basophils 0.2 0.0 - 0.2 10e9/L    Absolute Nucleated RBC 0.0     Anisocytosis Moderate     RBC Fragments Moderate     Platelet Estimate       Automated count confirmed.  Platelet morphology is normal.         Assessment and Plan:      This is a 85-year-old male with       chronic myelomonocytic leukemia CMML   - currently on decitabine.   Cycle 3-day 5 given on 07/03  Plan will be to do bone marrow biopsy after cycle 4  07/27-patient has follow-up appointment with Dr. Ndiaye prior to cycle 4 of decitabine    systemic mastocytosis.   Patient is asymptomatic  Tryptase 26 -slowly trending down  We will continue to monitor      Neutropenia  Patient is asymptomatic  Neutropenic precautions discussed.  Check temperature frequently in the event of fever chills sweats or any signs symptoms of infection patient is advised to go  to ER  -Neulasta shot today-side effects of Neulasta discussed  -Since he is at risk for infection I will start him on prophylactic Levaquin for 7 days  -We will check CBC next week  and follow-up with me        Anxiety  anxious about getting IV treatment  Reassurance given  He has appointment with Dr. Mg   Patient is advised to call our clinic if worsening of symptoms    Bilateral lower ankle edema  Lasix 20 mg po x 1 today   Kcl po BID x 1 day   Compression socks low-salt diet  Advised to call our clinic if edema does not resolve          Patient is advised to check temperature frequently in the event of fever chills sweats cough sore throat chest pain nausea vomiting diarrhea abdominal pain bleeding or any changes in health condition patient is advised to go to ER or call our clinic    RAUDEL Stephens CNP  [facility]     Chart documentation with Dragon Voice recognition Software. Although reviewed after completion, some words and grammatical errors may remain.          Again, thank you for allowing me to participate in the care of your patient.        Sincerely,        RAUDEL Stephens CNP

## 2020-07-09 NOTE — TELEPHONE ENCOUNTER
xanax      Last Written Prescription Date:  6/8/20  Last Fill Quantity: 45,   # refills: 0  Last Office Visit: 3/17/20  Future Office visit:    Next 5 appointments (look out 90 days)    Jul 16, 2020 10:30 AM CDT  Return Visit with RAUDEL Stephens CNP  [facility] [address], [address]  [facility] [address]  [phone]   Jul 20, 2020  9:00 AM CDT  Telephone Visit with Juan Mg PsyD  [facility] [address], [address]  [facility] [address]  [phone]   Jul 27, 2020  9:40 AM CDT  Return Visit with Obdulio Ndiaye MD  [facility] [address], [address]  [facility] [address]  [phone]           Routing refill request to provider for review/approval because:  Drug not on the FMG, UMP or  Health refill protocol or controlled substance    RX [place] ([place]) reviewed:  reviewed- no concerns    [place]:  [email or web address]      Myra Oneill RN   [facility] - T[facility]

## 2020-07-09 NOTE — PROGRESS NOTES
"Oncology Rooming Note    July 9, 2020 9:08 AM   Jeff Mack is a 85 year old male who presents for:    Chief Complaint   Patient presents with     Oncology Clinic Visit     Initial Vitals: There were no vitals taken for this visit. Estimated body mass index is 22.73 kg/m  as calculated from the following:    Height as of 7/3/20: 1.753 m (5' 9.02\").    Weight as of 7/6/20: 69.9 kg (154 lb). There is no height or weight on file to calculate BSA.  Data Unavailable Comment: Data Unavailable   No LMP for male patient.  Allergies reviewed: Yes  Medications reviewed: Yes    Medications: Medication refills not needed today.  Pharmacy name entered into [facility]:    [facility] #[id number] - [place], [place] - [address] [address] [place], [place] - [address]    Clinical concerns:  NP was notified.      Zaynab Aparicio CMA            "

## 2020-07-15 NOTE — TELEPHONE ENCOUNTER
White blood cell and neutrophil counts are normalized after Neulasta, his hemoglobin is stable at the previous range  His platelet count is normal as well

## 2020-07-15 NOTE — TELEPHONE ENCOUNTER
Called patient and informed him of MD response below. Patient verbalized understanding and agrees with plan.     Domi Rivas RN, BSN  [facility]

## 2020-07-15 NOTE — TELEPHONE ENCOUNTER
Reason for Call:   call back    Detailed comments:  Pt has labs done today. He has a appt. Tomorrow and would like to talk about results before the appt.     Phone Number Patient can be reached at: Cell number on file:    Telephone Information:   Mobile [phone]       Best Time: any     Can we leave a detailed message on this number? YES    Call taken on 7/15/2020 at 1:36 PM by Angela White

## 2020-07-15 NOTE — TELEPHONE ENCOUNTER
Patient calling stating that the CBC with platelets lab drawn today and would like Dr. Lazar to read the results so that patient when going into his oncology appointment tomorrow is prepared to questions to ask. Patient states he knows that Dr. Ndiaye ordered the CBC with platelets, but would like Dr. Lazar to read the results.     Routing to PCP to advise. Thank you.       Okay to leave a detailed message? YES    Domi Rivas RN, BSN  [facility]

## 2020-07-15 NOTE — TELEPHONE ENCOUNTER
Don called to request his lab results ahead of his appointment tomorrow.  Shared his Hgb and WBC results, per his request.    Patient had no questions.    NICO DozierN, RN, OCN  Oncology Care Coordinator  [facility]

## 2020-07-16 NOTE — PROGRESS NOTES
"Oncology Rooming Note    July 16, 2020 10:44 AM   Jeff Mack is a 85 year old male who presents for:    Chief Complaint   Patient presents with     Oncology Clinic Visit     Initial Vitals: There were no vitals taken for this visit. Estimated body mass index is 22.52 kg/m  as calculated from the following:    Height as of 7/9/20: 1.753 m (5' 9.02\").    Weight as of 7/9/20: 69.2 kg (152 lb 9.6 oz). There is no height or weight on file to calculate BSA.  Data Unavailable Comment: Data Unavailable   No LMP for male patient.  Allergies reviewed: Yes  Medications reviewed: Yes    Medications: Medication refills not needed today.  Pharmacy name entered into [facility]:    [facility] #[id number] - [place], [place] - [address] [address] [place] [facility] [place] [place] - [address]    Clinical concerns: swelling in the ankles and mild constipation       Roxana Stevenson, [facility]              "

## 2020-07-16 NOTE — LETTER
"    7/16/2020         RE: Jeff Mack  [address]  [place] [address]        Dear Colleague,    Thank you for referring your patient, Jeff Mack, to the [facility] CLINIC. Please see a copy of my visit note below.    Oncology Rooming Note    July 16, 2020 10:44 AM   Jeff Mack is a 85 year old male who presents for:    Chief Complaint   Patient presents with     Oncology Clinic Visit     Initial Vitals: There were no vitals taken for this visit. Estimated body mass index is 22.52 kg/m  as calculated from the following:    Height as of 7/9/20: 1.753 m (5' 9.02\").    Weight as of 7/9/20: 69.2 kg (152 lb 9.6 oz). There is no height or weight on file to calculate BSA.  Data Unavailable Comment: Data Unavailable   No LMP for male patient.  Allergies reviewed: Yes  Medications reviewed: Yes    Medications: Medication refills not needed today.  Pharmacy name entered into [employer]:    [facility] #[id number] - [place], [place] - [address]  [facility] [address] [place] [facility] [place] [facility] [address]    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     Pt  is currently on decitabine--cycle 3 completed on 07/03/2020        Interval History:  Patient reports that he is feeling well.  Patient states he has bilateral edema in his ankles, denies pain denies cough pain denies redness denies any swelling above the ankles.  Patient denies fever chills sweats denies cough no shortness of breath denies chest pain    Review of Systems:  14 point ROS of systems including Constitutional, Eyes, Respiratory, Cardiovascular, Gastroenterology, Genitourinary, Integumentary, Muscularskeletal, Psychiatric were all negative except for pertinent positives noted in my HPI.        Physical " Examination:  Physical Exam   Constitutional: He appears well-developed.   HENT:   Head: Normocephalic.   Neck: Normal range of motion.   Cardiovascular: Normal rate.   Pulmonary/Chest: Effort normal.   Abdominal: Soft.   Musculoskeletal: Normal range of motion.      Comments: Pitting edema bilateral ankles,no redness, no tenderness.         Laboratory Data:    Results for SAULO ALFONSO (MRN [id number]) as of 7/17/2020 10:43   Ref. Range 7/15/2020 08:57   WBC Latest Ref Range: 4.0 - 11.0 10e9/L 6.8   Hemoglobin Latest Ref Range: 13.3 - 17.7 g/dL 11.2 (L)   Hematocrit Latest Ref Range: 40.0 - 53.0 % 34.0 (L)   Platelet Count Latest Ref Range: 150 - 450 10e9/L 241   RBC Count Latest Ref Range: 4.4 - 5.9 10e12/L 3.91 (L)   MCV Latest Ref Range: 78 - 100 fl 87   MCH Latest Ref Range: 26.5 - 33.0 pg 28.6   MCHC Latest Ref Range: 31.5 - 36.5 g/dL 32.9   RDW Latest Ref Range: 10.0 - 15.0 % 23.6 (H)   Diff Method Unknown Automated Method   % Neutrophils Latest Units: % 78.6   % Lymphocytes Latest Units: % 12.4   % Monocytes Latest Units: % 7.1   % Eosinophils Latest Units: % 0.6   % Basophils Latest Units: % 1.3   Absolute Neutrophil Latest Ref Range: 1.6 - 8.3 10e9/L 5.3   Absolute Lymphocytes Latest Ref Range: 0.8 - 5.3 10e9/L 0.8   Absolute Monocytes Latest Ref Range: 0.0 - 1.3 10e9/L 0.5   Absolute Eosinophils Latest Ref Range: 0.0 - 0.7 10e9/L 0.0   Absolute Basophils Latest Ref Range: 0.0 - 0.2 10e9/L 0.1     Assessment and Plan:      This is a 85-year-old male with       chronic myelomonocytic leukemia CMML   - currently on decitabine.   Cycle 3-day 5 given on 07/03  Overall labs look stable today patient was given Neulasta shot on 07/09  -ANC is normal  Plan for bone marrow biopsy after cycle 4  07/27-patient has follow-up appointment with Dr. Ndiaye prior to cycle 4 of decitabine    systemic mastocytosis.   Patient is asymptomatic  Tryptase 26 -slowly trending down  We will continue to monitor      Anxiety  anxious  about getting IV treatment  Reassurance given  He has appointment with Dr. Mg   Patient is advised to call our clinic if worsening of symptoms    Bilateral lower ankle edema  -On exam no concern for DVT  Lasix 20 mg po x 1 today   Kcl po BID x 1 day   Compression socks low-salt diet  Advised to call our clinic if edema does not resolve  Or in the event of swelling above the ankles, redness cough pain shortness of breath chest pain or any changes in health condition      Patient is advised to check temperature frequently in the event of fever chills sweats cough sore throat chest pain nausea vomiting diarrhea abdominal pain bleeding or any changes in health condition patient is advised to go to ER or call our clinic    RAUDEL Stephens CNP  [facility]     Chart documentation with Dragon Voice recognition Software. Although reviewed after completion, some words and grammatical errors may remain.          Again, thank you for allowing me to participate in the care of your patient.        Sincerely,        RAUDEL Stephens CNP

## 2020-07-16 NOTE — LETTER
"    7/16/2020         RE: Jeff Mack  [address]  [place] [address]        Dear Colleague,    Thank you for referring your patient, Jeff Mack, to the [facility]IC. Please see a copy of my visit note below.    Oncology Rooming Note    July 16, 2020 10:44 AM   Jeff Mack is a 85 year old male who presents for:    Chief Complaint   Patient presents with     Oncology Clinic Visit     Initial Vitals: There were no vitals taken for this visit. Estimated body mass index is 22.52 kg/m  as calculated from the following:    Height as of 7/9/20: 1.753 m (5' 9.02\").    Weight as of 7/9/20: 69.2 kg (152 lb 9.6 oz). There is no height or weight on file to calculate BSA.  Data Unavailable Comment: Data Unavailable   No LMP for male patient.  Allergies reviewed: Yes  Medications reviewed: Yes    Medications: Medication refills not needed today.  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     Pt  is currently on decitabine--cycle 3 completed on 07/03/2020        Interval History:  Patient reports that he is feeling well.  Patient states he has bilateral edema in his ankles, denies pain denies cough pain denies redness denies any swelling above the ankles.  Patient denies fever chills sweats denies cough no shortness of breath denies chest pain    Review of Systems:  14 point ROS of systems including Constitutional, Eyes, Respiratory, Cardiovascular, Gastroenterology, Genitourinary, Integumentary, Muscularskeletal, Psychiatric were all negative except for pertinent positives noted in my HPI.        Physical " Examination:  Physical Exam   Constitutional: He appears well-developed.   HENT:   Head: Normocephalic.   Neck: Normal range of motion.   Cardiovascular: Normal rate.   Pulmonary/Chest: Effort normal.   Abdominal: Soft.   Musculoskeletal: Normal range of motion.      Comments: Pitting edema bilateral ankles,no redness, no tenderness.         Laboratory Data:    Results for SAULO ALFONSO (MRN [id number]) as of 7/17/2020 10:43   Ref. Range 7/15/2020 08:57   WBC Latest Ref Range: 4.0 - 11.0 10e9/L 6.8   Hemoglobin Latest Ref Range: 13.3 - 17.7 g/dL 11.2 (L)   Hematocrit Latest Ref Range: 40.0 - 53.0 % 34.0 (L)   Platelet Count Latest Ref Range: 150 - 450 10e9/L 241   RBC Count Latest Ref Range: 4.4 - 5.9 10e12/L 3.91 (L)   MCV Latest Ref Range: 78 - 100 fl 87   MCH Latest Ref Range: 26.5 - 33.0 pg 28.6   MCHC Latest Ref Range: 31.5 - 36.5 g/dL 32.9   RDW Latest Ref Range: 10.0 - 15.0 % 23.6 (H)   Diff Method Unknown Automated Method   % Neutrophils Latest Units: % 78.6   % Lymphocytes Latest Units: % 12.4   % Monocytes Latest Units: % 7.1   % Eosinophils Latest Units: % 0.6   % Basophils Latest Units: % 1.3   Absolute Neutrophil Latest Ref Range: 1.6 - 8.3 10e9/L 5.3   Absolute Lymphocytes Latest Ref Range: 0.8 - 5.3 10e9/L 0.8   Absolute Monocytes Latest Ref Range: 0.0 - 1.3 10e9/L 0.5   Absolute Eosinophils Latest Ref Range: 0.0 - 0.7 10e9/L 0.0   Absolute Basophils Latest Ref Range: 0.0 - 0.2 10e9/L 0.1     Assessment and Plan:      This is a 85-year-old male with       chronic myelomonocytic leukemia CMML   - currently on decitabine.   Cycle 3-day 5 given on 07/03  Overall labs look stable today patient was given Neulasta shot on 07/09  -ANC is normal  Plan for bone marrow biopsy after cycle 4  07/27-patient has follow-up appointment with Dr. Ndiaye prior to cycle 4 of decitabine    systemic mastocytosis.   Patient is asymptomatic  Tryptase 26 -slowly trending down  We will continue to monitor      Anxiety  anxious  about getting IV treatment  Reassurance given  He has appointment with Dr. Mg   Patient is advised to call our clinic if worsening of symptoms    Bilateral lower ankle edema  -On exam no concern for DVT  Lasix 20 mg po x 1 today   Kcl po BID x 1 day   Compression socks low-salt diet  Advised to call our clinic if edema does not resolve  Or in the event of swelling above the ankles, redness cough pain shortness of breath chest pain or any changes in health condition      Patient is advised to check temperature frequently in the event of fever chills sweats cough sore throat chest pain nausea vomiting diarrhea abdominal pain bleeding or any changes in health condition patient is advised to go to ER or call our clinic    RAUDEL Stephens CNP  [facility]     Chart documentation with Dragon Voice recognition Software. Although reviewed after completion, some words and grammatical errors may remain.          Again, thank you for allowing me to participate in the care of your patient.        Sincerely,        RAUDEL Stephens CNP

## 2020-07-17 NOTE — PROGRESS NOTES
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     Pt  is currently on decitabine--cycle 3 completed on 07/03/2020        Interval History:  Patient reports that he is feeling well.  Patient states he has bilateral edema in his ankles, denies pain denies cough pain denies redness denies any swelling above the ankles.  Patient denies fever chills sweats denies cough no shortness of breath denies chest pain    Review of Systems:  14 point ROS of systems including Constitutional, Eyes, Respiratory, Cardiovascular, Gastroenterology, Genitourinary, Integumentary, Muscularskeletal, Psychiatric were all negative except for pertinent positives noted in my HPI.        Physical Examination:  Physical Exam   Constitutional: He appears well-developed.   HENT:   Head: Normocephalic.   Neck: Normal range of motion.   Cardiovascular: Normal rate.   Pulmonary/Chest: Effort normal.   Abdominal: Soft.   Musculoskeletal: Normal range of motion.      Comments: Pitting edema bilateral ankles,no redness, no tenderness.         Laboratory Data:    Results for SAULO ALFONSO (MRN [id number]) as of 7/17/2020 10:43   Ref. Range 7/15/2020 08:57   WBC Latest Ref Range: 4.0 - 11.0 10e9/L 6.8   Hemoglobin Latest Ref Range: 13.3 - 17.7 g/dL 11.2 (L)   Hematocrit Latest Ref Range: 40.0 - 53.0 % 34.0 (L)   Platelet Count Latest Ref Range: 150 - 450 10e9/L 241   RBC Count Latest Ref Range: 4.4 - 5.9 10e12/L 3.91 (L)   MCV Latest Ref Range: 78 - 100 fl 87   MCH Latest Ref Range: 26.5 - 33.0 pg 28.6   MCHC Latest Ref Range: 31.5 - 36.5 g/dL 32.9   RDW Latest Ref Range: 10.0 - 15.0 % 23.6 (H)   Diff Method Unknown Automated Method   % Neutrophils Latest Units: % 78.6   % Lymphocytes Latest Units: % 12.4   % Monocytes Latest Units: % 7.1   % Eosinophils Latest Units: % 0.6   % Basophils Latest Units: % 1.3   Absolute Neutrophil Latest Ref Range: 1.6 - 8.3  10e9/L 5.3   Absolute Lymphocytes Latest Ref Range: 0.8 - 5.3 10e9/L 0.8   Absolute Monocytes Latest Ref Range: 0.0 - 1.3 10e9/L 0.5   Absolute Eosinophils Latest Ref Range: 0.0 - 0.7 10e9/L 0.0   Absolute Basophils Latest Ref Range: 0.0 - 0.2 10e9/L 0.1     Assessment and Plan:      This is a 85-year-old male with       chronic myelomonocytic leukemia CMML   - currently on decitabine.   Cycle 3-day 5 given on 07/03  Overall labs look stable today patient was given Neulasta shot on 07/09  -ANC is normal  Plan for bone marrow biopsy after cycle 4  07/27-patient has follow-up appointment with Dr. Nidaye prior to cycle 4 of decitabine    systemic mastocytosis.   Patient is asymptomatic  Tryptase 26 -slowly trending down  We will continue to monitor      Anxiety  anxious about getting IV treatment  Reassurance given  He has appointment with Dr. Mg   Patient is advised to call our clinic if worsening of symptoms    Bilateral lower ankle edema  -On exam no concern for DVT  Lasix 20 mg po x 1 today   Kcl po BID x 1 day   Compression socks low-salt diet  Advised to call our clinic if edema does not resolve  Or in the event of swelling above the ankles, redness cough pain shortness of breath chest pain or any changes in health condition      Patient is advised to check temperature frequently in the event of fever chills sweats cough sore throat chest pain nausea vomiting diarrhea abdominal pain bleeding or any changes in health condition patient is advised to go to ER or call our clinic    RAUDEL Stephens [facility]     Chart documentation with Dragon Voice recognition Software. Although reviewed after completion, some words and grammatical errors may remain.

## 2020-07-20 NOTE — PROGRESS NOTES
"Jeff Mack is a 85 year old male who is being evaluated via a billable telephone visit. Phone call duration: 30 minutes     The patient has been notified of following:     \"This telephone visit will be conducted via a call between you and your physician/provider. We have found that certain health care needs can be provided without the need for a physical exam.  This service lets us provide the care you need with a short phone conversation.      Telephone visits are billed at different rates depending on your insurance coverage. During this emergency period, for some insurers they may be billed the same as an in-person visit.  Please reach out to your insurance provider with any questions.    Patient has given verbal consent for Telephone visit? Yes    Jeff Mack is a 85 year old male who presents for anxiousness due to a possible change in treatment.   The patient was seen for a 30 minute appointment on 7/20/2020.    Subjective: He was feeling better physically today which always improves his affect and his ability to manage his emotional coping. Topics discussed included maintaining activity levels, increasing his food intake by always taking \"one more bite\" before ending his meals, and a review of the tips for managing frustration secondary to his physical state.     Objective: Affect was appropriate to the therapeutic content.     Diagnosis:   Encounter Diagnosis   Name Primary?     Adjustment disorder with depressed mood Yes     Recommendations/Plan:  1. Continue to practice and use the behavioral recommendations   2. Return for follow-up as needed.     Thank you for this opportunity to participate in your care of this patient.    Juan Mg Psy.D., L.P.  Director, Oncology Supportive Care   "

## 2020-07-24 NOTE — TELEPHONE ENCOUNTER
Reviewed Dons CBC with him today.   He is asking with the improvement in his counts does this change the plan for his treatment next week?    Will route to Dr. Ndiaye

## 2020-07-27 NOTE — PROGRESS NOTES
Infusion Nursing Note:  Jeff Mack presents today for C4D1 Decitabine.    Patient seen by provider today: Yes: Dr. Ndiaye   present during visit today: Not Applicable.    Note: N/A.    Intravenous Access:  Peripheral IV placed.    Treatment Conditions:  Lab Results   Component Value Date    HGB 11.8 07/24/2020     Lab Results   Component Value Date    WBC 7.1 07/24/2020      Lab Results   Component Value Date    ANEU 5.3 07/15/2020     Lab Results   Component Value Date     07/24/2020      Lab Results   Component Value Date     07/27/2020                   Lab Results   Component Value Date    POTASSIUM 4.1 07/27/2020           Lab Results   Component Value Date    MAG 2.1 04/26/2020            Lab Results   Component Value Date    CR 0.75 07/27/2020                   Lab Results   Component Value Date    KAE 9.0 07/27/2020                Lab Results   Component Value Date    BILITOTAL 0.4 07/27/2020           Lab Results   Component Value Date    ALBUMIN 3.6 07/27/2020                    Lab Results   Component Value Date    ALT 21 07/27/2020           Lab Results   Component Value Date    AST 15 07/27/2020       Results reviewed, labs MET treatment parameters, ok to proceed with treatment.      Post Infusion Assessment:  Patient tolerated infusion without incident.  Blood return noted pre and post infusion.  Site patent and intact, free from redness, edema or discomfort.  No evidence of extravasations.  Access discontinued per protocol.       Discharge Plan:   Discharge instructions reviewed with: Patient.  Patient and/or family verbalized understanding of discharge instructions and all questions answered.  Copy of AVS reviewed with patient and/or family.  Patient will return 7/28/20 for next appointment.  Patient discharged in stable condition accompanied by: self.  Departure Mode: Ambulatory.    Olena Bernardo RN

## 2020-07-27 NOTE — PROGRESS NOTES
Visit Date:   07/27/2020     HEMATOLOGY HISTORY: Mr. 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   1.  On 02/28/2020, WBC of 30.5, hemoglobin of 9.4 and platelet of 235.  MCV of 87.  Neutrophil of 50%, lymphocytes of 15% and monocytes of 17%. On 11/16/2018, CBC normal with WBC of 4.8, hemoglobin 15.3 and platelets of 295.  2.  CT chest, abdomen and pelvis on 02/29/2020 does not reveal any evidence of malignancy.   3.   On 03/19/2020, NGS panel negative for JAK2, CALR and MPL.  There is IDH2 R140Q and KIT D816V.  4. Bone marrow biopsy on 04/16/2020 reveals chronic myelomonocytic leukemia - 1 (CMML-1) characterized by hypercellular bone marrow (90%) with increased M:E ratio and 7% blasts. There are aggregates of atypical mast cells consistent with bone marrow involvement by systemic mastocytosis. Increased bone marrow fibrosis (MF-1-2).  -Normal cytogenetics.   -Flow cytometry on bone marrow reveals increased monocytic cells (43%).  No increase in CD34 positive myeloid blasts.   -BCR-ABL1 bone marrow is negative.   5. On 04/29/2020, tryptase elevated at 51.3 (normal less than 11).  6. Decitabine started on 04/29/2020.     SUBJECTIVE:  Mr. Mack is an 85-year-old gentleman with CMML-1 and systemic mastocytosis.  He is on decitabine.  He will be starting cycle 4 today.      Overall, he is doing well.  He has some fatigue.  No worsening.  No headache.  No dizziness.  No chest pain.  No shortness of breath.  No abdominal pain, nausea or vomiting.  Appetite is good.  No urinary or bowel complaints.  No bleeding.  No fever, chills, or night sweats.  The patient has some anxiety.      PHYSICAL EXAMINATION:   GENERAL:  Alert and oriented x 3.   VITAL SIGNS:  Reviewed.  ECOG PS of 1.     EYES:  No icterus.   THROAT:  No ulcer. No thrush.   NECK:  Supple. No lymphadenopathy. No thyromegaly.   AXILLAE:  No lymphadenopathy.   LUNGS:  Good air entry bilaterally.  No crackles or  wheezing.   HEART:  Regular.  No murmur.   GI: Abdomen is soft.  Nontender. No mass.   EXTREMITIES:  No pedal edema.  No calf swelling or tenderness.   SKIN:  No rash.      LABORATORY DATA:  Reviewed.      ASSESSMENT:     1.  An 85-year-old gentleman with CMML-1.   2.  Systemic mastocytosis   3.  Mild normocytic anemia.   4.  Anxiety.      PLAN:   1.  The patient clinically is doing better.  No excessive fatigue.  His appetite has been fairly good.  Not losing weight.  His anxiety is better.  Overall, he feels better.     2.  The patient is on decitabine.  We will start cycle 4 today.  Labs are good for treatment.  He has been tolerating it well.      The patient wants to stop this decitabine after this cycle.  The patient has anxiety.  The patient says that coming for IV treatment makes him very anxious. As per his wishes, we will stop the decitabine after this cycle.  He can get it in the future as needed. We will consider using hydroxyurea when there is worsening of his counts.     3.  Discussed regarding bone marrow biopsy.  He does not want it.  He wants to avoid it.  I told the patient that bone marrow biopsy is best to assess the response, but since he does not want it, we will rely on his CBC.     4.  I will see him in a month with CBC.  In between, he will see our nurse practitioner.  Advised him to see a physician if he has fever, chills, worsening weakness, shortness of breath, recurrent vomiting, bleeding, or any other concerns.         GRAZYNA RICHEY MD             D: 2020   T: 2020   MT: ALBARO      Name:     RADHA ALFONSO   MRN:      [id number]        Account:      [id number]   :      1935           Visit Date:   2020      Document: [id number]

## 2020-07-27 NOTE — PATIENT INSTRUCTIONS
1. Continue decitabine.  2. See Andrea Sanchez in about 7 days with labs.  3. Follow-up in 1 month with labs.    Patient in [facility]

## 2020-07-27 NOTE — TELEPHONE ENCOUNTER
Patient has arrived for 7/27 appointment with Dr. Ndiaye and plan of care to be reviewed.     Jody Mehta RN

## 2020-07-27 NOTE — PROGRESS NOTES
"Oncology Rooming Note    July 27, 2020 9:39 AM   Jeff Mack is a 85 year old male who presents for:    Chief Complaint   Patient presents with     Oncology Clinic Visit     Initial Vitals: There were no vitals taken for this visit. Estimated body mass index is 22.88 kg/m  as calculated from the following:    Height as of 7/16/20: 1.753 m (5' 9.02\").    Weight as of 7/16/20: 70.3 kg (155 lb). There is no height or weight on file to calculate BSA.  Data Unavailable Comment: Data Unavailable   No LMP for male patient.  Allergies reviewed: Yes  Medications reviewed: Yes    Medications: Medication refills not needed today.  Pharmacy name entered into [facility]:    [id number] - [place] - [address] [address] [place], [place] - [address]    Clinical concerns:  doctor was notified.      Briana Eubanks MA            "

## 2020-07-28 NOTE — PROGRESS NOTES
Infusion Nursing Note:  Jeff Mack presents today for c4d2 dacogen.    Patient seen by provider today: No   present during visit today: Not Applicable.    Note: N/A.    Intravenous Access:  Peripheral IV placed.    Treatment Conditions:  Lab Results   Component Value Date    HGB 11.8 07/24/2020     Lab Results   Component Value Date    WBC 7.1 07/24/2020      Lab Results   Component Value Date    ANEU 5.3 07/15/2020     Lab Results   Component Value Date     07/24/2020      Lab Results   Component Value Date     07/27/2020                   Lab Results   Component Value Date    POTASSIUM 4.1 07/27/2020           Lab Results   Component Value Date    MAG 2.1 04/26/2020            Lab Results   Component Value Date    CR 0.75 07/27/2020                   Lab Results   Component Value Date    KAE 9.0 07/27/2020                Lab Results   Component Value Date    BILITOTAL 0.4 07/27/2020           Lab Results   Component Value Date    ALBUMIN 3.6 07/27/2020                    Lab Results   Component Value Date    ALT 21 07/27/2020           Lab Results   Component Value Date    AST 15 07/27/2020       Results reviewed, labs MET treatment parameters, ok to proceed with treatment.      Post Infusion Assessment:  Patient tolerated infusion without incident.  Site patent and intact, free from redness, edema or discomfort.  No evidence of extravasations.  Access discontinued per protocol.       Discharge Plan:   Discharge instructions reviewed with: Patient.  Patient and/or family verbalized understanding of discharge instructions and all questions answered.  Copy of AVS reviewed with patient and/or family.  Patient will return 7/29/20 for next appointment.  Patient discharged in stable condition accompanied by: self.  Departure Mode: Ambulatory.    Esteban Abraham RN

## 2020-07-29 NOTE — PROGRESS NOTES
Infusion Nursing Note:  Jeff Mack presents today for C4D3 Decitabine.    Patient seen by provider today: No   present during visit today: Not Applicable.    Note: N/A.    Intravenous Access:  Peripheral IV placed.    Treatment Conditions:  Lab Results   Component Value Date    HGB 11.8 07/24/2020     Lab Results   Component Value Date    WBC 7.1 07/24/2020      Lab Results   Component Value Date    ANEU 5.3 07/15/2020     Lab Results   Component Value Date     07/24/2020      Lab Results   Component Value Date     07/27/2020                   Lab Results   Component Value Date    POTASSIUM 4.1 07/27/2020           Lab Results   Component Value Date    MAG 2.1 04/26/2020            Lab Results   Component Value Date    CR 0.75 07/27/2020                   Lab Results   Component Value Date    KAE 9.0 07/27/2020                Lab Results   Component Value Date    BILITOTAL 0.4 07/27/2020           Lab Results   Component Value Date    ALBUMIN 3.6 07/27/2020                    Lab Results   Component Value Date    ALT 21 07/27/2020           Lab Results   Component Value Date    AST 15 07/27/2020       Results reviewed, labs MET treatment parameters, ok to proceed with treatment.      Post Infusion Assessment:  Patient tolerated infusion without incident.  Blood return noted pre and post infusion.  Site patent and intact, free from redness, edema or discomfort.  No evidence of extravasations.  Access discontinued per protocol.       Discharge Plan:   Patient declined prescription refills.  Discharge instructions reviewed with: Patient.  Patient and/or family verbalized understanding of discharge instructions and all questions answered.  Copy of AVS reviewed with patient and/or family.  Patient will return 7/30/2020 for next appointment.  Patient discharged in stable condition accompanied by: self.  Departure Mode: Ambulatory.    Silvia Bartholomew RN

## 2020-07-30 NOTE — PROGRESS NOTES
Infusion Nursing Note:  Jeff Mack presents today for c4d4 dacogen.    Patient seen by provider today: No   present during visit today: Not Applicable.    Note: N/A.    Intravenous Access:  Peripheral IV placed.    Treatment Conditions:  Lab Results   Component Value Date    HGB 11.8 07/24/2020     Lab Results   Component Value Date    WBC 7.1 07/24/2020      Lab Results   Component Value Date    ANEU 5.3 07/15/2020     Lab Results   Component Value Date     07/24/2020      Lab Results   Component Value Date     07/27/2020                   Lab Results   Component Value Date    POTASSIUM 4.1 07/27/2020           Lab Results   Component Value Date    MAG 2.1 04/26/2020            Lab Results   Component Value Date    CR 0.75 07/27/2020                   Lab Results   Component Value Date    AKE 9.0 07/27/2020                Lab Results   Component Value Date    BILITOTAL 0.4 07/27/2020           Lab Results   Component Value Date    ALBUMIN 3.6 07/27/2020                    Lab Results   Component Value Date    ALT 21 07/27/2020           Lab Results   Component Value Date    AST 15 07/27/2020       Results reviewed, labs MET treatment parameters, ok to proceed with treatment.      Post Infusion Assessment:  Patient tolerated infusion without incident.  No evidence of extravasations.  Access discontinued per protocol.       Discharge Plan:   Discharge instructions reviewed with: Patient.  Patient and/or family verbalized understanding of discharge instructions and all questions answered.  Copy of AVS reviewed with patient and/or family.  Patient will return 7/31/20 for next appointment.  Patient discharged in stable condition accompanied by: self.  Departure Mode: Ambulatory.    Esteban Abraham RN

## 2020-08-02 NOTE — PROGRESS NOTES
Visit Date:   07/27/2020     HEMATOLOGY HISTORY: Mr. 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   1.  On 02/28/2020, WBC of 30.5, hemoglobin of 9.4 and platelet of 235.  MCV of 87.  Neutrophil of 50%, lymphocytes of 15% and monocytes of 17%. On 11/16/2018, CBC normal with WBC of 4.8, hemoglobin 15.3 and platelets of 295.  2.  CT chest, abdomen and pelvis on 02/29/2020 does not reveal any evidence of malignancy.   3.   On 03/19/2020, NGS panel negative for JAK2, CALR and MPL.  There is IDH2 R140Q and KIT D816V.  4. Bone marrow biopsy on 04/16/2020 reveals chronic myelomonocytic leukemia - 1 (CMML-1) characterized by hypercellular bone marrow (90%) with increased M:E ratio and 7% blasts. There are aggregates of atypical mast cells consistent with bone marrow involvement by systemic mastocytosis. Increased bone marrow fibrosis (MF-1-2).  -Normal cytogenetics.   -Flow cytometry on bone marrow reveals increased monocytic cells (43%).  No increase in CD34 positive myeloid blasts.   -BCR-ABL1 bone marrow is negative.   5. On 04/29/2020, tryptase elevated at 51.3 (normal less than 11).  6. Decitabine started on 04/29/2020.     SUBJECTIVE:  Mr. Mack is an 85-year-old gentleman with CMML-1 and systemic mastocytosis.  He is on decitabine.  He will be starting cycle 4 today.      Overall, he is doing well.  He has some fatigue.  No worsening.  No headache.  No dizziness.  No chest pain.  No shortness of breath.  No abdominal pain, nausea or vomiting.  Appetite is good.  No urinary or bowel complaints.  No bleeding.  No fever, chills, or night sweats.  The patient has some anxiety.      PHYSICAL EXAMINATION:   GENERAL:  Alert and oriented x 3.   VITAL SIGNS:  Reviewed.  ECOG PS of 1.     EYES:  No icterus.   THROAT:  No ulcer. No thrush.   NECK:  Supple. No lymphadenopathy. No thyromegaly.   AXILLAE:  No lymphadenopathy.   LUNGS:  Good air entry bilaterally.  No crackles or  wheezing.   HEART:  Regular.  No murmur.   GI: Abdomen is soft.  Nontender. No mass.   EXTREMITIES:  No pedal edema.  No calf swelling or tenderness.   SKIN:  No rash.      LABORATORY DATA:  Reviewed.      ASSESSMENT:     1.  An 85-year-old gentleman with CMML-1.   2.  Systemic mastocytosis   3.  Mild normocytic anemia.   4.  Anxiety.      PLAN:   1.  The patient clinically is doing better.  No excessive fatigue.  His appetite has been fairly good.  Not losing weight.  His anxiety is better.  Overall, he feels better.     2.  The patient is on decitabine.  We will start cycle 4 today.  Labs are good for treatment.  He has been tolerating it well.      The patient wants to stop this decitabine after this cycle.  The patient has anxiety.  The patient says that coming for IV treatment makes him very anxious. As per his wishes, we will stop the decitabine after this cycle.  He can get it in the future as needed. We will consider using hydroxyurea when there is worsening of his counts.     3.  Discussed regarding bone marrow biopsy.  He does not want it.  He wants to avoid it.  I told the patient that bone marrow biopsy is best to assess the response, but since he does not want it, we will rely on his CBC.     4.  I will see him in a month with CBC.  In between, he will see our nurse practitioner.  Advised him to see a physician if he has fever, chills, worsening weakness, shortness of breath, recurrent vomiting, bleeding, or any other concerns.         GRAZYNA RICHEY MD             D: 2020   T: 2020   MT: ALBARO      Name:     RADHA ALFONSO   MRN:      [id number]        Account:      [id number]   :      1935           Visit Date:   2020      Document: [id number]

## 2020-08-03 NOTE — PROGRESS NOTES
Plt count is 1342. Being send to [facility] for retesting, will not show up in computer per lab dept.   Esteban Abraham RN

## 2020-08-04 NOTE — TELEPHONE ENCOUNTER
"Patient calling re: Hydrea Rx sent by Andrea today.     Patient reports he picked up Rx which was sent for capsules, however he is unable to swallow capsules. Usually crushes meds and takes with pudding.     Writer contacted patient's pharmacy to discuss tablet alternative, pharmacy reports they do not carry Hydrea in tablet form. Writer discussed with MELISSA Mendez. Per Vanessa, \"[facility] makes a suspension of this: 100ml/ml. [phone] so i think one of two things: compounding makes the suspension OR maybe they would have more data about patient opening the capsule and putting on his own food if there is a coverage issue with the suspension\"    Writer contacted  [facility] and spoke with Enrico. Per Enrico, Hydroxyurea can be ordered as 100mg/mL suspension formula. [facility]. Rx will cost ~$100/month. Enrico will request PA as patient is unable to swallow capsule formula.     Writer updated patient who does not wish to pay $100/month and agrees to wait for prior authorization but wishes to start Hydroxyurea tonight. Writer advised patient attempt to swallow capsule whole with apple sauce or pudding. If patient is unable to safely swallow capsule, patient could create slurry and follow with full glass of water (although this is not ideal and contraindicated). Advised patient wear gloves/wash hands before and after handling hydroxyurea.       Kathy Alegre, NICON, RN, PHN  Oncology Care Coordinator  [facility]      "

## 2020-08-04 NOTE — PROGRESS NOTES
Medical Assistant Note:  Jeff Mack presents today for blood draw.    Patient seen by provider today: Yes: sai.   present during visit today: Not Applicable.    Concerns: No Concerns.    Procedure:  Lab draw site: rac, Needle type: bf, Gauge: 23.    Post Assessment:  Labs drawn without difficulty: Yes.    Discharge Plan:  Departure Mode: Ambulatory.    Face to Face Time: 5 min  .    Zaynab Aparicio, CMA

## 2020-08-04 NOTE — PROGRESS NOTES
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     -Patient completed 4 cycles of decitabine  -refused additional decitabine cycles  Refused bone marrow biopsy    Interval History:  Patient reports he continues to feel well.  He denies fever chills sweats denies cough no shortness of breath denies chest pain denies nausea vomiting diarrhea denies abdominal pain denies bleeding denies headache dizziness    Review of Systems:  14 point ROS of systems including Constitutional, Eyes, Respiratory, Cardiovascular, Gastroenterology, Genitourinary, Integumentary, Muscularskeletal, Psychiatric were all negative except for pertinent positives noted in my HPI.        Physical Examination:  Physical Exam  HENT:      Head: Normocephalic.      Nose: Nose normal.      Mouth/Throat:      Mouth: Mucous membranes are moist.   Eyes:      Pupils: Pupils are equal, round, and reactive to light.   Neck:      Musculoskeletal: Normal range of motion.   Cardiovascular:      Rate and Rhythm: Normal rate.   Pulmonary:      Effort: Pulmonary effort is normal.   Abdominal:      General: Abdomen is flat.   Musculoskeletal: Normal range of motion.   Neurological:      General: No focal deficit present.      Mental Status: He is alert.           Laboratory Data:    Results for SAULO ALFONSO (MRN [id number]) as of 7/17/2020 10:43   Ref. Range 7/15/2020 08:57   WBC Latest Ref Range: 4.0 - 11.0 10e9/L 6.8   Hemoglobin Latest Ref Range: 13.3 - 17.7 g/dL 11.2 (L)   Hematocrit Latest Ref Range: 40.0 - 53.0 % 34.0 (L)   Platelet Count Latest Ref Range: 150 - 450 10e9/L 241   RBC Count Latest Ref Range: 4.4 - 5.9 10e12/L 3.91 (L)   MCV Latest Ref Range: 78 - 100 fl 87   MCH Latest Ref Range: 26.5 - 33.0 pg 28.6   MCHC Latest Ref Range: 31.5 - 36.5 g/dL 32.9   RDW Latest Ref Range: 10.0 - 15.0 % 23.6 (H)   Diff Method Unknown Automated Method   % Neutrophils  Latest Units: % 78.6   % Lymphocytes Latest Units: % 12.4   % Monocytes Latest Units: % 7.1   % Eosinophils Latest Units: % 0.6   % Basophils Latest Units: % 1.3   Absolute Neutrophil Latest Ref Range: 1.6 - 8.3 10e9/L 5.3   Absolute Lymphocytes Latest Ref Range: 0.8 - 5.3 10e9/L 0.8   Absolute Monocytes Latest Ref Range: 0.0 - 1.3 10e9/L 0.5   Absolute Eosinophils Latest Ref Range: 0.0 - 0.7 10e9/L 0.0   Absolute Basophils Latest Ref Range: 0.0 - 0.2 10e9/L 0.1     Assessment and Plan:      This is a 85-year-old male with       chronic myelomonocytic leukemia CMML   - currently on decitabine.   Cycle 3-day 5 given on 07/03-completed 4 cycles  -Patient did not want additional decitabine.  Also he refused bone marrow biopsy  -CBC reviewed with patient      Thrombocytosis  Complications of thrombocytosis discussed with patient  Start ASA 81 mg -side effects of aspirin discussed  Spoke to Dr. Ndiaye- start hydroxyurea 500 mg p.o. daily-side effects of hydroxyurea discussed.  Patient agrees to start treatment  Repeat CBC and follow-up with me next week      systemic mastocytosis.   Patient is asymptomatic  Tryptase 26 -slowly trending down  We will continue to monitor        Patient is advised to check temperature frequently in the event of fever chills sweats cough sore throat chest pain nausea vomiting diarrhea abdominal pain bleeding or any changes in health condition patient is advised to go to ER or call our clinic    RAUDEL Stephens [facility]     Chart documentation with Dragon Voice recognition Software. Although reviewed after completion, some words and grammatical errors may remain.

## 2020-08-04 NOTE — PROGRESS NOTES
"Oncology Rooming Note    August 4, 2020 8:29 AM   Jeff Mack is a 85 year old male who presents for:    Chief Complaint   Patient presents with     Oncology Clinic Visit     Initial Vitals: BP (!) 157/74   Pulse 80   Temp 98.1  F (36.7  C) (Oral)   Resp 14   Wt 69.5 kg (153 lb 3.2 oz)   SpO2 99%   BMI 22.62 kg/m   Estimated body mass index is 22.62 kg/m  as calculated from the following:    Height as of 7/29/20: 1.753 m (5' 9\").    Weight as of this encounter: 69.5 kg (153 lb 3.2 oz). Body surface area is 1.84 meters squared.  Severe Pain (7) Comment: back   No LMP for male patient.  Allergies reviewed: Yes  Medications reviewed: Yes    Medications: Medication refills not needed today.  Pharmacy name entered into Isent[employer]:    [id number] [place] - [address] [address] [place] [employer] - [facility] [address]    Clinical concerns: go over labs Andrea Sanchez was notified.      Shari J. Schoenberger, KALEIGH            "

## 2020-08-04 NOTE — LETTER
"    8/4/2020         RE: Jeff Mack  [address]  [place] [address]        Dear Colleague,    Thank you for referring your patient, Jeff Mack, to the [facility]. Please see a copy of my visit note below.    Oncology Rooming Note    August 4, 2020 8:29 AM   Jeff Mack is a 85 year old male who presents for:    Chief Complaint   Patient presents with     Oncology Clinic Visit     Initial Vitals: BP (!) 157/74   Pulse 80   Temp 98.1  F (36.7  C) (Oral)   Resp 14   Wt 69.5 kg (153 lb 3.2 oz)   SpO2 99%   BMI 22.62 kg/m   Estimated body mass index is 22.62 kg/m  as calculated from the following:    Height as of 7/29/20: 1.753 m (5' 9\").    Weight as of this encounter: 69.5 kg (153 lb 3.2 oz). Body surface area is 1.84 meters squared.  Severe Pain (7) Comment: back   No LMP for male patient.  Allergies reviewed: Yes  Medications reviewed: Yes    Medications: Medication refills not needed today.  Pharmacy name entered into V[employer]:    [id number] - [place], [place] - [address] [address] [place] [employer] - [place], [place] - [address]    Clinical concerns: go over labs Andrea Sanchez was notified.      Shari J. Schoenberger, CMA                  Oncology/Hematology Visit Note  Aug 4, 2020    Reason for Visit:    chronic myelomonocytic leukemia - 1 (CMML-1) and systemic mastocytosis.     -Patient completed 4 cycles of decitabine  -refused additional decitabine cycles  Refused bone marrow biopsy    Interval History:  Patient reports he continues to feel well.  He denies fever chills sweats denies cough no shortness of breath denies chest pain denies nausea vomiting diarrhea denies abdominal pain denies bleeding denies headache dizziness    Review of Systems:  14 point ROS of systems including Constitutional, Eyes, Respiratory, Cardiovascular, Gastroenterology, Genitourinary, Integumentary, Muscularskeletal, Psychiatric were all negative except for pertinent " positives noted in my HPI.        Physical Examination:  Physical Exam  HENT:      Head: Normocephalic.      Nose: Nose normal.      Mouth/Throat:      Mouth: Mucous membranes are moist.   Eyes:      Pupils: Pupils are equal, round, and reactive to light.   Neck:      Musculoskeletal: Normal range of motion.   Cardiovascular:      Rate and Rhythm: Normal rate.   Pulmonary:      Effort: Pulmonary effort is normal.   Abdominal:      General: Abdomen is flat.   Musculoskeletal: Normal range of motion.   Neurological:      General: No focal deficit present.      Mental Status: He is alert.           Laboratory Data:    Results for SAULO ALFONSO (MRN [id number]) as of 7/17/2020 10:43   Ref. Range 7/15/2020 08:57   WBC Latest Ref Range: 4.0 - 11.0 10e9/L 6.8   Hemoglobin Latest Ref Range: 13.3 - 17.7 g/dL 11.2 (L)   Hematocrit Latest Ref Range: 40.0 - 53.0 % 34.0 (L)   Platelet Count Latest Ref Range: 150 - 450 10e9/L 241   RBC Count Latest Ref Range: 4.4 - 5.9 10e12/L 3.91 (L)   MCV Latest Ref Range: 78 - 100 fl 87   MCH Latest Ref Range: 26.5 - 33.0 pg 28.6   MCHC Latest Ref Range: 31.5 - 36.5 g/dL 32.9   RDW Latest Ref Range: 10.0 - 15.0 % 23.6 (H)   Diff Method Unknown Automated Method   % Neutrophils Latest Units: % 78.6   % Lymphocytes Latest Units: % 12.4   % Monocytes Latest Units: % 7.1   % Eosinophils Latest Units: % 0.6   % Basophils Latest Units: % 1.3   Absolute Neutrophil Latest Ref Range: 1.6 - 8.3 10e9/L 5.3   Absolute Lymphocytes Latest Ref Range: 0.8 - 5.3 10e9/L 0.8   Absolute Monocytes Latest Ref Range: 0.0 - 1.3 10e9/L 0.5   Absolute Eosinophils Latest Ref Range: 0.0 - 0.7 10e9/L 0.0   Absolute Basophils Latest Ref Range: 0.0 - 0.2 10e9/L 0.1     Assessment and Plan:      This is a 85-year-old male with       chronic myelomonocytic leukemia CMML   - currently on decitabine.   Cycle 3-day 5 given on 07/03-completed 4 cycles  -Patient did not want additional decitabine.  Also he refused bone marrow  biopsy  -CBC reviewed with patient      Thrombocytosis  Complications of thrombocytosis discussed with patient  Spoke to Dr. Ndiaye- start hydroxyurea 500 mg p.o. daily-side effects of hydroxyurea discussed.  Patient agrees to start treatment  Repeat CBC and follow-up with me next week      systemic mastocytosis.   Patient is asymptomatic  Tryptase 26 -slowly trending down  We will continue to monitor        Patient is advised to check temperature frequently in the event of fever chills sweats cough sore throat chest pain nausea vomiting diarrhea abdominal pain bleeding or any changes in health condition patient is advised to go to ER or call our clinic    RAUDEL Stephens CNP  [facility]     Chart documentation with Dragon Voice recognition Software. Although reviewed after completion, some words and grammatical errors may remain.          Again, thank you for allowing me to participate in the care of your patient.        Sincerely,        RAUDEL Stephens CNP

## 2020-08-05 NOTE — TELEPHONE ENCOUNTER
A prior authorization is needed for the following medication prescribed.  Please complete a prior authorization with the information included below.    Medication:FV-Hydroxyurea (cap pwd) 100mg/ml Susp    Ingredients: Hydroxyurea 500mg Caps NDC: [address]-42 Qty: 30 caps  Cherry Syrp NDC: [address]-36 Qty: 131.25 mls  Sterile Water for Irrigation Soln NDC:[address]-54 Qty: 11.25 mls    RX #: [id number]  Reason for Rejection: Not covered    Pharmacy Insurance plan: [facility] [place] Part D  BIN #: [id number]  ID #: [id number]  PCN #: [id number]  Phone #: [phone]      Pharmacy NPI:[id number]      Please advise the pharmacy when the prior authorization is approved or denied.     Thank you for your time.     [facility]  [phone]

## 2020-08-05 NOTE — TELEPHONE ENCOUNTER
Writer attempted to reach [facility] as Prior Authorization request has not been received yet. Left VM requesting PA be faxed or pharmacy call with update.     Addendum 8/6/20: Writer received call back from Nate pharmacist at [facility]. Nate reports they do use hydroxyurea but safely prepare in [place]. Advises Don could do this at home with precaution, or could take the capsule and dissolve completely in ~5oz water then drink to prevent opening the capsule and expressing particles into the air.     Writer updated Don on recommendations from pharmacist. Don will try dissolving entire capsule in water. Updated Don that PA is pending.      Kathy Alegre, BSN, RN, PHN  Oncology Care Coordinator  [facility]

## 2020-08-06 NOTE — TELEPHONE ENCOUNTER
Central Prior Authorization Team   Phone: [phone]      PA Initiation    Medication: FV-Hydroxyurea (cap pwd) 100mg/ml Susp-PA initiated  Insurance Company: [employer] [place] - Phone [phone] Fax [phone]  Pharmacy Filling the Rx: [facility] - [place], [place] - [address]  Filling Pharmacy Phone: [phone]  Filling Pharmacy Fax:    Start Date: 8/6/2020

## 2020-08-10 NOTE — TELEPHONE ENCOUNTER
I spoke to Chanel at [employer]. She states this was denied. She is faxing denial. Will update when received.

## 2020-08-10 NOTE — PROGRESS NOTES
Medical Assistant Note:  Jeff Mack presents today for lab draw.    Patient seen by provider today: Yes: Andrea NP.   present during visit today: Not Applicable.    Concerns: No Concerns.    Procedure:  Lab draw site: RAC, Needle type: BF, Gauge: 21. Gauze and coban applied    Post Assessment:  Labs drawn without difficulty: Yes.    Discharge Plan:  Departure Mode: Ambulatory.    Face to Face Time: 5.    Lashanda Yeh CMA

## 2020-08-10 NOTE — PROGRESS NOTES
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     -Patient completed 4 cycles of decitabine  -refused additional decitabine cycles  Refused bone marrow biopsy    Interval History:  Patient reports he is feeling well.  He denies fever chills sweats denies cough no shortness of breath denies chest pain denies nausea vomiting diarrhea denies abdominal pain denies bleeding    Review of Systems:  14 point ROS of systems including Constitutional, Eyes, Respiratory, Cardiovascular, Gastroenterology, Genitourinary, Integumentary, Muscularskeletal, Psychiatric were all negative except for pertinent positives noted in my HPI.        Physical Examination:  Physical Exam  HENT:      Head: Normocephalic.      Nose: Nose normal.      Mouth/Throat:      Mouth: Mucous membranes are moist.   Eyes:      Pupils: Pupils are equal, round, and reactive to light.   Neck:      Musculoskeletal: Normal range of motion.   Cardiovascular:      Rate and Rhythm: Normal rate.   Pulmonary:      Effort: Pulmonary effort is normal.   Abdominal:      General: Abdomen is flat.   Musculoskeletal: Normal range of motion.   Neurological:      General: No focal deficit present.      Mental Status: He is alert.       Laboratory Data:  Results for SAULO ALFONSO (MRN [id number]) as of 8/10/2020 12:15   Ref. Range 8/10/2020 09:11   Sodium Latest Ref Range: 133 - 144 mmol/L 134   Potassium Latest Ref Range: 3.4 - 5.3 mmol/L 3.8   Chloride Latest Ref Range: 94 - 109 mmol/L 101   Carbon Dioxide Latest Ref Range: 20 - 32 mmol/L 31   Urea Nitrogen Latest Ref Range: 7 - 30 mg/dL 15   Creatinine Latest Ref Range: 0.66 - 1.25 mg/dL 0.67   GFR Estimate Latest Ref Range: >60 mL/min/1.73_m2 89   GFR Estimate If Black Latest Ref Range: >60 mL/min/1.73_m2 >90   Calcium Latest Ref Range: 8.5 - 10.1 mg/dL 8.9   Anion Gap Latest Ref Range: 3 - 14 mmol/L 2 (L)   Albumin Latest  Ref Range: 3.4 - 5.0 g/dL 3.9   Protein Total Latest Ref Range: 6.8 - 8.8 g/dL 8.3   Bilirubin Total Latest Ref Range: 0.2 - 1.3 mg/dL 0.7   Alkaline Phosphatase Latest Ref Range: 40 - 150 U/L 131   ALT Latest Ref Range: 0 - 70 U/L 28   AST Latest Ref Range: 0 - 45 U/L 17   Glucose Latest Ref Range: 70 - 99 mg/dL 94   WBC Latest Ref Range: 4.0 - 11.0 10e9/L 4.7   Hemoglobin Latest Ref Range: 13.3 - 17.7 g/dL 12.3 (L)   Hematocrit Latest Ref Range: 40.0 - 53.0 % 37.8 (L)   Platelet Count Latest Ref Range: 150 - 450 10e9/L 442   RBC Count Latest Ref Range: 4.4 - 5.9 10e12/L 4.54   MCV Latest Ref Range: 78 - 100 fl 83   MCH Latest Ref Range: 26.5 - 33.0 pg 27.1   MCHC Latest Ref Range: 31.5 - 36.5 g/dL 32.5   RDW Latest Ref Range: 10.0 - 15.0 % 21.6 (H)   Diff Method Unknown Automated Method   % Neutrophils Latest Units: % 64.5   % Lymphocytes Latest Units: % 13.8   % Monocytes Latest Units: % 5.2   % Eosinophils Latest Units: % 13.3   % Basophils Latest Units: % 3.0   % Immature Granulocytes Latest Units: % 0.2   Nucleated RBCs Latest Ref Range: 0 /100 0   Absolute Neutrophil Latest Ref Range: 1.6 - 8.3 10e9/L 3.0   Absolute Lymphocytes Latest Ref Range: 0.8 - 5.3 10e9/L 0.6 (L)   Absolute Monocytes Latest Ref Range: 0.0 - 1.3 10e9/L 0.2   Absolute Eosinophils Latest Ref Range: 0.0 - 0.7 10e9/L 0.6   Absolute Basophils Latest Ref Range: 0.0 - 0.2 10e9/L 0.1   Abs Immature Granulocytes Latest Ref Range: 0 - 0.4 10e9/L 0.0   Absolute Nucleated RBC Unknown 0.0       Assessment and Plan:      This is a 85-year-old male with      chronic myelomonocytic leukemia CMML    Cycle 3-day 5 given on 07/03-completed 4 cycles  -Patient did not want additional decitabine.  Also he refused bone marrow biopsy  08/03-plt count up to 1420 recheck platelets again 1290  08/04-patient started hydroxyurea 500 mg p.o. daily  Platelets are down to 442, ANC 3 hemoglobin 12.3  Recheck platelets next week  Patient has follow-up appointment with  Dr. Ndiaye in 2 weeks      Thrombocytosis  Platelets down to 442 after starting hydroxyurea on 0804  Continue with hydroxyurea 500 mg p.o. daily        systemic mastocytosis.   Patient is asymptomatic  Tryptase 52.6  We will continue to monitor        Patient is advised to check temperature frequently in the event of fever chills sweats cough sore throat chest pain nausea vomiting diarrhea abdominal pain bleeding or any changes in health condition patient is advised to go to ER or call our clinic    RAUDEL Stephens [facility]     Chart documentation with Dragon Voice recognition Software. Although reviewed after completion, some words and grammatical errors may remain.

## 2020-08-10 NOTE — LETTER
8/10/2020         RE: Jeff Alfonso  [address]  [place] [address]        Dear Colleague,    Thank you for referring your patient, Jeff Alfonso, to the [facility]. Please see a copy of my visit note below.        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     -Patient completed 4 cycles of decitabine  -refused additional decitabine cycles  Refused bone marrow biopsy    Interval History:  Patient reports he is feeling well.  He denies fever chills sweats denies cough no shortness of breath denies chest pain denies nausea vomiting diarrhea denies abdominal pain denies bleeding    Review of Systems:  14 point ROS of systems including Constitutional, Eyes, Respiratory, Cardiovascular, Gastroenterology, Genitourinary, Integumentary, Muscularskeletal, Psychiatric were all negative except for pertinent positives noted in my HPI.        Physical Examination:  Physical Exam  HENT:      Head: Normocephalic.      Nose: Nose normal.      Mouth/Throat:      Mouth: Mucous membranes are moist.   Eyes:      Pupils: Pupils are equal, round, and reactive to light.   Neck:      Musculoskeletal: Normal range of motion.   Cardiovascular:      Rate and Rhythm: Normal rate.   Pulmonary:      Effort: Pulmonary effort is normal.   Abdominal:      General: Abdomen is flat.   Musculoskeletal: Normal range of motion.   Neurological:      General: No focal deficit present.      Mental Status: He is alert.       Laboratory Data:  Results for SAULO ALFONSO (MRN [id number]) as of 8/10/2020 12:15   Ref. Range 8/10/2020 09:11   Sodium Latest Ref Range: 133 - 144 mmol/L 134   Potassium Latest Ref Range: 3.4 - 5.3 mmol/L 3.8   Chloride Latest Ref Range: 94 - 109 mmol/L 101   Carbon Dioxide Latest Ref Range: 20 - 32 mmol/L 31   Urea Nitrogen Latest Ref Range: 7 - 30 mg/dL 15   Creatinine Latest Ref Range: 0.66 - 1.25  mg/dL 0.67   GFR Estimate Latest Ref Range: >60 mL/min/1.73_m2 89   GFR Estimate If Black Latest Ref Range: >60 mL/min/1.73_m2 >90   Calcium Latest Ref Range: 8.5 - 10.1 mg/dL 8.9   Anion Gap Latest Ref Range: 3 - 14 mmol/L 2 (L)   Albumin Latest Ref Range: 3.4 - 5.0 g/dL 3.9   Protein Total Latest Ref Range: 6.8 - 8.8 g/dL 8.3   Bilirubin Total Latest Ref Range: 0.2 - 1.3 mg/dL 0.7   Alkaline Phosphatase Latest Ref Range: 40 - 150 U/L 131   ALT Latest Ref Range: 0 - 70 U/L 28   AST Latest Ref Range: 0 - 45 U/L 17   Glucose Latest Ref Range: 70 - 99 mg/dL 94   WBC Latest Ref Range: 4.0 - 11.0 10e9/L 4.7   Hemoglobin Latest Ref Range: 13.3 - 17.7 g/dL 12.3 (L)   Hematocrit Latest Ref Range: 40.0 - 53.0 % 37.8 (L)   Platelet Count Latest Ref Range: 150 - 450 10e9/L 442   RBC Count Latest Ref Range: 4.4 - 5.9 10e12/L 4.54   MCV Latest Ref Range: 78 - 100 fl 83   MCH Latest Ref Range: 26.5 - 33.0 pg 27.1   MCHC Latest Ref Range: 31.5 - 36.5 g/dL 32.5   RDW Latest Ref Range: 10.0 - 15.0 % 21.6 (H)   Diff Method Unknown Automated Method   % Neutrophils Latest Units: % 64.5   % Lymphocytes Latest Units: % 13.8   % Monocytes Latest Units: % 5.2   % Eosinophils Latest Units: % 13.3   % Basophils Latest Units: % 3.0   % Immature Granulocytes Latest Units: % 0.2   Nucleated RBCs Latest Ref Range: 0 /100 0   Absolute Neutrophil Latest Ref Range: 1.6 - 8.3 10e9/L 3.0   Absolute Lymphocytes Latest Ref Range: 0.8 - 5.3 10e9/L 0.6 (L)   Absolute Monocytes Latest Ref Range: 0.0 - 1.3 10e9/L 0.2   Absolute Eosinophils Latest Ref Range: 0.0 - 0.7 10e9/L 0.6   Absolute Basophils Latest Ref Range: 0.0 - 0.2 10e9/L 0.1   Abs Immature Granulocytes Latest Ref Range: 0 - 0.4 10e9/L 0.0   Absolute Nucleated RBC Unknown 0.0       Assessment and Plan:      This is a 85-year-old male with      chronic myelomonocytic leukemia CMML    Cycle 3-day 5 given on 07/03-completed 4 cycles  -Patient did not want additional decitabine.  Also he refused  bone marrow biopsy  08/03-plt count up to 1420 recheck platelets again 1290  08/04-patient started hydroxyurea 500 mg p.o. daily  Platelets are down to 442, ANC 3 hemoglobin 12.3  Recheck platelets next week  Patient has follow-up appointment with Dr. Ndiaye in 2 weeks      Thrombocytosis  Platelets down to 442 after starting hydroxyurea on 0804  Continue with hydroxyurea 500 mg p.o. daily        systemic mastocytosis.   Patient is asymptomatic  Tryptase 52.6  We will continue to monitor        Patient is advised to check temperature frequently in the event of fever chills sweats cough sore throat chest pain nausea vomiting diarrhea abdominal pain bleeding or any changes in health condition patient is advised to go to ER or call our clinic    RAUDEL Stephens CNP  [facility]     Chart documentation with Dragon Voice recognition Software. Although reviewed after completion, some words and grammatical errors may remain.          Again, thank you for allowing me to participate in the care of your patient.        Sincerely,        RAUDEL Stephens CNP

## 2020-08-11 NOTE — TELEPHONE ENCOUNTER
PRIOR AUTHORIZATION DENIED    Medication: FV-Hydroxyurea (cap pwd) 100mg/ml Susp-PA denied    Denial Date: 8/7/2020    Denial Rational: Excluded        Appeal Information:

## 2020-08-11 NOTE — PROGRESS NOTES
Jeff Mack is a 85 year old male who is being evaluated via a billable telephone visit.          What phone number would you like to be contacted at? [phone]    How would you like to obtain your AVS? Mail a copy    Subjective     Jeff Mack is a 85 year old male who presents via phone visit today for the following health issues:    HPI    Back Pain       Duration: x 2 weeks ago after lifting and moving         Specific cause: lifting    Description:   Location of pain: low back left  Character of pain: dull ache and stiffness, intermittent  Pain radiation:none  New numbness or weakness in legs, not attributed to pain:  YES- numbness of the feet and swelling of ankles. Pt receiving infusions x 2 months     Intensity: moderate to severe    History:   Pain interferes with job: No  History of back problems: Back surgery x 8 years ago, disc fusion. Also treated for Lukemia   Any previous MRI or X-rays: In the past, xrays preformed x 4 months ago during ER visit, related to lukemia   Sees a specialist for back pain:  No  Therapies tried without relief: cold, icing twice daily     Alleviating factors:   Improved by: ibuprofen mild relief       Precipitating factors:  Worsened by: Walking and getting in and out of the car           Accompanying Signs & Symptoms:  Risk of Fracture:  None  Risk of Cauda Equina:  None  Risk of Infection:  None  Risk of Cancer:  None  Risk of Ankylosing Spondylitis:  Onset at age <35, male, AND morning back stiffness. 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)     Chemotherapy-induced neutropenia (H)     Past Surgical History:   Procedure Laterality Date     BONE MARROW BIOPSY, BONE SPECIMEN, NEEDLE/TROCAR N/A 4/16/2020    Procedure: BIOPSY, BONE MARROW;  Surgeon: Mg Hobbs MD;  Location:  GI     BRONCHOSCOPY FLEXIBLE N/A 4/3/2015    Procedure: BRONCHOSCOPY FLEXIBLE;  Surgeon: Ralph Catherine MD;  Location:  OR     C EXCISION OF LINGUAL TONSIL      TONSILLECTOMY     C NONSPECIFIC PROCEDURE  2-99    Colonic polpectomy     C NONSPECIFIC PROCEDURE  9-92    TURP     C NONSPECIFIC PROCEDURE  1982    Retinal surgery     COLONOSCOPY       COLONOSCOPY N/A 2/19/2020    Procedure: COLONOSCOPY, WITH POLYPECTOMY AND BIOPSY;  Surgeon: Kathy Torres MD;  Location: [facility]     ESOPHAGOSCOPY, GASTROSCOPY, DUODENOSCOPY (EGD), COMBINED N/A 3/19/2015    Procedure: COMBINED ESOPHAGOSCOPY, GASTROSCOPY, DUODENOSCOPY (EGD), BIOPSY SINGLE OR MULTIPLE;  Surgeon: Alo Mauro MD;  Location: [facility]     ESOPHAGOSCOPY, GASTROSCOPY, DUODENOSCOPY (EGD), COMBINED N/A 7/14/2015    Procedure: COMBINED ESOPHAGOSCOPY, GASTROSCOPY, DUODENOSCOPY (EGD), BIOPSY SINGLE OR MULTIPLE;  Surgeon: Kathy Torres MD;  Location: [facility]     ESOPHAGOSCOPY, GASTROSCOPY, DUODENOSCOPY (EGD), COMBINED N/A 12/1/2015    Procedure: COMBINED ESOPHAGOSCOPY, GASTROSCOPY, DUODENOSCOPY (EGD), BIOPSY SINGLE OR MULTIPLE;  Surgeon: Alo Mauro MD;  Location: [facility]     ESOPHAGOSCOPY, GASTROSCOPY, DUODENOSCOPY (EGD), COMBINED N/A 3/17/2016    Procedure: COMBINED ESOPHAGOSCOPY, GASTROSCOPY, DUODENOSCOPY (EGD), BIOPSY SINGLE OR MULTIPLE;  Surgeon: Alo Mauro MD;  Location: [facility]     ESOPHAGOSCOPY, GASTROSCOPY, DUODENOSCOPY (EGD), COMBINED N/A 7/21/2016    Procedure: COMBINED ESOPHAGOSCOPY, GASTROSCOPY, DUODENOSCOPY (EGD);   Surgeon: Alo Mauro MD;  Location:  GI     ESOPHAGOSCOPY, GASTROSCOPY, DUODENOSCOPY (EGD), COMBINED N/A 2016    Procedure: COMBINED ESOPHAGOSCOPY, GASTROSCOPY, DUODENOSCOPY (EGD);  Surgeon: Alo Mauro MD;  Location:  GI     ESOPHAGOSCOPY, GASTROSCOPY, DUODENOSCOPY (EGD), COMBINED N/A 2017    Procedure: COMBINED ESOPHAGOSCOPY, GASTROSCOPY, DUODENOSCOPY (EGD);  (EGD) COMBINED ESOPHAGOSCOPY, GASTROSCOPY, DUODENOSCOPY;  Surgeon: Alo Mauro MD;  Location:  GI     ESOPHAGOSCOPY, GASTROSCOPY, DUODENOSCOPY (EGD), COMBINED N/A 2018    Procedure: COMBINED ESOPHAGOSCOPY, GASTROSCOPY, DUODENOSCOPY (EGD);  ESOPHAGOGASTRODUODENOSCOPY ;  Surgeon: Alo Mauro MD;  Location:  GI     GASTROSTOMY, INSERT TUBE, COMBINED N/A 4/3/2015    Procedure: COMBINED GASTROSTOMY, INSERT TUBE (OPEN);  Surgeon: Ralph Catherine MD;  Location:  OR     HC PRESSURE GRADIENT MEASUREMENT, INITIAL VESSEL  2005    essentially normal     HC UGI ENDOSCOPY W EUS N/A 2015    Procedure: COMBINED ENDOSCOPIC ULTRASOUND, ESOPHAGOSCOPY, GASTROSCOPY, DUODENOSCOPY (EGD);  Surgeon: Kathy Torres MD;  Location:  GI     LAMINECTOMY, FUSION LUMBAR ONE LEVEL, COMBINED  2012    Procedure: COMBINED LAMINECTOMY, FUSION LUMBAR ONE LEVEL;  Posterior Fusion L5-S1, Total Facetectomy L5-S1 Left ;  Surgeon: Ronald Espinosa MD;  Location: [facility]     PROSTATE SURGERY         Social History     Tobacco Use     Smoking status: Former Smoker     Packs/day: 2.00     Years: 40.00     Pack years: 80.00     Types: Cigarettes     Last attempt to quit: 1975     Years since quittin.6     Smokeless tobacco: Never Used   Substance Use Topics     Alcohol use: No     Family History   Problem Relation Age of Onset     Hypertension Mother      Cerebrovascular Disease Mother      Diabetes Brother      Diabetes Father          age 97         Current Outpatient Medications   Medication Sig Dispense Refill      albuterol (PROAIR HFA/PROVENTIL HFA/VENTOLIN HFA) 108 (90 Base) MCG/ACT inhaler Inhale 1-2 puffs into the lungs every 6 hours as needed for shortness of breath / dyspnea or wheezing       ALPRAZolam (XANAX) 0.5 MG tablet TAKE 1 TABLET BY MOUTH 2 TIMES DAILY AS NEEDED FOR ANXIETY 45 tablet 0     aspirin (ASA) 81 MG EC tablet Take 1 tablet (81 mg) by mouth daily 30 tablet 3     Cholecalciferol (VITAMIN D3) 5000 UNITS TABS Take 5,000 Units by mouth daily        fluticasone (FLONASE) 50 MCG/ACT nasal spray SPRAY 2 SPRAYS INTO EACH NOSTRIL EVERY DAY 48 mL 3     hydroxyurea (HYDREA) 500 MG capsule Take 1 capsule (500 mg) by mouth daily 30 capsule 0     hydroxyurea (HYDREA/DROXIA) 100 mg/mL SUSP Take 5 mLs (500 mg) by mouth daily 150 mL 3     Nutritional Supplements (ENSURE COMPLETE) LIQD Take 1 Bottle by mouth every 8 hours as needed 30 Bottle 3     polyethylene glycol (MIRALAX) 17 g packet Take 1 packet by mouth daily as needed for constipation       predniSONE (DELTASONE) 10 MG tablet Take 3 tabs by mouth daily x 3 days, then 2 tabs daily x 3 days, then 1 tab daily x 3 days, then 1/2 tab daily x 3 days. 20 tablet 0     senna-docusate (SENOKOT-S/PERICOLACE) 8.6-50 MG tablet Take 1 tablet by mouth daily 30 tablet 0     sennosides (SENOKOT) 8.6 MG tablet Take 1 tablet by mouth daily as needed for constipation       SYMBICORT 160-4.5 MCG/ACT Inhaler Inhale 2 puffs into the lungs 2 times daily        No Known Allergies  Recent Labs   Lab Test 08/10/20  0911 08/03/20  0804 07/27/20  1023  09/06/17  0943  07/09/15  1110  07/17/12  1548   LDL  --   --   --   --   --   --  92  --  111   HDL  --   --   --   --   --   --  48  --  71   TRIG  --   --   --   --   --   --  80  --  110   ALT 28 23 21   < >  --   --   --   --  19   CR 0.67 0.75 0.75   < >  --    < > 0.56*   < > 0.86   GFRESTIMATED 89 85 84   < >  --    < > >90  Non  GFR Calc     < > 87   GFRESTBLACK >90 >90 >90   < >  --    < > >90   GFR  Calc     < > >90   POTASSIUM 3.8 4.1 4.1   < >  --    < > 4.1   < > 3.5   TSH  --   --   --   --  1.79  --   --   --  1.72    < > = values in this interval not displayed.      BP Readings from Last 3 Encounters:   08/10/20 (!) 149/74   08/04/20 (!) 157/74   07/31/20 126/61    Wt Readings from Last 3 Encounters:   08/10/20 69.9 kg (154 lb)   08/04/20 69.5 kg (153 lb 3.2 oz)   07/31/20 70 kg (154 lb 6.4 oz)                    Reviewed and updated as needed this visit by Provider         Review of Systems   Constitutional, HEENT, cardiovascular, pulmonary, gi and gu systems are negative, except as otherwise noted.       Objective   Reported vitals:  There were no vitals taken for this visit.   healthy, alert and no distress  PSYCH: Alert and oriented times 3; coherent speech, normal   rate and volume, able to articulate logical thoughts, able   to abstract reason, no tangential thoughts, no hallucinations   or delusions  His affect is normal  RESP: No cough, no audible wheezing, able to talk in full sentences  Remainder of exam unable to be completed due to telephone visits    Diagnostic Test Results:  Labs reviewed in Epic        Assessment/Plan:    1. Sprain of low back, initial encounter  - predniSONE (DELTASONE) 10 MG tablet; Take 3 tabs by mouth daily x 3 days, then 2 tabs daily x 3 days, then 1 tab daily x 3 days, then 1/2 tab daily x 3 days.  Dispense: 20 tablet; Refill: 0    2. Spondylosis of lumbar region without myelopathy or radiculopathy  - predniSONE (DELTASONE) 10 MG tablet; Take 3 tabs by mouth daily x 3 days, then 2 tabs daily x 3 days, then 1 tab daily x 3 days, then 1/2 tab daily x 3 days.  Dispense: 20 tablet; Refill: 0      Started after lifting at gardening, has no radiculopathy, had spinal fusion 5 years ago, has no weakness on legs,   Will have him to try tapering dose of prednisone and also will consider referral to PT if needed       No follow-ups on file.      Phone call duration:  14  "minutes    Benjamin Lazar MD      The patient has been notified of following:     \"This telephone visit will be conducted via a call between you and your physician/provider. We have found that certain health care needs can be provided without the need for a physical exam.  This service lets us provide the care you need with a short phone conversation.  If a prescription is necessary we can send it directly to your pharmacy.  If lab work is needed we can place an order for that and you can then stop by our lab to have the test done at a later time.    Telephone visits are billed at different rates depending on your insurance coverage. During this emergency period, for some insurers they may be billed the same as an in-person visit.  Please reach out to your insurance provider with any questions.    If during the course of the call the physician/provider feels a telephone visit is not appropriate, you will not be charged for this service.\"    Patient has given verbal consent for Telephone visit?  Yes  "

## 2020-08-11 NOTE — TELEPHONE ENCOUNTER
PA denied (see encounter). Writer discussed with finance team who advises Medicare would likely deny an appeal as medication is not FDA approved.     Writer discussed with Andrea Sacnhez NP who reports patient's labs are improving taking Hydrea as he is now. Andrea recommends continuing to dissolve capsule in water.     Writer attempted to contact patient to confirm things are going well and update on PA denial. Left VM requesting call back.     Addendum: Patient returned call. Reports he will continue taking the Hydroxyurea the way he has been as it is going well and his labs are showing improvement. Writer reiterated he should follow med with water, and clean his hands thoroughly after handling meds. Call if any questions or concerns.     Kathy Alegre, NICON, RN, PHN  Oncology Care Coordinator  [facility]

## 2020-08-13 NOTE — PROGRESS NOTES
[facility] Telephone Triage Note    Assessment: Patient called in to triage reporting the following symptoms: constipation, last bowel movement small bowerl movement this afternoon.He is taking Miralax took for the first time today and Senokot 2 tablets last evening.     Recommendations: Discussed with patient to continue daily Miralax and 2 tablets of Senokot twice daily and can increase that per direction on the label.I also advised increasing water, adding prunes in the morning and pears..        Follow-Up: Patient voiced understanding of advice and/or instructions given.

## 2020-08-17 NOTE — PROGRESS NOTES
Medical Assistant Note:  Jeff Mack presents today for blood draw.    Patient seen by provider today: No.   present during visit today: Not Applicable.    Concerns: No Concerns.    Procedure:  Lab draw site: lac, Needle type: bf, Gauge: 23.    Post Assessment:  Labs drawn without difficulty: Yes.    Discharge Plan:  Departure Mode: Ambulatory.    Face to Face Time: 5 min  .    Zaynab Aparicio, CMA

## 2020-08-17 NOTE — PROGRESS NOTES
Andrea GONZALES advised to hold Hydrea and details would be discussed at tomorrow's visit.     Patient is aware of instructions.    Jody Mehta RN     no

## 2020-08-17 NOTE — TELEPHONE ENCOUNTER
S/w pt who states he is no longer on the sertraline 25 mg and does not need refill.    Pharmacy notified pt is no longer on the medication and to take off auto-refill.    Smita MEZA RN  EP Triage

## 2020-08-17 NOTE — PROGRESS NOTES
Writer received a call from patient with the request to have labs reviewed in particular the plt count of 107K today. Patient is on Hydrea and wonders if he should hold the Hydrea?    Patient is aware of tomorrow's appointment with Andrea GONZALES.    Andrea GONZALES to advise.     Jody Mehta RN

## 2020-08-18 NOTE — PROGRESS NOTES
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     -Patient completed 4 cycles of decitabine  -refused additional decitabine cycles  Refused bone marrow biopsy    Interval History:  Patient reports he is feeling well.  He denies fever chills sweats denies cough no shortness of breath denies chest pain denies nausea vomiting diarrhea denies abdominal pain denies bleeding  Patient is worried about his low platelet count.  He has a small bruise on his right arm no swelling no hematoma denies pain discharge  Patient denies bleeding      Review of Systems:  14 point ROS of systems including Constitutional, Eyes, Respiratory, Cardiovascular, Gastroenterology, Genitourinary, Integumentary, Muscularskeletal, Psychiatric were all negative except for pertinent positives noted in my HPI.        Physical Examination:  Physical Exam  HENT:      Head: Normocephalic.      Nose: Nose normal.      Mouth/Throat:      Mouth: Mucous membranes are moist.   Eyes:      Pupils: Pupils are equal, round, and reactive to light.   Neck:      Musculoskeletal: Normal range of motion.   Cardiovascular:      Rate and Rhythm: Normal rate.   Pulmonary:      Effort: Pulmonary effort is normal.   Abdominal:      General: Abdomen is flat.   Musculoskeletal: Normal range of motion.   Neurological:      General: No focal deficit present.      Mental Status: He is alert.       Laboratory Data:    Results for SAULO ALFONSO (MRN [id number]) as of 8/18/2020 12:44   Ref. Range 8/17/2020 10:59   WBC Latest Ref Range: 4.0 - 11.0 10e9/L 3.2 (L)   Hemoglobin Latest Ref Range: 13.3 - 17.7 g/dL 11.9 (L)   Hematocrit Latest Ref Range: 40.0 - 53.0 % 35.6 (L)   Platelet Count Latest Ref Range: 150 - 450 10e9/L 107 (L)   RBC Count Latest Ref Range: 4.4 - 5.9 10e12/L 4.32 (L)   MCV Latest Ref Range: 78 - 100 fl 82   MCH Latest Ref Range: 26.5 - 33.0 pg 27.5   MCHC Latest  Ref Range: 31.5 - 36.5 g/dL 33.4   RDW Latest Ref Range: 10.0 - 15.0 % 22.7 (H)   Diff Method Unknown Manual Differential   % Neutrophils Latest Units: % 66.0   % Lymphocytes Latest Units: % 22.0   % Monocytes Latest Units: % 5.0   % Eosinophils Latest Units: % 4.0   % Basophils Latest Units: % 3.0   Absolute Neutrophil Latest Ref Range: 1.6 - 8.3 10e9/L 2.1   Absolute Lymphocytes Latest Ref Range: 0.8 - 5.3 10e9/L 0.7 (L)   Absolute Monocytes Latest Ref Range: 0.0 - 1.3 10e9/L 0.2   Absolute Eosinophils Latest Ref Range: 0.0 - 0.7 10e9/L 0.1   Absolute Basophils Latest Ref Range: 0.0 - 0.2 10e9/L 0.1         Assessment and Plan:      This is a 85-year-old male with      chronic myelomonocytic leukemia CMML    Cycle 3-day 5 given on 07/03-completed 4 cycles  -Patient did not want additional decitabine.  Also he refused bone marrow biopsy    08/03-plt count up to 1420 recheck platelets again 1290  08/04-patient started hydroxyurea 500 mg p.o. daily  08/10-platelet back to normal 442  08/17 platelets dropped to 107, WBC 3.2  -Patient is  Very nervous about his platelet count of 107.  Patient is advised to call our clinic in the event of bleeding fall or injury.   -hold Hydroxyurea this week . Recheck labs next week   -Patient already has follow-up appointment with labs and follow-up with Dr. Ndiaye next week-keep the appointment      systemic mastocytosis.   Patient is asymptomatic  We will continue to monitor  Monitor Tryptase       Patient is advised to check temperature frequently in the event of fever chills sweats cough sore throat chest pain nausea vomiting diarrhea abdominal pain bleeding or any changes in health condition patient is advised to go to ER or call our clinic    RAUDEL Stephens [facility] [place]     Chart documentation with Dragon Voice recognition Software. Although reviewed after completion, some words and grammatical errors may remain.

## 2020-08-18 NOTE — LETTER
"    8/18/2020         RE: Jeff Mack  [address]  [place] [address]        Dear Colleague,    Thank you for referring your patient, Jeff Mack, to the [facility]. Please see a copy of my visit note below.    Oncology Rooming Note    August 18, 2020 12:34 PM   Jeff Mack is a 85 year old male who presents for:    Chief Complaint   Patient presents with     Oncology Clinic Visit     Initial Vitals: There were no vitals taken for this visit. Estimated body mass index is 22.74 kg/m  as calculated from the following:    Height as of 7/29/20: 1.753 m (5' 9\").    Weight as of 8/10/20: 69.9 kg (154 lb). There is no height or weight on file to calculate BSA.  Data Unavailable Comment: Data Unavailable   No LMP for male patient.  Allergies reviewed: Yes  Medications reviewed: Yes    Medications: Medication refills not needed today.  Pharmacy name entered into [employer]:    [id number] - [place], [place] - [address] [facility] [address] [place] [employer] - [place], [place] - [address] [facility] - [place], [place] - [address] [place] [address]    Clinical concerns:  NP was notified.      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     -Patient completed 4 cycles of decitabine  -refused additional decitabine cycles  Refused bone marrow biopsy    Interval History:  Patient reports he is feeling well.  He denies fever chills sweats denies cough no shortness of breath denies chest pain denies nausea vomiting diarrhea denies abdominal pain denies bleeding    Review of Systems:  14 point ROS of systems including Constitutional, Eyes, Respiratory, Cardiovascular, Gastroenterology, Genitourinary, Integumentary, Muscularskeletal, Psychiatric were all negative except for pertinent positives noted in my HPI.        Physical Examination:  Physical " Exam  HENT:      Head: Normocephalic.      Nose: Nose normal.      Mouth/Throat:      Mouth: Mucous membranes are moist.   Eyes:      Pupils: Pupils are equal, round, and reactive to light.   Neck:      Musculoskeletal: Normal range of motion.   Cardiovascular:      Rate and Rhythm: Normal rate.   Pulmonary:      Effort: Pulmonary effort is normal.   Abdominal:      General: Abdomen is flat.   Musculoskeletal: Normal range of motion.   Neurological:      General: No focal deficit present.      Mental Status: He is alert.       Laboratory Data:    Results for SAULO ALFONSO (MRN [id number]) as of 8/18/2020 12:44   Ref. Range 8/17/2020 10:59   WBC Latest Ref Range: 4.0 - 11.0 10e9/L 3.2 (L)   Hemoglobin Latest Ref Range: 13.3 - 17.7 g/dL 11.9 (L)   Hematocrit Latest Ref Range: 40.0 - 53.0 % 35.6 (L)   Platelet Count Latest Ref Range: 150 - 450 10e9/L 107 (L)   RBC Count Latest Ref Range: 4.4 - 5.9 10e12/L 4.32 (L)   MCV Latest Ref Range: 78 - 100 fl 82   MCH Latest Ref Range: 26.5 - 33.0 pg 27.5   MCHC Latest Ref Range: 31.5 - 36.5 g/dL 33.4   RDW Latest Ref Range: 10.0 - 15.0 % 22.7 (H)   Diff Method Unknown Manual Differential   % Neutrophils Latest Units: % 66.0   % Lymphocytes Latest Units: % 22.0   % Monocytes Latest Units: % 5.0   % Eosinophils Latest Units: % 4.0   % Basophils Latest Units: % 3.0   Absolute Neutrophil Latest Ref Range: 1.6 - 8.3 10e9/L 2.1   Absolute Lymphocytes Latest Ref Range: 0.8 - 5.3 10e9/L 0.7 (L)   Absolute Monocytes Latest Ref Range: 0.0 - 1.3 10e9/L 0.2   Absolute Eosinophils Latest Ref Range: 0.0 - 0.7 10e9/L 0.1   Absolute Basophils Latest Ref Range: 0.0 - 0.2 10e9/L 0.1         Assessment and Plan:      This is a 85-year-old male with      chronic myelomonocytic leukemia CMML    Cycle 3-day 5 given on 07/03-completed 4 cycles  -Patient did not want additional decitabine.  Also he refused bone marrow biopsy    08/03-plt count up to 1420 recheck platelets again 1290  08/04-patient  started hydroxyurea 500 mg p.o. daily  Today platelets dropped to 107, WBC 3.2  -Patient is  Very nervous about his platelet count of 107.  Patient is advised to call our clinic in the event of bleeding fall or injury.   -hold Hydroxyurea this week . Recheck labs next week   -Patient already has follow-up appointment with labs and follow-up with Dr. Ndiaye next week-keep the appointment      systemic mastocytosis.   Patient is asymptomatic  We will continue to monitor  Monitor Tryptase       Patient is advised to check temperature frequently in the event of fever chills sweats cough sore throat chest pain nausea vomiting diarrhea abdominal pain bleeding or any changes in health condition patient is advised to go to ER or call our clinic    RAUDEL Stephens CNP  [facility] [place]     Chart documentation with Dragon Voice recognition Software. Although reviewed after completion, some words and grammatical errors may remain.          Again, thank you for allowing me to participate in the care of your patient.        Sincerely,        RAUDEL Stephens CNP

## 2020-08-18 NOTE — PROGRESS NOTES
"Oncology Rooming Note    August 18, 2020 12:34 PM   Jeff Mack is a 85 year old male who presents for:    Chief Complaint   Patient presents with     Oncology Clinic Visit     Initial Vitals: There were no vitals taken for this visit. Estimated body mass index is 22.74 kg/m  as calculated from the following:    Height as of 7/29/20: 1.753 m (5' 9\").    Weight as of 8/10/20: 69.9 kg (154 lb). There is no height or weight on file to calculate BSA.  Data Unavailable Comment: Data Unavailable   No LMP for male patient.  Allergies reviewed: Yes  Medications reviewed: Yes    Medications: Medication refills not needed today.  Pharmacy name entered into [employer]:    [id number] - [place], [place] - [address] [facility] [address] [place] [facility] - [place], [place] - [address] [facility] - [place], [place] - [address]    Clinical concerns:  NP was notified.      Briana Eubanks MA            "

## 2020-08-19 NOTE — TELEPHONE ENCOUNTER
Jeff reports that he sustained a skin tear on his right wrist after getting it caught in a piece of wire, happened 3 days ago.   Wound is Z-shaped, size is about 1/4 inch. Has serosanguinous drainage    He has applied a Neosporin band-aid, and adhesive pulls his skin.     Also states that he is on hydroxyurea.     Thermometer at home not working, unable to check if he has a fever. Advised to call back if T > 100F. Don agreed.    Asks for wound care advice. FNA advised:  1) Clean wound with soap and water, pat dry with gauze.  2) Apply Neosporin  3) Cover with Telfa dressing. Avoid use of paper tape or band-aid.  4) Change once a day or as needed.    Call back for further concerns. He verbalized understanding.    Negra Collins RN/[place] Nurse Advisor      Additional Information    Negative: [1] Major bleeding (e.g., actively dripping or spurting) AND [2] can't be stopped    Negative: Amputation    Negative: Shock suspected (e.g., cold/pale/clammy skin, too weak to stand, low BP, rapid pulse)    Negative: Sounds like a life-threatening emergency to the triager    Negative: [1] Bleeding AND [2] won't stop after 10 minutes of direct pressure (using correct technique)    Negative: Skin is split open or gaping (or length > 1/2 inch or 12 mm on the skin, 1/4 inch or 6 mm on the face)    Negative: [1] Deep cut AND [2] can see bone or tendons    Negative: [1] Dirt in the wound AND [2] not removed with 15 minutes of scrubbing    Negative: Skin loss involves more than 10% of surface area (Note: the palm of the hand = 1%)    Negative: High pressure injection injury (e.g., from paint gun, usually work-related)    Negative: Wound causes numbness (i.e., loss of sensation)    Negative: Wound causes weakness (i.e., decreased ability to move hand, finger, toe)    Negative: Sounds like a serious injury to the triager    Negative: [1] SEVERE pain AND [2] not improved 2 hours after pain medicine/ice packs    Negative: [1] Looks  infected AND [2] large red area or streak (>2 inches or 5 cm)    Negative: [1] Fever AND [2] bright red area or streak    Negative: Suspicious history for the injury    Negative: [1] Raised bruise AND [2] size > 2 inches (5 cm) AND [3] expanding    Negative: [1] Looks infected (spreading redness, pus) AND [2] no fever    Negative: No prior tetanus shots (or is not fully vaccinated)    Negative: [1] Last tetanus shot > 5 years ago AND [2] DIRTY cut or scrape    Negative: [1] Last tetanus shot > 10 years ago AND [2] CLEAN cut or scrape (e.g., object AND skin were clean)    Negative: [1] After 14 days AND [2] wound isn't healed    Negative: [1] Has diabetes (diabetes mellitus) AND [2] wound on foot    Minor cut or scratch    Protocols used: SKIN INJURY-A-AH

## 2020-08-24 NOTE — PROGRESS NOTES
Medical Assistant Note:  Jeff Mack presents today for lab draw.    Patient seen by provider today: No.   present during visit today: Not Applicable.    Concerns: No Concerns.    Procedure:  Lab draw site: LAC, Needle type: BF, Gauge: 21. Gauze and coban applied    Post Assessment:  Labs drawn without difficulty: Yes.    Discharge Plan:  Departure Mode: Ambulatory.    Face to Face Time: 5.    Lashanda Yeh CMA

## 2020-08-25 NOTE — PATIENT INSTRUCTIONS
1. Stop hydroxyurea.  2. Start aspirin in 1 to 2 weeks.  3. See Andrea Sanchez in 3 weeks with CBC.  4. See me in 2 months with labs.  5. Recheck BP. [facility]    PLEASE CALL TO SCHEDULE    Lvm to call back, 8/25/20, tm

## 2020-08-25 NOTE — PROGRESS NOTES
Visit Date:   08/25/2020     HEMATOLOGY HISTORY: Mr. 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   1.  On 02/28/2020, WBC of 30.5, hemoglobin of 9.4 and platelet of 235.  MCV of 87.  Neutrophil of 50%, lymphocytes of 15% and monocytes of 17%. On 11/16/2018, CBC normal with WBC of 4.8, hemoglobin 15.3 and platelets of 295.  2.  CT chest, abdomen and pelvis on 02/29/2020 does not reveal any evidence of malignancy.   3.   On 03/19/2020, NGS panel negative for JAK2, CALR and MPL.  There is IDH2 R140Q and KIT D816V.  4. Bone marrow biopsy on 04/16/2020 reveals chronic myelomonocytic leukemia - 1 (CMML-1) characterized by hypercellular bone marrow (90%) with increased M:E ratio and 7% blasts. There are aggregates of atypical mast cells consistent with bone marrow involvement by systemic mastocytosis. Increased bone marrow fibrosis (MF-1-2).  -Normal cytogenetics.   -Flow cytometry on bone marrow reveals increased monocytic cells (43%).  No increase in CD34 positive myeloid blasts.   -BCR-ABL1 bone marrow is negative.   5. On 04/29/2020, tryptase elevated at 51.3 (normal less than 11).  6. Decitabine x 4 cycles between 04/29/2020 and 07/31/2020. (patient did not want any further decitabine).  7. Hydroxyurea started on 08/04/2020 due to thrombocytosis.     SUBJECTIVE:  Mr. Mack is an 85-year-old gentleman with CMML-1 and systemic mastocytosis.  He was on Decitabine.  It was stopped after 4 cycles on 07/31/2020, as he did not want to continue with IV treatment.  His disease had responded.      The patient started to have severe thrombocytosis.  He was started on hydroxyurea on 08/04/2020.  His platelets have responded very well.  It came down from one 1420 to 107.  He stopped the hydroxyurea.  He has not taken it for more than a week.      The patient says that with hydroxyurea he has been getting more bruising.  Also, he has been more constipated.  Since he has stopped  hydroxyurea, both of them are improving.      He has anxiety.  He has to take alprazolam twice a day.  Anxiety is under control.      No headache.  No dizziness.  No chest pain.  No shortness of breath.  No abdominal pain, nausea or vomiting.  Appetite is good.  No urinary complaints.  His constipation is improving.  He is taking MiraLax.  No abnormal bleeding from any site.  No fever, chills or night sweats.      Discussed regarding bruising. It is mainly in the upper extremities.  The patient says just with minor trauma, he gets some bruising.  He was on aspirin, which he is holding.  He is not in any other blood thinner.      PHYSICAL EXAMINATION:   GENERAL:  He is alert, oriented x 3.   VITAL SIGNS:  Reviewed.  ECOG PS of 1.   SKIN:  There are multiple ecchymotic spots in the upper extremities.  No ecchymotic spots in the lower extremities.  No petechiae anywhere.      LABORATORY DATA:  Reviewed.      ASSESSMENT:   1.  An 85-year-old gentleman with chronic myelomonocytic leukemia-1 (CMML-1).   2.  Systemic mastocytosis.   3.  Easy bruising, improving.     4.  Constipation.   5.  Leukopenia.   6.  Mild normocytic anemia.      PLAN:   1.  The patient overall is doing well.  His bruising and constipation are improving.      CBC was reviewed with him.  I explained to him that his white count and hemoglobin are improving as he is not on hydroxyurea.  Platelet is now normal.      I explained to the patient that hydroxyurea has been helping.  I told him we can restart at a lower dose.  He does not want it.  He is worried about bruising and constipation.  Will hold the hydroxyurea.     2.  We will check CBC once a month.  When there is worsening thrombocytosis or leukocytosis, hydroxyurea at a lower dose can be restarted.  He is agreeable for that plan.     3.  We will continue on high fiber diet and MiraLax.     4.  His bruising is improving.  In the next 1-2 weeks he can restart aspirin, if he does not get any new  bruising.     5.  Prescription of Xanax refill.  He has anxiety.  Xanax has been helping.     6.  He has systemic mastocytosis.  Will monitor tryptase level.  It has been elevated.     7.  I will see him in 2 months with labs.  In between, he will see our nurse practitioner.  Advised him to see a physician if he has fever, chills, worsening weakness, shortness of breath, recurrent vomiting, bleeding or any other concerns.      TOTAL FACE TO FACE TIME SPENT:  25 minutes, more than 50% of the time spent in counseling and coordination of care.         GRAZYNA RICHEY MD             D: 2020   T: 2020   MT: JOSAFAT      Name:     RADHA ALFONSO   MRN:      [id number]        Account:      [id number]   :      1935           Visit Date:   2020      Document: [id number]

## 2020-08-28 NOTE — PROGRESS NOTES
Opened encounter to reorder Hydrea, per 8/25 note written by Dr. Ndiaye it has been discontinued.    Medication not reordered.     Jody Mehta RN

## 2020-09-01 NOTE — PROGRESS NOTES
Visit Date:   08/25/2020     HEMATOLOGY HISTORY: Mr. 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   1.  On 02/28/2020, WBC of 30.5, hemoglobin of 9.4 and platelet of 235.  MCV of 87.  Neutrophil of 50%, lymphocytes of 15% and monocytes of 17%. On 11/16/2018, CBC normal with WBC of 4.8, hemoglobin 15.3 and platelets of 295.  2.  CT chest, abdomen and pelvis on 02/29/2020 does not reveal any evidence of malignancy.   3.   On 03/19/2020, NGS panel negative for JAK2, CALR and MPL.  There is IDH2 R140Q and KIT D816V.  4. Bone marrow biopsy on 04/16/2020 reveals chronic myelomonocytic leukemia - 1 (CMML-1) characterized by hypercellular bone marrow (90%) with increased M:E ratio and 7% blasts. There are aggregates of atypical mast cells consistent with bone marrow involvement by systemic mastocytosis. Increased bone marrow fibrosis (MF-1-2).  -Normal cytogenetics.   -Flow cytometry on bone marrow reveals increased monocytic cells (43%).  No increase in CD34 positive myeloid blasts.   -BCR-ABL1 bone marrow is negative.   5. On 04/29/2020, tryptase elevated at 51.3 (normal less than 11).  6. Decitabine x 4 cycles between 04/29/2020 and 07/31/2020. (patient did not want any further decitabine).  7. Hydroxyurea started on 08/04/2020 due to thrombocytosis.     SUBJECTIVE:  Mr. Mack is an 85-year-old gentleman with CMML-1 and systemic mastocytosis.  He was on Decitabine.  It was stopped after 4 cycles on 07/31/2020, as he did not want to continue with IV treatment.  His disease had responded.      The patient started to have severe thrombocytosis.  He was started on hydroxyurea on 08/04/2020.  His platelets have responded very well.  It came down from one 1420 to 107.  He stopped the hydroxyurea.  He has not taken it for more than a week.      The patient says that with hydroxyurea he has been getting more bruising.  Also, he has been more constipated.  Since he has stopped  hydroxyurea, both of them are improving.      He has anxiety.  He has to take alprazolam twice a day.  Anxiety is under control.      No headache.  No dizziness.  No chest pain.  No shortness of breath.  No abdominal pain, nausea or vomiting.  Appetite is good.  No urinary complaints.  His constipation is improving.  He is taking MiraLax.  No abnormal bleeding from any site.  No fever, chills or night sweats.      Discussed regarding bruising. It is mainly in the upper extremities.  The patient says just with minor trauma, he gets some bruising.  He was on aspirin, which he is holding.  He is not in any other blood thinner.      PHYSICAL EXAMINATION:   GENERAL:  He is alert, oriented x 3.   VITAL SIGNS:  Reviewed.  ECOG PS of 1.   SKIN:  There are multiple ecchymotic spots in the upper extremities.  No ecchymotic spots in the lower extremities.  No petechiae anywhere.      LABORATORY DATA:  Reviewed.      ASSESSMENT:   1.  An 85-year-old gentleman with chronic myelomonocytic leukemia-1 (CMML-1).   2.  Systemic mastocytosis.   3.  Easy bruising, improving.     4.  Constipation.   5.  Leukopenia.   6.  Mild normocytic anemia.      PLAN:   1.  The patient overall is doing well.  His bruising and constipation are improving.      CBC was reviewed with him.  I explained to him that his white count and hemoglobin are improving as he is not on hydroxyurea.  Platelet is now normal.      I explained to the patient that hydroxyurea has been helping.  I told him we can restart at a lower dose.  He does not want it.  He is worried about bruising and constipation.  Will hold the hydroxyurea.     2.  We will check CBC once a month.  When there is worsening thrombocytosis or leukocytosis, hydroxyurea at a lower dose can be restarted.  He is agreeable for that plan.     3.  We will continue on high fiber diet and MiraLax.     4.  His bruising is improving.  In the next 1-2 weeks he can restart aspirin, if he does not get any new  bruising.     5.  Prescription of Xanax refill.  He has anxiety.  Xanax has been helping.     6.  He has systemic mastocytosis.  Will monitor tryptase level.  It has been elevated.     7.  I will see him in 2 months with labs.  In between, he will see our nurse practitioner.  Advised him to see a physician if he has fever, chills, worsening weakness, shortness of breath, recurrent vomiting, bleeding or any other concerns.      TOTAL FACE TO FACE TIME SPENT:  25 minutes, more than 50% of the time spent in counseling and coordination of care.         GRAZYNA RICHEY MD             D: 2020   T: 2020   MT: JOSAFAT      Name:     RADHA ALFONSO   MRN:      [id number]        Account:      [id number]   :      1935           Visit Date:   2020      Document: [id number]

## 2020-09-14 NOTE — PROGRESS NOTES
Medical Assistant Note:  Jeff Mack presents today for blood draw.    Patient seen by provider today: No.   present during visit today: Not Applicable.    Concerns: No Concerns.    Procedure:  Lab draw site: rac, Needle type: bf, Gauge: 23.    Post Assessment:  Labs drawn without difficulty: Yes.    Discharge Plan:  Departure Mode: Ambulatory.    Face to Face Time: 5 min  .    Zaynab Aparicio, CMA

## 2020-09-15 NOTE — PROGRESS NOTES
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     -Patient completed 4 cycles of decitabine  -refused additional decitabine cycles  Refused bone marrow biopsy    Interval History:  Ports feeling well.  He denies fever chills sweats denies cough no shortness of breath denies chest pain denies nausea vomiting diarrhea denies abdominal pain denies bleeding      Review of Systems:  14 point ROS of systems including Constitutional, Eyes, Respiratory, Cardiovascular, Gastroenterology, Genitourinary, Integumentary, Muscularskeletal, Psychiatric were all negative except for pertinent positives noted in my HPI.        Physical Examination:  Physical Exam  HENT:      Head: Normocephalic.      Nose: Nose normal.      Mouth/Throat:      Mouth: Mucous membranes are moist.   Eyes:      Pupils: Pupils are equal, round, and reactive to light.   Neck:      Musculoskeletal: Normal range of motion.   Cardiovascular:      Rate and Rhythm: Normal rate.   Pulmonary:      Effort: Pulmonary effort is normal.   Abdominal:      General: Abdomen is flat.   Musculoskeletal: Normal range of motion.   Neurological:      General: No focal deficit present.      Mental Status: He is alert.       Laboratory Data:    No visits with results within 1 Day(s) from this visit.   Latest known visit with results is:   Infusion Therapy Visit on 09/14/2020   Component Date Value Ref Range Status     WBC 09/14/2020 7.5  4.0 - 11.0 10e9/L Final     RBC Count 09/14/2020 5.29  4.4 - 5.9 10e12/L Final     Hemoglobin 09/14/2020 13.9  13.3 - 17.7 g/dL Final     Hematocrit 09/14/2020 42.3  40.0 - 53.0 % Final     MCV 09/14/2020 80  78 - 100 fl Final     MCH 09/14/2020 26.3* 26.5 - 33.0 pg Final     MCHC 09/14/2020 32.9  31.5 - 36.5 g/dL Final     RDW 09/14/2020 19.3* 10.0 - 15.0 % Final     Platelet Count 09/14/2020 532* 150 - 450 10e9/L Final     Diff Method 09/14/2020  Manual Differential   Final     % Neutrophils 09/14/2020 54.0  % Final     % Lymphocytes 09/14/2020 13.0  % Final     % Monocytes 09/14/2020 6.0  % Final     % Eosinophils 09/14/2020 20.0  % Final     % Basophils 09/14/2020 5.0  % Final     % Metamyelocytes 09/14/2020 1.0  % Final     % Myelocytes 09/14/2020 1.0  % Final     Absolute Neutrophil 09/14/2020 4.1  1.6 - 8.3 10e9/L Final     Absolute Lymphocytes 09/14/2020 1.0  0.8 - 5.3 10e9/L Final     Absolute Monocytes 09/14/2020 0.5  0.0 - 1.3 10e9/L Final     Absolute Eosinophils 09/14/2020 1.5* 0.0 - 0.7 10e9/L Final     Absolute Basophils 09/14/2020 0.4* 0.0 - 0.2 10e9/L Final     Absolute Metamyelocytes 09/14/2020 0.1* 0 10e9/L Final     Absolute Myelocytes 09/14/2020 0.1* 0 10e9/L Final     Anisocytosis 09/14/2020 Slight   Final     Acanthocytes 09/14/2020 Moderate   Final     Platelet Estimate 09/14/2020 Automated count confirmed.  Platelet morphology is normal.   Final         Assessment and Plan:      This is a 85-year-old male with      chronic myelomonocytic leukemia CMML    Cycle 3-day 5 given on 07/03-completed 4 cycles  -Patient did not want additional decitabine.  Also he refused bone marrow biopsy  Labs reviewed with patient platelets are 532.   He recently he was on hydroxyurea 500 mg with brought down his counts  -Start hydroxyurea 100 mg p.o. daily  Recheck labs in 10 to 14 days        systemic mastocytosis.   Patient is asymptomatic  We will continue to monitor  Monitor Tryptase       Patient is advised to check temperature frequently in the event of fever chills sweats cough sore throat chest pain nausea vomiting diarrhea abdominal pain bleeding or any changes in health condition patient is advised to go to ER or call our clinic    RAUDEL [facility]     Chart documentation with Dragon Voice recognition Software. Although reviewed after completion, some words and grammatical errors may remain.

## 2020-09-15 NOTE — LETTER
"    9/15/2020         RE: Jeff Mack  [address]  [place] [address]        Dear Colleague,    Thank you for referring your patient, Jeff Mack, to the [facility]. Please see a copy of my visit note below.    Oncology Rooming Note    September 15, 2020 10:01 AM   Jeff Mack is a 85 year old male who presents for:    Chief Complaint   Patient presents with     Oncology Clinic Visit     Initial Vitals: BP (!) 156/80   Pulse 81   Resp 16   Ht 1.753 m (5' 9\")   Wt 70.3 kg (155 lb)   SpO2 98%   BMI 22.89 kg/m   Estimated body mass index is 22.89 kg/m  as calculated from the following:    Height as of this encounter: 1.753 m (5' 9\").    Weight as of this encounter: 70.3 kg (155 lb). Body surface area is 1.85 meters squared.  No Pain (0) Comment: Data Unavailable   No LMP for male patient.  Allergies reviewed: Yes  Medications reviewed: Yes    Medications: Medication refills not needed today.  Pharmacy name entered into [employer]:    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     -Patient completed 4 cycles of decitabine  -refused additional decitabine cycles  Refused bone marrow biopsy    Interval History:  Ports feeling well.  He denies fever chills sweats denies cough no shortness of breath denies chest pain denies nausea vomiting diarrhea denies abdominal pain denies bleeding      Review of Systems:  14 point ROS of systems including Constitutional, Eyes, Respiratory, Cardiovascular, Gastroenterology, Genitourinary, Integumentary, Muscularskeletal, Psychiatric were all negative except for pertinent positives noted in my " HPI.        Physical Examination:  Physical Exam  HENT:      Head: Normocephalic.      Nose: Nose normal.      Mouth/Throat:      Mouth: Mucous membranes are moist.   Eyes:      Pupils: Pupils are equal, round, and reactive to light.   Neck:      Musculoskeletal: Normal range of motion.   Cardiovascular:      Rate and Rhythm: Normal rate.   Pulmonary:      Effort: Pulmonary effort is normal.   Abdominal:      General: Abdomen is flat.   Musculoskeletal: Normal range of motion.   Neurological:      General: No focal deficit present.      Mental Status: He is alert.       Laboratory Data:    No visits with results within 1 Day(s) from this visit.   Latest known visit with results is:   Infusion Therapy Visit on 09/14/2020   Component Date Value Ref Range Status     WBC 09/14/2020 7.5  4.0 - 11.0 10e9/L Final     RBC Count 09/14/2020 5.29  4.4 - 5.9 10e12/L Final     Hemoglobin 09/14/2020 13.9  13.3 - 17.7 g/dL Final     Hematocrit 09/14/2020 42.3  40.0 - 53.0 % Final     MCV 09/14/2020 80  78 - 100 fl Final     MCH 09/14/2020 26.3* 26.5 - 33.0 pg Final     MCHC 09/14/2020 32.9  31.5 - 36.5 g/dL Final     RDW 09/14/2020 19.3* 10.0 - 15.0 % Final     Platelet Count 09/14/2020 532* 150 - 450 10e9/L Final     Diff Method 09/14/2020 Manual Differential   Final     % Neutrophils 09/14/2020 54.0  % Final     % Lymphocytes 09/14/2020 13.0  % Final     % Monocytes 09/14/2020 6.0  % Final     % Eosinophils 09/14/2020 20.0  % Final     % Basophils 09/14/2020 5.0  % Final     % Metamyelocytes 09/14/2020 1.0  % Final     % Myelocytes 09/14/2020 1.0  % Final     Absolute Neutrophil 09/14/2020 4.1  1.6 - 8.3 10e9/L Final     Absolute Lymphocytes 09/14/2020 1.0  0.8 - 5.3 10e9/L Final     Absolute Monocytes 09/14/2020 0.5  0.0 - 1.3 10e9/L Final     Absolute Eosinophils 09/14/2020 1.5* 0.0 - 0.7 10e9/L Final     Absolute Basophils 09/14/2020 0.4* 0.0 - 0.2 10e9/L Final     Absolute Metamyelocytes 09/14/2020 0.1* 0 10e9/L Final      Absolute Myelocytes 09/14/2020 0.1* 0 10e9/L Final     Anisocytosis 09/14/2020 Slight   Final     Acanthocytes 09/14/2020 Moderate   Final     Platelet Estimate 09/14/2020 Automated count confirmed.  Platelet morphology is normal.   Final         Assessment and Plan:      This is a 85-year-old male with      chronic myelomonocytic leukemia CMML    Cycle 3-day 5 given on 07/03-completed 4 cycles  -Patient did not want additional decitabine.  Also he refused bone marrow biopsy  Labs reviewed with patient platelets are 532.   He recently he was on hydroxyurea 500 mg with brought down his counts  -Start hydroxyurea 100 mg p.o. daily  Recheck labs in 10 to 14 days        systemic mastocytosis.   Patient is asymptomatic  We will continue to monitor  Monitor Tryptase       Patient is advised to check temperature frequently in the event of fever chills sweats cough sore throat chest pain nausea vomiting diarrhea abdominal pain bleeding or any changes in health condition patient is advised to go to ER or call our clinic    RAUDEL Stephens CNP  [facility]     Chart documentation with Dragon Voice recognition Software. Although reviewed after completion, some words and grammatical errors may remain.          Again, thank you for allowing me to participate in the care of your patient.        Sincerely,        RAUDEL Stephens CNP

## 2020-09-15 NOTE — PROGRESS NOTES
"Oncology Rooming Note    September 15, 2020 10:01 AM   Jeff Mack is a 85 year old male who presents for:    Chief Complaint   Patient presents with     Oncology Clinic Visit     Initial Vitals: BP (!) 156/80   Pulse 81   Resp 16   Ht 1.753 m (5' 9\")   Wt 70.3 kg (155 lb)   SpO2 98%   BMI 22.89 kg/m   Estimated body mass index is 22.89 kg/m  as calculated from the following:    Height as of this encounter: 1.753 m (5' 9\").    Weight as of this encounter: 70.3 kg (155 lb). Body surface area is 1.85 meters squared.  No Pain (0) Comment: Data Unavailable   No LMP for male patient.  Allergies reviewed: Yes  Medications reviewed: Yes    Medications: Medication refills not needed today.  Pharmacy name entered into [facility]:    [id number] - [place], [place] - [address] [address] [address] [employer] - [place], [place] [facility] [address] [facility] [place] [place] - [address]    Clinical concerns: no      Roxana Stevenson CMA            "

## 2020-09-16 NOTE — TELEPHONE ENCOUNTER
Don left message on clinic voice mail stating that he went to [facility] pharmacy to pick script that LEVAR Mendez sent yesterday and he stated it was not authorized. Message forwarded to RN[facility] to call him back at [phone].

## 2020-09-16 NOTE — TELEPHONE ENCOUNTER
Writer noted the message and pharmacy liaison Desi is aware and is processing a PA for his Hydrea.    Patient is aware and will wait for a call back with updated information.    Jody Mehta RN

## 2020-09-21 NOTE — TELEPHONE ENCOUNTER
Writer returned call and spoke with the patient and confirmed with patient that Siklos is the  Right medication.     Jody Mehta RN

## 2020-09-21 NOTE — TELEPHONE ENCOUNTER
Pt left  on triage line re: new Rx he picked up today from pharmacy (Siklos). This is different than what he has used in the past and he would like clarification if the medication is correct. Requesting call back.     Will route to care team to advise and follow-up.    Kathy Alegre, NICON, RN, PHN  Oncology Care Coordinator  [facility]

## 2020-09-28 NOTE — PROGRESS NOTES
"Jeff Mack is a 85 year old male who is being evaluated via a billable telephone visit.      The patient has been notified of following:     \"This telephone visit will be conducted via a call between you and your physician/provider. We have found that certain health care needs can be provided without the need for a physical exam.  This service lets us provide the care you need with a short phone conversation.  If a prescription is necessary we can send it directly to your pharmacy.  If lab work is needed we can place an order for that and you can then stop by our lab to have the test done at a later time.    Telephone visits are billed at different rates depending on your insurance coverage. During this emergency period, for some insurers they may be billed the same as an in-person visit.  Please reach out to your insurance provider with any questions.    If during the course of the call the physician/provider feels a telephone visit is not appropriate, you will not be charged for this service.\"    Patient has given verbal consent for Telephone visit?  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     -Patient completed 4 cycles of decitabine  -refused additional decitabine cycles  Refused bone marrow biopsy    Interval History:  States he is feeling well.  He denies fever chills sweats.  Denies bleeding fall or injury      Review of Systems:  14 point ROS of systems including Constitutional, Eyes, Respiratory, Cardiovascular, Gastroenterology, Genitourinary, Integumentary, Muscularskeletal, Psychiatric were all negative except for pertinent positives noted in my HPI.        Physical Examination:  Laboratory Data:    Infusion Therapy Visit on 09/28/2020   Component Date Value Ref Range Status     WBC 09/28/2020 15.4* 4.0 - 11.0 10e9/L Final     RBC " Count 09/28/2020 5.22  4.4 - 5.9 10e12/L Final     Hemoglobin 09/28/2020 13.9  13.3 - 17.7 g/dL Final     Hematocrit 09/28/2020 41.8  40.0 - 53.0 % Final     MCV 09/28/2020 80  78 - 100 fl Final     MCH 09/28/2020 26.6  26.5 - 33.0 pg Final     MCHC 09/28/2020 33.3  31.5 - 36.5 g/dL Final     RDW 09/28/2020 19.4* 10.0 - 15.0 % Final     Platelet Count 09/28/2020 108* 150 - 450 10e9/L Final     Diff Method 09/28/2020 Manual Differential   Final     % Neutrophils 09/28/2020 63.0  % Final     % Lymphocytes 09/28/2020 10.0  % Final     % Monocytes 09/28/2020 11.0  % Final     % Eosinophils 09/28/2020 10.0  % Final     % Basophils 09/28/2020 0.0  % Final     % Metamyelocytes 09/28/2020 4.0  % Final     % Myelocytes 09/28/2020 2.0  % Final     Absolute Neutrophil 09/28/2020 9.7* 1.6 - 8.3 10e9/L Final     Absolute Lymphocytes 09/28/2020 1.5  0.8 - 5.3 10e9/L Final     Absolute Monocytes 09/28/2020 1.7* 0.0 - 1.3 10e9/L Final     Absolute Eosinophils 09/28/2020 1.5* 0.0 - 0.7 10e9/L Final     Absolute Basophils 09/28/2020 0.0  0.0 - 0.2 10e9/L Final     Absolute Metamyelocytes 09/28/2020 0.6* 0 10e9/L Final     Absolute Myelocytes 09/28/2020 0.3* 0 10e9/L Final     Anisocytosis 09/28/2020 Moderate   Final     Acanthocytes 09/28/2020 Slight   Final     Microcytes 09/28/2020 Present   Final     Platelet Estimate 09/28/2020 Automated count confirmed.  Platelet morphology is normal.   Final         Assessment and Plan:      This is a 85-year-old male with      chronic myelomonocytic leukemia CMML    Cycle 3-day 5 given on 07/03-completed 4 cycles  -Patient did not want additional decitabine.  Also he refused bone marrow biopsy  -Currently on hydroxyurea 100 mg p.o. daily  -Labs reviewed with patient it shows thrombocytopenia.  Patient instructed to hold hydroxyurea  Recheck labs and follow-up with me on Thursday      Leukocytosis   Afebrile and asymptomatic.  Most likely from CMML, absolute monocytes are elevated.  But because  of the low platelet count we have to hold hydroxyurea  Recheck labs this week          systemic mastocytosis.   Patient is asymptomatic  We will continue to monitor  Monitor Tryptase       Patient is advised to check temperature frequently in the event of fever chills sweats cough sore throat chest pain nausea vomiting diarrhea abdominal pain bleeding or any changes in health condition patient is advised to go to ER or call our clinic    RAUDEL Stephens [facility]     Chart documentation with Dragon Voice recognition Software. Although reviewed after completion, some words and grammatical errors may remain.

## 2020-09-28 NOTE — PROGRESS NOTES
"Jeff Mack is a 85 year old male who is being evaluated via a billable telephone visit.      The patient has been notified of following:     \"This telephone visit will be conducted via a call between you and your physician/provider. We have found that certain health care needs can be provided without the need for a physical exam.  This service lets us provide the care you need with a short phone conversation.  If a prescription is necessary we can send it directly to your pharmacy.  If lab work is needed we can place an order for that and you can then stop by our lab to have the test done at a later time.    Telephone visits are billed at different rates depending on your insurance coverage. During this emergency period, for some insurers they may be billed the same as an in-person visit.  Please reach out to your insurance provider with any questions.    If during the course of the call the physician/provider feels a telephone visit is not appropriate, you will not be charged for this service.\"    Patient has given verbal consent for Telephone visit?  Yes    What phone number would you like to be contacted at? [phone]    How would you like to obtain your AVS? Melissa Stevenson, KALEIGH    "

## 2020-09-28 NOTE — LETTER
"    9/28/2020         RE: Jeff Mack  [address]  [place] [address]        Dear Colleague,    Thank you for referring your patient, Jeff Mack, to the [facility]. Please see a copy of my visit note below.    Jeff Mack is a 85 year old male who is being evaluated via a billable telephone visit.      The patient has been notified of following:     \"This telephone visit will be conducted via a call between you and your physician/provider. We have found that certain health care needs can be provided without the need for a physical exam.  This service lets us provide the care you need with a short phone conversation.  If a prescription is necessary we can send it directly to your pharmacy.  If lab work is needed we can place an order for that and you can then stop by our lab to have the test done at a later time.    Telephone visits are billed at different rates depending on your insurance coverage. During this emergency period, for some insurers they may be billed the same as an in-person visit.  Please reach out to your insurance provider with any questions.    If during the course of the call the physician/provider feels a telephone visit is not appropriate, you will not be charged for this service.\"    Patient has given verbal consent for Telephone visit?  Yes    What phone number would you like to be contacted at? [phone]    How would you like to obtain your AVS? Melissa Stevenson [facility]      Jeff Mack is a 85 year old male who is being evaluated via a billable telephone visit.      The patient has been notified of following:     \"This telephone visit will be conducted via a call between you and your physician/provider. We have found that certain health care needs can be provided without the need for a physical exam.  This service lets us provide the care you need with a short phone conversation.  If a prescription is necessary we can send it " "directly to your pharmacy.  If lab work is needed we can place an order for that and you can then stop by our lab to have the test done at a later time.    Telephone visits are billed at different rates depending on your insurance coverage. During this emergency period, for some insurers they may be billed the same as an in-person visit.  Please reach out to your insurance provider with any questions.    If during the course of the call the physician/provider feels a telephone visit is not appropriate, you will not be charged for this service.\"    Patient has given verbal consent for Telephone visit?  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     -Patient completed 4 cycles of decitabine  -refused additional decitabine cycles  Refused bone marrow biopsy    Interval History:  States he is feeling well.  He denies fever chills sweats.  Denies bleeding fall or injury      Review of Systems:  14 point ROS of systems including Constitutional, Eyes, Respiratory, Cardiovascular, Gastroenterology, Genitourinary, Integumentary, Muscularskeletal, Psychiatric were all negative except for pertinent positives noted in my HPI.        Physical Examination:  Laboratory Data:    Infusion Therapy Visit on 09/28/2020   Component Date Value Ref Range Status     WBC 09/28/2020 15.4* 4.0 - 11.0 10e9/L Final     RBC Count 09/28/2020 5.22  4.4 - 5.9 10e12/L Final     Hemoglobin 09/28/2020 13.9  13.3 - 17.7 g/dL Final     Hematocrit 09/28/2020 41.8  40.0 - 53.0 % Final     MCV 09/28/2020 80  78 - 100 fl Final     MCH 09/28/2020 26.6  26.5 - 33.0 pg Final     MCHC 09/28/2020 33.3  31.5 - 36.5 g/dL Final     RDW 09/28/2020 19.4* 10.0 - 15.0 % Final     Platelet Count 09/28/2020 108* 150 - 450 10e9/L Final     Diff Method 09/28/2020 Manual Differential   Final     % Neutrophils " 09/28/2020 63.0  % Final     % Lymphocytes 09/28/2020 10.0  % Final     % Monocytes 09/28/2020 11.0  % Final     % Eosinophils 09/28/2020 10.0  % Final     % Basophils 09/28/2020 0.0  % Final     % Metamyelocytes 09/28/2020 4.0  % Final     % Myelocytes 09/28/2020 2.0  % Final     Absolute Neutrophil 09/28/2020 9.7* 1.6 - 8.3 10e9/L Final     Absolute Lymphocytes 09/28/2020 1.5  0.8 - 5.3 10e9/L Final     Absolute Monocytes 09/28/2020 1.7* 0.0 - 1.3 10e9/L Final     Absolute Eosinophils 09/28/2020 1.5* 0.0 - 0.7 10e9/L Final     Absolute Basophils 09/28/2020 0.0  0.0 - 0.2 10e9/L Final     Absolute Metamyelocytes 09/28/2020 0.6* 0 10e9/L Final     Absolute Myelocytes 09/28/2020 0.3* 0 10e9/L Final     Anisocytosis 09/28/2020 Moderate   Final     Acanthocytes 09/28/2020 Slight   Final     Microcytes 09/28/2020 Present   Final     Platelet Estimate 09/28/2020 Automated count confirmed.  Platelet morphology is normal.   Final         Assessment and Plan:      This is a 85-year-old male with      chronic myelomonocytic leukemia CMML    Cycle 3-day 5 given on 07/03-completed 4 cycles  -Patient did not want additional decitabine.  Also he refused bone marrow biopsy  -Currently on hydroxyurea 100 mg p.o. daily  -Labs reviewed with patient it shows thrombocytopenia.  Patient instructed to hold hydroxyurea  Recheck labs and follow-up with me on Thursday      Leukocytosis   Afebrile and asymptomatic.  Most likely from CMML, absolute monocytes are elevated.  But because of the low platelet count we have to hold hydroxyurea  Recheck labs this week          systemic mastocytosis.   Patient is asymptomatic  We will continue to monitor  Monitor Tryptase       Patient is advised to check temperature frequently in the event of fever chills sweats cough sore throat chest pain nausea vomiting diarrhea abdominal pain bleeding or any changes in health condition patient is advised to go to ER or call our clinic    RAUDEL Stephens CNP  [facility]     Chart documentation with Dragon Voice recognition Software. Although reviewed after completion, some words and grammatical errors may remain.          Again, thank you for allowing me to participate in the care of your patient.        Sincerely,        RAUDEL Stephens CNP

## 2020-09-28 NOTE — LETTER
"    9/28/2020         RE: Jeff Mack  [address]  [place] [address]        Dear Colleague,    Thank you for referring your patient, Jeff Mack, to the [facility]. Please see a copy of my visit note below.    Jeff Mack is a 85 year old male who is being evaluated via a billable telephone visit.      The patient has been notified of following:     \"This telephone visit will be conducted via a call between you and your physician/provider. We have found that certain health care needs can be provided without the need for a physical exam.  This service lets us provide the care you need with a short phone conversation.  If a prescription is necessary we can send it directly to your pharmacy.  If lab work is needed we can place an order for that and you can then stop by our lab to have the test done at a later time.    Telephone visits are billed at different rates depending on your insurance coverage. During this emergency period, for some insurers they may be billed the same as an in-person visit.  Please reach out to your insurance provider with any questions.    If during the course of the call the physician/provider feels a telephone visit is not appropriate, you will not be charged for this service.\"    Patient has given verbal consent for Telephone visit?  Yes    What phone number would you like to be contacted at? [phone]    How would you like to obtain your AVS? Melissa Stevenson [facility]      Jeff Mack is a 85 year old male who is being evaluated via a billable telephone visit.      The patient has been notified of following:     \"This telephone visit will be conducted via a call between you and your physician/provider. We have found that certain health care needs can be provided without the need for a physical exam.  This service lets us provide the care you need with a short phone conversation.  If a prescription is necessary we can send it " "directly to your pharmacy.  If lab work is needed we can place an order for that and you can then stop by our lab to have the test done at a later time.    Telephone visits are billed at different rates depending on your insurance coverage. During this emergency period, for some insurers they may be billed the same as an in-person visit.  Please reach out to your insurance provider with any questions.    If during the course of the call the physician/provider feels a telephone visit is not appropriate, you will not be charged for this service.\"    Patient has given verbal consent for Telephone visit?  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     -Patient completed 4 cycles of decitabine  -refused additional decitabine cycles  Refused bone marrow biopsy    Interval History:  States he is feeling well.  He denies fever chills sweats.  Denies bleeding fall or injury      Review of Systems:  14 point ROS of systems including Constitutional, Eyes, Respiratory, Cardiovascular, Gastroenterology, Genitourinary, Integumentary, Muscularskeletal, Psychiatric were all negative except for pertinent positives noted in my HPI.        Physical Examination:  Laboratory Data:    Infusion Therapy Visit on 09/28/2020   Component Date Value Ref Range Status     WBC 09/28/2020 15.4* 4.0 - 11.0 10e9/L Final     RBC Count 09/28/2020 5.22  4.4 - 5.9 10e12/L Final     Hemoglobin 09/28/2020 13.9  13.3 - 17.7 g/dL Final     Hematocrit 09/28/2020 41.8  40.0 - 53.0 % Final     MCV 09/28/2020 80  78 - 100 fl Final     MCH 09/28/2020 26.6  26.5 - 33.0 pg Final     MCHC 09/28/2020 33.3  31.5 - 36.5 g/dL Final     RDW 09/28/2020 19.4* 10.0 - 15.0 % Final     Platelet Count 09/28/2020 108* 150 - 450 10e9/L Final     Diff Method 09/28/2020 Manual Differential   Final     % Neutrophils " 09/28/2020 63.0  % Final     % Lymphocytes 09/28/2020 10.0  % Final     % Monocytes 09/28/2020 11.0  % Final     % Eosinophils 09/28/2020 10.0  % Final     % Basophils 09/28/2020 0.0  % Final     % Metamyelocytes 09/28/2020 4.0  % Final     % Myelocytes 09/28/2020 2.0  % Final     Absolute Neutrophil 09/28/2020 9.7* 1.6 - 8.3 10e9/L Final     Absolute Lymphocytes 09/28/2020 1.5  0.8 - 5.3 10e9/L Final     Absolute Monocytes 09/28/2020 1.7* 0.0 - 1.3 10e9/L Final     Absolute Eosinophils 09/28/2020 1.5* 0.0 - 0.7 10e9/L Final     Absolute Basophils 09/28/2020 0.0  0.0 - 0.2 10e9/L Final     Absolute Metamyelocytes 09/28/2020 0.6* 0 10e9/L Final     Absolute Myelocytes 09/28/2020 0.3* 0 10e9/L Final     Anisocytosis 09/28/2020 Moderate   Final     Acanthocytes 09/28/2020 Slight   Final     Microcytes 09/28/2020 Present   Final     Platelet Estimate 09/28/2020 Automated count confirmed.  Platelet morphology is normal.   Final         Assessment and Plan:      This is a 85-year-old male with      chronic myelomonocytic leukemia CMML    Cycle 3-day 5 given on 07/03-completed 4 cycles  -Patient did not want additional decitabine.  Also he refused bone marrow biopsy  -Currently on hydroxyurea 100 mg p.o. daily  -Labs reviewed with patient it shows thrombocytopenia.  Patient instructed to hold hydroxyurea  Recheck labs and follow-up with me on Thursday      Leukocytosis   Afebrile and asymptomatic.  Most likely from CMML, absolute monocytes are elevated.  But because of the low platelet count we have to hold hydroxyurea  Recheck labs this week          systemic mastocytosis.   Patient is asymptomatic  We will continue to monitor  Monitor Tryptase       Patient is advised to check temperature frequently in the event of fever chills sweats cough sore throat chest pain nausea vomiting diarrhea abdominal pain bleeding or any changes in health condition patient is advised to go to ER or call our clinic    RAUDEL Stephens CNP  [facility]     Chart documentation with Dragon Voice recognition Software. Although reviewed after completion, some words and grammatical errors may remain.          Again, thank you for allowing me to participate in the care of your patient.        Sincerely,        RAUDEL Stephens CNP

## 2020-09-28 NOTE — PROGRESS NOTES
Medical Assistant Note:  Jeff Mack presents today for blood draw.    Patient seen by provider today: Yes: sai via telephone.   present during visit today: Not Applicable.    Concerns: No Concerns.    Procedure:  Lab draw site: rac, Needle type: bf, Gauge: 23.    Post Assessment:  Labs drawn without difficulty: Yes.    Discharge Plan:  Departure Mode: Ambulatory.    Face to Face Time: 5 min  .    Zaynab Aparicio, CMA

## 2020-10-01 NOTE — PROGRESS NOTES
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     -Patient completed 4 cycles of decitabine  -refused additional decitabine cycles  Refused bone marrow biopsy    Interval History:  Reports he is face feels hot.  He has been checking temperature/ pt reports normal temp and denies fever.  Chills or sweats.  He does report he has some intermittent productive cough with phlegm.  Patient also reports he has sinus congestion denies pain . Pt denies shortness of breath, denies chest pain.  Denies nausea vomiting diarrhea        Review of Systems:  14 point ROS of systems including Constitutional, Eyes, Respiratory, Cardiovascular, Gastroenterology, Genitourinary, Integumentary, Muscularskeletal, Psychiatric were all negative except for pertinent positives noted in my HPI.        Physical Examination:  Physical Exam  HENT:      Head: Normocephalic.      Nose: Nose normal. No rhinorrhea.      Mouth/Throat:      Mouth: Mucous membranes are moist.   Neck:      Musculoskeletal: Normal range of motion.   Cardiovascular:      Rate and Rhythm: Normal rate.   Pulmonary:      Effort: Pulmonary effort is normal. No respiratory distress.      Breath sounds: Normal breath sounds. No wheezing, rhonchi or rales.   Abdominal:      General: Abdomen is flat.   Musculoskeletal: Normal range of motion.   Skin:     General: Skin is warm.   Neurological:      Mental Status: He is alert.           Laboratory Data:    Oncology Visit on 10/01/2020   Component Date Value Ref Range Status     Color Urine 10/01/2020 Yellow   Final     Appearance Urine 10/01/2020 Clear   Final     Glucose Urine 10/01/2020 Negative  NEG^Negative mg/dL Final     Bilirubin Urine 10/01/2020 Negative  NEG^Negative Final     Ketones Urine 10/01/2020 Negative  NEG^Negative mg/dL Final     Specific Gravity Urine 10/01/2020 1.018  1.003 - 1.035 Final     Blood Urine 10/01/2020  Negative  NEG^Negative Final     pH Urine 10/01/2020 6.5  5.0 - 7.0 pH Final     Protein Albumin Urine 10/01/2020 10* NEG^Negative mg/dL Final     Urobilinogen mg/dL 10/01/2020 Normal  0.0 - 2.0 mg/dL Final     Nitrite Urine 10/01/2020 Negative  NEG^Negative Final     Leukocyte Esterase Urine 10/01/2020 Negative  NEG^Negative Final     Source 10/01/2020 Midstream Urine   Final     WBC Urine 10/01/2020 <1  0 - 5 /HPF Final     RBC Urine 10/01/2020 1  0 - 2 /HPF Final     Squamous Epithelial /HPF Urine 10/01/2020 <1  0 - 1 /HPF Final     Mucous Urine 10/01/2020 Present* NEG^Negative /LPF Final     Hyaline Casts 10/01/2020 1  0 - 2 /LPF Final   Infusion Therapy Visit on 10/01/2020   Component Date Value Ref Range Status     Lactate Dehydrogenase 10/01/2020 204  85 - 227 U/L Final     WBC 10/01/2020 18.5* 4.0 - 11.0 10e9/L Final     RBC Count 10/01/2020 5.28  4.4 - 5.9 10e12/L Final     Hemoglobin 10/01/2020 14.3  13.3 - 17.7 g/dL Final     Hematocrit 10/01/2020 42.6  40.0 - 53.0 % Final     MCV 10/01/2020 81  78 - 100 fl Final     MCH 10/01/2020 27.1  26.5 - 33.0 pg Final     MCHC 10/01/2020 33.6  31.5 - 36.5 g/dL Final     RDW 10/01/2020 19.5* 10.0 - 15.0 % Final     Platelet Count 10/01/2020 127* 150 - 450 10e9/L Final     Diff Method 10/01/2020 Manual Differential   Final     % Neutrophils 10/01/2020 60.0  % Final     % Lymphocytes 10/01/2020 11.0  % Final     % Monocytes 10/01/2020 12.0  % Final     % Eosinophils 10/01/2020 6.0  % Final     % Basophils 10/01/2020 5.0  % Final     % Metamyelocytes 10/01/2020 4.0  % Final     % Myelocytes 10/01/2020 2.0  % Final     Absolute Neutrophil 10/01/2020 11.1* 1.6 - 8.3 10e9/L Final     Absolute Lymphocytes 10/01/2020 2.0  0.8 - 5.3 10e9/L Final     Absolute Monocytes 10/01/2020 2.2* 0.0 - 1.3 10e9/L Final     Absolute Eosinophils 10/01/2020 1.1* 0.0 - 0.7 10e9/L Final     Absolute Basophils 10/01/2020 0.9* 0.0 - 0.2 10e9/L Final     Absolute Metamyelocytes 10/01/2020 0.7* 0  10e9/L Final     Absolute Myelocytes 10/01/2020 0.4* 0 10e9/L Final     Anisocytosis 10/01/2020 Moderate   Final     Microcytes 10/01/2020 Present   Final     Platelet Estimate 10/01/2020 Automated count confirmed.  Platelet morphology is normal.   Final           Assessment and Plan:      This is a 85-year-old male with      chronic myelomonocytic leukemia CMML    Cycle 3-day 5 given on 07/03-completed 4 cycles  -Patient did not want additional decitabine.  Also he refused bone marrow biopsy  -He was on hydroxyurea 100 mg daily however his platelets dropped  hydroxyurea has been on hold since 09/28/2020  Labs reviewed with patient abnormalities discussed  His platelets are still recovering, continue to hold hydroxyurea this week  Recheck CBC on Monday 10/05 and follow-up with me.   plan is-if platelets recover, restart hydroxyurea 100mg p.o.  Mondays Wednesdays and Fridays-      Leukocytosis   CMML, absolute monocytes are elevated  VS infection   Will get blood cultures ,UA and chest x-ray  -since he is having nasal congestion and complains of productive cough will start him on Levaquin-side effects of Levaquin discussed  Patient is advised to check temperature frequently in the event of fever chills sweats persistent cough or worsening of cough patient is advised to go to ER        systemic mastocytosis.   Patient is asymptomatic  We will continue to monitor  Monitor Tryptase       Patient is advised to check temperature frequently in the event of fever chills sweats cough sore throat chest pain nausea vomiting diarrhea abdominal pain bleeding or any changes in health condition patient is advised to go to ER or call our clinic    RAUDEL Stephens [facility] [place]     Chart documentation with Dragon Voice recognition Software. Although reviewed after completion, some words and grammatical errors may remain.

## 2020-10-01 NOTE — LETTER
"    10/1/2020         RE: Jeff Mack  [address]  [place] [address]        Dear Colleague,    Thank you for referring your patient, Jeff Mack, to the [facility]. Please see a copy of my visit note below.    Oncology Rooming Note    October 1, 2020 9:20 AM   Jeff Mack is a 85 year old male who presents for:    Chief Complaint   Patient presents with     Oncology Clinic Visit     Initial Vitals: /67   Pulse 71   Resp 16   Ht 1.753 m (5' 9\")   Wt 70.3 kg (155 lb)   SpO2 98%   BMI 22.89 kg/m   Estimated body mass index is 22.89 kg/m  as calculated from the following:    Height as of this encounter: 1.753 m (5' 9\").    Weight as of this encounter: 70.3 kg (155 lb). Body surface area is 1.85 meters squared.  No Pain (0) Comment: Data Unavailable   No LMP for male patient.  Allergies reviewed: Yes  Medications reviewed: Yes    Medications: Medication refills not needed today.  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     -Patient completed 4 cycles of decitabine  -refused additional decitabine cycles  Refused bone marrow biopsy    Interval History:  Reports he is face feels hot.  He has been checking temperature/ pt reports normal temp and denies fever.  Chills or sweats.  He does report he has some intermittent productive cough with phlegm.  Patient also reports he has sinus congestion denies pain . Pt denies shortness of breath, denies chest pain.  Denies nausea vomiting diarrhea        Review of Systems:  14 point ROS of systems including Constitutional, Eyes, " Respiratory, Cardiovascular, Gastroenterology, Genitourinary, Integumentary, Muscularskeletal, Psychiatric were all negative except for pertinent positives noted in my HPI.        Physical Examination:    Laboratory Data:    Oncology Visit on 10/01/2020   Component Date Value Ref Range Status     Color Urine 10/01/2020 Yellow   Final     Appearance Urine 10/01/2020 Clear   Final     Glucose Urine 10/01/2020 Negative  NEG^Negative mg/dL Final     Bilirubin Urine 10/01/2020 Negative  NEG^Negative Final     Ketones Urine 10/01/2020 Negative  NEG^Negative mg/dL Final     Specific Gravity Urine 10/01/2020 1.018  1.003 - 1.035 Final     Blood Urine 10/01/2020 Negative  NEG^Negative Final     pH Urine 10/01/2020 6.5  5.0 - 7.0 pH Final     Protein Albumin Urine 10/01/2020 10* NEG^Negative mg/dL Final     Urobilinogen mg/dL 10/01/2020 Normal  0.0 - 2.0 mg/dL Final     Nitrite Urine 10/01/2020 Negative  NEG^Negative Final     Leukocyte Esterase Urine 10/01/2020 Negative  NEG^Negative Final     Source 10/01/2020 Midstream Urine   Final     WBC Urine 10/01/2020 <1  0 - 5 /HPF Final     RBC Urine 10/01/2020 1  0 - 2 /HPF Final     Squamous Epithelial /HPF Urine 10/01/2020 <1  0 - 1 /HPF Final     Mucous Urine 10/01/2020 Present* NEG^Negative /LPF Final     Hyaline Casts 10/01/2020 1  0 - 2 /LPF Final   Infusion Therapy Visit on 10/01/2020   Component Date Value Ref Range Status     Lactate Dehydrogenase 10/01/2020 204  85 - 227 U/L Final     WBC 10/01/2020 18.5* 4.0 - 11.0 10e9/L Final     RBC Count 10/01/2020 5.28  4.4 - 5.9 10e12/L Final     Hemoglobin 10/01/2020 14.3  13.3 - 17.7 g/dL Final     Hematocrit 10/01/2020 42.6  40.0 - 53.0 % Final     MCV 10/01/2020 81  78 - 100 fl Final     MCH 10/01/2020 27.1  26.5 - 33.0 pg Final     MCHC 10/01/2020 33.6  31.5 - 36.5 g/dL Final     RDW 10/01/2020 19.5* 10.0 - 15.0 % Final     Platelet Count 10/01/2020 127* 150 - 450 10e9/L Final     Diff Method 10/01/2020 Manual Differential    Final     % Neutrophils 10/01/2020 60.0  % Final     % Lymphocytes 10/01/2020 11.0  % Final     % Monocytes 10/01/2020 12.0  % Final     % Eosinophils 10/01/2020 6.0  % Final     % Basophils 10/01/2020 5.0  % Final     % Metamyelocytes 10/01/2020 4.0  % Final     % Myelocytes 10/01/2020 2.0  % Final     Absolute Neutrophil 10/01/2020 11.1* 1.6 - 8.3 10e9/L Final     Absolute Lymphocytes 10/01/2020 2.0  0.8 - 5.3 10e9/L Final     Absolute Monocytes 10/01/2020 2.2* 0.0 - 1.3 10e9/L Final     Absolute Eosinophils 10/01/2020 1.1* 0.0 - 0.7 10e9/L Final     Absolute Basophils 10/01/2020 0.9* 0.0 - 0.2 10e9/L Final     Absolute Metamyelocytes 10/01/2020 0.7* 0 10e9/L Final     Absolute Myelocytes 10/01/2020 0.4* 0 10e9/L Final     Anisocytosis 10/01/2020 Moderate   Final     Microcytes 10/01/2020 Present   Final     Platelet Estimate 10/01/2020 Automated count confirmed.  Platelet morphology is normal.   Final           Assessment and Plan:      This is a 85-year-old male with      chronic myelomonocytic leukemia CMML    Cycle 3-day 5 given on 07/03-completed 4 cycles  -Patient did not want additional decitabine.  Also he refused bone marrow biopsy  -He was on hydroxyurea 100 mg daily however his platelets dropped  hydroxyurea has been on hold since 09/28/2020  Labs reviewed with patient abnormalities discussed  His platelets are still recovering, continue to hold hydroxyurea this week  Recheck CBC on Monday 10/05 and follow-up with me.   plan is-if platelets recover, restart hydroxyurea 100mg p.o.  Mondays Wednesdays and Fridays-      Leukocytosis   CMML, absolute monocytes are elevated  VS infection   Will get blood cultures ,UA and chest x-ray  -since he is having nasal congestion and complains of productive cough will start him on Levaquin-side effects of Levaquin discussed  Patient is advised to check temperature frequently in the event of fever chills sweats persistent cough or worsening of cough patient is advised  to go to ER        systemic mastocytosis.   Patient is asymptomatic  We will continue to monitor  Monitor Tryptase       Patient is advised to check temperature frequently in the event of fever chills sweats cough sore throat chest pain nausea vomiting diarrhea abdominal pain bleeding or any changes in health condition patient is advised to go to ER or call our clinic    RAUDEL Stephens CNP  [facility]     Chart documentation with Dragon Voice recognition Software. Although reviewed after completion, some words and grammatical errors may remain.            Again, thank you for allowing me to participate in the care of your patient.        Sincerely,        RAUDEL Stephens CNP

## 2020-10-01 NOTE — PROGRESS NOTES
"Oncology Rooming Note    October 1, 2020 9:20 AM   Jeff Mack is a 85 year old male who presents for:    Chief Complaint   Patient presents with     Oncology Clinic Visit     Initial Vitals: /67   Pulse 71   Resp 16   Ht 1.753 m (5' 9\")   Wt 70.3 kg (155 lb)   SpO2 98%   BMI 22.89 kg/m   Estimated body mass index is 22.89 kg/m  as calculated from the following:    Height as of this encounter: 1.753 m (5' 9\").    Weight as of this encounter: 70.3 kg (155 lb). Body surface area is 1.85 meters squared.  No Pain (0) Comment: Data Unavailable   No LMP for male patient.  Allergies reviewed: Yes  Medications reviewed: Yes    Medications: Medication refills not needed today.  Pharmacy name entered into [facility]:    [id number] - [place], [place] - [address] [address] [address] [employer] - [place], [place] - [address] [facility] - [place], [place] - [address] [address]    Clinical concerns: no      Roxana Stevenson CMA            "

## 2020-10-02 NOTE — TELEPHONE ENCOUNTER
Don called clinic stating that he went to [facility] pharmacy in [place] to  his script and it was not there. Writer  looked up and informed him that script was sent to [facility] pharmacy in [place] not [facility]. Don stated that he will pick it up at [facility] today. He also inquired about his labs,UA and CXR report that he had done yesterday. Labs and Imaging reviewed with him. He will further discuss at his exam this Monday 10/5/20 with Andrea. Fatou Rubio RN,BSN,OCN

## 2020-10-05 NOTE — PROGRESS NOTES
Medical Assistant Note:  Jeffalfie Mack presents today for lab draw.    Patient seen by provider today: Yes: Andrea NP.   present during visit today: Not Applicable.    Concerns: No Concerns.    Procedure:  Lab draw site: LAC, Needle type: Bf, Gauge: 21. Gauze and coban applied    Post Assessment:  Labs drawn without difficulty: Yes.    Discharge Plan:  Departure Mode: Ambulatory.    Face to Face Time: 5.    Lashanda Yeh CMA

## 2020-10-05 NOTE — PROGRESS NOTES
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     -Patient completed 4 cycles of decitabine  -refused additional decitabine cycles  Refused bone marrow biopsy    Interval History:  Overall reports feeling the same.  Denies fever chills sweats cough shortness of breath chest pain urinary symptoms        Review of Systems:  14 point ROS of systems including Constitutional, Eyes, Respiratory, Cardiovascular, Gastroenterology, Genitourinary, Integumentary, Muscularskeletal, Psychiatric were all negative except for pertinent positives noted in my HPI.        Physical Examination:  Physical Exam  HENT:      Head: Normocephalic.      Nose: Nose normal. No rhinorrhea.      Mouth/Throat:      Mouth: Mucous membranes are moist.   Neck:      Musculoskeletal: Normal range of motion.   Cardiovascular:      Rate and Rhythm: Normal rate.   Pulmonary:      Effort: Pulmonary effort is normal. No respiratory distress.      Breath sounds: Normal breath sounds. No wheezing, rhonchi or rales.   Abdominal:      General: Abdomen is flat.   Musculoskeletal: Normal range of motion.   Skin:     General: Skin is warm.   Neurological:      Mental Status: He is alert.           Laboratory Data:  Infusion Therapy Visit on 10/05/2020   Component Date Value Ref Range Status     WBC 10/05/2020 23.3* 4.0 - 11.0 10e9/L Final     RBC Count 10/05/2020 5.14  4.4 - 5.9 10e12/L Final     Hemoglobin 10/05/2020 13.6  13.3 - 17.7 g/dL Final     Hematocrit 10/05/2020 41.6  40.0 - 53.0 % Final     MCV 10/05/2020 81  78 - 100 fl Final     MCH 10/05/2020 26.5  26.5 - 33.0 pg Final     MCHC 10/05/2020 32.7  31.5 - 36.5 g/dL Final     RDW 10/05/2020 19.5* 10.0 - 15.0 % Final     Platelet Count 10/05/2020 139* 150 - 450 10e9/L Final     Diff Method 10/05/2020 Manual Differential   Final     % Neutrophils 10/05/2020 60.0  % Final     % Lymphocytes 10/05/2020 5.0  %  Final     % Monocytes 10/05/2020 19.0  % Final     % Eosinophils 10/05/2020 5.0  % Final     % Basophils 10/05/2020 2.0  % Final     % Metamyelocytes 10/05/2020 4.0  % Final     % Myelocytes 10/05/2020 5.0  % Final     Absolute Neutrophil 10/05/2020 14.0* 1.6 - 8.3 10e9/L Final     Absolute Lymphocytes 10/05/2020 1.2  0.8 - 5.3 10e9/L Final     Absolute Monocytes 10/05/2020 4.4* 0.0 - 1.3 10e9/L Final     Absolute Eosinophils 10/05/2020 1.2* 0.0 - 0.7 10e9/L Final     Absolute Basophils 10/05/2020 0.5* 0.0 - 0.2 10e9/L Final     Absolute Metamyelocytes 10/05/2020 0.9* 0 10e9/L Final     Absolute Myelocytes 10/05/2020 1.2* 0 10e9/L Final     RBC Morphology 10/05/2020 Consistent with reported results   Final     Platelet Estimate 10/05/2020 Automated count confirmed.  Platelet morphology is normal.   Final               Assessment and Plan:      This is a 85-year-old male with      chronic myelomonocytic leukemia CMML    Cycle 3-day 5 given on 07/03-completed 4 cycles  -Patient did not want additional decitabine.  Also he refused bone marrow biopsy  -He was on hydroxyurea 100 mg daily however his platelets dropped  hydroxyurea has been on hold since 09/28/2020  Labs reviewed with patient abnormalities discussed  His platelets are still recovering, continue to hold hydroxyurea this week  -if platelets recover, restart hydroxyurea 100mg p.o.  Mondays Wednesdays and Fridays-      Leukocytosis   Likely secondary to see CMML absolute monocytes are 4.4  Fever work-up was negative last week  Patient is advised to check temperature frequently in the event of fever chills sweats persistent cough or worsening of cough patient is advised to go to ER        systemic mastocytosis.   Patient is asymptomatic  We will continue to monitor  Monitor Tryptase       Patient is advised to check temperature frequently in the event of fever chills sweats cough sore throat chest pain nausea vomiting diarrhea abdominal pain bleeding or any  changes in health condition patient is advised to go to ER or call our clinic    RAUDEL Stephens CNP [facility]     Chart documentation with Dragon Voice recognition Software. Although reviewed after completion, some words and grammatical errors may remain.

## 2020-10-05 NOTE — PROGRESS NOTES
"Oncology Rooming Note    October 5, 2020 9:41 AM   Jeff Mack is a 85 year old male who presents for:    No chief complaint on file.    Initial Vitals: BP (!) 147/81 (BP Location: Right arm, Patient Position: Sitting, Cuff Size: Adult Regular)   Pulse 83   Temp 97.9  F (36.6  C) (Oral)   Resp 16   Wt 71 kg (156 lb 9.6 oz)   SpO2 97%   BMI 23.13 kg/m   Estimated body mass index is 23.13 kg/m  as calculated from the following:    Height as of 10/1/20: 1.753 m (5' 9\").    Weight as of this encounter: 71 kg (156 lb 9.6 oz). Body surface area is 1.86 meters squared.  Mild Pain (2) Comment: Data Unavailable   No LMP for male patient.  Allergies reviewed: Yes  Medications reviewed: Yes    Medications: Medication refills not needed today.  Pharmacy name entered into [employer]:    [id number] - [place], [place] - [address] [address] [place] [facility] - [place], [place] - [address] [facility] - [place], [place] - [address]    Clinical concerns: no    Lashanda Yeh CMA                "

## 2020-10-05 NOTE — LETTER
"    10/5/2020         RE: Jeff Mack  [address]  [place] [address]        Dear Colleague,    Thank you for referring your patient, Jeff Mack, to the [facility]. Please see a copy of my visit note below.    Oncology Rooming Note    October 5, 2020 9:41 AM   Jeff Mack is a 85 year old male who presents for:    No chief complaint on file.    Initial Vitals: BP (!) 147/81 (BP Location: Right arm, Patient Position: Sitting, Cuff Size: Adult Regular)   Pulse 83   Temp 97.9  F (36.6  C) (Oral)   Resp 16   Wt 71 kg (156 lb 9.6 oz)   SpO2 97%   BMI 23.13 kg/m   Estimated body mass index is 23.13 kg/m  as calculated from the following:    Height as of 10/1/20: 1.753 m (5' 9\").    Weight as of this encounter: 71 kg (156 lb 9.6 oz). Body surface area is 1.86 meters squared.  Mild Pain (2) Comment: Data Unavailable   No LMP for male patient.  Allergies reviewed: Yes  Medications reviewed: Yes    Medications: Medication refills not needed today.  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     -Patient completed 4 cycles of decitabine  -refused additional decitabine cycles  Refused bone marrow biopsy    Interval History:  Overall reports feeling the same.  Denies fever chills sweats cough shortness of breath chest pain urinary symptoms        Review of Systems:  14 point ROS of systems including Constitutional, Eyes, Respiratory, Cardiovascular, Gastroenterology, Genitourinary, Integumentary, Muscularskeletal, Psychiatric were all negative except for pertinent positives noted " in my HPI.        Physical Examination:  Physical Exam  HENT:      Head: Normocephalic.      Nose: Nose normal. No rhinorrhea.      Mouth/Throat:      Mouth: Mucous membranes are moist.   Neck:      Musculoskeletal: Normal range of motion.   Cardiovascular:      Rate and Rhythm: Normal rate.   Pulmonary:      Effort: Pulmonary effort is normal. No respiratory distress.      Breath sounds: Normal breath sounds. No wheezing, rhonchi or rales.   Abdominal:      General: Abdomen is flat.   Musculoskeletal: Normal range of motion.   Skin:     General: Skin is warm.   Neurological:      Mental Status: He is alert.           Laboratory Data:  Infusion Therapy Visit on 10/05/2020   Component Date Value Ref Range Status     WBC 10/05/2020 23.3* 4.0 - 11.0 10e9/L Final     RBC Count 10/05/2020 5.14  4.4 - 5.9 10e12/L Final     Hemoglobin 10/05/2020 13.6  13.3 - 17.7 g/dL Final     Hematocrit 10/05/2020 41.6  40.0 - 53.0 % Final     MCV 10/05/2020 81  78 - 100 fl Final     MCH 10/05/2020 26.5  26.5 - 33.0 pg Final     MCHC 10/05/2020 32.7  31.5 - 36.5 g/dL Final     RDW 10/05/2020 19.5* 10.0 - 15.0 % Final     Platelet Count 10/05/2020 139* 150 - 450 10e9/L Final     Diff Method 10/05/2020 Manual Differential   Final     % Neutrophils 10/05/2020 60.0  % Final     % Lymphocytes 10/05/2020 5.0  % Final     % Monocytes 10/05/2020 19.0  % Final     % Eosinophils 10/05/2020 5.0  % Final     % Basophils 10/05/2020 2.0  % Final     % Metamyelocytes 10/05/2020 4.0  % Final     % Myelocytes 10/05/2020 5.0  % Final     Absolute Neutrophil 10/05/2020 14.0* 1.6 - 8.3 10e9/L Final     Absolute Lymphocytes 10/05/2020 1.2  0.8 - 5.3 10e9/L Final     Absolute Monocytes 10/05/2020 4.4* 0.0 - 1.3 10e9/L Final     Absolute Eosinophils 10/05/2020 1.2* 0.0 - 0.7 10e9/L Final     Absolute Basophils 10/05/2020 0.5* 0.0 - 0.2 10e9/L Final     Absolute Metamyelocytes 10/05/2020 0.9* 0 10e9/L Final     Absolute Myelocytes 10/05/2020 1.2* 0 10e9/L Final      RBC Morphology 10/05/2020 Consistent with reported results   Final     Platelet Estimate 10/05/2020 Automated count confirmed.  Platelet morphology is normal.   Final               Assessment and Plan:      This is a 85-year-old male with      chronic myelomonocytic leukemia CMML    Cycle 3-day 5 given on 07/03-completed 4 cycles  -Patient did not want additional decitabine.  Also he refused bone marrow biopsy  -He was on hydroxyurea 100 mg daily however his platelets dropped  hydroxyurea has been on hold since 09/28/2020  Labs reviewed with patient abnormalities discussed  His platelets are still recovering, continue to hold hydroxyurea this week  -if platelets recover, restart hydroxyurea 100mg p.o.  Mondays Wednesdays and Fridays-      Leukocytosis   Likely secondary to see CMML absolute monocytes are 4.4  Fever work-up was negative last week  Patient is advised to check temperature frequently in the event of fever chills sweats persistent cough or worsening of cough patient is advised to go to ER        systemic mastocytosis.   Patient is asymptomatic  We will continue to monitor  Monitor Tryptase       Patient is advised to check temperature frequently in the event of fever chills sweats cough sore throat chest pain nausea vomiting diarrhea abdominal pain bleeding or any changes in health condition patient is advised to go to ER or call our clinic    RAUDEL Stephens CNP  [facility]     Chart documentation with Dragon Voice recognition Software. Although reviewed after completion, some words and grammatical errors may remain.            Again, thank you for allowing me to participate in the care of your patient.        Sincerely,        RAUDEL Stephens CNP

## 2020-10-06 NOTE — TELEPHONE ENCOUNTER
Message left for patient to schedule return visit with [email or web address] for 10-12-20

## 2020-10-12 NOTE — LETTER
"    10/12/2020         RE: Jeff Mack  [address]  [place] [address]        Dear Colleague,    Thank you for referring your patient, Jeff Mack, to the [facility]. Please see a copy of my visit note below.    Oncology Rooming Note    October 12, 2020 9:35 AM   Jeff Mack is a 85 year old male who presents for:    Chief Complaint   Patient presents with     Oncology Clinic Visit     Initial Vitals: There were no vitals taken for this visit. Estimated body mass index is 23.13 kg/m  as calculated from the following:    Height as of 10/1/20: 1.753 m (5' 9\").    Weight as of 10/5/20: 71 kg (156 lb 9.6 oz). There is no height or weight on file to calculate BSA.  Data Unavailable Comment: Data Unavailable   No LMP for male patient.    Allergies reviewed: Yes  Medications reviewed: Yes    Medications: Medication refills not needed today.  Pharmacy name entered into [employer]:    [id number] - [place] [facility] [place] - [address] [address] [address] [employer] - [facility] [place] - [address] [facility] - [place], [place] - [address] [place] [address]    Clinical concerns:    Andrea Sanchez was notified.      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     -Patient completed 4 cycles of decitabine  -refused additional decitabine cycles  Refused bone marrow biopsy    Interval History:  Patient reports no changes since last week he is overall feeling well no new concerns.  Denies fever chills sweats cough urinary symptoms abdominal pain nausea vomiting.  He remains to be active        Review of Systems:  14 point ROS of systems including Constitutional, Eyes, Respiratory, Cardiovascular, Gastroenterology, Genitourinary, Integumentary, Muscularskeletal, Psychiatric were all negative except for pertinent positives " noted in my HPI.        Physical Examination:  Physical Exam  HENT:      Head: Normocephalic.      Nose: Nose normal. No rhinorrhea.      Mouth/Throat:      Mouth: Mucous membranes are moist.   Neck:      Musculoskeletal: Normal range of motion.   Cardiovascular:      Rate and Rhythm: Normal rate.   Pulmonary:      Effort: Pulmonary effort is normal. No respiratory distress.      Breath sounds: Normal breath sounds. No wheezing, rhonchi or rales.   Abdominal:      General: Abdomen is flat.   Musculoskeletal: Normal range of motion.   Skin:     General: Skin is warm.   Neurological:      Mental Status: He is alert.           Laboratory Data:  Infusion Therapy Visit on 10/12/2020   Component Date Value Ref Range Status     WBC 10/12/2020 33.5* 4.0 - 11.0 10e9/L Final     RBC Count 10/12/2020 5.02  4.4 - 5.9 10e12/L Final     Hemoglobin 10/12/2020 13.4  13.3 - 17.7 g/dL Final     Hematocrit 10/12/2020 40.8  40.0 - 53.0 % Final     MCV 10/12/2020 81  78 - 100 fl Final     MCH 10/12/2020 26.7  26.5 - 33.0 pg Final     MCHC 10/12/2020 32.8  31.5 - 36.5 g/dL Final     RDW 10/12/2020 19.5* 10.0 - 15.0 % Final     Platelet Count 10/12/2020 140* 150 - 450 10e9/L Final     Diff Method 10/12/2020 Manual Differential   Final     % Neutrophils 10/12/2020 53.0  % Final     % Lymphocytes 10/12/2020 5.0  % Final     % Monocytes 10/12/2020 27.0  % Final     % Eosinophils 10/12/2020 1.0  % Final     % Basophils 10/12/2020 0.0  % Final     % Metamyelocytes 10/12/2020 6.0  % Final     % Myelocytes 10/12/2020 8.0  % Final     Absolute Neutrophil 10/12/2020 17.8* 1.6 - 8.3 10e9/L Final     Absolute Lymphocytes 10/12/2020 1.7  0.8 - 5.3 10e9/L Final     Absolute Monocytes 10/12/2020 9.0* 0.0 - 1.3 10e9/L Final     Absolute Eosinophils 10/12/2020 0.3  0.0 - 0.7 10e9/L Final     Absolute Basophils 10/12/2020 0.0  0.0 - 0.2 10e9/L Final     Absolute Metamyelocytes 10/12/2020 2.0* 0 10e9/L Final     Absolute Myelocytes 10/12/2020 2.7* 0 10e9/L  Final     RBC Morphology 10/12/2020 Consistent with reported results   Final     Platelet Estimate 10/12/2020 Automated count confirmed.  Platelet morphology is normal.   Final               Assessment and Plan:      This is a 85-year-old male with      chronic myelomonocytic leukemia CMML    Cycle 3-day 5 given on 07/03-completed 4 cycles  -Patient did not want additional decitabine.  Also he refused bone marrow biopsy  -He was on hydroxyurea 100 mg daily however his platelets dropped therefore hydroxyurea has been on hold since 09/28/2020  Labs reviewed with patient abnormalities discussed.  As expected he is WBC are trending up, absolute monocytes are trending up as well  His platelets are still recovering,   continue to hold hydroxyurea this week  -if platelets recover, plan is to restart hydroxyurea 100mg p.o.  Mondays Wednesdays and Fridays next week   -Patient has follow-up appointment with Dr. Ndiaye next week      Leukocytosis   secondary to CMML   Patient is afebrile , no signs or symptoms of infection.  Patient advised to call our clinic or go to ER in the event of fever chills sweats or any signs symptoms of infection      systemic mastocytosis.   Patient is asymptomatic  We will continue to monitor  Monitor Tryptase       Patient is advised to check temperature frequently in the event of fever chills sweats cough sore throat chest pain nausea vomiting diarrhea abdominal pain bleeding or any changes in health condition patient is advised to go to ER or call our clinic    RAUDEL Stephens CNP  [facility] [place]     Chart documentation with Dragon Voice recognition Software. Although reviewed after completion, some words and grammatical errors may remain.            Again, thank you for allowing me to participate in the care of your patient.        Sincerely,        RAUDEL Stephens CNP

## 2020-10-12 NOTE — PROGRESS NOTES
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     -Patient completed 4 cycles of decitabine  -refused additional decitabine cycles  Refused bone marrow biopsy    Interval History:  Patient reports no changes since last week he is overall feeling well no new concerns.  Denies fever chills sweats cough urinary symptoms abdominal pain nausea vomiting.  He remains to be active        Review of Systems:  14 point ROS of systems including Constitutional, Eyes, Respiratory, Cardiovascular, Gastroenterology, Genitourinary, Integumentary, Muscularskeletal, Psychiatric were all negative except for pertinent positives noted in my HPI.        Physical Examination:  Physical Exam  HENT:      Head: Normocephalic.      Nose: Nose normal. No rhinorrhea.      Mouth/Throat:      Mouth: Mucous membranes are moist.   Neck:      Musculoskeletal: Normal range of motion.   Cardiovascular:      Rate and Rhythm: Normal rate.   Pulmonary:      Effort: Pulmonary effort is normal. No respiratory distress.      Breath sounds: Normal breath sounds. No wheezing, rhonchi or rales.   Abdominal:      General: Abdomen is flat.   Musculoskeletal: Normal range of motion.   Skin:     General: Skin is warm.   Neurological:      Mental Status: He is alert.           Laboratory Data:  Infusion Therapy Visit on 10/12/2020   Component Date Value Ref Range Status     WBC 10/12/2020 33.5* 4.0 - 11.0 10e9/L Final     RBC Count 10/12/2020 5.02  4.4 - 5.9 10e12/L Final     Hemoglobin 10/12/2020 13.4  13.3 - 17.7 g/dL Final     Hematocrit 10/12/2020 40.8  40.0 - 53.0 % Final     MCV 10/12/2020 81  78 - 100 fl Final     MCH 10/12/2020 26.7  26.5 - 33.0 pg Final     MCHC 10/12/2020 32.8  31.5 - 36.5 g/dL Final     RDW 10/12/2020 19.5* 10.0 - 15.0 % Final     Platelet Count 10/12/2020 140* 150 - 450 10e9/L Final     Diff Method 10/12/2020 Manual Differential   Final      % Neutrophils 10/12/2020 53.0  % Final     % Lymphocytes 10/12/2020 5.0  % Final     % Monocytes 10/12/2020 27.0  % Final     % Eosinophils 10/12/2020 1.0  % Final     % Basophils 10/12/2020 0.0  % Final     % Metamyelocytes 10/12/2020 6.0  % Final     % Myelocytes 10/12/2020 8.0  % Final     Absolute Neutrophil 10/12/2020 17.8* 1.6 - 8.3 10e9/L Final     Absolute Lymphocytes 10/12/2020 1.7  0.8 - 5.3 10e9/L Final     Absolute Monocytes 10/12/2020 9.0* 0.0 - 1.3 10e9/L Final     Absolute Eosinophils 10/12/2020 0.3  0.0 - 0.7 10e9/L Final     Absolute Basophils 10/12/2020 0.0  0.0 - 0.2 10e9/L Final     Absolute Metamyelocytes 10/12/2020 2.0* 0 10e9/L Final     Absolute Myelocytes 10/12/2020 2.7* 0 10e9/L Final     RBC Morphology 10/12/2020 Consistent with reported results   Final     Platelet Estimate 10/12/2020 Automated count confirmed.  Platelet morphology is normal.   Final               Assessment and Plan:      This is a 85-year-old male with      chronic myelomonocytic leukemia CMML    Cycle 3-day 5 given on 07/03-completed 4 cycles  -Patient did not want additional decitabine.  Also he refused bone marrow biopsy  -He was on hydroxyurea 100 mg daily however his platelets dropped therefore hydroxyurea has been on hold since 09/28/2020  Labs reviewed with patient abnormalities discussed.  As expected he is WBC are trending up, absolute monocytes are trending up as well  His platelets are still recovering,   continue to hold hydroxyurea this week  -if platelets recover, plan is to restart hydroxyurea 100mg p.o.  Mondays Wednesdays and Fridays next week   -Patient has follow-up appointment with Dr. Ndiaye next week      Leukocytosis   secondary to CMML   Patient is afebrile , no signs or symptoms of infection.  Patient advised to call our clinic or go to ER in the event of fever chills sweats or any signs symptoms of infection      systemic mastocytosis.   Patient is asymptomatic  We will continue to  monitor  Monitor Tryptase       Patient is advised to check temperature frequently in the event of fever chills sweats cough sore throat chest pain nausea vomiting diarrhea abdominal pain bleeding or any changes in health condition patient is advised to go to ER or call our clinic    RAUDEL Stephens CNP  [facility] [place]     Chart documentation with Dragon Voice recognition Software. Although reviewed after completion, some words and grammatical errors may remain.

## 2020-10-12 NOTE — PROGRESS NOTES
"Oncology Rooming Note    October 12, 2020 9:35 AM   Jeff Mack is a 85 year old male who presents for:    Chief Complaint   Patient presents with     Oncology Clinic Visit     Initial Vitals: There were no vitals taken for this visit. Estimated body mass index is 23.13 kg/m  as calculated from the following:    Height as of 10/1/20: 1.753 m (5' 9\").    Weight as of 10/5/20: 71 kg (156 lb 9.6 oz). There is no height or weight on file to calculate BSA.  Data Unavailable Comment: Data Unavailable   No LMP for male patient.    Allergies reviewed: Yes  Medications reviewed: Yes    Medications: Medication refills not needed today.  Pharmacy name entered into [employer]:    [facility] #[id number] - [place], [place] - [address] [address] [address] [employer] - [place], [place] - [address] [facility] - [place], [place] - [facility] [address]    Clinical concerns:    Andrea Sanchez was notified.      Bobbi Meyer CMA  10/12/2020      9:35 AM  "

## 2020-10-12 NOTE — PROGRESS NOTES
Medical Assistant Note:  Jeff Andrea Posadase presents today for lab draw.    Patient seen by provider today: Yes: Andrea NP.   present during visit today: Not Applicable.    Concerns: No Concerns.    Procedure:  Lab draw site: RAC, Needle type: BF, Gauge: 21.    Post Assessment:  Labs drawn without difficulty: Yes.    Discharge Plan:  Departure Mode: Ambulatory.    Face to Face Time: 5.    Lashanda Yeh CMA

## 2020-10-19 NOTE — PROGRESS NOTES
Writer received a call from patient requesting today's lab results. Writer reviewed results. Patient to receive details of results and further instructions at tomorrow's appointment with Dr. Ndiaye.    Jody Mehta RN

## 2020-10-20 NOTE — PROGRESS NOTES
"Oncology Rooming Note    October 20, 2020 8:17 AM   Jeff Mack is a 85 year old male who presents for:    Chief Complaint   Patient presents with     Oncology Clinic Visit     Initial Vitals: There were no vitals taken for this visit. Estimated body mass index is 23.13 kg/m  as calculated from the following:    Height as of 10/12/20: 1.753 m (5' 9\").    Weight as of 10/12/20: 71 kg (156 lb 9.6 oz). There is no height or weight on file to calculate BSA.  Data Unavailable Comment: Data Unavailable   No LMP for male patient.    Allergies reviewed: Yes  Medications reviewed: Yes    Medications: Medication refills not needed today.  Pharmacy name entered into [facility]:    [facility] #[id number] - [place], [place] - [address] [address] [place] [facility] - [place], [place] - [address] [facility] - [place], [place] - [facility] [address]    Clinical concerns: discomfort in digestive system.   Dr. Ndiaye was notified.      Bobbi Meyer CMA  10/20/2020      8:17 AM  "

## 2020-10-20 NOTE — LETTER
"    10/20/2020         RE: Jeff Mack  [address]  [place] [address]        Dear Colleague,    Thank you for referring your patient, Jeff Mack, to the [facility]. Please see a copy of my visit note below.    Oncology Rooming Note    October 20, 2020 8:17 AM   Jeff Mack is a 85 year old male who presents for:    Chief Complaint   Patient presents with     Oncology Clinic Visit     Initial Vitals: There were no vitals taken for this visit. Estimated body mass index is 23.13 kg/m  as calculated from the following:    Height as of 10/12/20: 1.753 m (5' 9\").    Weight as of 10/12/20: 71 kg (156 lb 9.6 oz). There is no height or weight on file to calculate BSA.  Data Unavailable Comment: Data Unavailable   No LMP for male patient.    Allergies reviewed: Yes  Medications reviewed: Yes    Medications: Medication refills not needed today.  Pharmacy name entered into [employer]:    [id number] [place] [place] [place] [facility] [address] [address] [place] [facility] [place] [facility] [address] [facility] - [place], [place] - [address] [place] [address]    Clinical concerns: discomfort in digestive system.   Dr. Ndiaye was notified.      Bobbi Meyer CMA  10/20/2020      8:17 AM    Visit Date:   10/20/2020     HEMATOLOGY HISTORY: Mr. 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   1.  On 02/28/2020, WBC of 30.5, hemoglobin of 9.4 and platelet of 235.  MCV of 87.  Neutrophil of 50%, lymphocytes of 15% and monocytes of 17%. On 11/16/2018, CBC normal with WBC of 4.8, hemoglobin 15.3 and platelets of 295.  2.  CT chest, abdomen and pelvis on 02/29/2020 does not reveal any evidence of malignancy.   3.   On 03/19/2020, NGS panel negative for JAK2, CALR and MPL.  There is IDH2 R140Q and KIT D816V.  4. Bone marrow biopsy on 04/16/2020 reveals chronic myelomonocytic leukemia - 1 (CMML-1) characterized by " hypercellular bone marrow (90%) with increased M:E ratio and 7% blasts. There are aggregates of atypical mast cells consistent with bone marrow involvement by systemic mastocytosis. Increased bone marrow fibrosis (MF-1-2).  -Normal cytogenetics.   -Flow cytometry on bone marrow reveals increased monocytic cells (43%).  No increase in CD34 positive myeloid blasts.   -BCR-ABL1 bone marrow is negative.   5. On 04/29/2020, tryptase elevated at 51.3 (normal less than 11).  6. Decitabine x 4 cycles between 04/29/2020 and 07/31/2020. (patient did not want any further decitabine).  7. Hydroxyurea started on 08/04/2020 due to thrombocytosis.     SUBJECTIVE:  Mr. Mack is an 85-year-old gentleman with chronic myelomonocytic leukemia and systemic mastocytosis.  Initially, he was on decitabine, which was stopped after 4 cycles.  He had anxiety and did not want to continue IV treatment.  He was started on hydroxyurea in 08/2020.  It has been on hold for the last 2 weeks.      Overall, he is doing well except for some anxiety.  No headache.  No dizziness.  No neck pain.  No chest pain.  No shortness of breath.  No abdominal pain, nausea or vomiting.  He has constipation.  He is taking Senokot and MiraLax.  No blood in the urine or stool.  No fever, chills or night sweats.  He has mild fatigue.      PHYSICAL EXAMINATION:   GENERAL:  Alert and oriented x 3.   VITAL SIGNS:  Reviewed.  ECOG PS of 1.     EYES:  No icterus.   THROAT:  No ulcer. No thrush.   NECK:  Supple. No lymphadenopathy. No thyromegaly.   AXILLAE:  No lymphadenopathy.   LUNGS:  Good air entry bilaterally.  No crackles or wheezing.   HEART:  Regular.  No murmur.   GI: Abdomen is soft.  Nontender. Mild splenomegaly.   EXTREMITIES:  No pedal edema.  No calf swelling or tenderness.   SKIN:  No rash.      LABORATORY DATA:  Reviewed.      ASSESSMENT:      1.  Chronic myelomonocytic leukemia (CMML-1).   2.  Systemic mastocytosis.   3.  Constipation.   4.  Fatigue.   5.   Thrombocytopenia.   6.  Leukocytosis.   7.  Elevated alkaline phosphatase and AST.      PLAN:   1.  The patient clinically is stable.  He does not have any new systemic symptoms.  He has chronic constipation.   2.  Labs were reviewed.  I explained to him that white count is increased and platelet has gone down.  Hemoglobin is normal.  Again, discussed regarding treatment.  He does not want to go back on decitabine or Vidaza.  Discussed regarding hydroxyurea.  He was on low-dose of 100 mg a day.  In August, he had severe thrombocytosis with platelet of about 1400, now his platelet is 78.  Because his platelets are low, I am going to continue holding the hydroxyurea.  He is agreeable for it.   3.  He will see our nurse practitioner with CBC next week.  If his CBC starts getting worse, we will again discuss regarding treatment.  The patient does not want any IV treatment, but he is open to oral treatment.   4.  I advised him to go to the emergency room if he has fever, chills, infection, bleeding, worsening weakness, shortness of breath, recurrent vomiting, bleeding or any other concerns.  I will see him in 4-to 6-weeks' time.         GRAZYNA RICHEY MD             D: 10/20/2020   T: 10/20/2020   MT: CARLTON      Name:     RADHA ALFONSO   MRN:      [id number]        Account:      [id number]   :      1935           Visit Date:   10/20/2020      Document: [id number]      This office note has been dictated.          Again, thank you for allowing me to participate in the care of your patient.        Sincerely,        Garzyna Richey MD

## 2020-10-20 NOTE — PATIENT INSTRUCTIONS
1. Hold hydroxyurea.  2. See Andrea Sanchez next week with CBC.  3. See me in person in 4-6 weeks.    Please call to schedule.

## 2020-10-26 NOTE — ED TRIAGE NOTES
Being treated for leukemia, dealing with frequent constipation, had good result from laxative but since yesterday having LLQ abdomen pain, pain is getting worse. Has a history of diverticulitis.

## 2020-10-26 NOTE — TELEPHONE ENCOUNTER
Harpreet Mendez:  Tell him to go to ER      Called Don back to let him know of the recommendation to go to the ED for evaluation.  He verbalized understanding.  He states that he won't be going during the day because his wife is at work, but that he would go either later today or tomorrow.  Encouraged patient to be seen today.  Advised him to seek treatment earlier if symptoms become worse or if he develops any other concerning symptoms (fever, chest pain, shortness of breath, etc.).  He verbalized understanding of all instructions.    Routing to RNCC Lindsey.    Aguila Alonso, NICON, RN, OCN  Oncology Care Coordinator  [facility]

## 2020-10-26 NOTE — TELEPHONE ENCOUNTER
"Don called to review upcoming appointments and to report worsening constipation and abdominal pain    He states that he has had constipation for couple months that had previously been controlled with PRN medications.  Reports having a BM every other day, and states they are soft to hard in consistency.  He states \"I feel like I'm bound up.\"    He is not on a regular bowel regimen.  He is taking Senna 2 tabs at night as needed for constipation (lately taking it about 4 times/week).  He reports taking Miralax as needed when Senna isn't effective (used twice in past week).  He reports a \"build up\" 2 days ago, where he had no BM x3 days, so then he used an enema which produced large bowel movement (mix of hard and soft stool).    Denies blood in stool.  He reports constant LLQ aching pain, but sharper pain in LLQ after a bowel movement.  Passing flatus intermittently, but reports recent increase in belching.  Denies fevers.      Report low appetite due to constipation.  Drinking about 40 ounces of water per day.  (Advised he increase that to at least 64 ounces per day.)  He reports that he includes fiber in his diet daily.    Reviewed upcoming appointments--labs tomorrow AM and telephone visit with Andrea tomorrow afternoon.    Routing to Andrea to advise.    Aguila Alonso, NICON, RN, OCN  Oncology Care Coordinator  [facility]  "

## 2020-10-26 NOTE — ED PROVIDER NOTES
History     Chief Complaint:  Abdominal Pain       HPI   Jeff Mack is a 85 year old male with a history of diverticulitis, CML, and esophageal cancer who presents with abdominal pain. The patient reports having intermittent LLQ abdominal pain and constipation for a couple of weeks. He states that he was using sennaca and miralax and had a good bowel movement this morning. However, he reports having worsening LLQ abdominal pain after he had a bowel movement this morning. He also reports having trouble urinating and feeling constipated. He denies any fevers or back pain. He has had no bloody stools.     Allergies:  No Known Allergies     Medications:    Xanax   Hydroxyurea    Past Medical History:    Anxiety state, unspecified   Asthma   Basal cell carcinoma   Benign neoplasm of colon   Cardiomegaly   Chronic ischemic heart disease, unspecified   CML (chronic myelocytic leukemia)   Constipation   Depressive disorder, not elsewhere classified   Diverticulosis of colon (without mention of hemorrhage)   Esophageal cancer (H)   Essential hypertension, benign   Hiatal hernia   Hypertrophy (benign) of prostate   Internal hemorrhoids without mention of complication   Mumps   Other primary cardiomyopathies   Sciatica   Spondylosis of unspecified site without mention of myelopathy   Diverticulitis     Past Surgical History:    Bone marrow biopsy   Excision of lingual tonsils   Prostate surgery    Colonic polpectomy   TURP  Retinal surgery   Laminectomy     Family History:    Hypertension   Cerebrovascular disease  Diabetes     Social History:  Smoking Status: former  Smokeless Tobacco: Never Used  Alcohol Use: No  Drug Use: No  PCP: Benjamin Lazar     Review of Systems   Constitutional: Negative for fever.   Gastrointestinal: Positive for abdominal pain (LLQ) and constipation. Negative for blood in stool.   Musculoskeletal: Negative for back pain.   All other systems reviewed and are negative.    Physical Exam      Patient Vitals for the past 24 hrs:   BP Temp Temp src Pulse Resp SpO2 Weight   10/26/20 1415 110/63 -- -- 89 20 97 % --   10/26/20 1411 122/66 -- -- 85 16 98 % --   10/26/20 1147 (!) 155/73 97.8  F (36.6  C) Oral 105 16 97 % 70.8 kg (156 lb)        Physical Exam    General: Sitting up in bed  Eyes:  The pupils are equal and round    Conjunctivae and sclerae are normal  ENT:    Wearing a mask  Neck:  Normal range of motion  CV:  Regular rate, regular rhythm     Skin warm and well perfused   Resp:  Non labored breathing on room air    No tachypnea    No cough heard  GI:  Abdomen is soft, there is no rigidity    No distension    No rebound tenderness     Mild left sided abdominal tenderness  MS:  Normal muscular tone  Skin:  No rash or acute skin lesions noted  Neuro:   Awake, alert.      Speech is normal and fluent.    Face is symmetric.     Moves all extremities equally  Psych: Normal affect.  Appropriate interactions.     Emergency Department Course     Imaging:  Radiology findings were communicated with the patient who voiced understanding of the findings.    CT Abdomen Pelvis w Contrast  Final Result  IMPRESSION:   1.  Mild mesenteric inflammation and trace amount of ascites possibly  related to inflammation involving the cecum, descending and sigmoid  colon. Correlate clinically for any changes to indicate an infectious  or inflammatory colitis. No evidence of abscess or free  intraperitoneal air. No obstruction, diverticulitis or appendicitis.  2.  New splenomegaly. No focal splenic mass is evident.  3.  Prior prostatectomy. Posterior left bladder diverticulum is  unchanged.  4.  Inguinal and lateral left abdominal wall hernias containing  portions of bowel without obstruction or incarceration. Minimal  interval change.  5.  New trace right pleural effusion.  SHILA OWENS MD  Reading per radiology     Laboratory:  Laboratory findings were communicated with the patient who voiced understanding of the  findings.    Lactic Acid whole blood (1413): 0.9      UA with micro: Protein Albumin Urine 30 (A) Bacteria few (A) RBC/HPF 21 (H) Mucous urine present (A)  o/w wnl/negative       CBC:  WBC 89.6 (HH),, HGB 11.6 (L), PLT 44 (LL), o/w WNL     CMP: Glucose 120 (H), alkphos 256 (H), AST 71 (H), o/w WNL (Creatinine: 1.02)     Lipase: 99      Interventions:  1412 Morphine 4 mg IV   1650 Flagyl 500 mg oral   1650 levaquin 750 mg oral     Emergency Department Course:  Past medical records, nursing notes, and vitals reviewed.    1230 I performed an exam of the patient as documented above.    IV was inserted and blood was drawn for laboratory testing, results above.     The patient was sent for imaging while in the emergency department, results above.      The patient provided a urine sample here in the emergency department. This was sent for laboratory testing, findings above.      1400 Patient rechecked and updated.      1510 I spoke with Laura CRISTOBAL for Dr. Ndiaye of the Oncology service regarding patient's presentation, findings, and plan of care. He thought the CT findings was related to his disease and will talk to him tomorrow in clinic.     1623 Patient rechecked and updated.      Findings and plan explained to the Patient. Patient discharged home with instructions regarding supportive care, medications, and reasons to return. The importance of close follow-up was reviewed. The patient was prescribed levaquin and flagyl.     Impression & Plan     Medical Decision Making:  Jeff Mack is a 85 year old male who presents to the emergency department today with abdominal pain. Patient with left sided abdominal pain. Looks well and mild tenderness with no peritoneal signs. Labs with increase in WBC above baseline and decrease in platelets from most recent labs. Is not currently on any treatment for CML. CT abdomen obtained that showed mild inflammation and ascites from inflammation of large colon. Also has new splenomegaly.  Doubt ischemic colitis. Spoke to oncology about patient's labs and CT abdomen findings. Suspect that these are related to his CML. Patient has apparently not wanted treatment recommendations such as bone marrow biopsy, additional decitabine. Patient's pain mild, no fever and looks well. Oncology will get patient into clinic tomorrow and we discussed that will place patient on antibiotics. Patient has no bloody stools, no fever. Discussed SE of flagyl and levofloxacin including not using alcohol and watching for tendon problems. Patient agreeable to plan.    Discharge Diagnosis:    ICD-10-CM    1. Colitis  K52.9    2. Splenomegaly  R16.1        Disposition:  Discharged to home.    Discharge Medications:  New Prescriptions    LEVOFLOXACIN (LEVAQUIN) 750 MG TABLET    Take 1 tablet (750 mg) by mouth daily for 10 days    METRONIDAZOLE (FLAGYL) 500 MG TABLET    Take 1 tablet (500 mg) by mouth 3 times daily for 10 days       Scribe Disclosure:  Xochilt HERBERTe Clare, am serving as a scribe at 12:30 PM on 10/26/2020 to document services personally performed by Shadia Ibrahim MD based on my observations and the provider's statements to me.      10/26/2020   Shadia Ibrahim MD Goertz, Maria Kristine, MD  10/26/20 2140       Shadia Ibrahim MD  10/26/20 2140

## 2020-10-26 NOTE — DISCHARGE INSTRUCTIONS
You should be contacted to get into oncology clinic tomorrow  Continue the antibiotics levofloxacin and flagyl

## 2020-10-26 NOTE — ED AVS SNAPSHOT
[facility] Emergency Dept  [address] [address]  Phone: [phone]  Fax: [phone]                                    Jeff Mack   MRN: [id number]    Department: [facility] Emergency Dept   Date of Visit: 10/26/2020           After Visit Summary Signature Page    I have received my discharge instructions, and my questions have been answered. I have discussed any challenges I see with this plan with the nurse or doctor.    ..........................................................................................................................................  Patient/Patient Representative Signature      ..........................................................................................................................................  Patient Representative Print Name and Relationship to Patient    ..................................................               ................................................  Date                                   Time    ..........................................................................................................................................  Reviewed by Signature/Title    ...................................................              ..............................................  Date                                               Time          22EPIC Rev 08/18

## 2020-10-27 NOTE — PROGRESS NOTES
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     -Patient completed 4 cycles of decitabine  -refused additional decitabine cycles due to anxiety related to treatment  Refused bone marrow biopsy    Interval History:  Presented to ER yesterday with complaint of left abdominal pain.  He was discharged on Levaquin and Flagyl  Patient overall reports he is feeling well.  He denies fever chills sweats denies cough no shortness of breath.  He reports that the pain in the abdomen is better since he started antibiotic      Review of Systems:  14 point ROS of systems including Constitutional, Eyes, Respiratory, Cardiovascular, Gastroenterology, Genitourinary, Integumentary, Muscularskeletal, Psychiatric were all negative except for pertinent positives noted in my HPI.        Physical Examination:  Physical Exam  HENT:      Head: Normocephalic.      Nose: Nose normal. No rhinorrhea.      Mouth/Throat:      Mouth: Mucous membranes are moist.   Neck:      Musculoskeletal: Normal range of motion.   Cardiovascular:      Rate and Rhythm: Normal rate.   Pulmonary:      Effort: Pulmonary effort is normal. No respiratory distress.      Breath sounds: Normal breath sounds. No wheezing, rhonchi or rales.   Abdominal:      General: Abdomen is flat.      Comments: Splenomegaly    Musculoskeletal: Normal range of motion.   Skin:     General: Skin is warm.   Neurological:      Mental Status: He is alert.           Laboratory Data:  Infusion Therapy Visit on 10/12/2020   Component Date Value Ref Range Status     WBC 10/12/2020 33.5* 4.0 - 11.0 10e9/L Final     RBC Count 10/12/2020 5.02  4.4 - 5.9 10e12/L Final     Hemoglobin 10/12/2020 13.4  13.3 - 17.7 g/dL Final     Hematocrit 10/12/2020 40.8  40.0 - 53.0 % Final     MCV 10/12/2020 81  78 - 100 fl Final     MCH 10/12/2020 26.7  26.5 - 33.0 pg Final     MCHC 10/12/2020 32.8  31.5 - 36.5 g/dL  Final     RDW 10/12/2020 19.5* 10.0 - 15.0 % Final     Platelet Count 10/12/2020 140* 150 - 450 10e9/L Final     Diff Method 10/12/2020 Manual Differential   Final     % Neutrophils 10/12/2020 53.0  % Final     % Lymphocytes 10/12/2020 5.0  % Final     % Monocytes 10/12/2020 27.0  % Final     % Eosinophils 10/12/2020 1.0  % Final     % Basophils 10/12/2020 0.0  % Final     % Metamyelocytes 10/12/2020 6.0  % Final     % Myelocytes 10/12/2020 8.0  % Final     Absolute Neutrophil 10/12/2020 17.8* 1.6 - 8.3 10e9/L Final     Absolute Lymphocytes 10/12/2020 1.7  0.8 - 5.3 10e9/L Final     Absolute Monocytes 10/12/2020 9.0* 0.0 - 1.3 10e9/L Final     Absolute Eosinophils 10/12/2020 0.3  0.0 - 0.7 10e9/L Final     Absolute Basophils 10/12/2020 0.0  0.0 - 0.2 10e9/L Final     Absolute Metamyelocytes 10/12/2020 2.0* 0 10e9/L Final     Absolute Myelocytes 10/12/2020 2.7* 0 10e9/L Final     RBC Morphology 10/12/2020 Consistent with reported results   Final     Platelet Estimate 10/12/2020 Automated count confirmed.  Platelet morphology is normal.   Final               Assessment and Plan:      This is a 85-year-old male with      chronic myelomonocytic leukemia CMML      WBC are trending up, absolute monocytes are trending up as well,  platelets are trending down  -CT scan shows new splenomegaly  -Informed patient that these go along with progression of CMML  -Patient states that decitabine or any  IV treatment make him  very anxious .   -He states that he would like to speak to Dr. Ndiaye next week regarding the treatment.  He reports that he will do whatever Dr. Ndiaye recommends but he wants to talk to him first  I will tentatively schedule for decitabine for 5 days starting next Monday and follow-up with Dr. Ndiaye    Leukocytosis   secondary to CMML   Patient is afebrile , no signs or symptoms of infection.  Patient advised to call our clinic or go to ER in the event of fever chills sweats or any signs symptoms of  infection      systemic mastocytosis.   Patient is asymptomatic  We will continue to monitor  Monitor Tryptase       Patient is advised to check temperature frequently in the event of fever chills sweats cough sore throat chest pain nausea vomiting diarrhea abdominal pain bleeding or any changes in health condition patient is advised to go to ER or call our clinic    RAUDEL Stephens [facility]     Chart documentation with Dragon Voice recognition Software. Although reviewed after completion, some words and grammatical errors may remain.

## 2020-10-27 NOTE — PROGRESS NOTES
"Oncology Rooming Note    October 27, 2020 9:39 AM   Jeff Mack is a 85 year old male who presents for:    Chief Complaint   Patient presents with     Oncology Clinic Visit     Initial Vitals: There were no vitals taken for this visit. Estimated body mass index is 23.04 kg/m  as calculated from the following:    Height as of 10/20/20: 1.753 m (5' 9\").    Weight as of 10/26/20: 70.8 kg (156 lb). There is no height or weight on file to calculate BSA.  Data Unavailable Comment: Data Unavailable   No LMP for male patient.    Allergies reviewed: Yes  Medications reviewed: Yes    Medications: MEDICATION REFILLS NEEDED TODAY. Provider was notified.  Pharmacy name entered into [employer]:    [facility] [id number] [place] [address] [address]      Clinical concerns: refill needed on on Xanax, pt prefers 0.25 MG tabs.   Andrea Sanchez was notified.      Bobbi Meyer, KALEIGH  10/27/2020      9:39 AM  "

## 2020-10-27 NOTE — LETTER
"    10/27/2020         RE: Jeff Mack  [address]  [place] [address]        Dear Colleague,    Thank you for referring your patient, Jeff Mack, to the [facility]. Please see a copy of my visit note below.    Oncology Rooming Note    October 27, 2020 9:39 AM   Jeff Mack is a 85 year old male who presents for:    Chief Complaint   Patient presents with     Oncology Clinic Visit     Initial Vitals: There were no vitals taken for this visit. Estimated body mass index is 23.04 kg/m  as calculated from the following:    Height as of 10/20/20: 1.753 m (5' 9\").    Weight as of 10/26/20: 70.8 kg (156 lb). There is no height or weight on file to calculate BSA.  Data Unavailable Comment: Data Unavailable   No LMP for male patient.    Allergies reviewed: Yes  Medications reviewed: Yes    Medications: MEDICATION REFILLS NEEDED TODAY. Provider was notified.  Pharmacy name entered into [employer]:    [id number] - [place], [place] - [address]      Clinical concerns: refill needed on on Xanax, pt prefers 0.25 MG tabs.   Andrea Sanchez was notified.      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     -Patient completed 4 cycles of decitabine  -refused additional decitabine cycles due to anxiety related to treatment  Refused bone marrow biopsy    Interval History:  Presented to ER yesterday with complaint of left abdominal pain.  He was discharged on Levaquin and Flagyl  Patient overall reports he is feeling well.  He denies fever chills sweats denies cough no shortness of breath.  He reports that the pain in the abdomen is better since he started antibiotic      Review of Systems:  14 point ROS of systems including Constitutional, Eyes, Respiratory, Cardiovascular, Gastroenterology, Genitourinary, " Integumentary, Muscularskeletal, Psychiatric were all negative except for pertinent positives noted in my HPI.        Physical Examination:  Physical Exam  HENT:      Head: Normocephalic.      Nose: Nose normal. No rhinorrhea.      Mouth/Throat:      Mouth: Mucous membranes are moist.   Neck:      Musculoskeletal: Normal range of motion.   Cardiovascular:      Rate and Rhythm: Normal rate.   Pulmonary:      Effort: Pulmonary effort is normal. No respiratory distress.      Breath sounds: Normal breath sounds. No wheezing, rhonchi or rales.   Abdominal:      General: Abdomen is flat.      Comments: Splenomegaly    Musculoskeletal: Normal range of motion.   Skin:     General: Skin is warm.   Neurological:      Mental Status: He is alert.           Laboratory Data:  Infusion Therapy Visit on 10/12/2020   Component Date Value Ref Range Status     WBC 10/12/2020 33.5* 4.0 - 11.0 10e9/L Final     RBC Count 10/12/2020 5.02  4.4 - 5.9 10e12/L Final     Hemoglobin 10/12/2020 13.4  13.3 - 17.7 g/dL Final     Hematocrit 10/12/2020 40.8  40.0 - 53.0 % Final     MCV 10/12/2020 81  78 - 100 fl Final     MCH 10/12/2020 26.7  26.5 - 33.0 pg Final     MCHC 10/12/2020 32.8  31.5 - 36.5 g/dL Final     RDW 10/12/2020 19.5* 10.0 - 15.0 % Final     Platelet Count 10/12/2020 140* 150 - 450 10e9/L Final     Diff Method 10/12/2020 Manual Differential   Final     % Neutrophils 10/12/2020 53.0  % Final     % Lymphocytes 10/12/2020 5.0  % Final     % Monocytes 10/12/2020 27.0  % Final     % Eosinophils 10/12/2020 1.0  % Final     % Basophils 10/12/2020 0.0  % Final     % Metamyelocytes 10/12/2020 6.0  % Final     % Myelocytes 10/12/2020 8.0  % Final     Absolute Neutrophil 10/12/2020 17.8* 1.6 - 8.3 10e9/L Final     Absolute Lymphocytes 10/12/2020 1.7  0.8 - 5.3 10e9/L Final     Absolute Monocytes 10/12/2020 9.0* 0.0 - 1.3 10e9/L Final     Absolute Eosinophils 10/12/2020 0.3  0.0 - 0.7 10e9/L Final     Absolute Basophils 10/12/2020 0.0  0.0 -  0.2 10e9/L Final     Absolute Metamyelocytes 10/12/2020 2.0* 0 10e9/L Final     Absolute Myelocytes 10/12/2020 2.7* 0 10e9/L Final     RBC Morphology 10/12/2020 Consistent with reported results   Final     Platelet Estimate 10/12/2020 Automated count confirmed.  Platelet morphology is normal.   Final               Assessment and Plan:      This is a 85-year-old male with      chronic myelomonocytic leukemia CMML      WBC are trending up, absolute monocytes are trending up as well,  platelets are trending down  -CT scan shows new splenomegaly  -Informed patient that these go along with progression of CMML  -Patient states that decitabine or any  IV treatment make him  very anxious .   -He states that he would like to speak to Dr. Ndiaye next week regarding the treatment.  He reports that he will do whatever Dr. Ndiaye recommends but he wants to talk to him first  I will tentatively schedule for decitabine for 5 days starting next Monday and follow-up with Dr. Ndiaye    Leukocytosis   secondary to CMML   Patient is afebrile , no signs or symptoms of infection.  Patient advised to call our clinic or go to ER in the event of fever chills sweats or any signs symptoms of infection      systemic mastocytosis.   Patient is asymptomatic  We will continue to monitor  Monitor Tryptase       Patient is advised to check temperature frequently in the event of fever chills sweats cough sore throat chest pain nausea vomiting diarrhea abdominal pain bleeding or any changes in health condition patient is advised to go to ER or call our clinic    RAUDEL Stephens CNP  [facility] [place]     Chart documentation with Dragon Voice recognition Software. Although reviewed after completion, some words and grammatical errors may remain.            Again, thank you for allowing me to participate in the care of your patient.        Sincerely,        RAUDEL Stephens CNP

## 2020-10-27 NOTE — PATIENT INSTRUCTIONS
Please call to schedule. [facility] Dr. Ndiaye is full. Where should we put the patient      10/27/20, tm

## 2020-10-29 NOTE — TELEPHONE ENCOUNTER
"Writer received instruction from Andrea GONZALES to have patient hold Levaquin.    Writer called and spoke with patient and reviewed the instruction to hold Levaquin. Patient will continue to take the Flagyl and is feeling \"better\", after eating some food.     Patient verbalized understanding and has a follow up on Monday, 11/2 with Andrea GONZALES.    Jody Mehta RN        "

## 2020-10-29 NOTE — TELEPHONE ENCOUNTER
Don called to report that he is having issues since starting antibiotics (Flagyl and Levaquin)  after ED visit on 10/26.      He reports that he read the medication pamphlets and he thinks that he is experiencing side effects from the Levaquin.  He says that he thinks the Levaquin 750 mg tabs are too much, so he hasn't taken them today.  Explained that medication dosages are determined by labs, patient size, etc.  He verbalized understanding but still states that he thinks the dosage is too high.    Since starting the Levaquin, he reports that his abdominal pain hasn't improved much.  Today, he states it is only a 2 out of 10, however he says it was worse yesterday and very similar to when he went to the ED on 10/26.    He believes the Levaquin is causing nervousness, generalized muscle weakness, fatigue, and loss of appetite.  He also reports his ankles are slightly more swollen and discolored than normal.  He states that these side effects are so bothersome that he decided to not take the Levaquin today.    Routing to Andrea Sanchez NP to advise.    Aguila Alonso, NICON, RN, OCN  Oncology Care Coordinator  [facility]

## 2020-10-31 NOTE — ED PROVIDER NOTES
History     Chief Complaint:  Abdominal Pain    HPI   Jeff Mack is a 85 year old male former tobacco user with a history of diverticulitis, esophageal cancer, CML, and colitis who presents with abdominal pain. The patient was seen in the emergency department on 10/26 for left lower quadrant pain and constipation. A workup was performed, noted below, and the patient was diagnosed with colitis and discharged on Levaquin and Flagyl. He returns today with similar complaints stating that the medications have not relieved his pain. He complains of left upper quadrant and right lower quadrant pain, constipation, lack of appetite, weakness, and ankle swelling since leaving the hospital. The patient reports trying Miralax at 0000 this morning, as well as and Senokot and GasX without relief. He has not been able to pass gas since the pain began. The patient also notes some shortness of breath since beginning the antibiotic he was prescribed. He denies any vomiting, fever, chest pain, or cough. The patient has a history of CML but reports that he has not had an infusion since July. He treats with Dr. Ndiaye of Oncology.     10/26/2020 [facility] Workup   CT Abdomen Pelvis w Contrast  IMPRESSION:   1.  Mild mesenteric inflammation and trace amount of ascites possibly  related to inflammation involving the cecum, descending and sigmoid  colon. Correlate clinically for any changes to indicate an infectious  or inflammatory colitis. No evidence of abscess or free  intraperitoneal air. No obstruction, diverticulitis or appendicitis.  2.  New splenomegaly. No focal splenic mass is evident.  3.  Prior prostatectomy. Posterior left bladder diverticulum is  unchanged.  4.  Inguinal and lateral left abdominal wall hernias containing  portions of bowel without obstruction or incarceration. Minimal  interval change.  5.  New trace right pleural effusion.  SHILA OWENS MD  Reading per radiology     Lactic Acid whole blood  (1413): 0.9    UA with micro: Protein Albumin Urine 30 (A) Bacteria few (A) RBC/HPF 21 (H) Mucous urine present (A)  o/w wnl/negative    CBC:  WBC 89.6 (HH),, HGB 11.6 (L), PLT 44 (LL), o/w WNL  CMP: Glucose 120 (H), alkphos 256 (H), AST 71 (H), o/w WNL (Creatinine: 1.02)  Lipase: 99      Allergies:  No known drug allergies     Medications:    Albuterol   Xanax   Flagyl   Levaquin     Past Medical History:    JENI  Asthma   Basal cell carcinoma   Benign neoplasm of colon   Cardiomegaly   Colitis   Chronic ischemic heart disease, unspecified  CML   Depression   Diverticulosis   Esophageal cancer   Hypertension   Hiatal hernia   Hypertrophy benign of prostate  Internal hemorrhoids   Mumps   Primary cardiomyopathies    Sciatica   Spondylosis     Past Surgical History:    Bone marrow biopsy bone specimen   Bronchoscopy flexible   Colonoscopy   EGD combined x8  Excision of lingual tonsil   Pressure gradient measurement initial vessel   UGI endoscopy   Laminectomy fusion lumbar   Colonic polypectomy   TURP   Retinal surgery     Family History:    Mother: hypertension, cerebrovascular disease  Brother: diabetes   Father: diabetes     Social History:  Smoking status: former 2.00 ppd quit date 1975  Alcohol use: no  Drug use: no   The patient presents to the emergency department by personal vehicle     Benjamin Lazar   Marital Status:   [2]    Review of Systems   Constitutional: Positive for appetite change. Negative for fever.   Respiratory: Positive for shortness of breath. Negative for cough.    Cardiovascular: Positive for leg swelling. Negative for chest pain.   Gastrointestinal: Positive for abdominal pain and constipation. Negative for vomiting.   Neurological: Positive for weakness.   All other systems reviewed and are negative.    Physical Exam     Patient Vitals for the past 24 hrs:   BP Temp Temp src Pulse Resp SpO2 Height Weight   10/31/20 1400 122/67 -- -- 100 16 93 % -- --   10/31/20 1330 121/64 -- -- 96 16  93 % -- --   10/31/20 1300 117/67 -- -- 94 16 93 % -- --   10/31/20 1245 -- -- -- -- 16 95 % -- --   10/31/20 1230 119/65 -- -- 95 16 95 % -- --   10/31/20 1215 -- -- -- -- -- 95 % -- --   10/31/20 1200 127/69 -- -- 93 -- 96 % -- --   10/31/20 1145 -- -- -- -- -- 98 % -- --   10/31/20 1130 129/68 -- -- 89 -- 98 % -- --   10/31/20 1127 -- -- -- -- 16 -- -- --   10/31/20 1126 -- -- -- -- 16 98 % -- --   10/31/20 1125 (!) 140/73 -- -- 90 -- -- -- --   10/31/20 1003 111/65 -- -- 97 16 94 % -- --   10/31/20 0925 -- 97.5  F (36.4  C) Temporal 107 18 98 % 1.829 m (6') 71.2 kg (157 lb)       Physical Exam  General: alert but in pain  HENT: mucous membranes moist  CV: regular rate, regular rhythm  Resp: normal effort, clear throughout, no crackles or wheezing  GI: abdomen soft, no guarding, right lower quadrant, left upper quadrant tenderness   MSK: no bony tenderness  Skin: appropriately warm and dry  Neuro: alert, clear speech, oriented  Psych: normal mood and affect    Emergency Department Course   Imaging:  Radiology findings were communicated with the patient who voiced understanding of the findings.    XR Chest Port 1 View  IMPRESSION: New blunting of the right costophrenic angle suggestive of  a trace right pleural effusion. Right basilar opacities may be due to  atelectasis or pneumonitis. The lungs are otherwise clear. The cardiac  silhouette and pulmonary vasculature are within normal limits. No  pneumothorax.  As read by Radiology.    CT Abdomen Pelvis w Contrast   IMPRESSION:   1.  Splenomegaly which has slightly increased in size since 10/26/2020  in this patient with a history of CML.  2.  Overall decrease in mesenteric edema and trace free fluid. No  bowel obstruction.  3.  Increased small right and new trace left pleural effusions.  As read by Radiology.    Laboratory:  Laboratory findings were communicated with the patient who voiced understanding of the findings.    BNP: 478    UA: blood: trace, protein  albumin: 30, mucous: present, o/w Negative     Lipase: 241    CMP: glucose: 109 (H) o/w WNL (Creatinine 1.10)    CBC: .8 (H), HGB 11.9 (L), PLT 36 (L)    Interventions:  1003 Morphine 4 mg IV  1032 NS 1L IV Bolus  1218 Morphine 4 mg IV  1437 Zyloprim 200 mg PO   1454 Lasix 20 mg PO     Emergency Department Course:  Past medical records, nursing notes, and vitals reviewed.  1007: I performed an exam of the patient and obtained history, as documented above.     IV inserted and blood drawn.    Urinalysis and culture obtained and sent for evaluation.    The patient was sent for a chest XR and abdominal CT while in the emergency department, results above.     1226: I discussed the patient with Dr. Ndiaye's associate, Dr. Mathews.     1415: I rechecked the patient. I reviewed the results with the Patient and answered all related questions prior to discharge.     Findings and plan explained to the Patient. Patient discharged home with instructions regarding supportive care, medications, and reasons to return. The importance of close follow-up was reviewed. The patient was prescribed Zyloprim, Augmentin, and Lasix.   Impression & Plan   Medical Decision Making:  Jeff Mack is a 85 year old male former tobacco user with a history of diverticulitis, esophageal cancer, CML, and colitis who presents with abdominal pain.Jeff Mack is a 85 year old male former tobacco user with a history of diverticulitis, esophageal cancer, CML, and colitis who presents with abdominal pain.   Visit from 10/26/2020 was reviewed, including labs and imaging.  He has not been tolerating the levaquin and flagyl well, and complains of bilateral ankle pain.  The mesenteric edema and cecal swelling has improved, but splenomegaly has worsened.  This is consistent with dramatic increase in WBC count as well, indicating worsening of his CML.  I discussed the case briefly with Dr. Mathews (who was on conference call with Dr. Ndiaye).  Based on our  discussion, I will add allopurinol to his regimen, but not hydroxyurea given thrombocytopenia.  He has follow up with Dr. Ndiaye in 2 days.  He will take tramadol which he already has for the abdominal pain.  I have instructed him to stop the levaquin and flagyl, and start Augmentin.  He is also slightly short of breath, and imaging shows pleural effusions consistent with mild volume overload.  We will start 5 days of furosemide.  He would like to go home, though we discussed admission for pain control and oncology evaluation.  He will return for worsening pain, fever, or other concerns.    Diagnosis:    ICD-10-CM    1. Splenomegaly  R16.1        Disposition:  Discharged to home.    Discharge Medications:  New Prescriptions    ALLOPURINOL (ZYLOPRIM) 100 MG TABLET    Take 2 tablets (200 mg) by mouth daily for 5 days    AMOXICILLIN-CLAVULANATE (AUGMENTIN) 875-125 MG TABLET    Take 1 tablet by mouth 2 times daily for 7 days    FUROSEMIDE (LASIX) 20 MG TABLET    Take 1 tablet (20 mg) by mouth daily for 3 days     Ness Ortega  10/31/2020   [facility] EMERGENCY DEPT  Scribe Disclosure:  Ness HERBERT, am serving as a scribe at 10:07 AM on 10/31/2020 to document services personally performed by Bonny Andrade MD based on my observations and the provider's statements to me.        Bonny Andrade MD  11/01/20 2048

## 2020-10-31 NOTE — ED AVS SNAPSHOT
[facility] Emergency Dept  [address] [address]  Phone: [phone]  Fax: [phone]                                    Jeff Mack   MRN: [id number]    Department: [facility] Emergency Dept   Date of Visit: 10/31/2020           After Visit Summary Signature Page    I have received my discharge instructions, and my questions have been answered. I have discussed any challenges I see with this plan with the nurse or doctor.    ..........................................................................................................................................  Patient/Patient Representative Signature      ..........................................................................................................................................  Patient Representative Print Name and Relationship to Patient    ..................................................               ................................................  Date                                   Time    ..........................................................................................................................................  Reviewed by Signature/Title    ...................................................              ..............................................  Date                                               Time          22EPIC Rev 08/18

## 2020-10-31 NOTE — PROGRESS NOTES
Visit Date:   10/20/2020     HEMATOLOGY HISTORY: Mr. 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   1.  On 02/28/2020, WBC of 30.5, hemoglobin of 9.4 and platelet of 235.  MCV of 87.  Neutrophil of 50%, lymphocytes of 15% and monocytes of 17%. On 11/16/2018, CBC normal with WBC of 4.8, hemoglobin 15.3 and platelets of 295.  2.  CT chest, abdomen and pelvis on 02/29/2020 does not reveal any evidence of malignancy.   3.   On 03/19/2020, NGS panel negative for JAK2, CALR and MPL.  There is IDH2 R140Q and KIT D816V.  4. Bone marrow biopsy on 04/16/2020 reveals chronic myelomonocytic leukemia - 1 (CMML-1) characterized by hypercellular bone marrow (90%) with increased M:E ratio and 7% blasts. There are aggregates of atypical mast cells consistent with bone marrow involvement by systemic mastocytosis. Increased bone marrow fibrosis (MF-1-2).  -Normal cytogenetics.   -Flow cytometry on bone marrow reveals increased monocytic cells (43%).  No increase in CD34 positive myeloid blasts.   -BCR-ABL1 bone marrow is negative.   5. On 04/29/2020, tryptase elevated at 51.3 (normal less than 11).  6. Decitabine x 4 cycles between 04/29/2020 and 07/31/2020. (patient did not want any further decitabine).  7. Hydroxyurea started on 08/04/2020 due to thrombocytosis.     SUBJECTIVE:  Mr. Mack is an 85-year-old gentleman with chronic myelomonocytic leukemia and systemic mastocytosis.  Initially, he was on decitabine, which was stopped after 4 cycles.  He had anxiety and did not want to continue IV treatment.  He was started on hydroxyurea in 08/2020.  It has been on hold for the last 2 weeks.      Overall, he is doing well except for some anxiety.  No headache.  No dizziness.  No neck pain.  No chest pain.  No shortness of breath.  No abdominal pain, nausea or vomiting.  He has constipation.  He is taking Senokot and MiraLax.  No blood in the urine or stool.  No fever, chills or night  sweats.  He has mild fatigue.      PHYSICAL EXAMINATION:   GENERAL:  Alert and oriented x 3.   VITAL SIGNS:  Reviewed.  ECOG PS of 1.     EYES:  No icterus.   THROAT:  No ulcer. No thrush.   NECK:  Supple. No lymphadenopathy. No thyromegaly.   AXILLAE:  No lymphadenopathy.   LUNGS:  Good air entry bilaterally.  No crackles or wheezing.   HEART:  Regular.  No murmur.   GI: Abdomen is soft.  Nontender. Mild splenomegaly.   EXTREMITIES:  No pedal edema.  No calf swelling or tenderness.   SKIN:  No rash.      LABORATORY DATA:  Reviewed.      ASSESSMENT:      1.  Chronic myelomonocytic leukemia (CMML-1).   2.  Systemic mastocytosis.   3.  Constipation.   4.  Fatigue.   5.  Thrombocytopenia.   6.  Leukocytosis.   7.  Elevated alkaline phosphatase and AST.      PLAN:   1.  The patient clinically is stable.  He does not have any new systemic symptoms.  He has chronic constipation.   2.  Labs were reviewed.  I explained to him that white count is increased and platelet has gone down.  Hemoglobin is normal.  Again, discussed regarding treatment.  He does not want to go back on decitabine or Vidaza.  Discussed regarding hydroxyurea.  He was on low-dose of 100 mg a day.  In August, he had severe thrombocytosis with platelet of about 1400, now his platelet is 78.  Because his platelets are low, I am going to continue holding the hydroxyurea.  He is agreeable for it.   3.  He will see our nurse practitioner with CBC next week.  If his CBC starts getting worse, we will again discuss regarding treatment.  The patient does not want any IV treatment, but he is open to oral treatment.   4.  I advised him to go to the emergency room if he has fever, chills, infection, bleeding, worsening weakness, shortness of breath, recurrent vomiting, bleeding or any other concerns.  I will see him in 4-to 6-weeks' time.         GRAZYNA RICHEY MD             D: 10/20/2020   T: 10/20/2020   MT: [place]      Name:     RADHA ALFONSO   MRN:       [id number]        Account:      [id number]   :      1935           Visit Date:   10/20/2020      Document: [id number]

## 2020-10-31 NOTE — TELEPHONE ENCOUNTER
Jeff reports constant stabbing abdominal pain rated 8/10, started last night. Location: lower left abdomen. Took Miralax this morning, but has not passed BMs. Last BM episode was 2 days ago. He was able to sleep last night   Also took Gas-X and still has pain.    He was at the ED on 10/26 for colitis, treated with Levaquin and metronidazole, which has not helped at all.     Per protocol, advised ED. Care advice reviewed. Patient verbalizes understanding and plans to go to [facility]. Advised to call back with further questions/concerns. [facility] ED ([facility]) notified of patient's arrival.      Negra Collins RN/[place] Nurse Advisor    Reason for Disposition    [1] SEVERE pain (e.g., excruciating) AND [2] present > 1 hour    [1] SEVERE pain AND [2] age > 60    Additional Information    Negative: Shock suspected (e.g., cold/pale/clammy skin, too weak to stand, low BP, rapid pulse)    Negative: Difficult to awaken or acting confused (e.g., disoriented, slurred speech)    Negative: Passed out (i.e., lost consciousness, collapsed and was not responding)    Negative: Sounds like a life-threatening emergency to the triager    Protocols used: ABDOMINAL PAIN - MALE-A-AH

## 2020-11-02 PROBLEM — E79.0 HYPERURICEMIA: Status: ACTIVE | Noted: 2020-01-01

## 2020-11-02 NOTE — LETTER
11/2/2020         RE: Jeff Mack  [address]  [place] [address]        Dear Colleague,    Thank you for referring your patient, Jeff Mack, to the [facility]. Please see a copy of my visit note below.    Oncology Rooming Note    November 2, 2020 9:01 AM   Jeff Mack is a 85 year old male who presents for:    Chief Complaint   Patient presents with     Oncology Clinic Visit     Initial Vitals: Ht 1.829 m (6')   Wt 74.9 kg (165 lb 3.2 oz)   BMI 22.41 kg/m   Estimated body mass index is 22.41 kg/m  as calculated from the following:    Height as of this encounter: 1.829 m (6').    Weight as of this encounter: 74.9 kg (165 lb 3.2 oz). Body surface area is 1.95 meters squared.  Mild Pain (2) Comment: Data Unavailable   No LMP for male patient.  Allergies reviewed: Yes  Medications reviewed: Yes    Medications: Medication refills not needed today.  Pharmacy name entered into [employer]:    [id number] - [place], [place] - [address] [facility] [address] [address] [employer] - [place], [place] - [address] [facility] [place] [place] [facility] [address] [facility] [address]    Clinical concerns:  doctor was notified.      Briana Eubanks MA              Visit Date:   11/02/2020     HEMATOLOGY HISTORY: Mr. 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   1.  On 02/28/2020, WBC of 30.5, hemoglobin of 9.4 and platelet of 235.  MCV of 87.  Neutrophil of 50%, lymphocytes of 15% and monocytes of 17%. On 11/16/2018, CBC normal with WBC of 4.8, hemoglobin 15.3 and platelets of 295.  2.  CT chest, abdomen and pelvis on 02/29/2020 does not reveal any evidence of malignancy.   3.   On 03/19/2020, NGS panel negative for JAK2, CALR and MPL.  There is IDH2 R140Q and KIT D816V.  4. Bone marrow biopsy on 04/16/2020 reveals chronic myelomonocytic leukemia - 1 (CMML-1) characterized by hypercellular bone marrow (90%) with  increased M:E ratio and 7% blasts. There are aggregates of atypical mast cells consistent with bone marrow involvement by systemic mastocytosis. Increased bone marrow fibrosis (MF-1-2).  -Normal cytogenetics.   -Flow cytometry on bone marrow reveals increased monocytic cells (43%).  No increase in CD34 positive myeloid blasts.   -BCR-ABL1 bone marrow is negative.   5. On 04/29/2020, tryptase elevated at 51.3 (normal less than 11).  6. Decitabine x 4 cycles between 04/29/2020 and 07/31/2020. (patient did not want any further decitabine).  7. Hydroxyurea started on 08/04/2020 due to thrombocytosis. Later stopped due to thrombocytopenia.     SUBJECTIVE:  Mr. Mack is an 85-year-old gentleman with chronic myelomonocytic leukemia and systemic mastocytosis diagnosed in 04/2020.  He received 4 cycles of decitabine.  He was responding to it.  He has stopped it because of anxiety.  He was on hydroxyurea for thrombocytosis.  That has been stopped as he became thrombocytopenic.  Currently, he is not on treatment.      The patient lately has been having abdominal discomfort, it is mainly in the left side of the abdomen.  He has been to the emergency room twice.  A CT scan on 10/31/2020 reveals splenomegaly which has increased.  There is a small pleural effusion.      The patient continues to be very anxious.  The patient said he does not feel very good.  He has some fatigue.  No headache.  No dizziness.  No chest pain.  He gets short of breath on exertion.  No vomiting.  Some nausea.  Appetite is decreased.  No fever or chills.      The patient thinks that some of his symptoms are mainly from his anxiety.      The patient's CBC has been done multiple times in the last few weeks.  He has worsening leukocytosis, anemia and thrombocytopenia.      PHYSICAL EXAMINATION:   GENERAL:  Alert and oriented x 3.   VITAL SIGNS:  Reviewed.  ECOG PS of 2.     EYES:  No icterus.   THROAT:  No ulcer. No thrush.   NECK:  Supple. No  lymphadenopathy. No thyromegaly.   AXILLAE:  No lymphadenopathy.   LUNGS:  Good air entry bilaterally.  No crackles or wheezing.   HEART:  Regular.  No murmur.   GI: Abdomen is soft.  There is splenomegaly.  Nontender.  No rigidity or guarding.   EXTREMITIES:  Bilateral pedal edema.  No calf swelling or tenderness.   SKIN:  No rash.      LABORATORY DATA:  Reviewed.      ASSESSMENT:   1.  Chronic myelomonocytic leukemia (CMML-1), which is progressing.   2.  Systemic mastocytosis.   3.  Abdominal pain from splenomegaly.   4.  Splenomegaly.   5.  Leukocytosis, anemia and thrombocytopenia from CMML.   6.  Hyperuricemia.   7.  Elevated AST, alkaline phosphatase and bilirubin.   8.  Elevated creatinine.      PLAN:   1.  I had a long discussion with the patient.  Imaging studies and investigations were all reviewed.  I explained to him that his CMML is progressing.  It might have progressed to acute leukemia.  Discussed regarding bone marrow biopsy.  The patient does not want a bone marrow biopsy.  The patient says that he has anxiety and he cannot handle bone marrow biopsy.  No bone marrow biopsy done as per patient's Eliquis.  We will get a flow cytometry on peripheral blood.     2.  Discussed regarding treatment.  I told the patient that it is important that we treat him, otherwise, his disease will continue to progress.  After a long discussion, he agreed for decitabine.  He will get 5 days of IV decitabine.  In the past, he has tolerated it well.  Side effects reviewed.  I am hoping he will tolerate it well.  If flow cytometry confirms AML, we will discuss regarding adding venetoclax.       3.  He has hyperuricemia.  He is on allopurinol, which will be continued.  We will also give him 1 dose of rasburicase.  Uric acid will be monitored.     4.  Creatinine mildly elevated.  I advised him to increase his fluid intake.     5.  Elevated LFTs are secondary to his CMML.  We will continue to monitor it.     6.  He will be  transfused as needed for hemoglobin below 7 and platelet below 10 or bleeding.     7.  The patient was started on Augmentin for possible colitis.  He will complete a course.  For pain, he is taking tramadol.  It helps.  Prescription of tramadol refilled.  Side effects including sedation and constipation discussed.     8.  He will continue on MiraLax and Senokot as needed.     9.  He had multiple questions, which were all answered.  He will see our nurse practitioner next week.  I will see him in about 4 weeks.  I advised him to call us if he has any fever, chills, worsening weakness, worsening abdominal pain or any other concerns.     ADDENDUM: Flow cytometry does not reveal AML. There is 0.5% CD34 positive blasts, 34% monocytic cells which express CD14, CD56 (dim, partial), and CD64 and 0.02% possible mast cells, based on  versus side scatter.        GRAZYNA RICHEY MD             D: 2020   T: 2020   MT: [place]      Name:     RADHA ALFONSO   MRN:      [id number]        Account:      [id number]   :      1935           Visit Date:   2020      Document: [id number]      This office note has been dictated.          Again, thank you for allowing me to participate in the care of your patient.        Sincerely,        Grazyna Richey MD

## 2020-11-02 NOTE — PROGRESS NOTES
Oncology Rooming Note    November 2, 2020 9:01 AM   Jeff Mack is a 85 year old male who presents for:    Chief Complaint   Patient presents with     Oncology Clinic Visit     Initial Vitals: Ht 1.829 m (6')   Wt 74.9 kg (165 lb 3.2 oz)   BMI 22.41 kg/m   Estimated body mass index is 22.41 kg/m  as calculated from the following:    Height as of this encounter: 1.829 m (6').    Weight as of this encounter: 74.9 kg (165 lb 3.2 oz). Body surface area is 1.95 meters squared.  Mild Pain (2) Comment: Data Unavailable   No LMP for male patient.  Allergies reviewed: Yes  Medications reviewed: Yes    Medications: Medication refills not needed today.  Pharmacy name entered into [facility]:    [facility][id number] - [place], [place] - [address] [facility] [address] [place] [facility] - [place], [place] - [address] [facility] - [place], [place] - [facility] [address]    Clinical concerns:  doctor was notified.      Briana Eubanks MA

## 2020-11-02 NOTE — PROGRESS NOTES
Infusion Nursing Note:  Jeff Mack presents today for C5D1 Decitabine.    Patient seen by provider today: Yes: Dr. Ndiyae    Note: Patient continues to rate left abdominal pain at 4/10.  Took his own supply of Tramadol today for help with pain control.  Tolerated today's infusion without issue.    Intravenous Access:  Peripheral IV placed.      Treatment Conditions:  Lab Results   Component Value Date    HGB 11.9 10/31/2020     Lab Results   Component Value Date    .8 10/31/2020      Lab Results   Component Value Date    ANEU 48.5 10/31/2020     Lab Results   Component Value Date    PLT 36 10/31/2020      Lab Results   Component Value Date     10/31/2020                   Lab Results   Component Value Date    POTASSIUM 3.6 10/31/2020           Lab Results   Component Value Date    MAG 2.1 04/26/2020            Lab Results   Component Value Date    CR 1.10 10/31/2020                   Lab Results   Component Value Date    KAE 9.1 10/31/2020                Lab Results   Component Value Date    BILITOTAL 1.2 10/31/2020           Lab Results   Component Value Date    ALBUMIN 3.5 10/31/2020                    Lab Results   Component Value Date    ALT 61 10/31/2020           Lab Results   Component Value Date    AST 95 10/31/2020       Results reviewed, labs MET treatment parameters, ok to proceed with treatment.      Post Infusion Assessment:  Patient tolerated infusion without incident.  Blood return noted pre and post infusion.  Site patent and intact, free from redness, edema or discomfort.  No evidence of extravasations.  Access discontinued per protocol per patient request.    Discharge Plan:   Patient declined prescription refills.  Discharge instructions reviewed with: Patient.  Patient and/or family verbalized understanding of discharge instructions and all questions answered.  Copy of AVS reviewed with patient and/or family.  Patient will return 11/3/20 for next appointment.  Patient discharged  in stable condition accompanied by: self.  Departure Mode: Ambulatory.    Mariam Townsend RN

## 2020-11-02 NOTE — LETTER
11/2/2020         RE: Jeff Mack  [address]  [place] [address]      Oncology Rooming Note    November 2, 2020 9:01 AM   Jeff Mack is a 85 year old male who presents for:    Chief Complaint   Patient presents with     Oncology Clinic Visit     Initial Vitals: Ht 1.829 m (6')   Wt 74.9 kg (165 lb 3.2 oz)   BMI 22.41 kg/m   Estimated body mass index is 22.41 kg/m  as calculated from the following:    Height as of this encounter: 1.829 m (6').    Weight as of this encounter: 74.9 kg (165 lb 3.2 oz). Body surface area is 1.95 meters squared.  Mild Pain (2) Comment: Data Unavailable   No LMP for male patient.  Allergies reviewed: Yes  Medications reviewed: Yes    Medications: Medication refills not needed today.  Pharmacy name entered into [employer]:    [facility] #[id number] - [place], [place] - [address] [facility] [address] [place] [employer] [facility] [address] [facility] - [place], [place] - [address] [place] [address]    Clinical concerns:  doctor was notified.      Briana Eubanks MA              Visit Date:   11/02/2020     HEMATOLOGY HISTORY: Mr. 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   1.  On 02/28/2020, WBC of 30.5, hemoglobin of 9.4 and platelet of 235.  MCV of 87.  Neutrophil of 50%, lymphocytes of 15% and monocytes of 17%. On 11/16/2018, CBC normal with WBC of 4.8, hemoglobin 15.3 and platelets of 295.  2.  CT chest, abdomen and pelvis on 02/29/2020 does not reveal any evidence of malignancy.   3.   On 03/19/2020, NGS panel negative for JAK2, CALR and MPL.  There is IDH2 R140Q and KIT D816V.  4. Bone marrow biopsy on 04/16/2020 reveals chronic myelomonocytic leukemia - 1 (CMML-1) characterized by hypercellular bone marrow (90%) with increased M:E ratio and 7% blasts. There are aggregates of atypical mast cells consistent with bone marrow involvement by systemic mastocytosis. Increased bone marrow  fibrosis (MF-1-2).  -Normal cytogenetics.   -Flow cytometry on bone marrow reveals increased monocytic cells (43%).  No increase in CD34 positive myeloid blasts.   -BCR-ABL1 bone marrow is negative.   5. On 04/29/2020, tryptase elevated at 51.3 (normal less than 11).  6. Decitabine x 4 cycles between 04/29/2020 and 07/31/2020. (patient did not want any further decitabine).  7. Hydroxyurea started on 08/04/2020 due to thrombocytosis. Later stopped due to thrombocytopenia.     SUBJECTIVE:  Mr. Mack is an 85-year-old gentleman with chronic myelomonocytic leukemia and systemic mastocytosis diagnosed in 04/2020.  He received 4 cycles of decitabine.  He was responding to it.  He has stopped it because of anxiety.  He was on hydroxyurea for thrombocytosis.  That has been stopped as he became thrombocytopenic.  Currently, he is not on treatment.      The patient lately has been having abdominal discomfort, it is mainly in the left side of the abdomen.  He has been to the emergency room twice.  A CT scan on 10/31/2020 reveals splenomegaly which has increased.  There is a small pleural effusion.      The patient continues to be very anxious.  The patient said he does not feel very good.  He has some fatigue.  No headache.  No dizziness.  No chest pain.  He gets short of breath on exertion.  No vomiting.  Some nausea.  Appetite is decreased.  No fever or chills.      The patient thinks that some of his symptoms are mainly from his anxiety.      The patient's CBC has been done multiple times in the last few weeks.  He has worsening leukocytosis, anemia and thrombocytopenia.      PHYSICAL EXAMINATION:   GENERAL:  Alert and oriented x 3.   VITAL SIGNS:  Reviewed.  ECOG PS of 2.     EYES:  No icterus.   THROAT:  No ulcer. No thrush.   NECK:  Supple. No lymphadenopathy. No thyromegaly.   AXILLAE:  No lymphadenopathy.   LUNGS:  Good air entry bilaterally.  No crackles or wheezing.   HEART:  Regular.  No murmur.   GI: Abdomen is  soft.  There is splenomegaly.  Nontender.  No rigidity or guarding.   EXTREMITIES:  Bilateral pedal edema.  No calf swelling or tenderness.   SKIN:  No rash.      LABORATORY DATA:  Reviewed.      ASSESSMENT:   1.  Chronic myelomonocytic leukemia (CMML-1), which is progressing.   2.  Systemic mastocytosis.   3.  Abdominal pain from splenomegaly.   4.  Splenomegaly.   5.  Leukocytosis, anemia and thrombocytopenia from CMML.   6.  Hyperuricemia.   7.  Elevated AST, alkaline phosphatase and bilirubin.   8.  Elevated creatinine.      PLAN:   1.  I had a long discussion with the patient.  Imaging studies and investigations were all reviewed.  I explained to him that his CMML is progressing.  It might have progressed to acute leukemia.  Discussed regarding bone marrow biopsy.  The patient does not want a bone marrow biopsy.  The patient says that he has anxiety and he cannot handle bone marrow biopsy.  No bone marrow biopsy done as per patient's Eliquis.  We will get a flow cytometry on peripheral blood.     2.  Discussed regarding treatment.  I told the patient that it is important that we treat him, otherwise, his disease will continue to progress.  After a long discussion, he agreed for decitabine.  He will get 5 days of IV decitabine.  In the past, he has tolerated it well.  Side effects reviewed.  I am hoping he will tolerate it well.  If flow cytometry confirms AML, we will discuss regarding adding venetoclax.       3.  He has hyperuricemia.  He is on allopurinol, which will be continued.  We will also give him 1 dose of rasburicase.  Uric acid will be monitored.     4.  Creatinine mildly elevated.  I advised him to increase his fluid intake.     5.  Elevated LFTs are secondary to his CMML.  We will continue to monitor it.     6.  He will be transfused as needed for hemoglobin below 7 and platelet below 10 or bleeding.     7.  The patient was started on Augmentin for possible colitis.  He will complete a course.  For  pain, he is taking tramadol.  It helps.  Prescription of tramadol refilled.  Side effects including sedation and constipation discussed.     8.  He will continue on MiraLax and Senokot as needed.     9.  He had multiple questions, which were all answered.  He will see our nurse practitioner next week.  I will see him in about 4 weeks.  I advised him to call us if he has any fever, chills, worsening weakness, worsening abdominal pain or any other concerns.     ADDENDUM: Flow cytometry does not reveal AML. There is 0.5% CD34 positive blasts, 34% monocytic cells which express CD14, CD56 (dim, partial), and CD64 and 0.02% possible mast cells, based on  versus side scatter.        GRAZYNA RICHEY MD             D: 2020   T: 2020   MT: [place]      Name:     RADHA ALFONSO   MRN:      [id number]        Account:      [id number]   :      1935           Visit Date:   2020      Document: [id number]      This office note has been dictated.            Grazyna Richey MD

## 2020-11-02 NOTE — PATIENT INSTRUCTIONS
1. Start decitabine.  2. Labs today.  3. See Andrea Sanchez in 1 week.  4. Take tramadol as needed.  5. Discontinue hydroxyurea.  6. See me in 4 weeks.  7. Continue allopurinol.

## 2020-11-03 NOTE — PROGRESS NOTES
Infusion Nursing Note:  Jeff Mack presents today for Dacogen, Rasburicase.    Patient seen by provider today: No   present during visit today: Not Applicable.    Note: reviewed written information from [facility] on Rasburicase with patient. Questions answered    Intravenous Access:  Peripheral IV placed.    Treatment Conditions:  Lab Results   Component Value Date    HGB 10.7 11/02/2020     Lab Results   Component Value Date    .8 11/02/2020      Lab Results   Component Value Date    ANEU 51.9 11/02/2020     Lab Results   Component Value Date    PLT 44 11/02/2020      Lab Results   Component Value Date     11/02/2020                   Lab Results   Component Value Date    POTASSIUM 3.6 11/02/2020           Lab Results   Component Value Date    MAG 2.1 04/26/2020            Lab Results   Component Value Date    CR 1.28 11/02/2020                   Lab Results   Component Value Date    KAE 8.7 11/02/2020                Lab Results   Component Value Date    BILITOTAL 1.4 11/02/2020           Lab Results   Component Value Date    ALBUMIN 3.4 11/02/2020                    Lab Results   Component Value Date    ALT 56 11/02/2020           Lab Results   Component Value Date     11/02/2020       Results reviewed, labs MET treatment parameters, ok to proceed with treatment.      Post Infusion Assessment:  Patient tolerated infusion without incident.  Blood return noted pre and post infusion.  Site patent and intact, free from redness, edema or discomfort.  No evidence of extravasations.  Access discontinued per protocol.   [facility] for [facility] tomorrow    Discharge Plan:   Discharge instructions reviewed with: Patient.  Patient and/or family verbalized understanding of discharge instructions and all questions answered.  Patient discharged in stable condition accompanied by: self.  Departure Mode: Ambulatory.    Ashley Teran RN

## 2020-11-04 NOTE — PROGRESS NOTES
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     -Patient completed 4 cycles of decitabine  -refused additional decitabine cycles due to anxiety related to treatment  Refused bone marrow biopsy  Patient was started with hydroxyurea however developed thrombocytopenia with it for hydroxyurea was discontinued  -Patient developed worsening of leukocytosis anemia and thrombocytopenia consistent with progression of CMML  -11/02-patient met with Dr. Ndiaye who recommended bone marrow biopsy patient refused   -11/02 Decitabine restarted    Interval History:  Overall patient reports that treatment continues to make him anxious he reports that he was given medi cation to bring the uric acid down ( rasburicase) which made him anxious.  Continues to have some shortness of breath.  That he thinks is due to anxiety.  Denies cough denies chest pain denies fever chills sweats  Denies bleeding    Review of Systems:  14 point ROS of systems including Constitutional, Eyes, Respiratory, Cardiovascular, Gastroenterology, Genitourinary, Integumentary, Muscularskeletal, Psychiatric were all negative except for pertinent positives noted in my HPI.        Physical Examination:  Physical Exam  HENT:      Head: Normocephalic.      Nose: Nose normal. No rhinorrhea.      Mouth/Throat:      Mouth: Mucous membranes are moist.   Neck:      Musculoskeletal: Normal range of motion.   Cardiovascular:      Rate and Rhythm: Normal rate.   Pulmonary:      Effort: Pulmonary effort is normal. No respiratory distress.      Breath sounds: Normal breath sounds. No wheezing, rhonchi or rales.   Abdominal:      General: Abdomen is flat.      Comments: Splenomegaly    Musculoskeletal: Normal range of motion.   Skin:     General: Skin is warm.   Neurological:      Mental Status: He is alert.           Laboratory Data:    Infusion Therapy Visit on 11/04/2020    Component Date Value Ref Range Status     Sodium 11/04/2020 136  133 - 144 mmol/L Final     Potassium 11/04/2020 4.3  3.4 - 5.3 mmol/L Final     Chloride 11/04/2020 104  94 - 109 mmol/L Final     Carbon Dioxide 11/04/2020 28  20 - 32 mmol/L Final     Anion Gap 11/04/2020 4  3 - 14 mmol/L Final     Glucose 11/04/2020 113* 70 - 99 mg/dL Final     Urea Nitrogen 11/04/2020 30  7 - 30 mg/dL Final     Creatinine 11/04/2020 1.41* 0.66 - 1.25 mg/dL Final     GFR Estimate 11/04/2020 46* >60 mL/min/[1.73_m2] Final    Comment: Non  GFR Calc  Starting 12/18/2018, serum creatinine based estimated GFR (eGFR) will be   calculated using the Chronic Kidney Disease Epidemiology Collaboration   (CKD-EPI) equation.       GFR Estimate If Black 11/04/2020 53* >60 mL/min/[1.73_m2] Final    Comment:  GFR Calc  Starting 12/18/2018, serum creatinine based estimated GFR (eGFR) will be   calculated using the Chronic Kidney Disease Epidemiology Collaboration   (CKD-EPI) equation.       Calcium 11/04/2020 8.6  8.5 - 10.1 mg/dL Final     Bilirubin Total 11/04/2020 1.4* 0.2 - 1.3 mg/dL Final     Albumin 11/04/2020 3.3* 3.4 - 5.0 g/dL Final     Protein Total 11/04/2020 7.0  6.8 - 8.8 g/dL Final     Alkaline Phosphatase 11/04/2020 238* 40 - 150 U/L Final     ALT 11/04/2020 58  0 - 70 U/L Final     AST 11/04/2020 109* 0 - 45 U/L Final     WBC 11/04/2020 167.7* 4.0 - 11.0 10e9/L Final    Comment: This result has been called to AYLA LANIER by Frederic Martin on 11 04 2020 at   1054, and has been read back.        RBC Count 11/04/2020 4.03* 4.4 - 5.9 10e12/L Final     Hemoglobin 11/04/2020 11.3* 13.3 - 17.7 g/dL Final     Hematocrit 11/04/2020 34.1* 40.0 - 53.0 % Final     MCV 11/04/2020 85  78 - 100 fl Final     MCH 11/04/2020 28.0  26.5 - 33.0 pg Final     MCHC 11/04/2020 33.1  31.5 - 36.5 g/dL Final     RDW 11/04/2020 22.6* 10.0 - 15.0 % Final     Platelet Count 11/04/2020 36* 150 - 450 10e9/L Final    Comment: This  result has been called to AYLA LANIER by Frederic Martin on 11 04 2020 at   1054, and has been read back.        Diff Method 11/04/2020 Manual Differential   Final     % Neutrophils 11/04/2020 49.0  % Final     % Lymphocytes 11/04/2020 1.0  % Final     % Monocytes 11/04/2020 32.0  % Final     % Eosinophils 11/04/2020 2.0  % Final     % Basophils 11/04/2020 0.0  % Final     % Metamyelocytes 11/04/2020 10.0  % Final     % Myelocytes 11/04/2020 5.0  % Final     % Promyelocytes 11/04/2020 1.0  % Final     Nucleated RBCs 11/04/2020 2* 0 /100 Final     Absolute Neutrophil 11/04/2020 82.2* 1.6 - 8.3 10e9/L Final     Absolute Lymphocytes 11/04/2020 1.7  0.8 - 5.3 10e9/L Final     Absolute Monocytes 11/04/2020 53.7* 0.0 - 1.3 10e9/L Final     Absolute Eosinophils 11/04/2020 3.4* 0.0 - 0.7 10e9/L Final     Absolute Basophils 11/04/2020 0.0  0.0 - 0.2 10e9/L Final     Absolute Metamyelocytes 11/04/2020 16.8* 0 10e9/L Final     Absolute Myelocytes 11/04/2020 8.4* 0 10e9/L Final     Absolute Promyeloctyes 11/04/2020 1.7* 0 10e9/L Final     Absolute Nucleated RBC 11/04/2020 3.4   Final     Anisocytosis 11/04/2020 Marked   Final     Polychromasia 11/04/2020 Slight   Final     Acanthocytes 11/04/2020 Slight   Final     [place] Cells 11/04/2020 Slight   Final     Basophilic Stipling 11/04/2020 Present   Final     Platelet Estimate 11/04/2020 Confirming automated cell count   Final     Uric Acid 11/04/2020 9.8* 3.5 - 7.2 mg/dL Final               Assessment and Plan:      This is a 85-year-old male with      chronic myelomonocytic leukemia CMML    WBC are trending up, absolute monocytes are trending up as well,  platelets are trending down  -CT scan shows new splenomegaly  -Informed patient that these go along with progression of CMML  11/02-patient met with Dr. Ndiaye who recommended bone marrow biopsy.  Patient refused bone marrow biopsy  -Peripheral flow cytometry negative for AML  -11/02-decitabine restarted  Overall patient reports that  the treatment makes him anxious  -Labs reviewed with patient.  Abnormalities discussed.  -We will continue with the treatment.  We will recheck CBC CMP and uric acid this week.   -Continue with allopurinol 300 mg daily  -Schedule with  me in 2 days        Leukocytosis   secondary to CMML   Patient is afebrile , no signs or symptoms of infection.  Patient advised to call our clinic or go to ER in the event of fever chills sweats or any signs symptoms of infection  -As above decitabine restarted on 110/2.  Hopefully with the treatment we will  see improvement in leukocytosis  -Continue to monitor labs closely      Thrombocytopenia secondary to CMML  -Transfuse for platelet count 20 or below or bleeding  -Patient informed that he is at high risk for bleeding and in the event of bleeding fall injury bruising is advised to go to ER   -We will recheck CBC again this week.  Plan will be to monitor      systemic mastocytosis.   Patient is asymptomatic  We will continue to monitor  Monitor Tryptase       Elevated creatinine  Patient denies urinary symptoms  We will give NS bolus hydration slowly due to bilateral lower extremity edema  Rerecheck creatinine tomorrow    Lateral lower extremity edema  On exam no evidence of DVT  Looks like fluid overload  -We will give Lasix 20 mg IV in the clinic  I also advised him to take Lasix 20 mg p.o. at home for 3 days (he had previous Lasix prescription)  -Compression socks,   Monitor closely recheck CMP this week    Hyperuricemia  -continue  with allopurinol 300 mg daily  -Patient received 1 dose of rasburicase 3 mg yesterday  -Initially  I wanted to give him additional 3 mg of rasburicase today.  But he reports it made him very anxious yesterday therefore I will give him a lower dose today 1.5 mg  -Recheck uric acid tomorrow    Anxiety  Continue with Xanax  -Discussed different coping mechanisms  -    Shortness of breath  -Chronic issue for the patient.  Reports with treatment and  anxiety he gets more short of breath  No  signs or symptoms of infection, denies fever chest pain cough  10/31-Chest x ray shows pleural effusions consistent with mild volume overload.  He was prescribed Lasix  But he has not been taking Lasix as prescribed   will give Lasix IV in the clinic and he will take Lasix for the next 3 days  Patient advised to go to ER with any worsening of symptom      Patient is advised to check temperature frequently in the event of fever chills sweats cough sore throat chest pain nausea vomiting diarrhea abdominal pain bleeding or any changes in health condition patient is advised to go to ER or call our clinic    RAUDEL Stephens CNP  [facility]     Chart documentation with Dragon Voice recognition Software. Although reviewed after completion, some words and grammatical errors may remain.

## 2020-11-04 NOTE — TELEPHONE ENCOUNTER
Patient had appointment with Andrea today, states he will need a prescription for lasix and allopurinol as he does not have these medications at home.  Andrea notified and prescriptions sent to [facility] in [place].  Reiterated to patient that he should start these right away.  Patient states understanding.

## 2020-11-04 NOTE — PROGRESS NOTES
Infusion Nursing Note:  Jeff Mack presents today for decitabine.    Patient seen by provider today: Yes: EMILY Sanchez   present during visit today: Not Applicable.    Note: lasix; IVFs and rasburicase added to today's treatment.    Intravenous Access:  Peripheral IV placed.    Treatment Conditions:  Lab Results   Component Value Date    HGB 11.3 11/04/2020     Lab Results   Component Value Date    WBC PENDING 11/04/2020      Lab Results   Component Value Date    ANEU 51.9 11/02/2020     Lab Results   Component Value Date    PLT PENDING 11/04/2020      Lab Results   Component Value Date     11/04/2020                   Lab Results   Component Value Date    POTASSIUM 4.3 11/04/2020           Lab Results   Component Value Date    MAG 2.1 04/26/2020            Lab Results   Component Value Date    CR 1.41 11/04/2020                   Lab Results   Component Value Date    KAE 8.6 11/04/2020                Lab Results   Component Value Date    BILITOTAL 1.4 11/04/2020           Lab Results   Component Value Date    ALBUMIN 3.3 11/04/2020                    Lab Results   Component Value Date    ALT 58 11/04/2020           Lab Results   Component Value Date     11/04/2020       Results reviewed, labs MET treatment parameters, ok to proceed with treatment.      Post Infusion Assessment:  Patient tolerated infusion without incident.  Blood return noted pre and post infusion.  Site patent and intact, free from redness, edema or discomfort.  No evidence of extravasations.  Access discontinued per protocol.       Discharge Plan:   Discharge instructions reviewed with: Patient.  Patient and/or family verbalized understanding of discharge instructions and all questions answered.  Copy of AVS reviewed with patient and/or family.  Patient will return tomorrow for next appointment.  Patient discharged in stable condition accompanied by: self.  Departure Mode: Ambulatory.    Kayleen Gomez  RN

## 2020-11-04 NOTE — LETTER
11/4/2020         RE: Jeff Mack  [address]  [place] [address]        Dear Colleague,    Thank you for referring your patient, Jeff Mack, to the [facility] [place]. Please see a copy of my visit note below.        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     -Patient completed 4 cycles of decitabine  -refused additional decitabine cycles due to anxiety related to treatment  Refused bone marrow biopsy  Patient was started with hydroxyurea however developed thrombocytopenia with it for hydroxyurea was discontinued  -Patient developed worsening of leukocytosis anemia and thrombocytopenia consistent with progression of CMML  -11/02-patient met with Dr. Ndiaye who recommended bone marrow biopsy patient refused   -11/02 Decitabine restarted    Interval History:  Overall patient reports that treatment continues to make him anxious he reports that he was given medi cation to bring the uric acid down ( rasburicase) which made him anxious.  Continues to have some shortness of breath.  That he thinks is due to anxiety.  Denies cough denies chest pain denies fever chills sweats  Denies bleeding    Review of Systems:  14 point ROS of systems including Constitutional, Eyes, Respiratory, Cardiovascular, Gastroenterology, Genitourinary, Integumentary, Muscularskeletal, Psychiatric were all negative except for pertinent positives noted in my HPI.        Physical Examination:  Physical Exam  HENT:      Head: Normocephalic.      Nose: Nose normal. No rhinorrhea.      Mouth/Throat:      Mouth: Mucous membranes are moist.   Neck:      Musculoskeletal: Normal range of motion.   Cardiovascular:      Rate and Rhythm: Normal rate.   Pulmonary:      Effort: Pulmonary effort is normal. No respiratory distress.      Breath sounds: Normal breath sounds. No wheezing, rhonchi or rales.   Abdominal:       General: Abdomen is flat.      Comments: Splenomegaly    Musculoskeletal: Normal range of motion.   Skin:     General: Skin is warm.   Neurological:      Mental Status: He is alert.           Laboratory Data:    Infusion Therapy Visit on 11/04/2020   Component Date Value Ref Range Status     Sodium 11/04/2020 136  133 - 144 mmol/L Final     Potassium 11/04/2020 4.3  3.4 - 5.3 mmol/L Final     Chloride 11/04/2020 104  94 - 109 mmol/L Final     Carbon Dioxide 11/04/2020 28  20 - 32 mmol/L Final     Anion Gap 11/04/2020 4  3 - 14 mmol/L Final     Glucose 11/04/2020 113* 70 - 99 mg/dL Final     Urea Nitrogen 11/04/2020 30  7 - 30 mg/dL Final     Creatinine 11/04/2020 1.41* 0.66 - 1.25 mg/dL Final     GFR Estimate 11/04/2020 46* >60 mL/min/[1.73_m2] Final    Comment: Non  GFR Calc  Starting 12/18/2018, serum creatinine based estimated GFR (eGFR) will be   calculated using the Chronic Kidney Disease Epidemiology Collaboration   (CKD-EPI) equation.       GFR Estimate If Black 11/04/2020 53* >60 mL/min/[1.73_m2] Final    Comment:  GFR Calc  Starting 12/18/2018, serum creatinine based estimated GFR (eGFR) will be   calculated using the Chronic Kidney Disease Epidemiology Collaboration   (CKD-EPI) equation.       Calcium 11/04/2020 8.6  8.5 - 10.1 mg/dL Final     Bilirubin Total 11/04/2020 1.4* 0.2 - 1.3 mg/dL Final     Albumin 11/04/2020 3.3* 3.4 - 5.0 g/dL Final     Protein Total 11/04/2020 7.0  6.8 - 8.8 g/dL Final     Alkaline Phosphatase 11/04/2020 238* 40 - 150 U/L Final     ALT 11/04/2020 58  0 - 70 U/L Final     AST 11/04/2020 109* 0 - 45 U/L Final     WBC 11/04/2020 167.7* 4.0 - 11.0 10e9/L Final    Comment: This result has been called to AYLA LANIER by Frederic Martin on 11 04 2020 at   1054, and has been read back.        RBC Count 11/04/2020 4.03* 4.4 - 5.9 10e12/L Final     Hemoglobin 11/04/2020 11.3* 13.3 - 17.7 g/dL Final     Hematocrit 11/04/2020 34.1* 40.0 - 53.0 % Final      MCV 11/04/2020 85  78 - 100 fl Final     MCH 11/04/2020 28.0  26.5 - 33.0 pg Final     MCHC 11/04/2020 33.1  31.5 - 36.5 g/dL Final     RDW 11/04/2020 22.6* 10.0 - 15.0 % Final     Platelet Count 11/04/2020 36* 150 - 450 10e9/L Final    Comment: This result has been called to AYLA LANIER by Frederic Martin on 11 04 2020 at   1054, and has been read back.        Diff Method 11/04/2020 Manual Differential   Final     % Neutrophils 11/04/2020 49.0  % Final     % Lymphocytes 11/04/2020 1.0  % Final     % Monocytes 11/04/2020 32.0  % Final     % Eosinophils 11/04/2020 2.0  % Final     % Basophils 11/04/2020 0.0  % Final     % Metamyelocytes 11/04/2020 10.0  % Final     % Myelocytes 11/04/2020 5.0  % Final     % Promyelocytes 11/04/2020 1.0  % Final     Nucleated RBCs 11/04/2020 2* 0 /100 Final     Absolute Neutrophil 11/04/2020 82.2* 1.6 - 8.3 10e9/L Final     Absolute Lymphocytes 11/04/2020 1.7  0.8 - 5.3 10e9/L Final     Absolute Monocytes 11/04/2020 53.7* 0.0 - 1.3 10e9/L Final     Absolute Eosinophils 11/04/2020 3.4* 0.0 - 0.7 10e9/L Final     Absolute Basophils 11/04/2020 0.0  0.0 - 0.2 10e9/L Final     Absolute Metamyelocytes 11/04/2020 16.8* 0 10e9/L Final     Absolute Myelocytes 11/04/2020 8.4* 0 10e9/L Final     Absolute Promyeloctyes 11/04/2020 1.7* 0 10e9/L Final     Absolute Nucleated RBC 11/04/2020 3.4   Final     Anisocytosis 11/04/2020 Marked   Final     Polychromasia 11/04/2020 Slight   Final     Acanthocytes 11/04/2020 Slight   Final     [place] Cells 11/04/2020 Slight   Final     Basophilic Stipling 11/04/2020 Present   Final     Platelet Estimate 11/04/2020 Confirming automated cell count   Final     Uric Acid 11/04/2020 9.8* 3.5 - 7.2 mg/dL Final               Assessment and Plan:      This is a 85-year-old male with      chronic myelomonocytic leukemia CMML    WBC are trending up, absolute monocytes are trending up as well,  platelets are trending down  -CT scan shows new splenomegaly  -Informed patient that  these go along with progression of CMML  11/02-patient met with Dr. Ndiaye who recommended bone marrow biopsy.  Patient refused bone marrow biopsy  -Peripheral flow cytometry negative for AML  -11/02-decitabine restarted  Overall patient reports that the treatment makes him anxious  -Labs reviewed with patient.  Abnormalities discussed.  -We will continue with the treatment.  We will recheck CBC CMP and uric acid this week.   -Continue with allopurinol 300 mg daily  -Schedule with  me in 2 days        Leukocytosis   secondary to CMML   Patient is afebrile , no signs or symptoms of infection.  Patient advised to call our clinic or go to ER in the event of fever chills sweats or any signs symptoms of infection  -As above decitabine restarted on 110/2.  Hopefully with the treatment we will  see improvement in leukocytosis  -Continue to monitor labs closely      Thrombocytopenia secondary to CMML  -Transfuse for platelet count 20 or below or bleeding  -Patient informed that he is at high risk for bleeding and in the event of bleeding fall injury bruising is advised to go to ER   -We will recheck CBC again this week.  Plan will be to monitor      systemic mastocytosis.   Patient is asymptomatic  We will continue to monitor  Monitor Tryptase       Elevated creatinine  Patient denies urinary symptoms  We will give NS bolus hydration slowly due to bilateral lower extremity edema  Rerecheck creatinine tomorrow    Lateral lower extremity edema  On exam no evidence of DVT  Looks like fluid overload  -We will give Lasix 20 mg IV in the clinic  I also advised him to take Lasix 20 mg p.o. at home for 3 days (he had previous Lasix prescription)  -Compression socks,   Monitor closely recheck CMP this week    Hyperuricemia  -continue  with allopurinol 300 mg daily  -Patient received 1 dose of rasburicase 3 mg yesterday  -Initially  I wanted to give him additional 3 mg of rasburicase today.  But he reports it made him very anxious  yesterday therefore I will give him a lower dose today 1.5 mg  -Recheck uric acid tomorrow    Anxiety  Continue with Xanax  -Discussed different coping mechanisms  -    Shortness of breath  -Chronic issue for the patient.  Reports with treatment and anxiety he gets more short of breath  No  signs or symptoms of infection, denies fever chest pain cough  10/31-Chest x ray shows pleural effusions consistent with mild volume overload.  He was prescribed Lasix  But he has not been taking Lasix as prescribed   will give Lasix IV in the clinic and he will take Lasix for the next 3 days  Patient advised to go to ER with any worsening of symptom      Patient is advised to check temperature frequently in the event of fever chills sweats cough sore throat chest pain nausea vomiting diarrhea abdominal pain bleeding or any changes in health condition patient is advised to go to ER or call our clinic    RAUDEL Stephens CNP  [facility]     Chart documentation with Dragon Voice recognition Software. Although reviewed after completion, some words and grammatical errors may remain.            Again, thank you for allowing me to participate in the care of your patient.        Sincerely,        RAUDEL Stephens CNP

## 2020-11-05 NOTE — PROGRESS NOTES
Infusion Nursing Note:  Jeff Mack presents today for lasix, decibitine.    Patient seen by provider today: Yes: EMILY Sanchez-seen in infusion   present during visit today: Not Applicable.    Note: Patient will bring in all his medication bottles tomorrow for Andrea to review with him.    Intravenous Access:  Peripheral IV placed.    Treatment Conditions:  Results reviewed, labs MET treatment parameters, ok to proceed with treatment.      Post Infusion Assessment:  Patient tolerated infusion without incident.  Blood return noted pre and post infusion.  Site patent and intact, free from redness, edema or discomfort.  No evidence of extravasations.  Access discontinued per protocol.       Discharge Plan:   Discharge instructions reviewed with: Patient.  Patient and/or family verbalized understanding of discharge instructions and all questions answered.  Copy of AVS reviewed with patient and/or family.  Patient will return tomorrow for next appointment.  Patient discharged in stable condition accompanied by: self.  Departure Mode: Ambulatory.    Kayleen Gomez RN

## 2020-11-06 NOTE — PROGRESS NOTES
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     -Patient completed 4 cycles of decitabine  -refused additional decitabine cycles due to anxiety related to treatment  Refused bone marrow biopsy  Patient was started with hydroxyurea however developed thrombocytopenia with it for hydroxyurea was discontinued  -Patient developed worsening of leukocytosis anemia and thrombocytopenia consistent with progression of CMML  -11/02-patient met with Dr. Ndiaye who recommended bone marrow biopsy patient refused   -11/02 Decitabine restarted    Interval History:    reports that the treatment continues to make him feel very anxious.  He is taking Xanax for anxiety.  Patient denies fever chills sweats  Denies cough   Denies abdominal pain   Denies bleeding  Reports bilateral lower extremity edema.  Denies pain in legs, denies neuropathy denies redness.  Patient is ambulatory    Review of Systems:  14 point ROS of systems including Constitutional, Eyes, Respiratory, Cardiovascular, Gastroenterology, Genitourinary, Integumentary, Muscularskeletal, Psychiatric were all negative except for pertinent positives noted in my HPI.        Physical Examination:  Physical Exam  HENT:      Head: Normocephalic.      Nose: Nose normal. No rhinorrhea.      Mouth/Throat:      Mouth: Mucous membranes are moist.   Neck:      Musculoskeletal: Normal range of motion.   Cardiovascular:      Rate and Rhythm: Normal rate.   Pulmonary:      Effort: Pulmonary effort is normal. No respiratory distress.      Breath sounds: Normal breath sounds. No wheezing, rhonchi or rales.   Abdominal:      General: Abdomen is flat.      Comments: Splenomegaly    Musculoskeletal: Normal range of motion.   Skin:     General: Skin is warm.   Neurological:      Mental Status: He is alert.     Legs ;bilateral lower extremity edema    Laboratory Data:    Reviewed        Assessment  and Plan:      This is a 85-year-old male with      chronic myelomonocytic leukemia CMML    WBC are trending up, absolute monocytes are trending up as well,  platelets are trending down  -CT scan shows new splenomegaly  -Informed patient that these go along with progression of CMML  11/02-patient met with Dr. Ndiaye who recommended bone marrow biopsy.  Patient refused bone marrow biopsy  -Peripheral flow cytometry negative for AML  -11/02-decitabine restarted  Overall patient reports that the treatment makes him very  anxious  -Labs reviewed with patient.  Abnormalities discussed.  -We will continue with the treatment.  They he will get day 5 of decitabine   We will recheck CBC CMP and uric acid next week  -Continue with allopurinol 300 mg daily  -Schedule with me next week with labs and follow-up        Leukocytosis   secondary to CMML   Patient is afebrile , no signs or symptoms of infection.  Patient advised to call our clinic or go to ER in the event of fever chills sweats or any signs symptoms of infection  -As above decitabine restarted on 11/02.  Hopefully with the treatment we will  see improvement in leukocytosis  -Continue to monitor labs closely      Thrombocytopenia secondary to CMML  -Transfuse for platelet count 20 or below or bleeding  -Patient informed that he is at high risk for bleeding and in the event of bleeding fall injury bruising is advised to go to ER   -Recheck CBC next week      systemic mastocytosis.   Patient is asymptomatic  We will continue to monitor  Monitor Tryptase       Elevated creatinine  -Trending down.  Check creatinine next week    Lateral lower extremity edema  On exam no evidence of DVT, looks like fluid overload  -Give Lasix 20 mg IV in the clinic today  -Also take Lasix 20 mg p.o. tomorrow  -Compression socks  He also instructed to take potassium 1 tablet tomorrow with Lasix    Hyperuricemia-resolved  He did receive 2 doses of therapy rasburicase this week  Continue with  allopurinol 300 mg p.o. daily  -Recheck uric acid next week      Anxiety-chronic issue for the patient  -We talked how to cope with anxiety  Continue with Xanax  -Discussed different coping mechanisms      Possible colitis  -Patient was started on Augmentin on 10/30  -Finish Augmentin      Patient is advised to check temperature frequently in the event of fever chills sweats cough sore throat chest pain nausea vomiting diarrhea abdominal pain bleeding or any changes in health condition patient is advised to go to ER or call our clinic    RAUDEL Stephens [facility] [place]     Chart documentation with Dragon Voice recognition Software. Although reviewed after completion, some words and grammatical errors may remain.

## 2020-11-06 NOTE — LETTER
11/6/2020         RE: Jeff Mack  [address]  [place] [address]        Dear Colleague,    Thank you for referring your patient, Jeff Mack, to the [facility]. Please see a copy of my visit note below.        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     -Patient completed 4 cycles of decitabine  -refused additional decitabine cycles due to anxiety related to treatment  Refused bone marrow biopsy  Patient was started with hydroxyurea however developed thrombocytopenia with it for hydroxyurea was discontinued  -Patient developed worsening of leukocytosis anemia and thrombocytopenia consistent with progression of CMML  -11/02-patient met with Dr. Ndiaye who recommended bone marrow biopsy patient refused   -11/02 Decitabine restarted    Interval History:    reports that the treatment continues to make him feel very anxious.  He is taking Xanax for anxiety.  Patient denies fever chills sweats  Denies cough cough  Denies abdominal pain   Denies bleeding  Reports bilateral lower extremity edema.  Denies pain in legs, denies neuropathy denies redness.  Patient is ambulatory    Review of Systems:  14 point ROS of systems including Constitutional, Eyes, Respiratory, Cardiovascular, Gastroenterology, Genitourinary, Integumentary, Muscularskeletal, Psychiatric were all negative except for pertinent positives noted in my HPI.        Physical Examination:  Physical Exam  HENT:      Head: Normocephalic.      Nose: Nose normal. No rhinorrhea.      Mouth/Throat:      Mouth: Mucous membranes are moist.   Neck:      Musculoskeletal: Normal range of motion.   Cardiovascular:      Rate and Rhythm: Normal rate.   Pulmonary:      Effort: Pulmonary effort is normal. No respiratory distress.      Breath sounds: Normal breath sounds. No wheezing, rhonchi or rales.   Abdominal:       General: Abdomen is flat.      Comments: Splenomegaly    Musculoskeletal: Normal range of motion.   Skin:     General: Skin is warm.   Neurological:      Mental Status: He is alert.     Legs ;bilateral lower extremity edema    Laboratory Data:    Reviewed        Assessment and Plan:      This is a 85-year-old male with      chronic myelomonocytic leukemia CMML    WBC are trending up, absolute monocytes are trending up as well,  platelets are trending down  -CT scan shows new splenomegaly  -Informed patient that these go along with progression of CMML  11/02-patient met with Dr. Ndiaye who recommended bone marrow biopsy.  Patient refused bone marrow biopsy  -Peripheral flow cytometry negative for AML  -11/02-decitabine restarted  Overall patient reports that the treatment makes him very  anxious  -Labs reviewed with patient.  Abnormalities discussed.  -We will continue with the treatment.  They he will get day 5 of decitabine   We will recheck CBC CMP and uric acid next week  -Continue with allopurinol 300 mg daily  -Schedule with me next week with labs and follow-up        Leukocytosis   secondary to CMML   Patient is afebrile , no signs or symptoms of infection.  Patient advised to call our clinic or go to ER in the event of fever chills sweats or any signs symptoms of infection  -As above decitabine restarted on 11/02.  Hopefully with the treatment we will  see improvement in leukocytosis  -Continue to monitor labs closely      Thrombocytopenia secondary to CMML  -Transfuse for platelet count 20 or below or bleeding  -Patient informed that he is at high risk for bleeding and in the event of bleeding fall injury bruising is advised to go to ER   -Recheck CBC next week      systemic mastocytosis.   Patient is asymptomatic  We will continue to monitor  Monitor Tryptase       Elevated creatinine  -Trending down.  Check creatinine next week    Lateral lower extremity edema  On exam no evidence of DVT, looks like fluid  overload  -Give Lasix 20 mg IV in the clinic today  -Also take Lasix 20 mg p.o. tomorrow  -Compression socks  He also instructed to take potassium 1 tablet tomorrow with Lasix    Hyperuricemia-resolved  He did receive 2 doses of therapy rasburicase this week  Continue with allopurinol 300 mg p.o. daily  -Recheck uric acid next week      Anxiety-chronic issue for the patient  -We talked how to cope with anxiety  Continue with Xanax  -Discussed different coping mechanisms      Possible colitis  -Patient was started on Augmentin on 10/30  -Finish Augmentin      Patient is advised to check temperature frequently in the event of fever chills sweats cough sore throat chest pain nausea vomiting diarrhea abdominal pain bleeding or any changes in health condition patient is advised to go to ER or call our clinic    RAUDEL Stephens CNP  [facility] [place]     Chart documentation with Dragon Voice recognition Software. Although reviewed after completion, some words and grammatical errors may remain.            Again, thank you for allowing me to participate in the care of your patient.        Sincerely,        RAUDEL Stephens CNP

## 2020-11-06 NOTE — PATIENT INSTRUCTIONS
Andrea Sanchez N.P. would like to see you next week  (week of Nov 9) and have labs drawn that day.  Schedulers here are in the process of making that appt for you.  If you do not here from them by Monday, please call [phone] and ask for scheduling.

## 2020-11-06 NOTE — PROGRESS NOTES
Infusion Nursing Note:  Jeff Mack presents today for decitabine.    Patient seen by provider today: Yes: EMILY Sanchez N.P.   present during visit today: Not Applicable.    Note: N/A.    Intravenous Access:  Peripheral IV placed.    Treatment Conditions:  Lab Results   Component Value Date    HGB 11.1 11/05/2020     Lab Results   Component Value Date    .6 11/05/2020      Lab Results   Component Value Date    ANEU 66.5 11/05/2020     Lab Results   Component Value Date    PLT 35 11/05/2020      Lab Results   Component Value Date     11/06/2020                   Lab Results   Component Value Date    POTASSIUM 3.7 11/06/2020           Lab Results   Component Value Date    MAG 2.1 04/26/2020            Lab Results   Component Value Date    CR PENDING 11/06/2020                   Lab Results   Component Value Date    KAE PENDING 11/06/2020                Lab Results   Component Value Date    BILITOTAL PENDING 11/06/2020           Lab Results   Component Value Date    ALBUMIN PENDING 11/06/2020                    Lab Results   Component Value Date    ALT PENDING 11/06/2020           Lab Results   Component Value Date    AST PENDING 11/06/2020       Results reviewed, labs MET treatment parameters, ok to proceed with treatment.      Post Infusion Assessment:  Patient tolerated infusion without incident.  Blood return noted pre and post infusion.  Site patent and intact, free from redness, edema or discomfort.  No evidence of extravasations.  Access discontinued per protocol.       Discharge Plan:   Discharge instructions reviewed with: Patient.  Patient and/or family verbalized understanding of discharge instructions and all questions answered.  Copy of AVS reviewed with patient and/or family.  Patient will return next week for lab drawn and visit with Andrea. Schedulers to make new appts.   Patient discharged in stable condition accompanied by: self.  Departure Mode: Ambulatory.    Kayleen AMEZQUITA  Jason, RN

## 2020-11-06 NOTE — TELEPHONE ENCOUNTER
Left voicemail message for patient requesting a return call regarding future appointments scheduled for 11/12/20.

## 2020-11-07 NOTE — PROGRESS NOTES
Visit Date:   11/02/2020     HEMATOLOGY HISTORY: Mr. 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   1.  On 02/28/2020, WBC of 30.5, hemoglobin of 9.4 and platelet of 235.  MCV of 87.  Neutrophil of 50%, lymphocytes of 15% and monocytes of 17%. On 11/16/2018, CBC normal with WBC of 4.8, hemoglobin 15.3 and platelets of 295.  2.  CT chest, abdomen and pelvis on 02/29/2020 does not reveal any evidence of malignancy.   3.   On 03/19/2020, NGS panel negative for JAK2, CALR and MPL.  There is IDH2 R140Q and KIT D816V.  4. Bone marrow biopsy on 04/16/2020 reveals chronic myelomonocytic leukemia - 1 (CMML-1) characterized by hypercellular bone marrow (90%) with increased M:E ratio and 7% blasts. There are aggregates of atypical mast cells consistent with bone marrow involvement by systemic mastocytosis. Increased bone marrow fibrosis (MF-1-2).  -Normal cytogenetics.   -Flow cytometry on bone marrow reveals increased monocytic cells (43%).  No increase in CD34 positive myeloid blasts.   -BCR-ABL1 bone marrow is negative.   5. On 04/29/2020, tryptase elevated at 51.3 (normal less than 11).  6. Decitabine x 4 cycles between 04/29/2020 and 07/31/2020. (patient did not want any further decitabine).  7. Hydroxyurea started on 08/04/2020 due to thrombocytosis. Later stopped due to thrombocytopenia.     SUBJECTIVE:  Mr. Mack is an 85-year-old gentleman with chronic myelomonocytic leukemia and systemic mastocytosis diagnosed in 04/2020.  He received 4 cycles of decitabine.  He was responding to it.  He has stopped it because of anxiety.  He was on hydroxyurea for thrombocytosis.  That has been stopped as he became thrombocytopenic.  Currently, he is not on treatment.      The patient lately has been having abdominal discomfort, it is mainly in the left side of the abdomen.  He has been to the emergency room twice.  A CT scan on 10/31/2020 reveals splenomegaly which has increased.   There is a small pleural effusion.      The patient continues to be very anxious.  The patient said he does not feel very good.  He has some fatigue.  No headache.  No dizziness.  No chest pain.  He gets short of breath on exertion.  No vomiting.  Some nausea.  Appetite is decreased.  No fever or chills.      The patient thinks that some of his symptoms are mainly from his anxiety.      The patient's CBC has been done multiple times in the last few weeks.  He has worsening leukocytosis, anemia and thrombocytopenia.      PHYSICAL EXAMINATION:   GENERAL:  Alert and oriented x 3.   VITAL SIGNS:  Reviewed.  ECOG PS of 2.     EYES:  No icterus.   THROAT:  No ulcer. No thrush.   NECK:  Supple. No lymphadenopathy. No thyromegaly.   AXILLAE:  No lymphadenopathy.   LUNGS:  Good air entry bilaterally.  No crackles or wheezing.   HEART:  Regular.  No murmur.   GI: Abdomen is soft.  There is splenomegaly.  Nontender.  No rigidity or guarding.   EXTREMITIES:  Bilateral pedal edema.  No calf swelling or tenderness.   SKIN:  No rash.      LABORATORY DATA:  Reviewed.      ASSESSMENT:   1.  Chronic myelomonocytic leukemia (CMML-1), which is progressing.   2.  Systemic mastocytosis.   3.  Abdominal pain from splenomegaly.   4.  Splenomegaly.   5.  Leukocytosis, anemia and thrombocytopenia from CMML.   6.  Hyperuricemia.   7.  Elevated AST, alkaline phosphatase and bilirubin.   8.  Elevated creatinine.      PLAN:   1.  I had a long discussion with the patient.  Imaging studies and investigations were all reviewed.  I explained to him that his CMML is progressing.  It might have progressed to acute leukemia.  Discussed regarding bone marrow biopsy.  The patient does not want a bone marrow biopsy.  The patient says that he has anxiety and he cannot handle bone marrow biopsy.  No bone marrow biopsy done as per patient's Eliquis.  We will get a flow cytometry on peripheral blood.     2.  Discussed regarding treatment.  I told the patient  that it is important that we treat him, otherwise, his disease will continue to progress.  After a long discussion, he agreed for decitabine.  He will get 5 days of IV decitabine.  In the past, he has tolerated it well.  Side effects reviewed.  I am hoping he will tolerate it well.  If flow cytometry confirms AML, we will discuss regarding adding venetoclax.       3.  He has hyperuricemia.  He is on allopurinol, which will be continued.  We will also give him 1 dose of rasburicase.  Uric acid will be monitored.     4.  Creatinine mildly elevated.  I advised him to increase his fluid intake.     5.  Elevated LFTs are secondary to his CMML.  We will continue to monitor it.     6.  He will be transfused as needed for hemoglobin below 7 and platelet below 10 or bleeding.     7.  The patient was started on Augmentin for possible colitis.  He will complete a course.  For pain, he is taking tramadol.  It helps.  Prescription of tramadol refilled.  Side effects including sedation and constipation discussed.     8.  He will continue on MiraLax and Senokot as needed.     9.  He had multiple questions, which were all answered.  He will see our nurse practitioner next week.  I will see him in about 4 weeks.  I advised him to call us if he has any fever, chills, worsening weakness, worsening abdominal pain or any other concerns.     ADDENDUM: Flow cytometry does not reveal AML. There is 0.5% CD34 positive blasts, 34% monocytic cells which express CD14, CD56 (dim, partial), and CD64 and 0.02% possible mast cells, based on  versus side scatter.        GRAZYNA RICHEY MD             D: 2020   T: 2020   MT: [place]      Name:     RADHA ALFONSO   MRN:      [id number]        Account:      [id number]   :      1935           Visit Date:   2020      Document: [id number]

## 2020-11-09 NOTE — PROGRESS NOTES
Andrea GONZALES, is aware and he advises labs, chest x-ray and evaluation tomorrow.     Patient is aware and scheduling to notify patient of appointments.    Jody Mehta RN

## 2020-11-09 NOTE — PROGRESS NOTES
"Writer received a call from patient with concerns of pedal edema that goes up to the knee, R>L. Patient notes shortness of breath as well with this. . Patient does not sound short of breath over the phone and these symptoms are not new and correlate with the edema.     Patient last week was prescribed 3 days of  Lasix with some relief.     Patient is wondering \"why\" this could be happening and writer explained that many things could be causing it , increased salt intake, certain medications, and cardiac health can play a role.     Patient notes a history of left ventricle issue a while back ago,    Writer will have Dr. Ndiaye advise.    Jody Mehta RN    "

## 2020-11-10 PROBLEM — J90 PLEURAL EFFUSION: Status: ACTIVE | Noted: 2020-01-01

## 2020-11-10 NOTE — PROGRESS NOTES
Medical Assistant Note:  Jeff Mack presents today for lab draw.    Patient seen by provider today: No.   present during visit today: Not Applicable.    Concerns: No Concerns.    Procedure:  Lab draw site: LAC, Needle type: Butterfly, Gauge: 23.    Post Assessment:  Labs drawn without difficulty: Yes.    Discharge Plan:  Departure Mode: Ambulatory.    Face to Face Time: 4 min.    Shari Schoenberger, CMA

## 2020-11-10 NOTE — LETTER
11/10/2020         RE: Jeff Mack  [address]  [place] [address]        Dear Colleague,    Thank you for referring your patient, Jeff Mack, to the [facility]. Please see a copy of my visit note below.    Medical Assistant Note:  Jeff Mack presents today for lab draw.    Patient seen by provider today: No.   present during visit today: Not Applicable.    Concerns: No Concerns.    Procedure:  Lab draw site: LAC, Needle type: Butterfly, Gauge: 23.    Post Assessment:  Labs drawn without difficulty: Yes.    Discharge Plan:  Departure Mode: Ambulatory.    Face to Face Time: 4 min.    Shari Schoenberger, CMA      Again, thank you for allowing me to participate in the care of your patient.        Sincerely,         Lab Draw

## 2020-11-10 NOTE — ED PROVIDER NOTES
History     Chief Complaint:  Shortness of Breath, Leg Swelling, and Chest Pain    HPI   Jeff Mack is a 85 year old male with a history of hypertension, cardiomyopathy, chronic myelocytic leukemia who presents with shortness of breath, chest discomfort, and leg swelling. For the past week the patient has had shortness of breath with mild activity and related chest discomfort. He normally takes Symbicort 2 puffs in the morning and two at night. This morning he took 2 puffs Symbicort and Albuterol which did not help. He ran out of his Lasix two days ago and has been retaining water. He has also had some recent constipation. He was sent here from oncology clinic where he was seen by Haley Sanchez CNP, where he had some laboratory workup, findings below. He denies fever, loss of sense of taste or smell, diarrhea, abdominal pain, chest pain, or known COVID-19 exposure.      Allergies:  No known drug allergies     Medications:    Albuterol inhaler  Zyloprim  Xanax  Lasix  Senna  Symbicort inhaler    Past Medical History:    Anxiety  Asthma  Basal cell carcinoma  Benign neoplasm of colon  Cardiomegaly  Chronic ischemic heart disease  Chronic myelocytic leukemia  Depression   Diverticulosis  Esophageal cancer  Hypertension   Hiatal hernia  Hypertrophy of prostate  Hemorrhoids  Sciatica   Chronic obstructive airway disease    Past Surgical History:    Bone marrow biopsy, bone specimen   Bronchoscopy flexible   Colonoscopy   EGD combined x8  Excision of lingual tonsil   Pressure gradient measurement initial vessel   UGI endoscopy   Laminectomy fusion lumbar   Colonic polypectomy   TURP   Retinal surgery     Family History:    Hypertension   Cerebrovascular disease  Diabetes     Social History:  Smoking status: Former  Alcohol use: No  Drug use: No  Patient presents alone.  PCP: Benjamin Lazar    Marital Status:        Review of Systems   Constitutional: Negative for fever.   Respiratory: Positive for chest  tightness and shortness of breath.    Cardiovascular: Positive for leg swelling. Negative for chest pain.   Gastrointestinal: Positive for constipation. Negative for abdominal pain and diarrhea.   All other systems reviewed and are negative.      Physical Exam     Patient Vitals for the past 24 hrs:   BP Temp Pulse Resp SpO2   11/10/20 1021 (!) 141/71 97.4  F (36.3  C) 98 20 97 %      Physical Exam    Physical Exam   Constitutional:  Patient is oriented to person, place, and time. They appear well-developed and well-nourished. Mild distress secondary to shortness of breath.   HENT:   Mouth/Throat:   Oropharynx is clear and moist.   Eyes:    Conjunctivae normal and EOM are normal. Pupils are equal, round, and reactive to light.   Neck:    Normal range of motion.   Cardiovascular: Normal rate, regular rhythm and normal heart sounds.  Exam reveals no gallop and no friction rub.  No murmur heard.  Pulmonary/Chest:  Patient has no wheezes. Patient has no rales.   Abdominal:   Soft. Bowel sounds are normal. Patient exhibits no mass. There is no tenderness. There is no rebound and no guarding. He has palpable hepatosplenomegaly.   Musculoskeletal:  Normal range of motion. Patient exhibits bilateral edema.   Neurological:   Patient is alert and oriented to person, place, and time. Patient is generally weak. No cranial nerve deficit or sensory deficit. GCS 15  Skin:   Skin is warm and dry. No rash noted. No erythema.   Psychiatric:   Patient has a normal mood    Emergency Department Course   ECG:  @ 1051  Indication: Shortness of breath   Vent. Rate 91 bpm. DE interval 144 ms. QRS duration 94 ms. QT/QTc 370/455 ms. P-R-T axis 67 48 69.   Normal sinus rhythm. Normal ECG.    Read by Dr. Talbot.     Imaging:  CT Chest  (PE) Abdomen Pelvis w/ Contrast  IMPRESSION:  1.  No pulmonary emboli.  2.  Increase in moderate right and small left pleural effusion.  3.  Stable hepatosplenomegaly.  4.  Increased size of extrapleural  paraspinal soft tissue nodules to  the left and right of the mid-lower thoracic spine. These are  indeterminate but suspicious for lymphomatous involvement.  5.  Increased indeterminate prevertebral soft tissue thickening since  2/29/2020 at L4-L5 and L5-S1. This is indeterminate but could also  represent lymphomatous involvement and less likely due to degenerative  changes. Could consider a nonemergent lumbar spine MRI for further  evaluation based on clinical concern.  6.  Stable mildly enlarged upper abdominal lymph nodes.  7.  Small ascites, slightly increased.  Reading per radiology.      Radiographic findings were communicated with the patient who voiced understanding of the findings.    Laboratory:  1000 CMP: Glucose 128 (H), o/w WNL (Creatinine: 0.98)  1000 CBC: WBC 3.79 (L), HGB 10.9 (L), PLT 36 (L)  1000 Nt probnp inpatient (BNP): 397     D-dimer: 2.8 (H)    1111 Troponin I: <0.015     1144 Lactic acid whole blood: 1.3    Uric acid: 6.1    Symptomatic COVID-19 Virus by PCR: In process     Interventions:  1455 Lasix 40 mg IV     Emergency Department Course:  1038 Nursing notes and vitals reviewed. I performed an exam of the patient as documented above.     EKG was done, interpretation as above.    IV inserted. Medicine administered as documented above. Blood drawn. This was sent to the lab for further testing, results above.    The patient was sent for a CT while in the emergency department, findings above.     1423 I rechecked the patient and discussed the results of his workup thus far.     1454  I consulted with Dr. Hui of the hospitalist services. They are in agreement to accept the patient for admission.    Findings and plan explained to the Patient who consents to admission. Discussed the patient with Dr. Hui, who will admit the patient to a general medicine bed for further monitoring, evaluation, and treatment.     Impression & Plan    Covid-19  Jeff Mack was evaluated during a global  COVID-19 pandemic, which necessitated consideration that the patient might be at risk for infection with the SARS-CoV-2 virus that causes COVID-19.   Applicable protocols for evaluation were followed during the patient's care.   COVID-19 was considered as part of the patient's evaluation. The plan for testing is:  a test was obtained during this visit.    Medical Decision Making:  Jeff Mack is a 85 year old male presenting from his oncology clinic for shortness of breath. He states he has chest discomfort but then retracted that and states that it is just hard to breathe. It sounds like he has more LEYVA and not orthopnea. He denies any COVID-19 symptoms but certainly with shortness of breath I considered this in the differential. Basic blood work was done as noted above and I also reviewed the blood tests from his oncology clinic from just before he arrived today. His cardiac enzyme is normal, his lactic acid is normal,  His d-dimer is elevated so we did a CT of his chest with findings as noted above. The rest of his blood work as his clinic was unremarkable. I suspect at this time that he  has pulmonary edema, worsening from 10 days ago which is causing his symptoms. He also has increasing ascites and increasing lymph nodes which is indicated that his treatment may not be working. I gave him a dose of Lasix here, we will bring him in to the hospital to a PUI bed.    Diagnosis:    ICD-10-CM    1. Pleural effusion  J90        Disposition:  Admitted to Dr. Hui    Discharge Medications:  New Prescriptions    No medications on file       Lily Santamaria  11/10/2020   [facility] EMERGENCY DEPT    Scribe Disclosure:  I, Lily Santamaria, am serving as a scribe at 10:38 AM on 11/10/2020 to document services personally performed by Kelli Talbot MD based on my observations and the provider's statements to me.         Kelli Talbot MD  11/12/20 2729

## 2020-11-10 NOTE — PROGRESS NOTES
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     -Patient completed 4 cycles of decitabine  -refused additional decitabine cycles due to anxiety related to treatment  Refused bone marrow biopsy  Patient was started with hydroxyurea however developed thrombocytopenia with it for hydroxyurea was discontinued  -Patient developed worsening of leukocytosis anemia and thrombocytopenia consistent with progression of CMML  -11/02-patient met with Dr. Ndiaye who recommended bone marrow biopsy patient refused   -11/02 Decitabine restarted    Interval History:  Patient reports he has had shortness of breath for some time. Patient has been using his inhalers without significant help .Recently especially over the weekend he is noticing significant shortness of breath  He had hard time coming to the clinic.  Even when he takes few steps he gets significant shortness of breath, denies cough, denies chest pain, denies nausea vomiting abdominal pain, denies fever chills sweats  Patient reports he continues to have bilateral lower extremity edema denies pain or redness or rash denies neuropathy in the legs.      Review of Systems:  14 point ROS of systems including Constitutional, Eyes, Respiratory, Cardiovascular, Gastroenterology, Genitourinary, Integumentary, Muscularskeletal, Psychiatric were all negative except for pertinent positives noted in my HPI.        Physical Examination:  Physical Exam  HENT:      Head: Normocephalic.      Nose: Nose normal. No rhinorrhea.      Mouth/Throat:      Mouth: Mucous membranes are moist.   Neck:      Musculoskeletal: Normal range of motion.   Cardiovascular:      Rate and Rhythm: Normal rate.   Pulmonary:      Effort: No respiratory distress.      Breath sounds: No wheezing, rhonchi or rales.      Comments: Diminished  Breath sound  Abdominal:      General: Abdomen is flat.      Comments:  Splenomegaly    Musculoskeletal: Normal range of motion.   Skin:     General: Skin is warm.   Neurological:      Mental Status: He is alert.     Legs bilateral lower extremity edema    Laboratory Data:    Reviewed        Assessment and Plan:      This is a 85-year-old male with      Shortness of breath  He has had shortness of breath for some time now  -Patient has been using inhalers without any help  10/31-patient presented to the ER with shortness of breath  10/31Chest x-ray revealed pleural effusion he was started on Lasix  -Today hemoglobin is 10.9-should not cause this much of shortness of breath  -I Told patient that I would like get chest x-ray  and CT chest .  Patient reports he has severe shortness of breath and may not be able to do it.  -We will send patient to the emergency room for evaluation of shortness of breath        Bilateral lower extremity edema   looks like fluid overload  -He was given Lasix last week for 4 days,   -He continues to have bilateral lower extremity edema  -BNP today 397  -Compression socks  -As above patient is going to the ER      chronic myelomonocytic leukemia (CMML)    WBC ,absolute monocytes were trending up  platelets trending down  -CT scan shows new splenomegaly   these go along with progression of CMML  11/02-patient met with Dr. Ndiaye who recommended bone marrow biopsy.  Patient refused bone marrow biopsy  -Peripheral flow cytometry negative for AML  Patient agreed on treatment with decitabine  11/02-decitabine restarted, completed 5 days of decitabine on 11/06  Plan for next cycle of decitabine on 11/30  -WBC is slowly trending down  Thrombocytopenia stable at the count of 36      Leukocytosis   secondary to CMML   Patient is afebrile  -As above decitabine restarted on 11/02.  As above, WBC is slowly trending down      Thrombocytopenia secondary to CMML  -Transfuse for platelet count 20 or below or bleeding      systemic mastocytosis.   Patient is asymptomatic  We  will continue to monitor  Monitor Tryptase       Hyperuricemia-resolved  He did receive 2 doses of therapy rasburicase this week  Continue with allopurinol 300 mg p.o. daily  -Recheck uric acid       Anxiety-chronic issue for the patient  -We talked how to cope with anxiety  Continue with Xanax  -Discussed different coping mechanisms      Possible colitis  -Patient was started on Augmentin on 10/30  -Finish Augmentin        RAUDEL Stephens [facility] [place]     Chart documentation with Dragon Voice recognition Software. Although reviewed after completion, some words and grammatical errors may remain.

## 2020-11-10 NOTE — ED TRIAGE NOTES
SOB and lower leg edema. Ran out of Lasix two days ago. Chest tightness for four days. Pt went to clinic and had blood drawn, sent her for further eval.

## 2020-11-10 NOTE — PHARMACY-ADMISSION MEDICATION HISTORY
Pharmacy Medication History  Admission medication history interview status for the 11/10/2020  admission is complete. See EPIC admission navigator for prior to admission medications       Medication history sources: Patient  Location of interview: phone  Medication history source reliability: Moderate - pt kept interview brief as he was not feeling well and felt a little confused also  Adherence assessment: Good    Significant changes made to the medication list:  Removed allopurinol      Additional medication history information:   Pt ran out of lasix 3 days ago    Medication reconciliation completed by provider prior to medication history? No    Time spent in this activity: 10 min      Prior to Admission medications    Medication Sig Last Dose Taking? Auth Provider   albuterol (PROAIR HFA/PROVENTIL HFA/VENTOLIN HFA) 108 (90 Base) MCG/ACT inhaler Inhale 1-2 puffs into the lungs every 6 hours as needed for shortness of breath / dyspnea or wheezing 11/10/2020 at Unknown time Yes Unknown, Entered By History   Cholecalciferol (VITAMIN D3) 5000 UNITS TABS Take 5,000 Units by mouth daily  11/10/2020 at Unknown time Yes Reported, Patient   SYMBICORT 160-4.5 MCG/ACT Inhaler Inhale 2 puffs into the lungs 2 times daily  11/10/2020 at x1 Yes Reported, Patient   ALPRAZolam (XANAX) 0.25 MG tablet Take 1 tablet (0.25 mg) by mouth 2 times daily as needed for anxiety  Patient not taking: Reported on 11/5/2020 prn  Obdulio Ndiaye MD   fluticasone (FLONASE) 50 MCG/ACT nasal spray SPRAY 2 SPRAYS INTO EACH NOSTRIL EVERY DAY  Patient not taking: Reported on 11/5/2020 prn  Bill Reed MD   furosemide (LASIX) 20 MG tablet Take 1 tablet (20 mg) by mouth daily for 3 days   Andrea Sanchez APRN CNP   Nutritional Supplements (ENSURE COMPLETE) LIQD Take 1 Bottle by mouth every 8 hours as needed   Obdulio Ndiaye MD   polyethylene glycol (MIRALAX) 17 g packet Take 1 packet by mouth daily as needed for constipation prn  Unknown, Entered By  History   sennosides (SENOKOT) 8.6 MG tablet Take 1 tablet by mouth daily as needed for constipation prn  Unknown, Entered By History   Adrianne Tejeda, PharmD

## 2020-11-10 NOTE — PROGRESS NOTES
Writer received a call from patient's wife, Angela,  wanting to know when to follow up with ED visit.     Patient is currently in the ED and CT has been completed please see documention of ED and Andrea VNP visit for details.    Writer explained that tomorrow morning I will have Dr. Ndiaye review notes and test results from today's ED visit and call call to follow up and answer questions at that time.     Jody Mehta RN

## 2020-11-10 NOTE — ED NOTES
[facility]  ED Nurse Handoff Report    ED Chief complaint: Shortness of Breath, Leg Swelling, and Chest Pain      ED Diagnosis:   Final diagnoses:   Pleural effusion       Code Status: DNR / DNI    Allergies: No Known Allergies    Patient Story: SOB, med refill  Focused Assessment:  Pt ran out of lasix at home, hasnt had in a few days. Pt does have pleural effusion. Pt to be admitted. IV lasix given.     Treatments and/or interventions provided: lasix  Patient's response to treatments and/or interventions: good    To be done/followed up on inpatient unit:  monitor    Does this patient have any cognitive concerns?: none    Activity level - Baseline/Home:  Independent  Activity Level - Current:   Stand with Assist    Patient's Preferred language: English   Needed?: No    Isolation: None and COVID r/o and special precautions  Infection: Not Applicable  COVID r/o and special precautions  Patient tested for COVID 19 prior to admission: YES  Bariatric?: No    Vital Signs:   Vitals:    11/10/20 1021   BP: (!) 141/71   Pulse: 98   Resp: 20   Temp: 97.4  F (36.3  C)   SpO2: 97%       Cardiac Rhythm:Cardiac Rhythm: Normal sinus rhythm    Was the PSS-3 completed:   Yes  What interventions are required if any?               Family Comments: none  OBS brochure/video discussed/provided to patient/family: N/A              Name of person given brochure if not patient: NA              Relationship to patient: NA    For the majority of the shift this patient's behavior was Green.   Behavioral interventions performed were none.    ED NURSE PHONE NUMBER: *[phone]

## 2020-11-10 NOTE — PROGRESS NOTES
RECEIVING UNIT ED HANDOFF REVIEW    Just need 10-15 minutes to get room setup. Thank you!    ED Nurse Handoff Report was reviewed by: Chhaya Hu RN on November 10, 2020 at 4:09 PM

## 2020-11-10 NOTE — H&P
"[facility]    History and Physical  Hospitalist       Date of Admission:  11/10/2020  Date of Service (when I saw the patient): 11/10/20    Assessment & Plan     SOB and dyspnea with exertion; multifactorial  - check ECHO (Joshua suggests he had a cardiomyopathy in the past)  - lasix 40 mg IV given in ED today; continue with 20 mg BID beginning 11/11  - BMP in a.m.  - check TSH/reflex FT4  - check 02 sats after walking/exertion tomorrow    Bilateral pleural effusions; likely contributing to Don's sense of dyspnea.  - Oncology consult to options and utility of doing thoracentesis.    COPD; lung exam relatively unremarkable.  - continue Breo Ellipta and PRN albuterol HFA  - add Duo-nebs QID while awake; begin in a.m.    Anxiety; suspect chronic, but increased with recent treatments for his CMML; it has been impeding his ability to make decisions about his treatment options.  - increase xanax to TID PRN  - Psychiatry to consult; Joshua is interested in discussing options for more consistent treatment for his anxiety - and had been considering starting an anti-depressant in recent months.    Lower extremity edema, acute on chronic; worse in the past 2 days since he ran out of lasix.  - says he has not tolerated MARYBEL hose in the past  - Lymphedema PT consult    Chronic myelomonocytic leukemia (CMML-1), progressing per recent follow up with Dr. Ndiaye, his Oncologist.  Deconditioning.  - recent flow cytometry did not show progression to AML, however, per Dr. Ndiaye.  - PT eval.    Systemic mastocytosis     Splenomegaly; stable.     Hepatomegaly and elevated AST and alkaline phosphatase; attributed to his CMML.    Leukocytosis, anemia and thrombocytopenia from CMML.     Recent hyperuricemia.     Low albumin; likely is malnourished.  - RD to evaluate     Quality of Life discussions/concerns; Joshua raised the question \"What's the point?\" several times as we talked, in reference to his frustration about dyspnea " and not feeling well, his fluid/volume concerns and ongoing treatments for his CMML.  - Palliative Care consult -> Joshua is interested.        DVT Prophylaxis: Pneumatic Compression Devices  Code Status: DNR / DNI    Disposition: Expected discharge in 1-2 days.      SYLVIA Hui MD, FACP     Internal Medicine Hospitalist    Primary Care Physician   Benjamin Lazar     Oncologist:  Dr. Obdulio Ndiaye    Chief Complaint   SOB and dyspnea with exertion x 6+ days.    History was obtained from the ED provider, the EHR and from patient and his RN.    History of Present Illness   Per ED provider:  Jeff Mack is a 85 year old male with a history of hypertension, cardiomyopathy, chronic myelocytic leukemia who presents with shortness of breath, chest discomfort, and leg swelling. For the past week the patient has had shortness of breath with mild activity and related chest discomfort. He normally takes Symbicort 2 puffs in the morning and two at night. This morning he took 2 puffs Symbicort and Albuterol which did not help. He ran out of his Lasix two days ago. He has also had some recent constipation. He was sent here from oncology clinic where he was seen by Haley Sanchez CNP, where he had some laboratory workup, findings below. He denies fever, loss of sense of taste or smell, diarrhea, abdominal pain, chest pain, or known COVID-19 exposure.    Past Medical History    I have reviewed this patient's medical history and updated it with pertinent information if needed.   Past Medical History:   Diagnosis Date     Anxiety state, unspecified      Asthma      Basal cell carcinoma      Benign neoplasm of colon      Cardiomegaly 1/6/2005     Chronic ischemic heart disease, unspecified      CML (chronic myelocytic leukemia) (H)      Constipation      Depressive disorder, not elsewhere classified      Diverticulosis of colon (without mention of hemorrhage)      Esophageal cancer (H) 3/23/2015     Essential hypertension, benign       Hiatal hernia      Hypertrophy (benign) of prostate      Internal hemorrhoids without mention of complication      Mumps      Other primary cardiomyopathies 2003    EF 45%     Sciatica      Spondylosis of unspecified site without mention of myelopathy        Past Surgical History   I have reviewed this patient's surgical history and updated it with pertinent information if needed.  Past Surgical History:   Procedure Laterality Date     BONE MARROW BIOPSY, BONE SPECIMEN, NEEDLE/TROCAR N/A 4/16/2020    Procedure: BIOPSY, BONE MARROW;  Surgeon: Mg Hobbs MD;  Location:  GI     BRONCHOSCOPY FLEXIBLE N/A 4/3/2015    Procedure: BRONCHOSCOPY FLEXIBLE;  Surgeon: Ralph Catherine MD;  Location:  OR     COLONOSCOPY       COLONOSCOPY N/A 2/19/2020    Procedure: COLONOSCOPY, WITH POLYPECTOMY AND BIOPSY;  Surgeon: Kathy Torres MD;  Location: [facility]     ESOPHAGOSCOPY, GASTROSCOPY, DUODENOSCOPY (EGD), COMBINED N/A 3/19/2015    Procedure: COMBINED ESOPHAGOSCOPY, GASTROSCOPY, DUODENOSCOPY (EGD), BIOPSY SINGLE OR MULTIPLE;  Surgeon: Alo Mauro MD;  Location: [facility]     ESOPHAGOSCOPY, GASTROSCOPY, DUODENOSCOPY (EGD), COMBINED N/A 7/14/2015    Procedure: COMBINED ESOPHAGOSCOPY, GASTROSCOPY, DUODENOSCOPY (EGD), BIOPSY SINGLE OR MULTIPLE;  Surgeon: Kathy Torres MD;  Location: [facility]     ESOPHAGOSCOPY, GASTROSCOPY, DUODENOSCOPY (EGD), COMBINED N/A 12/1/2015    Procedure: COMBINED ESOPHAGOSCOPY, GASTROSCOPY, DUODENOSCOPY (EGD), BIOPSY SINGLE OR MULTIPLE;  Surgeon: Alo Mauro MD;  Location: [facility]     ESOPHAGOSCOPY, GASTROSCOPY, DUODENOSCOPY (EGD), COMBINED N/A 3/17/2016    Procedure: COMBINED ESOPHAGOSCOPY, GASTROSCOPY, DUODENOSCOPY (EGD), BIOPSY SINGLE OR MULTIPLE;  Surgeon: Alo Mauro MD;  Location: [facility]     ESOPHAGOSCOPY, GASTROSCOPY, DUODENOSCOPY (EGD), COMBINED N/A 7/21/2016    Procedure: COMBINED ESOPHAGOSCOPY, GASTROSCOPY, DUODENOSCOPY (EGD);  Surgeon: Alo Mauro MD;   Location:  GI     ESOPHAGOSCOPY, GASTROSCOPY, DUODENOSCOPY (EGD), COMBINED N/A 11/17/2016    Procedure: COMBINED ESOPHAGOSCOPY, GASTROSCOPY, DUODENOSCOPY (EGD);  Surgeon: Alo Mauro MD;  Location:  GI     ESOPHAGOSCOPY, GASTROSCOPY, DUODENOSCOPY (EGD), COMBINED N/A 5/9/2017    Procedure: COMBINED ESOPHAGOSCOPY, GASTROSCOPY, DUODENOSCOPY (EGD);  (EGD) COMBINED ESOPHAGOSCOPY, GASTROSCOPY, DUODENOSCOPY;  Surgeon: Alo Mauro MD;  Location:  GI     ESOPHAGOSCOPY, GASTROSCOPY, DUODENOSCOPY (EGD), COMBINED N/A 5/1/2018    Procedure: COMBINED ESOPHAGOSCOPY, GASTROSCOPY, DUODENOSCOPY (EGD);  ESOPHAGOGASTRODUODENOSCOPY ;  Surgeon: Alo Mauro MD;  Location:  GI     GASTROSTOMY, INSERT TUBE, COMBINED N/A 4/3/2015    Procedure: COMBINED GASTROSTOMY, INSERT TUBE (OPEN);  Surgeon: Ralph Catherine MD;  Location:  OR      EXCISION OF LINGUAL TONSIL      TONSILLECTOMY     HC PRESSURE GRADIENT MEASUREMENT, INITIAL VESSEL  2005    essentially normal     HC UGI ENDOSCOPY W EUS N/A 7/14/2015    Procedure: COMBINED ENDOSCOPIC ULTRASOUND, ESOPHAGOSCOPY, GASTROSCOPY, DUODENOSCOPY (EGD);  Surgeon: Kathy Torres MD;  Location:  GI     LAMINECTOMY, FUSION LUMBAR ONE LEVEL, COMBINED  7/26/2012    Procedure: COMBINED LAMINECTOMY, FUSION LUMBAR ONE LEVEL;  Posterior Fusion L5-S1, Total Facetectomy L5-S1 Left ;  Surgeon: Ronald Espinosa MD;  Location: [facility] OR     PROSTATE SURGERY       [facility] NONSPECIFIC PROCEDURE  2-99    Colonic polpectomy     [facility] NONSPECIFIC PROCEDURE  9-92    TURP     [facility] NONSPECIFIC PROCEDURE  1982    Retinal surgery       Prior to Admission Medications   Prior to Admission Medications   Prescriptions Last Dose Informant Patient Reported? Taking?   ALPRAZolam (XANAX) 0.25 MG tablet prn Self No No   Sig: Take 1 tablet (0.25 mg) by mouth 2 times daily as needed for anxiety   Patient not taking: Reported on 11/5/2020   Cholecalciferol (VITAMIN D3) 5000 UNITS TABS 11/10/2020 at  Unknown time Self Yes Yes   Sig: Take 5,000 Units by mouth daily    Nutritional Supplements (ENSURE COMPLETE) LIQD  Self No No   Sig: Take 1 Bottle by mouth every 8 hours as needed   SYMBICORT 160-4.5 MCG/ACT Inhaler 11/10/2020 at x1 Self Yes Yes   Sig: Inhale 2 puffs into the lungs 2 times daily    albuterol (PROAIR HFA/PROVENTIL HFA/VENTOLIN HFA) 108 (90 Base) MCG/ACT inhaler 11/10/2020 at Unknown time Self Yes Yes   Sig: Inhale 1-2 puffs into the lungs every 6 hours as needed for shortness of breath / dyspnea or wheezing   fluticasone (FLONASE) 50 MCG/ACT nasal spray prn Self No No   Sig: SPRAY 2 SPRAYS INTO EACH NOSTRIL EVERY DAY   Patient not taking: Reported on 2020   furosemide (LASIX) 20 MG tablet   No No   Sig: Take 1 tablet (20 mg) by mouth daily for 3 days   polyethylene glycol (MIRALAX) 17 g packet prn Self Yes No   Sig: Take 1 packet by mouth daily as needed for constipation   sennosides (SENOKOT) 8.6 MG tablet prn Self Yes No   Sig: Take 1 tablet by mouth daily as needed for constipation      Facility-Administered Medications: None     Allergies   No Known Allergies    Social History   I have reviewed this patient's social history and updated it with pertinent information if needed. Jeff Mack  reports that he quit smoking about 45 years ago. His smoking use included cigarettes. He has a 80.00 pack-year smoking history. He has never used smokeless tobacco. He reports that he does not drink alcohol or use drugs.  , lives with his wife.    Family History   I have reviewed this patient's family history and updated it with pertinent information if needed.   Family History   Problem Relation Age of Onset     Hypertension Mother      Cerebrovascular Disease Mother      Diabetes Brother      Diabetes Father          age 97       Review of Systems   The 10 point Review of Systems is negative other than noted in the HPI or here.     Physical Exam   Temp: 98.1  F (36.7  C) Temp src: Oral  BP: 116/65 Pulse: 93   Resp: 18 SpO2: 94 % O2 Device: None (Room air)    Vital Signs with Ranges  Temp:  [97.4  F (36.3  C)-98.1  F (36.7  C)] 98.1  F (36.7  C)  Pulse:  [60-98] 93  Resp:  [18-20] 18  BP: (116-141)/(65-74) 116/65  SpO2:  [92 %-97 %] 94 %  0 lbs 0 oz    Constitutional: awake, lying in bed; in no apparent distress, but anxious when conversing.  Eyes: sclerae clear; PERRLA/EOMI  HEENT: AT/NC; mucous membranes: mouth slightly dry, normal dentition.  Neck: supple, good ROM; no LA, no bruits, + JVD, no thyromegaly    Respiratory: good air entry bilaterally; decreased breath sounds at bases; no wheezing or rhonchi.  Back: no focal tenderness or other abnormalities  Cardiovascular: Regular rate and rhythm; S1, S2 noted; no murmur, rub or gallop  GI: abdomen flat, + bowel sounds, soft, non-tender, non-distended; Liver edge firm, palpated 3 FB's below RCM; spleen: enlarged, palpated 3-4 inches below LCM, non-tender  Skin: no rash, no cyanosis  Musculoskeletal: 1-2+ pitting edema bilateral lower legs, R > L; no obvious joint abnormalities  Neurologic: alert; oriented to person, date, place; follows directions well; no focal motor or sensory deficits  Psychiatric: no obvious signs depression, but admits to frequent anxiety, difficulty with decisions and worried about his ability to process information about his options.    Data   Data reviewed today:  I personally reviewed all recent labs/imaging results.    Recent Labs   Lab 11/10/20  1111 11/10/20  1000 11/06/20  1020 11/05/20  0925   WBC  --  121.6* 160.3* 170.6*   HGB  --  10.9* 11.1* 11.1*   MCV  --  87 85 85   PLT  --  36* 35* 35*   NA  --  137 136 135   POTASSIUM  --  4.0 3.7 3.8   CHLORIDE  --  106 104 104   CO2  --  27 24 24   BUN  --  23 34* 32*   CR  --  0.98 1.26* 1.36*   ANIONGAP  --  4 8 7   KAE  --  8.5 8.3* 8.4*   GLC  --  128* 107* 108*   ALBUMIN  --  3.2* 3.3* 3.3*   PROTTOTAL  --  6.9 6.8 6.9   BILITOTAL  --  1.3 1.5* 1.7*   ALKPHOS  --  237*  248* 249*   ALT  --  66 60 58   AST  --  105* 114* 110*   TROPI <0.015  --   --   --        Recent Results (from the past 24 hour(s))   CT Chest (PE) Abdomen Pelvis w Contrast    Narrative    CT CHEST PE ABDOMEN PELVIS W CONTRAST 11/10/2020 12:54 PM    CLINICAL HISTORY: Shortness of breath; also enlarged liver and spleen  on exam.h/o CMML  TECHNIQUE: CT angiogram chest and routine CT abdomen pelvis with IV  contrast. Arterial phase through the chest and venous phase through  the abdomen and pelvis. 2D and 3D MIP reconstructions were preformed  by the CT technologist. Dose reduction techniques were used.     CONTRAST: 83 mL [id number]    COMPARISON: 10/31/2020, 10/26/2020 and 2/29/2020    FINDINGS:  ANGIOGRAM CHEST: Pulmonary arteries are normal caliber and negative  for pulmonary emboli. Thoracic aorta is negative for dissection. No CT  evidence of right heart strain.     LUNGS AND PLEURA: Moderate right pleural effusion and small left  pleural effusion have increased since the abdominal CT on 10/31/2020.  Adjacent compressive atelectasis in the lungs. No infiltrates. Small  extrapleural posterior medial paraspinal nodules bilaterally have  increased in size/number. For example a 2.1 x 1.2 cm nodule in the  right posterior medial paraspinous space (series 4, image 105)  previously measured 1.8 x 0.9 cm, a 2.1 x 1.3 cm nodule (series 4,  image 115) previously measured 1.5 x 0.7 cm and a 2.2 x 0.9 cm left  paraspinous nodule appears new (series 4, image 115). These nodules  are indeterminate but suggestive of lymphomatous involvement.    MEDIASTINUM/AXILLAE: No lymphadenopathy in the axillae. No definite  mediastinal or hilar lymphadenopathy. The heart and great vessels are  normal in size. Severe coronary artery calcifications. No pericardial  effusion. Small hiatal hernia.    HEPATOBILIARY: Unchanged hepatomegaly. Stable subcentimeter  hypodensity at the caudate lobe likely small cyst (series 13, image  60). No  other focal lesions of the liver. No radiopaque gallstones or  biliary ductal dilatation.    PANCREAS: Normal.    SPLEEN: Stable marked splenomegaly with the spleen measuring about  16.6 cm.    ADRENAL GLANDS: Normal.    KIDNEYS/BLADDER: No renal calculi, masses or hydronephrosis.  Intravascular arterial calcifications in the kidneys. Distended  urinary bladder with a 3.3 cm left lateral bladder wall diverticulum.  TURP defect.    BOWEL: Normal caliber loops of small bowel and colon. Colonic  diverticulosis. Left inguinal hernia containing a nonobstructed loop  of small bowel. Normal appendix. No inflammatory changes.    LYMPH NODES: Small upper abdominal lymph nodes along the aortic  diaphragmatic hiatus (series 13 image 58) are stable. No new or  enlarging lymph nodes.    PELVIC ORGANS: No pelvic masses.    OTHER: Trace ascites. Prevertebral soft tissue thickening to the right  of the L4-5 intervertebral space measuring up to 1.7 cm (series 13,  image 122) has gradually increased in thickness since 2/29/2020. This  is contiguous with soft tissue thickening at the L5-S1 level measuring  up to 1.8 cm. (15, image 56)    MUSCULOSKELETAL: Instrumented fusion at L5-S1 is stable. No suspicious  lesions in the bones.      Impression    IMPRESSION:  1.  No pulmonary emboli.  2.  Increase in moderate right and small left pleural effusion.  3.  Stable hepatosplenomegaly.  4.  Increased size of extrapleural paraspinal soft tissue nodules to  the left and right of the mid-lower thoracic spine. These are  indeterminate but suspicious for lymphomatous involvement.  5.  Increased indeterminate prevertebral soft tissue thickening since  2/29/2020 at L4-L5 and L5-S1. This is indeterminate but could also  represent lymphomatous involvement and less likely due to degenerative  changes. Could consider a nonemergent lumbar spine MRI for further  evaluation based on clinical concern.  6.  Stable mildly enlarged upper abdominal lymph  nodes.  7.  Small ascites, slightly increased.    SHAUN KIM MD

## 2020-11-11 PROBLEM — Z20.822 PERSON UNDER INVESTIGATION FOR COVID-19: Status: ACTIVE | Noted: 2020-01-01

## 2020-11-11 NOTE — PROGRESS NOTES
Observation goals PRIOR TO DISCHARGE    Comments:   -diagnostic tests and consults completed and resulted: NOT MET   -vital signs normal or at patient baseline: MET  -dyspnea improved and O2 sats greater than 88% on room air or prior home oxygen levels: NOT MET, SOB with activity  -returns to baseline functional status: NOT MET   -safe disposition plan has been identified: NOT MET     Nurse to notify provider when observation goals have been met and patient is ready for discharge.

## 2020-11-11 NOTE — CONSULTS
Consult Date:  11/11/2020      This consult has been requested by Dr. Sung Hui for a chronic myelomonocytic leukemia.      Mr. Mack is an 85-year-old gentleman with chronic myelomonocytic leukemia-1 (CMML-1) and systemic mastocytosis.  This was diagnosed on bone marrow biopsy which was done in April 2020 for leukocytosis.  Treatment was recommended.  He was started on decitabine in April 2020.  He stopped it after four cycle.  The patient has anxiety and did not want any further treatment.  Later, he developed severe thrombocytosis, and hydroxyurea was started in 08/2020.  Hydroxyurea was subsequently stopped because of thrombocytopenia.      The patient recently has been having severe leukocytosis and thrombocytopenia.  Imaging studies revealed worsening of splenomegaly.  Treatment was again discussed with him.  The patient agreed for decitabine, which was restarted on 11/02/2020.  He completed 5 days of treatment on 11/06/2020.      The patient was seen by nurse practitioner in Oncology Clinic on 11/10/2020.  The patient complained of shortness of breath.  For further evaluation, the patient was sent to the emergency room and subsequently admitted.  Multiple investigations were done on 11/10/2020.   -WBC of 121, hemoglobin 10.9 and platelets of 36.   -Normal electrolytes.   -Elevated AST and alkaline phosphatase.   -EKG was normal.   -CT chest angiogram and CT abdomen and pelvis was done on 11/10/2020.  There is no pulmonary embolus.  There is moderate right pleural effusion and small left pleural effusion.  There is hepatosplenomegaly, which is stable.  There is increased size of extrapleural paraspinal soft tissue nodule.  There is increased indeterminate prevertebral soft tissue thickening.      The patient is being diuresed.  He is going to have thoracocentesis done today.      The patient says that with diuresis his shortness of breath is better.  Some of his shortness of breath is also from his anxiety.       REVIEW OF SYSTEMS:  He feels weak.  No headache.  Some dizziness.  No neck pain.  No chest pain.  No nausea or vomiting.  Appetite has been fair.  Denies urinary or bowel complaints.  No abnormal bleeding.      All other review of systems negative.      ALLERGIES:  REVIEWED.      MEDICATIONS:  Reviewed.      PAST MEDICAL HISTORY:   1.  CMML-1.   2.  Systemic mastocytosis.   3.  Skin cancer, status post excision.   4.  Asthma.   5.  Anxiety.   6.  Ischemic heart disease.   7.  Depression.   8.  Diverticulosis.   9.  Hypertension.   10.  Hiatal hernia.   11.  BPH, status post TURP   12.  Back surgery.   13.  Sciatica.   14.  Esophageal squamous cell carcinoma treated with concurrent chemoradiation in 2015.      SOCIAL HISTORY:   -He quit smoking about 40 years ago.   -No alcohol use.      FAMILY HISTORY:   -No family history of cancer or blood disorder.      PHYSICAL EXAMINATION:   GENERAL:  He is alert and oriented x 3.  He looked weak.   VITAL SIGNS:  Reviewed.   The rest of a comprehensive physical examination is deferred due to public health emergency video visit restrictions.      LABORATORY DATA:  Reviewed.      ASSESSMENT:   1.  An 85-year-old gentleman with chronic myelomonocytic leukemia-1 (CMML-1).   2.  Systemic mastocytosis   3.  Shortness of breath secondary to pleural effusion and anxiety.   4.  Anxiety.   5.  Fatigue secondary to his multiple ongoing problems.   6.  Leukocytosis, anemia and thrombocytopenia secondary to CMML.      RECOMMENDATIONS:   1.  The patient has been admitted with shortness of breath.  It is mainly because of fluid overload and pleural effusion.  Diuretic is already helping.  That will be continued.  He is going to have thoracocentesis.  Symptomatically, he should feel better.      Echocardiogram has been done.  Result awaited.  There may be a component of congestive heart failure.       2.  The patient has anxiety.  That is also contributing to his shortness of breath.   Psychiatrist have already seen him.    3.  The patient has CMML-1 and systemic mastocytosis.  For CMML-1 he is on decitabine, which will be continued.     4.  He is anemic and thrombocytopenic.  He should be transfused for hemoglobin below 8 and platelet below 10 or if he is bleeding.  Today, he is going to get platelet transfusion before thoracocentesis.     5.  He had a few questions, which were all answered.  Oncology will continue to follow.      This is a video visit.  Video visit was done as patient is being ruled out for COVID-19 and I wanted to preserve the PPE.      Total time spent on telehealth is 50 minutes.         GRAZYNA RICHEY MD             D: 2020   T: 2020   MT: [email or web address]      Name:     RADHA ALFONSO   MRN:      [id number]        Account:       [id number]   :      1935           Consult Date:  2020      Document: [id number]

## 2020-11-11 NOTE — PLAN OF CARE
DATE & TIME: 11/10/2020; [id number]   Cognitive Concerns/ Orientation : A & O x 4; calm and cooperative; hearing aids and glasses   BEHAVIOR & AGGRESSION TOOL COLOR: Green   CIWA SCORE: N/A   ABNL VS/O2: VSS on RA   MOBILITY: independent; steady on feet  PAIN MANAGMENT: denies  DIET: 2 g Na diet; tolerating well   BOWEL/BLADDER: BS active x 4; continent; monitoring I/O (IV lasix)  ABNL LAB/BG: Albumin= 3.2; Alkal phos=237; AGE=017.6; Hgb=10.9; D.dimer= 2.8   DRAIN/DEVICES: PIV/SL   TELEMETRY RHYTHM: N/A  SKIN: Scattered bruising; intact; +2-3 Edema to LE bilaterally, encouraged elevation  TESTS/PROCEDURES: N/A  D/C DAY/GOALS/PLACE: pending discharge   OTHER IMPORTANT INFO: vitals q4h (obs); SW, and PT consult; special precautions maintained; pills whole with applesauce; PRN xanax given; Nursing will continue to monitor and assess.      Observation goals PRIOR TO DISCHARGE    Comments:   -diagnostic tests and consults completed and resulted: NOT MET   -vital signs normal or at patient baseline: MET  -dyspnea improved and O2 sats greater than 88% on room air or prior home oxygen levels: NOT MET, SOB with activity  -returns to baseline functional status: NOT MET   -safe disposition plan has been identified: NOT MET     Nurse to notify provider when observation goals have been met and patient is ready for discharge.

## 2020-11-11 NOTE — CONSULTS
"CLINICAL NUTRITION SERVICES  -  ASSESSMENT NOTE      Future/Additional Recommendations: Recommend high protein supplements   Malnutrition: Unable to fully assess     REASON FOR ASSESSMENT  Jeff Mack is a 85 year old male seen by Registered Dietitian for Provider Order - \"Low albumin, deconditioning; please eval. nutritional risk.\"    PMH of hypertension, cardiomyopathy, chronic myelocytic leukemia who presents with shortness of breath, chest discomfort, and leg swelling.      NUTRITION HISTORY    - Information obtained from patient over the phone due to covid precautions:     -Patient mentioned his appetite comes and goes, he will have some days where he is hungry and others where he is not at all. He mentioned this is the result of the treatments and medications he is on.  Stated he knows he needs to eat and makes himself.   -He usually consumes 2 meals a day with a snack of some sort. One meal in the morning and one late afternoon.  - His breakfast usually consists of toast and egg  - He is aware of his sodium restriction diet order but says he does not keep track of his intake.   -Unable to obtain a full nutrition history due to patient having to go down for a procedure.      CURRENT NUTRITION ORDERS  Diet Order:     2000 mg Sodium     Current Intake/Tolerance:  Per intake/output flow chart appetite is fair-good and patient consumed 75%-100% of both meals that he ordered.  Meals have consisted of an omelet, pears, and apple juice this morning for breakfast and chicken with carrots for dinner last night.      NUTRITION FOCUSED PHYSICAL ASSESSMENT FOR DIAGNOSING MALNUTRITION)    No:  Rule out covid test pending.         Obtained from Chart/Interdisciplinary Team:  Scattered bruising; intact; BLE edema 3+, encouraged elevation per rn note 11/11.  Thoracentesis 11/11 1450 ml removed.  Last bowel movement reported 11/9.      ANTHROPOMETRICS  Height: 182.9 cm (6')  Weight: 74.8 kg (165 lbs)  Weight Status:  " Normal BMI- 22.38 kg/m^2  IBW: 81kg  % IBW: 92%  Weight History: Weight increase of 8lbs in the last two weeks, likely due to noted edema.    Wt Readings from Last 10 Encounters:   11/06/20 74.8 kg (165 lb)   11/04/20 74.8 kg (165 lb)   11/04/20 74.8 kg (165 lb)   11/03/20 75.3 kg (166 lb 1.6 oz)   11/02/20 74.9 kg (165 lb 3.2 oz)   10/31/20 71.2 kg (157 lb)   10/27/20 72.6 kg (160 lb)   10/26/20 70.8 kg (156 lb)   10/20/20 71.5 kg (157 lb 9.6 oz)   10/12/20 71 kg (156 lb 9.6 oz)       LABS  Labs reviewed.  Lactate Dehydrogenase 610 (H)       MEDICATIONS  Medications reviewed.  Lasix  Miralax, senna-docutase      ASSESSED NUTRITION NEEDS PER APPROVED PRACTICE GUIDELINES:    Dosing Weight 75 kg  Estimated Energy Needs: 9361-1477 kcals (25-30 Kcal/Kg)  Justification: maintenance  Estimated Protein Needs:  grams protein (1.2-1.5 g pro/Kg)  Justification: maintenance  Estimated Fluid Needs: 2452-8935 mL (1 mL/Kcal)  Justification: maintenance and per provider pending fluid status    MALNUTRITION:  % Weight Loss:  None noted  % Intake: unable to fully assess  Subcutaneous Fat Loss:  deferred due to covid rule out  Muscle Loss: deferred due to covid rule out  Fluid Retention:  Moderate - not nutrition related    Malnutrition Diagnosis: Unable to determine due to lack of information.    NUTRITION DIAGNOSIS:  Predicted inadequate nutrient intake (energy/protein) related to suspected decline in appetite caused by build up of fluids.    Limited adherence to nutrition related recommendations related to sodium intake as evidenced by patient's report that he does not monitor sodium intake.      NUTRITION INTERVENTIONS  Recommendations / Nutrition Prescription  Continue Low sodium diet.  Recommend nutrition supplements, will offer to patient as able.      Implementation  Nutrition education: Per provider order if indicated         Nutrition Goals  Patient to consume % of three meals daily.      MONITORING AND  EVALUATION:  Progress towards goals will be monitored and evaluated per protocol and Practice Guidelines    Alva Paul  Dietetic Intern

## 2020-11-11 NOTE — PLAN OF CARE
PT: Orders received and chart reviewed. Per discussion with RN, plan for platelet transfusion and thoracentesis. Will hold PT evaluation at this time. Patient is ambulating in room independently.

## 2020-11-11 NOTE — PROGRESS NOTES
Radiology Note:  Date: 2020   Patient name: Jeff Mack  MRN:[id number]  :  1935      Order has been received in US department for a right thoracentesis. No NPO is required and patient is not on any blood thinners. INR is acceptable to proceed with procedure, but platelets are low due to patient's underlying CMML.   Recent Labs   Lab 20  0728   PLT 32*     Spoke to hospitalist (Dr Green). Would prefer platelets to be above 50 to safely perform procedure. Dr Green will discuss with patient and order platelet transfusion prior to procedure.    COVID test also pending. Plan to wait for COVID results prior to procedure, hopefully these are back as patient is finishing platelet transfusion.     Shakila Devlin PA-C  Interventional Radiology

## 2020-11-11 NOTE — PLAN OF CARE
DATE & TIME: 11/11/20 3762-6925  Cognitive Concerns/ Orientation : A&O x 4; can be anxious at times, calm and cooperative this shift; hearing aids and glasses   BEHAVIOR & AGGRESSION TOOL COLOR: Green   CIWA SCORE: N/A   ABNL VS/O2: VSS on RA   MOBILITY: Independent; steady on feet  PAIN MANAGMENT: Denies pain  DIET: 2 g Na diet  BOWEL/BLADDER: BS active x 4; continent  ABNL LAB/BG: Albumin= 3.2; Alkal phos=237; LBE=142.6; Hgb=10.9; D.dimer= 2.8   DRAIN/DEVICES: PIV saline locked.   TELEMETRY RHYTHM: N/A  SKIN: Scattered bruising; intact; BLE edema 3+, encouraged elevation  TESTS/PROCEDURES: ECHO ordered.  D/C DAY/GOALS/PLACE: Discharge pending.  OTHER IMPORTANT INFO: SW/ PT/ Hem/Onc/Lymphedema/Palliative/Psych consulted; special precautions maintained; pills whole with applesauce; PRN Xanax available.     Observation goals PRIOR TO DISCHARGE    Comments:   -diagnostic tests and consults completed and resulted: NOT MET   -vital signs normal or at patient baseline: MET  -dyspnea improved and O2 sats greater than 88% on room air or prior home oxygen levels: NOT MET, SOB with activity  -returns to baseline functional status: NOT MET   -safe disposition plan has been identified: NOT MET

## 2020-11-11 NOTE — PROGRESS NOTES
[facility]    Hospitalist Progress Note    Brief Summary:  This is a 85 year old male with history of HTN, CMP, CML, CAD, Anxiety, Asthma, CAD, Esophageal ca, admitted because of worsening SOB and found to have bilateral Pleural effusion.     Assessment & Plan      Bilateral Pleural Effusion R>L  SOB and LEYVA most likely related to Pleural effusion  LE Edema   CT chest reviewed moderate to large right sided pleural effusion with  Compressive atelectasis worsening, no infiltrate, has some pleural effusion on left as well.  LE edema, was on Lasix started as out patient.   Agree with IV lasix 20 mg bid at this time, Echo ordered and pending.  Agree with Lymphedema wrap   I will do US guided thoracentesis and send fluid for analysis.        COPD; lung exam relatively unremarkable.  - continue Breo Ellipta and PRN albuterol HFA  - add Duo-nebs QID while awake; begin in a.m.  Not in acute exacerbation.      Anxiety; suspect chronic, but increased with recent treatments for his CMML; it has been impeding his ability to make decisions about his treatment options.  - increase xanax to TID PRN  - Psychiatry to consulted, appreciate input, low dose Seroquel added PRN         Chronic myelomonocytic leukemia (CMML-1),   Leukocytosis and Thrombocytopenia  Systemic mastocytosis    Patient is followed by Dr Ndiaye of [place] [facility].   CT chest shows increase size of extrapleural soft tissue nodule  To the left and right of the mid lower thoracic spine, indeterminate but  Suspicious for lymphomatous involvement.   Hepatosplenomegaly is stable.   He is started back on Decitabine last dose on 11/06, next dose plan for 11/30.  Leukocytosis is secondary to CML but trending down.  Thrombocytopenia stable, transfuse 1 unit of platelets for procedure   recent flow cytometry did not show progression to AML  Oncology consulted.         Hepatomegaly and elevated AST and alkaline phosphatase; attributed to his  CMML.        Recent hyperuricemia.   Stable      Low albumin; likely is malnourished.  - RD to evaluate     Awaiting COVID result at this time.  Thoracentesis and send fluid for analysis, rule out malignant pleural effusion.    continue with IV Lasix.  Transfuse 1 unit of Platelets for procedure.     DVT Prophylaxis: Pneumatic Compression Devices  Code Status: Special Code    Disposition: Expected discharge in 2 days once more stable.     Jaime Green MD  Text Page  (7am - 6pm)    Interval History   Patient feeling better as compare to yesterday, denies any fever, chills, chest pain, nausea, vomiting, abdominal pain,  Back pain or diarrhea at this time.     No other significant event overnight.     -Data reviewed today: I reviewed all new labs and imaging results over the last 24 hours. I personally reviewed no images or EKG's today.    Physical Exam   Temp: 98.2  F (36.8  C) Temp src: Oral BP: 127/66 Pulse: 91   Resp: 18 SpO2: 94 % O2 Device: None (Room air)    There were no vitals filed for this visit.  Vital Signs with Ranges  Temp:  [97.2  F (36.2  C)-98.2  F (36.8  C)] 98.2  F (36.8  C)  Pulse:  [60-93] 91  Resp:  [18-20] 18  BP: (116-137)/(65-74) 127/66  SpO2:  [92 %-94 %] 94 %  I/O last 3 completed shifts:  In: 120 [P.O.:120]  Out: 300 [Urine:300]    Constitutional: awake, alert, cooperative, no apparent distress, and appears stated age  Eyes: Lids and lashes normal, pupils equal, round and reactive to light, extra ocular muscles intact, sclera clear, conjunctiva normal  Respiratory: No increased work of breathing,decrease air entry at the bases otherwise clear to auscultation bilaterally, no crackles or wheezing  Cardiovascular: Normal apical impulse, regular rate and rhythm, normal S1 and S2, no S3 or S4, and no murmur noted  GI: No scars, normal bowel sounds, soft, non-distended, non-tender, no masses palpated, no hepatosplenomegally  Skin: no bruising or bleeding  Musculoskeletal:  lower extremity  pitting edema present  Neurologic: no focal deficit.     Medications     - MEDICATION INSTRUCTIONS -         fluticasone  2 spray Both Nostrils Daily     fluticasone-vilanterol  1 puff Inhalation Daily     furosemide  20 mg Intravenous BID     ipratropium-albuterol  1 puff Inhalation 4x daily     polyethylene glycol  17 g Oral Daily     senna-docusate  1 tablet Oral BID    Or     senna-docusate  2 tablet Oral BID     sodium chloride (PF)  3 mL Intracatheter Q8H       Data   Recent Labs   Lab 11/11/20  0728 11/10/20  1111 11/10/20  1000 11/06/20  1020   .7*  --  121.6* 160.3*   HGB 10.0*  --  10.9* 11.1*   MCV 87  --  87 85   PLT 32*  --  36* 35*   INR 1.28*  --   --   --      --  137 136   POTASSIUM 4.2  --  4.0 3.7   CHLORIDE 106  --  106 104   CO2 27  --  27 24   BUN 24  --  23 34*   CR 1.08  --  0.98 1.26*   ANIONGAP 5  --  4 8   KAE 8.4*  --  8.5 8.3*   GLC 83  --  128* 107*   ALBUMIN  --   --  3.2* 3.3*   PROTTOTAL 6.3*  --  6.9 6.8   BILITOTAL  --   --  1.3 1.5*   ALKPHOS  --   --  237* 248*   ALT  --   --  66 60   AST  --   --  105* 114*   TROPI  --  <0.015  --   --        Recent Results (from the past 24 hour(s))   CT Chest (PE) Abdomen Pelvis w Contrast    Narrative    CT CHEST PE ABDOMEN PELVIS W CONTRAST 11/10/2020 12:54 PM    CLINICAL HISTORY: Shortness of breath; also enlarged liver and spleen  on exam.h/o CMML  TECHNIQUE: CT angiogram chest and routine CT abdomen pelvis with IV  contrast. Arterial phase through the chest and venous phase through  the abdomen and pelvis. 2D and 3D MIP reconstructions were preformed  by the CT technologist. Dose reduction techniques were used.     CONTRAST: 83 mL [id number]    COMPARISON: 10/31/2020, 10/26/2020 and 2/29/2020    FINDINGS:  ANGIOGRAM CHEST: Pulmonary arteries are normal caliber and negative  for pulmonary emboli. Thoracic aorta is negative for dissection. No CT  evidence of right heart strain.     LUNGS AND PLEURA: Moderate right pleural  effusion and small left  pleural effusion have increased since the abdominal CT on 10/31/2020.  Adjacent compressive atelectasis in the lungs. No infiltrates. Small  extrapleural posterior medial paraspinal nodules bilaterally have  increased in size/number. For example a 2.1 x 1.2 cm nodule in the  right posterior medial paraspinous space (series 4, image 105)  previously measured 1.8 x 0.9 cm, a 2.1 x 1.3 cm nodule (series 4,  image 115) previously measured 1.5 x 0.7 cm and a 2.2 x 0.9 cm left  paraspinous nodule appears new (series 4, image 115). These nodules  are indeterminate but suggestive of lymphomatous involvement.    MEDIASTINUM/AXILLAE: No lymphadenopathy in the axillae. No definite  mediastinal or hilar lymphadenopathy. The heart and great vessels are  normal in size. Severe coronary artery calcifications. No pericardial  effusion. Small hiatal hernia.    HEPATOBILIARY: Unchanged hepatomegaly. Stable subcentimeter  hypodensity at the caudate lobe likely small cyst (series 13, image  60). No other focal lesions of the liver. No radiopaque gallstones or  biliary ductal dilatation.    PANCREAS: Normal.    SPLEEN: Stable marked splenomegaly with the spleen measuring about  16.6 cm.    ADRENAL GLANDS: Normal.    KIDNEYS/BLADDER: No renal calculi, masses or hydronephrosis.  Intravascular arterial calcifications in the kidneys. Distended  urinary bladder with a 3.3 cm left lateral bladder wall diverticulum.  TURP defect.    BOWEL: Normal caliber loops of small bowel and colon. Colonic  diverticulosis. Left inguinal hernia containing a nonobstructed loop  of small bowel. Normal appendix. No inflammatory changes.    LYMPH NODES: Small upper abdominal lymph nodes along the aortic  diaphragmatic hiatus (series 13 image 58) are stable. No new or  enlarging lymph nodes.    PELVIC ORGANS: No pelvic masses.    OTHER: Trace ascites. Prevertebral soft tissue thickening to the right  of the L4-5 intervertebral space  measuring up to 1.7 cm (series 13,  image 122) has gradually increased in thickness since 2/29/2020. This  is contiguous with soft tissue thickening at the L5-S1 level measuring  up to 1.8 cm. (15, image 56)    MUSCULOSKELETAL: Instrumented fusion at L5-S1 is stable. No suspicious  lesions in the bones.      Impression    IMPRESSION:  1.  No pulmonary emboli.  2.  Increase in moderate right and small left pleural effusion.  3.  Stable hepatosplenomegaly.  4.  Increased size of extrapleural paraspinal soft tissue nodules to  the left and right of the mid-lower thoracic spine. These are  indeterminate but suspicious for lymphomatous involvement.  5.  Increased indeterminate prevertebral soft tissue thickening since  2/29/2020 at L4-L5 and L5-S1. This is indeterminate but could also  represent lymphomatous involvement and less likely due to degenerative  changes. Could consider a nonemergent lumbar spine MRI for further  evaluation based on clinical concern.  6.  Stable mildly enlarged upper abdominal lymph nodes.  7.  Small ascites, slightly increased.    SHAUN KIM MD

## 2020-11-11 NOTE — CONSULTS
[facility]  Palliative Care Consultation Note    Patient: Jeff Mack  Date of Admission:  11/10/2020    Requesting Clinician / Team: Dr Hui  Reason for consult: Goals of care    Recommendations:    Goals remain restorative (see details of discussion below)    These recommendations have been discussed with Dr Ndiaye, pt's bedside RN.      Thank you for the opportunity to participate in the care of this patient and family. Our team: does not plan on following further, however do not hesitate to call or re-consult if we can be of further assistance to the patient/family.     During regular M-F work hours -- if you are not sure who specifically to contact -- please contact us on our team pager at [phone].    After regular work hours and on weekends/holidays, you can call our answering service at [phone]. Also, who's on call for us is available in [facility] Smart Web.     RAUDEL Clayton CNP  [facility]  Contact information available via [facility] Paging/Directory        Attestation:  Total time on the floor involved in the patient's care: 70 minutes  Total time spent in counseling/care coordination: >50%      Assessments:  Mr Jeff Mack is an 85 year old male with history of HTN, CMP, CML, CAD, Anxiety, Asthma, CAD, Esophageal ca, admitted because of worsening SOB and found to have bilateral Pleural effusion.     Today, the patient was seen for:  Goals of Care    Met with Mr Mack this afternoon. He was seen resting comfortably in bed, states he just returned from a thoracentesis. He tells me he was feeling pretty terrible two days ago, was unable to breathe and his legs were quite swollen. He was at his scheduled clinic appointment and was advised to go to the ER. He tells me he is feeling much better now that he has received diuretics and had the thoracentesis. He is also quite pleased to hear from Dr Ndiaye that his counts are improving which indicates he  is responding to his recent treatment. Mr Mack tells me that brings him quite a bit of hope for the future. He shared with me that he is grateful to have Dr Ndiaye's advice on how far he should take this. He also shared with me that he and his wife have had many intimate conversations regarding plans for the future as he knows at some point things will change and he does not wish to prolong any suffering. They have created living bowles and shared their wishes with each other and agree that quality of life is more important that quantity. Mr Mack tells me he is agreeable to continuing with treatment for now as he would like to have some more time with his family. I asked him if it would be helpful to hear about how we can take care of him if/when he decides in the future to stop cancer directed treatments. Mr Mack said his wife is already looking into hospice cares and in home support. Mr Mcak feels they are well supported and on a good path.     Prognosis, Goals, & Planning:      Functional Status just prior to hospitalization: 1 (Restricted in physically strenuous activity but ambulatory and able to carry out work of a light or sedentary nature)      Prognosis, Goals, and/or Advance Care Planning were addressed today: Yes        Summary/Comments:       Patient's decision making preferences: independently          Patient has decision-making capacity today for complex decisions: Yes            I have concerns about the patient/family's health literacy today: No           Patient has a completed Health Care Directive: Yes, and on file.      Code status: limited efforts, No Intubation    Coping, Meaning, & Spirituality:   Mood, coping, and/or meaning in the context of serious illness were addressed today: Yes  Summary/Comments: finds comfort in his ramon, which is something that he has come around to in his later years of life    Social:     Living situation: lives with his wife in their home    Key family /  caregivers: has a daughter in [place], a son in [place], 4 granddaughters    Occupational history: retired     History of Present Illness:  History gathered today from: patient, medical chart, medical team members    Adopted from H&P  Jeff Andrea Mack is a 85 year old male with a history of hypertension, cardiomyopathy, chronic myelocytic leukemia who presents with shortness of breath, chest discomfort, and leg swelling. For the past week the patient has had shortness of breath with mild activity and related chest discomfort. He normally takes Symbicort 2 puffs in the morning and two at night. This morning he took 2 puffs Symbicort and Albuterol which did not help. He ran out of his Lasix two days ago. He has also had some recent constipation. He was sent here from oncology clinic where he was seen by Haley Sanchez CNP, where he had some laboratory workup, findings below. He denies fever, loss of sense of taste or smell, diarrhea, abdominal pain, chest pain, or known COVID-19 exposure.    Key Palliative Symptom Data:  We are not helping to manage these symptoms currently in this patient.    ROS:  Comprehensive ROS is reviewed and is negative except as per HPI.     Past Medical History:  Past Medical History:   Diagnosis Date     Anxiety state, unspecified      Asthma      Basal cell carcinoma      Benign neoplasm of colon      Cardiomegaly 1/6/2005     Chronic ischemic heart disease, unspecified      CML (chronic myelocytic leukemia) (H)      Constipation      Depressive disorder, not elsewhere classified      Diverticulosis of colon (without mention of hemorrhage)      Esophageal cancer (H) 3/23/2015     Essential hypertension, benign      Hiatal hernia      Hypertrophy (benign) of prostate      Internal hemorrhoids without mention of complication      Mumps      Other primary cardiomyopathies 2003    EF 45%     Sciatica      Spondylosis of unspecified site without mention of myelopathy         Past  Surgical History:  Past Surgical History:   Procedure Laterality Date     BONE MARROW BIOPSY, BONE SPECIMEN, NEEDLE/TROCAR N/A 4/16/2020    Procedure: BIOPSY, BONE MARROW;  Surgeon: Mg Hobbs MD;  Location:  GI     BRONCHOSCOPY FLEXIBLE N/A 4/3/2015    Procedure: BRONCHOSCOPY FLEXIBLE;  Surgeon: Ralph Catherine MD;  Location:  OR     COLONOSCOPY       COLONOSCOPY N/A 2/19/2020    Procedure: COLONOSCOPY, WITH POLYPECTOMY AND BIOPSY;  Surgeon: Kathy Torres MD;  Location:  GI     ESOPHAGOSCOPY, GASTROSCOPY, DUODENOSCOPY (EGD), COMBINED N/A 3/19/2015    Procedure: COMBINED ESOPHAGOSCOPY, GASTROSCOPY, DUODENOSCOPY (EGD), BIOPSY SINGLE OR MULTIPLE;  Surgeon: Alo Mauro MD;  Location: [facility]     ESOPHAGOSCOPY, GASTROSCOPY, DUODENOSCOPY (EGD), COMBINED N/A 7/14/2015    Procedure: COMBINED ESOPHAGOSCOPY, GASTROSCOPY, DUODENOSCOPY (EGD), BIOPSY SINGLE OR MULTIPLE;  Surgeon: Kathy Torres MD;  Location: [facility]     ESOPHAGOSCOPY, GASTROSCOPY, DUODENOSCOPY (EGD), COMBINED N/A 12/1/2015    Procedure: COMBINED ESOPHAGOSCOPY, GASTROSCOPY, DUODENOSCOPY (EGD), BIOPSY SINGLE OR MULTIPLE;  Surgeon: Alo Mauro MD;  Location: [facility]     ESOPHAGOSCOPY, GASTROSCOPY, DUODENOSCOPY (EGD), COMBINED N/A 3/17/2016    Procedure: COMBINED ESOPHAGOSCOPY, GASTROSCOPY, DUODENOSCOPY (EGD), BIOPSY SINGLE OR MULTIPLE;  Surgeon: Alo Mauro MD;  Location:  GI     ESOPHAGOSCOPY, GASTROSCOPY, DUODENOSCOPY (EGD), COMBINED N/A 7/21/2016    Procedure: COMBINED ESOPHAGOSCOPY, GASTROSCOPY, DUODENOSCOPY (EGD);  Surgeon: Alo Mauro MD;  Location: [facility]     ESOPHAGOSCOPY, GASTROSCOPY, DUODENOSCOPY (EGD), COMBINED N/A 11/17/2016    Procedure: COMBINED ESOPHAGOSCOPY, GASTROSCOPY, DUODENOSCOPY (EGD);  Surgeon: Alo Mauro MD;  Location:  GI     ESOPHAGOSCOPY, GASTROSCOPY, DUODENOSCOPY (EGD), COMBINED N/A 5/9/2017    Procedure: COMBINED ESOPHAGOSCOPY, GASTROSCOPY, DUODENOSCOPY (EGD);  (EGD) COMBINED  ESOPHAGOSCOPY, GASTROSCOPY, DUODENOSCOPY;  Surgeon: Alo Mauro MD;  Location:  GI     ESOPHAGOSCOPY, GASTROSCOPY, DUODENOSCOPY (EGD), COMBINED N/A 2018    Procedure: COMBINED ESOPHAGOSCOPY, GASTROSCOPY, DUODENOSCOPY (EGD);  ESOPHAGOGASTRODUODENOSCOPY ;  Surgeon: Alo Mauro MD;  Location:  GI     GASTROSTOMY, INSERT TUBE, COMBINED N/A 4/3/2015    Procedure: COMBINED GASTROSTOMY, INSERT TUBE (OPEN);  Surgeon: Ralph Catherine MD;  Location:  OR      EXCISION OF LINGUAL TONSIL      TONSILLECTOMY     HC PRESSURE GRADIENT MEASUREMENT, INITIAL VESSEL  2005    essentially normal     HC UGI ENDOSCOPY W EUS N/A 2015    Procedure: COMBINED ENDOSCOPIC ULTRASOUND, ESOPHAGOSCOPY, GASTROSCOPY, DUODENOSCOPY (EGD);  Surgeon: Kathy Torres MD;  Location:  GI     LAMINECTOMY, FUSION LUMBAR ONE LEVEL, COMBINED  2012    Procedure: COMBINED LAMINECTOMY, FUSION LUMBAR ONE LEVEL;  Posterior Fusion L5-S1, Total Facetectomy L5-S1 Left ;  Surgeon: Ronald Espinosa MD;  Location: [facility] OR     PROSTATE SURGERY       [facility] NONSPECIFIC PROCEDURE  2-99    Colonic polpectomy     [facility] NONSPECIFIC PROCEDURE  9-    TURP     [facility] NONSPECIFIC PROCEDURE  1982    Retinal surgery         Family History:  Family History   Problem Relation Age of Onset     Hypertension Mother      Cerebrovascular Disease Mother      Diabetes Brother      Diabetes Father          age 97         Allergies:  No Known Allergies     Medications:  I have reviewed this patient's medication profile and medications from this hospitalization.   Current Facility-Administered Medications Ordered in Epic   Medication Dose Route Frequency Last Rate Last Dose     acetaminophen (TYLENOL) Suppository 650 mg  650 mg Rectal Q4H PRN         acetaminophen (TYLENOL) tablet 650 mg  650 mg Oral Q4H PRN         albuterol (PROAIR HFA/PROVENTIL HFA/VENTOLIN HFA) 108 (90 Base) MCG/ACT inhaler 1-2 puff  1-2 puff Inhalation Q6H PRN          ALPRAZolam (XANAX) tablet 0.25 mg  0.25 mg Oral TID PRN   0.25 mg at 11/10/20 2059     fluticasone (FLONASE) 50 MCG/ACT spray 2 spray  2 spray Both Nostrils Daily   2 spray at 11/11/20 0826     fluticasone-vilanterol (BREO ELLIPTA) 200-25 MCG/INH inhaler 1 puff  1 puff Inhalation Daily   1 puff at 11/11/20 0825     furosemide (LASIX) injection 20 mg  20 mg Intravenous BID   20 mg at 11/11/20 1534     ipratropium-albuterol (COMBIVENT RESPIMAT) inhaler 1 puff  1 puff Inhalation 4x daily   1 puff at 11/11/20 1319     lidocaine (LMX4) cream   Topical Q1H PRN         lidocaine 1 % 0.1-1 mL  0.1-1 mL Other Q1H PRN         May take regular AM medications except those listed below.   Does not apply Continuous PRN         melatonin tablet 1 mg  1 mg Oral At Bedtime PRN         ondansetron (ZOFRAN-ODT) ODT tab 4 mg  4 mg Oral Q6H PRN        Or     ondansetron (ZOFRAN) injection 4 mg  4 mg Intravenous Q6H PRN         polyethylene glycol (MIRALAX) Packet 17 g  17 g Oral Daily   17 g at 11/11/20 0824     QUEtiapine (SEROquel) half-tab 12.5-25 mg  12.5-25 mg Oral BID PRN         senna-docusate (SENOKOT-S/PERICOLACE) 8.6-50 MG per tablet 1 tablet  1 tablet Oral BID   1 tablet at 11/11/20 0826    Or     senna-docusate (SENOKOT-S/PERICOLACE) 8.6-50 MG per tablet 2 tablet  2 tablet Oral BID         sodium chloride (PF) 0.9% PF flush 3 mL  3 mL Intracatheter q1 min prn         sodium chloride (PF) 0.9% PF flush 3 mL  3 mL Intracatheter Q8H   3 mL at 11/11/20 0826     No current Epic-ordered outpatient medications on file.       Physical Exam:  Vital Signs: Temp: 97.6  F (36.4  C) Temp src: Oral BP: 125/89 Pulse: 100   Resp: 16 SpO2: 94 % O2 Device: None (Room air)    There were no vitals filed for this visit.  CONSTITUTIONAL: NAD, A&Ox3. Calm and cooperative.  HEENT: NCAT, MMM  CARDIOVASCULAR: exam deferred   RESPIRATORY: NL respiratory effort on RA  GASTROINTESTINAL: exam deferred   MUSCULOSKELETAL: +1 edema in Jorje LEs. Moving freely  in bed   SKIN: Warm and intact. No concerning lesions or rashes on exposed skin surfaces   NEUROLOGIC: Appropriately responsive during interview  PSYCH: Affect congruent     Data reviewed:  Recent imaging reviewed, my comments on pertinents:   11/10 CT Chest  IMPRESSION:  1.  No pulmonary emboli.  2.  Increase in moderate right and small left pleural effusion.  3.  Stable hepatosplenomegaly.  4.  Increased size of extrapleural paraspinal soft tissue nodules to  the left and right of the mid-lower thoracic spine. These are  indeterminate but suspicious for lymphomatous involvement.  5.  Increased indeterminate prevertebral soft tissue thickening since  2/29/2020 at L4-L5 and L5-S1. This is indeterminate but could also  represent lymphomatous involvement and less likely due to degenerative  changes. Could consider a nonemergent lumbar spine MRI for further  evaluation based on clinical concern.  6.  Stable mildly enlarged upper abdominal lymph nodes.  7.  Small ascites, slightly increased.    Recent lab data reviewed, my comments on pertinents:   Results for orders placed or performed during the hospital encounter of 11/10/20 (from the past 24 hour(s))   Psychiatry IP Consult: Please eval. re. anxiety; current PRN xanax not providing adequate relief.  Joshua is interested in considering antidepressant to help with his Sx.  ThanksBRITNEY MD; Consultant may enter orders: Yes; Patient to be seen: Rou...    Narrative    Alo Patiño MD     11/11/2020  7:28 AM  Pt seen for new psychiatric consultation, see my dictation.    Alo Patiño MD       Nutrition Services Adult IP Consult    Narrative    Alva Paul     11/11/2020  3:19 PM  CLINICAL NUTRITION SERVICES  -  ASSESSMENT NOTE      Future/Additional Recommendations: Recommend high protein   supplements   Malnutrition: Unable to fully assess     REASON FOR ASSESSMENT  Jeff Mack is a 85 year old male seen by Registered   Dietitian for Provider  "Order - \"Low albumin, deconditioning;   please eval. nutritional risk.\"    PMH of hypertension, cardiomyopathy, chronic myelocytic   leukemia who presents with shortness of breath, chest discomfort,   and leg swelling.      NUTRITION HISTORY    - Information obtained from patient over the phone due to covid   precautions:     -Patient mentioned his appetite comes and goes, he will have some   days where he is hungry and others where he is not at all. He   mentioned this is the result of the treatments and medications he   is on.  Stated he knows he needs to eat and makes himself.   -He usually consumes 2 meals a day with a snack of some sort. One   meal in the morning and one late afternoon.  - His breakfast usually consists of toast and egg  - He is aware of his sodium restriction diet order but says he   does not keep track of his intake.   -Unable to obtain a full nutrition history due to patient having   to go down for a procedure.      CURRENT NUTRITION ORDERS  Diet Order:     2000 mg Sodium     Current Intake/Tolerance:  Per intake/output flow chart appetite is fair-good and patient   consumed 75%-100% of both meals that he ordered.  Meals have consisted of an omelet, pears, and apple juice this   morning for breakfast and chicken with carrots for dinner last   night.      NUTRITION FOCUSED PHYSICAL ASSESSMENT FOR DIAGNOSING   MALNUTRITION)    No:  Rule out covid test pending.         Obtained from Chart/Interdisciplinary Team:  Scattered bruising; intact; BLE edema 3+, encouraged elevation   per rn note 11/11.  Thoracentesis 11/11 1450 ml removed.  Last bowel movement reported 11/9.      ANTHROPOMETRICS  Height: 182.9 cm (6')  Weight: 74.8 kg (165 lbs)  Weight Status:  Normal BMI- 22.38 kg/m^2  IBW: 81kg  % IBW: 92%  Weight History: Weight increase of 8lbs in the last two weeks,   likely due to noted edema.    Wt Readings from Last 10 Encounters:   11/06/20 74.8 kg (165 lb)   11/04/20 74.8 kg (165 lb) "   11/04/20 74.8 kg (165 lb)   11/03/20 75.3 kg (166 lb 1.6 oz)   11/02/20 74.9 kg (165 lb 3.2 oz)   10/31/20 71.2 kg (157 lb)   10/27/20 72.6 kg (160 lb)   10/26/20 70.8 kg (156 lb)   10/20/20 71.5 kg (157 lb 9.6 oz)   10/12/20 71 kg (156 lb 9.6 oz)       LABS  Labs reviewed.  Lactate Dehydrogenase 610 (H)       MEDICATIONS  Medications reviewed.  Lasix  Miralax, senna-docutase      ASSESSED NUTRITION NEEDS PER APPROVED PRACTICE GUIDELINES:    Dosing Weight 75 kg  Estimated Energy Needs: 6274-0946 kcals (25-30 Kcal/Kg)  Justification: maintenance  Estimated Protein Needs:  grams protein (1.2-1.5 g pro/Kg)  Justification: maintenance  Estimated Fluid Needs: 8634-9734 mL (1 mL/Kcal)  Justification: maintenance and per provider pending fluid status    MALNUTRITION:  % Weight Loss:  None noted  % Intake: unable to fully assess  Subcutaneous Fat Loss:  deferred due to covid rule out  Muscle Loss: deferred due to covid rule out  Fluid Retention:  Moderate - not nutrition related    Malnutrition Diagnosis: Unable to determine due to lack of   information.    NUTRITION DIAGNOSIS:  Predicted inadequate nutrient intake (energy/protein) related to   suspected decline in appetite caused by build up of fluids.    Limited adherence to nutrition related recommendations related to   sodium intake as evidenced by patient's report that he does not   monitor sodium intake.      NUTRITION INTERVENTIONS  Recommendations / Nutrition Prescription  Continue Low sodium diet.  Recommend nutrition supplements, will offer to patient as able.      Implementation  Nutrition education: Per provider order if indicated         Nutrition Goals  Patient to consume % of three meals daily.      MONITORING AND EVALUATION:  Progress towards goals will be monitored and evaluated per   protocol and Practice Guidelines    Alva Paul  Dietetic Intern                 Basic metabolic panel   Result Value Ref Range    Sodium 138 133 - 144 mmol/L     Potassium 4.2 3.4 - 5.3 mmol/L    Chloride 106 94 - 109 mmol/L    Carbon Dioxide 27 20 - 32 mmol/L    Anion Gap 5 3 - 14 mmol/L    Glucose 83 70 - 99 mg/dL    Urea Nitrogen 24 7 - 30 mg/dL    Creatinine 1.08 0.66 - 1.25 mg/dL    GFR Estimate 63 >60 mL/min/[1.73_m2]    GFR Estimate If Black 73 >60 mL/min/[1.73_m2]    Calcium 8.4 (L) 8.5 - 10.1 mg/dL   CBC with platelets   Result Value Ref Range    .7 (HH) 4.0 - 11.0 10e9/L    RBC Count 3.50 (L) 4.4 - 5.9 10e12/L    Hemoglobin 10.0 (L) 13.3 - 17.7 g/dL    Hematocrit 30.6 (L) 40.0 - 53.0 %    MCV 87 78 - 100 fl    MCH 28.6 26.5 - 33.0 pg    MCHC 32.7 31.5 - 36.5 g/dL    RDW 24.5 (H) 10.0 - 15.0 %    Platelet Count 32 (LL) 150 - 450 10e9/L   INR   Result Value Ref Range    INR 1.28 (H) 0.86 - 1.14   Lactate Dehydrogenase   Result Value Ref Range    Lactate Dehydrogenase 610 (H) 85 - 227 U/L   Protein total   Result Value Ref Range    Protein Total 6.3 (L) 6.8 - 8.8 g/dL   Platelets prepare order unit   Result Value Ref Range    Blood Component Type PLT Pheresis     Units Ordered 1    Blood component   Result Value Ref Range    Unit Number [id number]     Blood Component Type PlateletPheresis LeukoReduced Irradiated     Division Number 00     Status of Unit Released to care unit 11/11/2020 1028     Blood Product Code [id number]     Unit Status ISS    Glucose fluid   Result Value Ref Range    Glucose Fluid Source Pleural fluid     Glucose Fluid 80 mg/dL   Lactate dehydrogenase fluid   Result Value Ref Range    LD Fluid Source Pleural fluid     Lactate Dehydrogenase Fluid 306 U/L   Protein fluid   Result Value Ref Range    Protein Total Fluid Source Pleural fluid     Protein Total Fluid 3.3 g/dL   pH fluid   Result Value Ref Range    pH Fluid Source Pleural fluid     Ph Fluid 9.0 pH   US Thoracentesis    Narrative    US THORACENTESIS 11/11/2020 2:42 PM    CLINICAL HISTORY: right sided moderate to large pleural effusion and  SOB    PROCEDURE: Informed consent  obtained. Time out performed. The chest  was prepped and draped in sterile fashion. 10 mL of 1% Xylocaine was  infused into the local soft tissues. Under direct ultrasound guidance,  a 5 Indonesian catheter system was placed into the pleural effusion.     1.5 liters of dark red fluid were removed and sent to lab, if  requested.    Patient tolerated procedure well.    Ultrasound imaging was obtained and placed in the patient's permanent  medical record.      Impression    IMPRESSION:  1.  Status post right ultrasound-guided thoracentesis.    Reference CPT Code: 91090    SAMANTHA KIM MD   IR Procedure Note    Narrative    Samantha Kim MD     11/11/2020  3:36 PM  [facility]    Procedure: IR Procedure Note    Date/Time: 11/11/2020 2:45 PM  Performed by: Samantha Kim MD  Authorized by: Samantha Kim MD     UNIVERSAL PROTOCOL   Site Marked: NA  Prior Images Obtained and Reviewed:  Yes  Required items: Required blood products, implants, devices and special   equipment available    Patient identity confirmed:  Verbally with patient, arm band, provided   demographic data and hospital-assigned identification number  Patient was reevaluated immediately before administering moderate or deep   sedation or anesthesia  Confirmation Checklist:  Patient's identity using two indicators, relevant   allergies, procedure was appropriate and matched the consent or emergent   situation and correct equipment/implants were available  Time out: Immediately prior to the procedure a time out was called    Universal Protocol: the Joint Commission Universal Protocol was followed    Preparation: Patient was prepped and draped in usual sterile fashion           ANESTHESIA    Anesthesia: Local infiltration  Local Anesthetic:  Lidocaine 1% without epinephrine      SEDATION    Patient Sedated: No    See dictated procedure note for full details.  Findings: US guided right thoracentesis    Specimens: fluid and/or tissue for  laboratory analysis    Complications: None    Condition: Stable    PROCEDURE   Patient Tolerance:  Patient tolerated the procedure well with no immediate   complications    Length of time physician/provider present for 1:1 monitoring during   sedation: 0   Echocardiogram Complete    Narrative    [id number]  [id number]  [id number]  [id number]^LEOBARDO^GREGORY^TANJA           [facility]  Echocardiography Laboratory  [address] [place] [address]        Name: RADHA ALFONSO  MRN: [id number]  : 1935  Study Date: 2020 02:57 PM  Age: 85 yrs  Gender: Male  Patient Location: [facility]  Reason For Study: Dyspnea  Ordering Physician: GREGORY BOOTH  Referring Physician: ANGUS KIRK  Performed By: Piedad Chambers     BSA: 2.0 m2  Height: 72 in  Weight: 165 lb  HR: 95  BP: 107/59 mmHg  _____________________________________________________________________________  __        Procedure  Complete Portable Echo Adult.  _____________________________________________________________________________  __        Interpretation Summary     The left ventricle is normal in size.  The visual ejection fraction is estimated at 60-65%.  No regional wall motion abnormalities noted.  There is sclerosis, calcification, and restriction of the aortic valve opening  compatible with trivial aortic stenosis.  Right ventricle systolic pressure estimate normal  _____________________________________________________________________________  __        Left Ventricle  The left ventricle is normal in size. A sigmoid septum is present. The visual  ejection fraction is estimated at 60-65%. Left ventricular diastolic function  is indeterminate. No regional wall motion abnormalities noted.     Right Ventricle  The right ventricle is normal in size and function.     Atria  Normal left atrial size. Right atrial size is normal. There is no color  Doppler evidence of an atrial shunt.     Mitral Valve  The mitral valve leaflets are mildly thickened.  There is trace mitral  regurgitation.        Tricuspid Valve  There is mild (1+) tricuspid regurgitation. The right ventricular systolic  pressure is approximated at 31.6 mmHg plus the right atrial pressure. IVC  diameter <2.1 cm collapsing >50% with sniff suggests a normal RA pressure of 3  mmHg. Right ventricle systolic pressure estimate normal.     Aortic Valve  No aortic regurgitation is present. There is sclerosis, calcification, and  restriction of the aortic valve opening compatible with trivial aortic  stenosis. The mean AoV pressure gradient is 11.5 mmHg.     Pulmonic Valve  There is trace pulmonic valvular regurgitation.     Vessels  Mild aortic root dilatation. The ascending aorta is Mildly dilated.     Pericardium  There is no pericardial effusion.        Rhythm  Sinus rhythm was noted.  _____________________________________________________________________________  __  MMode/2D Measurements & Calculations  IVSd: 1.2 cm     LVIDd: 4.7 cm  LVIDs: 2.9 cm  LVPWd: 1.2 cm  FS: 39.4 %  LV mass(C)d: 207.5 grams  LV mass(C)dI: 105.7 grams/m2  Ao root diam: 3.9 cm  LA dimension: 3.8 cm  asc Aorta Diam: 3.7 cm  LA/Ao: 0.97  LVOT diam: 2.4 cm  LVOT area: 4.7 cm2  LA Volume (BP): 61.0 ml  LA Volume Index (BP): 31.1 ml/m2  RWT: 0.50           Doppler Measurements & Calculations  MV E max dylan: 77.6 cm/sec  MV A max dylan: 118.4 cm/sec  MV E/A: 0.66  MV dec time: 0.18 sec  Ao V2 max: 228.8 cm/sec  Ao max P.9 mmHg  Ao V2 mean: 158.9 cm/sec  Ao mean P.5 mmHg  Ao V2 VTI: 44.8 cm  HUY(I,D): 2.2 cm2  HUY(V,D): 2.3 cm2  LV V1 max P.2 mmHg  LV V1 max: 114.0 cm/sec  LV V1 VTI: 20.7 cm  SV(LVOT): 97.6 ml  SI(LVOT): 49.7 ml/m2  PA V2 max: 84.2 cm/sec  PA max P.8 mmHg  PA acc time: 0.10 sec  TR max dylan: 281.2 cm/sec  TR max P.6 mmHg  AV Dylan Ratio (DI): 0.50  HUY Index (cm2/m2): 1.1  E/E' av.2  Lateral E/e': 6.8  Medial E/e': 9.6               _____________________________________________________________________________  __        Report approved by: Pillo Qiu 11/11/2020 04:01 PM

## 2020-11-11 NOTE — PROGRESS NOTES
Admission    Patient arrives to room 611-01 via cart from ED.  Care plan note: A & O x 4; VSS on RA; calm and cooperative; dyspnea on exertion; +2-+3 edema to LE bilaterally. Nursing will continue to monitor and assess.     Inpatient nursing criteria listed below were met:    PCD's Documented: Yes  Skin issues/needs documented :Yes  Isolation education started/completed Yes  Patient allergies verified with patient: Yes  Verified completion of [place] Risk Assessment Tool:  Yes  Verified completion of Guardianship screening tool: Yes  Fall Prevention: Care plan updated, Education given and documented Yes  Care Plan initiated: Yes  Home medications documented in belongings flowsheet: Yes  Patient belongings documented in belongings flowsheet: Yes  Reminder note (belongings/ medications) placed in discharge instructions:Yes  Admission profile/ required documentation complete: Yes  Bedside Report Letter given and explained to patient Yes  Visitor Designated? Yes  If patient is a 72 hour hold/Commitment are belongings removed from room and locked up? ARNOLDO Hu RN on 11/10/2020 at 8:08 PM

## 2020-11-11 NOTE — PROGRESS NOTES
Care Suites Procedure Nursing Note    Patient Information  Name: Jeff Mack  Age: 85 year old    Procedure  Procedure: Thoracentesis:   Patient arrived to Ultrasound dept for thoracentesis. Admission intake info taken. Ultrasound tech performed priliminary scan to find pockets of fluid. MD arrived to explain procedure to patient. Consent obtained. 1420- Time out done just prior to proceeding with procedure. Patient had BP and O2 sats monitored throughout procedure. 1450 cc serosanguinous fluid removed without difficulty. Small hematoma developed at right back site, pressure held by Dr Garibay. Pt tolerated well, denies pain. Vital signs were stable throughout. Bandaid applied to site after cleaning.  1445- Pt back to room 611-1 per cart & transport NA in medically stable condition.  Procedure start time: 1415  Procedure complete time: 1436  Concerns/abnormal assessment: MD present   If abnormal assessment, provider notified: N/A  Plan/Other: return to inpatient room    Keily Carrera RN

## 2020-11-11 NOTE — UTILIZATION REVIEW
Admission Status; Secondary Review Determination    Under the authority of the Utilization Management Committee, the utilization review process indicated a secondary review on the above patient. The review outcome is based on review of the medical records, discussions with staff, and applying clinical experience noted on the date of the review.    (x) Inpatient Status Appropriate - This patient's medical care is consistent with medical management for inpatient care and reasonable inpatient medical practice.    RATIONALE FOR DETERMINATION: 85-year-old male with chronic myelomonocytic leukemia and systemic mastocytosis status post a course of chemotherapy and more recently hydroxyurea resulting in thrombocytopenia.  Patient now has progressive worsening of leukocytosis, anemia and thrombocytopenia most consistent with progression of his malignancy.  Chemotherapy was restarted 1 week prior to admission.  Patient now presents with significant increased shortness of breath as well as edema.  Patient has history of underlying COPD.  Imaging reveals moderate right and small left pleural effusions.  Patient will require intravenous diuretics, diagnostic and therapeutic thoracentesis (procedure delayed by patient's marked thrombocytopenia) with expectation requiring 3+ nights in the hospital appropriate for inpatient management.    At the time of admission with the information available to the attending physician more than 2 nights Hospital complex care was anticipated, based on patient risk of adverse outcome if treated as outpatient and complex care required. Inpatient admission is appropriate based on the Medicare guidelines.    This document was produced using voice recognition software    The information on this document is developed by the utilization review team in order for the business office to ensure compliance. This only denotes the appropriateness of proper admission status and does not reflect the quality  of care rendered.    The definitions of Inpatient Status and Observation Status used in making the determination above are those provided in the CMS Coverage Manual, Chapter 1 and Chapter 6, section 70.4.    Sincerely,    Isidro Rowe MD  Utilization Review  Physician Advisor  [facility].

## 2020-11-11 NOTE — CONSULTS
"Consult Date:  11/11/2020      PSYCHIATRY CONSULTATION      REQUESTING PHYSICIAN:  Dr. Hui      REASON FOR CONSULTATION:  Anxiety.        IDENTIFYING DATA:  I conducted today's visit via telemedicine using the [employer] system and iPad due to the COVID-19 virus pandemic.  The patient currently is on COVID-19 precautions.  He is an 85-year-old gentleman with multiple medical comorbidities admitted with shortness of breath, found to be COVID positive.  He has some underlying anxiety.      CHIEF COMPLAINT:  \"I've always taken a very small dose of Xanax for years.  I used to see a psychiatrist.\"      HISTORY OF PRESENT ILLNESS:  Mr. 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  He lives in [place].  He came in with shortness of breath.  He has chronic myelocytic leukemia, cardiomyopathy, hypertension and newly diagnosed COVID-19 infection.  He is currently in isolation on station 66.  He has had some intermittent anxiety, which has historically been responsive to Xanax.  He says years ago he saw a psychiatrist.  He has never been hospitalized for depression or anxiety and really does not identify neurovegetative symptoms of depression.  He has always been responsible using Xanax, has no chemical dependency history.  He states the stress of hospitalization, isolation, and \"always being woken up,\" has made things difficult.      On further questioning, he does not endorse a history of depression, hypomania, terry, psychosis, panic, OCD, eating disorder history, trauma history or ADHD.  He has always had some vacillating anxiety, which connects in part to his current health concerns.  There are no safety concerns.  He has never been hospitalized for psychiatric reasons and has a good support system.      PAST PSYCHIATRIC HISTORY:  As above.      PAST CHEMICAL DEPENDENCY HISTORY:  Negative.      PAST MEDICAL HISTORY:  Per chart review includes COVID-19 infection this admission " with shortness of breath, chronic myelocytic leukemia cardiomegaly, atherosclerotic coronary vascular disease, unspecified asthma, basal cell carcinoma, benign neoplasm of the colon, diverticulosis, esophageal cancer, essential hypertension, hiatal hernia, benign prostatic hypertrophy, internal hemorrhoids, mumps, sciatica, spondylosis.      CURRENT SCHEDULED MEDICATIONS:   1.  Flonase.   2.  Breo Ellipta.   3.  Lasix.    4.  Isovue.     5.  Combivent inhaler.   6.  MiraLax.   7.  Saline flush.   8.  Senokot-S p.r.n.     9.  Xanax 0.25 mg t.i.d.   10.  ProAir.   11.  Acetaminophen.   12.  Lidocaine.   13.  Melatonin.   14.  Zofran.      FAMILY HISTORY:  Negative for mental illness, chemical dependency or suicide.      SOCIAL HISTORY:  The patient is  with grown children and lives in [place].  He is a retired executive.  He has been plagued by a number of health issues, which has added stress to his life.  He generally functions independently and maintains a positive attitude.  There is no legal history,  history or trauma history.      REVIEW OF SYSTEMS:  A 10-point review of systems is notable for mild shortness of breath on respiratory exam.      MOST RECENT VITAL SIGNS:  Blood pressure 127/66, temperature 98.2, pulse 91, respiratory rate 18, oxygen saturation 94%.      MENTAL STATUS EXAMINATION:  Appearance:  The patient is a very pleasant gentleman lying in bed.  He appears his stated age.  He is judged to be a reliable historian.  Speech is of normal rate, flow and tone.  Use of language appropriate.  Motor exam:  Calm.  Coordination, station and gait not tested.  Muscle strength and tone adequate.  Affect is pleasant.  Mood mildly anxious.  Thought process logical, coherent and goal-directed.  No loosening of associations or flight of ideas.  No formal thought disorder on exam.  Thought content negative for hallucinations, delusions, paranoia, suicidal or homicidal ideation.  Insight and  judgment intact.  Cognitive exam:  The patient is alert and oriented x 3.  Concentration intact.  Recent and remote memory intact.  General fund of knowledge above average.      IMPRESSION:  The patient is a delightful 85-year-old gentleman with unspecified anxiety, multiple medical comorbidities.  He has historically responded to small doses of alprazolam.  I think it is fine to continue that, and we talked about adding a small dose of quetiapine as a backup to help anxiety breakthrough and sleep.  I do not think this needs to be a long-term strategy.      DIAGNOSES:   1.  Unspecified anxiety disorder.   2.  COVID-19 positive status with shortness of breath.   3.  Multiple medical comorbidities.      PLAN:   1.  Medical management as you are.   2.  Continue low-dose alprazolam 0.25 mg t.i.d. p.r.n. for anxiety, which he uses responsibly and with benefit.   3.  I added a small dose of quetiapine 12.5-25 mg b.i.d. p.r.n. for anxiety/sleep in case the alprazolam is ineffective.   4.  No further psychiatric intervention warranted at this time.  Reconsult us if any issues arise.         FABBY HAMPTON MD             D: 2020   T: 2020   MT: MARIA A      Name:     RADHA ALFONSO   MRN:      [id number]        Account:       [id number]   :      1935           Consult Date:  2020      Document: [id number]

## 2020-11-12 NOTE — PROGRESS NOTES
Service Date: 2020      SUBJECTIVE:  Mr. 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  The patient admitted with worsening shortness of breath.  He has pleural effusion.  The patient had right thoracocentesis of 1.5 liters.  He is being diuresed.  Overall, he is feeling better.  He had echocardiogram done. EF is normal.      The patient is feeling better.  Shortness of breath is better.  His fatigue is also slightly less.  His anxiety is better controlled.  He will be going home today.      No nausea.  No vomiting.  Appetite is fair.  No fever or chills.      PHYSICAL EXAMINATION:   GENERAL:  He is alert, oriented x 3. Not in any acute distress.   The rest of the systems not examined.      LABORATORY DATA:  Reviewed.      ASSESSMENT:   1.  An 85-year-old gentleman with shortness of breath secondary to pleural effusion and anxiety.   2.  CMML-1.   3.  Systemic mastocytosis.      PLAN:   1.  The patient clinically is better.  He is being discharged today.  We will see him in clinic next week.  Plan is to continue treatment for CMML-1.   2.  I explained to the patient that his CBC is highly abnormal.  I am expecting it to improve as his CMML improves.   3.  The patient advised to call us with any questions.         GRAZYNA RICHEY MD             D: 2020   T: 2020   MT: SHEKHAR      Name:     RADHA ALFONSO   MRN:      [id number]        Account:      [id number]   :      1935           Service Date: 2020      Document: [id number]

## 2020-11-12 NOTE — PLAN OF CARE
DATE & TIME: 11/11/20 DAY  Cognitive Concerns/ Orientation : A&O x 4; can be anxious at times, calm and cooperative this shift; hearing aids and glasses on.   BEHAVIOR & AGGRESSION TOOL COLOR: Green   CIWA SCORE: N/A   ABNL VS/O2: VSS on RA   MOBILITY: Independent; steady on feet  PAIN MANAGMENT: Denies pain  DIET: 2 gram Na+ diet- little appetite.   BOWEL/BLADDER: BS active x 4; continent. BM tonight.   ABNL LAB/BG: Albumin= 3.2, KTS=277.7- improving from yesterday, Plt 32- transfusion given- recheck in am. Hgb=10.9; D.dimer= 2.8   DRAIN/DEVICES: PIV SL   TELEMETRY RHYTHM: N/A  SKIN: Scattered bruising; intact; BLE edema 3+- legs elevated at all times in bed. Thoracentesis site- reddened with hematoma- bandaid CDI.   TESTS/PROCEDURES:echo and thoracentesis- 1.45 L removed today.   D/C DAY/GOALS/PLACE: Discharge 1-2 days pending clinical improvement.  OTHER IMPORTANT INFO: SW/ PT/ Hem/Onc/Lymphedema/Psych following. Palliative signed off- pt wants to continue current regimen. special precautions D/C'ed- COVID-19 (-). pills whole with applesauce; PRN Xanax available- given 1x.      Observation goals :  -diagnostic tests and consults completed and resulted: NOT MET   -vital signs normal or at patient baseline: MET  -dyspnea improved and O2 sats greater than 88% on room air or prior home oxygen levels: MET  -returns to baseline functional status: NOT MET   -safe disposition plan has been identified: NOT MET

## 2020-11-12 NOTE — CONSULTS
Care Management Initial Consult    General Information  Assessment completed with: Patient,    Type of CM/SW Visit: Initial Assessment  Primary Care Provider verified and updated as needed: Yes   Readmission within the last 30 days: no previous admission in last 30 days      Reason for Consult: discharge planning  Advance Care Planning: Advance Care Planning Reviewed: present on chart          Communication Assessment  Patient's communication style: spoken language (English or Bilingual)    Hearing Difficulty or Deaf: yes        Cognitive  Cognitive/Neuro/Behavioral: WDL                      Living Environment:   People in home: spouse  Angela Barrett  Current living Arrangements: house      Able to return to prior arrangements: yes       Family/Social Support:  Care provided by:    Provides care for:    Marital Status:   Wife;Children                  Current Resources:   Skilled Home Care Services:  NA  Community Resources: None  Equipment currently used at home: none  Supplies currently used at home: None    Employment/Financial:  Employment Status:   Retired       Financial Concerns: No concerns identified           Lifestyle & Psychosocial Needs:        Socioeconomic History     Marital status:      Spouse name: Not on file     Number of children: Not on file     Years of education: Not on file     Highest education level: Not on file     Tobacco Use     Smoking status: Former Smoker     Packs/day: 2.00     Years: 40.00     Pack years: 80.00     Types: Cigarettes     Quit date: 1975     Years since quittin.8     Smokeless tobacco: Never Used   Substance and Sexual Activity     Alcohol use: No     Drug use: No     Sexual activity: Not Currently       Functional Status:  Prior to admission patient needed assistance: No             Mental Health Status:  Positive attitude          Chemical Dependency Status:  NA                Values/Beliefs:  Spiritual, Cultural Beliefs, Adventist Practices,  Values that affect care:       NA          Additional Information:  Pt is in a better frame of mind concerning how he feels about continuing treatment for CMML.  He reports he had a good conversation with Palliative Care and noted he feels so much better after having the thoracentesis.  He is wanting Keily Rodriguez to call his spouse.  Have paged Keily, unsure if she is working today.  Pt and his spouse both drive.  He notes he is weaker than his baseline and declines offer for any home care.        Care Management Discharge Note    Discharge Date: 11/12/20  Expected Time of Departure: 12:15 PM    Discharge Disposition: Home    Discharge Services:  NA    Discharge DME: None    Discharge Transportation: family or friend will provide      Patient/family educated on Medicare website which has current facility and service quality ratings: no    Education Provided on the Discharge Plan:  yes    Patient/Family in Agreement with the Plan: yes    Handoff Referral Completed: Yes    Additional Information:  He notes he is weaker than his baseline and declines offer for any home care.  Pt is looking forward to phone conversation he and his spouse have arranged with Dr Ndiaye for this afternoon to discuss future treatment plans.    PCP appointment was scheduled for 11/18/20        Evelyn Davis RN

## 2020-11-12 NOTE — DISCHARGE SUMMARY
[facility]    Discharge Summary  Hospitalist    Date of Admission:  11/10/2020  Date of Discharge:  11/12/2020  Discharging Provider: Jaime Green MD  Date of Service (when I saw the patient): 11/12/20    Discharge Diagnoses   Please refer below     History of Present Illness   Jeff Mack is an 85 year old male who presented with SOB    Hospital Course      Bilateral Pleural Effusion R>L  SOB and LEYVA most likely related to Pleural effusion  LE Edema   S/P Right Thoracentesis     CT chest reviewed moderate to large right sided pleural effusion with  Compressive atelectasis worsening, no infiltrate, has some pleural effusion on left as well.  LE edema, was on Lasix started as out patient.   Agree with IV lasix 20 mg bid at this time, Echo ordered and reviewed shows normal EF 60-65%, no WMA,   Trivial aortic stenosis, right ventricular systolic pressure normal.   He responded to the IV lasix very well, his symptoms improve, his LE improve and only trace edema at time  Of discharge. He has Right Thoracentesis done on 10/11 about 1.5 liter of dark red fluid were removed, exudative as per Light's criteria, cytology pending at time of discharge.   He will be continue on Lasix 20 mg daily on discharge, prescription given and need follow up with his Oncologist, Dr Ndiaye, discuss with Dr Ndiaye at time of discharge and he will see him next week in his clinic.           COPD; lung exam relatively unremarkable.  - continue Breo Ellipta and PRN albuterol HFA  - add Duo-nebs QID while awake; begin in a.m.  Not in acute exacerbation.      Anxiety; suspect chronic, but increased with recent treatments for his CMML; it has been impeding his ability to make decisions about his treatment options.  - increase xanax to TID PRN  - Psychiatry to consulted, appreciate input, low dose Seroquel added PRN         Chronic myelomonocytic leukemia (CMML-1),   Leukocytosis and Thrombocytopenia  Systemic  mastocytosis     Patient is followed by Dr Ndiaye of [facility].   CT chest shows increase size of extrapleural soft tissue nodule  To the left and right of the mid lower thoracic spine, indeterminate but  Suspicious for lymphomatous involvement.   Hepatosplenomegaly is stable.   He is started back on Decitabine last dose on 11/06, next dose plan for 11/30.  Leukocytosis is secondary to CML but trending down.  Thrombocytopenia stable, transfuse 1 unit of platelets for procedure   recent flow cytometry did not show progression to AML  Appreciate input from Oncology, follow up as out patient.         Hepatomegaly and elevated AST and alkaline phosphatase; attributed to his CMML.        Recent hyperuricemia.   Stable      Low albumin; likely is malnourished.  - RD to evaluate       Patient is feeling well at the time of discharge and denies any chest pain, SOB, fever, chills, nausea, vomiting, headache or dizziness. He is up and about without any problem and requesting to discharge home. He will be discharge home in stable condition.     Jaime Green MD, MD    Significant Results and Procedures   Right Thoracentesis US guided.     Pending Results   These results will be followed up by PCP/Oncology   Unresulted Labs Ordered in the Past 30 Days of this Admission     Date and Time Order Name Status Description    11/11/2020 0854 Cytology non gyn In process     11/11/2020 0854 AFB Stain Non-Blood ([id number]) In process     11/11/2020 0854 AFB Culture Non Blood In process     11/11/2020 0854 Fluid Culture Aerobic Bacterial In process           Code Status   DNI       Primary Care Physician   Benjamin Lazar    Physical Exam   Temp: 98  F (36.7  C) Temp src: Oral BP: 113/67 Pulse: 98   Resp: 16 SpO2: 93 % O2 Device: None (Room air)    There were no vitals filed for this visit.  Vital Signs with Ranges  Temp:  [97.4  F (36.3  C)-98.1  F (36.7  C)] 98  F (36.7  C)  Pulse:  [] 98  Resp:  [16-18] 16  BP: (107-127)/(54-89)  113/67  SpO2:  [93 %-95 %] 93 %  I/O last 3 completed shifts:  In: 484 [P.O.:240]  Out: -     Constitutional: awake, alert, cooperative, no apparent distress, and appears stated age  Eyes: Lids and lashes normal, pupils equal, round and reactive to light, extra ocular muscles intact, sclera clear, conjunctiva normal  Respiratory: No increased work of breathing, good air exchange, clear to auscultation bilaterally, no crackles or wheezing  Cardiovascular: Normal apical impulse, regular rate and rhythm, normal S1 and S2, no S3 or S4, and no murmur noted  GI: No scars, normal bowel sounds, soft, non-distended, non-tender, no masses palpated, no hepatosplenomegally  Musculoskeletal: trace to no lower extremity pitting edema present  Neurologic: Awake, alert, oriented to name, place and time.  Cranial nerves II-XII are grossly intact.  Motor is 5 out of 5 bilaterally.  Cerebellar finger to nose, heel to shin intact.  Sensory is intact.  Babinski down going, Romberg negative, and gait is normal.    Discharge Disposition   Discharged to home  Condition at discharge: Stable    Consultations This Hospital Stay   CARE MANAGEMENT / SOCIAL WORK IP CONSULT  PHYSICAL THERAPY ADULT IP CONSULT  PALLIATIVE CARE ADULT IP CONSULT  HEMATOLOGY & ONCOLOGY IP CONSULT  PSYCHIATRY IP CONSULT  LYMPHEDEMA THERAPY IP CONSULT  NUTRITION SERVICES ADULT IP CONSULT  PHYSICAL THERAPY ADULT IP CONSULT    Time Spent on this Encounter   IJaime MD, personally saw the patient today and spent greater than 30 minutes discharging this patient.    Discharge Orders      Reason for your hospital stay    SOB secondary to Pleural effusion     Follow-up and recommended labs and tests     Follow up with primary care provider, Benjamin Lazar, within 7 days for hospital follow- up.  The following labs/tests are recommended: bmp and cbc.     Activity    Your activity upon discharge: activity as tolerated     Diet    Follow this diet upon discharge: Orders  Placed This Encounter      Combination Diet 2 gm NA Diet     Discharge Medications   Current Discharge Medication List      CONTINUE these medications which have CHANGED    Details   furosemide (LASIX) 20 MG tablet Take 1 tablet (20 mg) by mouth daily  Qty: 30 tablet, Refills: 0    Associated Diagnoses: 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)         CONTINUE these medications which have NOT CHANGED    Details   albuterol (PROAIR HFA/PROVENTIL HFA/VENTOLIN HFA) 108 (90 Base) MCG/ACT inhaler Inhale 1-2 puffs into the lungs every 6 hours as needed for shortness of breath / dyspnea or wheezing    Comments: Pharmacy may dispense brand covered by insurance (Proair, or proventil or ventolin or generic albuterol inhaler)      Cholecalciferol (VITAMIN D3) 5000 UNITS TABS Take 5,000 Units by mouth daily       SYMBICORT 160-4.5 MCG/ACT Inhaler Inhale 2 puffs into the lungs 2 times daily       ALPRAZolam (XANAX) 0.25 MG tablet Take 1 tablet (0.25 mg) by mouth 2 times daily as needed for anxiety  Qty: 60 tablet, Refills: 1    Associated Diagnoses: Anxiety      fluticasone (FLONASE) 50 MCG/ACT nasal spray SPRAY 2 SPRAYS INTO EACH NOSTRIL EVERY DAY  Qty: 48 mL, Refills: 3    Associated Diagnoses: Allergic sinusitis      Nutritional Supplements (ENSURE COMPLETE) LIQD Take 1 Bottle by mouth every 8 hours as needed  Qty: 30 Bottle, Refills: 3    Associated Diagnoses: Weight loss      polyethylene glycol (MIRALAX) 17 g packet Take 1 packet by mouth daily as needed for constipation      sennosides (SENOKOT) 8.6 MG tablet Take 1 tablet by mouth daily as needed for constipation           Allergies   No Known Allergies  Data   Most Recent 3 CBC's:  Recent Labs   Lab Test 11/11/20  0728 11/10/20  1000 11/06/20  1020   .7* 121.6* 160.3*   HGB 10.0* 10.9* 11.1*   MCV 87 87 85   PLT 32* 36* 35*      Most Recent 3 BMP's:  Recent Labs   Lab Test 11/11/20  0728 11/10/20  1000 11/06/20  1020    137 136    POTASSIUM 4.2 4.0 3.7   CHLORIDE 106 106 104   CO2 27 27 24   BUN 24 23 34*   CR 1.08 0.98 1.26*   ANIONGAP 5 4 8   KAE 8.4* 8.5 8.3*   GLC 83 128* 107*     Most Recent 2 LFT's:  Recent Labs   Lab Test 11/10/20  1000 11/06/20  1020   * 114*   ALT 66 60   ALKPHOS 237* 248*   BILITOTAL 1.3 1.5*     Most Recent INR's and Anticoagulation Dosing History:  Anticoagulation Dose History     Recent Dosing and Labs Latest Ref Rng & Units 1/12/2005 4/3/2015 5/4/2020 11/11/2020    INR 0.86 - 1.14 0.91 1.00 1.18(H) 1.28(H)        Most Recent 3 Troponin's:  Recent Labs   Lab Test 11/10/20  1111 04/26/20  1432 02/28/20  2331   TROPI <0.015 <0.015 <0.015     Most Recent Cholesterol Panel:  Recent Labs   Lab Test 07/09/15  1110   CHOL 156   LDL 92   HDL 48   TRIG 80     Most Recent 6 Bacteria Isolates From Any Culture (See EPIC Reports for Culture Details):  Recent Labs   Lab Test 10/01/20  0956 02/29/20  0329 03/27/17  1139   CULT No growth No growth  No growth 10,000 to 50,000 colonies/mL Escherichia coli*     Most Recent TSH, T4 and A1c Labs:  Recent Labs   Lab Test 11/10/20  1111   TSH 6.56*   T4 1.14     Results for orders placed or performed during the hospital encounter of 11/10/20   CT Chest (PE) Abdomen Pelvis w Contrast    Narrative    CT CHEST PE ABDOMEN PELVIS W CONTRAST 11/10/2020 12:54 PM    CLINICAL HISTORY: Shortness of breath; also enlarged liver and spleen  on exam.h/o CMML  TECHNIQUE: CT angiogram chest and routine CT abdomen pelvis with IV  contrast. Arterial phase through the chest and venous phase through  the abdomen and pelvis. 2D and 3D MIP reconstructions were preformed  by the CT technologist. Dose reduction techniques were used.     CONTRAST: 83 mL [id number]    COMPARISON: 10/31/2020, 10/26/2020 and 2/29/2020    FINDINGS:  ANGIOGRAM CHEST: Pulmonary arteries are normal caliber and negative  for pulmonary emboli. Thoracic aorta is negative for dissection. No CT  evidence of right heart strain.      LUNGS AND PLEURA: Moderate right pleural effusion and small left  pleural effusion have increased since the abdominal CT on 10/31/2020.  Adjacent compressive atelectasis in the lungs. No infiltrates. Small  extrapleural posterior medial paraspinal nodules bilaterally have  increased in size/number. For example a 2.1 x 1.2 cm nodule in the  right posterior medial paraspinous space (series 4, image 105)  previously measured 1.8 x 0.9 cm, a 2.1 x 1.3 cm nodule (series 4,  image 115) previously measured 1.5 x 0.7 cm and a 2.2 x 0.9 cm left  paraspinous nodule appears new (series 4, image 115). These nodules  are indeterminate but suggestive of lymphomatous involvement.    MEDIASTINUM/AXILLAE: No lymphadenopathy in the axillae. No definite  mediastinal or hilar lymphadenopathy. The heart and great vessels are  normal in size. Severe coronary artery calcifications. No pericardial  effusion. Small hiatal hernia.    HEPATOBILIARY: Unchanged hepatomegaly. Stable subcentimeter  hypodensity at the caudate lobe likely small cyst (series 13, image  60). No other focal lesions of the liver. No radiopaque gallstones or  biliary ductal dilatation.    PANCREAS: Normal.    SPLEEN: Stable marked splenomegaly with the spleen measuring about  16.6 cm.    ADRENAL GLANDS: Normal.    KIDNEYS/BLADDER: No renal calculi, masses or hydronephrosis.  Intravascular arterial calcifications in the kidneys. Distended  urinary bladder with a 3.3 cm left lateral bladder wall diverticulum.  TURP defect.    BOWEL: Normal caliber loops of small bowel and colon. Colonic  diverticulosis. Left inguinal hernia containing a nonobstructed loop  of small bowel. Normal appendix. No inflammatory changes.    LYMPH NODES: Small upper abdominal lymph nodes along the aortic  diaphragmatic hiatus (series 13 image 58) are stable. No new or  enlarging lymph nodes.    PELVIC ORGANS: No pelvic masses.    OTHER: Trace ascites. Prevertebral soft tissue thickening to the  right  of the L4-5 intervertebral space measuring up to 1.7 cm (series 13,  image 122) has gradually increased in thickness since 2/29/2020. This  is contiguous with soft tissue thickening at the L5-S1 level measuring  up to 1.8 cm. (15, image 56)    MUSCULOSKELETAL: Instrumented fusion at L5-S1 is stable. No suspicious  lesions in the bones.      Impression    IMPRESSION:  1.  No pulmonary emboli.  2.  Increase in moderate right and small left pleural effusion.  3.  Stable hepatosplenomegaly.  4.  Increased size of extrapleural paraspinal soft tissue nodules to  the left and right of the mid-lower thoracic spine. These are  indeterminate but suspicious for lymphomatous involvement.  5.  Increased indeterminate prevertebral soft tissue thickening since  2/29/2020 at L4-L5 and L5-S1. This is indeterminate but could also  represent lymphomatous involvement and less likely due to degenerative  changes. Could consider a nonemergent lumbar spine MRI for further  evaluation based on clinical concern.  6.  Stable mildly enlarged upper abdominal lymph nodes.  7.  Small ascites, slightly increased.    SHAUN KIM MD   US Thoracentesis    Narrative    US THORACENTESIS 11/11/2020 2:42 PM    CLINICAL HISTORY: right sided moderate to large pleural effusion and  SOB    PROCEDURE: Informed consent obtained. Time out performed. The chest  was prepped and draped in sterile fashion. 10 mL of 1% Xylocaine was  infused into the local soft tissues. Under direct ultrasound guidance,  a 5 Maori catheter system was placed into the pleural effusion.     1.5 liters of dark red fluid were removed and sent to lab, if  requested.    Patient tolerated procedure well.    Ultrasound imaging was obtained and placed in the patient's permanent  medical record.      Impression    IMPRESSION:  1.  Status post right ultrasound-guided thoracentesis.    Reference CPT Code: 91966    SHAUN KIM MD   XR Chest Port 1 View    Narrative    CHEST ONE VIEW  PORTABLE  2020 8:40 AM     HISTORY: Status post thoracentesis.    COMPARISON: CT chest, abdomen and pelvis on 11/10/2020.      Impression    IMPRESSION: AP views of the chest were obtained. Stable  cardiomediastinal silhouette. Mild prominence of the right renal  hilum, likely corresponds to the aneurysmal dilatation of the  ascending thoracic aorta. No significant pleural effusion or  pneumothorax.    KEVIN LARRY MD   Echocardiogram Complete    Narrative    [id number]  [id number]  [id number]  [id number]^LEOBARDO^GREGORY^TANJA           [facility]  Echocardiography Laboratory  [address] [place] [id number]        Name: RADHA ALFONSO  MRN: [id number]  : 1935  Study Date: 2020 02:57 PM  Age: 85 yrs  Gender: Male  Patient Location: [facility]  Reason For Study: Dyspnea  Ordering Physician: GREGORY BOOTH  Referring Physician: ANGUS KIRK  Performed By: Piedad Chambers     BSA: 2.0 m2  Height: 72 in  Weight: 165 lb  HR: 95  BP: 107/59 mmHg  _____________________________________________________________________________  __        Procedure  Complete Portable Echo Adult.  _____________________________________________________________________________  __        Interpretation Summary     The left ventricle is normal in size.  The visual ejection fraction is estimated at 60-65%.  No regional wall motion abnormalities noted.  There is sclerosis, calcification, and restriction of the aortic valve opening  compatible with trivial aortic stenosis.  Right ventricle systolic pressure estimate normal  _____________________________________________________________________________  __        Left Ventricle  The left ventricle is normal in size. A sigmoid septum is present. The visual  ejection fraction is estimated at 60-65%. Left ventricular diastolic function  is indeterminate. No regional wall motion abnormalities noted.     Right Ventricle  The right ventricle is normal in size and function.      Atria  Normal left atrial size. Right atrial size is normal. There is no color  Doppler evidence of an atrial shunt.     Mitral Valve  The mitral valve leaflets are mildly thickened. There is trace mitral  regurgitation.        Tricuspid Valve  There is mild (1+) tricuspid regurgitation. The right ventricular systolic  pressure is approximated at 31.6 mmHg plus the right atrial pressure. IVC  diameter <2.1 cm collapsing >50% with sniff suggests a normal RA pressure of 3  mmHg. Right ventricle systolic pressure estimate normal.     Aortic Valve  No aortic regurgitation is present. There is sclerosis, calcification, and  restriction of the aortic valve opening compatible with trivial aortic  stenosis. The mean AoV pressure gradient is 11.5 mmHg.     Pulmonic Valve  There is trace pulmonic valvular regurgitation.     Vessels  Mild aortic root dilatation. The ascending aorta is Mildly dilated.     Pericardium  There is no pericardial effusion.        Rhythm  Sinus rhythm was noted.  _____________________________________________________________________________  __  MMode/2D Measurements & Calculations  IVSd: 1.2 cm     LVIDd: 4.7 cm  LVIDs: 2.9 cm  LVPWd: 1.2 cm  FS: 39.4 %  LV mass(C)d: 207.5 grams  LV mass(C)dI: 105.7 grams/m2  Ao root diam: 3.9 cm  LA dimension: 3.8 cm  asc Aorta Diam: 3.7 cm  LA/Ao: 0.97  LVOT diam: 2.4 cm  LVOT area: 4.7 cm2  LA Volume (BP): 61.0 ml  LA Volume Index (BP): 31.1 ml/m2  RWT: 0.50           Doppler Measurements & Calculations  MV E max umesh: 77.6 cm/sec  MV A max umesh: 118.4 cm/sec  MV E/A: 0.66  MV dec time: 0.18 sec  Ao V2 max: 228.8 cm/sec  Ao max P.9 mmHg  Ao V2 mean: 158.9 cm/sec  Ao mean P.5 mmHg  Ao V2 VTI: 44.8 cm  HUY(I,D): 2.2 cm2  HUY(V,D): 2.3 cm2  LV V1 max P.2 mmHg  LV V1 max: 114.0 cm/sec  LV V1 VTI: 20.7 cm  SV(LVOT): 97.6 ml  SI(LVOT): 49.7 ml/m2  PA V2 max: 84.2 cm/sec  PA max P.8 mmHg  PA acc time: 0.10 sec  TR max umesh: 281.2 cm/sec  TR max P.6  mmHg  AV Dylan Ratio (DI): 0.50  HUY Index (cm2/m2): 1.1  E/E' av.2  Lateral E/e': 6.8  Medial E/e': 9.6              _____________________________________________________________________________  __        Report approved by: Pillo Qiu 2020 04:01 PM          Most Recent 3 CBC's:  Recent Labs   Lab Test 11/11/20  0728 11/10/20  1000 20  1020   .7* 121.6* 160.3*   HGB 10.0* 10.9* 11.1*   MCV 87 87 85   PLT 32* 36* 35*     Most Recent 3 BMP's:  Recent Labs   Lab Test 11/11/20  0728 11/10/20  1000 20  1020    137 136   POTASSIUM 4.2 4.0 3.7   CHLORIDE 106 106 104   CO2 27 27 24   BUN 24 23 34*   CR 1.08 0.98 1.26*   ANIONGAP 5 4 8   KAE 8.4* 8.5 8.3*   GLC 83 128* 107*

## 2020-11-12 NOTE — PROGRESS NOTES
Discharge    Patient discharged to HOME via private transportation with wife.   Care plan note: COMPLETE    Listed belongings gathered and returned to patient. Yes  Care Plan and Patient education resolved: Yes  Prescriptions if needed, hard copies sent with patient  NA  Home and hospital acquired medications returned to patient: Yes  Medication Bin checked and emptied on discharge Yes  Follow up appointment made for patient: Yes

## 2020-11-12 NOTE — PLAN OF CARE
DATE & TIME: 11/12/2020 DAY   Cognitive Concerns/ Orientation : A&O x 4; can be anxious at times, calm and cooperative this shift; hearing aids and glasses on.   BEHAVIOR & AGGRESSION TOOL COLOR: Green   CIWA SCORE: N/A   ABNL VS/O2: VSS on RA   MOBILITY: Independent; steady on feet  PAIN MANAGMENT: Denies pain  DIET: 2 gram Na+ diet- little appetite.   BOWEL/BLADDER:continent  ABNL LAB/BG: Albumin= 3.2, KLF=988.7, Plt 32- transfusion given yesterday. No AM labs today. Pt will follow up outpt for labs to be drawn   DRAIN/DEVICES: PIV SL   TELEMETRY RHYTHM: N/A  SKIN: Scattered bruising; intact; BLE edema 3+- legs elevated at all times in bed. Thoracentesis site CDI.   TESTS/PROCEDURES:echo and thoracentesis- 1.45 L removed yest.   D/C DAY/GOALS/PLACE: today HOME- will follow up outpt with Dr. Ndiaye.   OTHER IMPORTANT INFO: PRN xanax available. Pt refused lymphedema consult today- but educated on importance of exercise and keeping LE elevated.      Observation goals :  -diagnostic tests and consults completed and resulted: MET   -vital signs normal or at patient baseline: MET  -dyspnea improved and O2 sats greater than 88% on room air or prior home oxygen levels: MET  -returns to baseline functional status: MET   -safe disposition plan has been identified: MET

## 2020-11-12 NOTE — PLAN OF CARE
PT/Lymph: Orders received. Chart reviewed and discussed with pt and care team. PT not indicated as pt is independent in the room and at baseline. Pt is steady on his feet and moving safety. Lymph orders also received and pt is refusing this service. States he will not wear MARYBEL socks and does not want them wrapped here. States he will keep them elevated and was encouraged to walk and perform ankle pumps to assist with edema control and pt in agreement with this. Pt is safe to return home at this time from a mobility standpoint. Will complete orders.

## 2020-11-12 NOTE — PLAN OF CARE
DATE & TIME: 11/11/20 1900-0730  Cognitive Concerns/ Orientation : A&O x 4; can be anxious at times, calm and cooperative this shift; hearing aids and glasses on.   BEHAVIOR & AGGRESSION TOOL COLOR: Green   CIWA SCORE: N/A   ABNL VS/O2: VSS on RA   MOBILITY: Independent; steady on feet  PAIN MANAGMENT: Denies pain  DIET: 2 gram Na+ diet- little appetite.   BOWEL/BLADDER:continent  ABNL LAB/BG: Albumin= 3.2, OOX=840.7, platelets being rechecked this AM  DRAIN/DEVICES: PIV SL   TELEMETRY RHYTHM: N/A  SKIN: Scattered bruising; intact; BLE edema 3+- legs elevated at all times in bed  TESTS/PROCEDURES: NA  D/C DAY/GOALS/PLACE: Discharge 1-2 days pending clinical improvement.  OTHER IMPORTANT INFO: PRN xanax available

## 2020-11-13 NOTE — PROGRESS NOTES
Clinic Care Coordination Contact  Community Health Worker Initial Outreach            Patient accepts CC: No, we have everything covered and being addressed. No need for CCC      Pt will have phone conversation with his Oncologist, Dr Ndiaye this afternoon to go over future treatment plans for CMML.  Palliative consulted 11/11/20

## 2020-11-13 NOTE — TELEPHONE ENCOUNTER
Patient was discharged from [facility] on 11/12/2020 after being treated for Pleural Effusion. Triage please follow up with patient.      .Amanda REILLY    [facility]

## 2020-11-13 NOTE — TELEPHONE ENCOUNTER
ED / Discharge Outreach Protocol    Patient Contact    After reviewing the chart, patient had a follow up appointment with oncology yesterday and is being followed by oncology.  No triage call needed.    NICO PeralesN, RN  [employer] Triage

## 2020-11-16 NOTE — PROGRESS NOTES
"The patient has been notified of following:     \"This telephone visit will be conducted via a call between you and your physician/provider. We have found that certain health care needs can be provided without the need for a physical exam.  This service lets us provide the care you need with a short phone conversation.  If a prescription is necessary we can send it directly to your pharmacy.  If lab work is needed we can place an order for that and you can then stop by our lab to have the test done at a later time.    Telephone visits are billed at different rates depending on your insurance coverage. During this emergency period, for some insurers they may be billed the same as an in-person visit.  Please reach out to your insurance provider with any questions.    If during the course of the call the physician/provider feels a telephone visit is not appropriate, you will not be charged for this service.\"    Patient has given verbal consent for Telephone visit?  Yes    What phone number would you like to be contacted at? [phone]     How would you like to obtain your AVS? Giselat    Refill - Albuterol, pt has been SOB.    Shari J. Schoenberger, [facility]    "

## 2020-11-16 NOTE — PROGRESS NOTES
Visit Date:   11/16/2020     HEMATOLOGY HISTORY: Mr. 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   1.  On 02/28/2020, WBC of 30.5, hemoglobin of 9.4 and platelet of 235.  MCV of 87.  Neutrophil of 50%, lymphocytes of 15% and monocytes of 17%. On 11/16/2018, CBC normal with WBC of 4.8, hemoglobin 15.3 and platelets of 295.  2.  CT chest, abdomen and pelvis on 02/29/2020 does not reveal any evidence of malignancy.   3.   On 03/19/2020, NGS panel negative for JAK2, CALR and MPL.  There is IDH2 R140Q and KIT D816V.  4. Bone marrow biopsy on 04/16/2020 reveals chronic myelomonocytic leukemia - 1 (CMML-1) characterized by hypercellular bone marrow (90%) with increased M:E ratio and 7% blasts. There are aggregates of atypical mast cells consistent with bone marrow involvement by systemic mastocytosis. Increased bone marrow fibrosis (MF-1-2).  -Normal cytogenetics.   -Flow cytometry on bone marrow reveals increased monocytic cells (43%).  No increase in CD34 positive myeloid blasts.   -BCR-ABL1 bone marrow is negative.   5. On 04/29/2020, tryptase elevated at 51.3 (normal less than 11).  6. Decitabine x 4 cycles between 04/29/2020 and 07/31/2020. (patient did not want any further decitabine).  -Hydroxyurea started on 08/04/2020 due to thrombocytosis. Later stopped due to thrombocytopenia.  -Decitabine started on 11/02/2020.     SUBJECTIVE:  Mr. Mack is an 85-year-old gentleman with CMML-1 and systemic mastocytosis.  He was recently restarted on decitabine because of progression of disease.      The patient was recently admitted to hospital on 11/10/2020 because of shortness of breath.  The patient was found to have a pleural effusion, and he had right thoracocentesis done. Pleural fluid is negative for malignancy.   He is also on diuretic. His shortness of breath is better.       No headache.  Some lightheadedness.  No neck pain.  No chest pain.  No nausea or vomiting.   Appetite is fair.  No urinary or bowel complaints.  No abnormal bleeding. He still feels fatigued.      All other review of systems negative.      PHYSICAL EXAMINATION:    He is alert, oriented x 3.    This is a telephone visit.      LABORATORY DATA:  Reviewed.      ASSESSMENT:   1.  Chronic myelomonocytic leukemia-1   2.  Systemic mastocytosis   3.  Pleural effusion.   4.  Shortness of breath, improving.   5.  Fatigue.      PLAN:   1.  The patient's overall condition is stable to slightly better.  Thoracocentesis and diuretics are helping with the shortness of breath.      I explained to the patient that pleural fluid can reaccumulate, and he can have worsening shortness of breath.  I advised the patient to call us if he starts having shortness of breath, so that we will arrange for thoracocentesis.      2.  For CMML, he will continue on decitabine.  He is tolerating it well.     3.  I explained to the patient that fatigue is due to multiple factors including his age, anxiety and CMML.  I am hoping it will improve somewhat as his CMML get under better control.     4.  I will see him with next cycle of decitabine.  In between, he will see our nurse practitioner.  Advised him to call us with any questions or concerns.         GRAZYNA RICHEY MD             D: 2020   T: 2020   MT: BONILLA      Name:     RADHA ALFONSO   MRN:      [id number]        Account:      [id number]   :      1935           Visit Date:   2020      Document: [id number]      Telephone visit for 11 minutes.

## 2020-11-16 NOTE — LETTER
"    11/16/2020         RE: Jeff Mack  [address]  [place] [address]        Dear Colleague,    Thank you for referring your patient, Jeff Mack, to the [facility]. Please see a copy of my visit note below.    The patient has been notified of following:     \"This telephone visit will be conducted via a call between you and your physician/provider. We have found that certain health care needs can be provided without the need for a physical exam.  This service lets us provide the care you need with a short phone conversation.  If a prescription is necessary we can send it directly to your pharmacy.  If lab work is needed we can place an order for that and you can then stop by our lab to have the test done at a later time.    Telephone visits are billed at different rates depending on your insurance coverage. During this emergency period, for some insurers they may be billed the same as an in-person visit.  Please reach out to your insurance provider with any questions.    If during the course of the call the physician/provider feels a telephone visit is not appropriate, you will not be charged for this service.\"    Patient has given verbal consent for Telephone visit?  Yes    What phone number would you like to be contacted at? [phone]     How would you like to obtain your AVS? MyChart    Refill - Albuterol, pt has been SOB.    Shari J. Schoenberger, [facility]      Visit Date:   11/16/2020     HEMATOLOGY HISTORY: Mr. 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   1.  On 02/28/2020, WBC of 30.5, hemoglobin of 9.4 and platelet of 235.  MCV of 87.  Neutrophil of 50%, lymphocytes of 15% and monocytes of 17%. On 11/16/2018, CBC normal with WBC of 4.8, hemoglobin 15.3 and platelets of 295.  2.  CT chest, abdomen and pelvis on 02/29/2020 does not reveal any evidence of malignancy.   3.   On 03/19/2020, NGS panel negative " for JAK2, CALR and MPL.  There is IDH2 R140Q and KIT D816V.  4. Bone marrow biopsy on 04/16/2020 reveals chronic myelomonocytic leukemia - 1 (CMML-1) characterized by hypercellular bone marrow (90%) with increased M:E ratio and 7% blasts. There are aggregates of atypical mast cells consistent with bone marrow involvement by systemic mastocytosis. Increased bone marrow fibrosis (MF-1-2).  -Normal cytogenetics.   -Flow cytometry on bone marrow reveals increased monocytic cells (43%).  No increase in CD34 positive myeloid blasts.   -BCR-ABL1 bone marrow is negative.   5. On 04/29/2020, tryptase elevated at 51.3 (normal less than 11).  6. Decitabine x 4 cycles between 04/29/2020 and 07/31/2020. (patient did not want any further decitabine).  -Hydroxyurea started on 08/04/2020 due to thrombocytosis. Later stopped due to thrombocytopenia.  -Decitabine started on 11/02/2020.     SUBJECTIVE:  Mr. Mack is an 85-year-old gentleman with CMML-1 and systemic mastocytosis.  He was recently restarted on decitabine because of progression of disease.      The patient was recently admitted to hospital on 11/10/2020 because of shortness of breath.  The patient was found to have a pleural effusion, and he had right thoracocentesis done. Pleural fluid is negative for malignancy.   He is also on diuretic. His shortness of breath is better.       No headache.  Some lightheadedness.  No neck pain.  No chest pain.  No nausea or vomiting.  Appetite is fair.  No urinary or bowel complaints.  No abnormal bleeding. He still feels fatigued.      All other review of systems negative.      PHYSICAL EXAMINATION:    He is alert, oriented x 3.    This is a telephone visit.      LABORATORY DATA:  Reviewed.      ASSESSMENT:   1.  Chronic myelomonocytic leukemia-1   2.  Systemic mastocytosis   3.  Pleural effusion.   4.  Shortness of breath, improving.   5.  Fatigue.      PLAN:   1.  The patient's overall condition is stable to slightly better.   Thoracocentesis and diuretics are helping with the shortness of breath.      I explained to the patient that pleural fluid can reaccumulate, and he can have worsening shortness of breath.  I advised the patient to call us if he starts having shortness of breath, so that we will arrange for thoracocentesis.      2.  For CMML, he will continue on decitabine.  He is tolerating it well.     3.  I explained to the patient that fatigue is due to multiple factors including his age, anxiety and CMML.  I am hoping it will improve somewhat as his CMML get under better control.     4.  I will see him with next cycle of decitabine.  In between, he will see our nurse practitioner.  Advised him to call us with any questions or concerns.         GRAZYNA RICHEY MD             D: 2020   T: 2020   MT: BONILLA      Name:     RADHA ALFONSO   MRN:      [id number]        Account:      [id number]   :      1935           Visit Date:   2020      Document: [id number]      Telephone visit for 11 minutes.    This office note has been dictated.          Again, thank you for allowing me to participate in the care of your patient.        Sincerely,        Grazyna Richey MD

## 2020-11-16 NOTE — LETTER
"    11/16/2020         RE: Jeff Mack  [address]  [place] [address]        Dear Colleague,    Thank you for referring your patient, Jeff Mack, to the [facility]. Please see a copy of my visit note below.    The patient has been notified of following:     \"This telephone visit will be conducted via a call between you and your physician/provider. We have found that certain health care needs can be provided without the need for a physical exam.  This service lets us provide the care you need with a short phone conversation.  If a prescription is necessary we can send it directly to your pharmacy.  If lab work is needed we can place an order for that and you can then stop by our lab to have the test done at a later time.    Telephone visits are billed at different rates depending on your insurance coverage. During this emergency period, for some insurers they may be billed the same as an in-person visit.  Please reach out to your insurance provider with any questions.    If during the course of the call the physician/provider feels a telephone visit is not appropriate, you will not be charged for this service.\"    Patient has given verbal consent for Telephone visit?  Yes    What phone number would you like to be contacted at? [phone]     How would you like to obtain your AVS? MyChart    Refill - Albuterol, pt has been SOB.    Shari J. Schoenberger, [facility]      Visit Date:   11/16/2020     HEMATOLOGY HISTORY: Mr. 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   1.  On 02/28/2020, WBC of 30.5, hemoglobin of 9.4 and platelet of 235.  MCV of 87.  Neutrophil of 50%, lymphocytes of 15% and monocytes of 17%. On 11/16/2018, CBC normal with WBC of 4.8, hemoglobin 15.3 and platelets of 295.  2.  CT chest, abdomen and pelvis on 02/29/2020 does not reveal any evidence of malignancy.   3.   On 03/19/2020, NGS panel negative " for JAK2, CALR and MPL.  There is IDH2 R140Q and KIT D816V.  4. Bone marrow biopsy on 04/16/2020 reveals chronic myelomonocytic leukemia - 1 (CMML-1) characterized by hypercellular bone marrow (90%) with increased M:E ratio and 7% blasts. There are aggregates of atypical mast cells consistent with bone marrow involvement by systemic mastocytosis. Increased bone marrow fibrosis (MF-1-2).  -Normal cytogenetics.   -Flow cytometry on bone marrow reveals increased monocytic cells (43%).  No increase in CD34 positive myeloid blasts.   -BCR-ABL1 bone marrow is negative.   5. On 04/29/2020, tryptase elevated at 51.3 (normal less than 11).  6. Decitabine x 4 cycles between 04/29/2020 and 07/31/2020. (patient did not want any further decitabine).  -Hydroxyurea started on 08/04/2020 due to thrombocytosis. Later stopped due to thrombocytopenia.  -Decitabine started on 11/02/2020.     SUBJECTIVE:  Mr. Mack is an 85-year-old gentleman with CMML-1 and systemic mastocytosis.  He was recently restarted on decitabine because of progression of disease.      The patient was recently admitted to hospital on 11/10/2020 because of shortness of breath.  The patient was found to have a pleural effusion, and he had right thoracocentesis done. Pleural fluid is negative for malignancy.   He is also on diuretic. His shortness of breath is better.       No headache.  Some lightheadedness.  No neck pain.  No chest pain.  No nausea or vomiting.  Appetite is fair.  No urinary or bowel complaints.  No abnormal bleeding. He still feels fatigued.      All other review of systems negative.      PHYSICAL EXAMINATION:    He is alert, oriented x 3.    This is a telephone visit.      LABORATORY DATA:  Reviewed.      ASSESSMENT:   1.  Chronic myelomonocytic leukemia-1   2.  Systemic mastocytosis   3.  Pleural effusion.   4.  Shortness of breath, improving.   5.  Fatigue.      PLAN:   1.  The patient's overall condition is stable to slightly better.   Thoracocentesis and diuretics are helping with the shortness of breath.      I explained to the patient that pleural fluid can reaccumulate, and he can have worsening shortness of breath.  I advised the patient to call us if he starts having shortness of breath, so that we will arrange for thoracocentesis.      2.  For CMML, he will continue on decitabine.  He is tolerating it well.     3.  I explained to the patient that fatigue is due to multiple factors including his age, anxiety and CMML.  I am hoping it will improve somewhat as his CMML get under better control.     4.  I will see him with next cycle of decitabine.  In between, he will see our nurse practitioner.  Advised him to call us with any questions or concerns.         GRAZYNA RICHEY MD             D: 2020   T: 2020   MT: BONILLA      Name:     RADHA ALFONSO   MRN:      [id number]        Account:      [id number]   :      1935           Visit Date:   2020      Document: [id number]      Telephone visit for 11 minutes.    This office note has been dictated.          Again, thank you for allowing me to participate in the care of your patient.        Sincerely,        Grazyna Richey MD

## 2020-11-16 NOTE — PATIENT INSTRUCTIONS
1. See Gurvinder Sanchez next week with CBC.  2. See me when he comes for next cycle of decitabine.    Please mail AVS along with mychart. Shari Schoenberger, CMA

## 2020-11-17 NOTE — TELEPHONE ENCOUNTER
Social Work Progress Note      Data/Intervention:  Patient Name:  Jeff Mack  /Age:  1935 (85 year old)    Reason for Follow-Up:  Joshua is an 85-year-old gentleman with a diagnosis of CMML who was recently admitted for shortness of breath, leg swelling, and chest pain. Joshua is followed by Dr. Ndiaye at [facility] [place]. This clinician received referral from Jessie Chakraborty RN, that family would like to connect again with palliative care provider RAUDEL Clayton CNP.     Intervention:   This clinician called and LVM on home phone and for wife, Angela Barrett with social work contact information and availability.    Resources Provided:  Onc SW contact information    Plan:  Previously provided patient/family with writer's contact information and availability. Will continue to await return call, and notify RNCC of family's desire for palliative connection.      Please call or page if needs or concerns arise.     BILL Hdez, LICSW  Direct Phone: [phone]  Pager: [phone]

## 2020-11-17 NOTE — TELEPHONE ENCOUNTER
Social Work Progress Note      Data/Intervention:  Patient Name:  Jeff Mack  /Age:  1935 (85 year old)    Reason for Follow-Up:  Joshua is an 85-year-old gentleman with a diagnosis of CMML who is followed by Dr. Ndiaye at [facility]. This clinician received return call from Joshua and wife Angela Barrett today.     Intervention:   Joshua reported that he had received a letter from [facility] that they were denying coverage for his recent hospitalization. Joshua reported he had spoken with RNCC Lindsey who had advised that he bring letter to clinic 20, and that clinic will assist with appeal process and sending over necessary paperwork.     Angela Barrett expressed that she feels comfortable with the care that Joshua is receiving with Dr. Ndiaye, but acknowledges that she worries about making decisions to support Joshua's comfort at end-of-life. Angela Barrett reported that she has strong feelings about wanting Joshua to receive care at home and not at a nursing home. Angela Barrett reported that she would be interested in hospice involvement for Joshua when he is not longer receiving cancer directed care. SW acknowledged that  will support family in dialogue surrounding timing of engagement with hospice services. SW also provided education about palliative care team as an enhanced level of support for family in addition to oncology team. Oriented Joshua and Angela Barrett to interventions that palliative care team engages with to support quality of life and symptom management. Informed family that Joshua had met with inpatient palliative care team, and that outpatient palliative care team is embedded in our clinic. Angela Barrett and Joshua very interested in establishing care with palliative care in conjunction to oncologic care.     Plan:  1) Schedulers to call Angela Barrett to schedule Joshua for palliative care consultation  2) SW will continue to be available for ongoing psychosocial support.   3) Ongoing collaboration with  multidisciplinary care team.     Please call or page if needs or concerns arise.     BILL Hdez, [facility] Center[facility]  Direct Phone: [phone]  Pager: [phone]

## 2020-11-18 NOTE — PROGRESS NOTES
Subjective     Jeff Mack is a 85 year old male who presents to clinic today for the following health issues:    [facility]           Hospital Follow-up Visit:    Hospital/Nursing Home/IP Rehab Facility: [facility]  Date of Admission: 11/10/2020  Date of Discharge: 11/12/2020  Reason(s) for Admission: Bilateral Pleural Effusion R>L  SOB and LEYVA most likely related to Pleural effusion  LE Edema   S/P Right Thoracentesis       Was your hospitalization related to COVID-19? No   Problems taking medications regularly:  None  Medication changes since discharge: None  Problems adhering to non-medication therapy:  None    Summary of hospitalization:  [facility] discharge summary reviewed  Diagnostic Tests/Treatments reviewed.  Follow up needed: none  Other Healthcare Providers Involved in Patient s Care:         None  Update since discharge: stable.       Post Discharge Medication Reconciliation: discharge medications reconciled, continue medications without change.  Plan of care communicated with patient                  Review of Systems   Constitutional, HEENT, cardiovascular, pulmonary, gi and gu systems are negative, except as otherwise noted.      Objective    /62   Pulse 95   Temp 96.5  F (35.8  C) (Tympanic)   Wt 70.3 kg (155 lb)   SpO2 99%   BMI 21.02 kg/m    Body mass index is 21.02 kg/m .  Physical Exam   GENERAL: healthy, alert and no distress  NECK: no adenopathy, no asymmetry, masses, or scars and thyroid normal to palpation  RESP: lungs clear to auscultation - no rales, rhonchi or wheezes  CV: regular rate and rhythm, normal S1 S2, no S3 or S4, no murmur, click or rub, no peripheral edema and peripheral pulses strong  ABDOMEN: soft, nontender, no hepatosplenomegaly, no masses and bowel sounds normal  MS: no gross musculoskeletal defects noted, no edema            Assessment & Plan     Jeff was seen today for hospital f/u.    Diagnoses and all orders for this visit:    SOB  (shortness of breath)  -     albuterol (PROAIR HFA/PROVENTIL HFA/VENTOLIN HFA) 108 (90 Base) MCG/ACT inhaler; Inhale 1-2 puffs into the lungs every 6 hours as needed for shortness of breath / dyspnea or wheezing    Acute reaction to stress  -     sertraline (ZOLOFT) 25 MG tablet; Take 1 tablet (25 mg) by mouth daily    Weight loss  -     sertraline (ZOLOFT) 25 MG tablet; Take 1 tablet (25 mg) by mouth daily    Pleural effusion  -     albuterol (PROAIR HFA/PROVENTIL HFA/VENTOLIN HFA) 108 (90 Base) MCG/ACT inhaler; Inhale 1-2 puffs into the lungs every 6 hours as needed for shortness of breath / dyspnea or wheezing    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)    Weakness          Stable SOB, improving weakness, has no clinical sx of deterioration   Stable VS  Is supposed to see Dr Ndiaye next week with lab test  Has lingering anxiety, will have him to start selective serotonin reuptake inhibitor, encouraged him to update us in 2 weeks for us to consider adding Seroquel if insomnia not improving     FUTURE APPOINTMENTS:       - Follow-up visit in 2 weeks     No follow-ups on file.    Benjamin Lazar MD  [facility]

## 2020-11-18 NOTE — TELEPHONE ENCOUNTER
RECORDS STATUS - ALL OTHER DIAGNOSIS      RECORDS RECEIVED FROM: [facility]/ [facility]    DATE RECEIVED: 11/20/2020    NOTES STATUS DETAILS   OFFICE NOTE from referring provider Complete Benjamin Lazar MD   OFFICE NOTE from medical oncologist Complete [facility]    [facility] Records are in EPIC    Virtual Visit 11/16/2020 Chronic Myelomonocytic leukemia     Oncology Visit 11/12/2020  Chronic Myelomonocytic leukemia     11/10/2020   Oncology Visit   Chronic Myelomonocytic leukemia     More in EPIC   DISCHARGE SUMMARY from hospital Complete 11/10/2020 Epic PE    4/26/2020 Weakness, CML    DISCHARGE REPORT from the ER Complete 11/10/2020 Epic PE    4/26/2020 CML   OPERATIVE REPORT Complete [facility]    11/10/2020 IR Procedure Note    2/21/2020, 2/19/2020 Colonoscopy    5/1/2018 Upper GI Endoscopy    MEDICATION LIST Complete [facility]   CLINICAL TRIAL TREATMENTS TO DATE     LABS     PATHOLOGY REPORTS Complete Epic    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/2/2020, 4/16/2020 4/16/2020 Surgical Path    4/16/2020 Bone Marrow Biopsy    ANYTHING RELATED TO DIAGNOSIS Complete Labs last updated on 11/16/2020     [facility] 3/19/2020   GENONOMIC TESTING     TYPE:     IMAGING (NEED IMAGES & REPORT)     CT SCANS Complete CT Chest 11/10/2020     CT Abdomen Pelvis 10/31/2020, 10/26/2020     More in PACS   Xray Chest Complete 11/12/2020, 10/31/2020, 10/1/2020 more in PACS   MRI     MAMMO     ULTRASOUND Complete US Thoracentesis 11/11/2020    PET       Action    Action Taken 11/18/2020 3:13pm     I called [facility] to see if they have any recent records. Ph: [phone]     [facility] will fax records from 2019    4:20pm   Emailed [facility] records to HIM

## 2020-11-19 NOTE — PROGRESS NOTES
"Jeff Mack is a 85 year old male who is being evaluated via a billable telephone visit.      The patient has been notified of following:     \"This telephone visit will be conducted via a call between you and your physician/provider. We have found that certain health care needs can be provided without the need for a physical exam.  This service lets us provide the care you need with a short phone conversation.  If a prescription is necessary we can send it directly to your pharmacy.  If lab work is needed we can place an order for that and you can then stop by our lab to have the test done at a later time.    Telephone visits are billed at different rates depending on your insurance coverage. During this emergency period, for some insurers they may be billed the same as an in-person visit.  Please reach out to your insurance provider with any questions.    If during the course of the call the physician/provider feels a telephone visit is not appropriate, you will not be charged for this service.\"    Patient has given verbal consent for Telephone visit?  Yes    What phone number would you like to be contacted at? [phone]    How would you like to obtain your AVS? Mail a copy      Palliative Care Clinic Progress Note    Patient Name: Jeff Mack  Primary Provider: Benjamin Lazar    Chief Complaint/Patient ID: 85 year old male with a history of hypertension, cardiomyopathy, and chronic myelocytic leukemia.  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    Last Palliative care appointment: Seen by PC during hospitalization at [facility] 11/10-11/12. Please see note from Pau Rodriguez on 11/11/20 for full details.     Reviewed:  Yes, #30 tablets of tramadol 50mg filled 11/4. #60 tablets of Xanax 0.25mg filled 10/27 (30 day supply).    Interim History:  Jeff Mack 85 year old male returns to " Palliative Care today for follow up via billable telephone encounter. Wife Angela Barrett on call as well.    Angela Barrett primarily had questions about when to engage Palliative care and how to best support Don during this whole process. She is also wondering if it is still OK to take Don to the hospital if they think it's needed.     Don currently is focused on his labs (blood counts) and his meeting with Dr. Ndiaye next week. He knows that this will be a conversation with Dr. Ndiaye about whether future infusions will be helpful or not.    He does endorse some fatigue and low appetite, however, these have not been particularly bothersome to him. He is currently trying to stay active, doing some exercise by going up and down stairs 12-15x a day.    His shortness of breath has improved- he felt significantly better after the thoracentesis in the hospital. Currently doing well with his inhalers, Symbicort daily and albuterol PRN.  He declined any real symptomatic needs at this point in time.    Coping: Per wife, hasn't complained once since he was diagnosed. Wants to be here, so pretty high tolerance to go through treatment/procedures. Has struggled, though, as he has been unable to be as active. Per chart review, his PCP just started him on selective serotonin reuptake inhibitor for anxiety. He also has a prescription for Xanax 0.25mg up to BID for anxiety.    Social History:  Lives with his wife, Angela Barrett. Is a retired .  Social History     Tobacco Use     Smoking status: Former Smoker     Packs/day: 2.00     Years: 40.00     Pack years: 80.00     Types: Cigarettes     Quit date: 1975     Years since quittin.9     Smokeless tobacco: Never Used   Substance Use Topics     Alcohol use: No     Drug use: No     Family History- Reviewed in Epic.    No Known Allergies    Advanced Care Planning: HCD on file. Limited interventions- DNI. Primary HCA is wife, followed by children.    Medications- Reviewed in  Epic.    Past Medical History- Reviewed in [facility].    Past Surgical History- Reviewed in Epic.    Review of Systems:   ROS: 10 point ROS neg other than the symptoms noted above in the HPI.    Key Data Reviewed:  LABS: 11/12/20- Cr 1.02, GFR 67. .6, Hgb 10.2, Plts 39.     Impression & Recommendations & Counseling:  Jeff Mack is a 85 year old male with history of CMML and recent hospitalization for new pleural effusion.    Our phone call was primarily answering questions about the role of palliative care can play in their team.  Discussed that at this time, given Joshua's goals, going to the hospital for symptomatic needs does still make sense.  Joshua very much values Dr. Ndiaye's opinion about further treatments for his CMML and is anxious to talk to him next week.    Currently Joshua has very low symptomatic needs.  We did discuss that should his shortness of breath become an issue in the future, we could consider some low-dose opioid medication.  We would need to assess his use of as needed Xanax at that point.  His PCP recently started him on low-dose sertraline for anxiety.    Joshua declined to set up follow-up visit at this time, however they both appreciated having our information and knowing that we are available to support them however they need on this journey.    Telephone Visit Details     Start Time: 1:53PM  End Time: 2:18PM   Total length of phone call: 25 mins        Kristy Osorio DO  Palliative Medicine   Pager [phone], [facility] ID [id number]

## 2020-11-20 NOTE — PATIENT INSTRUCTIONS
Recommendations:  - Palliative care is available to assist as needed with symptom management as well as general support as you are going through your treatments and beyond. If you have any questions or concerns, please don't hesitate to reach out at the numbers below.  - We did discuss a medication that we might use in the future if your shortness of breath worsens and is impacting your daily function. This would be an opioid-type medication used in very low doses as needed to help with feeling short of breath.    Follow up: As needed      Reasons to Call    If you are having worsening/uncontrolled symptoms we want you to call!    You or your other physicians make any changes to medications we have prescribed.    Important Phone Numbers    IZAIAH Scanlon, RN palliative care nurse clinician [phone]  *After hours or on weekends. Will connect you with on call MD. [phone]      Arabella Gonsalves. LPN, Palliative care coordinator [phone]    [place] [facility]. Palliative Care RN. Answered 8 am to 4 pm . [phone]    Appointment Line.[phone]

## 2020-11-20 NOTE — LETTER
"    11/20/2020         RE: Jeff Mack  [address]  [place] [address]        Dear Colleague,    Thank you for referring your patient, Jeff Mack, to the [facility]. Please see a copy of my visit note below.    Jeff Mack is a 85 year old male who is being evaluated via a billable telephone visit.      The patient has been notified of following:     \"This telephone visit will be conducted via a call between you and your physician/provider. We have found that certain health care needs can be provided without the need for a physical exam.  This service lets us provide the care you need with a short phone conversation.  If a prescription is necessary we can send it directly to your pharmacy.  If lab work is needed we can place an order for that and you can then stop by our lab to have the test done at a later time.    Telephone visits are billed at different rates depending on your insurance coverage. During this emergency period, for some insurers they may be billed the same as an in-person visit.  Please reach out to your insurance provider with any questions.    If during the course of the call the physician/provider feels a telephone visit is not appropriate, you will not be charged for this service.\"    Patient has given verbal consent for Telephone visit?  Yes    What phone number would you like to be contacted at? [phone]    How would you like to obtain your AVS? Mail a copy      Palliative Care Clinic Progress Note    Patient Name: Jeff Mack  Primary Provider: Benjamin Lazar    Chief Complaint/Patient ID: 85 year old male with a history of hypertension, cardiomyopathy, and chronic myelocytic leukemia.  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    Last Palliative care appointment: Seen by PC during hospitalization at [facility] " 11/10-. Please see note from Pau Rodriguez on 20 for full details.     Reviewed:  Yes, #30 tablets of tramadol 50mg filled . #60 tablets of Xanax 0.25mg filled 10/27 (30 day supply).    Interim History:  Jeff Mack 85 year old male returns to Palliative Care today for follow up via billable telephone encounter. Wife Angela Barrett on call as well.    Angela Barrett primarily had questions about when to engage Palliative care and how to best support Don during this whole process. She is also wondering if it is still OK to take Don to the hospital if they think it's needed.     Don currently is focused on his labs (blood counts) and his meeting with Dr. Ndiaye next week. He knows that this will be a conversation with Dr. Ndiaye about whether future infusions will be helpful or not.    He does endorse some fatigue and low appetite, however, these have not been particularly bothersome to him. He is currently trying to stay active, doing some exercise by going up and down stairs 12-15x a day.    His shortness of breath has improved- he felt significantly better after the thoracentesis in the hospital. Currently doing well with his inhalers, Symbicort daily and albuterol PRN.  He declined any real symptomatic needs at this point in time.    Coping: Per wife, hasn't complained once since he was diagnosed. Wants to be here, so pretty high tolerance to go through treatment/procedures. Has struggled, though, as he has been unable to be as active. Per chart review, his PCP just started him on selective serotonin reuptake inhibitor for anxiety. He also has a prescription for Xanax 0.25mg up to BID for anxiety.    Social History:  Lives with his wife, Angela Barrett. Is a retired .  Social History     Tobacco Use     Smoking status: Former Smoker     Packs/day: 2.00     Years: 40.00     Pack years: 80.00     Types: Cigarettes     Quit date: 1975     Years since quittin.9     Smokeless tobacco: Never Used    Substance Use Topics     Alcohol use: No     Drug use: No     Family History- Reviewed in Epic.    No Known Allergies    Advanced Care Planning: HCD on file. Limited interventions- DNI. Primary HCA is wife, followed by children.    Medications- Reviewed in Epic.    Past Medical History- Reviewed in [facility].    Past Surgical History- Reviewed in Epic.    Review of Systems:   ROS: 10 point ROS neg other than the symptoms noted above in the HPI.    Key Data Reviewed:  LABS: 11/12/20- Cr 1.02, GFR 67. .6, Hgb 10.2, Plts 39.     Impression & Recommendations & Counseling:  Jeff Mack is a 85 year old male with history of CMML and recent hospitalization for new pleural effusion.    Our phone call was primarily answering questions about the role of palliative care can play in their team.  Discussed that at this time, given Joshua's goals, going to the hospital for symptomatic needs does still make sense.  Joshua very much values Dr. Ndiaye's opinion about further treatments for his CMML and is anxious to talk to him next week.    Currently Joshua has very low symptomatic needs.  We did discuss that should his shortness of breath become an issue in the future, we could consider some low-dose opioid medication.  We would need to assess his use of as needed Xanax at that point.  His PCP recently started him on low-dose sertraline for anxiety.    Joshua declined to set up follow-up visit at this time, however they both appreciated having our information and knowing that we are available to support them however they need on this journey.    Telephone Visit Details     Start Time: 1:53PM  End Time: 2:18PM   Total length of phone call: 25 mins        Kristy Osorio DO  Palliative Medicine   Pager [phone], [facility]           Again, thank you for allowing me to participate in the care of your patient.        Sincerely,        Kristy Osorio DO

## 2020-11-20 NOTE — LETTER
"    11/20/2020         RE: Jeff Mack  [address]  [place] [address]        Dear Colleague,    Thank you for referring your patient, Jeff Mack, to the [facility]. Please see a copy of my visit note below.    Jeff Mack is a 85 year old male who is being evaluated via a billable telephone visit.      The patient has been notified of following:     \"This telephone visit will be conducted via a call between you and your physician/provider. We have found that certain health care needs can be provided without the need for a physical exam.  This service lets us provide the care you need with a short phone conversation.  If a prescription is necessary we can send it directly to your pharmacy.  If lab work is needed we can place an order for that and you can then stop by our lab to have the test done at a later time.    Telephone visits are billed at different rates depending on your insurance coverage. During this emergency period, for some insurers they may be billed the same as an in-person visit.  Please reach out to your insurance provider with any questions.    If during the course of the call the physician/provider feels a telephone visit is not appropriate, you will not be charged for this service.\"    Patient has given verbal consent for Telephone visit?  Yes    What phone number would you like to be contacted at? [phone]    How would you like to obtain your AVS? Mail a copy      Palliative Care Clinic Progress Note    Patient Name: Jeff Mack  Primary Provider: Benjamin Lazar    Chief Complaint/Patient ID: 85 year old male with a history of hypertension, cardiomyopathy, and chronic myelocytic leukemia.  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    Last Palliative care appointment: Seen by PC during hospitalization at [facility] " 11/10-. Please see note from Pau Rodriguez on 20 for full details.     Reviewed:  Yes, #30 tablets of tramadol 50mg filled . #60 tablets of Xanax 0.25mg filled 10/27 (30 day supply).    Interim History:  Jeff Mack 85 year old male returns to Palliative Care today for follow up via billable telephone encounter. Wife Angela Barrett on call as well.    Angela Barrett primarily had questions about when to engage Palliative care and how to best support Don during this whole process. She is also wondering if it is still OK to take Don to the hospital if they think it's needed.     Don currently is focused on his labs (blood counts) and his meeting with Dr. Ndiaye next week. He knows that this will be a conversation with Dr. Ndiaye about whether future infusions will be helpful or not.    He does endorse some fatigue and low appetite, however, these have not been particularly bothersome to him. He is currently trying to stay active, doing some exercise by going up and down stairs 12-15x a day.    His shortness of breath has improved- he felt significantly better after the thoracentesis in the hospital. Currently doing well with his inhalers, Symbicort daily and albuterol PRN.  He declined any real symptomatic needs at this point in time.    Coping: Per wife, hasn't complained once since he was diagnosed. Wants to be here, so pretty high tolerance to go through treatment/procedures. Has struggled, though, as he has been unable to be as active. Per chart review, his PCP just started him on selective serotonin reuptake inhibitor for anxiety. He also has a prescription for Xanax 0.25mg up to BID for anxiety.    Social History:  Lives with his wife, Angela Barrett. Is a retired .  Social History     Tobacco Use     Smoking status: Former Smoker     Packs/day: 2.00     Years: 40.00     Pack years: 80.00     Types: Cigarettes     Quit date: 1975     Years since quittin.9     Smokeless tobacco: Never Used    Substance Use Topics     Alcohol use: No     Drug use: No     Family History- Reviewed in Epic.    No Known Allergies    Advanced Care Planning: HCD on file. Limited interventions- DNI. Primary HCA is wife, followed by children.    Medications- Reviewed in Epic.    Past Medical History- Reviewed in [facility].    Past Surgical History- Reviewed in Epic.    Review of Systems:   ROS: 10 point ROS neg other than the symptoms noted above in the HPI.    Key Data Reviewed:  LABS: 11/12/20- Cr 1.02, GFR 67. .6, Hgb 10.2, Plts 39.     Impression & Recommendations & Counseling:  Jeff Mack is a 85 year old male with history of CMML and recent hospitalization for new pleural effusion.    Our phone call was primarily answering questions about the role of palliative care can play in their team.  Discussed that at this time, given Joshua's goals, going to the hospital for symptomatic needs does still make sense.  Joshua very much values Dr. Ndiaye's opinion about further treatments for his CMML and is anxious to talk to him next week.    Currently Joshua has very low symptomatic needs.  We did discuss that should his shortness of breath become an issue in the future, we could consider some low-dose opioid medication.  We would need to assess his use of as needed Xanax at that point.  His PCP recently started him on low-dose sertraline for anxiety.    Joshua declined to set up follow-up visit at this time, however they both appreciated having our information and knowing that we are available to support them however they need on this journey.    Telephone Visit Details     Start Time: 1:53PM  End Time: 2:18PM   Total length of phone call: 25 mins        Kristy Osorio DO  Palliative Medicine   Pager [phone], [facility] ID [id number]          Again, thank you for allowing me to participate in the care of your patient.        Sincerely,        Kristy Osorio DO

## 2020-11-23 NOTE — PROGRESS NOTES
Medical Assistant Note:  Jeff Mack presents today for blood draw.    Patient seen by provider today: Yes: Andrea via telephone.   present during visit today: Not Applicable.    Concerns: No Concerns.    Procedure:  Lab draw site: rac, Needle type: bf, Gauge: 23.    Post Assessment:  Labs drawn without difficulty: Yes.    Discharge Plan:  Departure Mode: Ambulatory.    Face to Face Time: 5 min  .    Zaynab Aparicio, CMA

## 2020-11-23 NOTE — PROGRESS NOTES
CERTIFICATE OF WORK    January 24, 2019      Regarding: Dudley Cunha  [address] [facility] [address]      This is to certify that Dudley Cunha was seen in our office on 1/24/2019 and can return to regular work on 1/24/2019    RESTRICTIONS: none      REMARKS: follow up in 2 months        SIGNATURE:___________________________________________,   1/24/2019                                              Romeo Yu MD  Orthopaedics  [address] [address] [phone]       "Jeff Mack is a 85 year old male who is being evaluated via a billable telephone visit.      The patient has been notified of following:     \"This telephone visit will be conducted via a call between you and your physician/provider. We have found that certain health care needs can be provided without the need for a physical exam.  This service lets us provide the care you need with a short phone conversation.  If a prescription is necessary we can send it directly to your pharmacy.  If lab work is needed we can place an order for that and you can then stop by our lab to have the test done at a later time.    Telephone visits are billed at different rates depending on your insurance coverage. During this emergency period, for some insurers they may be billed the same as an in-person visit.  Please reach out to your insurance provider with any questions.    If during the course of the call the physician/provider feels a telephone visit is not appropriate, you will not be charged for this service.\"    Patient has given verbal consent for Telephone visit?  Yes    What phone number would you like to be contacted at? [phone]    Roxana Stevenson [facility]    "

## 2020-11-23 NOTE — PROGRESS NOTES
"Jeff Mack is a 85 year old male who is being evaluated via a billable telephone visit.      The patient has been notified of following:     \"This telephone visit will be conducted via a call between you and your physician/provider. We have found that certain health care needs can be provided without the need for a physical exam.  This service lets us provide the care you need with a short phone conversation.  If a prescription is necessary we can send it directly to your pharmacy.  If lab work is needed we can place an order for that and you can then stop by our lab to have the test done at a later time.    Telephone visits are billed at different rates depending on your insurance coverage. During this emergency period, for some insurers they may be billed the same as an in-person visit.  Please reach out to your insurance provider with any questions.    If during the course of the call the physician/provider feels a telephone visit is not appropriate, you will not be charged for this service.\"    Patient has given verbal consent for Telephone visit?  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     -Patient completed 4 cycles of decitabine  -refused additional decitabine cycles due to anxiety related to treatment  Refused bone marrow biopsy  Patient was started with hydroxyurea however developed thrombocytopenia with it for hydroxyurea was discontinued  -Patient developed worsening of leukocytosis anemia and thrombocytopenia consistent with progression of CMML  -11/02-patient met with Dr. Ndiaye who recommended bone marrow biopsy patient refused   -11/02 Decitabine restarted    Interval History:  Patient reports he continues to have shortness of breath he is using albuterol.  Has changed much since the discharge from the hospital.  " Although he reports that thoracentesis in the hospital helped with shortness of breath.  Patient denies chest pain denies cough denies fever chills sweats.  Denies bleeding        Review of Systems:  14 point ROS of systems including Constitutional, Eyes, Respiratory, Cardiovascular, Gastroenterology, Genitourinary, Integumentary, Muscularskeletal, Psychiatric were all negative except for pertinent positives noted in my HPI.        Physical Examination:    Telephone visit-no audible wheezing or cough      Laboratory Data:    Reviewed        Assessment and Plan:  This is a 85-year-old male with    chronic myelomonocytic leukemia (CMML)    11/02 Decitabine restarted  Labs reviewed with patient.  Leukocytosis is improving   thrombocytopenia is improving as well  -Patient is scheduled for next decitabine treatment next week  - pt has  scheduled appointment with Dr. Ndiaye on 11/30      Shortness of breath  11/10-CT negative for pulmonary embolism it shows pleural effusion patient status post thoracentesis on 1/11   ECHO --EF 60-65%  -Schedule for thoracentesis this week  -Continue with Lasix  -I will put standing order for thoracentesis  -I asked patient to monitor closely for shortness of breath and to call our clinic so we can arrange thoracentesis.    COPD  Continue with Breo Ellipta and albuterol as needed    Leukocytosis -improving  secondary to CMML   Patient is afebrile  -As above decitabine restarted on 11/02.    Thrombocytopenia secondary to CMML-improving   -Platelets are 58 which are improving from last week  -Transfuse for platelet count 20 or below or bleeding    systemic mastocytosis.   Patient is asymptomatic  We will continue to monitor  Monitor Tryptase     Anxiety-chronic issue for the patient  -Continue with Xanax  He was also consulted by psychiatry with last admission he is now taking low-dose Seroquel    Hyperbilirubinemia/elevated alk phos  Bilirubin 1.4-1.9 range   11/10-CT scan unchanged  hepatomegaly  Patient is asymptomatic  Monitor closely  We will recheck liver enzymes on Friday  Patient advised to call our clinic or go to ER in the event of jaundice abdominal pain abdominal distention nausea vomiting, nausea vomiting , fever           RAUDEL Stephens [facility] [place]     Chart documentation with Dragon Voice recognition Software. Although reviewed after completion, some words and grammatical errors may remain.

## 2020-11-23 NOTE — LETTER
"    11/23/2020         RE: Jeff Mack  [address]  [place] [address]        Dear Colleague,    Thank you for referring your patient, Jeff Mack, to the [facility] [place]. Please see a copy of my visit note below.    Jeff Mack is a 85 year old male who is being evaluated via a billable telephone visit.      The patient has been notified of following:     \"This telephone visit will be conducted via a call between you and your physician/provider. We have found that certain health care needs can be provided without the need for a physical exam.  This service lets us provide the care you need with a short phone conversation.  If a prescription is necessary we can send it directly to your pharmacy.  If lab work is needed we can place an order for that and you can then stop by our lab to have the test done at a later time.    Telephone visits are billed at different rates depending on your insurance coverage. During this emergency period, for some insurers they may be billed the same as an in-person visit.  Please reach out to your insurance provider with any questions.    If during the course of the call the physician/provider feels a telephone visit is not appropriate, you will not be charged for this service.\"    Patient has given verbal consent for Telephone visit?  Yes    What phone number would you like to be contacted at? [phone]    Roxana Stevenson CMA      Jeff Mack is a 85 year old male who is being evaluated via a billable telephone visit.      The patient has been notified of following:     \"This telephone visit will be conducted via a call between you and your physician/provider. We have found that certain health care needs can be provided without the need for a physical exam.  This service lets us provide the care you need with a short phone conversation.  If a prescription is necessary we can send it directly to your pharmacy.  If lab work " "is needed we can place an order for that and you can then stop by our lab to have the test done at a later time.    Telephone visits are billed at different rates depending on your insurance coverage. During this emergency period, for some insurers they may be billed the same as an in-person visit.  Please reach out to your insurance provider with any questions.    If during the course of the call the physician/provider feels a telephone visit is not appropriate, you will not be charged for this service.\"    Patient has given verbal consent for Telephone visit?  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     -Patient completed 4 cycles of decitabine  -refused additional decitabine cycles due to anxiety related to treatment  Refused bone marrow biopsy  Patient was started with hydroxyurea however developed thrombocytopenia with it for hydroxyurea was discontinued  -Patient developed worsening of leukocytosis anemia and thrombocytopenia consistent with progression of CMML  -11/02-patient met with Dr. Ndiaye who recommended bone marrow biopsy patient refused   -11/02 Decitabine restarted    Interval History:  Patient reports he continues to have shortness of breath he is using albuterol.  Has changed much since the discharge from the hospital.  Although he reports that thoracentesis in the hospital helped with shortness of breath.  Patient denies chest pain denies cough denies fever chills sweats.  Denies bleeding        Review of Systems:  14 point ROS of systems including Constitutional, Eyes, Respiratory, Cardiovascular, Gastroenterology, Genitourinary, Integumentary, Muscularskeletal, Psychiatric were all negative except for pertinent positives noted in my HPI.        Physical Examination:    Telephone visit-no audible wheezing or " cough      Laboratory Data:    Reviewed        Assessment and Plan:  This is a 85-year-old male with    chronic myelomonocytic leukemia (CMML)    11/02 Decitabine restarted  Labs reviewed with patient.  Leukocytosis is improving   thrombocytopenia is improving as well  -Patient is scheduled for next decitabine treatment next week  - pt has  scheduled appointment with Dr. Ndiaye on 11/30      Shortness of breath  11/10-CT negative for pulmonary embolism it shows pleural effusion patient status post thoracentesis on 1/11   ECHO --EF 60-65%  -Schedule for thoracentesis this week  -Continue with Lasix  -I will put standing order for thoracentesis  -I asked patient to monitor closely for shortness of breath and to call our clinic so we can arrange thoracentesis.    COPD  Continue with Breo Ellipta and albuterol as needed    Leukocytosis -improving  secondary to CMML   Patient is afebrile  -As above decitabine restarted on 11/02.    Thrombocytopenia secondary to CMML-improving   -Platelets are 58 which are improving from last week  -Transfuse for platelet count 20 or below or bleeding    systemic mastocytosis.   Patient is asymptomatic  We will continue to monitor  Monitor Tryptase     Anxiety-chronic issue for the patient  -Continue with Xanax  He was also consulted by psychiatry with last admission he is now taking low-dose Seroquel    Hyperbilirubinemia/elevated alk phos  Bilirubin 1.4-1.9 range   11/10-CT scan unchanged hepatomegaly  Patient is asymptomatic  Monitor closely  We will recheck liver enzymes on Friday  Patient advised to call our clinic or go to ER in the event of jaundice abdominal pain abdominal distention nausea vomiting, nausea vomiting , fever           RAUDEL Stephens [facility] [place]     Chart documentation with Dragon Voice recognition Software. Although reviewed after completion, some words and grammatical errors may remain.            Again, thank you for allowing me  to participate in the care of your patient.        Sincerely,        RAUDEL Stephens CNP

## 2020-11-23 NOTE — LETTER
"    11/23/2020         RE: Jeff Mack  [address]  [place] [address]        Dear Colleague,    Thank you for referring your patient, Jeff Mack, to the [facility] [place]. Please see a copy of my visit note below.    Jeff Mack is a 85 year old male who is being evaluated via a billable telephone visit.      The patient has been notified of following:     \"This telephone visit will be conducted via a call between you and your physician/provider. We have found that certain health care needs can be provided without the need for a physical exam.  This service lets us provide the care you need with a short phone conversation.  If a prescription is necessary we can send it directly to your pharmacy.  If lab work is needed we can place an order for that and you can then stop by our lab to have the test done at a later time.    Telephone visits are billed at different rates depending on your insurance coverage. During this emergency period, for some insurers they may be billed the same as an in-person visit.  Please reach out to your insurance provider with any questions.    If during the course of the call the physician/provider feels a telephone visit is not appropriate, you will not be charged for this service.\"    Patient has given verbal consent for Telephone visit?  Yes    What phone number would you like to be contacted at? [phone]    Roxana Stevenson CMA      Jeff Mack is a 85 year old male who is being evaluated via a billable telephone visit.      The patient has been notified of following:     \"This telephone visit will be conducted via a call between you and your physician/provider. We have found that certain health care needs can be provided without the need for a physical exam.  This service lets us provide the care you need with a short phone conversation.  If a prescription is necessary we can send it directly to your pharmacy.  If lab work " "is needed we can place an order for that and you can then stop by our lab to have the test done at a later time.    Telephone visits are billed at different rates depending on your insurance coverage. During this emergency period, for some insurers they may be billed the same as an in-person visit.  Please reach out to your insurance provider with any questions.    If during the course of the call the physician/provider feels a telephone visit is not appropriate, you will not be charged for this service.\"    Patient has given verbal consent for Telephone visit?  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     -Patient completed 4 cycles of decitabine  -refused additional decitabine cycles due to anxiety related to treatment  Refused bone marrow biopsy  Patient was started with hydroxyurea however developed thrombocytopenia with it for hydroxyurea was discontinued  -Patient developed worsening of leukocytosis anemia and thrombocytopenia consistent with progression of CMML  -11/02-patient met with Dr. Ndiaye who recommended bone marrow biopsy patient refused   -11/02 Decitabine restarted    Interval History:  Patient reports he continues to have shortness of breath he is using albuterol.  Has changed much since the discharge from the hospital.  Although he reports that thoracentesis in the hospital helped with shortness of breath.  Patient denies chest pain denies cough denies fever chills sweats.  Denies bleeding        Review of Systems:  14 point ROS of systems including Constitutional, Eyes, Respiratory, Cardiovascular, Gastroenterology, Genitourinary, Integumentary, Muscularskeletal, Psychiatric were all negative except for pertinent positives noted in my HPI.        Physical Examination:    Telephone visit-no audible wheezing or " cough      Laboratory Data:    Reviewed        Assessment and Plan:  This is a 85-year-old male with    chronic myelomonocytic leukemia (CMML)    11/02 Decitabine restarted  Labs reviewed with patient.  Leukocytosis is improving   thrombocytopenia is improving as well  -Patient is scheduled for next decitabine treatment next week  - pt has  scheduled appointment with Dr. Ndiaye on 11/30      Shortness of breath  11/10-CT negative for pulmonary embolism it shows pleural effusion patient status post thoracentesis on 1/11   ECHO --EF 60-65%  -Schedule for thoracentesis this week  -Continue with Lasix  -I will put standing order for thoracentesis  -I asked patient to monitor closely for shortness of breath and to call our clinic so we can arrange thoracentesis.    COPD  Continue with Breo Ellipta and albuterol as needed    Leukocytosis -improving  secondary to CMML   Patient is afebrile  -As above decitabine restarted on 11/02.    Thrombocytopenia secondary to CMML-improving   -Platelets are 58 which are improving from last week  -Transfuse for platelet count 20 or below or bleeding    systemic mastocytosis.   Patient is asymptomatic  We will continue to monitor  Monitor Tryptase     Anxiety-chronic issue for the patient  -Continue with Xanax  He was also consulted by psychiatry with last admission he is now taking low-dose Seroquel    Hyperbilirubinemia/elevated alk phos  Bilirubin 1.4-1.9 range   11/10-CT scan unchanged hepatomegaly  Patient is asymptomatic  Monitor closely  We will recheck liver enzymes on Friday  Patient advised to call our clinic or go to ER in the event of jaundice abdominal pain abdominal distention nausea vomiting, nausea vomiting , fever           RAUDEL Stephens [facility] [place]     Chart documentation with Dragon Voice recognition Software. Although reviewed after completion, some words and grammatical errors may remain.            Again, thank you for allowing me  to participate in the care of your patient.        Sincerely,        RAUDEL Stephens CNP

## 2020-11-24 NOTE — DISCHARGE INSTRUCTIONS
Thoracentesis Discharge Instructions     After you go home:      You may resume your normal diet    Care of Puncture Site:      For the first 48 hrs, check your puncture site every couple hours while you are awake     If there is a bandaid - you may remove it tomorrow morning    You may shower tomorrow    No tub baths, whirlpools or swimming until your puncture site has fully healed     Activity:      You may go back to normal activity in 24 hours    Wait 48 hours before lifting, straining, exercise or other strenuous activity    Medicines:      You may resume all your medications    For minor pain, you may take Acetaminophen (Tylenol) or Ibuprofen (Advil)            Call the provider who ordered this procedure if:      The site is red, swollen, hot or tender    Blood or fluid is draining from the site    You have chills or a fever greater than 101 F (38 C)    Shortness of breath    Pain that is getting worse    Any questions or concerns      Additional Information:      During this test, a needle will sometimes enter the lung. This can cause the lung to collapse - called a pneumothorax. Symptoms include severe chest pain, breathing problems or increased blood in your sputum (phlegm).     Call  911 or go to the Emergency Room if:      Severe chest pain or trouble breathing    Increased blood in your sputum (phlegm)    Bleeding that you cannot control      If you have questions call:        [facility] Radiology Dept @ [phone]      The provider who performed your procedure was _________________.

## 2020-11-24 NOTE — PROCEDURES
[facility]    Procedure: Thoracentesis    Date/Time: 11/24/2020 2:21 PM  Performed by: Shakila Devlin PA-C  Authorized by: Shakila Devlin PA-C     UNIVERSAL PROTOCOL   Site Marked: Yes  Prior Images Obtained and Reviewed:  Yes  Required items: Required blood products, implants, devices and special equipment available    Patient identity confirmed:  Verbally with patient  NA - No sedation, light sedation, or local anesthesia  Confirmation Checklist:  Patient's identity using two indicators, relevant allergies, procedure was appropriate and matched the consent or emergent situation and correct equipment/implants were available  Time out: Immediately prior to the procedure a time out was called    Universal Protocol: the Joint Commission Universal Protocol was followed    Preparation: Patient was prepped and draped in usual sterile fashion           ANESTHESIA    Anesthesia: Local infiltration  Local Anesthetic:  Lidocaine 1% without epinephrine      SEDATION    Patient Sedated: No    See dictated procedure note for full details.  PROCEDURE   Patient Tolerance:  Patient tolerated the procedure well with no immediate complications    Length of time physician/provider present for 1:1 monitoring during sedation: 0

## 2020-11-24 NOTE — PROGRESS NOTES
Care Suites Admission Nursing Note    Patient Information  Name: Jeff Mack  Age: 85 year old  Reason for admission: Thoracentesis  Care Suites arrival time: 1230    Visitor Information  Name: None    Patient Admission/Assessment   Pre-procedure assessment complete: Yes  If abnormal assessment/labs, provider notified: N/A, pt has leukemia   NPO: Yes  Medications held per instructions/orders: N/A  Consent: deferred  Patient oriented to room: Yes  Education/questions answered: Yes  Plan/other: To Ultrasound for thora    Discharge Planning  Discharge name/phone number: Angela Barrett [phone]  Overnight post sedation caregiver: same  Discharge location: home    PATIENT/VISITOR WELLNESS SCREENING    Step 1 Patient Screening    1. In the last month, have you been in contact with someone who was confirmed or suspected to have Coronavirus/COVID-19? No    2. Do you have the following symptoms?  Fever/Chills? No   Cough? No   Shortness of breath? No   New loss of taste or smell? No  Sore throat? No  Muscle or body aches? No  Headaches? No  Fatigue? No  Vomiting or diarrhea? No    Step 2 Visitor Screening    1. Name of Visitor: None    Keily Carrera RN

## 2020-11-24 NOTE — PROGRESS NOTES
Care Suites Procedure Nursing Note    Patient Information  Name: Jeff Mack  Age: 85 year old    Procedure  Procedure: thoracentesis  Procedure start time: 1358  Procedure complete time:   Concerns/abnormal assessment:   If abnormal assessment, provider notified: na  Plan/Other: proceed.  1409  900cc ofdark aminata fluid removed from RLL. Ric well    Flako Lara RN       Care Suites Discharge Nursing Note    Patient Information  Name: Jeff Mack  Age: 85 year old    Discharge Education:  Discharge instructions reviewed: Yes monse Michaud  Additional education/resources provided: no  Patient/patient representative verbalizes understanding: Yes  Patient discharging on new medications: No  Medication education completed: Yes    Discharge Plans:   Discharge location: home  Discharge ride contacted: Yes  Approximate discharge time: 1500    Discharge Criteria:  Discharge criteria met and vital signs stable: Yes    Patient Belongs:  Patient belongings returned to patient: Yes    Flako Lara RN

## 2020-11-25 NOTE — PROGRESS NOTES
Writer received a call from patient with a question regarding the thoracentesis why they have become necessary,is the need for them expected to continue, and for how long.     Patient also states he is on lasix to remove the fluid , writer discussed potassium rich foods. Patient verbalized understanding.     Writer will ask Dr. Ndiaye and also advised that patient discuss this at appointment on 11/30.    Jody Mehta RN

## 2020-11-27 NOTE — LETTER
11/27/2020         RE: Jeff Mack  [address]  [place] [address]        Dear Colleague,    Thank you for referring your patient, Jeff Mack, to the [facility]. Please see a copy of my visit note below.    Medical Assistant Note:  Jeff Mack presents today for blood draw.    Patient seen by provider today: No.   present during visit today: Not Applicable.    Concerns: No Concerns.    Procedure:  Lab draw site: rac, Needle type: bf, Gauge: 23.    Post Assessment:  Labs drawn without difficulty: Yes.    Discharge Plan:  Departure Mode: Ambulatory.    Face to Face Time: 5 min  .    Zaynab Aparicio CMA              Again, thank you for allowing me to participate in the care of your patient.        Sincerely,         Lab Draw

## 2020-11-27 NOTE — PROGRESS NOTES
Writer spoke with Dr. Ndiaye and he stated he was unsure of how frequently patient would need a thoracentesis and would discuss it with with the patient at his appointment on 11/30.    Writer called and LVM with the above message.    Jody Mehta RN

## 2020-11-30 NOTE — PROGRESS NOTES
Infusion Nursing Note:  Jeff Mack presents today for decitabine.    Patient seen by provider today: Yes: telephone visit Dr. Ndiaye   present during visit today: Not Applicable.    Note: N/A.    Intravenous Access:  Peripheral IV placed.    Treatment Conditions:  Results reviewed, labs MET treatment parameters, ok to proceed with treatment.      Post Infusion Assessment:  Patient tolerated infusion without incident.  Blood return noted pre and post infusion.  Site patent and intact, free from redness, edema or discomfort.  No evidence of extravasations.  Access discontinued per protocol.       Discharge Plan:   Discharge instructions reviewed with: Patient.  Patient and/or family verbalized understanding of discharge instructions and all questions answered.  Patient discharged in stable condition. Will return tomorrow. accompanied by: self.  Departure Mode: Ambulatory.    Kayleen Gomez RN

## 2020-11-30 NOTE — LETTER
"    11/30/2020         RE: Jeff Mack  [address]  [place] [address]      Jeff Mack is a 85 year old male who is being evaluated via a billable telephone visit.      The patient has been notified of following:     \"This telephone visit will be conducted via a call between you and your physician/provider. We have found that certain health care needs can be provided without the need for a physical exam.  This service lets us provide the care you need with a short phone conversation.  If a prescription is necessary we can send it directly to your pharmacy.  If lab work is needed we can place an order for that and you can then stop by our lab to have the test done at a later time.    Telephone visits are billed at different rates depending on your insurance coverage. During this emergency period, for some insurers they may be billed the same as an in-person visit.  Please reach out to your insurance provider with any questions.    If during the course of the call the physician/provider feels a telephone visit is not appropriate, you will not be charged for this service.\"    Patient has given verbal consent for Telephone visit?  Yes    What phone number would you like to be contacted at? [phone]    Roxana Stevenson, [facility]      Visit Date:   11/30/2020     HEMATOLOGY HISTORY: Mr. 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   1.  On 02/28/2020, WBC of 30.5, hemoglobin of 9.4 and platelet of 235.  MCV of 87.  Neutrophil of 50%, lymphocytes of 15% and monocytes of 17%. On 11/16/2018, CBC normal with WBC of 4.8, hemoglobin 15.3 and platelets of 295.  2.  CT chest, abdomen and pelvis on 02/29/2020 does not reveal any evidence of malignancy.   3.   On 03/19/2020, NGS panel negative for JAK2, CALR and MPL.  There is IDH2 R140Q and KIT D816V.  4. Bone marrow biopsy on 04/16/2020 reveals chronic myelomonocytic leukemia - 1 (CMML-1) characterized " by hypercellular bone marrow (90%) with increased M:E ratio and 7% blasts. There are aggregates of atypical mast cells consistent with bone marrow involvement by systemic mastocytosis. Increased bone marrow fibrosis (MF-1-2).  -Normal cytogenetics.   -Flow cytometry on bone marrow reveals increased monocytic cells (43%).  No increase in CD34 positive myeloid blasts.   -BCR-ABL1 bone marrow is negative.   5. On 04/29/2020, tryptase elevated at 51.3 (normal less than 11).  6. Decitabine x 4 cycles between 04/29/2020 and 07/31/2020. (patient did not want any further decitabine).  -Hydroxyurea started on 08/04/2020 due to thrombocytosis. Later stopped due to thrombocytopenia.  -Decitabine started on 11/02/2020.     SUBJECTIVE:  Mr. Mack is an 85-year-old gentleman with chronic myelomonocytic leukemia-1 and systemic mastocytosis.  The patient is on decitabine.  Overall, he is tolerating it well.  His CBC has improved.  Leukocytosis and thrombocytopenia are better.      He has anxiety.  Because of that, he has fatigue.  He does not sleep well.  No headache.  No dizziness.  No neck pain.  No chest pain.  Gets mildly short of breath on and off.  In the past, had pleural effusion, which has been tapped.  No abdominal pain, nausea or vomiting.  No fever or chills.  Some muscle weakness.  No fall.      All other review of systems negative.      PHYSICAL EXAMINATION:   GENERAL:  He is alert, oriented x 3.    This is a telephone visit.      LABORATORY DATA:  Reviewed.      ASSESSMENT:   1.  Chronic myelomonocytic leukemia-1.   2.  Systemic mastocytosis.   3.  Anxiety.   4.  Fatigue.    5.  Pleural effusion, status post thoracocentesis.   6.  Normocytic anemia.      PLAN:   1.  The patient clinically is stable.  Whenever his anxiety gets worse, he does not feel well.   2.  CBC was reviewed.  He was happy to know that WBC and platelets have improved.   3.  For CMML, he will continue on decitabine.  He is tolerating it well.  Side  effects reviewed.   4.  He has systemic mastocytosis.  No treatment indicated at this time.   5.  He had a few questions, which were all answered.  I will see him with next cycle of treatment.  In between, he will see our nurse practitioner.         GRAZYNA RICHEY MD             D: 2020   T: 2020   MT: [place]      Name:     RADHA ALFONSO   MRN:      [id number]        Account:      [id number]   :      1935           Visit Date:   2020      Document: [id number]      Telephone visit for 12 minutes.    This office note has been dictated.            Grazyna Richey MD

## 2020-11-30 NOTE — PROGRESS NOTES
Visit Date:   11/30/2020     HEMATOLOGY HISTORY: Mr. 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   1.  On 02/28/2020, WBC of 30.5, hemoglobin of 9.4 and platelet of 235.  MCV of 87.  Neutrophil of 50%, lymphocytes of 15% and monocytes of 17%. On 11/16/2018, CBC normal with WBC of 4.8, hemoglobin 15.3 and platelets of 295.  2.  CT chest, abdomen and pelvis on 02/29/2020 does not reveal any evidence of malignancy.   3.   On 03/19/2020, NGS panel negative for JAK2, CALR and MPL.  There is IDH2 R140Q and KIT D816V.  4. Bone marrow biopsy on 04/16/2020 reveals chronic myelomonocytic leukemia - 1 (CMML-1) characterized by hypercellular bone marrow (90%) with increased M:E ratio and 7% blasts. There are aggregates of atypical mast cells consistent with bone marrow involvement by systemic mastocytosis. Increased bone marrow fibrosis (MF-1-2).  -Normal cytogenetics.   -Flow cytometry on bone marrow reveals increased monocytic cells (43%).  No increase in CD34 positive myeloid blasts.   -BCR-ABL1 bone marrow is negative.   5. On 04/29/2020, tryptase elevated at 51.3 (normal less than 11).  6. Decitabine x 4 cycles between 04/29/2020 and 07/31/2020. (patient did not want any further decitabine).  -Hydroxyurea started on 08/04/2020 due to thrombocytosis. Later stopped due to thrombocytopenia.  -Decitabine started on 11/02/2020.     SUBJECTIVE:  Mr. Mack is an 85-year-old gentleman with chronic myelomonocytic leukemia-1 and systemic mastocytosis.  The patient is on decitabine.  Overall, he is tolerating it well.  His CBC has improved.  Leukocytosis and thrombocytopenia are better.      He has anxiety.  Because of that, he has fatigue.  He does not sleep well.  No headache.  No dizziness.  No neck pain.  No chest pain.  Gets mildly short of breath on and off.  In the past, had pleural effusion, which has been tapped.  No abdominal pain, nausea or vomiting.  No fever or chills.   Some muscle weakness.  No fall.      All other review of systems negative.      PHYSICAL EXAMINATION:   GENERAL:  He is alert, oriented x 3.    This is a telephone visit.      LABORATORY DATA:  Reviewed.      ASSESSMENT:   1.  Chronic myelomonocytic leukemia-1.   2.  Systemic mastocytosis.   3.  Anxiety.   4.  Fatigue.    5.  Pleural effusion, status post thoracocentesis.   6.  Normocytic anemia.      PLAN:   1.  The patient clinically is stable.  Whenever his anxiety gets worse, he does not feel well.   2.  CBC was reviewed.  He was happy to know that WBC and platelets have improved.   3.  For CMML, he will continue on decitabine.  He is tolerating it well.  Side effects reviewed.   4.  He has systemic mastocytosis.  No treatment indicated at this time.   5.  He had a few questions, which were all answered.  I will see him with next cycle of treatment.  In between, he will see our nurse practitioner.         GRAZYNA RICHEY MD             D: 2020   T: 2020   MT: [place]      Name:     RADHA ALFONSO   MRN:      [id number]        Account:      [id number]   :      1935           Visit Date:   2020      Document: [id number]      Telephone visit for 12 minutes.

## 2020-11-30 NOTE — PATIENT INSTRUCTIONS
1.  Continue decitabine   2.  Weekly CBC  3.  See nurse practitioner next week.  4.  See me with next cycle of treatment.

## 2020-11-30 NOTE — PROGRESS NOTES
"Jeff Mack is a 85 year old male who is being evaluated via a billable telephone visit.      The patient has been notified of following:     \"This telephone visit will be conducted via a call between you and your physician/provider. We have found that certain health care needs can be provided without the need for a physical exam.  This service lets us provide the care you need with a short phone conversation.  If a prescription is necessary we can send it directly to your pharmacy.  If lab work is needed we can place an order for that and you can then stop by our lab to have the test done at a later time.    Telephone visits are billed at different rates depending on your insurance coverage. During this emergency period, for some insurers they may be billed the same as an in-person visit.  Please reach out to your insurance provider with any questions.    If during the course of the call the physician/provider feels a telephone visit is not appropriate, you will not be charged for this service.\"    Patient has given verbal consent for Telephone visit?  Yes    What phone number would you like to be contacted at? [phone]    Roxana Stevenson [facility]    "

## 2020-11-30 NOTE — LETTER
"    11/30/2020         RE: Jeff Mack  [address]  [place] [address]        Dear Colleague,    Thank you for referring your patient, Jeff Mack, to the [facility]. Please see a copy of my visit note below.    Jeff Mack is a 85 year old male who is being evaluated via a billable telephone visit.      The patient has been notified of following:     \"This telephone visit will be conducted via a call between you and your physician/provider. We have found that certain health care needs can be provided without the need for a physical exam.  This service lets us provide the care you need with a short phone conversation.  If a prescription is necessary we can send it directly to your pharmacy.  If lab work is needed we can place an order for that and you can then stop by our lab to have the test done at a later time.    Telephone visits are billed at different rates depending on your insurance coverage. During this emergency period, for some insurers they may be billed the same as an in-person visit.  Please reach out to your insurance provider with any questions.    If during the course of the call the physician/provider feels a telephone visit is not appropriate, you will not be charged for this service.\"    Patient has given verbal consent for Telephone visit?  Yes    What phone number would you like to be contacted at? [phone]    Roxana Stevenson, [facility]      Visit Date:   11/30/2020     HEMATOLOGY HISTORY: Mr. 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   1.  On 02/28/2020, WBC of 30.5, hemoglobin of 9.4 and platelet of 235.  MCV of 87.  Neutrophil of 50%, lymphocytes of 15% and monocytes of 17%. On 11/16/2018, CBC normal with WBC of 4.8, hemoglobin 15.3 and platelets of 295.  2.  CT chest, abdomen and pelvis on 02/29/2020 does not reveal any evidence of malignancy.   3.   On 03/19/2020, NGS panel " negative for JAK2, CALR and MPL.  There is IDH2 R140Q and KIT D816V.  4. Bone marrow biopsy on 04/16/2020 reveals chronic myelomonocytic leukemia - 1 (CMML-1) characterized by hypercellular bone marrow (90%) with increased M:E ratio and 7% blasts. There are aggregates of atypical mast cells consistent with bone marrow involvement by systemic mastocytosis. Increased bone marrow fibrosis (MF-1-2).  -Normal cytogenetics.   -Flow cytometry on bone marrow reveals increased monocytic cells (43%).  No increase in CD34 positive myeloid blasts.   -BCR-ABL1 bone marrow is negative.   5. On 04/29/2020, tryptase elevated at 51.3 (normal less than 11).  6. Decitabine x 4 cycles between 04/29/2020 and 07/31/2020. (patient did not want any further decitabine).  -Hydroxyurea started on 08/04/2020 due to thrombocytosis. Later stopped due to thrombocytopenia.  -Decitabine started on 11/02/2020.     SUBJECTIVE:  Mr. Mack is an 85-year-old gentleman with chronic myelomonocytic leukemia-1 and systemic mastocytosis.  The patient is on decitabine.  Overall, he is tolerating it well.  His CBC has improved.  Leukocytosis and thrombocytopenia are better.      He has anxiety.  Because of that, he has fatigue.  He does not sleep well.  No headache.  No dizziness.  No neck pain.  No chest pain.  Gets mildly short of breath on and off.  In the past, had pleural effusion, which has been tapped.  No abdominal pain, nausea or vomiting.  No fever or chills.  Some muscle weakness.  No fall.      All other review of systems negative.      PHYSICAL EXAMINATION:   GENERAL:  He is alert, oriented x 3.    This is a telephone visit.      LABORATORY DATA:  Reviewed.      ASSESSMENT:   1.  Chronic myelomonocytic leukemia-1.   2.  Systemic mastocytosis.   3.  Anxiety.   4.  Fatigue.    5.  Pleural effusion, status post thoracocentesis.   6.  Normocytic anemia.      PLAN:   1.  The patient clinically is stable.  Whenever his anxiety gets worse, he does not  feel well.   2.  CBC was reviewed.  He was happy to know that WBC and platelets have improved.   3.  For CMML, he will continue on decitabine.  He is tolerating it well.  Side effects reviewed.   4.  He has systemic mastocytosis.  No treatment indicated at this time.   5.  He had a few questions, which were all answered.  I will see him with next cycle of treatment.  In between, he will see our nurse practitioner.         GRAZYNA RICHEY MD             D: 2020   T: 2020   MT: [place]      Name:     RADHA ALFONSO   MRN:      [id number]        Account:      [id number]   :      1935           Visit Date:   2020      Document: [id number]      Telephone visit for 12 minutes.    This office note has been dictated.          Again, thank you for allowing me to participate in the care of your patient.        Sincerely,        Grazyna Richey MD

## 2020-11-30 NOTE — LETTER
"    11/30/2020         RE: Jeff Mack  [address]  [place] [address]        Dear Colleague,    Thank you for referring your patient, Jeff Mack, to the [facility]. Please see a copy of my visit note below.    Jeff Mack is a 85 year old male who is being evaluated via a billable telephone visit.      The patient has been notified of following:     \"This telephone visit will be conducted via a call between you and your physician/provider. We have found that certain health care needs can be provided without the need for a physical exam.  This service lets us provide the care you need with a short phone conversation.  If a prescription is necessary we can send it directly to your pharmacy.  If lab work is needed we can place an order for that and you can then stop by our lab to have the test done at a later time.    Telephone visits are billed at different rates depending on your insurance coverage. During this emergency period, for some insurers they may be billed the same as an in-person visit.  Please reach out to your insurance provider with any questions.    If during the course of the call the physician/provider feels a telephone visit is not appropriate, you will not be charged for this service.\"    Patient has given verbal consent for Telephone visit?  Yes    What phone number would you like to be contacted at? [phone]    Roxana Stevenson, [facility]      Visit Date:   11/30/2020     HEMATOLOGY HISTORY: Mr. 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   1.  On 02/28/2020, WBC of 30.5, hemoglobin of 9.4 and platelet of 235.  MCV of 87.  Neutrophil of 50%, lymphocytes of 15% and monocytes of 17%. On 11/16/2018, CBC normal with WBC of 4.8, hemoglobin 15.3 and platelets of 295.  2.  CT chest, abdomen and pelvis on 02/29/2020 does not reveal any evidence of malignancy.   3.   On 03/19/2020, NGS panel " negative for JAK2, CALR and MPL.  There is IDH2 R140Q and KIT D816V.  4. Bone marrow biopsy on 04/16/2020 reveals chronic myelomonocytic leukemia - 1 (CMML-1) characterized by hypercellular bone marrow (90%) with increased M:E ratio and 7% blasts. There are aggregates of atypical mast cells consistent with bone marrow involvement by systemic mastocytosis. Increased bone marrow fibrosis (MF-1-2).  -Normal cytogenetics.   -Flow cytometry on bone marrow reveals increased monocytic cells (43%).  No increase in CD34 positive myeloid blasts.   -BCR-ABL1 bone marrow is negative.   5. On 04/29/2020, tryptase elevated at 51.3 (normal less than 11).  6. Decitabine x 4 cycles between 04/29/2020 and 07/31/2020. (patient did not want any further decitabine).  -Hydroxyurea started on 08/04/2020 due to thrombocytosis. Later stopped due to thrombocytopenia.  -Decitabine started on 11/02/2020.     SUBJECTIVE:  Mr. Mack is an 85-year-old gentleman with chronic myelomonocytic leukemia-1 and systemic mastocytosis.  The patient is on decitabine.  Overall, he is tolerating it well.  His CBC has improved.  Leukocytosis and thrombocytopenia are better.      He has anxiety.  Because of that, he has fatigue.  He does not sleep well.  No headache.  No dizziness.  No neck pain.  No chest pain.  Gets mildly short of breath on and off.  In the past, had pleural effusion, which has been tapped.  No abdominal pain, nausea or vomiting.  No fever or chills.  Some muscle weakness.  No fall.      All other review of systems negative.      PHYSICAL EXAMINATION:   GENERAL:  He is alert, oriented x 3.    This is a telephone visit.      LABORATORY DATA:  Reviewed.      ASSESSMENT:   1.  Chronic myelomonocytic leukemia-1.   2.  Systemic mastocytosis.   3.  Anxiety.   4.  Fatigue.    5.  Pleural effusion, status post thoracocentesis.   6.  Normocytic anemia.      PLAN:   1.  The patient clinically is stable.  Whenever his anxiety gets worse, he does not  feel well.   2.  CBC was reviewed.  He was happy to know that WBC and platelets have improved.   3.  For CMML, he will continue on decitabine.  He is tolerating it well.  Side effects reviewed.   4.  He has systemic mastocytosis.  No treatment indicated at this time.   5.  He had a few questions, which were all answered.  I will see him with next cycle of treatment.  In between, he will see our nurse practitioner.         GRAZYNA RICHEY MD             D: 2020   T: 2020   MT: [place]      Name:     RADHA ALFONSO   MRN:      [id number]        Account:      [id number]   :      1935           Visit Date:   2020      Document: [id number]      Telephone visit for 12 minutes.    This office note has been dictated.          Again, thank you for allowing me to participate in the care of your patient.        Sincerely,        Grazyna Richey MD

## 2020-12-01 NOTE — TELEPHONE ENCOUNTER
Don called requesting to talk to MIREYA Portillo regarding an insurance denial.  He reports that he talked to her about it last week.    Routing to MIREYA Portillo to return call.    NICO DozierN, RN, OCN  Oncology Care Coordinator  [facility]

## 2020-12-01 NOTE — PROGRESS NOTES
Infusion Nursing Note:  Jeff Mack presents today for C6D2 Decitibine.    Patient seen by provider today: No   present during visit today: Not Applicable.    Note: N/A.    Intravenous Access:  Peripheral IV placed.    Treatment Conditions:  Lab Results   Component Value Date    HGB 9.7 11/27/2020     Lab Results   Component Value Date    WBC 23.9 11/27/2020      Lab Results   Component Value Date    ANEU 17.4 11/27/2020     Lab Results   Component Value Date     11/27/2020      Lab Results   Component Value Date     11/27/2020                   Lab Results   Component Value Date    POTASSIUM 3.6 11/27/2020           Lab Results   Component Value Date    MAG 2.1 04/26/2020            Lab Results   Component Value Date    CR 0.93 11/27/2020                   Lab Results   Component Value Date    KAE 8.4 11/27/2020                Lab Results   Component Value Date    BILITOTAL 1.0 11/27/2020           Lab Results   Component Value Date    ALBUMIN 3.5 11/27/2020                    Lab Results   Component Value Date    ALT 33 11/27/2020           Lab Results   Component Value Date    AST 26 11/27/2020           Post Infusion Assessment:  Patient tolerated infusion without incident.  Site patent and intact, free from redness, edema or discomfort.  No evidence of extravasations.  Access discontinued per protocol.       Discharge Plan:   Patient declined prescription refills.  Discharge instructions reviewed with: Patient.  Patient and/or family verbalized understanding of discharge instructions and all questions answered.  AVS to patient via [employer].  Patient will return 12/2 for next appointment.   Patient discharged in stable condition accompanied by: self.  Departure Mode: Ambulatory.    Juan Francisco Palacio RN

## 2020-12-02 NOTE — PROGRESS NOTES
Infusion Nursing Note:  Jeff Mack presents today for C6D3 Decitabine.    Patient seen by provider today: No    Note: Patient reports feeling well with no new changes to report overnight.    Intravenous Access:  Peripheral IV placed.      Treatment Conditions:  Lab Results   Component Value Date    HGB 9.7 11/27/2020     Lab Results   Component Value Date    WBC 23.9 11/27/2020      Lab Results   Component Value Date    ANEU 17.4 11/27/2020     Lab Results   Component Value Date     11/27/2020      Lab Results   Component Value Date     11/27/2020                   Lab Results   Component Value Date    POTASSIUM 3.6 11/27/2020           Lab Results   Component Value Date    MAG 2.1 04/26/2020            Lab Results   Component Value Date    CR 0.93 11/27/2020                   Lab Results   Component Value Date    KAE 8.4 11/27/2020                Lab Results   Component Value Date    BILITOTAL 1.0 11/27/2020           Lab Results   Component Value Date    ALBUMIN 3.5 11/27/2020                    Lab Results   Component Value Date    ALT 33 11/27/2020           Lab Results   Component Value Date    AST 26 11/27/2020       Results reviewed, labs MET treatment parameters, ok to proceed with treatment.      Post Infusion Assessment:  Patient tolerated infusion without incident.  Blood return noted pre and post infusion.  Site patent and intact, free from redness, edema or discomfort.  No evidence of extravasations.  Access discontinued per protocol.    Discharge Plan:   Patient declined prescription refills.  Discharge instructions reviewed with: Patient.  Patient and/or family verbalized understanding of discharge instructions and all questions answered.  Copy of AVS reviewed with patient and/or family.  Patient will return 12/3/20 for next appointment.  Patient discharged in stable condition accompanied by: self.  Departure Mode: Ambulatory.    Mariam Townsend RN

## 2020-12-03 NOTE — PROGRESS NOTES
Infusion Nursing Note:  Jeff Mack presents today for C6D4 decitabine.    Patient seen by provider today: No   present during visit today: Not Applicable.    Note: N/A.    Intravenous Access:  Peripheral IV placed.    Treatment Conditions:  Not Applicable.      Post Infusion Assessment:  Patient tolerated infusion without incident.  Blood return noted pre and post infusion.  Site patent and intact, free from redness, edema or discomfort.  No evidence of extravasations.  Access discontinued per protocol.       Discharge Plan:   Discharge instructions reviewed with: Patient.  Patient and/or family verbalized understanding of discharge instructions and all questions answered.  Copy of AVS reviewed with patient and/or family.  Patient will return 12/4/20 for next appointment.  Patient discharged in stable condition accompanied by: self.  Departure Mode: Ambulatory.    Olena Bernardo RN

## 2020-12-03 NOTE — TELEPHONE ENCOUNTER
Writer spoke with patient regarding the update and he believes he received something in the mail that indicates the concern for insurance not covering admission is resolved. Patient will review and let me know if any further action needs to be taken.    Jody Mehta RN

## 2020-12-03 NOTE — TELEPHONE ENCOUNTER
Writer has called Greg  [facility] twice requesting a call back and has not received a call back.    Jody Mehta RN

## 2020-12-04 NOTE — PROGRESS NOTES
Infusion Nursing Note:  Jeff Mack presents today for C6D5 Decitabine.    Patient seen by provider today: No   present during visit today: Not Applicable.    Note: N/A.    Intravenous Access:  Peripheral IV placed.    Treatment Conditions:  Not Applicable.  BSA 1.82.      Post Infusion Assessment:  Patient tolerated infusion without incident.  Blood return noted pre and post infusion.  Site patent and intact, free from redness, edema or discomfort.  No evidence of extravasations.  Access discontinued per protocol.       Discharge Plan:   Discharge instructions reviewed with: Patient.  Patient and/or family verbalized understanding of discharge instructions and all questions answered.  Copy of AVS reviewed with patient and/or family.  Patient will return as scheduled for next appointment.  Patient discharged in stable condition accompanied by: self.  Departure Mode: Ambulatory.    Tika Foote RN

## 2020-12-09 NOTE — PROGRESS NOTES
Medical Assistant Note:  Jeff Mack presents today for blood draw.    Patient seen by provider today: Yes: Andrea.   present during visit today: Not Applicable.    Concerns: No Concerns.    Procedure:  Lab draw site: rac, Needle type: bf, Gauge: 23.    Post Assessment:  Labs drawn without difficulty: Yes.    Discharge Plan:  Departure Mode: Ambulatory.    Face to Face Time: 5 min  .    Zaynab Aparicio, CMA

## 2020-12-09 NOTE — LETTER
"    12/9/2020         RE: Jeff Mack  [address]  [place] [address]        Dear Colleague,    Thank you for referring your patient, Jeff Mack, to the [facility]. Please see a copy of my visit note below.    Jeff Mack is a 85 year old male who is being evaluated via a billable telephone visit.      The patient has been notified of following:     \"This telephone visit will be conducted via a call between you and your physician/provider. We have found that certain health care needs can be provided without the need for a physical exam.  This service lets us provide the care you need with a short phone conversation.  If a prescription is necessary we can send it directly to your pharmacy.  If lab work is needed we can place an order for that and you can then stop by our lab to have the test done at a later time.    Telephone visits are billed at different rates depending on your insurance coverage. During this emergency period, for some insurers they may be billed the same as an in-person visit.  Please reach out to your insurance provider with any questions.    If during the course of the call the physician/provider feels a telephone visit is not appropriate, you will not be charged for this service.\"    Patient has given verbal consent for Telephone visit?  Yes    What phone number would you like to be contacted at? [phone]    How would you like to obtain your AVS? Mail a copy    Phone call duration: 6 minutes    Zaynab Aparicio CMA      Jeff Mack is a 85 year old male who is being evaluated via a billable telephone visit.      The patient has been notified of following:     \"This telephone visit will be conducted via a call between you and your physician/provider. We have found that certain health care needs can be provided without the need for a physical exam.  This service lets us provide the care you need with a short phone conversation.  " "If a prescription is necessary we can send it directly to your pharmacy.  If lab work is needed we can place an order for that and you can then stop by our lab to have the test done at a later time.    Telephone visits are billed at different rates depending on your insurance coverage. During this emergency period, for some insurers they may be billed the same as an in-person visit.  Please reach out to your insurance provider with any questions.    If during the course of the call the physician/provider feels a telephone visit is not appropriate, you will not be charged for this service.\"    Patient has given verbal consent for Telephone visit?  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     -Patient completed 4 cycles of decitabine  -refused additional decitabine cycles due to anxiety related to treatment  Refused bone marrow biopsy  Patient was started with hydroxyurea however developed thrombocytopenia with it for hydroxyurea was discontinued  -Patient developed worsening of leukocytosis anemia and thrombocytopenia consistent with progression of CMML  -11/02-patient met with Dr. Ndiaye who recommended bone marrow biopsy patient refused   -11/02 Decitabine restarted    Interval History:  Patient reports that he is having a little worsening of shortness of breath.  He is taking Lasix.  He reports last time he got thoracentesis it helped with shortness of breath.  Patient denies cough denies chest pain denies fever chills sweats        Review of Systems:  14 point ROS of systems including Constitutional, Eyes, Respiratory, Cardiovascular, Gastroenterology, Genitourinary, Integumentary, Muscularskeletal, Psychiatric were all negative except for pertinent positives noted in my HPI.        Physical Examination:    Telephone visit-no audible " wheezing or cough      Laboratory Data:    Reviewed        Assessment and Plan:  This is a 85-year-old male with    chronic myelomonocytic leukemia (CMML)    11/02 Decitabine restarted  -Labs reviewed with patient.   Patient is nicely responding to decitabine.  WBC is normal.   We will continue to check weekly labs.  Schedule with me next week  -Patient has follow-up appointment with Dr. Ndiaye with next cycle of decitabine      Shortness of breath- chronic issue   11/10-CT negative for pulmonary embolism. 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   Patient is asymptomatic  We will continue to monitor  Monitor Tryptase     Anxiety-chronic issue for the patient  -Continue with Xanax  He was also consulted by psychiatry with last admission he is now taking low-dose Seroquel              RAUDEL Stephens CNP  [facility] [place]     Chart documentation with Dragon Voice recognition Software. Although reviewed after completion, some words and grammatical errors may remain.            Again, thank you for allowing me to participate in the care of your patient.        Sincerely,        RAUDEL Stephens CNP

## 2020-12-09 NOTE — LETTER
12/9/2020         RE: Jeff Mack  [address]  [place] [address]        Dear Colleague,    Thank you for referring your patient, Jeff Mack, to the [facility]. Please see a copy of my visit note below.    Medical Assistant Note:  Jeff Mack presents today for blood draw.    Patient seen by provider today: Yes: Andrea.   present during visit today: Not Applicable.    Concerns: No Concerns.    Procedure:  Lab draw site: rac, Needle type: bf, Gauge: 23.    Post Assessment:  Labs drawn without difficulty: Yes.    Discharge Plan:  Departure Mode: Ambulatory.    Face to Face Time: 5 min  .    Zaynab Aparicio CMA              Again, thank you for allowing me to participate in the care of your patient.        Sincerely,         Lab Draw

## 2020-12-09 NOTE — LETTER
"    12/9/2020         RE: Jeff Mack  [address]  [place] [address]        Dear Colleague,    Thank you for referring your patient, Jeff Mack, to the [facility]CER [facility]. Please see a copy of my visit note below.    Jeff Mack is a 85 year old male who is being evaluated via a billable telephone visit.      The patient has been notified of following:     \"This telephone visit will be conducted via a call between you and your physician/provider. We have found that certain health care needs can be provided without the need for a physical exam.  This service lets us provide the care you need with a short phone conversation.  If a prescription is necessary we can send it directly to your pharmacy.  If lab work is needed we can place an order for that and you can then stop by our lab to have the test done at a later time.    Telephone visits are billed at different rates depending on your insurance coverage. During this emergency period, for some insurers they may be billed the same as an in-person visit.  Please reach out to your insurance provider with any questions.    If during the course of the call the physician/provider feels a telephone visit is not appropriate, you will not be charged for this service.\"    Patient has given verbal consent for Telephone visit?  Yes    What phone number would you like to be contacted at? [phone]    How would you like to obtain your AVS? Mail a copy    Phone call duration: 6 minutes    Zaynab Aparicio CMA      Jeff Mack is a 85 year old male who is being evaluated via a billable telephone visit.      The patient has been notified of following:     \"This telephone visit will be conducted via a call between you and your physician/provider. We have found that certain health care needs can be provided without the need for a physical exam.  This service lets us provide the care you need with a short phone conversation.  " "If a prescription is necessary we can send it directly to your pharmacy.  If lab work is needed we can place an order for that and you can then stop by our lab to have the test done at a later time.    Telephone visits are billed at different rates depending on your insurance coverage. During this emergency period, for some insurers they may be billed the same as an in-person visit.  Please reach out to your insurance provider with any questions.    If during the course of the call the physician/provider feels a telephone visit is not appropriate, you will not be charged for this service.\"    Patient has given verbal consent for Telephone visit?  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     -Patient completed 4 cycles of decitabine  -refused additional decitabine cycles due to anxiety related to treatment  Refused bone marrow biopsy  Patient was started with hydroxyurea however developed thrombocytopenia with it for hydroxyurea was discontinued  -Patient developed worsening of leukocytosis anemia and thrombocytopenia consistent with progression of CMML  -11/02-patient met with Dr. Ndiaye who recommended bone marrow biopsy patient refused   -11/02 Decitabine restarted    Interval History:  Patient reports that he is having a little worsening of shortness of breath.  He is taking Lasix.  He reports last time he got thoracentesis it helped with shortness of breath.  Patient denies cough denies chest pain denies fever chills sweats        Review of Systems:  14 point ROS of systems including Constitutional, Eyes, Respiratory, Cardiovascular, Gastroenterology, Genitourinary, Integumentary, Muscularskeletal, Psychiatric were all negative except for pertinent positives noted in my HPI.        Physical Examination:    Telephone visit-no audible " wheezing or cough      Laboratory Data:    Reviewed        Assessment and Plan:  This is a 85-year-old male with    chronic myelomonocytic leukemia (CMML)    11/02 Decitabine restarted  -Labs reviewed with patient.   Patient is nicely responding to decitabine.  WBC is normal.   We will continue to check weekly labs.  Schedule with me next week  -Patient has follow-up appointment with Dr. Ndiaye with next cycle of decitabine      Shortness of breath- chronic issue   11/10-CT negative for pulmonary embolism. 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   Patient is asymptomatic  We will continue to monitor  Monitor Tryptase     Anxiety-chronic issue for the patient  -Continue with Xanax  He was also consulted by psychiatry with last admission he is now taking low-dose Seroquel              RAUDEL Stephens CNP  [facility] [place]     Chart documentation with Dragon Voice recognition Software. Although reviewed after completion, some words and grammatical errors may remain.            Again, thank you for allowing me to participate in the care of your patient.        Sincerely,        RAUDEL Stephens CNP

## 2020-12-09 NOTE — PROGRESS NOTES
"Jeff Mack is a 85 year old male who is being evaluated via a billable telephone visit.      The patient has been notified of following:     \"This telephone visit will be conducted via a call between you and your physician/provider. We have found that certain health care needs can be provided without the need for a physical exam.  This service lets us provide the care you need with a short phone conversation.  If a prescription is necessary we can send it directly to your pharmacy.  If lab work is needed we can place an order for that and you can then stop by our lab to have the test done at a later time.    Telephone visits are billed at different rates depending on your insurance coverage. During this emergency period, for some insurers they may be billed the same as an in-person visit.  Please reach out to your insurance provider with any questions.    If during the course of the call the physician/provider feels a telephone visit is not appropriate, you will not be charged for this service.\"    Patient has given verbal consent for Telephone visit?  Yes    What phone number would you like to be contacted at? [phone]    How would you like to obtain your AVS? Mail a copy    Phone call duration: 6 minutes    Zaynab Aparicio CMA    "

## 2020-12-09 NOTE — PROGRESS NOTES
"Jeff Mack is a 85 year old male who is being evaluated via a billable telephone visit.      The patient has been notified of following:     \"This telephone visit will be conducted via a call between you and your physician/provider. We have found that certain health care needs can be provided without the need for a physical exam.  This service lets us provide the care you need with a short phone conversation.  If a prescription is necessary we can send it directly to your pharmacy.  If lab work is needed we can place an order for that and you can then stop by our lab to have the test done at a later time.    Telephone visits are billed at different rates depending on your insurance coverage. During this emergency period, for some insurers they may be billed the same as an in-person visit.  Please reach out to your insurance provider with any questions.    If during the course of the call the physician/provider feels a telephone visit is not appropriate, you will not be charged for this service.\"    Patient has given verbal consent for Telephone visit?  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     -Patient completed 4 cycles of decitabine  -refused additional decitabine cycles due to anxiety related to treatment  Refused bone marrow biopsy  Patient was started with hydroxyurea however developed thrombocytopenia with it for hydroxyurea was discontinued  -Patient developed worsening of leukocytosis anemia and thrombocytopenia consistent with progression of CMML  -11/02-patient met with Dr. Ndiaye who recommended bone marrow biopsy patient refused   -11/02 Decitabine restarted    Interval History:  Patient reports that he is having a little worsening of shortness of breath.  He is taking Lasix.  He reports last time he got thoracentesis it " helped with shortness of breath.  Patient denies cough denies chest pain denies fever chills sweats        Review of Systems:  14 point ROS of systems including Constitutional, Eyes, Respiratory, Cardiovascular, Gastroenterology, Genitourinary, Integumentary, Muscularskeletal, Psychiatric were all negative except for pertinent positives noted in my HPI.        Physical Examination:    Telephone visit-no audible wheezing or cough      Laboratory Data:    Reviewed        Assessment and Plan:  This is a 85-year-old male with    chronic myelomonocytic leukemia (CMML)    11/02 Decitabine restarted  -Labs reviewed with patient.   Patient is nicely responding to decitabine.  WBC is normal.   We will continue to check weekly labs.  Schedule with me next week  -Patient has follow-up appointment with Dr. Ndiaye with next cycle of decitabine      Shortness of breath- chronic issue   11/10-CT negative for pulmonary embolism. 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   Patient is asymptomatic  We will continue to monitor  Monitor Tryptase     Anxiety-chronic issue for the patient  -Continue with Xanax  He was also consulted by psychiatry with last admission he is now taking low-dose Seroquel              RAUDEL Stephens [facility] [place]     Chart documentation with Dragon Voice recognition Software. Although reviewed after completion, some words and grammatical errors may remain.

## 2020-12-14 NOTE — TELEPHONE ENCOUNTER
Pre-Procedure Negative COVID Test Results    Results Reviewed  The patient has a negative COVID test result within the required timeframe for the scheduled procedure.     No COVID pre-call needed.     Conchis Stout RN

## 2020-12-15 NOTE — DISCHARGE INSTRUCTIONS
Thoracentesis Discharge Instructions     After you go home:      You may resume your normal diet    Care of Puncture Site:      For the first 48 hrs, check your puncture site every couple hours while you are awake     If there is a bandaid - you may remove it tomorrow morning    You may shower tomorrow    No tub baths, whirlpools or swimming until your puncture site has fully healed     Activity:      You may go back to normal activity in 24 hours    Wait 48 hours before lifting, straining, exercise or other strenuous activity    Medicines:      You may resume all your medications    For minor pain, you may take Acetaminophen (Tylenol) or Ibuprofen (Advil)            Call the provider who ordered this procedure if:      The site is red, swollen, hot or tender    Blood or fluid is draining from the site    You have chills or a fever greater than 101 F (38 C)    Shortness of breath    Pain that is getting worse    Any questions or concerns      Additional Information:      During this test, a needle will sometimes enter the lung. This can cause the lung to collapse - called a pneumothorax. Symptoms include severe chest pain, breathing problems or increased blood in your sputum (phlegm).     Call  911 or go to the Emergency Room if:      Severe chest pain or trouble breathing    Increased blood in your sputum (phlegm)    Bleeding that you cannot control      If you have questions call:        [facility] Radiology Dept @ [phone]      The provider who performed your procedure was Dr Quintero

## 2020-12-15 NOTE — PROGRESS NOTES
Care Suites Procedure Nursing Note    Patient Information  Name: Jeff Mack  Age: 85 year old    Procedure  Procedure: US guided thoracentesis  Procedure start time: 1305  Procedure complete time: 1315  Concerns/abnormal assessment: none VSS. Denies c/o. Drained 500 ml dark aminata fluid from right side.  If abnormal assessment, provider notified: N/A  Plan/Other: Back to care suites for monitoring before discharge.    Arabella Dewey RN

## 2020-12-15 NOTE — PROGRESS NOTES
Care Suites Post Procedure Note    Patient Information  Name: Jeff Mack  Age: 85 year old    Post Procedure  Time patient returned to Care Suites: 1325  Concerns/abnormal assessment: none. VSS. Site D/I. Taking lunch tray and juice. No c/o    If abnormal assessment, provider notified: N/A  Plan/Other: Monitor and then discharge with wife.    Arabella Dewey RN

## 2020-12-15 NOTE — LETTER
12/15/2020         RE: Jeff Mack  [address]  [place] [address]        Dear Colleague,    Thank you for referring your patient, Jeff Mack, to the [facility]. Please see a copy of my visit note below.    Medical Assistant Note:    Jeff Mack presents today for blood draw.    Patient seen by provider today: Yes: Andrea Sanchez.   present during visit today: Not Applicable.    Concerns: No Concerns.    Procedure:  Lab draw site: lac, Needle type: bf, Gauge: 23.    Post Assessment:  Labs drawn without difficulty: Yes.    Discharge Plan:  Departure Mode: Ambulatory.    Face to Face Time: 5 minutes.    Bobbi Meyer CMA  12/15/2020      11:15 AM                Again, thank you for allowing me to participate in the care of your patient.        Sincerely,         Lab Draw

## 2020-12-15 NOTE — PROGRESS NOTES
Care Suites Discharge Nursing Note    Patient Information  Name: Jeff Mack  Age: 85 year old    Discharge Education:  Discharge instructions reviewed: Yes  Additional education/resources provided: na  Patient/patient representative verbalizes understanding: Yes  Patient discharging on new medications: N/A  Medication education completed: N/A    Discharge Plans:   Discharge location: home  Discharge ride contacted: Yes  Approximate discharge time: 1400    Discharge Criteria:  Discharge criteria met and vital signs stable: Yes    Patient Belongs:  Patient belongings returned to patient: Yes    Arabella Dewey RN

## 2020-12-15 NOTE — PROGRESS NOTES
Care Suites Admission Nursing Note    Patient Information  Name: Jeff Mack  Age: 85 year old  Reason for admission: thoracentesis  Care Suites arrival time: 1125      Patient Admission/Assessment   Pre-procedure assessment complete: Yes  If abnormal assessment/labs, provider notified: N/A  NPO: N/A  Medications held per instructions/orders: N/A  Consent: obtained  If applicable, pregnancy test status: deferred  Patient oriented to room: Yes  Education/questions answered: Yes  Plan/other: pending INR.    Discharge Planning  Discharge name/phone number: [phone]  Overnight post sedation caregiver: wife  Discharge location   home  Arabella Dewey RN

## 2020-12-15 NOTE — TELEPHONE ENCOUNTER
Don called this afternoon requesting his blood counts. Labs reviewed with   Joshua prior to his appointment on today with Andrea at 0400 PM.

## 2020-12-15 NOTE — LETTER
"    12/15/2020         RE: Jeff Mack  [address]  [place] [address]        Dear Colleague,    Thank you for referring your patient, Jeff Mack, to the [facility]CER [facility]. Please see a copy of my visit note below.    Jeff Mack is a 85 year old male who is being evaluated via a billable telephone visit.      The patient has been notified of following:     \"This telephone visit will be conducted via a call between you and your physician/provider. We have found that certain health care needs can be provided without the need for a physical exam.  This service lets us provide the care you need with a short phone conversation.  If a prescription is necessary we can send it directly to your pharmacy.  If lab work is needed we can place an order for that and you can then stop by our lab to have the test done at a later time.    Telephone visits are billed at different rates depending on your insurance coverage. During this emergency period, for some insurers they may be billed the same as an in-person visit.  Please reach out to your insurance provider with any questions.    If during the course of the call the physician/provider feels a telephone visit is not appropriate, you will not be charged for this service.\"    Patient has given verbal consent for Telephone visit?  Yes    What phone number would you like to be contacted at? [phone]    Roxana Stevenson CMA      Jeff Mack is a 85 year old male who is being evaluated via a billable telephone visit.      The patient has been notified of following:     \"This telephone visit will be conducted via a call between you and your physician/provider. We have found that certain health care needs can be provided without the need for a physical exam.  This service lets us provide the care you need with a short phone conversation.  If a prescription is necessary we can send it directly to your pharmacy.  If lab work " "is needed we can place an order for that and you can then stop by our lab to have the test done at a later time.    Telephone visits are billed at different rates depending on your insurance coverage. During this emergency period, for some insurers they may be billed the same as an in-person visit.  Please reach out to your insurance provider with any questions.    If during the course of the call the physician/provider feels a telephone visit is not appropriate, you will not be charged for this service.\"    Patient has given verbal consent for Telephone visit?  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     -Patient completed 4 cycles of decitabine  -refused additional decitabine cycles due to anxiety related to treatment  Refused bone marrow biopsy  Patient was started with hydroxyurea however developed thrombocytopenia with it for hydroxyurea was discontinued  -Patient developed worsening of leukocytosis anemia and thrombocytopenia consistent with progression of CMML  -11/02-patient met with Dr. Ndiaye who recommended bone marrow biopsy patient refused   -11/02 Decitabine restarted    Interval History:  He had thoracentesis today done which is helping with shortness of breath.  Patient denies cough fever chills sweats.  Denies chest pain.  Denies nausea vomiting diarrhea abdominal pain or bleeding.  No new concerns today        Review of Systems:  14 point ROS of systems including Constitutional, Eyes, Respiratory, Cardiovascular, Gastroenterology, Genitourinary, Integumentary, Muscularskeletal, Psychiatric were all negative except for pertinent positives noted in my HPI.      Physical Examination:  Telephone visit-no audible wheezing or cough      Laboratory Data:    Reviewed      Assessment and Plan:  This is a 85-year-old male " with    chronic myelomonocytic leukemia (CMML)    11/02 Decitabine restarted  -Labs reviewed with patient.   -Patient is responding to treatment.  WBC is back to normal.  Overall stable CBC for patient  -12/28-next cycle of decitabine and follow-up with Dr. Ndiaye      Shortness of breath- chronic issue   11/10-CT negative for pulmonary embolism. 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   Patient is asymptomatic  We will continue to monitor  Monitor Tryptase     Anxiety-chronic issue for the patient  -Continue with Xanax  He was also consulted by psychiatry with last admission he is now taking low-dose Seroquel      RAUDEL Stephens CNP  [facility] [place]     Chart documentation with Dragon Voice recognition Software. Although reviewed after completion, some words and grammatical errors may remain.            Again, thank you for allowing me to participate in the care of your patient.        Sincerely,        RAUDEL Stephens CNP

## 2020-12-15 NOTE — PROGRESS NOTES
"Jeff Mack is a 85 year old male who is being evaluated via a billable telephone visit.      The patient has been notified of following:     \"This telephone visit will be conducted via a call between you and your physician/provider. We have found that certain health care needs can be provided without the need for a physical exam.  This service lets us provide the care you need with a short phone conversation.  If a prescription is necessary we can send it directly to your pharmacy.  If lab work is needed we can place an order for that and you can then stop by our lab to have the test done at a later time.    Telephone visits are billed at different rates depending on your insurance coverage. During this emergency period, for some insurers they may be billed the same as an in-person visit.  Please reach out to your insurance provider with any questions.    If during the course of the call the physician/provider feels a telephone visit is not appropriate, you will not be charged for this service.\"    Patient has given verbal consent for Telephone visit?  Yes    What phone number would you like to be contacted at? [phone]    Roxana Stevenson [facility]    "

## 2020-12-15 NOTE — LETTER
"    12/15/2020         RE: Jeff Mack  [address]  [place] [address]        Dear Colleague,    Thank you for referring your patient, Jeff Mack, to the [facility]. Please see a copy of my visit note below.    Jeff Mack is a 85 year old male who is being evaluated via a billable telephone visit.      The patient has been notified of following:     \"This telephone visit will be conducted via a call between you and your physician/provider. We have found that certain health care needs can be provided without the need for a physical exam.  This service lets us provide the care you need with a short phone conversation.  If a prescription is necessary we can send it directly to your pharmacy.  If lab work is needed we can place an order for that and you can then stop by our lab to have the test done at a later time.    Telephone visits are billed at different rates depending on your insurance coverage. During this emergency period, for some insurers they may be billed the same as an in-person visit.  Please reach out to your insurance provider with any questions.    If during the course of the call the physician/provider feels a telephone visit is not appropriate, you will not be charged for this service.\"    Patient has given verbal consent for Telephone visit?  Yes    What phone number would you like to be contacted at? [phone]    Roxana Stevenson CMA      Jeff Mack is a 85 year old male who is being evaluated via a billable telephone visit.      The patient has been notified of following:     \"This telephone visit will be conducted via a call between you and your physician/provider. We have found that certain health care needs can be provided without the need for a physical exam.  This service lets us provide the care you need with a short phone conversation.  If a prescription is necessary we can send it directly to your pharmacy.  If lab work " "is needed we can place an order for that and you can then stop by our lab to have the test done at a later time.    Telephone visits are billed at different rates depending on your insurance coverage. During this emergency period, for some insurers they may be billed the same as an in-person visit.  Please reach out to your insurance provider with any questions.    If during the course of the call the physician/provider feels a telephone visit is not appropriate, you will not be charged for this service.\"    Patient has given verbal consent for Telephone visit?  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     -Patient completed 4 cycles of decitabine  -refused additional decitabine cycles due to anxiety related to treatment  Refused bone marrow biopsy  Patient was started with hydroxyurea however developed thrombocytopenia with it for hydroxyurea was discontinued  -Patient developed worsening of leukocytosis anemia and thrombocytopenia consistent with progression of CMML  -11/02-patient met with Dr. Ndiaye who recommended bone marrow biopsy patient refused   -11/02 Decitabine restarted    Interval History:  He had thoracentesis today done which is helping with shortness of breath.  Patient denies cough fever chills sweats.  Denies chest pain.  Denies nausea vomiting diarrhea abdominal pain or bleeding.  No new concerns today        Review of Systems:  14 point ROS of systems including Constitutional, Eyes, Respiratory, Cardiovascular, Gastroenterology, Genitourinary, Integumentary, Muscularskeletal, Psychiatric were all negative except for pertinent positives noted in my HPI.      Physical Examination:  Telephone visit-no audible wheezing or cough      Laboratory Data:    Reviewed      Assessment and Plan:  This is a 85-year-old male " with    chronic myelomonocytic leukemia (CMML)    11/02 Decitabine restarted  -Labs reviewed with patient.   -Patient is responding to treatment.  WBC is back to normal.  Overall stable CBC for patient  -12/28-next cycle of decitabine and follow-up with Dr. Ndiaye      Shortness of breath- chronic issue   11/10-CT negative for pulmonary embolism. 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   Patient is asymptomatic  We will continue to monitor  Monitor Tryptase     Anxiety-chronic issue for the patient  -Continue with Xanax  He was also consulted by psychiatry with last admission he is now taking low-dose Seroquel      RAUDEL Stephens CNP  [facility] [place]     Chart documentation with Dragon Voice recognition Software. Although reviewed after completion, some words and grammatical errors may remain.            Again, thank you for allowing me to participate in the care of your patient.        Sincerely,        RAUDEL Stephens CNP

## 2020-12-15 NOTE — PROGRESS NOTES
Medical Assistant Note:    Jeff Mack presents today for blood draw.    Patient seen by provider today: Yes: Andrea Sanchez.   present during visit today: Not Applicable.    Concerns: No Concerns.    Procedure:  Lab draw site: lac, Needle type: bf, Gauge: 23.    Post Assessment:  Labs drawn without difficulty: Yes.    Discharge Plan:  Departure Mode: Ambulatory.    Face to Face Time: 5 minutes.    Bobbi Meyer CMA  12/15/2020      11:15 AM

## 2020-12-16 NOTE — PROGRESS NOTES
"Jeff Mack is a 85 year old male who is being evaluated via a billable telephone visit.      The patient has been notified of following:     \"This telephone visit will be conducted via a call between you and your physician/provider. We have found that certain health care needs can be provided without the need for a physical exam.  This service lets us provide the care you need with a short phone conversation.  If a prescription is necessary we can send it directly to your pharmacy.  If lab work is needed we can place an order for that and you can then stop by our lab to have the test done at a later time.    Telephone visits are billed at different rates depending on your insurance coverage. During this emergency period, for some insurers they may be billed the same as an in-person visit.  Please reach out to your insurance provider with any questions.    If during the course of the call the physician/provider feels a telephone visit is not appropriate, you will not be charged for this service.\"    Patient has given verbal consent for Telephone visit?  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     -Patient completed 4 cycles of decitabine  -refused additional decitabine cycles due to anxiety related to treatment  Refused bone marrow biopsy  Patient was started with hydroxyurea however developed thrombocytopenia with it for hydroxyurea was discontinued  -Patient developed worsening of leukocytosis anemia and thrombocytopenia consistent with progression of CMML  -11/02-patient met with Dr. Ndiaye who recommended bone marrow biopsy patient refused   -11/02 Decitabine restarted    Interval History:  He had thoracentesis today done which is helping with shortness of breath.  Patient denies cough fever chills sweats.  Denies chest pain.  " Denies nausea vomiting diarrhea abdominal pain or bleeding.  No new concerns today        Review of Systems:  14 point ROS of systems including Constitutional, Eyes, Respiratory, Cardiovascular, Gastroenterology, Genitourinary, Integumentary, Muscularskeletal, Psychiatric were all negative except for pertinent positives noted in my HPI.      Physical Examination:  Telephone visit-no audible wheezing or cough      Laboratory Data:    Reviewed      Assessment and Plan:  This is a 85-year-old male with    chronic myelomonocytic leukemia (CMML)    11/02 Decitabine restarted  -Labs reviewed with patient.   -Patient is responding to treatment.  WBC is back to normal.  Overall stable CBC for patient  -12/28-next cycle of decitabine and follow-up with Dr. Ndiaye      Shortness of breath- chronic issue   11/10-CT negative for pulmonary embolism. 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   Patient is asymptomatic  We will continue to monitor  Monitor Tryptase     Anxiety-chronic issue for the patient  -Continue with Xanax  He was also consulted by psychiatry with last admission he is now taking low-dose Seroquel      RAUDEL Stephens CNP  [facility] [place]     Chart documentation with Dragon Voice recognition Software. Although reviewed after completion, some words and grammatical errors may remain.

## 2020-12-17 NOTE — TELEPHONE ENCOUNTER
"Don called today stating he had a thoracentesis Tuesday. That went well and he's breathing better.   Wednesday he noticed a pain in his groin area, both sides , constant ache.   He notices it most when he's going from sitting to standing and when he is coughing.       Hes wanting to take tramadol for this achiness and wanted to know if it would interact with his lasix.   Reviewed with pharmacy-   \"not anything super significant. tramadol can possibly decrease efficacy of lasix, but it is only something to monitor and not anything that really needs to take action on\"    Will route to Andrea/ Dr. Ndiaye to advise on this and if he needs to be seen.         "

## 2020-12-17 NOTE — TELEPHONE ENCOUNTER
Reviewed with Dr. Ndiaye. Advised an appointment with Andrea tomorrow.    Don is agreeable to this, placed on Ruben schedule for 10 am.

## 2020-12-18 NOTE — PROGRESS NOTES
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     -Patient completed 4 cycles of decitabine  -refused additional decitabine cycles due to anxiety related to treatment  Refused bone marrow biopsy  Patient was started with hydroxyurea however developed thrombocytopenia with it for hydroxyurea was discontinued  -Patient developed worsening of leukocytosis anemia and thrombocytopenia consistent with progression of CMML  -11/02-patient met with Dr. Ndiaye who recommended bone marrow biopsy patient refused   -11/02 Decitabine restarted    Interval History:    Patient reports that he has been constipated which is causing him to have generalized lower abdominal ache.  He reports is not pain it is aching.  He reports he had bowel movement 2 days ago .He started  taking senna.  He has MiraLAX at home but he is not taking it. Pt took tramadol which helped with the pain  Denies abdominal distention, denies nausea vomiting.  Denies fever ,chills, sweats, cough shortness of breath chest pain.  Denies GERD, denies urinary symptoms, denies trouble urinating  He reports energy and appetite are good      Review of Systems:  14 point ROS of systems including Constitutional, Eyes, Respiratory, Cardiovascular, Gastroenterology, Genitourinary, Integumentary, Muscularskeletal, Psychiatric were all negative except for pertinent positives noted in my HPI.      Physical Examination:  Physical Exam  HENT:      Head: Normocephalic.   Eyes:      Pupils: Pupils are equal, round, and reactive to light.   Neck:      Musculoskeletal: Normal range of motion.   Cardiovascular:      Rate and Rhythm: Normal rate.      Pulses: Normal pulses.   Pulmonary:      Effort: Pulmonary effort is normal.   Abdominal:      General: Abdomen is flat. Bowel sounds are normal. There is no distension.      Palpations: Abdomen is soft. There is no mass.      Tenderness:  "There is no abdominal tenderness. There is no guarding or rebound.      Hernia: No hernia is present.   Neurological:      Mental Status: He is alert.             Laboratory Data:  reviewed       Assessment and Plan:  This is a 85-year-old male with      Generalized lower lower abdominal ache.  Patient reports it does not hurt much.  It is more like ache that \"comes and goes\"   -pt not in any  distress.  Abdominal exam is normal.  -Patient reports that he gets this kind of ache with constipation  -Continue with stool softener.  I told patient to take MiraLAX daily until he has bowel movement.  -Continue with tramadol as needed  -Patient advised to go to ER in the event of changes in symptoms, abdominal pain, abdominal distention nausea vomiting , fever chills sweats-patient verbalized understanding  I will call patient on Monday to follow-up.  We will plan for CT scan if no relief in arche         chronic myelomonocytic leukemia (CMML)    11/02 Decitabine restarted  -Labs reviewed with patient.   -Patient is responding to treatment.  WBC is back to normal.  Overall stable CBC for patient  -12/28-next cycle of decitabine and follow-up with Dr. Ndiaye      Shortness of breath- chronic issue   11/10-CT negative for pulmonary embolism. 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   Patient is asymptomatic  We will continue to monitor  Monitor Tryptase     Anxiety-chronic issue for the patient  -Continue with Xanax  He was also consulted by psychiatry with " last admission he is now taking low-dose Seroquel      RAUDEL Stephens CNP [facility]     Chart documentation with Dragon Voice recognition Software. Although reviewed after completion, some words and grammatical errors may remain.

## 2020-12-18 NOTE — PROGRESS NOTES
"Oncology Rooming Note    December 18, 2020 10:10 AM   Jeff Mack is a 85 year old male who presents for:    Chief Complaint   Patient presents with     Oncology Clinic Visit     Initial Vitals: /65   Pulse 84   Temp 97.5  F (36.4  C) (Oral)   Resp 16   Wt 68 kg (150 lb)   SpO2 96%   BMI 22.15 kg/m   Estimated body mass index is 22.15 kg/m  as calculated from the following:    Height as of 12/15/20: 1.753 m (5' 9\").    Weight as of this encounter: 68 kg (150 lb). Body surface area is 1.82 meters squared.  Severe Pain (6) Comment: Data Unavailable   No LMP for male patient.  Allergies reviewed: Yes  Medications reviewed: Yes    Medications: Medication refills not needed today.  Pharmacy name entered into [employer]:    [id number] - [place], [place] - [address] [address] [address] [employer] - [place], [place] - [address] [facility] - [place], [place] - [facility] [address]    Clinical concerns: no      Roxana Stevenson CMA            "

## 2020-12-20 PROBLEM — K59.00 OBSTIPATION: Status: ACTIVE | Noted: 2020-01-01

## 2020-12-20 PROBLEM — K52.9: Status: ACTIVE | Noted: 2020-01-01

## 2020-12-20 NOTE — PHARMACY-ADMISSION MEDICATION HISTORY
Pharmacy Medication History  Admission medication history interview status for the 12/20/2020  admission is complete. See EPIC admission navigator for prior to admission medications       Medication history sources: Patient, Surescripts and Care Everywhere  Location of interview: Phone  Adherence Assessment: Good    Significant changes made to the medication list:  None    Additional medication history information:   Per patient, he has not started taking his sertraline    Medication reconciliation completed by provider prior to medication history? Yes    Time spent in this activity: 10 mins      Prior to Admission medications    Medication Sig Last Dose Taking? Auth Provider   albuterol (PROAIR HFA/PROVENTIL HFA/VENTOLIN HFA) 108 (90 Base) MCG/ACT inhaler Inhale 1-2 puffs into the lungs every 6 hours as needed for shortness of breath / dyspnea or wheezing  at prn Yes Benjamin Lazar MD   ALPRAZolam (XANAX) 0.25 MG tablet Take 1 tablet (0.25 mg) by mouth 2 times daily as needed for anxiety  at prn Yes Obdulio Ndiaye MD   Cholecalciferol (VITAMIN D3) 5000 UNITS TABS Take 5,000 Units by mouth daily  12/19/2020 at Unknown time Yes Reported, Patient   fluticasone (FLONASE) 50 MCG/ACT nasal spray SPRAY 2 SPRAYS INTO EACH NOSTRIL EVERY DAY 12/19/2020 at Unknown time Yes Bill Reed MD   furosemide (LASIX) 20 MG tablet Take 1 tablet (20 mg) by mouth daily 12/19/2020 at Unknown time Yes Andrea Sanchez APRN CNP   Nutritional Supplements (ENSURE COMPLETE) LIQD Take 1 Bottle by mouth every 8 hours as needed 12/19/2020 at Unknown time Yes Obdulio Ndiaye MD   polyethylene glycol (MIRALAX) 17 g packet Take 1 packet by mouth daily as needed for constipation  at prn Yes Unknown, Entered By History   sennosides (SENOKOT) 8.6 MG tablet Take 1 tablet by mouth daily as needed for constipation  at prn Yes Unknown, Entered By History   SYMBICORT 160-4.5 MCG/ACT Inhaler Inhale 2 puffs into the lungs 2 times daily  12/20/2020 at am Yes  Reported, Patient   traMADol (ULTRAM) 50 MG tablet Take 1 tablet (50 mg) by mouth every 6 hours as needed for severe pain  at prn Yes Andera Sanchez APRN CNP   sertraline (ZOLOFT) 25 MG tablet Take 1 tablet (25 mg) by mouth daily has not started taking  Benjamin Lazar MD       The information provided in this note is only as accurate as the sources available at the time of the update(s).

## 2020-12-20 NOTE — ED NOTES
"[facility]  ED Nurse Handoff Report    ED Chief complaint: Abdominal Pain      ED Diagnosis:   Final diagnoses:   Proctocolitis with intestinal obstruction       Code Status: To be addressed by admitting provider    Allergies: No Known Allergies    Patient Story: Pt comes from home for report of fecal incontinence, urinary hesitancy and LLQ abd pain.     Focused Assessment:  A&Ox4. Denies fevers, sob, cough, cp, n/v. Pt reports using an enema yesterday with no resolution of \"constipated feeling\" and now small amount of liquid fecal incontinence. Pt reports urinary hesitancy. x1 assist to bedside commode. VSS in ED. Calm, cooperative and resting on cart.    CT Abdomen Pelvis w Contrast   Final Result   IMPRESSION:    1.  Moderate distal colorectal stool with mural thickening and   adjacent stranding concerning for proctocolitis, can predispose to   stercoral colitis.   2.  Partially visualized lower thoracic paravertebral soft tissue   masses. In conjunction with moderate splenomegaly, findings may   represent sequela of extramedullary hematopoiesis. Malignant   etiologies, such as lymphoma, cannot be excluded and if clinically   indicated biopsy could be performed.      LEONARDO LAND MD        Labs Ordered and Resulted from Time of ED Arrival Up to the Time of Departure from the ED   UA MACROSCOPIC WITH REFLEX TO MICRO AND CULTURE - Abnormal; Notable for the following components:       Result Value    Blood Urine Trace (*)     Protein Albumin Urine 10 (*)     RBC Urine 7 (*)     Squamous Epithelial /HPF Urine 3 (*)     Mucous Urine Present (*)     Hyaline Casts 9 (*)     Granular Casts 2 (*)     All other components within normal limits   CBC WITH PLATELETS DIFFERENTIAL - Abnormal; Notable for the following components:    WBC 46.5 (*)     RBC Count 3.58 (*)     Hemoglobin 10.7 (*)     Hematocrit 32.6 (*)     RDW 20.0 (*)     Platelet Count 144 (*)     All other components within normal limits "   COMPREHENSIVE METABOLIC PANEL - Abnormal; Notable for the following components:    Sodium 132 (*)     Potassium 3.3 (*)     Glucose 116 (*)     Creatinine 1.26 (*)     GFR Estimate 52 (*)     GFR Estimate If Black 60 (*)     Alkaline Phosphatase 213 (*)     AST 56 (*)     All other components within normal limits   LIPASE - Abnormal; Notable for the following components:    Lipase 18 (*)     All other components within normal limits   LACTIC ACID WHOLE BLOOD   INFLUENZA A/B & SARS-COV2 PCR MULTIPLEX   BLADDER SCAN   FREE WATER   BLOOD CULTURE   BLOOD CULTURE       Treatments and/or interventions provided: abx, labs, CT abd, 1L Bolus NS  Patient's response to treatments and/or interventions: resting on cart    To be done/followed up on inpatient unit:  Continue with plan of care per admitting MD.    Does this patient have any cognitive concerns?: None    Activity level - Baseline/Home:  Independent  Activity Level - Current:   Stand with Assist    Patient's Preferred language: English   Needed?: No    Isolation: None  Infection: Not Applicable  Patient tested for COVID 19 prior to admission: YES  Bariatric?: No    Vital Signs:   Vitals:    12/20/20 0930 12/20/20 1000 12/20/20 1015 12/20/20 1045   BP:    131/67   Pulse:    100   Resp:       Temp:       TempSrc:       SpO2: 95% 96% 96% 97%   Weight:       Height:           Cardiac Rhythm:     Was the PSS-3 completed:   Yes  What interventions are required if any?  NA             Family Comments:None present, patient updated wife himself on admission  OBS brochure/video discussed/provided to patient/family: N/A    For the majority of the shift this patient's behavior was Green.   Behavioral interventions performed were NA.    ED NURSE PHONE NUMBER: *[phone]

## 2020-12-20 NOTE — ED NOTES
Barrier cream applied to patients buttock.  Redness and tenderness noted.  Patient is also complaining of abdominal discomfort. Abdomen soft, tender to touch.  Morphine ordered.  Provided with a warm blanket.

## 2020-12-20 NOTE — ED PROVIDER NOTES
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 The patient reports he has been taking lasix and has been experiencing constipation for the past 3 days. To get some relief, his wife gave him an enema yesterday, but has been experiencing anal leakage since. He also complains of an abdominal ache in his left side. He denies any fever, chills, diarrhea, vomiting, or blood in stool.    Allergies:  The patient has no known allergies.     Medications:    Albuterol  Xanax  Lasix  Sertraline  Senokot  Symbicort  Ultram    Past Medical History:    Neuritis or Radiculitis - thoracic or lumbosacral   Chronic ischemic heart disease  Hypertension  Cardiomyopathy  Lesion of sacral region  Hypertrophy of prostate  Chronic obstructive airway disease  Cardiomegaly  Hyperlipidemia  Edema  Esophageal cancer  Myelomonocytic leukemia  Hyperuricemia  Pleural effusion  Depression  Internal hemorrhoids  Spondylosis  Asthma  Basal cell carcinoma  Diverticulosis  Anxiety  Benign neoplasm of colon  Sciatica  Mumps    Past Surgical History:    TURP  Retinal surgery  Colonic polypectomy  Tonsillectomy  Laminectomy  Gastrostomy tube insertion  Prostate surgery  Bone marrow biopsy    Family History:    Hypertension  Cerebrovascular disease  Diabetes    Social History:  Marital Status:   Smoking status: No - Former  Alcohol use: No  Drug use: No  PCP: Benjamin Lazar    Review of Systems   Constitutional: Negative for chills and fever.   Gastrointestinal: Positive for abdominal pain. Negative for blood in stool, diarrhea and vomiting.        Anal leakage   All other systems reviewed and are negative.    Physical Exam     Patient  "Vitals for the past 24 hrs:   BP Temp Temp src Pulse Resp SpO2 Height Weight   12/20/20 1045 131/67 -- -- 100 -- 97 % -- --   12/20/20 1015 -- -- -- -- -- 96 % -- --   12/20/20 1000 -- -- -- -- -- 96 % -- --   12/20/20 0930 -- -- -- -- -- 95 % -- --   12/20/20 0900 -- -- -- -- -- 96 % -- --   12/20/20 0835 138/75 98.6  F (37  C) Tympanic 112 16 97 % 1.753 m (5' 9\") 68 kg (150 lb)       Physical Exam  Nursing note and vitals reviewed.  Constitutional:  Appears well-developed and well-nourished.   HENT:   Head:    Atraumatic.   Mouth/Throat:   Oropharynx is clear and moist. No oropharyngeal exudate.   Eyes:    Pupils are equal, round, and reactive to light.   Neck:    Normal range of motion. Neck supple.      No tracheal deviation present. No thyromegaly present.   Cardiovascular:  Normal rate, regular rhythm, no murmur   Pulmonary/Chest: Breath sounds are clear and equal without wheezes or crackles.  Abdominal:   Soft. Bowel sounds are normal. Exhibits no distension and      no mass. Left moderate tenderness without rebound or guarding.  :   Uncircumcised penis. No visible swelling or hernia in groin.  Rectal:  Small amount of liquid stool in rectal vault. No blood.  Musculoskeletal:  Exhibits no edema.   Lymphadenopathy:  No cervical adenopathy.   Neurological:   Alert and oriented to person, place, and time.   Skin:    Skin is warm and dry. No rash noted. No pallor.       Emergency Department Course   Imaging:  Radiology findings were communicated with the patient who voiced understanding of the findings.    CT Abd/pelvis with contrast:  1.  Moderate distal colorectal stool with mural thickening and   adjacent stranding concerning for proctocolitis, can predispose to   stercoral colitis.   2.  Partially visualized lower thoracic paravertebral soft tissue   masses. In conjunction with moderate splenomegaly, findings may   represent sequela of extramedullary hematopoiesis. Malignant   etiologies, such as lymphoma, " cannot be excluded and if clinically   indicated biopsy could be performed.     Reading per radiology    Laboratory:  Laboratory findings were communicated with the patient who voiced understanding of the findings.    CBC: WBC: 46.5 (H), HGB: 10.7 (L), PLT: 144 (L)    CMP: Na 132 (L), K 3.3 (L), Glc 116 (H) GFR 52 (L), Alkphos 213 (H), AST 56 (H), Creatinine 1.26 (H) o/w WNL    Lipase: 18 (L)    UA with micro: Blood Trace, Albumin 10, RBC/HPF 7 (H), Squamous Epithelial/HPF 3 (H), Mucus present, Hyaline Casts 9 (H), Granular casts 2 o/w negative     Asymptomatic COVID-19 Virus PCR: Pending    Emergency Department Course:    Reviewed:  I reviewed the patient's nursing notes, vitals, past medical records, Care Everywhere.     Assessments:  0830 I first assessed the patient and performed an exam.     1045 I rechecked the patient and discussed his results so far.    Consults:   1058 I spoke with Dr. Green regarding the patient. He accepted care for the patient.    Interventions:  1054: NS 1000 mL IV Bolus   1054: Klor-Con 40 mEq PO  1114: Lidocaine 2% 6 mL Urethral  1116: Zosyn 3.375 g IV Infusion    Disposition:  The patient was admitted to the hospital under the care of Dr. Green.       Impression & Plan    Covid-19  Jeff Mack was evaluated during a global COVID-19 pandemic, which necessitated consideration that the patient might be at risk for infection with the SARS-CoV-2 virus that causes COVID-19.   Applicable protocols for evaluation were followed during the patient's care.   COVID-19 was considered as part of the patient's evaluation. The plan for testing is:  a test was obtained during this visit.    Medical Decision Making:   I found the patient to have proctocolitis, changes on his CT with the significantly elevated white blood cell count concerning for bacterial infections, so he was given Zosyn IV after blood cultures x2 were drawn. I attempted to perform a rectal-fecal disimpaction, however, there was  no palpable stool in the vault that I could access. I am concerned that this stool impaction of the colon and rectum is causing him to have a bowel obstruction and infection of the colon wall, so he was admitted for further evaluation and treatment under the care of the hospitalist. He was given IV fluid hydration fluid here.     Diagnosis:     ICD-10-CM    1. Proctocolitis with intestinal obstruction  K52.9 UA reflex to Microscopic and Culture     Blood culture     Blood culture     Lactic acid     Asymptomatic Influenza A/B & SARS-CoV2 (COVID-19) Virus PCR Multiplex       Disposition:   Admitted to inpatient bed.    Scribe Disclosure:  Radha HERBERT, am serving as a scribe on 12/20/2020 at 8:30 AM to personally document services performed by Anila Foy MD based on my observations and the provider's statements to me.           Anila Foy MD  12/20/20 [id number]       Anila Foy MD  12/20/20 0373

## 2020-12-20 NOTE — CONSULTS
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  He had been responding well to treatment so far, with improvement of his WBC and platelet count.  Just on 12/15/20, WBC was 4.0, platelet count was 115K.  Today, on admission, WBC was up to 46.5K, with differential pending.  Platelet count is stable at 144K.  Sudden elevation may be due to acute illness/infection, along with CMML.  Will check peripheral smear, and monitor trend of CBC/diff for now.  -daily CBC/diff  -follow-up with Dr. Ndiaye - he will round tomorrow    2) Constipation, possible proctocolitis:   -on senna  -had enema, starting on lactulose.  Agree with Golytely if no improvement.  -also on Zosyn for possible proctocolitis  -appreciate hospitalist care of patient    3) COPD, acute on chronic renal failure:  -as per hospitalist service        HISTORY OF PRESENT ILLNESS: Jeff Mack is a 85 year old male with PMHx of CMML, diverticulosis, HTN, asthma, esophageal cancer, COPD, hiatal hernia, mastocytosis, recurrent pleural effusion who presents with constipation and abdominal pain.    His primary oncologist is Dr. Obdulio Ndiaye.  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  Please see his recent clinic note for details of his oncologic history and treatment.  Most recently, he has been on treatment with decitabine, which started on 11/2/20 (he was also on it previously between April-July 2020, but he declined further treatment at the time).  He was then on hydroxyurea, but was later stopped due to thrombocytopenia.  He restarted on decitabine on 11/2/20.  Overall, he has been responding to treatment.  His leukocytosis and thrombocytopenia improved.      He came to the ED today because he  has been constipated for 3-4 days.  He tried an enema yesterday, as well as senna and Miralax, but it did not help and was painful.  He only passed a small amount of gas.  He also had some abdominal pain.  He has had poor oral intake as a result      CT abdomen/pelvis done today showed moderate distal colorectal stool with mural thickening and adjacent stranding concerning for proctocolitis, can predispose to stercoral colitis.      Patient has been admitted, started on bowel regimen and antibiotics.      His WBC on admission was 46.5K, which was markedly higher than his recent WBC, which was 4.0K on 12/15/20, and 4.5K on 12/9/20.         REVIEW OF SYSTEMS:   14 point ROS was reviewed and is negative other than as noted above in HPI.       MEDICATIONS:  Current Facility-Administered Medications   Medication     albuterol (PROAIR HFA/PROVENTIL HFA/VENTOLIN HFA) 108 (90 Base) MCG/ACT inhaler 1-2 puff     ALPRAZolam (XANAX) tablet 0.25 mg     fluticasone (FLONASE) 50 MCG/ACT spray 2 spray     fluticasone-vilanterol (BREO ELLIPTA) 200-25 MCG/INH inhaler 1 puff     lactulose (CHRONULAC) solution 20 g     lidocaine (LMX4) cream     lidocaine 1 % 0.1-1 mL     magnesium citrate solution 148 mL     melatonin tablet 1 mg     naloxone (NARCAN) injection 0.2 mg    Or     naloxone (NARCAN) injection 0.4 mg    Or     naloxone (NARCAN) injection 0.2 mg    Or     naloxone (NARCAN) injection 0.4 mg     ondansetron (ZOFRAN-ODT) ODT tab 4 mg    Or     ondansetron (ZOFRAN) injection 4 mg     oxyCODONE (ROXICODONE) tablet 5-10 mg     piperacillin-tazobactam (ZOSYN) 3.375 g vial to attach to  mL bag     polyethylene glycol (MIRALAX) Packet 17 g     prochlorperazine (COMPAZINE) injection 5 mg    Or     prochlorperazine (COMPAZINE) tablet 5 mg    Or     prochlorperazine (COMPAZINE) suppository 12.5 mg     sennosides (SENOKOT) tablet 2 tablet     sertraline (ZOLOFT) tablet 25 mg     sodium chloride (PF) 0.9% PF flush 3 mL     sodium  chloride (PF) 0.9% PF flush 3 mL     traMADol (ULTRAM) tablet 50 mg     Vitamin D3 (CHOLECALCIFEROL) tablet 125 mcg         ALLERGIES:  No Known Allergies      PAST MEDICAL HISTORY:  Past Medical History:   Diagnosis Date     Anxiety state, unspecified      Asthma      Basal cell carcinoma      Benign neoplasm of colon      Cardiomegaly 1/6/2005     Chronic ischemic heart disease, unspecified      CML (chronic myelocytic leukemia) (H)      Constipation      Depressive disorder, not elsewhere classified      Diverticulosis of colon (without mention of hemorrhage)      Esophageal cancer (H) 3/23/2015     Essential hypertension, benign      Hiatal hernia      Hypertrophy (benign) of prostate      Internal hemorrhoids without mention of complication      Mumps      Other primary cardiomyopathies 2003    EF 45%     Sciatica      Spondylosis of unspecified site without mention of myelopathy          PAST SURGICAL HISTORY:  Past Surgical History:   Procedure Laterality Date     BONE MARROW BIOPSY, BONE SPECIMEN, NEEDLE/TROCAR N/A 4/16/2020    Procedure: BIOPSY, BONE MARROW;  Surgeon: Mg Hobbs MD;  Location: [facility]     BRONCHOSCOPY FLEXIBLE N/A 4/3/2015    Procedure: BRONCHOSCOPY FLEXIBLE;  Surgeon: Ralph Catherine MD;  Location:  OR     COLONOSCOPY       COLONOSCOPY N/A 2/19/2020    Procedure: COLONOSCOPY, WITH POLYPECTOMY AND BIOPSY;  Surgeon: Kathy Torres MD;  Location: [facility]     ESOPHAGOSCOPY, GASTROSCOPY, DUODENOSCOPY (EGD), COMBINED N/A 3/19/2015    Procedure: COMBINED ESOPHAGOSCOPY, GASTROSCOPY, DUODENOSCOPY (EGD), BIOPSY SINGLE OR MULTIPLE;  Surgeon: Alo Mauro MD;  Location: [facility]     ESOPHAGOSCOPY, GASTROSCOPY, DUODENOSCOPY (EGD), COMBINED N/A 7/14/2015    Procedure: COMBINED ESOPHAGOSCOPY, GASTROSCOPY, DUODENOSCOPY (EGD), BIOPSY SINGLE OR MULTIPLE;  Surgeon: Kathy Torres MD;  Location: [facility]     ESOPHAGOSCOPY, GASTROSCOPY, DUODENOSCOPY (EGD), COMBINED N/A 12/1/2015     Procedure: COMBINED ESOPHAGOSCOPY, GASTROSCOPY, DUODENOSCOPY (EGD), BIOPSY SINGLE OR MULTIPLE;  Surgeon: Alo Mauro MD;  Location:  GI     ESOPHAGOSCOPY, GASTROSCOPY, DUODENOSCOPY (EGD), COMBINED N/A 3/17/2016    Procedure: COMBINED ESOPHAGOSCOPY, GASTROSCOPY, DUODENOSCOPY (EGD), BIOPSY SINGLE OR MULTIPLE;  Surgeon: Alo Mauro MD;  Location:  GI     ESOPHAGOSCOPY, GASTROSCOPY, DUODENOSCOPY (EGD), COMBINED N/A 7/21/2016    Procedure: COMBINED ESOPHAGOSCOPY, GASTROSCOPY, DUODENOSCOPY (EGD);  Surgeon: Alo Mauro MD;  Location: [facility]     ESOPHAGOSCOPY, GASTROSCOPY, DUODENOSCOPY (EGD), COMBINED N/A 11/17/2016    Procedure: COMBINED ESOPHAGOSCOPY, GASTROSCOPY, DUODENOSCOPY (EGD);  Surgeon: Alo Mauro MD;  Location: [facility]     ESOPHAGOSCOPY, GASTROSCOPY, DUODENOSCOPY (EGD), COMBINED N/A 5/9/2017    Procedure: COMBINED ESOPHAGOSCOPY, GASTROSCOPY, DUODENOSCOPY (EGD);  (EGD) COMBINED ESOPHAGOSCOPY, GASTROSCOPY, DUODENOSCOPY;  Surgeon: Alo Mauro MD;  Location: [facility]     ESOPHAGOSCOPY, GASTROSCOPY, DUODENOSCOPY (EGD), COMBINED N/A 5/1/2018    Procedure: COMBINED ESOPHAGOSCOPY, GASTROSCOPY, DUODENOSCOPY (EGD);  ESOPHAGOGASTRODUODENOSCOPY ;  Surgeon: Alo Mauro MD;  Location:  GI     GASTROSTOMY, INSERT TUBE, COMBINED N/A 4/3/2015    Procedure: COMBINED GASTROSTOMY, INSERT TUBE (OPEN);  Surgeon: Ralph Catherine MD;  Location:  OR      EXCISION OF LINGUAL TONSIL      TONSILLECTOMY     HC PRESSURE GRADIENT MEASUREMENT, INITIAL VESSEL  2005    essentially normal     HC UGI ENDOSCOPY W EUS N/A 7/14/2015    Procedure: COMBINED ENDOSCOPIC ULTRASOUND, ESOPHAGOSCOPY, GASTROSCOPY, DUODENOSCOPY (EGD);  Surgeon: Kathy Torres MD;  Location:  GI     LAMINECTOMY, FUSION LUMBAR ONE LEVEL, COMBINED  7/26/2012    Procedure: COMBINED LAMINECTOMY, FUSION LUMBAR ONE LEVEL;  Posterior Fusion L5-S1, Total Facetectomy L5-S1 Left ;  Surgeon: Ronald Espinosa MD;  Location:  OR      PROSTATE SURGERY       [facility] NONSPECIFIC PROCEDURE      Colonic polpectomy     [facility] NONSPECIFIC PROCEDURE      TURP     [facility] NONSPECIFIC PROCEDURE  1982    Retinal surgery         SOCIAL HISTORY:  Social History     Socioeconomic History     Marital status:      Spouse name: Not on file     Number of children: Not on file     Years of education: Not on file     Highest education level: Not on file   Occupational History     Not on file   Social Needs     Financial resource strain: Not on file     Food insecurity     Worry: Not on file     Inability: Not on file     Transportation needs     Medical: Not on file     Non-medical: Not on file   Tobacco Use     Smoking status: Former Smoker     Packs/day: 2.00     Years: 40.00     Pack years: 80.00     Types: Cigarettes     Quit date: 1975     Years since quittin.0     Smokeless tobacco: Never Used   Substance and Sexual Activity     Alcohol use: No     Drug use: No     Sexual activity: Not Currently   Lifestyle     Physical activity     Days per week: Not on file     Minutes per session: Not on file     Stress: Not on file   Relationships     Social connections     Talks on phone: Not on file     Gets together: Not on file     Attends [employer] service: Not on file     Active member of club or organization: Not on file     Attends meetings of clubs or organizations: Not on file     Relationship status: Not on file     Intimate partner violence     Fear of current or ex partner: Not on file     Emotionally abused: Not on file     Physically abused: Not on file     Forced sexual activity: Not on file   Other Topics Concern     Parent/sibling w/ CABG, MI or angioplasty before 65F 55M? Not Asked   Social History Narrative     Not on file         FAMILY HISTORY:  Family History   Problem Relation Age of Onset     Hypertension Mother      Cerebrovascular Disease Mother      Diabetes Brother      Diabetes Father          age 97         PHYSICAL  "EXAM:  Vital signs:  Temp: 97.7  F (36.5  C) Temp src: Oral BP: 112/47 Pulse: 98   Resp: 16 SpO2: 96 % O2 Device: None (Room air)   Height: 175.3 cm (5' 9\") Weight: 68 kg (150 lb)  Estimated body mass index is 22.15 kg/m  as calculated from the following:    Height as of this encounter: 1.753 m (5' 9\").    Weight as of this encounter: 68 kg (150 lb).    GENERAL/CONSTITUTIONAL: No acute distress.  EYES: No scleral icterus.  NEUROLOGIC: Alert, oriented, answers questions appropriately.  INTEGUMENTARY: No jaundice.      LABS:  CBC RESULTS:   Recent Labs   Lab Test 12/20/20  0853   WBC 46.5*   RBC 3.58*   HGB 10.7*   HCT 32.6*   MCV 91   MCH 29.9   MCHC 32.8   RDW 20.0*   *       Recent Labs   Lab Test 12/20/20  1435 12/20/20  0853 12/15/20  1114   NA  --  132* 137   POTASSIUM 3.5 3.3* 3.6   CHLORIDE  --  98 103   CO2  --  28 31   ANIONGAP  --  6 3   GLC  --  116* 106*   BUN  --  12 13   CR  --  1.26* 0.79   KAE  --  10.0 8.8       IMAGING:  CT abdomen/pelvis 12/20/20:  1.  Moderate distal colorectal stool with mural thickening and  adjacent stranding concerning for proctocolitis, can predispose to  stercoral colitis.  2.  Partially visualized lower thoracic paravertebral soft tissue  masses. In conjunction with moderate splenomegaly, findings may  represent sequela of extramedullary hematopoiesis. Malignant  etiologies, such as lymphoma, cannot be excluded and if clinically  indicated biopsy could be performed.          Thank you for the opportunity to participate in this patient's care.  Please call with any questions.    Chelsy Hoover MD  Hematology/Oncology  [facility]  "

## 2020-12-20 NOTE — PROGRESS NOTES
.RECEIVING UNIT ED HANDOFF REVIEW    ED Nurse Handoff Report was reviewed by: Manuela Centeno RN on December 20, 2020 at 12:43 PM

## 2020-12-20 NOTE — H&P
Admitted:     12/20/2020      HISTORY OF PRESENT ILLNESS:  This is an 85-year-old male with history of CMML, currently on chemotherapy with decitabine due on 12/28/2020, history of diverticulosis, hypertension, asthma, esophageal cancer, COPD, hiatal hernia, cystic mastocytosis, and recurrent pleural effusion, who comes to the ER with complaint of constipation and abdominal pain.      According to the patient, he has been constipated for the last 3-4 days and unable to pass any bowel movement.  His wife tried to give him an enema yesterday, which was quite painful.  He only passed a small amount of gas.  Has some mild abdominal pain at 4-5/10 on the pain scale.  He has not been able to eat for the last 1-2 days because of being constipated.  Denies any nausea, vomiting, chest pain, or shortness of breath.  He is at his baseline level.  No orthopnea, PND, palpitation.  No dysuria, hematuria or diarrhea at this time.  Denies any hematemesis, melena, hematochezia.      Because of the worsening constipation, abdominal pain and unable to pass much gas, he comes to the ER for evaluation.  He took Senna and MiraLax at home and tried to get an enema last night without much benefit.      ASSESSMENT AND PLAN:   1.  Obstipation with possible proctocolitis:  This is a 85-year-old male with multiple comorbidities as mentioned above.  He is on chemotherapy for chronic myelomonocytic leukemia (CMML) who presented with abdominal pain and constipation.  CT scan of the abdomen done with contrast shows moderate distal colorectal stool with mural thickening and adjacent stranding, concerning for proctocolitis.  Can predispose to stercoral colitis.  I think his symptoms are most likely because of constipation.  At this time, I will admit him for observation, give him soapsuds enema and start him on lactulose 20 grams 3 times a day.  Continue with Senna b.i.d.  We will see if it works.  If it does not work, then we may give him GoLYTELY  and see if that can help.  He was started on IV Zosyn in the ER for possible proctocolitis.  I think this is most likely because of distention by the stool.  Not likely because of infection.  Zosyn has been started, so we will continue with that for now.  Once he has good bowel movement, and he can get better, we may discontinue antibiotics.   2.  CMML with leukocytosis:  The patient has worsening leukocytosis at this time.  I think this is most likely because of the CMML.  His next cycle due is on 12/28/2020.  We will have Oncology evaluate him.  Most likely this elevated white cell count is because of his CMML rather than infection.   3.  Thrombocytopenia:   Mild, most likely because of CMML and chemotherapy as well.  We will continue to monitor.   4.  Hypokalemia:  Mild.  We will replace it while he is in the hospital and keep him on replacement protocol.     5.  Acute on chronic renal failure:  The patient with history of chronic kidney disease (CKD) II-III.  Creatinine last was 0.79; today it is 1.26.  Most likely prerenal, has not been eating for the last 2 days and being on diuretic, Lasix.  At this time, we will hold the Lasix.  He did receive a bolus of IV fluids in the ER.  I think that will be enough.  We will hold any further IV fluids as he has a history of recurrent pleural effusion and lower extremity edema.  I will hold the Lasix, and we will repeat the labs in the morning.   6.  History of esophageal cancer:  Followed by [facility].  Has been stable at this time.  Recurrent pleural effusion on the right side.  The last time he had a thoracentesis done was on 12/15, this month.  About half a liter was removed at that time.     7.  Chronic obstructive pulmonary disease (COPD)/asthma:   Has been stable.  Not in acute exacerbation.  Continue with his albuterol and Symbicort at this time.     8.  Mildly elevated liver enzymes:  Most likely because of chemotherapy.  We will continue to monitor it.    9.  Hyponatremia:  Mild, asymptomatic.  He was given IV normal saline in the ER.  We will repeat the sodium again in the morning.   10.  Lower extremity edema:  He was on Lasix.  We will hold the Lasix, as his creatinine is slightly high this morning.  Will restart it once his creatinine is more stable.  He has started eating better.   11.  Hypertension:  It is well controlled at this time.  Not on any medications other than Lasix at this time.   12.  History of depression and anxiety:  On Zoloft.  I will continue with that.   13.  Deep venous thrombosis prophylaxis:  SCDs.      CODE STATUS:  DNR/DNI as per the patient wishes.      The case discussed with ER physician and the nursing staff taking care of the patient.         AIDA YUAN MD             D: 2020   T: 2020   MT:       Name:     RADHA ALFONSO   MRN:      [id number]        Account:      [id number]   :      1935        Admitted:     2020                   Document: [id number]       cc: Benjamin Lazar MD

## 2020-12-20 NOTE — TELEPHONE ENCOUNTER
"Patient calling reporting his rectal area has been very sore. Ongoing lower abdominal \"soreness\" that is rated \"moderate\" and  intermittent. Decreased urine stream/out put this morning. Dry mouth. Patient is taking Lasix.  Stating he took Barbara lax and Senokot x 2 days followed by enema last evening with very little results. Reporting frequent urge to have a bowel movement every 15-20 minutes throughout night. Patient is passing very small amounts of liquid stool. Afebrile.    Paged on call Dr Chelsy Hoover through [facility] Oncology Answering Service at 745 a.m. to call Ailin at  [phone]. 2nd level triage.    Dr Hoover advised to have patient seen in the Emergency Room due to decreased urine out put.     Patient notified and verbalized understanding.   Stating his spouse will drive him to [facility] ED now.    COVID 19 Nurse Triage Plan/Patient Instructions    Please be aware that novel coronavirus (COVID-19) may be circulating in the community. If you develop symptoms such as fever, cough, or SOB or if you have concerns about the presence of another infection including coronavirus (COVID-19), please contact your health care provider or visit [email or web address].     Disposition/Instructions    ED Visit recommended. Follow protocol based instructions.     Bring Your Own Device:  Please also bring your smart device(s) (smart phones, tablets, laptops) and their charging cables for your personal use and to communicate with your care team during your visit.    Thank you for taking steps to prevent the spread of this virus.  o Limit your contact with others.  o Wear a simple mask to cover your cough.  o Wash your hands well and often.    Resources    [facility]: About COVID-19: [email or web address]    CDC: What to Do If You're Sick: www.cdc.gov/coronavirus/2019-ncov/about/steps-when-sick.html    CDC: Ending Home Isolation: www.cdc.gov/coronavirus/2019-ncov/hcp/disposition-in-home-patients.html "     CDC: Caring for Someone: www.cdc.gov/coronavirus/2019-ncov/if-you-are-sick/care-for-someone.html     [facility]: Interim Guidance for Hospital Discharge to Home: www.health.[email or web address].[email or web address]/diseases/coronavirus/hcp/hospdischarge.pdf    [facility] clinical trials (COVID-19 research studies): [email or web address]-trials     Below are the COVID-19 hotlines at the [place] Department of Health ([facility]). Interpreters are available.   o For health questions: Call 757-325-4111 or 1-443.757.2803 (7 a.m. to 7 p.m.)  o For questions about schools and childcare: Call 198-378-9744 or 1-967.330.4163 (7 a.m. to 7 p.m.)                      Additional Information    Negative: Sounds like a life-threatening emergency to the triager    Negative: [1] Abdomen pain is main symptom AND [2] male    Negative: [1] Abdomen pain is main symptom AND [2] adult female    Negative: Rectal bleeding or blood in stool is main symptom    Negative: Rectal pain or itching is main symptom    Negative: [1] Neutropenia known or suspected (e.g., recent cancer chemotherapy) AND [2] fever > 100.4 F (38.0 C)    Negative: [1] Vomiting AND [2] abdomen looks much more swollen than usual    Negative: [1] Vomiting AND [2] contains bile (green color)    Negative: [1] Constant abdominal pain AND [2] present > 2 hours    [1] Sudden onset rectal pain (straining, rectal pressure or fullness) AND [2] NOT better after SITZ bath, suppository or enema    Protocols used: CANCER - CONSTIPATION-A-AH

## 2020-12-20 NOTE — ED NOTES
MD updated with bladder scan value and patient inability to empty bladder. No new orders at this time.

## 2020-12-20 NOTE — H&P
[facility]    History and Physical  Hospitalist       Date of Admission:  12/20/2020    Assessment & Plan      This is an 85-year-old male with history of CMML, currently on chemotherapy with decitabine due on 12/28/2020, history of diverticulosis, hypertension, asthma, esophageal cancer, COPD, hiatal hernia, cystic mastocytosis, and recurrent pleural effusion, who comes to the ER with complaint of constipation and abdominal pain.      ASSESSMENT AND PLAN:   1.  Obstipation with possible proctocolitis:  This is a 85-year-old male with multiple comorbidities as mentioned above.  He is on chemotherapy for chronic myelomonocytic leukemia (CMML) who presented with abdominal pain and constipation.  CT scan of the abdomen done with contrast shows moderate distal colorectal stool with mural thickening and adjacent stranding, concerning for proctocolitis.  Can predispose to stercoral colitis.  I think his symptoms are most likely because of constipation.  At this time, I will admit him for observation, give him soapsuds enema and start him on lactulose 20 grams 3 times a day.  Continue with Senna b.i.d.  We will see if it works.  If it does not work, then we may give him GoLYTELY and see if that can help.  He was started on IV Zosyn in the ER for possible proctocolitis.  I think this is most likely because of distention by the stool.  Not likely because of infection.  Zosyn has been started, so we will continue with that for now.  Once he has good bowel movement, and he can get better, we may discontinue antibiotics.   2.  CMML with leukocytosis:  The patient has worsening leukocytosis at this time.  I think this is most likely because of the CMML.  His next cycle due is on 12/28/2020.  We will have Oncology evaluate him.  Most likely this elevated white cell count is because of his CMML rather than infection.   3.  Thrombocytopenia:   Mild, most likely because of CMML and chemotherapy as well.  We  will continue to monitor.   4.  Hypokalemia:  Mild.  We will replace it while he is in the hospital and keep him on replacement protocol.     5.  Acute on chronic renal failure:  The patient with history of chronic kidney disease (CKD) II-III.  Creatinine last was 0.79; today it is 1.26.  Most likely prerenal, has not been eating for the last 2 days and being on diuretic, Lasix.  At this time, we will hold the Lasix.  He did receive a bolus of IV fluids in the ER.  I think that will be enough.  We will hold any further IV fluids as he has a history of recurrent pleural effusion and lower extremity edema.  I will hold the Lasix, and we will repeat the labs in the morning.   6.  History of esophageal cancer:  Followed by [facility].  Has been stable at this time.  Recurrent pleural effusion on the right side.  The last time he had a thoracentesis done was on 12/15, this month.  About half a liter was removed at that time.     7.  Chronic obstructive pulmonary disease (COPD)/asthma:   Has been stable.  Not in acute exacerbation.  Continue with his albuterol and Symbicort at this time.     8.  Mildly elevated liver enzymes:  Most likely because of chemotherapy.  We will continue to monitor it.   9.  Hyponatremia:  Mild, asymptomatic.  He was given IV normal saline in the ER.  We will repeat the sodium again in the morning.   10.  Lower extremity edema:  He was on Lasix.  We will hold the Lasix, as his creatinine is slightly high this morning.  Will restart it once his creatinine is more stable.  He has started eating better.   11.  Hypertension:  It is well controlled at this time.  Not on any medications other than Lasix at this time.   12.  History of depression and anxiety:  On Zoloft.  I will continue with that.   13.  Deep venous thrombosis prophylaxis:  SCDs.      CODE STATUS:  DNR/DNI as per the patient wishes.      The case discussed with ER physician and the nursing staff taking care of the patient.          JAIME GREEN MD         DVT Prophylaxis: Pneumatic Compression Devices  Code Status: DNR / DNI    Disposition: Expected discharge in 1-2 days once stable.    Jaime Green MD    Primary Care Physician   Benjamin Lazar    Chief Complaint   Constipation and abdominal pain     History is obtained from the patient    History of Present Illness   Admitted:     12/20/2020      HISTORY OF PRESENT ILLNESS:  This is an 85-year-old male with history of CMML, currently on chemotherapy with decitabine due on 12/28/2020, history of diverticulosis, hypertension, asthma, esophageal cancer, COPD, hiatal hernia, cystic mastocytosis, and recurrent pleural effusion, who comes to the ER with complaint of constipation and abdominal pain.      According to the patient, he has been constipated for the last 3-4 days and unable to pass any bowel movement.  His wife tried to give him an enema yesterday, which was quite painful.  He only passed a small amount of gas.  Has some mild abdominal pain at 4-5/10 on the pain scale.  He has not been able to eat for the last 1-2 days because of being constipated.  Denies any nausea, vomiting, chest pain, or shortness of breath.  He is at his baseline level.  No orthopnea, PND, palpitation.  No dysuria, hematuria or diarrhea at this time.  Denies any hematemesis, melena, hematochezia.      Because of the worsening constipation, abdominal pain and unable to pass much gas, he comes to the ER for evaluation.  He took Senna and MiraLax at home and tried to get an enema last night without much benefit.       Past Medical History    I have reviewed this patient's medical history and updated it with pertinent information if needed.   Past Medical History:   Diagnosis Date     Anxiety state, unspecified      Asthma      Basal cell carcinoma      Benign neoplasm of colon      Cardiomegaly 1/6/2005     Chronic ischemic heart disease, unspecified      CML (chronic myelocytic leukemia) (H)      Constipation       Depressive disorder, not elsewhere classified      Diverticulosis of colon (without mention of hemorrhage)      Esophageal cancer (H) 3/23/2015     Essential hypertension, benign      Hiatal hernia      Hypertrophy (benign) of prostate      Internal hemorrhoids without mention of complication      Mumps      Other primary cardiomyopathies 2003    EF 45%     Sciatica      Spondylosis of unspecified site without mention of myelopathy        Past Surgical History   I have reviewed this patient's surgical history and updated it with pertinent information if needed.  Past Surgical History:   Procedure Laterality Date     BONE MARROW BIOPSY, BONE SPECIMEN, NEEDLE/TROCAR N/A 4/16/2020    Procedure: BIOPSY, BONE MARROW;  Surgeon: Mg Hobbs MD;  Location: [facility]     BRONCHOSCOPY FLEXIBLE N/A 4/3/2015    Procedure: BRONCHOSCOPY FLEXIBLE;  Surgeon: Ralph Catherine MD;  Location:  OR     COLONOSCOPY       COLONOSCOPY N/A 2/19/2020    Procedure: COLONOSCOPY, WITH POLYPECTOMY AND BIOPSY;  Surgeon: Kathy Torres MD;  Location: [facility]     ESOPHAGOSCOPY, GASTROSCOPY, DUODENOSCOPY (EGD), COMBINED N/A 3/19/2015    Procedure: COMBINED ESOPHAGOSCOPY, GASTROSCOPY, DUODENOSCOPY (EGD), BIOPSY SINGLE OR MULTIPLE;  Surgeon: Alo Mauro MD;  Location: [facility]     ESOPHAGOSCOPY, GASTROSCOPY, DUODENOSCOPY (EGD), COMBINED N/A 7/14/2015    Procedure: COMBINED ESOPHAGOSCOPY, GASTROSCOPY, DUODENOSCOPY (EGD), BIOPSY SINGLE OR MULTIPLE;  Surgeon: Kathy Torres MD;  Location: [facility]     ESOPHAGOSCOPY, GASTROSCOPY, DUODENOSCOPY (EGD), COMBINED N/A 12/1/2015    Procedure: COMBINED ESOPHAGOSCOPY, GASTROSCOPY, DUODENOSCOPY (EGD), BIOPSY SINGLE OR MULTIPLE;  Surgeon: Alo Mauro MD;  Location: [facility]     ESOPHAGOSCOPY, GASTROSCOPY, DUODENOSCOPY (EGD), COMBINED N/A 3/17/2016    Procedure: COMBINED ESOPHAGOSCOPY, GASTROSCOPY, DUODENOSCOPY (EGD), BIOPSY SINGLE OR MULTIPLE;  Surgeon: Alo Mauro MD;  Location:   GI     ESOPHAGOSCOPY, GASTROSCOPY, DUODENOSCOPY (EGD), COMBINED N/A 7/21/2016    Procedure: COMBINED ESOPHAGOSCOPY, GASTROSCOPY, DUODENOSCOPY (EGD);  Surgeon: Alo Mauro MD;  Location:  GI     ESOPHAGOSCOPY, GASTROSCOPY, DUODENOSCOPY (EGD), COMBINED N/A 11/17/2016    Procedure: COMBINED ESOPHAGOSCOPY, GASTROSCOPY, DUODENOSCOPY (EGD);  Surgeon: Alo Mauro MD;  Location:  GI     ESOPHAGOSCOPY, GASTROSCOPY, DUODENOSCOPY (EGD), COMBINED N/A 5/9/2017    Procedure: COMBINED ESOPHAGOSCOPY, GASTROSCOPY, DUODENOSCOPY (EGD);  (EGD) COMBINED ESOPHAGOSCOPY, GASTROSCOPY, DUODENOSCOPY;  Surgeon: Alo Mauro MD;  Location:  GI     ESOPHAGOSCOPY, GASTROSCOPY, DUODENOSCOPY (EGD), COMBINED N/A 5/1/2018    Procedure: COMBINED ESOPHAGOSCOPY, GASTROSCOPY, DUODENOSCOPY (EGD);  ESOPHAGOGASTRODUODENOSCOPY ;  Surgeon: Alo Mauro MD;  Location:  GI     GASTROSTOMY, INSERT TUBE, COMBINED N/A 4/3/2015    Procedure: COMBINED GASTROSTOMY, INSERT TUBE (OPEN);  Surgeon: Ralph Catherine MD;  Location:  OR      EXCISION OF LINGUAL TONSIL      TONSILLECTOMY     HC PRESSURE GRADIENT MEASUREMENT, INITIAL VESSEL  2005    essentially normal     HC UGI ENDOSCOPY W EUS N/A 7/14/2015    Procedure: COMBINED ENDOSCOPIC ULTRASOUND, ESOPHAGOSCOPY, GASTROSCOPY, DUODENOSCOPY (EGD);  Surgeon: Kathy Torres MD;  Location:  GI     LAMINECTOMY, FUSION LUMBAR ONE LEVEL, COMBINED  7/26/2012    Procedure: COMBINED LAMINECTOMY, FUSION LUMBAR ONE LEVEL;  Posterior Fusion L5-S1, Total Facetectomy L5-S1 Left ;  Surgeon: Ronald Espinosa MD;  Location: [facility] OR     PROSTATE SURGERY       [facility] NONSPECIFIC PROCEDURE  2-99    Colonic polpectomy     [facility] NONSPECIFIC PROCEDURE  9-92    TURP     [facility] NONSPECIFIC PROCEDURE  1982    Retinal surgery       Prior to Admission Medications   Prior to Admission Medications   Prescriptions Last Dose Informant Patient Reported? Taking?   ALPRAZolam (XANAX) 0.25 MG tablet  Self No No    Sig: Take 1 tablet (0.25 mg) by mouth 2 times daily as needed for anxiety   Cholecalciferol (VITAMIN D3) 5000 UNITS TABS  Self Yes No   Sig: Take 5,000 Units by mouth daily    Nutritional Supplements (ENSURE COMPLETE) LIQD  Self No No   Sig: Take 1 Bottle by mouth every 8 hours as needed   SYMBICORT 160-4.5 MCG/ACT Inhaler  Self Yes No   Sig: Inhale 2 puffs into the lungs 2 times daily    albuterol (PROAIR HFA/PROVENTIL HFA/VENTOLIN HFA) 108 (90 Base) MCG/ACT inhaler   No No   Sig: Inhale 1-2 puffs into the lungs every 6 hours as needed for shortness of breath / dyspnea or wheezing   fluticasone (FLONASE) 50 MCG/ACT nasal spray  Self No No   Sig: SPRAY 2 SPRAYS INTO EACH NOSTRIL EVERY DAY   furosemide (LASIX) 20 MG tablet   No No   Sig: Take 1 tablet (20 mg) by mouth daily   polyethylene glycol (MIRALAX) 17 g packet  Self Yes No   Sig: Take 1 packet by mouth daily as needed for constipation   sennosides (SENOKOT) 8.6 MG tablet  Self Yes No   Sig: Take 1 tablet by mouth daily as needed for constipation   sertraline (ZOLOFT) 25 MG tablet   No No   Sig: Take 1 tablet (25 mg) by mouth daily   traMADol (ULTRAM) 50 MG tablet   Yes No   Sig: Take 1 tablet (50 mg) by mouth every 6 hours as needed for severe pain      Facility-Administered Medications: None     Allergies   No Known Allergies    Social History   I have reviewed this patient's social history and updated it with pertinent information if needed. Jeff Mack  reports that he quit smoking about 46 years ago. His smoking use included cigarettes. He has a 80.00 pack-year smoking history. He has never used smokeless tobacco. He reports that he does not drink alcohol or use drugs.    Family History   I have reviewed this patient's family history and updated it with pertinent information if needed.   Family History   Problem Relation Age of Onset     Hypertension Mother      Cerebrovascular Disease Mother      Diabetes Brother      Diabetes Father           age 97       Review of Systems   CONSTITUTIONAL:  negative  EYES:  negative  HEENT:  negative  RESPIRATORY:  positive for  dyspnea  CARDIOVASCULAR:  negative  GASTROINTESTINAL:  positive for constipation and abdominal pain  GENITOURINARY:  negative  INTEGUMENT/BREAST:  negative  HEMATOLOGIC/LYMPHATIC:  negative  ALLERGIC/IMMUNOLOGIC:  negative  ENDOCRINE:  negative  MUSCULOSKELETAL:  negative  NEUROLOGICAL:  negative  BEHAVIOR/PSYCH:  negative    Physical Exam   Temp: 98.6  F (37  C) Temp src: Tympanic BP: 131/67 Pulse: 100   Resp: 16 SpO2: 97 % O2 Device: None (Room air)    Vital Signs with Ranges  Temp:  [98.6  F (37  C)] 98.6  F (37  C)  Pulse:  [100-112] 100  Resp:  [16] 16  BP: (131-138)/(67-75) 131/67  SpO2:  [95 %-97 %] 97 %  150 lbs 0 oz    Constitutional: Awake, alert, cooperative, no apparent distress.  Eyes: Conjunctiva and pupils examined and normal.  HEENT: Moist mucous membranes, normal dentition.  Respiratory: Clear to auscultation bilaterally, no crackles or wheezing.  Cardiovascular: Regular rate and rhythm, normal S1 and S2, and no murmur noted.  GI: Soft, non-distended, non-tender, normal bowel sounds.  Lymph/Hematologic: No anterior cervical or supraclavicular adenopathy.  Skin: No rashes, no cyanosis,1-2+ LE edema.  Musculoskeletal: No joint swelling, erythema or tenderness.  Neurologic: Cranial nerves 2-12 intact, normal strength and sensation.  Psychiatric: Alert, oriented to person, place and time, no obvious anxiety or depression.    Data   Data reviewed today:  I personally reviewed the abdominal CT image(s) and agree with the report below.  Recent Labs   Lab 12/20/20  0853 12/15/20  1227 12/15/20  1114   WBC 46.5*  --  4.0   HGB 10.7*  --  9.9*   MCV 91  --  94   *  --  115*   INR  --  1.25*  --    *  --  137   POTASSIUM 3.3*  --  3.6   CHLORIDE 98  --  103   CO2 28  --  31   BUN 12  --  13   CR 1.26*  --  0.79   ANIONGAP 6  --  3   KAE 10.0  --  8.8   *  --  106*    ALBUMIN 3.4  --  3.7   PROTTOTAL 7.2  --  7.4   BILITOTAL 1.2  --  0.8   ALKPHOS 213*  --  188*   ALT 31  --  39   AST 56*  --  23   LIPASE 18*  --   --        Recent Results (from the past 24 hour(s))   CT Abdomen Pelvis w Contrast    Narrative    CT ABDOMEN PELVIS W CONTRAST 12/20/2020 9:46 AM    CLINICAL HISTORY: abdominal pain and constipation with LLQ  tenderness-check for Diverticulitis, SBO, constipation or other cause    TECHNIQUE: CT scan of the abdomen and pelvis was performed following  injection of IV contrast. Multiplanar reformats were obtained. Dose  reduction techniques were used.  CONTRAST: 75 mL [id number]    COMPARISON: CT abdomen and pelvis 11/10/2020, 10/31/2020, 3/19/2015    FINDINGS:   LOWER CHEST: Mild emphysema. Coronary artery calcifications. Trace  right pleural effusion.    HEPATOBILIARY: No suspicious focal hepatic lesion. Gallbladder are  unremarkable.    PANCREAS: Unremarkable.    SPLEEN: Moderate splenomegaly measuring 16 cm.    ADRENAL GLANDS: Normal.    KIDNEYS/BLADDER: No hydronephrosis. Left posterolateral urinary  bladder diverticulum. Urinary bladder is otherwise unremarkable.    BOWEL: Moderate distal colorectal stool with moderate mural thickening  and perirectal stranding. No free pneumoperitoneum or extraperitoneal  gas or perirectal loculated gas/fluid collection. Appendix is normal.  No bowel obstruction. Tiny hiatal hernia.    PELVIC ORGANS: TURP noted.    ADDITIONAL FINDINGS: There are are symmetric oval soft tissue density  masses in the paravertebral space adjacent to T11 and likely at T10 is  only partially visualized. Largest discrete soft tissue nodule along  the right T11 paravertebral space without extension to the neural  foramina or adjacent vertebral cortical destruction measuring up to  2.4 cm series 4/15. No abdominal or pelvic lymphadenopathy.    MUSCULOSKELETAL: L5-S1 posterior spinal fixation with bilateral  vertical rods and transpedicular screws.       Impression    IMPRESSION:   1.  Moderate distal colorectal stool with mural thickening and  adjacent stranding concerning for proctocolitis, can predispose to  stercoral colitis.  2.  Partially visualized lower thoracic paravertebral soft tissue  masses. In conjunction with moderate splenomegaly, findings may  represent sequela of extramedullary hematopoiesis. Malignant  etiologies, such as lymphoma, cannot be excluded and if clinically  indicated biopsy could be performed.    LEONARDO LAND MD

## 2020-12-21 NOTE — PROVIDER NOTIFICATION
MD Notification    Person notified: MD    Person Name: Dr. Green    Date/Time: 12/21/2020 @ 9:14 AM    Interaction: web based page    Purpose of Notification: Critical WBC 83.9. FYI Per Lab Diff on hold until pathology reviews.     Orders Received:    Comments:

## 2020-12-21 NOTE — PLAN OF CARE
PT - Received order, reviewed chart, spoke with RN who reports pt had a fall earlier this morning. Will hold off on PT eval and check back tomorrow.

## 2020-12-21 NOTE — PLAN OF CARE
Pt rec'd lactulose and senna on evenings, many loose incontinent stools overnight. Bottom is sore, blanchable redness, barrier cream applied. Tried putting BSC next to pt so that he can get to the toilet in time. Up ind with walker. On IV Zosyn.

## 2020-12-21 NOTE — PHARMACY
Dr. Obdulio Ndiaye ordered Hydroxyurea 500mg twice daily to start for CMML.    Patient's platelets were 85,000 today. Dr. Ndiaye confirmed he wanted to start Hydroxyurea today with the platelet count.      Also discussed CrCl 37.4 ml/min.  Recommended to administer 50% of dose.  Dosing range for CML is 20-30 mg/kg a day.  Patient will be getting~ 15mg/kg, so dosing is correct.    Emily, PharmD

## 2020-12-21 NOTE — PLAN OF CARE
"A&Ox4. VSS on RA. Denied pain. Was up ind + walker to BSC. Coccyx blanchable redness, barrier cream applied.  +2 BLE edema noted. R hand bruised.  At 0945 pt had unwitnessed fall. He stated, \"I got up to BSC and just lost my balance and hit my head on the wall\". RRT called; see NP provider note. Pt placed on spinal precautions. CT showed SDH. MD and NP called pt's wife and updated her. Writer called report to ICU RN. Pt transferred with flying christiansen RN and CNA to CT then to ICU. Medications and belonging sent with patient.  "

## 2020-12-21 NOTE — PROGRESS NOTES
Service Date: 12/21/2020      SUBJECTIVE:  Mr. Mack is an 85-year-old gentleman with chronic myelomonocytic leukemia-1 and systemic mastocytosis diagnosed in 04/2020.  Initially, he was on decitabine and responding to it.  It was stopped as patient has anxiety and did not want any further IV treatment.  Because of worsening leukocytosis, decitabine was again started in 11/2020.      The patient presented to the emergency room on 12/20/2020 because of abdominal pain.  CBC revealed severe leukocytosis with WBC of 46.5.  It was 4.0 on 12/15/2020.  CT abdomen and pelvis revealed moderate distal colorectal stool with mural thickening and adjacent stranding concerning for proctocolitis.      The patient fell in the hospital.  He had multiple imaging studies done.   -CT head reveals subdural hemorrhage along the right cerebral convexity.   -CT C-spine does not reveal any fracture.     CBC done today reveal worsening leukocytosis with WBC of 83.9.      For subdural hematoma, Neurosurgery is following.  They are monitoring him with plan to repeat brain imaging tomorrow.      REVIEW OF SYSTEMS:  The patient has mild headache.  No visual problem.  No chest pain.  No shortness of breath.  No nausea or vomiting.  He has lower abdominal discomfort.  No recurrent vomiting.  He feels weak.      All other review of systems negative.      PHYSICAL EXAMINATION:   GENERAL:  He is alert and oriented.  He is not in any distress.     CHEST:  No cough.  No shortness of breath.   The rest of the systems not examined.      LABORATORY DATA:  Reviewed.      ASSESSMENT:   1.  An 85-year-old gentleman with chronic myelomonocytic leukemia with worsening leukocytosis.  He is likely progressing into acute leukemia.   2.  Subdural hematoma secondary to fall.   3.  Abdominal pain secondary to obstipation and possible proctocolitis.   4.  Anemia and thrombocytopenia from chronic myelomonocytic leukemia.      PLAN:   1.  I discussed with him regarding  chronic myelomonocytic leukemia.  I explained to the patient that his WBC has increased significantly in last 2 weeks.  His anemia and thrombocytopenia have gotten worse.  On peripheral blood smear reviewed, there are blasts.  I explained to the patient that he might be progressing to acute leukemia.  Flow cytometry has been sent.  We will await further results.   2.  Since his WBC increased significantly, I am going to start him on hydroxyurea.  We will also have him on allopurinol.   3.  If acute leukemia is confirmed, we will consider transfer to [facility] if the patient wants aggressive treatment.  Otherwise, we will plan on treating him with combination of decitabine and venetoclax.   4.  The patient has subdural hematoma.  Treatment as per Neurosurgery   5.  The patient had a few questions, which were all answered.  Oncology will continue to follow. Case discussed with Dr. Green.   6.  Message left for wife to call me.     Total time spent was 35 minutes.         GRAZYNA RICHEY MD             D: 2020   T: 2020   MT: BONILLA      Name:     RADHA ALFONSO   MRN:      [id number]        Account:      [id number]   :      1935           Service Date: 2020      Document: [id number]

## 2020-12-21 NOTE — PROGRESS NOTES
[facility]    Hospitalist Progress Note    Brief Summary:   This is an 85-year-old male with history of CMML, currently on chemotherapy with decitabine due on 12/28/2020, history of diverticulosis, hypertension, asthma, esophageal cancer, COPD, hiatal hernia, cystic mastocytosis, and recurrent pleural effusion, who comes to the ER with complaint of constipation and abdominal pain.       Assessment & Plan         Obstipation with possible proctocolitis: Now resolved.    This is a 85-year-old male with multiple comorbidities as mentioned above.  He is on chemotherapy for chronic myelomonocytic leukemia (CMML) who presented with abdominal pain and constipation.  CT scan of the abdomen done with contrast shows moderate distal colorectal stool with mural thickening and adjacent stranding, concerning for proctocolitis.    Has few large BM overnight after Soap suds enema and Lactulose, Obstipation/Constipation now resolved.   Hold further lactulose, continue Senna from tomorrow and Miralax from tomorrow.         He was started on IV Zosyn in the ER for possible proctocolitis.  I think this is most likely because of distention by the stool.  Not likely because of infection.  Zosyn has been started, so we will continue with that for now.       Fall and Subdural Hematoma.   Patient while trying to get out of the bed by himself later this morning, fall and hit his head, RRT called right away, and had   CT head and CT cervical spine done, CT head consistent with SDH about 1.5 cm in thickness along the right cerebral convexity. Given extensive cerebral volume loss, this SDH does not result in any significant mass effect.   Consult Neurosurgery to evaluate, CT cervical spine negative for any acute fracture.     Did call his wife on the phone and updated her about his Fall and SDH, all her questions answered. She was asking about his CMML status, prognosis and life expectancy, ask Dr Ndiaye to give her a  call in this regard.        CMML with leukocytosis/Thrombocytopenia:  The patient has worsening leukocytosis at this time.  I think this is most likely because of the CMML.  His next cycle due is on 12/28/2020.  WBC count increase from 46.5 on admission to 84 now.  Dr Senthil sepulveda, will evaluate the patient and will discuss with his wife.  Thrombocytopenia mild, most likely because of CMML and chemotherapy as well.  We will continue to monitor.        Hypokalemia:  Mild.  We will replace it while he is in the hospital and keep him on replacement protocol.         Acute on chronic renal failure:  The patient with history of chronic kidney disease (CKD) II-III.  Creatinine last was 0.79; admitted with  1.26 and now increase to 1.4 today possible Contrast induce nephropathy, .  Most likely prerenal, has not been eating for the last 2 days and being on diuretic, Lasix.  At this time, we will hold the Lasix.  He did receive a bolus of IV fluids in the ER., I will start him on gentle IV hydration with IV fluids at 100 ml/hr for few hours today.       History of esophageal cancer:  Followed by [facility].  Has been stable at this time.       Recurrent pleural effusion on the right side.  The last time he had a thoracentesis done was on 12/15, this month.  About half a liter was removed at that time. Stable at thsi time.       Chronic obstructive pulmonary disease (COPD)/asthma:   Has been stable.  Not in acute exacerbation.  Continue with his albuterol and Symbicort at this time.         Mildly elevated liver enzymes:  Most likely because of chemotherapy.  We will continue to monitor it.      Hyponatremia:  Mild, asymptomatic.  He was given IV normal saline in the ER.  repeat sodium this morning is normal.     Lower extremity edema:  He was on Lasix.  We will hold the Lasix, as his creatinine is slightly high this morning.  Will restart it once his creatinine is more stable.  He has started eating better.        Hypertension:  It is well controlled at this time.  Not on any medications other than Lasix at this time.       History of depression and anxiety:  On Zoloft.  I will continue with that.     13.  Deep venous thrombosis prophylaxis:  SCDs.      Discussed with Patient, his Wife, Dr Ndiaye, Shakila Hdz NP and the nursing staff taking care of the patient today.     DVT Prophylaxis: Pneumatic Compression Devices  Code Status: No CPR- Do NOT Intubate    Disposition: Expected discharge in 2 days once stable.    Jaime Green MD  Text Page  (7am - 6pm)    Interval History   Patient was seen earlier today was feeling somewhat tired but better as he has few good BM overnight and think he has clear his constipation, passing gas. Not able to sleep well overnight and feeling tired, no abdominal pain, fever, chills, nausea or vomiting.     Later this morning he was trying to get out of the bed to the Bedside commode and missed step and fall down hit his head.     No other significant event overnight.     -Data reviewed today: I reviewed all new labs and imaging results over the last 24 hours. I personally reviewed the head CT image(s) reviewed and agree with the report below     Physical Exam   Temp: 97.2  F (36.2  C) Temp src: Oral BP: 116/62 Pulse: 107   Resp: 20 SpO2: 95 % O2 Device: None (Room air)    Vitals:    12/20/20 0835   Weight: 68 kg (150 lb)     Vital Signs with Ranges  Temp:  [97.2  F (36.2  C)-99.1  F (37.3  C)] 97.2  F (36.2  C)  Pulse:  [] 107  Resp:  [16-20] 20  BP: (106-127)/(46-72) 116/62  SpO2:  [92 %-97 %] 95 %  I/O last 3 completed shifts:  In: 1220 [P.O.:120; IV Piggyback:1100]  Out: -     Constitutional: awake, alert, cooperative, no apparent distress, and appears stated age  Eyes: Lids and lashes normal, pupils equal, round and reactive to light, extra ocular muscles intact, sclera clear, conjunctiva normal  Respiratory: No increased work of breathing, good air exchange, clear to auscultation  bilaterally, no crackles or wheezing  Cardiovascular: Normal apical impulse, regular rate and rhythm, normal S1 and S2, no S3 or S4, and no murmur noted  GI: No scars, normal bowel sounds, soft, non-distended, non-tender, no masses palpated, no hepatosplenomegally  Musculoskeletal: 1+ lower extremity pitting edema present  Neurologic: no focal deficit.     Medications     sodium chloride Stopped (12/21/20 1015)       fluticasone  2 spray Both Nostrils Daily     fluticasone-vilanterol  1 puff Inhalation Daily     piperacillin-tazobactam  2.25 g Intravenous Q6H     sennosides  2 tablet Oral BID     sertraline  25 mg Oral Daily     sodium chloride (PF)  3 mL Intracatheter Q8H     Vitamin D3  125 mcg Oral Daily       Data   Recent Labs   Lab 12/21/20  0709 12/20/20  1435 12/20/20  0853 12/15/20  1227 12/15/20  1114   WBC 83.9*  --  46.5*  --  4.0   HGB 9.7*  --  10.7*  --  9.9*   MCV 93  --  91  --  94   PLT 85*  --  144*  --  115*   INR  --   --   --  1.25*  --      --  132*  --  137   POTASSIUM 3.4 3.5 3.3*  --  3.6   CHLORIDE 103  --  98  --  103   CO2 24  --  28  --  31   BUN 15  --  12  --  13   CR 1.40*  --  1.26*  --  0.79   ANIONGAP 8  --  6  --  3   KAE 9.8  --  10.0  --  8.8   GLC 71  --  116*  --  106*   ALBUMIN  --   --  3.4  --  3.7   PROTTOTAL  --   --  7.2  --  7.4   BILITOTAL  --   --  1.2  --  0.8   ALKPHOS  --   --  213*  --  188*   ALT  --   --  31  --  39   AST  --   --  56*  --  23   LIPASE  --   --  18*  --   --        Recent Results (from the past 24 hour(s))   CT Head w/o Contrast   Result Value    Radiologist flags Intracranial hemorrhage (AA)    Narrative    CT OF THE HEAD WITHOUT CONTRAST 12/21/2020 10:29 AM     COMPARISON: None available.    HISTORY: Altered level of consciousness (LOC), unexplained;  unwitnessed fall with head strike.    TECHNIQUE: 5 mm thick axial CT images of the head were acquired  without IV contrast material.    FINDINGS: There is a lobulated mixed density  subdural collection along  the right cerebral convexity measuring up to 1.5 cm in maximum  thickness. Given the extensive cerebral volume loss, this subdural  hemorrhage does not result in any significant mass effect.     There is moderate diffuse cerebral volume loss. There are subtle  patchy areas of decreased density in the cerebral white matter  bilaterally that are consistent with sequela of chronic small vessel  ischemic disease. The ventricles and basal cisterns are within normal  limits in configuration given the degree of cerebral volume loss.   There is no midline shift.     No intracranial mass or recent infarct.    The visualized paranasal sinuses are well-aerated. There is no  mastoiditis. There are no fractures of the visualized bones.       Impression    IMPRESSION:   1. Mixed density subdural hemorrhage along the right cerebral  convexity measuring up to 1.5 cm in thickness. Given the extensive  cerebral volume loss, this subdural hemorrhage does not result in any  significant mass effect.  2. Diffuse cerebral volume loss and cerebral white matter changes  consistent with chronic small vessel ischemic disease.      [Critical Result: Intracranial hemorrhage]    Finding was identified on 12/21/2020 10:32 AM.     ZAINAB LONGORIA was contacted by Dr. Jeong on 12/21/2020 10:33 AM and  verbalized understanding of the critical result.       Radiation dose for this scan was reduced using automated exposure  control, adjustment of the mA and/or kV according to patient size, or  iterative reconstruction technique.   CT Cervical Spine w/o Contrast    Narrative    CT OF THE CERVICAL SPINE WITHOUT CONTRAST   12/21/2020 10:33 AM     COMPARISON: None.    HISTORY: Unwitnessed fall with head strike.     TECHNIQUE: Axial images of the cervical spine were acquired without  intravenous contrast. Multiplanar reformations were created.        Impression    IMPRESSION: Minimal degenerative anterolisthesis of C7 upon  T1.  Alignment is otherwise normal. Vertebral body heights are normal.  Craniocervical alignment is normal. No fractures. Moderate-severe  facet arthropathy throughout the cervical spine. Degenerative endplate  spurring at the C3-C4, C4-C5, C5-C6 and C6-C7 levels. No prevertebral  soft tissue swelling. No degenerative spinal canal stenosis.      Radiation dose for this scan was reduced using automated exposure  control, adjustment of the mA and/or kV according to patient size, or  iterative reconstruction technique.

## 2020-12-21 NOTE — CODE/RAPID RESPONSE
"[facility]    RRT Note  12/21/2020   Time Called: 0946am    RRT called for: unwitnessed fall    Assessment & Plan   IMPRESSION & PLAN:    Jeff Mack is a 85 year old male w/ PMH of diverticulosis, hypertension, asthma, esophageal cancer, COPD, hiatal hernia, cystic mastocytosis, and recurrent pleural effusion, who was admitted on 12/20/2020 for constipation and abdominal pain.    Pt was found by NA on ground.  Pt was going to bathroom when he \"lost balance\" and fell backward, hitting head on floor.  Pt was awake when staff found him.  This was an unwitnessed fall.  RRT was called.      On my arrival ICU RN had already placed a cervical collar.  Pt was awake, VS , SpO2 95% on RA, BP 88/45 but on LUE which is above head.  Pt is [place] but talking.  He c/o back on R side of lower back and later RUE and most of spine on palpation.  He denied and SOB or CP at time of fall.  Pt was placed on rigid back board, lifted to stretcher.     Impression  Unwitnessed fall w/ head strike, non anticoagulated pt but thrombocytopenic  Differential diagnosis:  -need to r/o ICH and/or cervical spine injury    INTERVENTIONS:  -stat CT head w/o contrast  -gluc 81mg/dL  -stat CT c spine w/o contrast  -neuro checks q2h while awaiting neurosurg consult  -anticipate pt will need additional imaging w/ XRs of thoracic, lumbar spine and sacrum as well as pelvic/L hip xray and RUE/elbow xr    Working diagnosis: I received a call from radiologist re:  Mixed density subdural hemorrhage along the right cerebral convexity measuring up to 1.5 cm in thickness. Given the extensive  cerebral volume loss, this subdural hemorrhage does not result in any  significant mass effect.    At the end of the RRT I have stat consulted nsgy, spoken w/ NP from that service.  Family was updated by hospitalist attending physician.      Disposition: dependent on recommendations of neurosurgical team    Discussed with and defer further " cares to hospitalist attending physician Dr. Sandoval     Addendum:  After speaking w/ nsgy NP will do the following  -transfer pt to ICU  -neuro checks q2h  -ok to remove c collar  -will obtain CT imaging of chest, abd, pelvis to r/o rib fx, scapular injury, vertebral fx, hip fracture, etc.   -xr R elbow ordered to r/o fracture  -I've discussed this w/ pt and will update wife as well on plan of care.    Code Status: No CPR- Do NOT Intubate    Allergies   No Known Allergies    Physical Exam   Vital Signs with Ranges:  Temp:  [97.2  F (36.2  C)-99.1  F (37.3  C)] 97.2  F (36.2  C)  Pulse:  [] 107  Resp:  [16-20] 20  BP: (106-127)/(46-72) 116/62  SpO2:  [92 %-97 %] 95 %  I/O last 3 completed shifts:  In: 1220 [P.O.:120; IV Piggyback:1100]  Out: -     Constitutional:no acute distress  Neuro: +follows commands wiggle toes and show 2 fingers bilat, face symmetric, tongue midline, speech fluent, oriented x3  HEENT: NC/AT, pupils equal round 3mm briskly reactive bilat, sclera nonicteric, noninjected, conjunctiva pink,dry oral mucosa  Neck: supple  Heart: S1S2, recheck   Lungs: CTAB upper and lower lobes  Abd:normoactive bowel sounds, soft, nontender, nondisteded  Ext: R>L asymmetric LE edema    Data     Troponin:    Recent Labs   Lab Test 11/10/20  1111   TROPI <0.015       IMAGING: (X-ray/CT/MRI)   Recent Results (from the past 24 hour(s))   CT Head w/o Contrast   Result Value    Radiologist flags Intracranial hemorrhage (AA)    Narrative    CT OF THE HEAD WITHOUT CONTRAST 12/21/2020 10:29 AM     COMPARISON: None available.    HISTORY: Altered level of consciousness (LOC), unexplained;  unwitnessed fall with head strike.    TECHNIQUE: 5 mm thick axial CT images of the head were acquired  without IV contrast material.    FINDINGS: There is a lobulated mixed density subdural collection along  the right cerebral convexity measuring up to 1.5 cm in maximum  thickness. Given the extensive cerebral volume loss, this  subdural  hemorrhage does not result in any significant mass effect.     There is moderate diffuse cerebral volume loss. There are subtle  patchy areas of decreased density in the cerebral white matter  bilaterally that are consistent with sequela of chronic small vessel  ischemic disease. The ventricles and basal cisterns are within normal  limits in configuration given the degree of cerebral volume loss.   There is no midline shift.     No intracranial mass or recent infarct.    The visualized paranasal sinuses are well-aerated. There is no  mastoiditis. There are no fractures of the visualized bones.       Impression    IMPRESSION:   1. Mixed density subdural hemorrhage along the right cerebral  convexity measuring up to 1.5 cm in thickness. Given the extensive  cerebral volume loss, this subdural hemorrhage does not result in any  significant mass effect.  2. Diffuse cerebral volume loss and cerebral white matter changes  consistent with chronic small vessel ischemic disease.      [Critical Result: Intracranial hemorrhage]    Finding was identified on 12/21/2020 10:32 AM.     ZAINAB LONGORIA was contacted by Dr. Jeong on 12/21/2020 10:33 AM and  verbalized understanding of the critical result.       Radiation dose for this scan was reduced using automated exposure  control, adjustment of the mA and/or kV according to patient size, or  iterative reconstruction technique.   CT Cervical Spine w/o Contrast    Narrative    CT OF THE CERVICAL SPINE WITHOUT CONTRAST   12/21/2020 10:33 AM     COMPARISON: None.    HISTORY: Unwitnessed fall with head strike.     TECHNIQUE: Axial images of the cervical spine were acquired without  intravenous contrast. Multiplanar reformations were created.        Impression    IMPRESSION: Minimal degenerative anterolisthesis of C7 upon T1.  Alignment is otherwise normal. Vertebral body heights are normal.  Craniocervical alignment is normal. No fractures. Moderate-severe  facet arthropathy  throughout the cervical spine. Degenerative endplate  spurring at the C3-C4, C4-C5, C5-C6 and C6-C7 levels. No prevertebral  soft tissue swelling. No degenerative spinal canal stenosis.      Radiation dose for this scan was reduced using automated exposure  control, adjustment of the mA and/or kV according to patient size, or  iterative reconstruction technique.       CBC with Diff:  Recent Labs   Lab Test 12/21/20  0709 12/15/20  1227 12/15/20  1227   WBC 83.9*   < >  --    HGB 9.7*   < >  --    MCV 93   < >  --    PLT 85*   < >  --    INR  --   --  1.25*    < > = values in this interval not displayed.        Comprehensive Metabolic Panel:  Recent Labs   Lab 12/21/20  0709 12/20/20  0853 12/20/20  0853     --  132*   POTASSIUM 3.4   < > 3.3*   CHLORIDE 103  --  98   CO2 24  --  28   ANIONGAP 8  --  6   GLC 71  --  116*   BUN 15  --  12   CR 1.40*  --  1.26*   GFRESTIMATED 46*  --  52*   GFRESTBLACK 53*  --  60*   KAE 9.8  --  10.0   PROTTOTAL  --   --  7.2   ALBUMIN  --   --  3.4   BILITOTAL  --   --  1.2   ALKPHOS  --   --  213*   AST  --   --  56*   ALT  --   --  31    < > = values in this interval not displayed.       INR:    Recent Labs   Lab Test 12/15/20  1227   INR 1.25*       UA:  Recent Labs   Lab 12/20/20  1041   COLOR Yellow   APPEARANCE Slightly Cloudy   URINEGLC Negative   URINEBILI Negative   URINEKETONE Negative   SG 1.020   UBLD Trace*   URINEPH 5.5   PROTEIN 10*   NITRITE Negative   LEUKEST Negative   RBCU 7*   WBCU 3     Time Spent on this Encounter   I spent 50 minutes ([id number] and 1495-1118) of critical care time on the unit/floor managing the care of Jeff Mack. Upon evaluation, this patient had a high probability of imminent or life-threatening deterioration due to fall w/ acute SDH, which required my direct attention, intervention, and personal management. 100% of my time was spent at the bedside counseling the patient and/or coordinating care regarding services listed in this  note.    Shakila Hdz, [facility]  [phone]

## 2020-12-21 NOTE — PLAN OF CARE
ED admission. Patient arrived to unit at about 1330. VSS on room air. Denies pain other than sore bottom. Blanchable redness to inner buttocks- barrier cream applied. Soap suds enema given. Patient had one episode of fecal incontinence; stool was loose and watery from enema so unable to accurately measure. Lactulose given. Plan for golytely if no further results or significant improvement. WBC elevated. Continues on IV zosyn. PT consulted. Up with assist x1 + walker. Mild LEYVA. Hx COPD. Uses inhalers, IS encouraged. Hem/Onc to follow. Hx CMML.

## 2020-12-21 NOTE — CONSULTS
[facility]    Neurosurgery Consultation     Date of Admission:  12/20/2020  Date of Consult (When I saw the patient): 12/21/20    Assessment & Plan   Jeff Mack is a 85 year old male who was admitted on 12/20/2020. I was asked to see the patient for fall w/ SDH.    Active Problems:    Fall    Assessment: mixed density subdural hemorrhage along the right cerebral convexity measuring up to 1.5 cm, due to extensive cerebral volume loss, no significant mass effect    Plan:   -Continue to hold anticoagulation/blood thinners  -Repeat scan tomorrow morning or sooner if neuro status changes  -Discontinue cervical collar    I have discussed the following assessment and plan with Dr. Vargas who is in agreement with initial plan and will follow up with further consultation recommendations.    Edel Hirsch CNP  [facility]  [address]  [address] [place] [id number]    Tel [phone]  Pager [phone]        Code Status    No CPR- Do NOT Intubate    Reason for Consult   Reason for consult: I was asked by Shakila Hdz CNP to evaluate this patient for fall w/ SDH.    Primary Care Physician   Benjamin Lazar    Chief Complaint   Fall    History is obtained from the patient and EMR.     History of Present Illness   Jeff Mack is a 85 year old male with a PMH of CMML and thrombocytopenia not on anticoagulation who sustained a hospital fall today. PEr his report he was attempting to get out of bed to go to the bathroom when he lost his balance and fell hitting his head on the floor. A nursing assistant found him on the ground and an RRT was called. Imaging revealed a mixed density subdural hemorrhage along the right cerebral convexity measuring up to 1.5 cm in thickness, no significant mass effect. A cervical CT and CT CAP was negative for fracture. Today he was seen lying flat in bed with a cervical collar on. He denies headache,  dizziness, nausea/vomiting, and vision changes. He is alert and oriented and follows commands appropriately. He denies neck pain. He reports baseline back pain which he states is unchanged. He denies radicular pain, paresthesias, and weakness. He is without midline tenderness to the cervical, thoracic, and lumbar spine.    Past Medical History   I have reviewed this patient's medical history and updated it with pertinent information if needed.   Past Medical History:   Diagnosis Date     Anxiety state, unspecified      Asthma      Basal cell carcinoma      Benign neoplasm of colon      Cardiomegaly 1/6/2005     Chronic ischemic heart disease, unspecified      CML (chronic myelocytic leukemia) (H)      Constipation      Depressive disorder, not elsewhere classified      Diverticulosis of colon (without mention of hemorrhage)      Esophageal cancer (H) 3/23/2015     Essential hypertension, benign      Hiatal hernia      Hypertrophy (benign) of prostate      Internal hemorrhoids without mention of complication      Mumps      Other primary cardiomyopathies 2003    EF 45%     Sciatica      Spondylosis of unspecified site without mention of myelopathy        Past Surgical History   I have reviewed this patient's surgical history and updated it with pertinent information if needed.  Past Surgical History:   Procedure Laterality Date     BONE MARROW BIOPSY, BONE SPECIMEN, NEEDLE/TROCAR N/A 4/16/2020    Procedure: BIOPSY, BONE MARROW;  Surgeon: Mg Hobbs MD;  Location:  GI     BRONCHOSCOPY FLEXIBLE N/A 4/3/2015    Procedure: BRONCHOSCOPY FLEXIBLE;  Surgeon: Ralph Catherine MD;  Location:  OR     COLONOSCOPY       COLONOSCOPY N/A 2/19/2020    Procedure: COLONOSCOPY, WITH POLYPECTOMY AND BIOPSY;  Surgeon: Kathy Torres MD;  Location:  GI     ESOPHAGOSCOPY, GASTROSCOPY, DUODENOSCOPY (EGD), COMBINED N/A 3/19/2015    Procedure: COMBINED ESOPHAGOSCOPY, GASTROSCOPY, DUODENOSCOPY (EGD), BIOPSY  SINGLE OR MULTIPLE;  Surgeon: Alo Mauro MD;  Location: [facility]     ESOPHAGOSCOPY, GASTROSCOPY, DUODENOSCOPY (EGD), COMBINED N/A 7/14/2015    Procedure: COMBINED ESOPHAGOSCOPY, GASTROSCOPY, DUODENOSCOPY (EGD), BIOPSY SINGLE OR MULTIPLE;  Surgeon: Kathy Torres MD;  Location: [facility]     ESOPHAGOSCOPY, GASTROSCOPY, DUODENOSCOPY (EGD), COMBINED N/A 12/1/2015    Procedure: COMBINED ESOPHAGOSCOPY, GASTROSCOPY, DUODENOSCOPY (EGD), BIOPSY SINGLE OR MULTIPLE;  Surgeon: Alo Mauro MD;  Location: [facility]     ESOPHAGOSCOPY, GASTROSCOPY, DUODENOSCOPY (EGD), COMBINED N/A 3/17/2016    Procedure: COMBINED ESOPHAGOSCOPY, GASTROSCOPY, DUODENOSCOPY (EGD), BIOPSY SINGLE OR MULTIPLE;  Surgeon: Alo Mauro MD;  Location: [facility]     ESOPHAGOSCOPY, GASTROSCOPY, DUODENOSCOPY (EGD), COMBINED N/A 7/21/2016    Procedure: COMBINED ESOPHAGOSCOPY, GASTROSCOPY, DUODENOSCOPY (EGD);  Surgeon: Alo Mauro MD;  Location: [facility]     ESOPHAGOSCOPY, GASTROSCOPY, DUODENOSCOPY (EGD), COMBINED N/A 11/17/2016    Procedure: COMBINED ESOPHAGOSCOPY, GASTROSCOPY, DUODENOSCOPY (EGD);  Surgeon: Alo Mauro MD;  Location: [facility]     ESOPHAGOSCOPY, GASTROSCOPY, DUODENOSCOPY (EGD), COMBINED N/A 5/9/2017    Procedure: COMBINED ESOPHAGOSCOPY, GASTROSCOPY, DUODENOSCOPY (EGD);  (EGD) COMBINED ESOPHAGOSCOPY, GASTROSCOPY, DUODENOSCOPY;  Surgeon: Alo Mauro MD;  Location: [facility]     ESOPHAGOSCOPY, GASTROSCOPY, DUODENOSCOPY (EGD), COMBINED N/A 5/1/2018    Procedure: COMBINED ESOPHAGOSCOPY, GASTROSCOPY, DUODENOSCOPY (EGD);  ESOPHAGOGASTRODUODENOSCOPY ;  Surgeon: Alo Mauro MD;  Location: [facility]     GASTROSTOMY, INSERT TUBE, COMBINED N/A 4/3/2015    Procedure: COMBINED GASTROSTOMY, INSERT TUBE (OPEN);  Surgeon: Ralph Catherine MD;  Location: [facility] OR     HC EXCISION OF LINGUAL TONSIL      TONSILLECTOMY     HC PRESSURE GRADIENT MEASUREMENT, INITIAL VESSEL  2005    essentially normal     HC UGI ENDOSCOPY W EUS N/A 7/14/2015    Procedure:  COMBINED ENDOSCOPIC ULTRASOUND, ESOPHAGOSCOPY, GASTROSCOPY, DUODENOSCOPY (EGD);  Surgeon: Kathy Torres MD;  Location:  GI     LAMINECTOMY, FUSION LUMBAR ONE LEVEL, COMBINED  7/26/2012    Procedure: COMBINED LAMINECTOMY, FUSION LUMBAR ONE LEVEL;  Posterior Fusion L5-S1, Total Facetectomy L5-S1 Left ;  Surgeon: Ronald Espinosa MD;  Location: [facility] OR     PROSTATE SURGERY       [facility] NONSPECIFIC PROCEDURE  2-99    Colonic polpectomy     [facility] NONSPECIFIC PROCEDURE  9-92    TURP     [facility] NONSPECIFIC PROCEDURE  1982    Retinal surgery       Prior to Admission Medications   Prior to Admission Medications   Prescriptions Last Dose Informant Patient Reported? Taking?   ALPRAZolam (XANAX) 0.25 MG tablet  at prn Self No Yes   Sig: Take 1 tablet (0.25 mg) by mouth 2 times daily as needed for anxiety   Cholecalciferol (VITAMIN D3) 5000 UNITS TABS 12/19/2020 at Unknown time Self Yes Yes   Sig: Take 5,000 Units by mouth daily    Nutritional Supplements (ENSURE COMPLETE) LIQD 12/19/2020 at Unknown time Self No Yes   Sig: Take 1 Bottle by mouth every 8 hours as needed   SYMBICORT 160-4.5 MCG/ACT Inhaler 12/20/2020 at am Self Yes Yes   Sig: Inhale 2 puffs into the lungs 2 times daily    albuterol (PROAIR HFA/PROVENTIL HFA/VENTOLIN HFA) 108 (90 Base) MCG/ACT inhaler  at prn Self No Yes   Sig: Inhale 1-2 puffs into the lungs every 6 hours as needed for shortness of breath / dyspnea or wheezing   fluticasone (FLONASE) 50 MCG/ACT nasal spray 12/19/2020 at Unknown time Self No Yes   Sig: SPRAY 2 SPRAYS INTO EACH NOSTRIL EVERY DAY   furosemide (LASIX) 20 MG tablet 12/19/2020 at Unknown time Self No Yes   Sig: Take 1 tablet (20 mg) by mouth daily   polyethylene glycol (MIRALAX) 17 g packet  at prn Self Yes Yes   Sig: Take 1 packet by mouth daily as needed for constipation   sennosides (SENOKOT) 8.6 MG tablet  at prn Self Yes Yes   Sig: Take 1 tablet by mouth daily as needed for constipation   sertraline (ZOLOFT) 25 MG tablet  "has not started taking Self No No   Sig: Take 1 tablet (25 mg) by mouth daily   traMADol (ULTRAM) 50 MG tablet  at prn Self Yes Yes   Sig: Take 1 tablet (50 mg) by mouth every 6 hours as needed for severe pain      Facility-Administered Medications: None     Allergies   No Known Allergies    Social History   I have reviewed this patient's social history and updated it with pertinent information if needed. Jeff Mack  reports that he quit smoking about 46 years ago. His smoking use included cigarettes. He has a 80.00 pack-year smoking history. He has never used smokeless tobacco. He reports that he does not drink alcohol or use drugs.    Family History   I have reviewed this patient's family history and updated it with pertinent information if needed.   Family History   Problem Relation Age of Onset     Hypertension Mother      Cerebrovascular Disease Mother      Diabetes Brother      Diabetes Father          age 97       Review of Systems   10 point ROS negative except noted above.    Physical Exam   Temp: 98.8  F (37.1  C) Temp src: Oral BP: 122/59 Pulse: 106   Resp: 19 SpO2: 97 % O2 Device: None (Room air)    Vital Signs with Ranges  Temp:  [95.6  F (35.3  C)-99.1  F (37.3  C)] 98.8  F (37.1  C)  Pulse:  [] 106  Resp:  [16-20] 19  BP: (106-127)/(46-62) 122/59  SpO2:  [92 %-97 %] 97 %  145 lbs 15.11 oz     , Blood pressure 122/59, pulse 106, temperature 98.8  F (37.1  C), temperature source Oral, resp. rate 19, height 1.753 m (5' 9\"), weight 66.2 kg (145 lb 15.1 oz), SpO2 97 %.  145 lbs 15.11 oz  HEENT:  Normocephalic, atraumatic.  PERRLA.  EOM s intact.   Heart:  No peripheral edema  Lungs:  No SOB  Skin:  Warm and dry, good capillary refill.  Extremities:  Good radial and dorsalis pedis pulses bilaterally, no edema, cyanosis or clubbing.    NEUROLOGICAL EXAMINATION:   Mental status:  Alert and Oriented x 3, speech is fluent.  Cranial nerves:  II-XII intact.   Motor:  Strength is 5/5 throughout the " upper and lower extremities  Shoulder Abduction:  Right:  5/5   Left:  5/5  Biceps:                      Right:  5/5   Left:  5/5  Triceps:                     Right:  5/5   Left:  5/5  Wrist Extensors:       Right:  5/5   Left:  5/5  Wrist Flexors:           Right:  5/5   Left:  5/5  interosseus :            Right:  5/5   Left:  5/5   Hip Flexor:                Right: 5/5  Left:  5/5  Hip Adductor:             Right:  5/5  Left:  5/5  Hip Abductor:             Right:  5/5  Left:  5/5  Gastroc Soleus:        Right:  5/5  Left:  5/5  Tib/Ant:                      Right:  5/5  Left:  5/5  EHL:                     Right:  5/5  Left:  5/5  Sensation:  intact  Reflexes:   Negative Babinski.  Negative Clonus.    Coordination:  Smooth finger to nose testing.   Negative pronator drift.     Cervical examination reveals good range of motion.  No tenderness to palpation of the cervical spine or paraspinous muscles bilaterally.     Lumbar examination reveals no tenderness of the spine or paraspinous muscles.  Hip height is symmetrical. Negative SI joint, sciatic notch or greater trochanteric tenderness to palpation bilaterally.  Straight leg raise is negative bilaterally.       Data     CBC RESULTS:   Recent Labs   Lab Test 12/21/20  0709   WBC 83.9*   RBC 3.18*   HGB 9.7*   HCT 29.6*   MCV 93   MCH 30.5   MCHC 32.8   RDW 20.0*   PLT 85*     Basic Metabolic Panel:  Lab Results   Component Value Date     12/21/2020      Lab Results   Component Value Date    POTASSIUM 3.4 12/21/2020     Lab Results   Component Value Date    CHLORIDE 103 12/21/2020     Lab Results   Component Value Date    KAE 9.8 12/21/2020     Lab Results   Component Value Date    CO2 24 12/21/2020     Lab Results   Component Value Date    BUN 15 12/21/2020     Lab Results   Component Value Date    CR 1.40 12/21/2020     Lab Results   Component Value Date    GLC 71 12/21/2020     INR:  Lab Results   Component Value Date    INR 1.25 12/15/2020    INR 1.28  11/11/2020    INR 1.18 05/04/2020    INR 1.00 04/03/2015    INR 0.91 01/12/2005

## 2020-12-22 NOTE — PROGRESS NOTES
[facility] brief note:     Was called by hospitalist at 0559 to evaluate pt as nursing notes pt now bradycardic with agonal respirations.  Upon arrival, pt's respirations absent, has a NSR, HR 70s-100 on monitor; however, pulseless, no heart tones auscultated, in PEA arrest.  Confirmed pt DNR/DNI.  Shortly thereafter, pt's rhythm now asystole at 0607.  Pt was pronounced dead at 0607.  Neurosurgery updated by nursing staff.  Contacted pt's wife, Angela Barrett, regarding events of this morning and the death of her .  Angela Barrett expressed understanding and will present to pt's bedside with her children.    RAUDEL Smiley, CNP  [facility]    No charge.

## 2020-12-22 NOTE — DISCHARGE SUMMARY
[facility]    Discharge Summary  Hospitalist    Date of Admission:  12/20/2020  Date of Discharge:  12/22/2020 10:28 AM  Discharging Provider: Jaime Green MD  Date of Service (when I saw the patient): I did not personally see this patient today.    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  Subdural hematoma secondary to fall       This is an 85-year-old male with history of CMML, currently on chemotherapy with decitabine next dose due on 12/28/2020, history of diverticulosis, hypertension, asthma, esophageal cancer, COPD, hiatal hernia, cystic mastocytosis, and recurrent pleural effusion, who comes to the ER with complaint of constipation and abdominal pain.     Patient was admitted in the hospital, CT scan of the abdomen pelvis shows significant stool burden constipation and possible proctocolitis.  He was started on lactulose and soapsuds enema with that his ops constipation resolved and he good bowel movement.  While in the hospital he was also noted to have significantly high white blood cell count from normal 5 days ago to 46.5 on admission.  While in the hospital his white cell count increased to 84,000.  Hematology oncology was consulted.  They think the patient is now progressing to acute leukemia with blast, flow cytometry was sent and pathology has evaluated his peripheral  smear.    While he was in the hospital he was trying to get out of the bed and fell down and hit his head, and developed subdural hematoma.  Subsequently he was transferred to ICU, neurosurgery was consulted and no surgical intervention was recommended.  While he was in the ICU, early this morning he became tachycardic and hypotensive.  Prior to that he had a repeat CT scan of the head which shows slight redistribution and expected evolution of the right hemispheric subdural hemorrhage without significant interval change in size.  No new intracranial hemorrhage or adverse intracranial mass-effect.    The patient remained tachycardic and hypotensive, he was given IV fluids, blood was eval by the house physician as well.  Patient remained DNR/DNI as per his wishes.  The patient started getting bradycardic started having a coronary respiration at around 6 AM and went into PEA cardiac arrest and to asystole.  He was pronounced dead at 6:07 AM December 22, 2020.           Final discharge diagnosis and hospital course          Obstipation with possible proctocolitis: Now resolved.    This is a 85-year-old male with multiple comorbidities as mentioned above.  He is on chemotherapy for chronic myelomonocytic leukemia (CMML) who presented with abdominal pain and constipation.  CT scan of the abdomen done with contrast shows moderate distal colorectal stool with mural thickening and adjacent stranding, concerning for proctocolitis.     Has few large BM overnight after Soap suds enema and Lactulose, Obstipation/Constipation now resolved.        He was started on IV Zosyn in the ER for possible proctocolitis.  I think this is most likely because of distention by the stool.  Not likely because of infection.  Zosyn has been started, so we will continue with that for now.        Fall and Subdural Hematoma.   Patient while trying to get out of the bed by himself later this morning, fall and hit his head, RRT called right away, and  had   CT head and CT cervical spine done, CT head consistent with SDH about 1.5 cm in thickness along the right cerebral convexity. Given extensive cerebral volume loss, this SDH does not result in any significant mass effect.   Consult Neurosurgery to evaluate, CT cervical spine negative for any acute fracture.      Did call his wife on the phone and updated her about his Fall and SDH, all her questions answered.         CMML with leukocytosis/Thrombocytopenia:   Progression to acute leukemia  Hyperuricemia     The patient has worsening leukocytosis at this time.  I think this is most likely because of the CMML.  His next cycle due is on 12/28/2020.  WBC count increase from 46.5 on admission to 84 and then to 196 on the day of death.  Doubling every 24 hours.  [facility] has evaluated the patient, as per his assessment he is progressing into acute leukemia with poor prognosis.  Patient family aware of poor prognosis.  Subdural hematoma may be related to his acute leukemia and fall as well.  Patient passed away comfortably as he was DNR/DNI on 12/28/2020 at 6:07 AM       Hypokalemia:  Mild.  We will replace it while he is in the hospital and keep him on replacement protocol.         Acute on chronic renal failure:  The patient with history of chronic kidney disease (CKD) II-III.  Creatinine last was 0.79; admitted with  1.26 and now increase to 1.7 today possible Contrast induce nephropathy, .  Most likely prerenal, has not been eating for the last 2 days and being on diuretic, Lasix.  At this time, we will hold the Lasix.  He did receive a bolus of IV IV fluids while he is in the hospital.      History of esophageal cancer:  Followed by [facility].  Has been stable at this time.        Recurrent pleural effusion on the right side.  The last time he had a thoracentesis done was on 12/15, this month.  About half a liter was removed at that time. Stable at si time.        Chronic obstructive pulmonary disease  (COPD)/asthma:   Has been stable.  Not in acute exacerbation.  Continue with his albuterol and Symbicort at this time.          Mildly elevated liver enzymes:  Most likely because of chemotherapy.  We will continue to monitor it.       Hyponatremia:  Mild, asymptomatic.  He was given IV normal saline in the ER.  repeat sodium this morning is normal.      Lower extremity edema:  He was on Lasix.  We will hold the Lasix, as his creatinine is slightly high this morning.  Will restart it once his creatinine is more stable.  He has started eating better.        Hypertension:  It is well controlled at this time.  Not on any medications other than Lasix at this time.        History of depression and anxiety:  On Zoloft.  I will continue with that.           DVT Prophylaxis: Pneumatic Compression Devices  Jaime Green MD, MD    Significant Results and Procedures       Pending Results   These results will be followed up by none  Unresulted Labs Ordered in the Past 30 Days of this Admission     Date and Time Order Name Status Description    12/21/2020 0710 Leukemia Lymphoma Evaluation Non CSF In process     12/20/2020 1041 Blood culture Preliminary     12/20/2020 1041 Blood culture Preliminary           Code Status   DNR / DNI       Consultations This Hospital Stay   PHYSICAL THERAPY ADULT IP CONSULT  HEMATOLOGY & ONCOLOGY IP CONSULT  NEUROSURGERY IP CONSULT    Time Spent on this Encounter   I, Jaime Green MD, discharged this patient today but I did not personally see the patient today and will not be billing for the patient's discharge.    Discharge Orders   No discharge procedures on file.  Discharge Medications   Discharge Medication List as of 12/22/2020 10:29 AM      CONTINUE these medications which have NOT CHANGED    Details   albuterol (PROAIR HFA/PROVENTIL HFA/VENTOLIN HFA) 108 (90 Base) MCG/ACT inhaler Inhale 1-2 puffs into the lungs every 6 hours as needed for shortness of breath / dyspnea or wheezing, Disp-1  Inhaler, R-4, E-PrescribePharmacy may dispense brand covered by insurance (Proair, or proventil or ventolin or generic albuterol inhaler)      ALPRAZolam (XANAX) 0.25 MG tablet Take 1 tablet (0.25 mg) by mouth 2 times daily as needed for anxiety, Disp-60 tablet, R-1, E-Prescribe      Cholecalciferol (VITAMIN D3) 5000 UNITS TABS Take 5,000 Units by mouth daily , Historical      fluticasone (FLONASE) 50 MCG/ACT nasal spray SPRAY 2 SPRAYS INTO EACH NOSTRIL EVERY DAY, Disp-48 mL,R-3, E-Prescribe      furosemide (LASIX) 20 MG tablet Take 1 tablet (20 mg) by mouth daily, Disp-30 tablet, R-0, E-Prescribe      Nutritional Supplements (ENSURE COMPLETE) LIQD Take 1 Bottle by mouth every 8 hours as needed, Disp-30 Bottle, R-3, E-Prescribe      polyethylene glycol (MIRALAX) 17 g packet Take 1 packet by mouth daily as needed for constipation, Historical      sennosides (SENOKOT) 8.6 MG tablet Take 1 tablet by mouth daily as needed for constipation, Historical      sertraline (ZOLOFT) 25 MG tablet Take 1 tablet (25 mg) by mouth daily, Disp-90 tablet, R-0, E-Prescribe      SYMBICORT 160-4.5 MCG/ACT Inhaler Inhale 2 puffs into the lungs 2 times daily , ELIZABETH, Historical      traMADol (ULTRAM) 50 MG tablet Take 1 tablet (50 mg) by mouth every 6 hours as needed for severe pain, Disp-20 tablet, R-0, Historical           Allergies   No Known Allergies  Data   Most Recent 3 CBC's:  Recent Labs   Lab Test 12/22/20  0504 12/21/20  0709 12/20/20  0853   .0* 83.9* 46.5*   HGB 9.3* 9.7* 10.7*   MCV 96 93 91   PLT 76* 85* 144*      Most Recent 3 BMP's:  Recent Labs   Lab Test 12/22/20  0504 12/21/20  0709 12/20/20  1435 12/20/20  0853    135  --  132*   POTASSIUM 5.3 3.4 3.5 3.3*   CHLORIDE 108 103  --  98   CO2 18* 24  --  28   BUN 22 15  --  12   CR 1.77* 1.40*  --  1.26*   ANIONGAP 12 8  --  6   KAE 10.1 9.8  --  10.0   GLC 90 71  --  116*     Most Recent 2 LFT's:  Recent Labs   Lab Test 12/20/20  0853 12/15/20  1114   AST 56*  23   ALT 31 39   ALKPHOS 213* 188*   BILITOTAL 1.2 0.8     Most Recent INR's and Anticoagulation Dosing History:  Anticoagulation Dose History     Recent Dosing and Labs Latest Ref Rng & Units 1/12/2005 4/3/2015 5/4/2020 11/11/2020 12/15/2020    INR 0.86 - 1.14 0.91 1.00 1.18(H) 1.28(H) 1.25(H)        Most Recent 3 Troponin's:  Recent Labs   Lab Test 11/10/20  1111 04/26/20  1432 02/28/20  2331   TROPI <0.015 <0.015 <0.015     Most Recent Cholesterol Panel:  Recent Labs   Lab Test 07/09/15  1110   CHOL 156   LDL 92   HDL 48   TRIG 80     Most Recent 6 Bacteria Isolates From Any Culture (See EPIC Reports for Culture Details):  Recent Labs   Lab Test 12/20/20  1055 11/11/20  1425 10/01/20  0956 02/29/20  0329 03/27/17  1139   CULT No growth after 2 days  No growth after 2 days No growth  Culture received and in progress.  Positive AFB results are called as soon as detected.    Final report to follow in 7 to 8 weeks.   No growth No growth  No growth 10,000 to 50,000 colonies/mL Escherichia coli*     Most Recent TSH, T4 and A1c Labs:  Recent Labs   Lab Test 11/10/20  1111   TSH 6.56*   T4 1.14     Results for orders placed or performed during the hospital encounter of 12/20/20   CT Abdomen Pelvis w Contrast    Narrative    CT ABDOMEN PELVIS W CONTRAST 12/20/2020 9:46 AM    CLINICAL HISTORY: abdominal pain and constipation with LLQ  tenderness-check for Diverticulitis, SBO, constipation or other cause    TECHNIQUE: CT scan of the abdomen and pelvis was performed following  injection of IV contrast. Multiplanar reformats were obtained. Dose  reduction techniques were used.  CONTRAST: 75 mL Isovue-[id number]    COMPARISON: CT abdomen and pelvis 11/10/2020, 10/31/2020, 3/19/2015    FINDINGS:   LOWER CHEST: Mild emphysema. Coronary artery calcifications. Trace  right pleural effusion.    HEPATOBILIARY: No suspicious focal hepatic lesion. Gallbladder are  unremarkable.    PANCREAS: Unremarkable.    SPLEEN: Moderate splenomegaly  measuring 16 cm.    ADRENAL GLANDS: Normal.    KIDNEYS/BLADDER: No hydronephrosis. Left posterolateral urinary  bladder diverticulum. Urinary bladder is otherwise unremarkable.    BOWEL: Moderate distal colorectal stool with moderate mural thickening  and perirectal stranding. No free pneumoperitoneum or extraperitoneal  gas or perirectal loculated gas/fluid collection. Appendix is normal.  No bowel obstruction. Tiny hiatal hernia.    PELVIC ORGANS: TURP noted.    ADDITIONAL FINDINGS: There are are symmetric oval soft tissue density  masses in the paravertebral space adjacent to T11 and likely at T10 is  only partially visualized. Largest discrete soft tissue nodule along  the right T11 paravertebral space without extension to the neural  foramina or adjacent vertebral cortical destruction measuring up to  2.4 cm series 4/15. No abdominal or pelvic lymphadenopathy.    MUSCULOSKELETAL: L5-S1 posterior spinal fixation with bilateral  vertical rods and transpedicular screws.      Impression    IMPRESSION:   1.  Moderate distal colorectal stool with mural thickening and  adjacent stranding concerning for proctocolitis, can predispose to  stercoral colitis.  2.  Partially visualized lower thoracic paravertebral soft tissue  masses. In conjunction with moderate splenomegaly, findings may  represent sequela of extramedullary hematopoiesis. Malignant  etiologies, such as lymphoma, cannot be excluded and if clinically  indicated biopsy could be performed.    LEONARDO LAND MD   CT Head w/o Contrast     Value    Radiologist flags Intracranial hemorrhage (AA)    Narrative    CT OF THE HEAD WITHOUT CONTRAST 12/21/2020 10:29 AM     COMPARISON: None available.    HISTORY: Altered level of consciousness (LOC), unexplained;  unwitnessed fall with head strike.    TECHNIQUE: 5 mm thick axial CT images of the head were acquired  without IV contrast material.    FINDINGS: There is a lobulated mixed density subdural collection  along  the right cerebral convexity measuring up to 1.5 cm in maximum  thickness. Given the extensive cerebral volume loss, this subdural  hemorrhage does not result in any significant mass effect.     There is moderate diffuse cerebral volume loss. There are subtle  patchy areas of decreased density in the cerebral white matter  bilaterally that are consistent with sequela of chronic small vessel  ischemic disease. The ventricles and basal cisterns are within normal  limits in configuration given the degree of cerebral volume loss.   There is no midline shift.     No intracranial mass or recent infarct.    The visualized paranasal sinuses are well-aerated. There is no  mastoiditis. There are no fractures of the visualized bones.       Impression    IMPRESSION:   1. Mixed density subdural hemorrhage along the right cerebral  convexity measuring up to 1.5 cm in thickness. Given the extensive  cerebral volume loss, this subdural hemorrhage does not result in any  significant mass effect.  2. Diffuse cerebral volume loss and cerebral white matter changes  consistent with chronic small vessel ischemic disease.      [Critical Result: Intracranial hemorrhage]    Finding was identified on 12/21/2020 10:32 AM.     ZAINAB MONTERROSO VON was contacted by Dr. Jeong on 12/21/2020 10:33 AM and  verbalized understanding of the critical result.       Radiation dose for this scan was reduced using automated exposure  control, adjustment of the mA and/or kV according to patient size, or  iterative reconstruction technique.    ESTELLA JEONG MD   CT Cervical Spine w/o Contrast    Narrative    CT OF THE CERVICAL SPINE WITHOUT CONTRAST   12/21/2020 10:33 AM     COMPARISON: None.    HISTORY: Unwitnessed fall with head strike.     TECHNIQUE: Axial images of the cervical spine were acquired without  intravenous contrast. Multiplanar reformations were created.        Impression    IMPRESSION: Minimal degenerative anterolisthesis of C7 upon  T1.  Alignment is otherwise normal. Vertebral body heights are normal.  Craniocervical alignment is normal. No fractures. Moderate-severe  facet arthropathy throughout the cervical spine. Degenerative endplate  spurring at the C3-C4, C4-C5, C5-C6 and C6-C7 levels. No prevertebral  soft tissue swelling. No degenerative spinal canal stenosis.      Radiation dose for this scan was reduced using automated exposure  control, adjustment of the mA and/or kV according to patient size, or  iterative reconstruction technique.    ESTELLA GREGORIO MD   CT Chest Abdomen Pelvis w/o Contrast    Narrative    CT CHEST, ABDOMEN AND PELVIS WITHOUT CONTRAST  12/21/2020 12:41 PM    CLINICAL HISTORY: Fall with rib and abdominal pain.    TECHNIQUE: CT scan of the chest, abdomen, and pelvis was performed  without IV contrast. Multiplanar reformats were obtained. Dose  reduction techniques were used.   CONTRAST: None.    COMPARISON: Abdomen and pelvis CT from 12/20/2020.    FINDINGS:   LUNGS AND PLEURA: Small bilateral pleural effusions. Mild right base  atelectasis. Lungs are otherwise clear.    MEDIASTINUM/AXILLAE: No adenopathy. No evidence of aortic aneurysm.    HEPATOBILIARY: There is IV contrast seen in the gallbladder. Vicarious  secretion of IV contrast into the gallbladder can be related to  decreased renal function. Liver is unremarkable.    PANCREAS: Normal.    SPLEEN: Moderate splenomegaly.    ADRENAL GLANDS: Normal.    KIDNEYS/BLADDER: There is some contrast in the renal collecting system  indicating delayed excretion suggesting renal insufficiency. The  patient was given contrast yesterday. Contrast also seen in the  bladder.    BOWEL: Since the prior study the stool in the rectum was evacuated.  There is persistent perirectal inflammation in the fat suggesting  proctocolitis. No evidence of bowel obstruction or diverticulitis. No  inflammation in the right lower quadrant. Appendix is visualized and  is likely normal.    LYMPH  NODES: Normal.    VASCULATURE: Unremarkable.    PELVIC ORGANS: Normal.    OTHER: Stable oval-shaped densities adjacent to the T11 and T10  vertebral bodies in the paravertebral region. This is of uncertain  etiology and as stated on the prior study could be related to  extramedullary hematopoiesis. Lymphoma not excluded.    MUSCULOSKELETAL: Lower lumbar instrumented fusion. No evidence of rib  fracture.      Impression    IMPRESSION:  1.  There is IV contrast in the gallbladder and some in the renal  collecting system. Vicarious excretion of contrast is often related to  renal insufficiency. There is contrast in the renal collecting systems  and bladder as well. Last contrast injection was yesterday.  2.  Moderate splenomegaly. Oval-shaped densities adjacent to the T10  and T11 vertebrae and the paravertebral space. As stated previously  this could be related to extramedullary hematopoiesis. Lymphoma not  excluded. No other adenopathy however.  3.  Since the prior study the stool in the rectum was evacuated. There  is persistent perirectal inflammation in the perirectal fat suggesting  proctocolitis.  4.  Small bilateral pleural effusions, greater on the right than the  left, slightly more prominent than on the comparison study. No  definite rib fracture.    BETO CH MD   XR Elbow Port Right G/E 3 Views    Narrative    ELBOW THREE VIEWS RIGHT  12/21/2020 4:54 PM     HISTORY: fall, c/o R elbow pain    COMPARISON: None.      Impression    IMPRESSION: Question a small elbow joint effusion. No acute fracture  is identified. There is normal joint alignment. Mild elbow joint  degenerative changes. Vascular calcification.    WLILIAMS FERRIS MD   CT Head w/o Contrast    Narrative    EXAM: CT HEAD W/O CONTRAST  LOCATION: [facility]  DATE/TIME: 12/22/2020 5:27 AM    INDICATION: Follow-up intracranial hemorrhage  COMPARISON: 12/21/2020  TECHNIQUE: Routine CT Head without IV contrast. Multiplanar reformats.  Dose reduction techniques were used.    FINDINGS:  INTRACRANIAL CONTENTS: There is been interval redistribution and evolution of blood products, with the right-sided subdural hemorrhage settling more posteriorly. The hemorrhage measures approximately 1.8 cm in maximal thickness, similar to the prior   exam. There is mass effect on the posterior right cerebral hemisphere is partial effacement of the right occipital horn. No midline shift or effacement of the basal cisterns. No CT evidence of acute infarct. Stable generalized parenchymal volume loss and   sequelae of chronic microvascular ischemic disease. No hydrocephalus.     VISUALIZED ORBITS/SINUSES/MASTOIDS: Prior bilateral cataract surgery. Visualized portions of the orbits are otherwise unremarkable. Chronic deformities of the anterior wall of the right maxillary sinus and the right orbital floor. Postoperative changes   of the paranasal sinuses with trace mucosal thickening of the right maxillary sinus and sphenoid sinuses. Trace bilateral mastoid effusions.    BONES/SOFT TISSUES: Degenerative changes of the left greater than right temporomandibular joints. No acute displaced calvarial fracture      Impression    IMPRESSION:  1.  Slight redistribution and expected evolution of the right hemispheric subdural hemorrhage, without significant interval change in size.   2.  No new intracranial hemorrhage or adverse intracranial mass effect.     Most Recent 3 CBC's:  Recent Labs   Lab Test 12/22/20  0504 12/21/20  0709 12/20/20  0853   .0* 83.9* 46.5*   HGB 9.3* 9.7* 10.7*   MCV 96 93 91   PLT 76* 85* 144*     Most Recent 3 BMP's:  Recent Labs   Lab Test 12/22/20  0504 12/21/20  0709 12/20/20  1435 12/20/20  0853    135  --  132*   POTASSIUM 5.3 3.4 3.5 3.3*   CHLORIDE 108 103  --  98   CO2 18* 24  --  28   BUN 22 15  --  12   CR 1.77* 1.40*  --  1.26*   ANIONGAP 12 8  --  6   KAE 10.1 9.8  --  10.0   GLC 90 71  --  116*

## 2020-12-22 NOTE — PROGRESS NOTES
ICU Multi-Disciplinary Note  Patient condition reviewed and discussed while on PM rounds today.   85 M hx of CML and  systemic mastocytosis on chemotherapy admitted for protocolitis 12/20/20 with hospital couse complicated by fall with head injury sustaining small subdural hemorrhage transferred to the ICU for further monitoring. Current Neuro status in tact. NS and hospitalist primary.  Plan for repeat imagingi  AM,   The Critical Care service will continue to follow peripherally while the patient is within the ICU. We are readily available should issues arise. Please feel free to contact us for critical care issues with which we may be of assistance. For all other concerns, please contact primary service first.     Yvon Tan MD   L1

## 2020-12-22 NOTE — PROVIDER NOTIFICATION
Paged Fajardo  MD  HR sustaining 120bpm, no other signs of distress. New lab orders?    No new orders.

## 2020-12-22 NOTE — PROVIDER NOTIFICATION
Carlos Fajardo MD    Arriving back from CT, Pt HR sustaining 140s, BP 70s/40 and O2 dropping.     Orders to follow

## 2020-12-22 NOTE — PLAN OF CARE
Arrived back from CT and became unstable,(tachy 140s, hypotension, low O2). Fajardo called and ordered bolus and to page neuro surg. Neuro surg paged and aware. Pt started agonal breathing and german down at this time.     Dany Chin came to bedside to assess, pt . See  Giuseppe note, Wife updated and family to come to bedside today for goodbyes.

## 2020-12-22 NOTE — PROGRESS NOTES
House [facility] Death Pronouncement    Called by nursing staff to pronounce Jeff Mack dead.    Physical Exam: Unresponsive to noxious stimuli, Spontaneous respirations absent, Breath sounds absent, Carotid pulse absent, Heart sounds absent, No cardiac activity on monitor, Pupillary light reflex absent and Corneal blink reflex absent    Patient was pronounced dead at 6:07 AM, 2020.    No data filed        Active Problems:    Proctocolitis with intestinal obstruction    Obstipation       Infectious disease present?: NO    Communicable disease present? (examples: HIV, chicken pox, TB, Ebola, CJD) :  NO    Multi-drug resistant organism present? (example: MRSA): NO    Please consider an autopsy if any of the following exist:  NO Unexpected or unexplained death during or following any dental, medical, or surgical diagnostic treatment procedures.   NO Death of mother at or up to seven days after delivery.     NO All  and pediatric deaths.     NO Death where the cause is sufficiently obscure to delay completion of the death certificate.   NO Deaths in which autopsy would confirm a suspected illness/condition that would affect surviving family members or recipients of transplanted organs.     The following deaths must be reported to the 's Office:  YES A death that may be due entirely or in part to any factors other than natural disease (recent surgery, recent trauma, suspected abuse/neglect).   NO A death that may be an accident, suicide, or homicide.     NO Any sudden, unexpected death in which there is no prior history of significant heart disease or any other condition associated with sudden death.   NO A death under suspicious, unusual, or unexpected circumstances.    NO Any death which is apparently due to natural causes but in which the  does not have a personal physician familiar with the patient s medical history, social, or environmental situation or the circumstances of the  terminal event.   NO Any death apparently due to Sudden Infant Death Syndrome.     NO Deaths that occur during, in association with, or as consequences of a diagnostic, therapeutic, or anesthetic procedure.   NO Any death in which a fracture of a major bone has occurred within the past (6) six months.   NO A death of persons note seen by their physician within 120 days of demise.     NO Any death in which the  was an inmate of a public institution or was in the custody of Law Enforcement personnel.   NO  All unexpected deaths of children   NO Solid organ donors   NO Unidentified bodies   NO Deaths of persons whose bodies are to be cremated or otherwise disposed of so that the bodies will later be unavailable for examination;   NO Deaths unattended by a physician outside of a licensed healthcare facility or licensed residential hospice program   NO Deaths occurring within 24 hours of arrival to a health care facility if death is unexpected.    NO Deaths associated with the decedent s employment.   NO Deaths attributed to acts of terrorism.   NO Any death in which there is uncertainty as to whether it is a medical examiner s care should be discussed with the medical investigator.      Death Certificate to be directed to Dr. Jaime Green, hospitalist    Body disposition: Pending family arrival, ME discussion and pt/family plan for whole body donation to [facility] medical school [facility].    RAUDEL Smiley, [facility]  [facility]

## 2020-12-22 NOTE — PLAN OF CARE
Pt transferred to ICU from  for close monitoring after sustaining a fall. Neuro's unchanged. Cervical collar discontinued per Neurosurgery. Pt unable to void - straight cathed for 600cc. UItram given prn pain and xanax prn anxiety. CT ordered for AM. Pt tolerating sips of water. Cont to monitor.

## 2020-12-22 NOTE — PROVIDER NOTIFICATION
Brief update:    Paged re: hypoglycemia to 52    Could have diet, but not really eating    Added hypoglycemia protocol as well as switched NS to D5NS    Zackery Fajardo MD  12:29 AM

## 2020-12-22 NOTE — PROVIDER NOTIFICATION
Brief update:    Paged regarding hypotension, tachycardia    Patient went down for head CT, became hypotensive into the 70s, tachycardic into the 130s.  Return from head CT and is now becoming bradycardic with agonal breathing    I am unable to review CT imaging at this time, uncertain if this was even completed given change in vitals.  Has a subdural after a fall, concern for expansion of this with sudden hemodynamic instability    Normal saline bolus starting  Currently there is an attempt at a second IV    Discussed with house officer, neurosurgery should be updated given concern for intracranial catastrophe.    I note a DNR/DNI status    Zackery Fajardo MD  6:02 AM

## 2020-12-26 LAB
BACTERIA SPEC CULT: NO GROWTH
BACTERIA SPEC CULT: NO GROWTH
SPECIMEN SOURCE: NORMAL
SPECIMEN SOURCE: NORMAL

## 2021-01-11 LAB
MYCOBACTERIUM SPEC CULT: NORMAL
MYCOBACTERIUM SPEC CULT: NORMAL
SPECIMEN SOURCE: NORMAL

## 2021-02-13 DIAGNOSIS — R63.4 WEIGHT LOSS: ICD-10-CM

## 2021-02-13 DIAGNOSIS — F43.0 ACUTE REACTION TO STRESS: ICD-10-CM

## 2021-02-13 RX ORDER — SERTRALINE HYDROCHLORIDE 25 MG/1
TABLET, FILM COATED ORAL
Qty: 90 TABLET | Refills: 0 | OUTPATIENT
Start: 2021-02-13

## 2021-09-09 NOTE — PROGRESS NOTES
Infusion Nursing Note:  Jeff Mack presents today for Cycle 4 Day 5 Dacogen.    Patient seen by provider today: No   present during visit today: Not Applicable.    Note: N/A.    Intravenous Access:  Peripheral IV placed.    Treatment Conditions:  Not Applicable.      Post Infusion Assessment:  Patient tolerated infusion without incident.  Blood return noted pre and post infusion.  Site patent and intact, free from redness, edema or discomfort.  No evidence of extravasations.  Access discontinued per protocol.       Discharge Plan:   Patient declined prescription refills.  Discharge instructions reviewed with: Patient.  Patient verbalized understanding of discharge instructions and all questions answered.  Copy of AVS reviewed with patient.  Patient will return 8/3/20 for next appointment.  Patient discharged in stable condition accompanied by: self.  Departure Mode: Ambulatory.    Elisha Kaur RN                        
No

## 2022-04-24 NOTE — Clinical Note
"    7/27/2020         RE: Jeff Mack  [address]  [place] [address]        Dear Colleague,    Thank you for referring your patient, Jeff Mack, to the [facility]. Please see a copy of my visit note below.    Oncology Rooming Note    July 27, 2020 9:39 AM   Jeff Mack is a 85 year old male who presents for:    Chief Complaint   Patient presents with     Oncology Clinic Visit     Initial Vitals: There were no vitals taken for this visit. Estimated body mass index is 22.88 kg/m  as calculated from the following:    Height as of 7/16/20: 1.753 m (5' 9.02\").    Weight as of 7/16/20: 70.3 kg (155 lb). There is no height or weight on file to calculate BSA.  Data Unavailable Comment: Data Unavailable   No LMP for male patient.  Allergies reviewed: Yes  Medications reviewed: Yes    Medications: Medication refills not needed today.  Pharmacy name entered into [employer]:    [id number] - [place], [place] - [address]  [facility] [id number] [place] [facility] - [place] - [address]    Clinical concerns:  doctor was notified.      Briana Eubnaks MA              Visit Date:   07/27/2020     HEMATOLOGY HISTORY: Mr. 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   1.  On 02/28/2020, WBC of 30.5, hemoglobin of 9.4 and platelet of 235.  MCV of 87.  Neutrophil of 50%, lymphocytes of 15% and monocytes of 17%. On 11/16/2018, CBC normal with WBC of 4.8, hemoglobin 15.3 and platelets of 295.  2.  CT chest, abdomen and pelvis on 02/29/2020 does not reveal any evidence of malignancy.   3.   On 03/19/2020, NGS panel negative for JAK2, CALR and MPL.  There is IDH2 R140Q and KIT D816V.  4. Bone marrow biopsy on 04/16/2020 reveals chronic myelomonocytic leukemia - 1 (CMML-1) characterized by hypercellular bone marrow (90%) with increased M:E ratio and 7% blasts. There are aggregates of atypical mast cells consistent with bone marrow " involvement by systemic mastocytosis. Increased bone marrow fibrosis (MF-1-2).  -Normal cytogenetics.   -Flow cytometry on bone marrow reveals increased monocytic cells (43%).  No increase in CD34 positive myeloid blasts.   -BCR-ABL1 bone marrow is negative.   5. On 04/29/2020, tryptase elevated at 51.3 (normal less than 11).  6. Decitabine started on 04/29/2020.     SUBJECTIVE:  Mr. Mack is an 85-year-old gentleman with CMML-1 and systemic mastocytosis.  He is on decitabine.  He will be starting cycle 4 today.      Overall, he is doing well.  He has some fatigue.  No worsening.  No headache.  No dizziness.  No chest pain.  No shortness of breath.  No abdominal pain, nausea or vomiting.  Appetite is good.  No urinary or bowel complaints.  No bleeding.  No fever, chills, or night sweats.  The patient has some anxiety.      PHYSICAL EXAMINATION:   GENERAL:  Alert and oriented x 3.   VITAL SIGNS:  Reviewed.  ECOG PS of 1.     EYES:  No icterus.   THROAT:  No ulcer. No thrush.   NECK:  Supple. No lymphadenopathy. No thyromegaly.   AXILLAE:  No lymphadenopathy.   LUNGS:  Good air entry bilaterally.  No crackles or wheezing.   HEART:  Regular.  No murmur.   GI: Abdomen is soft.  Nontender. No mass.   EXTREMITIES:  No pedal edema.  No calf swelling or tenderness.   SKIN:  No rash.      LABORATORY DATA:  Reviewed.      ASSESSMENT:     1.  An 85-year-old gentleman with CMML-1.   2.  Systemic mastocytosis   3.  Mild normocytic anemia.   4.  Anxiety.      PLAN:   1.  The patient clinically is doing better.  No excessive fatigue.  His appetite has been fairly good.  Not losing weight.  His anxiety is better.  Overall, he feels better.     2.  The patient is on decitabine.  We will start cycle 4 today.  Labs are good for treatment.  He has been tolerating it well.      The patient wants to stop this decitabine after this cycle.  The patient has anxiety.  The patient says that coming for IV treatment makes him very anxious. As  per his wishes, we will stop the decitabine after this cycle.  He can get it in the future as needed. We will consider using hydroxyurea when there is worsening of his counts.     3.  Discussed regarding bone marrow biopsy.  He does not want it.  He wants to avoid it.  I told the patient that bone marrow biopsy is best to assess the response, but since he does not want it, we will rely on his CBC.     4.  I will see him in a month with CBC.  In between, he will see our nurse practitioner.  Advised him to see a physician if he has fever, chills, worsening weakness, shortness of breath, recurrent vomiting, bleeding, or any other concerns.         GRAZYNA RICHEY MD             D: 2020   T: 2020   MT: ALBARO      Name:     RADHA ALFONSO   MRN:      [id number]        Account:      [id number]   :      1935           Visit Date:   2020      Document: [id number]      Visit Date:   2020      SUBJECTIVE:  Mr. 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  He is on decitabine.  He will be starting cycle 4 today.      Overall, he is doing well.  He has some fatigue.  No worsening.  No headache.  No dizziness.  No chest pain.  No shortness of breath.  No abdominal pain, nausea or vomiting.  Appetite good.  No urinary or bowel complaints.  No bleeding.  No fever, chills, or night sweats.  The patient has some anxiety, it is under ***  control.      PHYSICAL EXAMINATION:  Unchanged.  ECOG PS of 1.      LABORATORY DATA:  Reviewed.      ASSESSMENT:     1.  An 85-year-old gentleman with CMML-1.   2.  Systemic mastocytosis   3.  Mild normocytic anemia.   4.  Anxiety.      PLAN:   1.  The patient clinically is doing better.  No excessive fatigue.  His appetite has been fairly good.  Not losing weight.  His anxiety is better.  Overall, he feels better.   2.  The patient is on decitabine.  We will start cycle 4 today.  Labs are good for treatment.  He has been tolerating it well.       The patient wants to stop this decitabine after this cycle.  The patient has anxiety.  The patient says that coming for IV treatment makes him very anxious.      As per his wishes, we will stop the decitabine after this cycle.  He can get it in the future as needed.      We will consider using hydroxyurea when there is worsening of his counts.   3.  Discussed regarding bone marrow biopsy.  He does not want it.  He wants to avoid it.  I told the patient that bone marrow biopsy is best to assess the response, but since he does not want it, we will rely on his CBC.   4.  I will see him in a month with CBC.  In between, he will see our nurse practitioner.  Advised him to see a physician if he has fever, chills, worsening weakness, shortness of breath, recurrent vomiting, bleeding, or any other concerns.         GRAZYNA RICHEY MD             D: 2020   T: 2020   MT: ALABRO      Name:     RADHA ALFONSO   MRN:      [id number]        Account:      [id number]   :      1935           Visit Date:   2020      Document: [id number]       Again, thank you for allowing me to participate in the care of your patient.        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

## 2024-05-18 NOTE — LETTER
"    12/18/2020         RE: Jeff Mack  [address]  [place] [address]        Dear Colleague,    Thank you for referring your patient, Jeff Mack, to the [facility]. Please see a copy of my visit note below.    Oncology Rooming Note    December 18, 2020 10:10 AM   Jeff Mack is a 85 year old male who presents for:    Chief Complaint   Patient presents with     Oncology Clinic Visit     Initial Vitals: /65   Pulse 84   Temp 97.5  F (36.4  C) (Oral)   Resp 16   Wt 68 kg (150 lb)   SpO2 96%   BMI 22.15 kg/m   Estimated body mass index is 22.15 kg/m  as calculated from the following:    Height as of 12/15/20: 1.753 m (5' 9\").    Weight as of this encounter: 68 kg (150 lb). Body surface area is 1.82 meters squared.  Severe Pain (6) Comment: Data Unavailable   No LMP for male patient.  Allergies reviewed: Yes  Medications reviewed: Yes    Medications: Medication refills not needed today.  Pharmacy name entered into [employer]:    [id number] - [place] [facility] 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     -Patient completed 4 cycles of decitabine  -refused additional decitabine cycles due to anxiety related to treatment  Refused bone marrow biopsy  Patient was started with hydroxyurea however developed thrombocytopenia with it for hydroxyurea was discontinued  -Patient developed worsening of leukocytosis anemia and thrombocytopenia consistent with progression of CMML  -11/02-patient met with Dr. Ndiaye who recommended bone marrow biopsy patient refused   -11/02 Decitabine restarted    Interval History:    Patient reports that he has been " "constipated which is causing him to have generalized lower abdominal ache.  He reports is not pain it is aching.  He reports he had bowel movement 2 days ago .He started  taking senna.  He has MiraLAX at home but he is not taking it. Pt took tramadol which helped with the pain  Denies abdominal distention, denies nausea vomiting.  Denies fever ,chills, sweats, cough shortness of breath chest pain.  Denies GERD, denies urinary symptoms, denies trouble urinating  He reports energy and appetite are good      Review of Systems:  14 point ROS of systems including Constitutional, Eyes, Respiratory, Cardiovascular, Gastroenterology, Genitourinary, Integumentary, Muscularskeletal, Psychiatric were all negative except for pertinent positives noted in my HPI.      Physical Examination:  Physical Exam  HENT:      Head: Normocephalic.   Eyes:      Pupils: Pupils are equal, round, and reactive to light.   Neck:      Musculoskeletal: Normal range of motion.   Cardiovascular:      Rate and Rhythm: Normal rate.      Pulses: Normal pulses.   Pulmonary:      Effort: Pulmonary effort is normal.   Abdominal:      General: Abdomen is flat. Bowel sounds are normal. There is no distension.      Palpations: Abdomen is soft. There is no mass.      Tenderness: There is no abdominal tenderness. There is no guarding or rebound.      Hernia: No hernia is present.   Neurological:      Mental Status: He is alert.             Laboratory Data:  reviewed       Assessment and Plan:  This is a 85-year-old male with      Generalized lower lower abdominal ache.  Patient reports it does not hurt much.  It is more like ache that \"comes and goes\"   -pt not in any  distress.  Abdominal exam is normal.  -Patient reports that he gets this kind of ache with constipation  -Continue with stool softener.  I told patient to take MiraLAX daily until he has bowel movement.  -Continue with tramadol as needed  -Patient advised to go to ER in the event of changes in " symptoms, abdominal pain, abdominal distention nausea vomiting , fever chills sweats-patient verbalized understanding  I will call patient on Monday to follow-up.  We will plan for CT scan if no relief in arche         chronic myelomonocytic leukemia (CMML)    11/02 Decitabine restarted  -Labs reviewed with patient.   -Patient is responding to treatment.  WBC is back to normal.  Overall stable CBC for patient  -12/28-next cycle of decitabine and follow-up with Dr. Ndiaye      Shortness of breath- chronic issue   11/10-CT negative for pulmonary embolism. 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   Patient is asymptomatic  We will continue to monitor  Monitor Tryptase     Anxiety-chronic issue for the patient  -Continue with Xanax  He was also consulted by psychiatry with last admission he is now taking low-dose Seroquel      RAUDEL Stephens CNP  [facility]     Chart documentation with Dragon Voice recognition Software. Although reviewed after completion, some words and grammatical errors may remain.            Again, thank you for allowing me to participate in the care of your patient.        Sincerely,        RAUDEL Stephens CNP     never

## (undated) DEVICE — PEN MARKING SKIN

## (undated) DEVICE — SPECIMEN CONTAINER 60MLW/10% FORMALIN 59601

## (undated) RX ORDER — FENTANYL CITRATE 50 UG/ML
INJECTION, SOLUTION INTRAMUSCULAR; INTRAVENOUS
Status: DISPENSED
Start: 2017-05-09

## (undated) RX ORDER — FENTANYL CITRATE 50 UG/ML
INJECTION, SOLUTION INTRAMUSCULAR; INTRAVENOUS
Status: DISPENSED
Start: 2018-05-01

## (undated) RX ORDER — FENTANYL CITRATE 50 UG/ML
INJECTION, SOLUTION INTRAMUSCULAR; INTRAVENOUS
Status: DISPENSED
Start: 2020-01-01